# Patient Record
Sex: FEMALE | Race: WHITE | Employment: OTHER
[De-identification: names, ages, dates, MRNs, and addresses within clinical notes are randomized per-mention and may not be internally consistent; named-entity substitution may affect disease eponyms.]

---

## 2017-01-06 ENCOUNTER — CARE COORDINATOR VISIT (OUTPATIENT)
Dept: CARE COORDINATION | Facility: CLINIC | Age: 82
End: 2017-01-06

## 2017-01-13 ENCOUNTER — OFFICE VISIT (OUTPATIENT)
Dept: FAMILY MEDICINE CLINIC | Facility: CLINIC | Age: 82
End: 2017-01-13

## 2017-01-13 VITALS
DIASTOLIC BLOOD PRESSURE: 60 MMHG | HEART RATE: 68 BPM | WEIGHT: 201 LBS | BODY MASS INDEX: 30.46 KG/M2 | TEMPERATURE: 97.6 F | SYSTOLIC BLOOD PRESSURE: 122 MMHG | HEIGHT: 68 IN

## 2017-01-13 DIAGNOSIS — I10 ESSENTIAL HYPERTENSION: ICD-10-CM

## 2017-01-13 DIAGNOSIS — E11.9 TYPE 2 DIABETES MELLITUS WITHOUT COMPLICATION, WITHOUT LONG-TERM CURRENT USE OF INSULIN (HCC): Primary | ICD-10-CM

## 2017-01-13 DIAGNOSIS — Z91.81 AT HIGH RISK FOR FALLS: ICD-10-CM

## 2017-01-13 PROCEDURE — 4040F PNEUMOC VAC/ADMIN/RCVD: CPT | Performed by: FAMILY MEDICINE

## 2017-01-13 PROCEDURE — 1036F TOBACCO NON-USER: CPT | Performed by: FAMILY MEDICINE

## 2017-01-13 PROCEDURE — G8399 PT W/DXA RESULTS DOCUMENT: HCPCS | Performed by: FAMILY MEDICINE

## 2017-01-13 PROCEDURE — G8427 DOCREV CUR MEDS BY ELIG CLIN: HCPCS | Performed by: FAMILY MEDICINE

## 2017-01-13 PROCEDURE — G8484 FLU IMMUNIZE NO ADMIN: HCPCS | Performed by: FAMILY MEDICINE

## 2017-01-13 PROCEDURE — 99213 OFFICE O/P EST LOW 20 MIN: CPT | Performed by: FAMILY MEDICINE

## 2017-01-13 PROCEDURE — G8598 ASA/ANTIPLAT THER USED: HCPCS | Performed by: FAMILY MEDICINE

## 2017-01-13 PROCEDURE — G8419 CALC BMI OUT NRM PARAM NOF/U: HCPCS | Performed by: FAMILY MEDICINE

## 2017-01-13 PROCEDURE — 1090F PRES/ABSN URINE INCON ASSESS: CPT | Performed by: FAMILY MEDICINE

## 2017-01-13 PROCEDURE — 1123F ACP DISCUSS/DSCN MKR DOCD: CPT | Performed by: FAMILY MEDICINE

## 2017-01-13 ASSESSMENT — PATIENT HEALTH QUESTIONNAIRE - PHQ9
SUM OF ALL RESPONSES TO PHQ9 QUESTIONS 1 & 2: 0
SUM OF ALL RESPONSES TO PHQ QUESTIONS 1-9: 0
2. FEELING DOWN, DEPRESSED OR HOPELESS: 0

## 2017-01-13 ASSESSMENT — ENCOUNTER SYMPTOMS
EYE ITCHING: 0
SORE THROAT: 0
SHORTNESS OF BREATH: 0
NAUSEA: 0
ABDOMINAL PAIN: 0

## 2017-01-16 DIAGNOSIS — E11.9 TYPE 2 DIABETES MELLITUS WITHOUT COMPLICATION, WITHOUT LONG-TERM CURRENT USE OF INSULIN (HCC): ICD-10-CM

## 2017-01-16 RX ORDER — BLOOD-GLUCOSE METER
KIT MISCELLANEOUS
Qty: 200 EACH | Refills: 1 | Status: SHIPPED | OUTPATIENT
Start: 2017-01-16 | End: 2020-05-19 | Stop reason: SDUPTHER

## 2017-01-16 RX ORDER — LANCETS 28 GAUGE
EACH MISCELLANEOUS
Qty: 200 EACH | Refills: 1 | Status: SHIPPED | OUTPATIENT
Start: 2017-01-16 | End: 2021-11-17 | Stop reason: SDUPTHER

## 2017-01-18 ENCOUNTER — OFFICE VISIT (OUTPATIENT)
Dept: ORTHOPEDIC SURGERY | Facility: CLINIC | Age: 82
End: 2017-01-18

## 2017-01-18 VITALS
BODY MASS INDEX: 30.51 KG/M2 | HEIGHT: 69 IN | HEART RATE: 66 BPM | DIASTOLIC BLOOD PRESSURE: 72 MMHG | SYSTOLIC BLOOD PRESSURE: 159 MMHG | WEIGHT: 206 LBS

## 2017-01-18 DIAGNOSIS — S49.91XA RIGHT SHOULDER INJURY, INITIAL ENCOUNTER: Primary | ICD-10-CM

## 2017-01-18 PROCEDURE — G8598 ASA/ANTIPLAT THER USED: HCPCS | Performed by: ORTHOPAEDIC SURGERY

## 2017-01-18 PROCEDURE — G8419 CALC BMI OUT NRM PARAM NOF/U: HCPCS | Performed by: ORTHOPAEDIC SURGERY

## 2017-01-18 PROCEDURE — G8399 PT W/DXA RESULTS DOCUMENT: HCPCS | Performed by: ORTHOPAEDIC SURGERY

## 2017-01-18 PROCEDURE — G8484 FLU IMMUNIZE NO ADMIN: HCPCS | Performed by: ORTHOPAEDIC SURGERY

## 2017-01-18 PROCEDURE — 4040F PNEUMOC VAC/ADMIN/RCVD: CPT | Performed by: ORTHOPAEDIC SURGERY

## 2017-01-18 PROCEDURE — 1123F ACP DISCUSS/DSCN MKR DOCD: CPT | Performed by: ORTHOPAEDIC SURGERY

## 2017-01-18 PROCEDURE — 99203 OFFICE O/P NEW LOW 30 MIN: CPT | Performed by: ORTHOPAEDIC SURGERY

## 2017-01-18 PROCEDURE — 1036F TOBACCO NON-USER: CPT | Performed by: ORTHOPAEDIC SURGERY

## 2017-01-18 PROCEDURE — G8427 DOCREV CUR MEDS BY ELIG CLIN: HCPCS | Performed by: ORTHOPAEDIC SURGERY

## 2017-01-18 PROCEDURE — 1090F PRES/ABSN URINE INCON ASSESS: CPT | Performed by: ORTHOPAEDIC SURGERY

## 2017-01-18 RX ORDER — METHYLPREDNISOLONE 4 MG/1
TABLET ORAL
Qty: 1 KIT | Refills: 0 | Status: SHIPPED | OUTPATIENT
Start: 2017-01-18 | End: 2017-03-16 | Stop reason: ALTCHOICE

## 2017-02-06 ENCOUNTER — HOSPITAL ENCOUNTER (OUTPATIENT)
Dept: PHYSICAL THERAPY | Age: 82
Setting detail: THERAPIES SERIES
Discharge: HOME | End: 2017-02-06
Payer: MEDICARE

## 2017-02-06 PROCEDURE — 97110 THERAPEUTIC EXERCISES: CPT

## 2017-02-06 ASSESSMENT — PAIN DESCRIPTION - ORIENTATION: ORIENTATION: RIGHT

## 2017-02-06 ASSESSMENT — PAIN DESCRIPTION - FREQUENCY: FREQUENCY: CONTINUOUS

## 2017-02-06 ASSESSMENT — PAIN SCALES - GENERAL: PAINLEVEL_OUTOF10: 2

## 2017-02-06 ASSESSMENT — PAIN DESCRIPTION - LOCATION: LOCATION: SHOULDER

## 2017-02-06 ASSESSMENT — PAIN DESCRIPTION - DESCRIPTORS: DESCRIPTORS: ACHING;DULL;CONSTANT

## 2017-02-09 ENCOUNTER — APPOINTMENT (OUTPATIENT)
Dept: PHYSICAL THERAPY | Age: 82
End: 2017-02-09
Payer: MEDICARE

## 2017-02-13 ENCOUNTER — HOSPITAL ENCOUNTER (OUTPATIENT)
Dept: PHYSICAL THERAPY | Age: 82
Setting detail: THERAPIES SERIES
Discharge: HOME | End: 2017-02-13
Payer: MEDICARE

## 2017-02-13 PROCEDURE — 97110 THERAPEUTIC EXERCISES: CPT

## 2017-02-13 ASSESSMENT — PAIN DESCRIPTION - FREQUENCY: FREQUENCY: CONTINUOUS

## 2017-02-13 ASSESSMENT — PAIN DESCRIPTION - DESCRIPTORS: DESCRIPTORS: ACHING;DULL;CONSTANT

## 2017-02-13 ASSESSMENT — PAIN DESCRIPTION - ORIENTATION: ORIENTATION: RIGHT

## 2017-02-13 ASSESSMENT — PAIN SCALES - GENERAL: PAINLEVEL_OUTOF10: 4

## 2017-02-13 ASSESSMENT — PAIN DESCRIPTION - LOCATION: LOCATION: SHOULDER

## 2017-02-16 ENCOUNTER — HOSPITAL ENCOUNTER (OUTPATIENT)
Dept: PHYSICAL THERAPY | Age: 82
Setting detail: THERAPIES SERIES
Discharge: HOME | End: 2017-02-16
Payer: MEDICARE

## 2017-02-16 PROCEDURE — 97110 THERAPEUTIC EXERCISES: CPT

## 2017-02-16 PROCEDURE — G8984 CARRY CURRENT STATUS: HCPCS

## 2017-02-16 PROCEDURE — G8985 CARRY GOAL STATUS: HCPCS

## 2017-02-16 ASSESSMENT — PAIN DESCRIPTION - ORIENTATION: ORIENTATION: RIGHT

## 2017-02-16 ASSESSMENT — PAIN DESCRIPTION - LOCATION: LOCATION: SHOULDER

## 2017-02-16 ASSESSMENT — PAIN DESCRIPTION - FREQUENCY: FREQUENCY: CONTINUOUS

## 2017-02-16 ASSESSMENT — PAIN SCALES - GENERAL: PAINLEVEL_OUTOF10: 4

## 2017-02-16 ASSESSMENT — PAIN DESCRIPTION - DESCRIPTORS: DESCRIPTORS: ACHING;CONSTANT;DULL

## 2017-02-20 ENCOUNTER — HOSPITAL ENCOUNTER (OUTPATIENT)
Dept: PHYSICAL THERAPY | Age: 82
Setting detail: THERAPIES SERIES
Discharge: HOME | End: 2017-02-20
Payer: MEDICARE

## 2017-02-20 PROCEDURE — 97110 THERAPEUTIC EXERCISES: CPT

## 2017-02-20 ASSESSMENT — PAIN DESCRIPTION - LOCATION: LOCATION: SHOULDER

## 2017-02-20 ASSESSMENT — PAIN DESCRIPTION - DESCRIPTORS: DESCRIPTORS: ACHING;CONSTANT;DULL

## 2017-02-20 ASSESSMENT — PAIN DESCRIPTION - FREQUENCY: FREQUENCY: CONTINUOUS

## 2017-02-20 ASSESSMENT — PAIN SCALES - GENERAL: PAINLEVEL_OUTOF10: 3

## 2017-02-20 ASSESSMENT — PAIN DESCRIPTION - ORIENTATION: ORIENTATION: RIGHT

## 2017-02-22 ENCOUNTER — HOSPITAL ENCOUNTER (OUTPATIENT)
Dept: PHYSICAL THERAPY | Age: 82
Setting detail: THERAPIES SERIES
Discharge: HOME | End: 2017-02-22
Payer: MEDICARE

## 2017-02-22 PROCEDURE — 97110 THERAPEUTIC EXERCISES: CPT

## 2017-02-22 ASSESSMENT — PAIN SCALES - GENERAL: PAINLEVEL_OUTOF10: 3

## 2017-02-22 ASSESSMENT — PAIN DESCRIPTION - ORIENTATION: ORIENTATION: RIGHT

## 2017-02-22 ASSESSMENT — PAIN DESCRIPTION - FREQUENCY: FREQUENCY: CONTINUOUS

## 2017-02-22 ASSESSMENT — PAIN DESCRIPTION - DESCRIPTORS: DESCRIPTORS: ACHING;CONSTANT

## 2017-02-22 ASSESSMENT — PAIN DESCRIPTION - LOCATION: LOCATION: SHOULDER

## 2017-02-27 ENCOUNTER — HOSPITAL ENCOUNTER (OUTPATIENT)
Dept: PHYSICAL THERAPY | Age: 82
Setting detail: THERAPIES SERIES
Discharge: HOME | End: 2017-02-27
Payer: MEDICARE

## 2017-02-27 PROCEDURE — 97110 THERAPEUTIC EXERCISES: CPT

## 2017-02-27 ASSESSMENT — PAIN DESCRIPTION - ORIENTATION: ORIENTATION: RIGHT

## 2017-02-27 ASSESSMENT — PAIN DESCRIPTION - DESCRIPTORS: DESCRIPTORS: ACHING;CONSTANT

## 2017-02-27 ASSESSMENT — PAIN DESCRIPTION - LOCATION: LOCATION: SHOULDER

## 2017-02-27 ASSESSMENT — PAIN SCALES - GENERAL: PAINLEVEL_OUTOF10: 4

## 2017-02-27 ASSESSMENT — PAIN DESCRIPTION - FREQUENCY: FREQUENCY: CONTINUOUS

## 2017-03-01 ENCOUNTER — HOSPITAL ENCOUNTER (OUTPATIENT)
Dept: PHYSICAL THERAPY | Age: 82
Setting detail: THERAPIES SERIES
Discharge: HOME | End: 2017-03-01
Payer: MEDICARE

## 2017-03-01 PROCEDURE — G8984 CARRY CURRENT STATUS: HCPCS

## 2017-03-01 PROCEDURE — 97110 THERAPEUTIC EXERCISES: CPT

## 2017-03-01 PROCEDURE — G8985 CARRY GOAL STATUS: HCPCS

## 2017-03-01 PROCEDURE — 97164 PT RE-EVAL EST PLAN CARE: CPT

## 2017-03-01 ASSESSMENT — PAIN DESCRIPTION - FREQUENCY: FREQUENCY: CONTINUOUS

## 2017-03-01 ASSESSMENT — PAIN DESCRIPTION - ORIENTATION: ORIENTATION: RIGHT

## 2017-03-01 ASSESSMENT — PAIN SCALES - GENERAL: PAINLEVEL_OUTOF10: 3

## 2017-03-01 ASSESSMENT — PAIN DESCRIPTION - LOCATION: LOCATION: SHOULDER

## 2017-03-01 ASSESSMENT — PAIN DESCRIPTION - DESCRIPTORS: DESCRIPTORS: ACHING;CONSTANT

## 2017-03-02 ENCOUNTER — TELEPHONE (OUTPATIENT)
Dept: ORTHOPEDIC SURGERY | Facility: CLINIC | Age: 82
End: 2017-03-02

## 2017-03-02 DIAGNOSIS — M25.511 CHRONIC RIGHT SHOULDER PAIN: Primary | ICD-10-CM

## 2017-03-02 DIAGNOSIS — G89.29 CHRONIC RIGHT SHOULDER PAIN: Primary | ICD-10-CM

## 2017-03-03 ENCOUNTER — HOSPITAL ENCOUNTER (OUTPATIENT)
Dept: PHYSICAL THERAPY | Age: 82
Setting detail: THERAPIES SERIES
Discharge: HOME | End: 2017-03-03
Payer: MEDICARE

## 2017-03-03 PROCEDURE — 97110 THERAPEUTIC EXERCISES: CPT

## 2017-03-03 ASSESSMENT — PAIN DESCRIPTION - ORIENTATION: ORIENTATION: RIGHT

## 2017-03-03 ASSESSMENT — PAIN DESCRIPTION - DESCRIPTORS: DESCRIPTORS: ACHING;CONSTANT

## 2017-03-03 ASSESSMENT — PAIN DESCRIPTION - FREQUENCY: FREQUENCY: CONTINUOUS

## 2017-03-03 ASSESSMENT — PAIN DESCRIPTION - LOCATION: LOCATION: SHOULDER

## 2017-03-03 ASSESSMENT — PAIN SCALES - GENERAL: PAINLEVEL_OUTOF10: 3

## 2017-03-09 ENCOUNTER — TELEPHONE (OUTPATIENT)
Dept: ORTHOPEDIC SURGERY | Facility: CLINIC | Age: 82
End: 2017-03-09

## 2017-03-13 ENCOUNTER — HOSPITAL ENCOUNTER (OUTPATIENT)
Dept: MRI IMAGING | Age: 82
Discharge: HOME | End: 2017-03-13
Payer: MEDICARE

## 2017-03-13 DIAGNOSIS — G89.29 CHRONIC RIGHT SHOULDER PAIN: ICD-10-CM

## 2017-03-13 DIAGNOSIS — M25.511 CHRONIC RIGHT SHOULDER PAIN: ICD-10-CM

## 2017-03-13 PROCEDURE — 73221 MRI JOINT UPR EXTREM W/O DYE: CPT

## 2017-03-14 ENCOUNTER — TELEPHONE (OUTPATIENT)
Dept: ORTHOPEDIC SURGERY | Age: 82
End: 2017-03-14

## 2017-03-16 ENCOUNTER — OFFICE VISIT (OUTPATIENT)
Dept: FAMILY MEDICINE CLINIC | Age: 82
End: 2017-03-16
Payer: MEDICARE

## 2017-03-16 VITALS
HEART RATE: 71 BPM | SYSTOLIC BLOOD PRESSURE: 134 MMHG | WEIGHT: 206.2 LBS | HEIGHT: 69 IN | BODY MASS INDEX: 30.54 KG/M2 | DIASTOLIC BLOOD PRESSURE: 72 MMHG | TEMPERATURE: 98.3 F

## 2017-03-16 DIAGNOSIS — I25.810 CORONARY ARTERY DISEASE INVOLVING OTHER CORONARY ARTERY BYPASS GRAFT: ICD-10-CM

## 2017-03-16 DIAGNOSIS — E11.9 TYPE 2 DIABETES MELLITUS WITHOUT COMPLICATION, WITHOUT LONG-TERM CURRENT USE OF INSULIN (HCC): Primary | ICD-10-CM

## 2017-03-16 DIAGNOSIS — M10.9 GOUT OF FOOT, UNSPECIFIED CAUSE, UNSPECIFIED CHRONICITY, UNSPECIFIED LATERALITY: ICD-10-CM

## 2017-03-16 DIAGNOSIS — I10 ESSENTIAL HYPERTENSION: ICD-10-CM

## 2017-03-16 LAB — HBA1C MFR BLD: 6.6 %

## 2017-03-16 PROCEDURE — G8598 ASA/ANTIPLAT THER USED: HCPCS | Performed by: FAMILY MEDICINE

## 2017-03-16 PROCEDURE — 1123F ACP DISCUSS/DSCN MKR DOCD: CPT | Performed by: FAMILY MEDICINE

## 2017-03-16 PROCEDURE — 1036F TOBACCO NON-USER: CPT | Performed by: FAMILY MEDICINE

## 2017-03-16 PROCEDURE — G8484 FLU IMMUNIZE NO ADMIN: HCPCS | Performed by: FAMILY MEDICINE

## 2017-03-16 PROCEDURE — G8427 DOCREV CUR MEDS BY ELIG CLIN: HCPCS | Performed by: FAMILY MEDICINE

## 2017-03-16 PROCEDURE — 99214 OFFICE O/P EST MOD 30 MIN: CPT | Performed by: FAMILY MEDICINE

## 2017-03-16 PROCEDURE — 4040F PNEUMOC VAC/ADMIN/RCVD: CPT | Performed by: FAMILY MEDICINE

## 2017-03-16 PROCEDURE — G8399 PT W/DXA RESULTS DOCUMENT: HCPCS | Performed by: FAMILY MEDICINE

## 2017-03-16 PROCEDURE — 1090F PRES/ABSN URINE INCON ASSESS: CPT | Performed by: FAMILY MEDICINE

## 2017-03-16 PROCEDURE — G8419 CALC BMI OUT NRM PARAM NOF/U: HCPCS | Performed by: FAMILY MEDICINE

## 2017-03-16 PROCEDURE — 83036 HEMOGLOBIN GLYCOSYLATED A1C: CPT | Performed by: FAMILY MEDICINE

## 2017-03-16 RX ORDER — ISOSORBIDE MONONITRATE 30 MG/1
TABLET, EXTENDED RELEASE ORAL
COMMUNITY
Start: 2017-03-14 | End: 2017-06-12 | Stop reason: ALTCHOICE

## 2017-03-16 RX ORDER — AMLODIPINE BESYLATE 2.5 MG/1
TABLET ORAL
COMMUNITY
Start: 2017-02-01 | End: 2017-03-16 | Stop reason: ALTCHOICE

## 2017-03-16 ASSESSMENT — PATIENT HEALTH QUESTIONNAIRE - PHQ9
2. FEELING DOWN, DEPRESSED OR HOPELESS: 0
SUM OF ALL RESPONSES TO PHQ9 QUESTIONS 1 & 2: 0
1. LITTLE INTEREST OR PLEASURE IN DOING THINGS: 0
SUM OF ALL RESPONSES TO PHQ QUESTIONS 1-9: 0

## 2017-03-16 ASSESSMENT — ENCOUNTER SYMPTOMS
SORE THROAT: 0
SHORTNESS OF BREATH: 0
NAUSEA: 0
ABDOMINAL PAIN: 0

## 2017-03-22 ENCOUNTER — OFFICE VISIT (OUTPATIENT)
Dept: ORTHOPEDIC SURGERY | Age: 82
End: 2017-03-22
Payer: MEDICARE

## 2017-03-22 VITALS — HEART RATE: 70 BPM | DIASTOLIC BLOOD PRESSURE: 79 MMHG | SYSTOLIC BLOOD PRESSURE: 169 MMHG

## 2017-03-22 DIAGNOSIS — M75.101 TEAR OF RIGHT ROTATOR CUFF, UNSPECIFIED TEAR EXTENT: Primary | ICD-10-CM

## 2017-03-22 PROCEDURE — G8399 PT W/DXA RESULTS DOCUMENT: HCPCS | Performed by: ORTHOPAEDIC SURGERY

## 2017-03-22 PROCEDURE — G8484 FLU IMMUNIZE NO ADMIN: HCPCS | Performed by: ORTHOPAEDIC SURGERY

## 2017-03-22 PROCEDURE — 1123F ACP DISCUSS/DSCN MKR DOCD: CPT | Performed by: ORTHOPAEDIC SURGERY

## 2017-03-22 PROCEDURE — 1036F TOBACCO NON-USER: CPT | Performed by: ORTHOPAEDIC SURGERY

## 2017-03-22 PROCEDURE — G8598 ASA/ANTIPLAT THER USED: HCPCS | Performed by: ORTHOPAEDIC SURGERY

## 2017-03-22 PROCEDURE — 1090F PRES/ABSN URINE INCON ASSESS: CPT | Performed by: ORTHOPAEDIC SURGERY

## 2017-03-22 PROCEDURE — 99212 OFFICE O/P EST SF 10 MIN: CPT | Performed by: ORTHOPAEDIC SURGERY

## 2017-03-22 PROCEDURE — G8419 CALC BMI OUT NRM PARAM NOF/U: HCPCS | Performed by: ORTHOPAEDIC SURGERY

## 2017-03-22 PROCEDURE — 4040F PNEUMOC VAC/ADMIN/RCVD: CPT | Performed by: ORTHOPAEDIC SURGERY

## 2017-03-22 PROCEDURE — G8427 DOCREV CUR MEDS BY ELIG CLIN: HCPCS | Performed by: ORTHOPAEDIC SURGERY

## 2017-03-23 LAB
ALBUMIN SERPL-MCNC: 4.4 G/DL (ref 3.2–5.3)
ALK PHOSPHATASE: 65 IU/L (ref 35–121)
ALT SERPL-CCNC: 37 IU/L (ref 5–59)
ANION GAP SERPL CALCULATED.3IONS-SCNC: 16 MMOL/L
AST SERPL-CCNC: 35 IU/L (ref 10–42)
BILIRUB SERPL-MCNC: 1.6 MG/DL (ref 0.2–1.3)
BUN BLDV-MCNC: 20 MG/DL (ref 9–24)
CALCIUM SERPL-MCNC: 10.1 MG/DL (ref 8.7–10.8)
CHLORIDE BLD-SCNC: 102 MMOL/L (ref 95–111)
CHOLESTEROL/HDL RATIO: 3.2
CHOLESTEROL: 122 MG/DL
CO2: 29 MMOL/L (ref 21–32)
CREAT SERPL-MCNC: 0.8 MG/DL (ref 0.5–1.3)
EGFR AFRICAN AMERICAN: 83
EGFR IF NONAFRICAN AMERICAN: 69
GLUCOSE: 155 MG/DL (ref 70–100)
HDLC SERPL-MCNC: 38 MG/DL (ref 40–60)
LDL CHOLESTEROL CALCULATED: 34 MG/DL
LDL/HDL RATIO: 0.9
POTASSIUM SERPL-SCNC: 4.7 MMOL/L (ref 3.5–5.4)
SODIUM BLD-SCNC: 142 MMOL/L (ref 134–147)
TOTAL PROTEIN: 7 G/DL (ref 5.8–8)
TRIGL SERPL-MCNC: 248 MG/DL
VLDLC SERPL CALC-MCNC: 50 MG/DL

## 2017-04-13 RX ORDER — GLIMEPIRIDE 4 MG/1
TABLET ORAL
Qty: 180 TABLET | Refills: 0 | Status: SHIPPED | OUTPATIENT
Start: 2017-04-13 | End: 2017-07-12 | Stop reason: SDUPTHER

## 2017-04-13 RX ORDER — ALLOPURINOL 100 MG/1
TABLET ORAL
Qty: 90 TABLET | Refills: 0 | Status: SHIPPED | OUTPATIENT
Start: 2017-04-13 | End: 2017-07-12 | Stop reason: SDUPTHER

## 2017-05-22 ENCOUNTER — OFFICE VISIT (OUTPATIENT)
Dept: ORTHOPEDIC SURGERY | Age: 82
End: 2017-05-22
Payer: MEDICARE

## 2017-05-22 VITALS — HEART RATE: 78 BPM | RESPIRATION RATE: 16 BRPM

## 2017-05-22 DIAGNOSIS — S46.001D UNSPECIFIED INJURY OF MUSCLE(S) AND TENDON(S) OF THE ROTATOR CUFF OF RIGHT SHOULDER, SUBSEQUENT ENCOUNTER: Primary | ICD-10-CM

## 2017-05-22 PROCEDURE — 1123F ACP DISCUSS/DSCN MKR DOCD: CPT | Performed by: ORTHOPAEDIC SURGERY

## 2017-05-22 PROCEDURE — G8399 PT W/DXA RESULTS DOCUMENT: HCPCS | Performed by: ORTHOPAEDIC SURGERY

## 2017-05-22 PROCEDURE — 1036F TOBACCO NON-USER: CPT | Performed by: ORTHOPAEDIC SURGERY

## 2017-05-22 PROCEDURE — G8427 DOCREV CUR MEDS BY ELIG CLIN: HCPCS | Performed by: ORTHOPAEDIC SURGERY

## 2017-05-22 PROCEDURE — 4040F PNEUMOC VAC/ADMIN/RCVD: CPT | Performed by: ORTHOPAEDIC SURGERY

## 2017-05-22 PROCEDURE — G8419 CALC BMI OUT NRM PARAM NOF/U: HCPCS | Performed by: ORTHOPAEDIC SURGERY

## 2017-05-22 PROCEDURE — 99213 OFFICE O/P EST LOW 20 MIN: CPT | Performed by: ORTHOPAEDIC SURGERY

## 2017-05-22 PROCEDURE — G8598 ASA/ANTIPLAT THER USED: HCPCS | Performed by: ORTHOPAEDIC SURGERY

## 2017-05-22 PROCEDURE — 1090F PRES/ABSN URINE INCON ASSESS: CPT | Performed by: ORTHOPAEDIC SURGERY

## 2017-05-24 RX ORDER — LINAGLIPTIN 5 MG/1
TABLET, FILM COATED ORAL
Qty: 90 TABLET | Refills: 1 | Status: SHIPPED | OUTPATIENT
Start: 2017-05-24 | End: 2017-11-20 | Stop reason: SDUPTHER

## 2017-05-24 RX ORDER — RIVAROXABAN 20 MG/1
TABLET, FILM COATED ORAL
Qty: 90 TABLET | Refills: 1 | Status: SHIPPED | OUTPATIENT
Start: 2017-05-24 | End: 2017-11-20 | Stop reason: SDUPTHER

## 2017-06-12 ENCOUNTER — HOSPITAL ENCOUNTER (OUTPATIENT)
Age: 82
Setting detail: SPECIMEN
Discharge: HOME | End: 2017-06-12
Payer: MEDICARE

## 2017-06-12 ENCOUNTER — OFFICE VISIT (OUTPATIENT)
Dept: FAMILY MEDICINE CLINIC | Age: 82
End: 2017-06-12
Payer: MEDICARE

## 2017-06-12 VITALS
BODY MASS INDEX: 31.67 KG/M2 | SYSTOLIC BLOOD PRESSURE: 160 MMHG | DIASTOLIC BLOOD PRESSURE: 84 MMHG | WEIGHT: 209 LBS | HEART RATE: 75 BPM | HEIGHT: 68 IN | TEMPERATURE: 99 F | RESPIRATION RATE: 18 BRPM

## 2017-06-12 DIAGNOSIS — I10 ESSENTIAL HYPERTENSION: ICD-10-CM

## 2017-06-12 DIAGNOSIS — R07.81 RIB PAIN ON RIGHT SIDE: ICD-10-CM

## 2017-06-12 DIAGNOSIS — E11.9 TYPE 2 DIABETES MELLITUS WITHOUT COMPLICATION, WITHOUT LONG-TERM CURRENT USE OF INSULIN (HCC): Primary | ICD-10-CM

## 2017-06-12 DIAGNOSIS — M54.89 OTHER BACK PAIN: ICD-10-CM

## 2017-06-12 LAB
-: ABNORMAL
AMORPHOUS: ABNORMAL
BACTERIA: ABNORMAL
BILIRUBIN URINE: NEGATIVE
BILIRUBIN, POC: NEGATIVE
BLOOD URINE, POC: NEGATIVE
CASTS UA: ABNORMAL /LPF (ref 0–2)
CLARITY, POC: CLEAR
COLOR, POC: YELLOW
COLOR: YELLOW
COMMENT UA: ABNORMAL
CRYSTALS, UA: ABNORMAL /HPF
EPITHELIAL CELLS UA: ABNORMAL /HPF (ref 0–5)
GLUCOSE URINE, POC: NEGATIVE
GLUCOSE URINE: NEGATIVE
KETONES, POC: NEGATIVE
KETONES, URINE: NEGATIVE
LEUKOCYTE EST, POC: NORMAL
LEUKOCYTE ESTERASE, URINE: ABNORMAL
MUCUS: ABNORMAL
NITRITE, POC: NEGATIVE
NITRITE, URINE: NEGATIVE
OTHER OBSERVATIONS UA: ABNORMAL
PH UA: 5 (ref 5–8)
PH, POC: 5
PROTEIN UA: NEGATIVE
PROTEIN, POC: NEGATIVE
RBC UA: ABNORMAL /HPF (ref 0–2)
RENAL EPITHELIAL, UA: ABNORMAL /HPF
SPECIFIC GRAVITY UA: 1.02 (ref 1–1.03)
SPECIFIC GRAVITY, POC: 1.01
TRICHOMONAS: ABNORMAL
TURBIDITY: CLEAR
URINE HGB: NEGATIVE
UROBILINOGEN, POC: NEGATIVE
UROBILINOGEN, URINE: NORMAL
WBC UA: ABNORMAL /HPF (ref 0–5)
YEAST: ABNORMAL

## 2017-06-12 PROCEDURE — 1090F PRES/ABSN URINE INCON ASSESS: CPT | Performed by: FAMILY MEDICINE

## 2017-06-12 PROCEDURE — 81003 URINALYSIS AUTO W/O SCOPE: CPT | Performed by: FAMILY MEDICINE

## 2017-06-12 PROCEDURE — 1123F ACP DISCUSS/DSCN MKR DOCD: CPT | Performed by: FAMILY MEDICINE

## 2017-06-12 PROCEDURE — 99213 OFFICE O/P EST LOW 20 MIN: CPT | Performed by: FAMILY MEDICINE

## 2017-06-12 PROCEDURE — 1036F TOBACCO NON-USER: CPT | Performed by: FAMILY MEDICINE

## 2017-06-12 PROCEDURE — 4040F PNEUMOC VAC/ADMIN/RCVD: CPT | Performed by: FAMILY MEDICINE

## 2017-06-12 PROCEDURE — G8598 ASA/ANTIPLAT THER USED: HCPCS | Performed by: FAMILY MEDICINE

## 2017-06-12 PROCEDURE — G8417 CALC BMI ABV UP PARAM F/U: HCPCS | Performed by: FAMILY MEDICINE

## 2017-06-12 PROCEDURE — G8399 PT W/DXA RESULTS DOCUMENT: HCPCS | Performed by: FAMILY MEDICINE

## 2017-06-12 PROCEDURE — G8427 DOCREV CUR MEDS BY ELIG CLIN: HCPCS | Performed by: FAMILY MEDICINE

## 2017-06-12 RX ORDER — AMOXICILLIN 500 MG/1
CAPSULE ORAL
Refills: 0 | COMMUNITY
Start: 2017-06-01 | End: 2017-09-12 | Stop reason: ALTCHOICE

## 2017-06-12 ASSESSMENT — ENCOUNTER SYMPTOMS
ABDOMINAL PAIN: 0
CONSTIPATION: 0
SHORTNESS OF BREATH: 0
NAUSEA: 0
SORE THROAT: 0

## 2017-06-13 ENCOUNTER — HOSPITAL ENCOUNTER (OUTPATIENT)
Dept: GENERAL RADIOLOGY | Age: 82
Discharge: HOME | End: 2017-06-13
Payer: MEDICARE

## 2017-06-13 ENCOUNTER — HOSPITAL ENCOUNTER (OUTPATIENT)
Age: 82
Discharge: HOME | End: 2017-06-13
Payer: MEDICARE

## 2017-06-13 DIAGNOSIS — R07.81 RIB PAIN ON RIGHT SIDE: ICD-10-CM

## 2017-06-13 LAB
CULTURE: NO GROWTH
CULTURE: NORMAL
Lab: NORMAL
SPECIMEN DESCRIPTION: NORMAL
STATUS: NORMAL

## 2017-06-13 PROCEDURE — 71100 X-RAY EXAM RIBS UNI 2 VIEWS: CPT

## 2017-06-15 ENCOUNTER — TELEPHONE (OUTPATIENT)
Dept: FAMILY MEDICINE CLINIC | Age: 82
End: 2017-06-15

## 2017-06-15 DIAGNOSIS — R10.9 RIGHT FLANK PAIN: Primary | ICD-10-CM

## 2017-06-28 ENCOUNTER — OFFICE VISIT (OUTPATIENT)
Dept: ORTHOPEDIC SURGERY | Age: 82
End: 2017-06-28
Payer: MEDICARE

## 2017-06-28 VITALS
HEIGHT: 68 IN | BODY MASS INDEX: 31.67 KG/M2 | WEIGHT: 209 LBS | SYSTOLIC BLOOD PRESSURE: 166 MMHG | DIASTOLIC BLOOD PRESSURE: 75 MMHG | HEART RATE: 66 BPM

## 2017-06-28 DIAGNOSIS — Z96.653 PRESENCE OF BOTH ARTIFICIAL KNEE JOINTS: Primary | ICD-10-CM

## 2017-06-28 PROCEDURE — 1036F TOBACCO NON-USER: CPT | Performed by: ORTHOPAEDIC SURGERY

## 2017-06-28 PROCEDURE — 1123F ACP DISCUSS/DSCN MKR DOCD: CPT | Performed by: ORTHOPAEDIC SURGERY

## 2017-06-28 PROCEDURE — G8427 DOCREV CUR MEDS BY ELIG CLIN: HCPCS | Performed by: ORTHOPAEDIC SURGERY

## 2017-06-28 PROCEDURE — 99213 OFFICE O/P EST LOW 20 MIN: CPT | Performed by: ORTHOPAEDIC SURGERY

## 2017-06-28 PROCEDURE — G8399 PT W/DXA RESULTS DOCUMENT: HCPCS | Performed by: ORTHOPAEDIC SURGERY

## 2017-06-28 PROCEDURE — 1090F PRES/ABSN URINE INCON ASSESS: CPT | Performed by: ORTHOPAEDIC SURGERY

## 2017-06-28 PROCEDURE — 4040F PNEUMOC VAC/ADMIN/RCVD: CPT | Performed by: ORTHOPAEDIC SURGERY

## 2017-06-28 PROCEDURE — G8417 CALC BMI ABV UP PARAM F/U: HCPCS | Performed by: ORTHOPAEDIC SURGERY

## 2017-06-28 PROCEDURE — G8598 ASA/ANTIPLAT THER USED: HCPCS | Performed by: ORTHOPAEDIC SURGERY

## 2017-07-12 RX ORDER — ALLOPURINOL 100 MG/1
TABLET ORAL
Qty: 90 TABLET | Refills: 1 | Status: SHIPPED | OUTPATIENT
Start: 2017-07-12 | End: 2018-01-08 | Stop reason: SDUPTHER

## 2017-07-12 RX ORDER — GLIMEPIRIDE 4 MG/1
TABLET ORAL
Qty: 180 TABLET | Refills: 1 | Status: SHIPPED | OUTPATIENT
Start: 2017-07-12 | End: 2018-01-08 | Stop reason: SDUPTHER

## 2017-08-11 ENCOUNTER — HOSPITAL ENCOUNTER (OUTPATIENT)
Dept: WOMENS IMAGING | Age: 82
Discharge: HOME | End: 2017-08-11
Payer: MEDICARE

## 2017-08-11 DIAGNOSIS — Z12.31 VISIT FOR SCREENING MAMMOGRAM: ICD-10-CM

## 2017-08-11 PROCEDURE — 77063 BREAST TOMOSYNTHESIS BI: CPT

## 2017-08-15 ENCOUNTER — TELEPHONE (OUTPATIENT)
Dept: FAMILY MEDICINE CLINIC | Age: 82
End: 2017-08-15

## 2017-09-12 ENCOUNTER — HOSPITAL ENCOUNTER (OUTPATIENT)
Age: 82
Setting detail: SPECIMEN
Discharge: HOME | End: 2017-09-12
Payer: MEDICARE

## 2017-09-12 ENCOUNTER — OFFICE VISIT (OUTPATIENT)
Dept: FAMILY MEDICINE CLINIC | Age: 82
End: 2017-09-12
Payer: MEDICARE

## 2017-09-12 VITALS
HEIGHT: 68 IN | BODY MASS INDEX: 30.77 KG/M2 | WEIGHT: 203 LBS | SYSTOLIC BLOOD PRESSURE: 128 MMHG | OXYGEN SATURATION: 98 % | HEART RATE: 61 BPM | DIASTOLIC BLOOD PRESSURE: 58 MMHG | TEMPERATURE: 98.1 F

## 2017-09-12 DIAGNOSIS — B34.9 VIRAL SYNDROME: ICD-10-CM

## 2017-09-12 DIAGNOSIS — I10 ESSENTIAL HYPERTENSION: ICD-10-CM

## 2017-09-12 DIAGNOSIS — M10.9 GOUT OF FOOT, UNSPECIFIED CAUSE, UNSPECIFIED CHRONICITY, UNSPECIFIED LATERALITY: ICD-10-CM

## 2017-09-12 DIAGNOSIS — E11.9 TYPE 2 DIABETES MELLITUS WITHOUT COMPLICATION, WITHOUT LONG-TERM CURRENT USE OF INSULIN (HCC): Primary | ICD-10-CM

## 2017-09-12 LAB
CREATININE URINE: 187.2 MG/DL (ref 28–217)
HBA1C MFR BLD: 6.6 %
MICROALBUMIN/CREAT 24H UR: 32 MG/L
MICROALBUMIN/CREAT UR-RTO: 17 MCG/MG CREAT

## 2017-09-12 PROCEDURE — 83036 HEMOGLOBIN GLYCOSYLATED A1C: CPT | Performed by: FAMILY MEDICINE

## 2017-09-12 PROCEDURE — G8399 PT W/DXA RESULTS DOCUMENT: HCPCS | Performed by: FAMILY MEDICINE

## 2017-09-12 PROCEDURE — 1123F ACP DISCUSS/DSCN MKR DOCD: CPT | Performed by: FAMILY MEDICINE

## 2017-09-12 PROCEDURE — 1090F PRES/ABSN URINE INCON ASSESS: CPT | Performed by: FAMILY MEDICINE

## 2017-09-12 PROCEDURE — 1036F TOBACCO NON-USER: CPT | Performed by: FAMILY MEDICINE

## 2017-09-12 PROCEDURE — G8417 CALC BMI ABV UP PARAM F/U: HCPCS | Performed by: FAMILY MEDICINE

## 2017-09-12 PROCEDURE — G8598 ASA/ANTIPLAT THER USED: HCPCS | Performed by: FAMILY MEDICINE

## 2017-09-12 PROCEDURE — 99214 OFFICE O/P EST MOD 30 MIN: CPT | Performed by: FAMILY MEDICINE

## 2017-09-12 PROCEDURE — G8427 DOCREV CUR MEDS BY ELIG CLIN: HCPCS | Performed by: FAMILY MEDICINE

## 2017-09-12 PROCEDURE — 4040F PNEUMOC VAC/ADMIN/RCVD: CPT | Performed by: FAMILY MEDICINE

## 2017-09-12 RX ORDER — AMLODIPINE BESYLATE 2.5 MG/1
TABLET ORAL
COMMUNITY
Start: 2017-07-31 | End: 2017-09-12 | Stop reason: SDUPTHER

## 2017-09-12 ASSESSMENT — ENCOUNTER SYMPTOMS
NAUSEA: 0
CONSTIPATION: 0
COUGH: 1
WHEEZING: 0
ABDOMINAL PAIN: 0
SORE THROAT: 0
SHORTNESS OF BREATH: 0

## 2017-10-11 LAB
ABSOLUTE BASO #: 0 K/UL (ref 0–0.1)
ABSOLUTE EOS #: 0.2 K/UL (ref 0.1–0.4)
ABSOLUTE LYMPH #: 2.5 K/UL (ref 0.8–5.2)
ABSOLUTE MONO #: 0.7 K/UL (ref 0.1–0.9)
ABSOLUTE NEUT #: 4.7 K/UL (ref 1.3–9.1)
ALBUMIN SERPL-MCNC: 4.5 G/DL (ref 3.2–5.3)
ALK PHOSPHATASE: 64 IU/L (ref 35–121)
ALT SERPL-CCNC: 27 IU/L (ref 5–59)
ANION GAP SERPL CALCULATED.3IONS-SCNC: 13 MMOL/L
AST SERPL-CCNC: 26 IU/L (ref 10–42)
BASOPHILS RELATIVE PERCENT: 0.5 %
BILIRUB SERPL-MCNC: 1.5 MG/DL (ref 0.2–1.3)
BUN BLDV-MCNC: 26 MG/DL (ref 9–24)
CALCIUM SERPL-MCNC: 10 MG/DL (ref 8.7–10.8)
CHLORIDE BLD-SCNC: 103 MMOL/L (ref 95–111)
CHOLESTEROL/HDL RATIO: 3
CHOLESTEROL: 120 MG/DL
CO2: 29 MMOL/L (ref 21–32)
CREAT SERPL-MCNC: 0.9 MG/DL (ref 0.5–1.3)
EGFR AFRICAN AMERICAN: 72
EGFR IF NONAFRICAN AMERICAN: 60
EOSINOPHILS RELATIVE PERCENT: 2.8 %
GLUCOSE: 168 MG/DL (ref 70–100)
HCT VFR BLD CALC: 41.6 % (ref 36–48)
HDLC SERPL-MCNC: 40 MG/DL (ref 40–60)
HEMOGLOBIN: 13.8 G/DL (ref 12–16)
LDL CHOLESTEROL CALCULATED: 46 MG/DL
LDL/HDL RATIO: 1.2
LYMPHOCYTE %: 30.6 %
MCH RBC QN AUTO: 28.7 PG (ref 27–34)
MCHC RBC AUTO-ENTMCNC: 33.2 G/DL (ref 31–36)
MCV RBC AUTO: 86.5 FL (ref 80–100)
MONOCYTES # BLD: 8.7 %
NEUTROPHILS RELATIVE PERCENT: 57 %
PDW BLD-RTO: 13.8 % (ref 10.8–14.8)
PLATELETS: 213 K/UL (ref 150–450)
POTASSIUM SERPL-SCNC: 5.1 MMOL/L (ref 3.5–5.4)
RBC: 4.81 M/UL (ref 4–5.5)
SODIUM BLD-SCNC: 140 MMOL/L (ref 134–147)
TOTAL PROTEIN: 6.7 G/DL (ref 5.8–8)
TRIGL SERPL-MCNC: 172 MG/DL
URIC ACID: 6.3 MG/DL (ref 2.8–6.8)
VLDLC SERPL CALC-MCNC: 34 MG/DL
WBC: 8.2 K/UL (ref 3.7–10.8)

## 2017-10-27 ENCOUNTER — NURSE ONLY (OUTPATIENT)
Dept: FAMILY MEDICINE CLINIC | Age: 82
End: 2017-10-27
Payer: MEDICARE

## 2017-10-27 DIAGNOSIS — Z23 NEED FOR INFLUENZA VACCINATION: Primary | ICD-10-CM

## 2017-10-27 PROCEDURE — 90688 IIV4 VACCINE SPLT 0.5 ML IM: CPT | Performed by: FAMILY MEDICINE

## 2017-10-27 PROCEDURE — G0008 ADMIN INFLUENZA VIRUS VAC: HCPCS | Performed by: FAMILY MEDICINE

## 2017-10-27 NOTE — PROGRESS NOTES
After obtaining consent, and per orders of Dr. Alex Singletary, injection of Fluzone Quadrivalent, Influenza Vaccination given in Left deltoid by Dondra Merlin. Patient instructed to report any adverse reaction to me immediately. Patient given education of flu vaccine at time of office visit.

## 2017-11-20 RX ORDER — LINAGLIPTIN 5 MG/1
TABLET, FILM COATED ORAL
Qty: 90 TABLET | Refills: 1 | Status: SHIPPED | OUTPATIENT
Start: 2017-11-20 | End: 2018-07-27 | Stop reason: SDUPTHER

## 2017-11-20 RX ORDER — RIVAROXABAN 20 MG/1
TABLET, FILM COATED ORAL
Qty: 90 TABLET | Refills: 1 | Status: SHIPPED | OUTPATIENT
Start: 2017-11-20 | End: 2018-05-19 | Stop reason: SDUPTHER

## 2017-12-12 ENCOUNTER — OFFICE VISIT (OUTPATIENT)
Dept: FAMILY MEDICINE CLINIC | Age: 82
End: 2017-12-12
Payer: MEDICARE

## 2017-12-12 VITALS
HEART RATE: 68 BPM | OXYGEN SATURATION: 96 % | DIASTOLIC BLOOD PRESSURE: 72 MMHG | SYSTOLIC BLOOD PRESSURE: 138 MMHG | WEIGHT: 202.6 LBS | BODY MASS INDEX: 30.71 KG/M2 | TEMPERATURE: 98.4 F | HEIGHT: 68 IN

## 2017-12-12 DIAGNOSIS — E11.9 TYPE 2 DIABETES MELLITUS WITHOUT COMPLICATION, WITHOUT LONG-TERM CURRENT USE OF INSULIN (HCC): Primary | ICD-10-CM

## 2017-12-12 DIAGNOSIS — I10 ESSENTIAL HYPERTENSION: ICD-10-CM

## 2017-12-12 DIAGNOSIS — M10.9 GOUT OF FOOT, UNSPECIFIED CAUSE, UNSPECIFIED CHRONICITY, UNSPECIFIED LATERALITY: ICD-10-CM

## 2017-12-12 PROCEDURE — G8484 FLU IMMUNIZE NO ADMIN: HCPCS | Performed by: FAMILY MEDICINE

## 2017-12-12 PROCEDURE — G8598 ASA/ANTIPLAT THER USED: HCPCS | Performed by: FAMILY MEDICINE

## 2017-12-12 PROCEDURE — 1090F PRES/ABSN URINE INCON ASSESS: CPT | Performed by: FAMILY MEDICINE

## 2017-12-12 PROCEDURE — 99213 OFFICE O/P EST LOW 20 MIN: CPT | Performed by: FAMILY MEDICINE

## 2017-12-12 PROCEDURE — 1036F TOBACCO NON-USER: CPT | Performed by: FAMILY MEDICINE

## 2017-12-12 PROCEDURE — G8417 CALC BMI ABV UP PARAM F/U: HCPCS | Performed by: FAMILY MEDICINE

## 2017-12-12 PROCEDURE — G8427 DOCREV CUR MEDS BY ELIG CLIN: HCPCS | Performed by: FAMILY MEDICINE

## 2017-12-12 PROCEDURE — 4040F PNEUMOC VAC/ADMIN/RCVD: CPT | Performed by: FAMILY MEDICINE

## 2017-12-12 PROCEDURE — 1123F ACP DISCUSS/DSCN MKR DOCD: CPT | Performed by: FAMILY MEDICINE

## 2017-12-12 PROCEDURE — G8399 PT W/DXA RESULTS DOCUMENT: HCPCS | Performed by: FAMILY MEDICINE

## 2017-12-12 ASSESSMENT — ENCOUNTER SYMPTOMS
SHORTNESS OF BREATH: 0
COUGH: 0
ABDOMINAL PAIN: 0
NAUSEA: 0
SORE THROAT: 0
CONSTIPATION: 0
BACK PAIN: 0

## 2017-12-12 NOTE — PROGRESS NOTES
Subjective:      Patient ID: Helio Duncan is a 80 y.o. female. Chronic Disease Visit Information    BP Readings from Last 3 Encounters:   12/12/17 138/72   09/12/17 (!) 128/58   06/28/17 (!) 166/75          Hemoglobin A1C (%)   Date Value   09/12/2017 6.6   03/16/2017 6.6   09/06/2016 6.9     Microalb/Crt. Ratio (mcg/mg creat)   Date Value   09/12/2017 17     LDL Cholesterol (mg/dL)   Date Value   01/24/2014 54     LDL Calculated (mg/dL)   Date Value   10/10/2017 46     HDL (mg/dl)   Date Value   10/10/2017 40     BUN (mg/dl)   Date Value   10/10/2017 26 (H)     CREATININE (mg/dl)   Date Value   10/10/2017 0.9     Glucose (mg/dl)   Date Value   10/10/2017 168 (H)            Have you changed or started any medications since your last visit including any over-the-counter medicines, vitamins, or herbal medicines? no   Are you having any side effects from any of your medications? -  no  Have you stopped taking any of your medications? Is so, why? -  no    Have you seen any other physician or provider since your last visit? Yes - Records Obtained  Have you had any other diagnostic tests since your last visit? Yes - Records Obtained  Have you been seen in the emergency room and/or had an admission to a hospital since we last saw you? No  Have you had your annual diabetic retinal (eye) exam? Yes - Records Obtained  Have you had your routine dental cleaning in the past 6 months? yes - November 2017    Have you activated your [employer] account? If not, what are your barriers?  Yes     Patient Care Team:  Denia Briceño MD as PCP - General  Denia Briceño MD as PCP - S Attributed Provider  Pop Rothman MD as Consulting Physician (Ophthalmology)  Trinidad Parsons MD as Consulting Physician (Cardiology)  Louisa Gardiner as Consulting Physician (Neurology)         Medical History Review  Past Medical, Family, and Social History reviewed and does contribute to the patient presenting condition    Health Maintenance Topic Date Due    DTaP/Tdap/Td vaccine (1 - Tdap) [birth year]/1952    Pneumococcal low/med risk (2 of 2 - PCV13) 10/22/2016    Diabetic retinal exam  07/07/2017    Diabetic hemoglobin A1C test  03/12/2018    Diabetic foot exam  06/12/2018    Lipid screen  10/10/2018    Colon cancer screen colonoscopy  09/14/2025    Zostavax vaccine  Completed    DEXA (modify frequency per FRAX score)  Completed    Flu vaccine  Completed     HPI  This 66-year-old female is seen in the Office. today for follow-up for her diabetes hypertension hyperlipidemia her blood sugars are running between 150-170 she is on Amaryl and Tradjenta I told her we need to add another medicine she does not want to take anymore medication she will try to watch her diet. Has history of hypertension blood pressure is controlled on Norvasc and Lopressor denies of any chest pain or shortness of her breath  has hyperlipidemia on Lipitor and Zetia she also recently saw her cardiologist who continued her on same medication. Has got gout and is on Zyloprim has had no problems  Review of Systems   Constitutional: Negative for appetite change. HENT: Negative for ear pain and sore throat. Eyes: Positive for visual disturbance. Wears glasses   Respiratory: Negative for cough and shortness of breath. Cardiovascular: Positive for leg swelling. Negative for chest pain. Gastrointestinal: Negative for abdominal pain, constipation and nausea. Genitourinary: Negative for frequency and pelvic pain. Musculoskeletal: Negative for arthralgias and back pain. Neurological: Negative for dizziness and headaches. Objective:   Physical Exam   Constitutional: She is oriented to person, place, and time. She appears well-developed and well-nourished. /72   Pulse 68   Temp 98.4 °F (36.9 °C) (Oral)   Ht 5' 8\" (1.727 m)   Wt 202 lb 9.6 oz (91.9 kg)   SpO2 96%   BMI 30.81 kg/m²    HENT:   Head: Normocephalic.    Mouth/Throat: Oropharynx is clear and moist.        Eyes: Conjunctivae are normal.   Cardiovascular: Normal rate and regular rhythm. Pulmonary/Chest: Breath sounds normal. She has no rales. Abdominal: Soft. Bowel sounds are normal. There is no tenderness. Musculoskeletal: She exhibits edema. She exhibits no tenderness. 1+ pedal edema present   Lymphadenopathy:     She has no cervical adenopathy. Neurological: She is alert and oriented to person, place, and time. Nursing note and vitals reviewed. Assessment:      1. Type 2 diabetes mellitus without complication, without long-term current use of insulin ([facility])  Stable    2. Essential hypertension  Controlled    3. Gout of foot, unspecified cause, unspecified chronicity, unspecified laterality  Controlled           Plan:        No orders of the defined types were placed in this encounter. No orders of the defined types were placed in this encounter. Return in about 3 months (around 3/12/2018) for diabetes.     Continue current medications reviewed from the chart

## 2018-01-08 RX ORDER — ALLOPURINOL 100 MG/1
TABLET ORAL
Qty: 90 TABLET | Refills: 1 | Status: SHIPPED | OUTPATIENT
Start: 2018-01-08 | End: 2018-07-07 | Stop reason: SDUPTHER

## 2018-01-08 RX ORDER — GLIMEPIRIDE 4 MG/1
TABLET ORAL
Qty: 180 TABLET | Refills: 1 | Status: SHIPPED | OUTPATIENT
Start: 2018-01-08 | End: 2018-07-07 | Stop reason: SDUPTHER

## 2018-01-12 ENCOUNTER — TELEPHONE (OUTPATIENT)
Dept: FAMILY MEDICINE CLINIC | Age: 83
End: 2018-01-12

## 2018-03-12 ENCOUNTER — OFFICE VISIT (OUTPATIENT)
Dept: FAMILY MEDICINE CLINIC | Age: 83
End: 2018-03-12
Payer: MEDICARE

## 2018-03-12 VITALS
WEIGHT: 205.2 LBS | OXYGEN SATURATION: 97 % | TEMPERATURE: 98.6 F | DIASTOLIC BLOOD PRESSURE: 68 MMHG | HEART RATE: 72 BPM | SYSTOLIC BLOOD PRESSURE: 124 MMHG | BODY MASS INDEX: 31.2 KG/M2

## 2018-03-12 DIAGNOSIS — I10 ESSENTIAL HYPERTENSION: ICD-10-CM

## 2018-03-12 DIAGNOSIS — E11.9 TYPE 2 DIABETES MELLITUS WITHOUT COMPLICATION, WITHOUT LONG-TERM CURRENT USE OF INSULIN (HCC): Primary | ICD-10-CM

## 2018-03-12 DIAGNOSIS — M10.9 GOUT OF FOOT, UNSPECIFIED CAUSE, UNSPECIFIED CHRONICITY, UNSPECIFIED LATERALITY: ICD-10-CM

## 2018-03-12 DIAGNOSIS — I25.810 CORONARY ARTERY DISEASE INVOLVING CORONARY BYPASS GRAFT OF NATIVE HEART WITHOUT ANGINA PECTORIS: ICD-10-CM

## 2018-03-12 LAB — HBA1C MFR BLD: 6.6 %

## 2018-03-12 PROCEDURE — 83036 HEMOGLOBIN GLYCOSYLATED A1C: CPT | Performed by: FAMILY MEDICINE

## 2018-03-12 PROCEDURE — 4040F PNEUMOC VAC/ADMIN/RCVD: CPT | Performed by: FAMILY MEDICINE

## 2018-03-12 PROCEDURE — G8399 PT W/DXA RESULTS DOCUMENT: HCPCS | Performed by: FAMILY MEDICINE

## 2018-03-12 PROCEDURE — 1090F PRES/ABSN URINE INCON ASSESS: CPT | Performed by: FAMILY MEDICINE

## 2018-03-12 PROCEDURE — G8482 FLU IMMUNIZE ORDER/ADMIN: HCPCS | Performed by: FAMILY MEDICINE

## 2018-03-12 PROCEDURE — G8598 ASA/ANTIPLAT THER USED: HCPCS | Performed by: FAMILY MEDICINE

## 2018-03-12 PROCEDURE — 1123F ACP DISCUSS/DSCN MKR DOCD: CPT | Performed by: FAMILY MEDICINE

## 2018-03-12 PROCEDURE — 1036F TOBACCO NON-USER: CPT | Performed by: FAMILY MEDICINE

## 2018-03-12 PROCEDURE — G8417 CALC BMI ABV UP PARAM F/U: HCPCS | Performed by: FAMILY MEDICINE

## 2018-03-12 PROCEDURE — G8427 DOCREV CUR MEDS BY ELIG CLIN: HCPCS | Performed by: FAMILY MEDICINE

## 2018-03-12 PROCEDURE — 99214 OFFICE O/P EST MOD 30 MIN: CPT | Performed by: FAMILY MEDICINE

## 2018-03-12 RX ORDER — NITROGLYCERIN 0.4 MG/1
0.4 TABLET SUBLINGUAL EVERY 5 MIN PRN
Qty: 25 TABLET | Refills: 1 | Status: CANCELLED | OUTPATIENT
Start: 2018-03-12

## 2018-03-12 ASSESSMENT — ENCOUNTER SYMPTOMS
SORE THROAT: 0
ABDOMINAL PAIN: 0
NAUSEA: 0
CONSTIPATION: 0
SHORTNESS OF BREATH: 0

## 2018-03-12 NOTE — PROGRESS NOTES
when she doesn't follow her diet goes up and is on Tradjenta and Amaryl her hemoglobin A1c is good. Her blood pressure is controlled she is on Norvasc  and metoprolol denies of any chest pain or shortness of  breath  Review of Systems   Constitutional: Negative for appetite change. HENT: Negative for ear pain and sore throat. Eyes: Positive for visual disturbance. Wears glasses   Respiratory: Negative for shortness of breath. Cardiovascular: Positive for leg swelling. Negative for chest pain. Gastrointestinal: Negative for abdominal pain, constipation and nausea. Genitourinary: Negative for frequency and pelvic pain. Musculoskeletal: Negative for arthralgias. Neurological: Negative for dizziness, syncope and headaches. Objective:   Physical Exam   Constitutional: She is oriented to person, place, and time. She appears well-developed and well-nourished. /68 (Site: Left Arm, Position: Sitting, Cuff Size: Large Adult)   Pulse 72   Temp 98.6 °F (37 °C) (Oral)   Wt 205 lb 3.2 oz (93.1 kg)   SpO2 97%   BMI 31.20 kg/m²    HENT:   Head: Normocephalic. Mouth/Throat: Oropharynx is clear and moist.        Eyes: Conjunctivae are normal.   Cardiovascular: Normal rate and regular rhythm. Pulmonary/Chest: Breath sounds normal. She has no rales. Abdominal: Soft. Bowel sounds are normal. There is no tenderness. Musculoskeletal: She exhibits edema. Minimal pedal edema present   Lymphadenopathy:     She has no cervical adenopathy. Neurological: She is alert and oriented to person, place, and time. Nursing note and vitals reviewed. Hemoglobin A1c today was 6.6    Assessment:      1. Type 2 diabetes mellitus without complication, without long-term current use of insulin (Former[facility])  Controlled   POCT glycosylated hemoglobin (Hb A1C)   2. Essential hypertension  Controlled  Comprehensive Metabolic Panel   3.  Coronary artery disease involving coronary bypass graft of native heart without angina pectoris  Lipid Panel   4. Gout of foot, unspecified cause, unspecified chronicity, unspecified laterality  Uric Acid          Plan:        Orders Placed This Encounter   Procedures    Lipid Panel     Standing Status:   Future     Standing Expiration Date:   3/12/2019     Order Specific Question:   Is Patient Fasting?/# of Hours     Answer:   12 hours    Comprehensive Metabolic Panel     Standing Status:   Future     Standing Expiration Date:   3/12/2019    Uric Acid     Standing Status:   Future     Standing Expiration Date:   3/12/2019    POCT glycosylated hemoglobin (Hb A1C)     No orders of the defined types were placed in this encounter. Return in about 3 months (around 6/12/2018) for diabetes.     Continue current medications reviewed from the chart

## 2018-05-14 ENCOUNTER — OFFICE VISIT (OUTPATIENT)
Dept: FAMILY MEDICINE CLINIC | Age: 83
End: 2018-05-14
Payer: MEDICARE

## 2018-05-14 VITALS
HEIGHT: 68 IN | BODY MASS INDEX: 31.37 KG/M2 | OXYGEN SATURATION: 98 % | SYSTOLIC BLOOD PRESSURE: 100 MMHG | TEMPERATURE: 98 F | WEIGHT: 207 LBS | HEART RATE: 62 BPM | DIASTOLIC BLOOD PRESSURE: 70 MMHG | RESPIRATION RATE: 20 BRPM

## 2018-05-14 DIAGNOSIS — Z00.00 ROUTINE GENERAL MEDICAL EXAMINATION AT A HEALTH CARE FACILITY: Primary | ICD-10-CM

## 2018-05-14 PROCEDURE — G0439 PPPS, SUBSEQ VISIT: HCPCS | Performed by: FAMILY MEDICINE

## 2018-05-14 PROCEDURE — 4040F PNEUMOC VAC/ADMIN/RCVD: CPT | Performed by: FAMILY MEDICINE

## 2018-05-14 PROCEDURE — G8598 ASA/ANTIPLAT THER USED: HCPCS | Performed by: FAMILY MEDICINE

## 2018-05-14 RX ORDER — EZETIMIBE 10 MG/1
10 TABLET ORAL NIGHTLY
Qty: 90 TABLET | Refills: 0 | Status: SHIPPED | OUTPATIENT
Start: 2018-05-14 | End: 2018-07-26 | Stop reason: SDUPTHER

## 2018-05-14 ASSESSMENT — ANXIETY QUESTIONNAIRES: GAD7 TOTAL SCORE: 1

## 2018-05-14 ASSESSMENT — PATIENT HEALTH QUESTIONNAIRE - PHQ9: SUM OF ALL RESPONSES TO PHQ QUESTIONS 1-9: 1

## 2018-06-10 LAB
ALBUMIN SERPL-MCNC: 4.4 G/DL (ref 3.5–5.2)
ALK PHOSPHATASE: 62 U/L (ref 30–146)
ALT SERPL-CCNC: 29 U/L (ref 5–40)
ANION GAP SERPL CALCULATED.3IONS-SCNC: 12 MEQ/L (ref 10–19)
AST SERPL-CCNC: 28 U/L (ref 9–40)
BILIRUB SERPL-MCNC: 1.1 MG/DL
BUN BLDV-MCNC: 18 MG/DL (ref 8–23)
CALCIUM SERPL-MCNC: 9.3 MG/DL (ref 8.5–10.5)
CHLORIDE BLD-SCNC: 101 MEQ/L (ref 95–107)
CHOLESTEROL/HDL RATIO: 3.3
CHOLESTEROL: 104 MG/DL
CO2: 27 MEQ/L (ref 19–31)
CREAT SERPL-MCNC: 0.9 MG/DL (ref 0.6–1.3)
EGFR AFRICAN AMERICAN: 68.1 ML/MIN/1.73 M2
EGFR IF NONAFRICAN AMERICAN: 58.7 ML/MIN/1.73 M2
GLUCOSE: 175 MG/DL (ref 70–99)
HDLC SERPL-MCNC: 32 MG/DL
LDL CHOLESTEROL CALCULATED: 34 MG/DL
LDL/HDL RATIO: 1.1
POTASSIUM SERPL-SCNC: 5 MEQ/L (ref 3.5–5.4)
SODIUM BLD-SCNC: 140 MEQ/L (ref 135–146)
TOTAL PROTEIN: 6.5 G/DL (ref 6.1–8.3)
TRIGL SERPL-MCNC: 192 MG/DL
VLDLC SERPL CALC-MCNC: 38 MG/DL

## 2018-06-12 ENCOUNTER — OFFICE VISIT (OUTPATIENT)
Dept: FAMILY MEDICINE CLINIC | Age: 83
End: 2018-06-12
Payer: MEDICARE

## 2018-06-12 VITALS
WEIGHT: 208.4 LBS | HEIGHT: 68 IN | OXYGEN SATURATION: 96 % | SYSTOLIC BLOOD PRESSURE: 134 MMHG | DIASTOLIC BLOOD PRESSURE: 60 MMHG | BODY MASS INDEX: 31.58 KG/M2 | TEMPERATURE: 98.6 F | HEART RATE: 69 BPM

## 2018-06-12 DIAGNOSIS — Z23 NEED FOR PNEUMOCOCCAL VACCINATION: ICD-10-CM

## 2018-06-12 DIAGNOSIS — E11.9 TYPE 2 DIABETES MELLITUS WITHOUT COMPLICATION, WITHOUT LONG-TERM CURRENT USE OF INSULIN (HCC): Primary | ICD-10-CM

## 2018-06-12 DIAGNOSIS — I25.810 CORONARY ARTERY DISEASE INVOLVING CORONARY BYPASS GRAFT OF NATIVE HEART WITHOUT ANGINA PECTORIS: ICD-10-CM

## 2018-06-12 DIAGNOSIS — I10 ESSENTIAL HYPERTENSION: ICD-10-CM

## 2018-06-12 LAB — HBA1C MFR BLD: 6.6 %

## 2018-06-12 PROCEDURE — 83036 HEMOGLOBIN GLYCOSYLATED A1C: CPT | Performed by: FAMILY MEDICINE

## 2018-06-12 PROCEDURE — 4040F PNEUMOC VAC/ADMIN/RCVD: CPT | Performed by: FAMILY MEDICINE

## 2018-06-12 PROCEDURE — G8417 CALC BMI ABV UP PARAM F/U: HCPCS | Performed by: FAMILY MEDICINE

## 2018-06-12 PROCEDURE — G8427 DOCREV CUR MEDS BY ELIG CLIN: HCPCS | Performed by: FAMILY MEDICINE

## 2018-06-12 PROCEDURE — 90732 PPSV23 VACC 2 YRS+ SUBQ/IM: CPT | Performed by: FAMILY MEDICINE

## 2018-06-12 PROCEDURE — 1123F ACP DISCUSS/DSCN MKR DOCD: CPT | Performed by: FAMILY MEDICINE

## 2018-06-12 PROCEDURE — 99214 OFFICE O/P EST MOD 30 MIN: CPT | Performed by: FAMILY MEDICINE

## 2018-06-12 PROCEDURE — G0009 ADMIN PNEUMOCOCCAL VACCINE: HCPCS | Performed by: FAMILY MEDICINE

## 2018-06-12 PROCEDURE — G8598 ASA/ANTIPLAT THER USED: HCPCS | Performed by: FAMILY MEDICINE

## 2018-06-12 PROCEDURE — 1090F PRES/ABSN URINE INCON ASSESS: CPT | Performed by: FAMILY MEDICINE

## 2018-06-12 PROCEDURE — G8399 PT W/DXA RESULTS DOCUMENT: HCPCS | Performed by: FAMILY MEDICINE

## 2018-06-12 PROCEDURE — 1036F TOBACCO NON-USER: CPT | Performed by: FAMILY MEDICINE

## 2018-06-12 ASSESSMENT — ENCOUNTER SYMPTOMS
ABDOMINAL PAIN: 0
SORE THROAT: 0
COUGH: 0
SHORTNESS OF BREATH: 0
NAUSEA: 0

## 2018-06-13 LAB — URIC ACID: 5.6 MG/DL (ref 2.7–6.1)

## 2018-07-09 RX ORDER — ALLOPURINOL 100 MG/1
TABLET ORAL
Qty: 90 TABLET | Refills: 1 | Status: SHIPPED | OUTPATIENT
Start: 2018-07-09 | End: 2019-01-03 | Stop reason: SDUPTHER

## 2018-07-09 RX ORDER — GLIMEPIRIDE 4 MG/1
TABLET ORAL
Qty: 180 TABLET | Refills: 1 | Status: SHIPPED | OUTPATIENT
Start: 2018-07-09 | End: 2019-01-03 | Stop reason: SDUPTHER

## 2018-07-26 RX ORDER — EZETIMIBE 10 MG/1
TABLET ORAL
Qty: 90 TABLET | Refills: 1 | Status: SHIPPED | OUTPATIENT
Start: 2018-07-26 | End: 2019-01-22 | Stop reason: SDUPTHER

## 2018-07-30 RX ORDER — LINAGLIPTIN 5 MG/1
TABLET, FILM COATED ORAL
Qty: 90 TABLET | Refills: 1 | Status: SHIPPED | OUTPATIENT
Start: 2018-07-30 | End: 2019-10-15 | Stop reason: SDUPTHER

## 2018-08-17 NOTE — TELEPHONE ENCOUNTER
She has a follow up appointment in October. Health Maintenance   Topic Date Due    DTaP/Tdap/Td vaccine (1 - Tdap) 12[birth year]10/1952    Diabetic foot exam  06/12/2018    Diabetic retinal exam  07/17/2018    Shingles Vaccine (1 of 2 - 2 Dose Series) 03/01/2019 (Originally 12/1/2017)    Flu vaccine (1) 09/01/2018    A1C test (Diabetic or Prediabetic)  12/12/2018    Lipid screen  06/09/2019    Potassium monitoring  06/09/2019    Creatinine monitoring  06/09/2019    Pneumococcal low/med risk (2 of 2 - PCV13) 06/12/2019    Colon cancer screen colonoscopy  09/14/2025    DEXA (modify frequency per FRAX score)  Completed             (applicable per patient's age: Cancer Screenings, Depression Screening, Fall Risk Screening, Immunizations)    Hemoglobin A1C (%)   Date Value   06/12/2018 6.6   03/12/2018 6.6   09/12/2017 6.6     Microalb/Crt.  Ratio (mcg/mg creat)   Date Value   09/12/2017 17     LDL Cholesterol (mg/dL)   Date Value   01/24/2014 54     LDL Calculated (mg/dL)   Date Value   06/09/2018 34     AST (U/L)   Date Value   06/09/2018 28     ALT (U/L)   Date Value   06/09/2018 29     BUN (mg/dL)   Date Value   06/09/2018 18      (goal A1C is < 7)   (goal LDL is <100) need 30-50% reduction from baseline     BP Readings from Last 3 Encounters:   06/12/18 134/60   05/14/18 100/70   03/12/18 124/68    (goal /80)      All Future Testing planned in [facility]:      Next Visit Date:  Future Appointments  Date Time Provider Sherly Pitt   10/15/2018 1:00 PM Bennett Evangelista MD [facility]            Patient Active Problem List:     Essential hypertension     Gout of foot     Type 2 diabetes mellitus without complication, without long-term current use of insulin ([facility] [address]     Coronary artery disease involving coronary bypass graft of native heart without angina pectoris

## 2018-08-20 RX ORDER — RIVAROXABAN 20 MG/1
TABLET, FILM COATED ORAL
Qty: 90 TABLET | Refills: 0 | Status: SHIPPED | OUTPATIENT
Start: 2018-08-20 | End: 2018-11-17 | Stop reason: SDUPTHER

## 2018-09-06 ENCOUNTER — HOSPITAL ENCOUNTER (OUTPATIENT)
Dept: WOMENS IMAGING | Age: 83
Discharge: HOME | End: 2018-09-08
Payer: MEDICARE

## 2018-09-06 ENCOUNTER — TELEPHONE (OUTPATIENT)
Dept: FAMILY MEDICINE CLINIC | Age: 83
End: 2018-09-06

## 2018-09-06 DIAGNOSIS — Z12.31 VISIT FOR SCREENING MAMMOGRAM: ICD-10-CM

## 2018-09-06 PROCEDURE — 77067 SCR MAMMO BI INCL CAD: CPT

## 2018-10-15 ENCOUNTER — OFFICE VISIT (OUTPATIENT)
Dept: FAMILY MEDICINE CLINIC | Age: 83
End: 2018-10-15
Payer: MEDICARE

## 2018-10-15 ENCOUNTER — HOSPITAL ENCOUNTER (OUTPATIENT)
Age: 83
Setting detail: SPECIMEN
Discharge: HOME | End: 2018-10-15
Payer: MEDICARE

## 2018-10-15 VITALS
HEART RATE: 70 BPM | OXYGEN SATURATION: 97 % | RESPIRATION RATE: 18 BRPM | WEIGHT: 201.8 LBS | BODY MASS INDEX: 30.58 KG/M2 | SYSTOLIC BLOOD PRESSURE: 138 MMHG | HEIGHT: 68 IN | TEMPERATURE: 97.8 F | DIASTOLIC BLOOD PRESSURE: 72 MMHG

## 2018-10-15 DIAGNOSIS — E11.9 TYPE 2 DIABETES MELLITUS WITHOUT COMPLICATION, WITHOUT LONG-TERM CURRENT USE OF INSULIN (HCC): ICD-10-CM

## 2018-10-15 DIAGNOSIS — I25.810 CORONARY ARTERY DISEASE INVOLVING CORONARY BYPASS GRAFT OF NATIVE HEART WITHOUT ANGINA PECTORIS: ICD-10-CM

## 2018-10-15 DIAGNOSIS — I10 ESSENTIAL HYPERTENSION: ICD-10-CM

## 2018-10-15 DIAGNOSIS — Z91.81 AT HIGH RISK FOR FALLS: ICD-10-CM

## 2018-10-15 DIAGNOSIS — E11.9 TYPE 2 DIABETES MELLITUS WITHOUT COMPLICATION, WITHOUT LONG-TERM CURRENT USE OF INSULIN (HCC): Primary | ICD-10-CM

## 2018-10-15 DIAGNOSIS — Z23 FLU VACCINE NEED: ICD-10-CM

## 2018-10-15 LAB
CREATININE URINE: 35.1 MG/DL (ref 28–217)
HBA1C MFR BLD: 7.3 %
MICROALBUMIN/CREAT 24H UR: <12 MG/L
MICROALBUMIN/CREAT UR-RTO: NORMAL MCG/MG CREAT

## 2018-10-15 PROCEDURE — G8399 PT W/DXA RESULTS DOCUMENT: HCPCS | Performed by: FAMILY MEDICINE

## 2018-10-15 PROCEDURE — G8427 DOCREV CUR MEDS BY ELIG CLIN: HCPCS | Performed by: FAMILY MEDICINE

## 2018-10-15 PROCEDURE — 1090F PRES/ABSN URINE INCON ASSESS: CPT | Performed by: FAMILY MEDICINE

## 2018-10-15 PROCEDURE — G8482 FLU IMMUNIZE ORDER/ADMIN: HCPCS | Performed by: FAMILY MEDICINE

## 2018-10-15 PROCEDURE — 83036 HEMOGLOBIN GLYCOSYLATED A1C: CPT | Performed by: FAMILY MEDICINE

## 2018-10-15 PROCEDURE — 1123F ACP DISCUSS/DSCN MKR DOCD: CPT | Performed by: FAMILY MEDICINE

## 2018-10-15 PROCEDURE — G8417 CALC BMI ABV UP PARAM F/U: HCPCS | Performed by: FAMILY MEDICINE

## 2018-10-15 PROCEDURE — G8598 ASA/ANTIPLAT THER USED: HCPCS | Performed by: FAMILY MEDICINE

## 2018-10-15 PROCEDURE — 90662 IIV NO PRSV INCREASED AG IM: CPT | Performed by: FAMILY MEDICINE

## 2018-10-15 PROCEDURE — G0008 ADMIN INFLUENZA VIRUS VAC: HCPCS | Performed by: FAMILY MEDICINE

## 2018-10-15 PROCEDURE — 1036F TOBACCO NON-USER: CPT | Performed by: FAMILY MEDICINE

## 2018-10-15 PROCEDURE — 4040F PNEUMOC VAC/ADMIN/RCVD: CPT | Performed by: FAMILY MEDICINE

## 2018-10-15 PROCEDURE — 1101F PT FALLS ASSESS-DOCD LE1/YR: CPT | Performed by: FAMILY MEDICINE

## 2018-10-15 PROCEDURE — 99214 OFFICE O/P EST MOD 30 MIN: CPT | Performed by: FAMILY MEDICINE

## 2018-10-15 PROCEDURE — G8510 SCR DEP NEG, NO PLAN REQD: HCPCS | Performed by: FAMILY MEDICINE

## 2018-10-15 ASSESSMENT — ENCOUNTER SYMPTOMS
NAUSEA: 0
SHORTNESS OF BREATH: 0
ABDOMINAL PAIN: 0
CONSTIPATION: 0
SORE THROAT: 0

## 2018-10-15 ASSESSMENT — PATIENT HEALTH QUESTIONNAIRE - PHQ9
1. LITTLE INTEREST OR PLEASURE IN DOING THINGS: 0
SUM OF ALL RESPONSES TO PHQ QUESTIONS 1-9: 0
SUM OF ALL RESPONSES TO PHQ QUESTIONS 1-9: 0
2. FEELING DOWN, DEPRESSED OR HOPELESS: 0
SUM OF ALL RESPONSES TO PHQ9 QUESTIONS 1 & 2: 0

## 2018-10-15 NOTE — PROGRESS NOTES
Abdominal: Soft. Bowel sounds are normal. There is no tenderness. Musculoskeletal: She exhibits no edema. Lymphadenopathy:     She has no cervical adenopathy. Neurological: She is alert and oriented to person, place, and time. Nursing note and vitals reviewed. Hemoglobin A1c today was 7.3    Assessment:       Diagnosis Orders   1. Type 2 diabetes mellitus without complication, without long-term current use of insulin (formerly [facility])  Monitor blood sugars  POCT glycosylated hemoglobin (Hb A1C)    Microalbumin, Ur    HM DIABETES FOOT EXAM   2. Flu vaccine need     3. At high risk for falls     4. Essential hypertension  Controlled   5. Coronary artery disease involving coronary bypass graft of native heart without angina pectoris  Stable under care of a cardiologist           Plan:         Orders Placed This Encounter   Procedures    INFLUENZA, HIGH DOSE, 65 YRS +, IM, PF, PREFILL SYR, 0.5ML (FLUZONE HD)    Microalbumin, Ur     Standing Status:   Future     Standing Expiration Date:   10/15/2019    POCT glycosylated hemoglobin (Hb A1C)    HM DIABETES FOOT EXAM     No orders of the defined types were placed in this encounter. Return in about 3 months (around 1/15/2019) for diabetes.     Continue current medications reviewed from the chart            Ida Taylor MD

## 2018-11-19 RX ORDER — RIVAROXABAN 20 MG/1
TABLET, FILM COATED ORAL
Qty: 90 TABLET | Refills: 0 | Status: SHIPPED | OUTPATIENT
Start: 2018-11-19 | End: 2019-02-16 | Stop reason: SDUPTHER

## 2019-01-03 RX ORDER — GLIMEPIRIDE 4 MG/1
TABLET ORAL
Qty: 180 TABLET | Refills: 1 | Status: SHIPPED | OUTPATIENT
Start: 2019-01-03 | End: 2019-10-15 | Stop reason: SDUPTHER

## 2019-01-03 RX ORDER — ALLOPURINOL 100 MG/1
TABLET ORAL
Qty: 90 TABLET | Refills: 1 | Status: SHIPPED | OUTPATIENT
Start: 2019-01-03 | End: 2019-10-15 | Stop reason: SDUPTHER

## 2019-01-22 ENCOUNTER — OFFICE VISIT (OUTPATIENT)
Dept: FAMILY MEDICINE CLINIC | Age: 84
End: 2019-01-22
Payer: MEDICARE

## 2019-01-22 VITALS
OXYGEN SATURATION: 95 % | HEART RATE: 78 BPM | HEIGHT: 68 IN | WEIGHT: 204.4 LBS | BODY MASS INDEX: 30.98 KG/M2 | TEMPERATURE: 96.7 F | SYSTOLIC BLOOD PRESSURE: 138 MMHG | DIASTOLIC BLOOD PRESSURE: 72 MMHG

## 2019-01-22 DIAGNOSIS — I25.810 CORONARY ARTERY DISEASE INVOLVING CORONARY BYPASS GRAFT OF NATIVE HEART WITHOUT ANGINA PECTORIS: ICD-10-CM

## 2019-01-22 DIAGNOSIS — M10.9 GOUT OF FOOT, UNSPECIFIED CAUSE, UNSPECIFIED CHRONICITY, UNSPECIFIED LATERALITY: ICD-10-CM

## 2019-01-22 DIAGNOSIS — E11.9 TYPE 2 DIABETES MELLITUS WITHOUT COMPLICATION, WITHOUT LONG-TERM CURRENT USE OF INSULIN (HCC): Primary | ICD-10-CM

## 2019-01-22 DIAGNOSIS — I10 ESSENTIAL HYPERTENSION: ICD-10-CM

## 2019-01-22 LAB — HBA1C MFR BLD: 7.2 %

## 2019-01-22 PROCEDURE — 99214 OFFICE O/P EST MOD 30 MIN: CPT | Performed by: FAMILY MEDICINE

## 2019-01-22 PROCEDURE — G8427 DOCREV CUR MEDS BY ELIG CLIN: HCPCS | Performed by: FAMILY MEDICINE

## 2019-01-22 PROCEDURE — 1101F PT FALLS ASSESS-DOCD LE1/YR: CPT | Performed by: FAMILY MEDICINE

## 2019-01-22 PROCEDURE — 1123F ACP DISCUSS/DSCN MKR DOCD: CPT | Performed by: FAMILY MEDICINE

## 2019-01-22 PROCEDURE — 1090F PRES/ABSN URINE INCON ASSESS: CPT | Performed by: FAMILY MEDICINE

## 2019-01-22 PROCEDURE — G8417 CALC BMI ABV UP PARAM F/U: HCPCS | Performed by: FAMILY MEDICINE

## 2019-01-22 PROCEDURE — 4040F PNEUMOC VAC/ADMIN/RCVD: CPT | Performed by: FAMILY MEDICINE

## 2019-01-22 PROCEDURE — 83036 HEMOGLOBIN GLYCOSYLATED A1C: CPT | Performed by: FAMILY MEDICINE

## 2019-01-22 PROCEDURE — G8399 PT W/DXA RESULTS DOCUMENT: HCPCS | Performed by: FAMILY MEDICINE

## 2019-01-22 PROCEDURE — 1036F TOBACCO NON-USER: CPT | Performed by: FAMILY MEDICINE

## 2019-01-22 PROCEDURE — G8598 ASA/ANTIPLAT THER USED: HCPCS | Performed by: FAMILY MEDICINE

## 2019-01-22 PROCEDURE — G8482 FLU IMMUNIZE ORDER/ADMIN: HCPCS | Performed by: FAMILY MEDICINE

## 2019-01-22 RX ORDER — EZETIMIBE 10 MG/1
TABLET ORAL
Qty: 90 TABLET | Refills: 1 | Status: SHIPPED | OUTPATIENT
Start: 2019-01-22 | End: 2019-09-05 | Stop reason: SDUPTHER

## 2019-01-22 RX ORDER — TRAZODONE HYDROCHLORIDE 50 MG/1
50 TABLET ORAL NIGHTLY
Qty: 30 TABLET | Refills: 0 | Status: SHIPPED | OUTPATIENT
Start: 2019-01-22 | End: 2019-05-28

## 2019-01-22 ASSESSMENT — ENCOUNTER SYMPTOMS
CONSTIPATION: 0
ABDOMINAL PAIN: 0
SHORTNESS OF BREATH: 0
SORE THROAT: 0
NAUSEA: 0
EYE ITCHING: 0

## 2019-02-18 RX ORDER — RIVAROXABAN 20 MG/1
TABLET, FILM COATED ORAL
Qty: 90 TABLET | Refills: 1 | Status: SHIPPED | OUTPATIENT
Start: 2019-02-18 | End: 2019-10-16 | Stop reason: SDUPTHER

## 2019-02-22 LAB
ABSOLUTE BASO #: 0.1 K/UL (ref 0–0.1)
ABSOLUTE EOS #: 0.2 K/UL (ref 0.1–0.4)
ABSOLUTE LYMPH #: 2.5 K/UL (ref 0.8–5.2)
ABSOLUTE MONO #: 0.8 K/UL (ref 0.1–0.9)
ABSOLUTE NEUT #: 3.7 K/UL (ref 1.3–9.1)
BASOPHILS RELATIVE PERCENT: 0.7 %
EOSINOPHILS RELATIVE PERCENT: 3.3 %
HCT VFR BLD CALC: 39.4 % (ref 36–48)
HEMOGLOBIN: 13.2 G/DL (ref 12–16)
LYMPHOCYTE %: 34.1 %
MCH RBC QN AUTO: 29 PG (ref 27–34)
MCHC RBC AUTO-ENTMCNC: 33.5 G/DL (ref 31–36)
MCV RBC AUTO: 86.6 FL (ref 80–100)
MONOCYTES # BLD: 11 %
NEUTROPHILS RELATIVE PERCENT: 50.5 %
PDW BLD-RTO: 13.9 % (ref 10.8–14.8)
PLATELETS: 191 K/UL (ref 150–450)
RBC: 4.55 M/UL (ref 4–5.5)
WBC: 7.3 K/UL (ref 3.7–10.8)

## 2019-02-23 LAB
ALBUMIN SERPL-MCNC: 4.3 G/DL (ref 3.5–5.2)
ALK PHOSPHATASE: 71 U/L (ref 30–146)
ALT SERPL-CCNC: 47 U/L (ref 5–40)
ANION GAP SERPL CALCULATED.3IONS-SCNC: 13 MEQ/L (ref 10–19)
AST SERPL-CCNC: 40 U/L (ref 9–40)
BILIRUB SERPL-MCNC: 1.4 MG/DL
BUN BLDV-MCNC: 19 MG/DL (ref 8–23)
CALCIUM SERPL-MCNC: 9.4 MG/DL (ref 8.5–10.5)
CHLORIDE BLD-SCNC: 105 MEQ/L (ref 95–107)
CHOLESTEROL/HDL RATIO: 3.2
CHOLESTEROL: 114 MG/DL
CO2: 24 MEQ/L (ref 19–31)
CREAT SERPL-MCNC: 0.8 MG/DL (ref 0.6–1.3)
EGFR AFRICAN AMERICAN: 77.9 ML/MIN/1.73 M2
EGFR IF NONAFRICAN AMERICAN: 67.2 ML/MIN/1.73 M2
GLUCOSE: 174 MG/DL (ref 70–99)
HDLC SERPL-MCNC: 35.6 MG/DL
LDL CHOLESTEROL CALCULATED: 43 MG/DL
LDL/HDL RATIO: 1.2
POTASSIUM SERPL-SCNC: 4.4 MEQ/L (ref 3.5–5.4)
SODIUM BLD-SCNC: 142 MEQ/L (ref 135–146)
TOTAL PROTEIN: 6.5 G/DL (ref 6.1–8.3)
TRIGL SERPL-MCNC: 178 MG/DL
URIC ACID: 5.6 MG/DL (ref 2.7–6.1)
VLDLC SERPL CALC-MCNC: 36 MG/DL

## 2019-02-25 ENCOUNTER — OFFICE VISIT (OUTPATIENT)
Dept: FAMILY MEDICINE CLINIC | Age: 84
End: 2019-02-25
Payer: MEDICARE

## 2019-02-25 VITALS
SYSTOLIC BLOOD PRESSURE: 130 MMHG | WEIGHT: 207 LBS | DIASTOLIC BLOOD PRESSURE: 64 MMHG | BODY MASS INDEX: 31.37 KG/M2 | HEIGHT: 68 IN | TEMPERATURE: 98.7 F | HEART RATE: 64 BPM

## 2019-02-25 DIAGNOSIS — E11.9 TYPE 2 DIABETES MELLITUS WITHOUT COMPLICATION, WITHOUT LONG-TERM CURRENT USE OF INSULIN (HCC): Primary | ICD-10-CM

## 2019-02-25 DIAGNOSIS — R42 DIZZINESS: ICD-10-CM

## 2019-02-25 DIAGNOSIS — I10 ESSENTIAL HYPERTENSION: ICD-10-CM

## 2019-02-25 DIAGNOSIS — I25.810 CORONARY ARTERY DISEASE INVOLVING CORONARY BYPASS GRAFT OF NATIVE HEART WITHOUT ANGINA PECTORIS: ICD-10-CM

## 2019-02-25 PROCEDURE — G8399 PT W/DXA RESULTS DOCUMENT: HCPCS | Performed by: FAMILY MEDICINE

## 2019-02-25 PROCEDURE — 4040F PNEUMOC VAC/ADMIN/RCVD: CPT | Performed by: FAMILY MEDICINE

## 2019-02-25 PROCEDURE — G8482 FLU IMMUNIZE ORDER/ADMIN: HCPCS | Performed by: FAMILY MEDICINE

## 2019-02-25 PROCEDURE — 99213 OFFICE O/P EST LOW 20 MIN: CPT | Performed by: FAMILY MEDICINE

## 2019-02-25 PROCEDURE — 1101F PT FALLS ASSESS-DOCD LE1/YR: CPT | Performed by: FAMILY MEDICINE

## 2019-02-25 PROCEDURE — G8598 ASA/ANTIPLAT THER USED: HCPCS | Performed by: FAMILY MEDICINE

## 2019-02-25 PROCEDURE — G8427 DOCREV CUR MEDS BY ELIG CLIN: HCPCS | Performed by: FAMILY MEDICINE

## 2019-02-25 PROCEDURE — 1123F ACP DISCUSS/DSCN MKR DOCD: CPT | Performed by: FAMILY MEDICINE

## 2019-02-25 PROCEDURE — 1036F TOBACCO NON-USER: CPT | Performed by: FAMILY MEDICINE

## 2019-02-25 PROCEDURE — 1090F PRES/ABSN URINE INCON ASSESS: CPT | Performed by: FAMILY MEDICINE

## 2019-02-25 PROCEDURE — G8417 CALC BMI ABV UP PARAM F/U: HCPCS | Performed by: FAMILY MEDICINE

## 2019-02-25 ASSESSMENT — ENCOUNTER SYMPTOMS
SORE THROAT: 0
ABDOMINAL PAIN: 0
CONSTIPATION: 0
SHORTNESS OF BREATH: 0
NAUSEA: 0

## 2019-02-25 ASSESSMENT — PATIENT HEALTH QUESTIONNAIRE - PHQ9
SUM OF ALL RESPONSES TO PHQ9 QUESTIONS 1 & 2: 0
1. LITTLE INTEREST OR PLEASURE IN DOING THINGS: 0
SUM OF ALL RESPONSES TO PHQ QUESTIONS 1-9: 0
SUM OF ALL RESPONSES TO PHQ QUESTIONS 1-9: 0
2. FEELING DOWN, DEPRESSED OR HOPELESS: 0

## 2019-03-11 ENCOUNTER — HOSPITAL ENCOUNTER (OUTPATIENT)
Dept: VASCULAR LAB | Age: 84
Discharge: HOME | End: 2019-03-11
Payer: MEDICARE

## 2019-03-11 PROCEDURE — 93880 EXTRACRANIAL BILAT STUDY: CPT

## 2019-03-15 ENCOUNTER — TELEPHONE (OUTPATIENT)
Dept: FAMILY MEDICINE CLINIC | Age: 84
End: 2019-03-15

## 2019-05-28 ENCOUNTER — HOSPITAL ENCOUNTER (OUTPATIENT)
Age: 84
Setting detail: SPECIMEN
Discharge: HOME | End: 2019-05-28
Payer: MEDICARE

## 2019-05-28 ENCOUNTER — OFFICE VISIT (OUTPATIENT)
Dept: FAMILY MEDICINE CLINIC | Age: 84
End: 2019-05-28
Payer: MEDICARE

## 2019-05-28 VITALS
BODY MASS INDEX: 31.22 KG/M2 | DIASTOLIC BLOOD PRESSURE: 64 MMHG | TEMPERATURE: 98.7 F | WEIGHT: 206 LBS | HEART RATE: 65 BPM | OXYGEN SATURATION: 96 % | HEIGHT: 68 IN | SYSTOLIC BLOOD PRESSURE: 130 MMHG

## 2019-05-28 DIAGNOSIS — E11.9 TYPE 2 DIABETES MELLITUS WITHOUT COMPLICATION, WITHOUT LONG-TERM CURRENT USE OF INSULIN (HCC): Primary | ICD-10-CM

## 2019-05-28 DIAGNOSIS — I10 ESSENTIAL HYPERTENSION: ICD-10-CM

## 2019-05-28 DIAGNOSIS — I25.810 CORONARY ARTERY DISEASE INVOLVING CORONARY BYPASS GRAFT OF NATIVE HEART WITHOUT ANGINA PECTORIS: ICD-10-CM

## 2019-05-28 LAB — HBA1C MFR BLD: 7.4 %

## 2019-05-28 PROCEDURE — G8427 DOCREV CUR MEDS BY ELIG CLIN: HCPCS | Performed by: FAMILY MEDICINE

## 2019-05-28 PROCEDURE — G8417 CALC BMI ABV UP PARAM F/U: HCPCS | Performed by: FAMILY MEDICINE

## 2019-05-28 PROCEDURE — 1036F TOBACCO NON-USER: CPT | Performed by: FAMILY MEDICINE

## 2019-05-28 PROCEDURE — 1123F ACP DISCUSS/DSCN MKR DOCD: CPT | Performed by: FAMILY MEDICINE

## 2019-05-28 PROCEDURE — 99214 OFFICE O/P EST MOD 30 MIN: CPT | Performed by: FAMILY MEDICINE

## 2019-05-28 PROCEDURE — 83036 HEMOGLOBIN GLYCOSYLATED A1C: CPT | Performed by: FAMILY MEDICINE

## 2019-05-28 PROCEDURE — G8399 PT W/DXA RESULTS DOCUMENT: HCPCS | Performed by: FAMILY MEDICINE

## 2019-05-28 PROCEDURE — 4040F PNEUMOC VAC/ADMIN/RCVD: CPT | Performed by: FAMILY MEDICINE

## 2019-05-28 PROCEDURE — 82360 CALCULUS ASSAY QUANT: CPT

## 2019-05-28 PROCEDURE — 1090F PRES/ABSN URINE INCON ASSESS: CPT | Performed by: FAMILY MEDICINE

## 2019-05-28 PROCEDURE — 88300 SURGICAL PATH GROSS: CPT

## 2019-05-28 PROCEDURE — G8598 ASA/ANTIPLAT THER USED: HCPCS | Performed by: FAMILY MEDICINE

## 2019-05-28 ASSESSMENT — ENCOUNTER SYMPTOMS
NAUSEA: 0
SORE THROAT: 0
SHORTNESS OF BREATH: 0
ABDOMINAL PAIN: 0

## 2019-05-28 NOTE — PROGRESS NOTES
Diabetic retinal exam  07/20/2019    A1C test (Diabetic or Prediabetic)  07/22/2019    Diabetic foot exam  10/15/2019    Lipid screen  02/22/2020    Potassium monitoring  02/22/2020    Creatinine monitoring  02/22/2020    Colon cancer screen colonoscopy  09/14/2025    DEXA (modify frequency per FRAX score)  Completed    Flu vaccine  Completed       HPI  89year-old female is seen in the office today for follow-up for her diabetes hypertension coronary artery disease her blood sugars are running between 120-160 she is trying to watch her diet is on Amaryl and Tradjenta. Has hypertension blood pressure is controlled she is on Lopressor and Norvasc denies of any chest pain or shortness of breath is on Lipitor, has got gout hasn't had any flareups is on Zyloprim she recently passed a kidney stone  Review of Systems   Constitutional: Negative for appetite change. HENT: Negative for ear pain and sore throat. Eyes: Positive for visual disturbance. Wears glasses   Respiratory: Negative for shortness of breath. Cardiovascular: Positive for leg swelling. Negative for chest pain. Gastrointestinal: Negative for abdominal pain and nausea. Genitourinary: Negative for frequency and pelvic pain. Musculoskeletal: Positive for arthralgias. Neurological: Negative for dizziness and headaches. Objective:   Physical Exam   Constitutional: She is oriented to person, place, and time. She appears well-developed and well-nourished. /64 (Site: Left Upper Arm, Position: Sitting, Cuff Size: Large Adult)   Pulse 65   Temp 98.7 °F (37.1 °C) (Oral)   Ht 5' 8\" (1.727 m)   Wt 206 lb (93.4 kg)   SpO2 96%   BMI 31.32 kg/m²    HENT:   Head: Normocephalic. Mouth/Throat: Oropharynx is clear and moist.        Eyes: Conjunctivae are normal.   Cardiovascular: Normal rate and regular rhythm. Pulmonary/Chest: Breath sounds normal. She has no rales. Abdominal: Soft.  Bowel sounds are normal. There is no tenderness. Musculoskeletal: She exhibits edema. She exhibits no tenderness. Minimal pedal edema both legs present and has got spider veins on both legs   Lymphadenopathy:     She has no cervical adenopathy. Neurological: She is alert and oriented to person, place, and time. Nursing note and vitals reviewed. hemoglobin A1c is 7.4    Assessment:       Diagnosis Orders   1. Type 2 diabetes mellitus without complication, without long-term current use of insulin (HCC)  Stable  POCT glycosylated hemoglobin (Hb A1C)   2. Essential hypertension  Control    3. Coronary artery disease involving coronary bypass graft of native heart without angina pectoris  Stable under care of a cardiologist            Plan:        Orders Placed This Encounter   Procedures    POCT glycosylated hemoglobin (Hb A1C)     No orders of the defined types were placed in this encounter. Return in about 3 months (around 8/28/2019) for diabetes.     Continue current medications reviewed from the chart          Maya Hardwick MA

## 2019-05-29 LAB — SURGICAL PATHOLOGY REPORT: NORMAL

## 2019-06-13 ENCOUNTER — TELEPHONE (OUTPATIENT)
Dept: FAMILY MEDICINE CLINIC | Age: 84
End: 2019-06-13

## 2019-06-25 ENCOUNTER — HOSPITAL ENCOUNTER (OUTPATIENT)
Age: 84
Setting detail: SPECIMEN
Discharge: HOME | End: 2019-06-25
Payer: MEDICARE

## 2019-06-25 ENCOUNTER — OFFICE VISIT (OUTPATIENT)
Dept: FAMILY MEDICINE CLINIC | Age: 84
End: 2019-06-25
Payer: MEDICARE

## 2019-06-25 VITALS
BODY MASS INDEX: 30.07 KG/M2 | TEMPERATURE: 98.7 F | HEART RATE: 78 BPM | HEIGHT: 69 IN | WEIGHT: 203 LBS | DIASTOLIC BLOOD PRESSURE: 80 MMHG | SYSTOLIC BLOOD PRESSURE: 120 MMHG | OXYGEN SATURATION: 98 %

## 2019-06-25 DIAGNOSIS — N20.0 CALCIUM KIDNEY STONE: ICD-10-CM

## 2019-06-25 DIAGNOSIS — S21.201A WOUND OF RIGHT SIDE OF BACK, INITIAL ENCOUNTER: ICD-10-CM

## 2019-06-25 DIAGNOSIS — I10 ESSENTIAL HYPERTENSION: ICD-10-CM

## 2019-06-25 DIAGNOSIS — E11.9 TYPE 2 DIABETES MELLITUS WITHOUT COMPLICATION, WITHOUT LONG-TERM CURRENT USE OF INSULIN (HCC): ICD-10-CM

## 2019-06-25 DIAGNOSIS — S21.201A WOUND OF RIGHT SIDE OF BACK, INITIAL ENCOUNTER: Primary | ICD-10-CM

## 2019-06-25 PROCEDURE — G8598 ASA/ANTIPLAT THER USED: HCPCS | Performed by: FAMILY MEDICINE

## 2019-06-25 PROCEDURE — G8417 CALC BMI ABV UP PARAM F/U: HCPCS | Performed by: FAMILY MEDICINE

## 2019-06-25 PROCEDURE — G8399 PT W/DXA RESULTS DOCUMENT: HCPCS | Performed by: FAMILY MEDICINE

## 2019-06-25 PROCEDURE — 1123F ACP DISCUSS/DSCN MKR DOCD: CPT | Performed by: FAMILY MEDICINE

## 2019-06-25 PROCEDURE — G8427 DOCREV CUR MEDS BY ELIG CLIN: HCPCS | Performed by: FAMILY MEDICINE

## 2019-06-25 PROCEDURE — 1036F TOBACCO NON-USER: CPT | Performed by: FAMILY MEDICINE

## 2019-06-25 PROCEDURE — 1090F PRES/ABSN URINE INCON ASSESS: CPT | Performed by: FAMILY MEDICINE

## 2019-06-25 PROCEDURE — 4040F PNEUMOC VAC/ADMIN/RCVD: CPT | Performed by: FAMILY MEDICINE

## 2019-06-25 PROCEDURE — 99214 OFFICE O/P EST MOD 30 MIN: CPT | Performed by: FAMILY MEDICINE

## 2019-06-25 RX ORDER — CEPHALEXIN 500 MG/1
500 CAPSULE ORAL 2 TIMES DAILY
Qty: 20 CAPSULE | Refills: 0 | Status: SHIPPED | OUTPATIENT
Start: 2019-06-25 | End: 2019-08-07

## 2019-06-25 ASSESSMENT — ENCOUNTER SYMPTOMS
ABDOMINAL PAIN: 0
NAUSEA: 0
SORE THROAT: 0
SHORTNESS OF BREATH: 0

## 2019-06-25 NOTE — PROGRESS NOTES
pressure is controlled no chest pain no shortness of breath. She also had calcium kidney stones will refer her to the nephrologist  Review of Systems   Constitutional: Negative for appetite change. HENT: Negative for ear pain and sore throat. Eyes: Positive for visual disturbance. Wears glasses   Respiratory: Negative for shortness of breath. Cardiovascular: Negative for chest pain and leg swelling. Gastrointestinal: Negative for abdominal pain and nausea. Genitourinary: Negative for frequency and pelvic pain. Musculoskeletal: Positive for arthralgias. Skin: Positive for wound. Neurological: Negative for dizziness and headaches. Objective:   Physical Exam   Constitutional: She is oriented to person, place, and time. She appears well-developed and well-nourished. /80 (Site: Left Upper Arm, Position: Sitting, Cuff Size: Large Adult)   Pulse 78   Temp 98.7 °F (37.1 °C) (Oral)   Ht 5' 9\" (1.753 m)   Wt 203 lb (92.1 kg)   SpO2 98%   BMI 29.98 kg/m²    HENT:   Head: Normocephalic. Mouth/Throat: Oropharynx is clear and moist.        Eyes: Conjunctivae are normal.   Cardiovascular: Normal rate and regular rhythm. Pulmonary/Chest: Breath sounds normal. She has no rales. Abdominal: Soft. Bowel sounds are normal. There is no tenderness. Musculoskeletal: She exhibits no edema. Lymphadenopathy:     She has no cervical adenopathy. Neurological: She is alert and oriented to person, place, and time. Skin:   Small wound on the right side of the back present and there is some yellowish drainage present   Nursing note and vitals reviewed. small amount of yellowish drainage was obtained    Assessment:        Diagnosis Orders   1. Wound of right side of back, initial encounter  [facility]FL - Kylie Van MD, General Surgery, [facility]   2. Calcium kidney stone  [facility]) - Brinaa Zaragoza MD, Nephrology, [facility]   3.  Type 2 diabetes mellitus without complication, without long-term current use of insulin (HCC)   stable   4.  Essential hypertension               Plan:         Orders Placed This Encounter   Procedures   Margo Quezada MD, General Surgery, [place]     Referral Priority:   Routine     Referral Type:   Eval and Treat     Referral Reason:   Specialty Services Required     Referred to Provider:   Refugio Barnes MD     Requested Specialty:   General Surgery     Number of Visits Requested:   1    [facility]) - Margo Quezada MD, Nephrology, [place]     Referral Priority:   Routine     Referral Type:   Eval and Treat     Referral Reason:   Specialty Services Required     Referred to Provider:   Carrie Belle MD     Requested Specialty:   Nephrology     Number of Visits Requested:   1     Orders Placed This Encounter   Medications    cephALEXin (KEFLEX) 500 MG capsule     Sig: Take 1 capsule by mouth 2 times daily     Dispense:  20 capsule     Refill:  0     To keep her scheduled appointment  Continue current medications reviewed from the chart    Wound culture for CNS        Chris Sands MA

## 2019-06-28 DIAGNOSIS — L02.91 ABSCESS: Primary | ICD-10-CM

## 2019-06-28 LAB
CULTURE: ABNORMAL
DIRECT EXAM: ABNORMAL
DIRECT EXAM: ABNORMAL
Lab: ABNORMAL
SPECIMEN DESCRIPTION: ABNORMAL

## 2019-06-28 RX ORDER — CLINDAMYCIN HYDROCHLORIDE 300 MG/1
300 CAPSULE ORAL 3 TIMES DAILY
Qty: 30 CAPSULE | Refills: 0 | Status: SHIPPED | OUTPATIENT
Start: 2019-06-28 | End: 2019-07-08

## 2019-07-10 PROBLEM — I25.10 CORONARY ATHEROSCLEROSIS: Status: ACTIVE | Noted: 2019-07-10

## 2019-07-10 PROBLEM — E11.9 DIABETES MELLITUS (HCC): Status: ACTIVE | Noted: 2019-07-10

## 2019-07-10 PROBLEM — M19.90 ARTHRITIS: Status: ACTIVE | Noted: 2019-07-10

## 2019-07-10 PROBLEM — E78.5 HYPERLIPIDEMIA: Status: ACTIVE | Noted: 2019-07-10

## 2019-07-10 PROBLEM — N18.2 CKD (CHRONIC KIDNEY DISEASE), STAGE II: Status: ACTIVE | Noted: 2019-07-10

## 2019-07-10 PROBLEM — R01.1 MURMUR, CARDIAC: Status: ACTIVE | Noted: 2019-06-07

## 2019-07-10 PROBLEM — I82.409 DVT (DEEP VENOUS THROMBOSIS) (HCC): Status: ACTIVE | Noted: 2019-07-10

## 2019-07-10 PROBLEM — I20.9 ANGINA PECTORIS (HCC): Status: ACTIVE | Noted: 2019-07-10

## 2019-07-10 PROBLEM — I49.3 PVC (PREMATURE VENTRICULAR CONTRACTION): Status: ACTIVE | Noted: 2019-07-10

## 2019-08-07 PROBLEM — I12.9 BENIGN HYPERTENSION WITH CKD (CHRONIC KIDNEY DISEASE) STAGE III (HCC): Status: ACTIVE | Noted: 2019-08-07

## 2019-08-07 PROBLEM — E13.22 SECONDARY DIABETES MELLITUS WITH STAGE 3 CHRONIC KIDNEY DISEASE (HCC): Status: ACTIVE | Noted: 2019-07-10

## 2019-08-07 PROBLEM — N18.30 SECONDARY DIABETES MELLITUS WITH STAGE 3 CHRONIC KIDNEY DISEASE (HCC): Status: ACTIVE | Noted: 2019-07-10

## 2019-08-07 PROBLEM — N18.30 BENIGN HYPERTENSION WITH CKD (CHRONIC KIDNEY DISEASE) STAGE III (HCC): Status: ACTIVE | Noted: 2019-08-07

## 2019-08-07 PROBLEM — N18.30 CKD (CHRONIC KIDNEY DISEASE), STAGE III (HCC): Status: ACTIVE | Noted: 2019-08-07

## 2019-08-16 ENCOUNTER — HOSPITAL ENCOUNTER (OUTPATIENT)
Dept: CT IMAGING | Age: 84
Discharge: HOME | End: 2019-08-18
Payer: MEDICARE

## 2019-08-16 DIAGNOSIS — N20.0 CALCIUM KIDNEY STONE: ICD-10-CM

## 2019-08-16 DIAGNOSIS — I12.9 BENIGN HYPERTENSION WITH CKD (CHRONIC KIDNEY DISEASE) STAGE III (HCC): ICD-10-CM

## 2019-08-16 DIAGNOSIS — N18.30 BENIGN HYPERTENSION WITH CKD (CHRONIC KIDNEY DISEASE) STAGE III (HCC): ICD-10-CM

## 2019-08-16 DIAGNOSIS — N18.30 CKD (CHRONIC KIDNEY DISEASE), STAGE III (HCC): ICD-10-CM

## 2019-08-16 DIAGNOSIS — E13.22 SECONDARY DIABETES MELLITUS WITH STAGE 3 CHRONIC KIDNEY DISEASE (HCC): ICD-10-CM

## 2019-08-16 DIAGNOSIS — N18.30 SECONDARY DIABETES MELLITUS WITH STAGE 3 CHRONIC KIDNEY DISEASE (HCC): ICD-10-CM

## 2019-08-16 PROCEDURE — 74176 CT ABD & PELVIS W/O CONTRAST: CPT

## 2019-09-03 ENCOUNTER — OFFICE VISIT (OUTPATIENT)
Dept: FAMILY MEDICINE CLINIC | Age: 84
End: 2019-09-03
Payer: MEDICARE

## 2019-09-03 VITALS — BODY MASS INDEX: 30.42 KG/M2 | HEART RATE: 62 BPM | HEIGHT: 69 IN | OXYGEN SATURATION: 97 % | WEIGHT: 205.4 LBS

## 2019-09-03 DIAGNOSIS — I20.9 ANGINA PECTORIS (HCC): ICD-10-CM

## 2019-09-03 DIAGNOSIS — I49.5 SICK SINUS SYNDROME (HCC): ICD-10-CM

## 2019-09-03 DIAGNOSIS — Z00.00 ROUTINE GENERAL MEDICAL EXAMINATION AT A HEALTH CARE FACILITY: ICD-10-CM

## 2019-09-03 DIAGNOSIS — E11.9 TYPE 2 DIABETES MELLITUS WITHOUT COMPLICATION, WITHOUT LONG-TERM CURRENT USE OF INSULIN (HCC): Primary | ICD-10-CM

## 2019-09-03 LAB — HBA1C MFR BLD: 7 %

## 2019-09-03 PROCEDURE — G8598 ASA/ANTIPLAT THER USED: HCPCS | Performed by: FAMILY MEDICINE

## 2019-09-03 PROCEDURE — G0439 PPPS, SUBSEQ VISIT: HCPCS | Performed by: FAMILY MEDICINE

## 2019-09-03 PROCEDURE — 4040F PNEUMOC VAC/ADMIN/RCVD: CPT | Performed by: FAMILY MEDICINE

## 2019-09-03 PROCEDURE — 83036 HEMOGLOBIN GLYCOSYLATED A1C: CPT | Performed by: FAMILY MEDICINE

## 2019-09-03 PROCEDURE — 1123F ACP DISCUSS/DSCN MKR DOCD: CPT | Performed by: FAMILY MEDICINE

## 2019-09-03 ASSESSMENT — LIFESTYLE VARIABLES: HOW OFTEN DO YOU HAVE A DRINK CONTAINING ALCOHOL: 0

## 2019-09-03 ASSESSMENT — PATIENT HEALTH QUESTIONNAIRE - PHQ9
SUM OF ALL RESPONSES TO PHQ QUESTIONS 1-9: 0
SUM OF ALL RESPONSES TO PHQ QUESTIONS 1-9: 0

## 2019-09-03 NOTE — PROGRESS NOTES
other.    Diagnoses and all orders for this visit:    Type 2 diabetes mellitus without complication, without long-term current use of insulin (HCC)  -     POCT glycosylated hemoglobin (Hb A1C)

## 2019-09-05 RX ORDER — ATORVASTATIN CALCIUM 80 MG/1
80 TABLET, FILM COATED ORAL NIGHTLY
Qty: 90 TABLET | Refills: 2 | Status: SHIPPED | OUTPATIENT
Start: 2019-09-05 | End: 2020-05-19 | Stop reason: SDUPTHER

## 2019-09-05 RX ORDER — EZETIMIBE 10 MG/1
TABLET ORAL
Qty: 90 TABLET | Refills: 2 | Status: SHIPPED | OUTPATIENT
Start: 2019-09-05 | End: 2020-05-19 | Stop reason: SDUPTHER

## 2019-09-05 NOTE — TELEPHONE ENCOUNTER
Please Approve or Refuse.   Send to Pharmacy per Pt's Request: zetia, lipitor     Next Visit Date:  9/17/2019   Last Visit Date: 9/3/2019    Hemoglobin A1C (%)   Date Value   09/03/2019 7.0   05/28/2019 7.4   01/22/2019 7.2             ( goal A1C is < 7)   BP Readings from Last 3 Encounters:   08/07/19 128/64   06/25/19 120/80   05/28/19 130/64          (goal 120/80)  BUN   Date Value Ref Range Status   08/02/2019 21 5 - 27 mg/dL Final     CREATININE   Date Value Ref Range Status   08/02/2019 0.95 0.40 - 1.00 mg/dL Final     Comment:     METHOD TRACEABLE TO IDMS STANDARD     Potassium   Date Value Ref Range Status   08/02/2019 4.7 3.5 - 5.0 mmol/L Final     Comment:     SPECIMEN HEMOLYZED, RESULTS INCREASED

## 2019-09-17 ENCOUNTER — OFFICE VISIT (OUTPATIENT)
Dept: FAMILY MEDICINE CLINIC | Age: 84
End: 2019-09-17
Payer: MEDICARE

## 2019-09-17 VITALS
SYSTOLIC BLOOD PRESSURE: 120 MMHG | HEIGHT: 69 IN | WEIGHT: 203.4 LBS | TEMPERATURE: 98.1 F | OXYGEN SATURATION: 96 % | HEART RATE: 52 BPM | BODY MASS INDEX: 30.13 KG/M2 | DIASTOLIC BLOOD PRESSURE: 70 MMHG

## 2019-09-17 DIAGNOSIS — E11.9 TYPE 2 DIABETES MELLITUS WITHOUT COMPLICATION, WITHOUT LONG-TERM CURRENT USE OF INSULIN (HCC): Primary | ICD-10-CM

## 2019-09-17 DIAGNOSIS — I25.810 CORONARY ARTERY DISEASE INVOLVING CORONARY BYPASS GRAFT OF NATIVE HEART WITHOUT ANGINA PECTORIS: ICD-10-CM

## 2019-09-17 DIAGNOSIS — M10.9 GOUT OF FOOT, UNSPECIFIED CAUSE, UNSPECIFIED CHRONICITY, UNSPECIFIED LATERALITY: ICD-10-CM

## 2019-09-17 DIAGNOSIS — Z23 NEED FOR INFLUENZA VACCINATION: ICD-10-CM

## 2019-09-17 DIAGNOSIS — I10 ESSENTIAL HYPERTENSION: ICD-10-CM

## 2019-09-17 LAB
CREATININE URINE: 10 MG/DL
MICROALBUMIN/CREAT 24H UR: 10 MG/G{CREAT}

## 2019-09-17 PROCEDURE — 99214 OFFICE O/P EST MOD 30 MIN: CPT | Performed by: FAMILY MEDICINE

## 2019-09-17 PROCEDURE — G8427 DOCREV CUR MEDS BY ELIG CLIN: HCPCS | Performed by: FAMILY MEDICINE

## 2019-09-17 PROCEDURE — G8399 PT W/DXA RESULTS DOCUMENT: HCPCS | Performed by: FAMILY MEDICINE

## 2019-09-17 PROCEDURE — G8417 CALC BMI ABV UP PARAM F/U: HCPCS | Performed by: FAMILY MEDICINE

## 2019-09-17 PROCEDURE — G0008 ADMIN INFLUENZA VIRUS VAC: HCPCS | Performed by: FAMILY MEDICINE

## 2019-09-17 PROCEDURE — 4040F PNEUMOC VAC/ADMIN/RCVD: CPT | Performed by: FAMILY MEDICINE

## 2019-09-17 PROCEDURE — G8598 ASA/ANTIPLAT THER USED: HCPCS | Performed by: FAMILY MEDICINE

## 2019-09-17 PROCEDURE — 1036F TOBACCO NON-USER: CPT | Performed by: FAMILY MEDICINE

## 2019-09-17 PROCEDURE — 1123F ACP DISCUSS/DSCN MKR DOCD: CPT | Performed by: FAMILY MEDICINE

## 2019-09-17 PROCEDURE — 90653 IIV ADJUVANT VACCINE IM: CPT | Performed by: FAMILY MEDICINE

## 2019-09-17 PROCEDURE — 1090F PRES/ABSN URINE INCON ASSESS: CPT | Performed by: FAMILY MEDICINE

## 2019-09-17 RX ORDER — TRAZODONE HYDROCHLORIDE 50 MG/1
50 TABLET ORAL NIGHTLY
Qty: 30 TABLET | Refills: 0 | Status: CANCELLED | OUTPATIENT
Start: 2019-09-17

## 2019-09-17 ASSESSMENT — ENCOUNTER SYMPTOMS
NAUSEA: 0
SHORTNESS OF BREATH: 0
SORE THROAT: 0
ABDOMINAL PAIN: 0

## 2019-09-17 NOTE — PROGRESS NOTES
Visit Information    Have you changed or started any medications since your last visit including any over-the-counter medicines, vitamins, or herbal medicines? no   Are you having any side effects from any of your medications? -  no  Have you stopped taking any of your medications? Is so, why? -  no    Have you seen any other physician or provider since your last visit? No  Have you had any other diagnostic tests since your last visit? No  Have you been seen in the emergency room and/or had an admission to a hospital since we last saw you? No  Have you had your routine dental cleaning in the past 6 months? yes -     Have you activated your [employer] account? If not, what are your barriers? Yes     Patient Care Team:  Flora Ding MD as PCP - General  Flora Ding MD as PCP - [facility]  Frandy Nagy MD as Consulting Physician (Ophthalmology)  Sally Vargas MD as Consulting Physician (Cardiology)  Lynne Zee as Consulting Physician (Neurology)    Medical History Review  Past Medical, Family, and Social History reviewed and does not contribute to the patient presenting condition    Health Maintenance   Topic Date Due    DTaP/Tdap/Td vaccine (1 - Tdap) 12/10/1952    Shingles Vaccine (2 of 3) 11/26/2017    Pneumococcal 65+ years Vaccine (2 of 2 - PCV13) 06/12/2019    Diabetic retinal exam  07/20/2019    Flu vaccine (1) 09/01/2019    Diabetic foot exam  10/15/2019    Lipid screen  02/22/2020    A1C test (Diabetic or Prediabetic)  03/03/2020    Potassium monitoring  08/02/2020    Creatinine monitoring  08/02/2020    Annual Wellness Visit (AWV)  09/02/2020    Colon cancer screen colonoscopy  09/14/2025    DEXA (modify frequency per FRAX score)  Completed   Subjective:      Patient ID: Bre Holm is a 80 y.o. female.     HPI  This 80-year-old female is seen in the office today for follow-up for her diabetes hypertension and coronary artery disease blood sugars are running

## 2019-09-18 ENCOUNTER — HOSPITAL ENCOUNTER (OUTPATIENT)
Dept: WOMENS IMAGING | Age: 84
Discharge: HOME | End: 2019-09-20
Payer: MEDICARE

## 2019-09-18 DIAGNOSIS — Z12.39 SCREENING BREAST EXAMINATION: ICD-10-CM

## 2019-09-18 PROCEDURE — 77063 BREAST TOMOSYNTHESIS BI: CPT

## 2019-12-10 ENCOUNTER — TELEPHONE (OUTPATIENT)
Dept: FAMILY MEDICINE CLINIC | Age: 84
End: 2019-12-10

## 2019-12-10 ENCOUNTER — HOSPITAL ENCOUNTER (OUTPATIENT)
Dept: GENERAL RADIOLOGY | Age: 84
Discharge: HOME | End: 2019-12-12
Payer: MEDICARE

## 2019-12-10 ENCOUNTER — HOSPITAL ENCOUNTER (OUTPATIENT)
Age: 84
Discharge: HOME | End: 2019-12-12
Payer: MEDICARE

## 2019-12-10 ENCOUNTER — OFFICE VISIT (OUTPATIENT)
Dept: FAMILY MEDICINE CLINIC | Age: 84
End: 2019-12-10
Payer: MEDICARE

## 2019-12-10 VITALS
HEIGHT: 69 IN | DIASTOLIC BLOOD PRESSURE: 70 MMHG | OXYGEN SATURATION: 97 % | BODY MASS INDEX: 29.62 KG/M2 | WEIGHT: 200 LBS | HEART RATE: 86 BPM | SYSTOLIC BLOOD PRESSURE: 124 MMHG

## 2019-12-10 DIAGNOSIS — I10 ESSENTIAL HYPERTENSION: ICD-10-CM

## 2019-12-10 DIAGNOSIS — M25.551 PAIN IN RIGHT HIP: ICD-10-CM

## 2019-12-10 DIAGNOSIS — M10.9 GOUT OF FOOT, UNSPECIFIED CAUSE, UNSPECIFIED CHRONICITY, UNSPECIFIED LATERALITY: ICD-10-CM

## 2019-12-10 DIAGNOSIS — E11.9 TYPE 2 DIABETES MELLITUS WITHOUT COMPLICATION, WITHOUT LONG-TERM CURRENT USE OF INSULIN (HCC): Primary | ICD-10-CM

## 2019-12-10 LAB — HBA1C MFR BLD: 6.9 %

## 2019-12-10 PROCEDURE — G8482 FLU IMMUNIZE ORDER/ADMIN: HCPCS | Performed by: FAMILY MEDICINE

## 2019-12-10 PROCEDURE — 83036 HEMOGLOBIN GLYCOSYLATED A1C: CPT | Performed by: FAMILY MEDICINE

## 2019-12-10 PROCEDURE — 73502 X-RAY EXAM HIP UNI 2-3 VIEWS: CPT

## 2019-12-10 PROCEDURE — 1090F PRES/ABSN URINE INCON ASSESS: CPT | Performed by: FAMILY MEDICINE

## 2019-12-10 PROCEDURE — 1123F ACP DISCUSS/DSCN MKR DOCD: CPT | Performed by: FAMILY MEDICINE

## 2019-12-10 PROCEDURE — 99214 OFFICE O/P EST MOD 30 MIN: CPT | Performed by: FAMILY MEDICINE

## 2019-12-10 PROCEDURE — 4040F PNEUMOC VAC/ADMIN/RCVD: CPT | Performed by: FAMILY MEDICINE

## 2019-12-10 PROCEDURE — G8427 DOCREV CUR MEDS BY ELIG CLIN: HCPCS | Performed by: FAMILY MEDICINE

## 2019-12-10 PROCEDURE — G8417 CALC BMI ABV UP PARAM F/U: HCPCS | Performed by: FAMILY MEDICINE

## 2019-12-10 PROCEDURE — 1036F TOBACCO NON-USER: CPT | Performed by: FAMILY MEDICINE

## 2019-12-10 PROCEDURE — G8598 ASA/ANTIPLAT THER USED: HCPCS | Performed by: FAMILY MEDICINE

## 2019-12-10 ASSESSMENT — ENCOUNTER SYMPTOMS
SORE THROAT: 0
NAUSEA: 0
ABDOMINAL PAIN: 0
SHORTNESS OF BREATH: 0
BACK PAIN: 0

## 2019-12-12 ENCOUNTER — HOSPITAL ENCOUNTER (OUTPATIENT)
Age: 84
Setting detail: SPECIMEN
Discharge: HOME | End: 2019-12-12
Payer: MEDICARE

## 2019-12-12 ENCOUNTER — OFFICE VISIT (OUTPATIENT)
Dept: UROLOGY | Age: 84
End: 2019-12-12
Payer: MEDICARE

## 2019-12-12 VITALS
BODY MASS INDEX: 30.07 KG/M2 | HEIGHT: 69 IN | SYSTOLIC BLOOD PRESSURE: 134 MMHG | WEIGHT: 203 LBS | DIASTOLIC BLOOD PRESSURE: 60 MMHG | HEART RATE: 77 BPM | TEMPERATURE: 98 F

## 2019-12-12 DIAGNOSIS — R31.0 GROSS HEMATURIA: Primary | ICD-10-CM

## 2019-12-12 LAB
-: ABNORMAL
AMORPHOUS: ABNORMAL
BACTERIA: ABNORMAL
BILIRUBIN URINE: NEGATIVE
BILIRUBIN, POC: ABNORMAL
BLOOD URINE, POC: ABNORMAL
CASTS UA: ABNORMAL /LPF (ref 0–8)
CLARITY, POC: ABNORMAL
COLOR, POC: YELLOW
COLOR: YELLOW
COMMENT UA: ABNORMAL
CRYSTALS, UA: ABNORMAL /HPF
EPITHELIAL CELLS UA: ABNORMAL /HPF (ref 0–5)
GLUCOSE URINE, POC: ABNORMAL
GLUCOSE URINE: NEGATIVE
KETONES, POC: ABNORMAL
KETONES, URINE: NEGATIVE
LEUKOCYTE EST, POC: ABNORMAL
LEUKOCYTE ESTERASE, URINE: ABNORMAL
MUCUS: ABNORMAL
NITRITE, POC: POSITIVE
NITRITE, URINE: NEGATIVE
OTHER OBSERVATIONS UA: ABNORMAL
PH UA: 5 (ref 5–8)
PH, POC: ABNORMAL
PROTEIN UA: ABNORMAL
PROTEIN, POC: ABNORMAL
RBC UA: ABNORMAL /HPF (ref 0–4)
RENAL EPITHELIAL, UA: ABNORMAL /HPF
SPECIFIC GRAVITY UA: 1.02 (ref 1–1.03)
SPECIFIC GRAVITY, POC: ABNORMAL
TRICHOMONAS: ABNORMAL
TURBIDITY: ABNORMAL
URINE HGB: NEGATIVE
UROBILINOGEN, POC: ABNORMAL
UROBILINOGEN, URINE: NORMAL
WBC UA: ABNORMAL /HPF (ref 0–5)
YEAST: ABNORMAL

## 2019-12-12 PROCEDURE — G8598 ASA/ANTIPLAT THER USED: HCPCS | Performed by: UROLOGY

## 2019-12-12 PROCEDURE — 99204 OFFICE O/P NEW MOD 45 MIN: CPT | Performed by: UROLOGY

## 2019-12-12 PROCEDURE — 81002 URINALYSIS NONAUTO W/O SCOPE: CPT | Performed by: UROLOGY

## 2019-12-12 PROCEDURE — 1090F PRES/ABSN URINE INCON ASSESS: CPT | Performed by: UROLOGY

## 2019-12-12 PROCEDURE — 1036F TOBACCO NON-USER: CPT | Performed by: UROLOGY

## 2019-12-12 PROCEDURE — G8482 FLU IMMUNIZE ORDER/ADMIN: HCPCS | Performed by: UROLOGY

## 2019-12-12 PROCEDURE — G8417 CALC BMI ABV UP PARAM F/U: HCPCS | Performed by: UROLOGY

## 2019-12-12 PROCEDURE — 4040F PNEUMOC VAC/ADMIN/RCVD: CPT | Performed by: UROLOGY

## 2019-12-12 PROCEDURE — G8427 DOCREV CUR MEDS BY ELIG CLIN: HCPCS | Performed by: UROLOGY

## 2019-12-12 PROCEDURE — 1123F ACP DISCUSS/DSCN MKR DOCD: CPT | Performed by: UROLOGY

## 2019-12-12 ASSESSMENT — ENCOUNTER SYMPTOMS
EYE REDNESS: 0
COLOR CHANGE: 0
BACK PAIN: 0
ABDOMINAL PAIN: 0
SHORTNESS OF BREATH: 0
EYE PAIN: 0
WHEEZING: 0
VOMITING: 0
COUGH: 0
NAUSEA: 0

## 2019-12-14 LAB
CULTURE: ABNORMAL
Lab: ABNORMAL
SPECIMEN DESCRIPTION: ABNORMAL

## 2019-12-16 ENCOUNTER — TELEPHONE (OUTPATIENT)
Dept: UROLOGY | Age: 84
End: 2019-12-16

## 2019-12-16 RX ORDER — NITROFURANTOIN 25; 75 MG/1; MG/1
100 CAPSULE ORAL 2 TIMES DAILY
Qty: 20 CAPSULE | Refills: 0 | Status: SHIPPED | OUTPATIENT
Start: 2019-12-16 | End: 2019-12-26

## 2020-01-07 ENCOUNTER — HOSPITAL ENCOUNTER (OUTPATIENT)
Dept: PREADMISSION TESTING | Age: 85
Discharge: HOME | End: 2020-01-11
Payer: MEDICARE

## 2020-01-07 VITALS
HEART RATE: 58 BPM | RESPIRATION RATE: 18 BRPM | DIASTOLIC BLOOD PRESSURE: 55 MMHG | OXYGEN SATURATION: 99 % | TEMPERATURE: 58 F | BODY MASS INDEX: 29.92 KG/M2 | HEIGHT: 69 IN | WEIGHT: 202 LBS | SYSTOLIC BLOOD PRESSURE: 144 MMHG

## 2020-01-07 LAB
ABSOLUTE EOS #: 0.2 K/UL (ref 0–0.4)
ABSOLUTE IMMATURE GRANULOCYTE: ABNORMAL K/UL (ref 0–0.3)
ABSOLUTE LYMPH #: 2.3 K/UL (ref 1–4.8)
ABSOLUTE MONO #: 0.8 K/UL (ref 0.1–1.3)
ANION GAP SERPL CALCULATED.3IONS-SCNC: 11 MMOL/L (ref 9–17)
BASOPHILS # BLD: 1 % (ref 0–2)
BASOPHILS ABSOLUTE: 0 K/UL (ref 0–0.2)
BUN BLDV-MCNC: 19 MG/DL (ref 8–23)
BUN/CREAT BLD: ABNORMAL (ref 9–20)
CALCIUM SERPL-MCNC: 9.6 MG/DL (ref 8.6–10.4)
CHLORIDE BLD-SCNC: 102 MMOL/L (ref 98–107)
CO2: 29 MMOL/L (ref 20–31)
CREAT SERPL-MCNC: 0.64 MG/DL (ref 0.5–0.9)
DIFFERENTIAL TYPE: ABNORMAL
EOSINOPHILS RELATIVE PERCENT: 3 % (ref 0–4)
GFR AFRICAN AMERICAN: >60 ML/MIN
GFR NON-AFRICAN AMERICAN: >60 ML/MIN
GFR SERPL CREATININE-BSD FRML MDRD: ABNORMAL ML/MIN/{1.73_M2}
GFR SERPL CREATININE-BSD FRML MDRD: ABNORMAL ML/MIN/{1.73_M2}
GLUCOSE BLD-MCNC: 179 MG/DL (ref 70–99)
HCT VFR BLD CALC: 41.3 % (ref 36–46)
HEMOGLOBIN: 13.7 G/DL (ref 12–16)
IMMATURE GRANULOCYTES: ABNORMAL %
LYMPHOCYTES # BLD: 29 % (ref 24–44)
MCH RBC QN AUTO: 29 PG (ref 26–34)
MCHC RBC AUTO-ENTMCNC: 33.1 G/DL (ref 31–37)
MCV RBC AUTO: 87.5 FL (ref 80–100)
MONOCYTES # BLD: 9 % (ref 1–7)
NRBC AUTOMATED: ABNORMAL PER 100 WBC
PDW BLD-RTO: 15.4 % (ref 11.5–14.9)
PLATELET # BLD: 223 K/UL (ref 150–450)
PLATELET ESTIMATE: ABNORMAL
PMV BLD AUTO: 8.7 FL (ref 6–12)
POTASSIUM SERPL-SCNC: 5 MMOL/L (ref 3.7–5.3)
RBC # BLD: 4.71 M/UL (ref 4–5.2)
RBC # BLD: ABNORMAL 10*6/UL
SEG NEUTROPHILS: 58 % (ref 36–66)
SEGMENTED NEUTROPHILS ABSOLUTE COUNT: 4.7 K/UL (ref 1.3–9.1)
SODIUM BLD-SCNC: 142 MMOL/L (ref 135–144)
WBC # BLD: 8.1 K/UL (ref 3.5–11)
WBC # BLD: ABNORMAL 10*3/UL

## 2020-01-07 PROCEDURE — 36415 COLL VENOUS BLD VENIPUNCTURE: CPT

## 2020-01-07 PROCEDURE — 87086 URINE CULTURE/COLONY COUNT: CPT

## 2020-01-07 PROCEDURE — 87186 SC STD MICRODIL/AGAR DIL: CPT

## 2020-01-07 PROCEDURE — 80048 BASIC METABOLIC PNL TOTAL CA: CPT

## 2020-01-07 PROCEDURE — 87077 CULTURE AEROBIC IDENTIFY: CPT

## 2020-01-07 PROCEDURE — 85025 COMPLETE CBC W/AUTO DIFF WBC: CPT

## 2020-01-07 NOTE — H&P (VIEW-ONLY)
stones most pronounced in the inferior pole of  the right kidney.     Moderate amount of formed stool throughout the colon and correlation with  symptomatology for constipation is needed. PAST MEDICAL HISTORY     Past Medical History:   Diagnosis Date    Arthritis     Basal cell carcinoma of nose     Bronchitis     HX. OF     CAD (coronary artery disease)     Carpal tunnel syndrome     left hand    Colon cancer (HCC)     Diabetes mellitus ([facility]     Gout     Hematuria     Hx of blood clots     ED. LEGS, ED. 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     SURGICAL HISTORY       Past Surgical History:   Procedure Laterality Date    ANGIOPLASTY      hx. of stenting x 3.    APPENDECTOMY      BREAST SURGERY      INFECTED MILK DUCT LT.    CARDIAC SURGERY      CABG x 3 vessels and 3 stents    CAROTID ENDARTERECTOMY Left 3-3-16    CARPAL TUNNEL RELEASE Right     CHOLECYSTECTOMY      COLON SURGERY      colectomy, PRECANCEROUS POLYPS    COLONOSCOPY      X5, HX PRECANCEROUS POLYPS    CORONARY ARTERY BYPASS GRAFT      X3 vessels    CYST REMOVAL  10/07/2016    EXCISION OF SEBACEOUS CYST REMOVED FROM BACK X3    DILATATION, ESOPHAGUS      FINGER TRIGGER RELEASE Right     INDEX FINGER AND THUMB.    HYSTERECTOMY      JOINT REPLACEMENT Left 03/29/2010    TKA    JOINT REPLACEMENT Right 02/16/1999    TKA    LITHOTRIPSY      X 3    SKIN BIOPSY      TOP OF NOSE (BASAL CELL)    VASCULAR SURGERY      vein ablation on legs       FAMILY HISTORY       Family History   Problem Relation Age of Onset    Stroke Mother     Hypertension Mother  Heart Disease Father         AAA    Stroke Sister     Parkinsonism Brother     Cancer Sister         lung    Alcohol Abuse Sister        SOCIAL HISTORY       Social History     Socioeconomic History    Marital status:       Spouse name: None    Number of children: None    Years of education: None    Highest education level: None   Occupational History    None   Social Needs    Financial resource strain: None    Food insecurity:     Worry: None     Inability: None    Transportation needs:     Medical: None     Non-medical: None   Tobacco Use    Smoking status: Never Smoker    Smokeless tobacco: Never Used   Substance and Sexual Activity    Alcohol use: No    Drug use: No    Sexual activity: None   Lifestyle    Physical activity:     Days per week: None     Minutes per session: None    Stress: None   Relationships    Social connections:     Talks on phone: None     Gets together: None     Attends Je[employer]'s witness service: None     Active member of club or organization: None     Attends meetings of clubs or organizations: None     Relationship status: None    Intimate partner violence:     Fear of current or ex partner: None     Emotionally abused: None     Physically abused: None     Forced sexual activity: None   Other Topics Concern    None   Social History Narrative    None        REVIEW OF SYSTEMS      Allergies   Allergen Reactions    Diovan [Valsartan] Other (See Comments)     cough    Lantus [Insulin Glargine] Nausea Only     Stomach \"quivered\" after taking it, palpitations    Metformin And Related Diarrhea    Plavix [Clopidogrel Bisulfate] Itching       Current Outpatient Medications on File Prior to Encounter   Medication Sig Dispense Refill    allopurinol (ZYLOPRIM) 100 MG tablet Take 1 tablet by mouth 2 times daily 180 tablet 3    XARELTO 20 MG TABS tablet TAKE 1 TABLET DAILY WITH BREAKFAST 90 tablet 1    metoprolol tartrate (LOPRESSOR) 25 MG tablet TAKE 1 TABLET TWICE A DAY kg/m².       GENERAL APPEARANCE:   Niko Humphrey is 80 y.o.,  female, not obese, nourished, conscious, alert. Does not appear to be distress or pain at this time. SKIN:  Warm, dry, no cyanosis or jaundice. HEAD:  Normocephalic, atraumatic, no swelling or tenderness. EYES:  Pupils equal, reactive to light. EARS:  No discharge, no marked hearing loss. NOSE:  No rhinorrhea, epistaxis or septal deformity. THROAT:  Not congested. No ulceration bleeding or discharge. NECK:  No stiffness, trachea central.  No palpable masses or L.N.                 CHEST:  Symmetrical and equal on expansion. HEART:  RRR S1 > S2. 2/6 systolic murmur heard at the base. No gallops. LUNGS:  Equal on expansion, normal breath sounds. No adventitious sounds. ABDOMEN:  Soft on palpation. No localized tenderness. No guarding or rigidity. No palpable hepatosplenomegaly. LYMPHATICS:  No palpable cervical lymphadenopathy. LOCOMOTOR, BACK AND SPINE:  No tenderness or deformities. EXTREMITIES:  Symmetrical,  +1 bilateral pretibial edema. Wearing compression socks. No discoloration or ulcerations on exposed skin. NEUROLOGIC:  The patient is conscious, alert, oriented, Cranial nerve II-XII intact, taste and smell were not examined. No apparent focal sensory or motor deficits.                                                                                      PROVISIONAL DIAGNOSES / SURGERY:      HEMATURIA    CYSTOSCOPY RETROGRADE PYELOGRAM Bilateral    Patient Active Problem List    Diagnosis Date Noted    CKD (chronic kidney disease), stage III ([address] 08/07/2019    Benign hypertension with CKD (chronic kidney disease) stage III ([address] 08/07/2019    Arthritis 07/10/2019    Coronary atherosclerosis 07/10/2019    Secondary diabetes mellitus with stage 3 [facility] 07/10/2019    DVT (deep venous thrombosis) ([facility] 07/10/2019    Hyperlipidemia 07/10/2019    Angina pectoris ([address] 07/10/2019    PVC (premature ventricular contraction) 07/10/2019    CKD (chronic kidney disease), stage II 07/10/2019    Wound of right side of back 06/25/2019    Calcium kidney stone 06/25/2019    Murmur, cardiac 06/07/2019    Coronary artery disease involving coronary bypass graft of native heart without angina pectoris 03/12/2018    Type 2 diabetes mellitus without complication, without long-term current use of insulin ([facility] 10/13/2016    Gout of foot 07/11/2016    Other transient cerebral ischemic attacks and related syndromes 06/02/2016    Palpitations 06/02/2016    Sick sinus syndrome ([facility] 04/11/2016    Essential hypertension 02/26/2016    Occlusion and stenosis of bilateral carotid arteries 02/03/2016    Other pulmonary embolism without acute cor pulmonale ([facility] 04/30/2013           LISSY Wood - CNP on 1/7/2020 at 3:34 PM

## 2020-01-07 NOTE — H&P
HISTORY and [id number]       NAME:  Constance Scott  MRN: [id number]   YOB: 1933   Date: 1/7/2020   Age: 80 y.o. Gender: female       COMPLAINT AND PRESENT HISTORY:     Constance Scott is 80 y.o.,  female, here for hematuria with scheduled Cystoscopy with bilateral retrograde pyelogram.     Pt followed by nephrology. Pt has a history of nephrolithiasis with two previous lithotripsies in 2004 and another two in 2014. Pt most recent kidney stone was in May 2019 where she passed this stone at that time. Has had consistent right flank pain since passing this stone. Pain is rated at the worst 5/10. Described as sharp, intermittent and depending on activity. Developed gross hematuria twice since passing stone in May. Each time she was having her hair shampooed and in a supine position. After sitting up she had to urinate and had gross hematuria. August had a CT of abdomen/pelvis showed bilateral non obstructing renal stones (results below). Reports she had recent UTI that was treated with antibiotics that she completed about two weeks ago. Was having dysuria at that time but has since resolved. C/O current, intermittent right flank pain. Reports dark urine at times. Has increased hydration and now reports urine is lighter and clear. Denies current hematuria, dysuria, a sense of residual urine, weak stream, hesitancy or urgency. Denies urological trauma. No recent falls. No redness, rashes or swelling. Denies family history of renal or bladder cancer. Denies history of complications with anesthesia. Denies family history of complications with anesthesia. Denies current chest pain/pressure, palpitations, SOB, recent URI, nausea, vomiting, diarrhea, constipation, fever or chills.          EXAMINATION:  CT OF THE ABDOMEN AND PELVIS WITHOUT CONTRAST 8/16/2019 9:37 am    Impression  No acute abdominal or pelvic abnormality.     Bilateral nonobstructing renal  Heart Disease Father         AAA    Stroke Sister     Parkinsonism Brother     Cancer Sister         lung    Alcohol Abuse Sister        SOCIAL HISTORY       Social History     Socioeconomic History    Marital status:       Spouse name: None    Number of children: None    Years of education: None    Highest education level: None   Occupational History    None   Social Needs    Financial resource strain: None    Food insecurity:     Worry: None     Inability: None    Transportation needs:     Medical: None     Non-medical: None   Tobacco Use    Smoking status: Never Smoker    Smokeless tobacco: Never Used   Substance and Sexual Activity    Alcohol use: No    Drug use: No    Sexual activity: None   Lifestyle    Physical activity:     Days per week: None     Minutes per session: None    Stress: None   Relationships    Social connections:     Talks on phone: None     Gets together: None     Attends [employer] service: None     Active member of club or organization: None     Attends meetings of clubs or organizations: None     Relationship status: None    Intimate partner violence:     Fear of current or ex partner: None     Emotionally abused: None     Physically abused: None     Forced sexual activity: None   Other Topics Concern    None   Social History Narrative    None        REVIEW OF SYSTEMS      Allergies   Allergen Reactions    Diovan [Valsartan] Other (See Comments)     cough    Lantus [Insulin Glargine] Nausea Only     Stomach \"quivered\" after taking it, palpitations    Metformin And Related Diarrhea    Plavix [Clopidogrel Bisulfate] Itching       Current Outpatient Medications on File Prior to Encounter   Medication Sig Dispense Refill    allopurinol (ZYLOPRIM) 100 MG tablet Take 1 tablet by mouth 2 times daily 180 tablet 3    XARELTO 20 MG TABS tablet TAKE 1 TABLET DAILY WITH BREAKFAST 90 tablet 1    metoprolol tartrate (LOPRESSOR) 25 MG tablet TAKE 1 TABLET TWICE A DAY kg/m².       GENERAL APPEARANCE:   Angelic Masters is 80 y.o.,  female, not obese, nourished, conscious, alert. Does not appear to be distress or pain at this time. SKIN:  Warm, dry, no cyanosis or jaundice. HEAD:  Normocephalic, atraumatic, no swelling or tenderness. EYES:  Pupils equal, reactive to light. EARS:  No discharge, no marked hearing loss. NOSE:  No rhinorrhea, epistaxis or septal deformity. THROAT:  Not congested. No ulceration bleeding or discharge. NECK:  No stiffness, trachea central.  No palpable masses or L.N.                 CHEST:  Symmetrical and equal on expansion. HEART:  RRR S1 > S2. 2/6 systolic murmur heard at the base. No gallops. LUNGS:  Equal on expansion, normal breath sounds. No adventitious sounds. ABDOMEN:  Soft on palpation. No localized tenderness. No guarding or rigidity. No palpable hepatosplenomegaly. LYMPHATICS:  No palpable cervical lymphadenopathy. LOCOMOTOR, BACK AND SPINE:  No tenderness or deformities. EXTREMITIES:  Symmetrical,  +1 bilateral pretibial edema. Wearing compression socks. No discoloration or ulcerations on exposed skin. NEUROLOGIC:  The patient is conscious, alert, oriented, Cranial nerve II-XII intact, taste and smell were not examined. No apparent focal sensory or motor deficits.                                                                                      PROVISIONAL DIAGNOSES / SURGERY:      HEMATURIA    CYSTOSCOPY RETROGRADE PYELOGRAM Bilateral    Patient Active Problem List    Diagnosis Date Noted    CKD (chronic kidney disease), stage III ([address] 08/07/2019    Benign hypertension with CKD (chronic kidney disease) stage III ([address] 08/07/2019    Arthritis 07/10/2019    Coronary atherosclerosis 07/10/2019    Secondary diabetes mellitus with stage

## 2020-01-09 LAB
CULTURE: ABNORMAL
Lab: ABNORMAL
SPECIMEN DESCRIPTION: ABNORMAL

## 2020-01-09 RX ORDER — CEPHALEXIN 500 MG/1
500 CAPSULE ORAL 3 TIMES DAILY
Qty: 15 CAPSULE | Refills: 0 | Status: SHIPPED | OUTPATIENT
Start: 2020-01-09 | End: 2020-01-14

## 2020-01-21 ENCOUNTER — HOSPITAL ENCOUNTER (OUTPATIENT)
Age: 85
Setting detail: OUTPATIENT SURGERY
Discharge: HOME | End: 2020-01-21
Attending: UROLOGY | Admitting: UROLOGY
Payer: MEDICARE

## 2020-01-21 ENCOUNTER — APPOINTMENT (OUTPATIENT)
Dept: GENERAL RADIOLOGY | Age: 85
End: 2020-01-21
Attending: UROLOGY
Payer: MEDICARE

## 2020-01-21 ENCOUNTER — ANESTHESIA EVENT (OUTPATIENT)
Dept: OPERATING ROOM | Age: 85
End: 2020-01-21
Payer: MEDICARE

## 2020-01-21 ENCOUNTER — ANESTHESIA (OUTPATIENT)
Dept: OPERATING ROOM | Age: 85
End: 2020-01-21
Payer: MEDICARE

## 2020-01-21 VITALS
BODY MASS INDEX: 29.62 KG/M2 | RESPIRATION RATE: 16 BRPM | SYSTOLIC BLOOD PRESSURE: 164 MMHG | HEART RATE: 78 BPM | DIASTOLIC BLOOD PRESSURE: 77 MMHG | OXYGEN SATURATION: 97 % | WEIGHT: 200 LBS | HEIGHT: 69 IN | TEMPERATURE: 97.9 F

## 2020-01-21 VITALS — DIASTOLIC BLOOD PRESSURE: 57 MMHG | SYSTOLIC BLOOD PRESSURE: 128 MMHG | OXYGEN SATURATION: 98 %

## 2020-01-21 LAB
GLUCOSE BLD-MCNC: 186 MG/DL (ref 65–105)
GLUCOSE BLD-MCNC: 188 MG/DL (ref 65–105)

## 2020-01-21 PROCEDURE — 6360000002 HC RX W HCPCS: Performed by: NURSE ANESTHETIST, CERTIFIED REGISTERED

## 2020-01-21 PROCEDURE — 7100000001 HC PACU RECOVERY - ADDTL 15 MIN: Performed by: UROLOGY

## 2020-01-21 PROCEDURE — 2580000003 HC RX 258: Performed by: ANESTHESIOLOGY

## 2020-01-21 PROCEDURE — 3600000012 HC SURGERY LEVEL 2 ADDTL 15MIN: Performed by: UROLOGY

## 2020-01-21 PROCEDURE — 7100000031 HC ASPR PHASE II RECOVERY - ADDTL 15 MIN: Performed by: UROLOGY

## 2020-01-21 PROCEDURE — 3700000000 HC ANESTHESIA ATTENDED CARE: Performed by: UROLOGY

## 2020-01-21 PROCEDURE — 3209999900 FLUORO FOR SURGICAL PROCEDURES

## 2020-01-21 PROCEDURE — 74420 UROGRAPHY RTRGR +-KUB: CPT

## 2020-01-21 PROCEDURE — 82947 ASSAY GLUCOSE BLOOD QUANT: CPT

## 2020-01-21 PROCEDURE — 7100000000 HC PACU RECOVERY - FIRST 15 MIN: Performed by: UROLOGY

## 2020-01-21 PROCEDURE — 2500000003 HC RX 250 WO HCPCS: Performed by: NURSE ANESTHETIST, CERTIFIED REGISTERED

## 2020-01-21 PROCEDURE — C1758 CATHETER, URETERAL: HCPCS | Performed by: UROLOGY

## 2020-01-21 PROCEDURE — 3700000001 HC ADD 15 MINUTES (ANESTHESIA): Performed by: UROLOGY

## 2020-01-21 PROCEDURE — 88120 CYTP URNE 3-5 PROBES EA SPEC: CPT

## 2020-01-21 PROCEDURE — 7100000030 HC ASPR PHASE II RECOVERY - FIRST 15 MIN: Performed by: UROLOGY

## 2020-01-21 PROCEDURE — 3600000002 HC SURGERY LEVEL 2 BASE: Performed by: UROLOGY

## 2020-01-21 PROCEDURE — 2709999900 HC NON-CHARGEABLE SUPPLY: Performed by: UROLOGY

## 2020-01-21 RX ORDER — ONDANSETRON 2 MG/ML
INJECTION INTRAMUSCULAR; INTRAVENOUS PRN
Status: DISCONTINUED | OUTPATIENT
Start: 2020-01-21 | End: 2020-01-21 | Stop reason: SDUPTHER

## 2020-01-21 RX ORDER — LABETALOL 20 MG/4 ML (5 MG/ML) INTRAVENOUS SYRINGE
5 EVERY 10 MIN PRN
Status: DISCONTINUED | OUTPATIENT
Start: 2020-01-21 | End: 2020-01-21 | Stop reason: HOSPADM

## 2020-01-21 RX ORDER — LIDOCAINE HYDROCHLORIDE 10 MG/ML
INJECTION, SOLUTION EPIDURAL; INFILTRATION; INTRACAUDAL; PERINEURAL PRN
Status: DISCONTINUED | OUTPATIENT
Start: 2020-01-21 | End: 2020-01-21 | Stop reason: SDUPTHER

## 2020-01-21 RX ORDER — SODIUM CHLORIDE 9 MG/ML
INJECTION, SOLUTION INTRAVENOUS CONTINUOUS
Status: DISCONTINUED | OUTPATIENT
Start: 2020-01-21 | End: 2020-01-21 | Stop reason: HOSPADM

## 2020-01-21 RX ORDER — PROPOFOL 10 MG/ML
INJECTION, EMULSION INTRAVENOUS PRN
Status: DISCONTINUED | OUTPATIENT
Start: 2020-01-21 | End: 2020-01-21 | Stop reason: SDUPTHER

## 2020-01-21 RX ORDER — FENTANYL CITRATE 50 UG/ML
25 INJECTION, SOLUTION INTRAMUSCULAR; INTRAVENOUS EVERY 5 MIN PRN
Status: DISCONTINUED | OUTPATIENT
Start: 2020-01-21 | End: 2020-01-21 | Stop reason: HOSPADM

## 2020-01-21 RX ORDER — DIPHENHYDRAMINE HYDROCHLORIDE 50 MG/ML
12.5 INJECTION INTRAMUSCULAR; INTRAVENOUS
Status: DISCONTINUED | OUTPATIENT
Start: 2020-01-21 | End: 2020-01-21 | Stop reason: HOSPADM

## 2020-01-21 RX ORDER — HYDROCODONE BITARTRATE AND ACETAMINOPHEN 5; 325 MG/1; MG/1
2 TABLET ORAL PRN
Status: DISCONTINUED | OUTPATIENT
Start: 2020-01-21 | End: 2020-01-21 | Stop reason: HOSPADM

## 2020-01-21 RX ORDER — HYDROCODONE BITARTRATE AND ACETAMINOPHEN 5; 325 MG/1; MG/1
1 TABLET ORAL PRN
Status: DISCONTINUED | OUTPATIENT
Start: 2020-01-21 | End: 2020-01-21 | Stop reason: HOSPADM

## 2020-01-21 RX ORDER — MORPHINE SULFATE 2 MG/ML
2 INJECTION, SOLUTION INTRAMUSCULAR; INTRAVENOUS EVERY 5 MIN PRN
Status: DISCONTINUED | OUTPATIENT
Start: 2020-01-21 | End: 2020-01-21 | Stop reason: HOSPADM

## 2020-01-21 RX ORDER — CEFAZOLIN SODIUM 1 G/3ML
INJECTION, POWDER, FOR SOLUTION INTRAMUSCULAR; INTRAVENOUS PRN
Status: DISCONTINUED | OUTPATIENT
Start: 2020-01-21 | End: 2020-01-21 | Stop reason: SDUPTHER

## 2020-01-21 RX ORDER — MEPERIDINE HYDROCHLORIDE 25 MG/ML
12.5 INJECTION INTRAMUSCULAR; INTRAVENOUS; SUBCUTANEOUS EVERY 5 MIN PRN
Status: DISCONTINUED | OUTPATIENT
Start: 2020-01-21 | End: 2020-01-21 | Stop reason: HOSPADM

## 2020-01-21 RX ORDER — HYDRALAZINE HYDROCHLORIDE 20 MG/ML
5 INJECTION INTRAMUSCULAR; INTRAVENOUS EVERY 10 MIN PRN
Status: DISCONTINUED | OUTPATIENT
Start: 2020-01-21 | End: 2020-01-21 | Stop reason: HOSPADM

## 2020-01-21 RX ORDER — METOCLOPRAMIDE HYDROCHLORIDE 5 MG/ML
10 INJECTION INTRAMUSCULAR; INTRAVENOUS
Status: DISCONTINUED | OUTPATIENT
Start: 2020-01-21 | End: 2020-01-21 | Stop reason: HOSPADM

## 2020-01-21 RX ORDER — ONDANSETRON 2 MG/ML
4 INJECTION INTRAMUSCULAR; INTRAVENOUS
Status: DISCONTINUED | OUTPATIENT
Start: 2020-01-21 | End: 2020-01-21 | Stop reason: HOSPADM

## 2020-01-21 RX ORDER — FENTANYL CITRATE 50 UG/ML
50 INJECTION, SOLUTION INTRAMUSCULAR; INTRAVENOUS EVERY 5 MIN PRN
Status: DISCONTINUED | OUTPATIENT
Start: 2020-01-21 | End: 2020-01-21 | Stop reason: HOSPADM

## 2020-01-21 RX ORDER — PROPOFOL 10 MG/ML
INJECTION, EMULSION INTRAVENOUS CONTINUOUS PRN
Status: DISCONTINUED | OUTPATIENT
Start: 2020-01-21 | End: 2020-01-21 | Stop reason: SDUPTHER

## 2020-01-21 RX ADMIN — PROPOFOL 150 MCG/KG/MIN: 10 INJECTION, EMULSION INTRAVENOUS at 08:50

## 2020-01-21 RX ADMIN — ONDANSETRON 4 MG: 2 INJECTION INTRAMUSCULAR; INTRAVENOUS at 09:02

## 2020-01-21 RX ADMIN — CEFAZOLIN 2000 MG: 1 INJECTION, POWDER, FOR SOLUTION INTRAMUSCULAR; INTRAVENOUS at 08:50

## 2020-01-21 RX ADMIN — LIDOCAINE HYDROCHLORIDE 50 MG: 10 INJECTION, SOLUTION EPIDURAL; INFILTRATION; INTRACAUDAL at 08:47

## 2020-01-21 RX ADMIN — SODIUM CHLORIDE: 9 INJECTION, SOLUTION INTRAVENOUS at 07:20

## 2020-01-21 RX ADMIN — PROPOFOL 100 MG: 10 INJECTION, EMULSION INTRAVENOUS at 08:47

## 2020-01-21 ASSESSMENT — PULMONARY FUNCTION TESTS
PIF_VALUE: 1
PIF_VALUE: 0
PIF_VALUE: 1
PIF_VALUE: 0
PIF_VALUE: 1
PIF_VALUE: 2
PIF_VALUE: 1
PIF_VALUE: 1
PIF_VALUE: 0
PIF_VALUE: 1
PIF_VALUE: 0
PIF_VALUE: 1
PIF_VALUE: 0
PIF_VALUE: 0
PIF_VALUE: 1
PIF_VALUE: 0
PIF_VALUE: 1

## 2020-01-21 ASSESSMENT — PAIN DESCRIPTION - PAIN TYPE: TYPE: ACUTE PAIN

## 2020-01-21 ASSESSMENT — ENCOUNTER SYMPTOMS: STRIDOR: 0

## 2020-01-21 ASSESSMENT — PAIN SCALES - GENERAL
PAINLEVEL_OUTOF10: 0

## 2020-01-21 ASSESSMENT — PAIN - FUNCTIONAL ASSESSMENT: PAIN_FUNCTIONAL_ASSESSMENT: 0-10

## 2020-01-21 NOTE — ANESTHESIA PRE PROCEDURE
Department of Anesthesiology  Preprocedure Note       Name:  Jennifer Carias   Age:  80 y.o.  :  12/10/1933                                          MRN:  [id number]         Date:  2020      Surgeon: Brianne Chavis):  Blayne Andujar MD    Procedure: CYSTOSCOPY RETROGRADE PYELOGRAM (Bilateral )    Medications prior to admission:   Prior to Admission medications    Medication Sig Start Date End Date Taking? Authorizing Provider   allopurinol (ZYLOPRIM) 100 MG tablet Take 1 tablet by mouth 2 times daily 19  Yes Trevon Roper MD   metoprolol tartrate (LOPRESSOR) 25 MG tablet TAKE 1 TABLET TWICE A DAY 10/15/19  Yes Nadya Grey MD   glimepiride (AMARYL) 4 MG tablet TAKE 1 TABLET TWICE A DAY 10/15/19  Yes Nadya Grey MD   TRADJENTA 5 MG tablet TAKE 1 TABLET DAILY 10/15/19  Yes Nadya Grey MD   ezetimibe (ZETIA) 10 MG tablet TAKE 1 TABLET NIGHTLY 19  Yes Nadya Grey MD   atorvastatin (LIPITOR) 80 MG tablet Take 1 tablet by mouth nightly 19  Yes Nadya Grey MD   amLODIPine (NORVASC) 2.5 MG tablet nightly  19  Yes Historical Provider, MD   Biotin 300 MCG TABS Take 1 tablet by mouth daily   Yes Historical Provider, MD   Cholecalciferol (VITAMIN D3) 1000 UNITS TABS Take 1 tablet by mouth daily   Yes Historical Provider, MD   furosemide (LASIX) 40 MG tablet Take 40 mg by mouth daily. Yes Historical Provider, MD   XARELTO 20 MG TABS tablet TAKE 1 TABLET DAILY WITH BREAKFAST 10/17/19   Nadya Grey MD   FREESTYLE LITE strip USE TO TEST BLOOD SUGAR TWICE A DAY 17   Nadya Grey MD   FREESTYLE LANCETS MISC USE TO TEST BLOOD SUGAR TWICE A DAY 17   Nadya Grey MD   Blood Glucose Monitoring Suppl (FREESTYLE LITE) SARY use as directed 16   Historical Provider, MD   nitroGLYCERIN (NITROSTAT) 0.4 MG SL tablet Place 1 tablet under the tongue every 5 minutes as needed for Chest pain 16   Nadya Grey MD   aspirin 81 MG tablet Take 81 mg by mouth daily. Historical Provider, MD       Current medications:    Current Facility-Administered Medications   Medication Dose Route Frequency Provider Last Rate Last Dose    0.9 % sodium chloride infusion   Intravenous Continuous Reyna Gauthier  mL/hr at 01/21/20 0720         Allergies: Allergies   Allergen Reactions    Diovan [Valsartan] Other (See Comments)     cough    Lantus [Insulin Glargine] Nausea Only     Stomach \"quivered\" after taking it, palpitations    Metformin And Related Diarrhea    Plavix [Clopidogrel Bisulfate] Itching       Problem List:    Patient Active Problem List   Diagnosis Code    Essential hypertension I10    Gout of foot M10.9    Type 2 diabetes mellitus without complication, without long-term current use of insulin ([facility]) E11.9    Coronary artery disease involving coronary bypass graft of native heart without angina pectoris I25.810    Wound of right side of back S21.201A    Calcium kidney stone N20.0    Arthritis M19.90    Coronary atherosclerosis I25.10    Secondary diabetes mellitus with stage 3 chronic kidney disease ([facility]) E13.22, N18.3    DVT (deep venous thrombosis) ([facility]) I82.409    Hyperlipidemia E78.5    Murmur, cardiac R01.1    Angina pectoris ([facility]) I20.9    Occlusion and stenosis of bilateral carotid arteries I65.23    Other pulmonary embolism without acute cor pulmonale ([facility]) I26.99    Other transient cerebral ischemic attacks and related syndromes G45.8    Palpitations R00.2    PVC (premature ventricular contraction) I49.3    Sick sinus syndrome ([facility]) I49.5    CKD (chronic kidney disease), stage II N18.2    CKD (chronic kidney disease), stage III ([facility]) N18.3    Benign hypertension with CKD (chronic kidney disease) stage III ([facility]) I12.9, N18.3       Past Medical History:        Diagnosis Date    Arthritis     Basal cell carcinoma of nose     Bronchitis     HX.  OF     CAD (coronary artery disease)     Carpal tunnel syndrome     left hand    Colon Date of last liquid consumption: 01/20/20                        Date of last solid food consumption: 01/20/20    BMI:   Wt Readings from Last 3 Encounters:   01/21/20 200 lb (90.7 kg)   01/07/20 202 lb (91.6 kg)   12/12/19 203 lb (92.1 kg)     Body mass index is 29.53 kg/m².     CBC:   Lab Results   Component Value Date    WBC 8.1 01/07/2020    RBC 4.71 01/07/2020    RBC 4.68 11/12/2019    HGB 13.7 01/07/2020    HCT 41.3 01/07/2020    MCV 87.5 01/07/2020    RDW 15.4 01/07/2020     01/07/2020       CMP:   Lab Results   Component Value Date     01/07/2020    K 5.0 01/07/2020     01/07/2020    CO2 29 01/07/2020    BUN 19 01/07/2020    CREATININE 0.64 01/07/2020    GFRAA >60 01/07/2020    LABGLOM >60 01/07/2020    GLUCOSE 179 01/07/2020    GLUCOSE 170 11/12/2019    PROT 6.6 11/12/2019    CALCIUM 9.6 01/07/2020    BILITOT 1.2 11/12/2019    ALKPHOS 80 11/12/2019    ALKPHOS 43 03/27/2013    AST 21 11/12/2019    ALT 22 11/12/2019       POC Tests:   Recent Labs     01/21/20  0717   POCGLU 186*       Coags:   Lab Results   Component Value Date    PROTIME 14.4 05/24/2016    PROTIME 32.1 02/21/2014    INR 1.3 05/24/2016    APTT 33.8 05/24/2016       HCG (If Applicable): No results found for: PREGTESTUR, PREGSERUM, HCG, HCGQUANT     ABGs: No results found for: PHART, PO2ART, NKG5KNL, YOQ4PQA, BEART, P2KSVUUH     Type & Screen (If Applicable):  No results found for: [facility]    Anesthesia Evaluation  Patient summary reviewed and Nursing notes reviewed  Airway: Mallampati: III  TM distance: >3 FB   Neck ROM: full  Mouth opening: > = 3 FB Dental:          Pulmonary: breath sounds clear to auscultation      (-) rhonchi, wheezes, rales and stridor                           Cardiovascular:    (+) hypertension: no interval change, angina:, past MI:, CAD:,     (-) murmur, weak pulses,  friction rub, carotid bruit and  JVD    ECG reviewed  Rhythm: regular  Rate: normal                    Neuro/Psych:   (+)

## 2020-01-22 NOTE — OP NOTE
I was present and scrubbed throughout the entire  case.         [facility]    D: 01/21/2020 12:13:41       T: 01/21/2020 19:15:16     [id number]  Job#: [id number]     Doc#: [id number]    CC:  [phone]

## 2020-01-27 LAB — UROTHELIAL CANCER DETECTION: NORMAL

## 2020-02-13 ENCOUNTER — OFFICE VISIT (OUTPATIENT)
Dept: UROLOGY | Age: 85
End: 2020-02-13
Payer: MEDICARE

## 2020-02-13 VITALS
WEIGHT: 206 LBS | SYSTOLIC BLOOD PRESSURE: 118 MMHG | HEIGHT: 69 IN | TEMPERATURE: 98.3 F | DIASTOLIC BLOOD PRESSURE: 62 MMHG | BODY MASS INDEX: 30.51 KG/M2

## 2020-02-13 PROCEDURE — G8417 CALC BMI ABV UP PARAM F/U: HCPCS | Performed by: UROLOGY

## 2020-02-13 PROCEDURE — G8427 DOCREV CUR MEDS BY ELIG CLIN: HCPCS | Performed by: UROLOGY

## 2020-02-13 PROCEDURE — 99213 OFFICE O/P EST LOW 20 MIN: CPT | Performed by: UROLOGY

## 2020-02-13 PROCEDURE — G8482 FLU IMMUNIZE ORDER/ADMIN: HCPCS | Performed by: UROLOGY

## 2020-02-13 PROCEDURE — 1090F PRES/ABSN URINE INCON ASSESS: CPT | Performed by: UROLOGY

## 2020-02-13 PROCEDURE — 1036F TOBACCO NON-USER: CPT | Performed by: UROLOGY

## 2020-02-13 PROCEDURE — 1123F ACP DISCUSS/DSCN MKR DOCD: CPT | Performed by: UROLOGY

## 2020-02-13 PROCEDURE — 4040F PNEUMOC VAC/ADMIN/RCVD: CPT | Performed by: UROLOGY

## 2020-02-13 ASSESSMENT — ENCOUNTER SYMPTOMS
NAUSEA: 0
EYE REDNESS: 0
SHORTNESS OF BREATH: 0
WHEEZING: 0
COLOR CHANGE: 0
COUGH: 0
EYE PAIN: 0
VOMITING: 0
BACK PAIN: 0
ABDOMINAL PAIN: 0

## 2020-02-13 NOTE — PROGRESS NOTES
SOCIAL HISTORY UPDATE:  Past Medical History:   Diagnosis Date    Arthritis     Basal cell carcinoma of nose     Bronchitis     HX. OF     CAD (coronary artery disease)     Carpal tunnel syndrome     left hand    Colon cancer (HCC)     Diabetes mellitus ([address]     Gout     Hematuria     Hx of blood clots     ED.  LEGS, ED. 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 of stenting x 3.    APPENDECTOMY      BREAST SURGERY      INFECTED MILK DUCT LT.    CARDIAC SURGERY      CABG x 3 vessels and 3 stents    CAROTID ENDARTERECTOMY Left 3-3-16    CARPAL TUNNEL RELEASE Right     CHOLECYSTECTOMY      COLON SURGERY      colectomy, PRECANCEROUS POLYPS    COLONOSCOPY      X5, HX PRECANCEROUS POLYPS    CORONARY ARTERY BYPASS GRAFT      X3 vessels    CYST REMOVAL  10/07/2016    EXCISION OF SEBACEOUS CYST REMOVED FROM BACK X3    CYSTOSCOPY Bilateral 1/21/2020    CYSTOSCOPY RETROGRADE PYELOGRAM performed by Gray Roach MD at [facility], [address] Right     INDEX FINGER AND THUMB.    HYSTERECTOMY      JOINT REPLACEMENT Left 03/29/2010    TKA    JOINT REPLACEMENT Right 02/16/1999    TKA    LITHOTRIPSY      X 3    SKIN BIOPSY      TOP OF NOSE (BASAL CELL)    VASCULAR SURGERY      vein ablation on legs     Family History   Problem Relation Age of Onset    Stroke Mother     Hypertension Mother     Heart Disease Father         AAA    Stroke Sister     Parkinsonism Brother     Cancer Sister         lung    Alcohol Abuse Sister      Outpatient Medications Marked as Taking for the 2/13/20 encounter (Office Visit) with Gray Roach MD   Medication Sig Dispense Refill  allopurinol (ZYLOPRIM) 100 MG tablet Take 1 tablet by mouth 2 times daily 180 tablet 3    XARELTO 20 MG TABS tablet TAKE 1 TABLET DAILY WITH BREAKFAST 90 tablet 1    metoprolol tartrate (LOPRESSOR) 25 MG tablet TAKE 1 TABLET TWICE A  tablet 1    glimepiride (AMARYL) 4 MG tablet TAKE 1 TABLET TWICE A  tablet 1    TRADJENTA 5 MG tablet TAKE 1 TABLET DAILY 90 tablet 1    ezetimibe (ZETIA) 10 MG tablet TAKE 1 TABLET NIGHTLY 90 tablet 2    atorvastatin (LIPITOR) 80 MG tablet Take 1 tablet by mouth nightly 90 tablet 2    amLODIPine (NORVASC) 2.5 MG tablet nightly       FREESTYLE LITE strip USE TO TEST BLOOD SUGAR TWICE A  each 1    FREESTYLE LANCETS MISC USE TO TEST BLOOD SUGAR TWICE A  each 1    Blood Glucose Monitoring Suppl (FREESTYLE LITE) SARY use as directed  0    nitroGLYCERIN (NITROSTAT) 0.4 MG SL tablet Place 1 tablet under the tongue every 5 minutes as needed for Chest pain 25 tablet 1    Biotin 300 MCG TABS Take 1 tablet by mouth daily      Cholecalciferol (VITAMIN D3) 1000 UNITS TABS Take 1 tablet by mouth daily      aspirin 81 MG tablet Take 81 mg by mouth daily.  furosemide (LASIX) 40 MG tablet Take 40 mg by mouth daily. (All medications reviewed and updated by provider sincelast office visit or hospitalization)   Diovan [valsartan]; Lantus [insulin glargine]; Metformin and related; and Plavix [clopidogrel bisulfate]  Social History     Tobacco Use   Smoking Status Never Smoker   Smokeless Tobacco Never Used      (If patient a smoker, smoking cessation counseling offered)     Social History     Substance and Sexual Activity   Alcohol Use No       REVIEW OF SYSTEMS:  Review of Systems      Physical Exam:      Vitals:    02/13/20 0941   BP: 118/62   Temp: 98.3 °F (36.8 °C)     Body mass index is 30.42 kg/m². Patient is a 80 y.o. female in noacute distress and alert and oriented to person, place and time.   Physical Exam  Constitutional: Patient in no acute distress. Neuro: Alert andoriented to person, place and time. Psych: Mood normal, affect normal  Skin: No rash noted  HEENT: Head: Normocephalic and atraumatic  Conjunctivae and EOM are normal. Pupils are equal, round  Nose: Normal  Right External Ear: Normal; Left External Ear: Normal  Mouth: Mucosa Moist  Neck: Supple  Lungs: Respiratory effort is normal  Cardiovascular: Warm & Pink  Abdomen: Soft, non-tender, non-distended with no CVA,  No flank tenderness,  Or hepatosplenomegaly         and Plan      1. Other microscopic hematuria    2. Kidney stones           Plan:      Return in about 6 months (around 8/13/2020) for Follow up. Prescriptions Ordered:  No orders of the defined types were placed in this encounter. Orders Placed:  Orders Placed This Encounter   Procedures    XR ABDOMEN (KUB) (SINGLE AP VIEW)     Standing Status:   Future     Standing Expiration Date:   2/13/2021            Francisco Gutierrez MD    Agree with the ROS entered by the MA.

## 2020-02-13 NOTE — PROGRESS NOTES
Review of Systems   Constitutional: Negative for appetite change, chills and fever. Eyes: Negative for pain, redness and visual disturbance. Respiratory: Negative for cough, shortness of breath and wheezing. Cardiovascular: Negative for chest pain and leg swelling. Gastrointestinal: Negative for abdominal pain, nausea and vomiting. Genitourinary: Negative for difficulty urinating, dysuria, flank pain, frequency, hematuria, urgency and vaginal discharge. Musculoskeletal: Negative for back pain, joint swelling and myalgias. Skin: Negative for color change, rash and wound. Neurological: Negative for dizziness, tremors and numbness. Hematological: Negative for adenopathy. Does not bruise/bleed easily.

## 2020-03-10 ENCOUNTER — OFFICE VISIT (OUTPATIENT)
Dept: FAMILY MEDICINE CLINIC | Age: 85
End: 2020-03-10
Payer: MEDICARE

## 2020-03-10 VITALS
HEART RATE: 75 BPM | HEIGHT: 69 IN | SYSTOLIC BLOOD PRESSURE: 132 MMHG | BODY MASS INDEX: 30.07 KG/M2 | WEIGHT: 203 LBS | DIASTOLIC BLOOD PRESSURE: 74 MMHG | OXYGEN SATURATION: 96 %

## 2020-03-10 PROBLEM — S21.201A WOUND OF RIGHT SIDE OF BACK: Status: RESOLVED | Noted: 2019-06-25 | Resolved: 2020-03-10

## 2020-03-10 PROBLEM — I20.9 ANGINA PECTORIS (HCC): Status: RESOLVED | Noted: 2019-07-10 | Resolved: 2020-03-10

## 2020-03-10 PROBLEM — I49.3 PVC (PREMATURE VENTRICULAR CONTRACTION): Status: RESOLVED | Noted: 2019-07-10 | Resolved: 2020-03-10

## 2020-03-10 LAB — HBA1C MFR BLD: 7.1 %

## 2020-03-10 PROCEDURE — G8427 DOCREV CUR MEDS BY ELIG CLIN: HCPCS | Performed by: FAMILY MEDICINE

## 2020-03-10 PROCEDURE — 3051F HG A1C>EQUAL 7.0%<8.0%: CPT | Performed by: FAMILY MEDICINE

## 2020-03-10 PROCEDURE — 1036F TOBACCO NON-USER: CPT | Performed by: FAMILY MEDICINE

## 2020-03-10 PROCEDURE — 1090F PRES/ABSN URINE INCON ASSESS: CPT | Performed by: FAMILY MEDICINE

## 2020-03-10 PROCEDURE — 83036 HEMOGLOBIN GLYCOSYLATED A1C: CPT | Performed by: FAMILY MEDICINE

## 2020-03-10 PROCEDURE — 4040F PNEUMOC VAC/ADMIN/RCVD: CPT | Performed by: FAMILY MEDICINE

## 2020-03-10 PROCEDURE — G8482 FLU IMMUNIZE ORDER/ADMIN: HCPCS | Performed by: FAMILY MEDICINE

## 2020-03-10 PROCEDURE — 1123F ACP DISCUSS/DSCN MKR DOCD: CPT | Performed by: FAMILY MEDICINE

## 2020-03-10 PROCEDURE — G8417 CALC BMI ABV UP PARAM F/U: HCPCS | Performed by: FAMILY MEDICINE

## 2020-03-10 PROCEDURE — 99214 OFFICE O/P EST MOD 30 MIN: CPT | Performed by: FAMILY MEDICINE

## 2020-03-10 RX ORDER — TRAZODONE HYDROCHLORIDE 50 MG/1
TABLET ORAL
Qty: 30 TABLET | Refills: 1 | Status: SHIPPED | OUTPATIENT
Start: 2020-03-10 | End: 2020-05-19

## 2020-03-10 ASSESSMENT — PATIENT HEALTH QUESTIONNAIRE - PHQ9
SUM OF ALL RESPONSES TO PHQ QUESTIONS 1-9: 0
2. FEELING DOWN, DEPRESSED OR HOPELESS: 0
1. LITTLE INTEREST OR PLEASURE IN DOING THINGS: 0
SUM OF ALL RESPONSES TO PHQ9 QUESTIONS 1 & 2: 0
SUM OF ALL RESPONSES TO PHQ QUESTIONS 1-9: 0

## 2020-03-10 ASSESSMENT — ENCOUNTER SYMPTOMS
SHORTNESS OF BREATH: 0
ABDOMINAL PAIN: 0
NAUSEA: 0
SORE THROAT: 0

## 2020-03-10 NOTE — PROGRESS NOTES
1. Type 2 diabetes mellitus with stage 3 chronic kidney disease, without long-term current use of insulin (HCC)   stable   2. Hyperlipidemia, unspecified hyperlipidemia type  Lipid Panel   3. Post-menopausal  DEXA Bone Density 2 Sites   4. Gout of foot, unspecified cause, unspecified chronicity, unspecified laterality   controlled   5. Other pulmonary embolism without acute cor pulmonale, unspecified chronicity (HCC)   stable             Plan:         Orders Placed This Encounter   Procedures    DEXA Bone Density 2 Sites     Standing Status:   Future     Standing Expiration Date:   9/10/2020    Lipid Panel     Standing Status:   Future     Standing Expiration Date:   3/10/2021     Order Specific Question:   Is Patient Fasting?/# of Hours     Answer:   12    POCT glycosylated hemoglobin (Hb A1C)     Orders Placed This Encounter   Medications    traZODone (DESYREL) 50 MG tablet     Sig: Half tab at nite     Dispense:  30 tablet     Refill:  1     Return in about 2 weeks (around 3/24/2020) for insomnia.     Continue current medications reviewed from the chart            Merna Reyes

## 2020-03-17 ENCOUNTER — HOSPITAL ENCOUNTER (OUTPATIENT)
Dept: WOMENS IMAGING | Age: 85
Discharge: HOME | End: 2020-03-19
Payer: MEDICARE

## 2020-03-17 ENCOUNTER — TELEPHONE (OUTPATIENT)
Dept: FAMILY MEDICINE CLINIC | Age: 85
End: 2020-03-17

## 2020-03-17 PROCEDURE — 77080 DXA BONE DENSITY AXIAL: CPT

## 2020-03-17 RX ORDER — ALENDRONATE SODIUM 70 MG/1
70 TABLET ORAL
Qty: 4 TABLET | Refills: 1 | Status: SHIPPED | OUTPATIENT
Start: 2020-03-17 | End: 2020-05-19 | Stop reason: SDUPTHER

## 2020-04-13 ENCOUNTER — TELEPHONE (OUTPATIENT)
Dept: FAMILY MEDICINE CLINIC | Age: 85
End: 2020-04-13

## 2020-04-13 ENCOUNTER — HOSPITAL ENCOUNTER (OUTPATIENT)
Age: 85
Setting detail: SPECIMEN
Discharge: HOME | End: 2020-04-13
Payer: MEDICARE

## 2020-04-13 LAB
CHOLESTEROL/HDL RATIO: 3.1
CHOLESTEROL: 98 MG/DL
HDLC SERPL-MCNC: 32 MG/DL
LDL CHOLESTEROL: 29 MG/DL (ref 0–130)
TRIGL SERPL-MCNC: 186 MG/DL
VLDLC SERPL CALC-MCNC: ABNORMAL MG/DL (ref 1–30)

## 2020-04-13 PROCEDURE — 36415 COLL VENOUS BLD VENIPUNCTURE: CPT

## 2020-04-13 PROCEDURE — 80061 LIPID PANEL: CPT

## 2020-04-22 RX ORDER — ALLOPURINOL 100 MG/1
100 TABLET ORAL 2 TIMES DAILY
Qty: 180 TABLET | Refills: 3 | Status: SHIPPED | OUTPATIENT
Start: 2020-04-22 | End: 2020-05-19 | Stop reason: SDUPTHER

## 2020-04-22 NOTE — TELEPHONE ENCOUNTER
Last visit: 3/10/2020  Last Med refill:   Does patient have enough medication for 72 hours: No:     Next Visit Date:  Future Appointments   Date Time Provider Sherly Kiseri   6/19/2020  1:15 PM Albin John MD [id number]   8/13/2020 10:20 AM Elizabeth Beckman MD [facility] 53 Maintenance   Topic Date Due    DTaP/Tdap/Td vaccine (1 - Tdap) 12/10/1952    Shingles Vaccine (2 of 3) 11/26/2017    Annual Wellness Visit (AWV)  09/03/2020    Potassium monitoring  02/14/2021    Creatinine monitoring  02/14/2021    Lipid screen  04/13/2021    Flu vaccine  Completed    Pneumococcal 65+ years Vaccine  Completed    Hepatitis A vaccine  Aged Out    Hib vaccine  Aged Out    Meningococcal (ACWY) vaccine  Aged Out       Hemoglobin A1C (%)   Date Value   03/10/2020 7.1   12/10/2019 6.9   09/03/2019 7.0             ( goal A1C is < 7)   Microalb/Crt.  Ratio (mcg/mg creat)   Date Value   10/15/2018 CANNOT BE CALCULATED     LDL Cholesterol (mg/dL)   Date Value   04/13/2020 29   01/24/2014 54     LDL Calculated (mg/dL)   Date Value   02/22/2019 43   06/09/2018 34       (goal LDL is <100)   AST (U/L)   Date Value   02/14/2020 26     ALT (U/L)   Date Value   02/14/2020 31     BUN (mg/dL)   Date Value   02/14/2020 18     BP Readings from Last 3 Encounters:   03/10/20 132/74   02/20/20 (!) 118/52   02/13/20 118/62          (goal 120/80)    All Future Testing planned in [facility]  Lab Frequency Next Occurrence   Comprehensive Metabolic Panel Once 34/05/4443   CBC with Differential Once 07/10/2019   Magnesium Once 07/10/2019   Phosphorus Once 07/10/2019   CBC with Differential Once 08/07/2019   Comprehensive Metabolic Panel Once 84/39/7876   Magnesium Once 08/07/2019   Phosphorus Once 08/07/2019   Uric Acid Once 08/07/2019   Calcium, Ionized Once 08/07/2019   CBC with Differential Once 11/18/2019   Comprehensive Metabolic Panel Once 14/55/6052   Magnesium Once 11/18/2019   Phosphorus Once 11/18/2019

## 2020-04-24 NOTE — TELEPHONE ENCOUNTER
Please Approve or Refuse.   Send to Pharmacy per Pt's Request:      Next Visit Date:  6/19/2020   Last Visit Date: 3/10/2020    Hemoglobin A1C (%)   Date Value   03/10/2020 7.1   12/10/2019 6.9   09/03/2019 7.0             ( goal A1C is < 7)   BP Readings from Last 3 Encounters:   03/10/20 132/74   02/20/20 (!) 118/52   02/13/20 118/62          (goal 120/80)  BUN   Date Value Ref Range Status   02/14/2020 18 5 - 27 mg/dL Final     CREATININE   Date Value Ref Range Status   02/14/2020 0.84 0.40 - 1.00 mg/dL Final     Comment:     METHOD TRACEABLE TO IDMS STANDARD     Potassium   Date Value Ref Range Status   02/14/2020 3.9 3.5 - 5.0 mmol/L Final

## 2020-05-19 ENCOUNTER — OFFICE VISIT (OUTPATIENT)
Dept: FAMILY MEDICINE CLINIC | Age: 85
End: 2020-05-19
Payer: MEDICARE

## 2020-05-19 VITALS
HEIGHT: 69 IN | HEART RATE: 63 BPM | OXYGEN SATURATION: 93 % | DIASTOLIC BLOOD PRESSURE: 61 MMHG | SYSTOLIC BLOOD PRESSURE: 139 MMHG | TEMPERATURE: 96.9 F | WEIGHT: 205 LBS | BODY MASS INDEX: 30.36 KG/M2

## 2020-05-19 PROCEDURE — 1123F ACP DISCUSS/DSCN MKR DOCD: CPT | Performed by: FAMILY MEDICINE

## 2020-05-19 PROCEDURE — 4040F PNEUMOC VAC/ADMIN/RCVD: CPT | Performed by: FAMILY MEDICINE

## 2020-05-19 PROCEDURE — 1036F TOBACCO NON-USER: CPT | Performed by: FAMILY MEDICINE

## 2020-05-19 PROCEDURE — 1090F PRES/ABSN URINE INCON ASSESS: CPT | Performed by: FAMILY MEDICINE

## 2020-05-19 PROCEDURE — 3052F HG A1C>EQUAL 8.0%<EQUAL 9.0%: CPT | Performed by: FAMILY MEDICINE

## 2020-05-19 PROCEDURE — 99215 OFFICE O/P EST HI 40 MIN: CPT | Performed by: FAMILY MEDICINE

## 2020-05-19 PROCEDURE — G8417 CALC BMI ABV UP PARAM F/U: HCPCS | Performed by: FAMILY MEDICINE

## 2020-05-19 PROCEDURE — G8510 SCR DEP NEG, NO PLAN REQD: HCPCS | Performed by: FAMILY MEDICINE

## 2020-05-19 PROCEDURE — G8427 DOCREV CUR MEDS BY ELIG CLIN: HCPCS | Performed by: FAMILY MEDICINE

## 2020-05-19 RX ORDER — ALLOPURINOL 100 MG/1
100 TABLET ORAL 2 TIMES DAILY
Qty: 180 TABLET | Refills: 3 | Status: SHIPPED | OUTPATIENT
Start: 2020-05-19 | End: 2021-06-01

## 2020-05-19 RX ORDER — FUROSEMIDE 40 MG/1
40 TABLET ORAL DAILY
Qty: 90 TABLET | Refills: 3 | Status: SHIPPED | OUTPATIENT
Start: 2020-05-19 | End: 2021-04-22

## 2020-05-19 RX ORDER — ALENDRONATE SODIUM 70 MG/1
70 TABLET ORAL
Qty: 12 TABLET | Refills: 3 | Status: SHIPPED | OUTPATIENT
Start: 2020-05-19 | End: 2020-08-21 | Stop reason: ALTCHOICE

## 2020-05-19 RX ORDER — EZETIMIBE 10 MG/1
10 TABLET ORAL DAILY
Qty: 90 TABLET | Refills: 3 | Status: SHIPPED | OUTPATIENT
Start: 2020-05-19 | End: 2020-06-15 | Stop reason: DRUGHIGH

## 2020-05-19 RX ORDER — AMLODIPINE BESYLATE 2.5 MG/1
2.5 TABLET ORAL DAILY
Qty: 90 TABLET | Refills: 3 | Status: SHIPPED | OUTPATIENT
Start: 2020-05-19 | End: 2021-06-07

## 2020-05-19 RX ORDER — TRAZODONE HYDROCHLORIDE 50 MG/1
50 TABLET ORAL NIGHTLY PRN
Qty: 90 TABLET | Refills: 1 | Status: SHIPPED | OUTPATIENT
Start: 2020-05-19 | End: 2020-06-15 | Stop reason: DRUGHIGH

## 2020-05-19 RX ORDER — GLIMEPIRIDE 4 MG/1
4 TABLET ORAL 2 TIMES DAILY
Qty: 180 TABLET | Refills: 3 | Status: SHIPPED | OUTPATIENT
Start: 2020-05-19 | End: 2021-04-22

## 2020-05-19 RX ORDER — ATORVASTATIN CALCIUM 80 MG/1
80 TABLET, FILM COATED ORAL NIGHTLY
Qty: 90 TABLET | Refills: 3 | Status: SHIPPED | OUTPATIENT
Start: 2020-05-19 | End: 2021-04-22

## 2020-05-19 RX ORDER — GLUCOSAMINE HCL/CHONDROITIN SU 500-400 MG
CAPSULE ORAL
Qty: 300 STRIP | Refills: 3 | Status: SHIPPED | OUTPATIENT
Start: 2020-05-19 | End: 2021-11-17 | Stop reason: SDUPTHER

## 2020-05-19 ASSESSMENT — ENCOUNTER SYMPTOMS
CHEST TIGHTNESS: 0
SHORTNESS OF BREATH: 1
VOMITING: 0
ABDOMINAL PAIN: 0
ABDOMINAL DISTENTION: 0
NAUSEA: 0
DIARRHEA: 0
WHEEZING: 0
CONSTIPATION: 0
COUGH: 0

## 2020-05-19 ASSESSMENT — PATIENT HEALTH QUESTIONNAIRE - PHQ9
SUM OF ALL RESPONSES TO PHQ9 QUESTIONS 1 & 2: 0
SUM OF ALL RESPONSES TO PHQ QUESTIONS 1-9: 0
SUM OF ALL RESPONSES TO PHQ QUESTIONS 1-9: 0
2. FEELING DOWN, DEPRESSED OR HOPELESS: 0
1. LITTLE INTEREST OR PLEASURE IN DOING THINGS: 0

## 2020-05-19 NOTE — PROGRESS NOTES
Chief Complaint   Patient presents with    Hypertension    Diabetes    Hyperlipidemia    Establish Care       Patient is here to follow-up on chronic medical problems and to establish care. Her previous PCP retired. HPI      Hypertension and Chronic kidney disease stage 3:    she  is not exercising and is adherent to low salt diet. Blood pressure is well controlled at home. Cardiac symptoms dyspnea, fatigue and lower extremity edema. Patient denies chest pain, chest pressure/discomfort, claudication, exertional chest pressure/discomfort, irregular heart beat, near-syncope, orthopnea, palpitations, paroxysmal nocturnal dyspnea, syncope and tachypnea. Cardiovascular risk factors: advanced age (older than 54 for men, 72 for women), diabetes mellitus, dyslipidemia, hypertension and obesity (BMI >= 30 kg/m2). Use of agents associated with hypertension: none. History of target organ damage: Carotid artery stenosis, coronary artery disease, prior revascularization of the heart, chronic kidney disease stage III. Has history of CABG x 3, and history of PVCs, history of sick sinus syndrome and ventricular tachycardia per notes from Dr. Meg Aparicio, whom she sees once a year  Sees Dr. Meg Aparicio once a year. Echo 2D showed EF 55 to 60%, mild aortic stenosis on 6/21/2019, the report is in [address] ([facility]) Dr. Talia Cotton for kidney stones and kidney disease   Had left ESWL 3 times, last time she had ESWL was last fall   Denies frequency, urgency, dysuria or gross hematuria. BP controlled. Gaby Wilson reports compliance with BP medications, and tolerates them well, denies side effects.       BP Readings from Last 3 Encounters:   05/19/20 139/61   03/10/20 132/74   02/20/20 (!) 118/52        Pulse is Normal.    Pulse Readings from Last 3 Encounters:   05/19/20 63   03/10/20 75   02/20/20 68       Weight is stable  Wt Readings from Last 3 Encounters:   05/19/20 205 lb (93 kg)   03/10/20 203 lb (92.1 kg)   02/20/20 209 lb 3.2 oz (94.9 kg)       Patient has known diabetes mellitus type 2 for several years    Patient reports testing a few times a week, and her blood Glucose is \"high in am\", does not remember the number. She admits she is not testing every day  Reports complinace with Glimepiride and Tradjenta. Patient denies symptoms of Hypoglycemia    A1c is worsening    Lab Results   Component Value Date    LABA1C 7.1 03/10/2020    LABA1C 6.9 12/10/2019    LABA1C 7.0 2019       Hyperlipidemia:  No new myalgias or GI upset on atorvastatin (Lipitor) and eztemibe (Zetia). Medication compliance: compliant all of the time. Patient is  following a low fat, low cholesterol diet. LDL is normal, triglycerides low  Lab Results   Component Value Date    LDLCALC 43 2019    LDLCHOLESTEROL 29 2020     Lab Results   Component Value Date    TRIG 186 (H) 2020    TRIG 178 (H) 2019    TRIG 192 (H) 2018       Patient is on chronic anticoagulation due to history of history of bilateral PE and DVT in the setting of severe gastroenteritis and dehydration during hospitalization, per Dr. Mary Morales notes from 2019. She is on rivaroxaban , tolerated well. Patient has osteopenia, she is on Fosamax, tolerated well, denies heartburn or acid reflux. The report is in epic, completed on 3/17/2020 with a lowest T score minus 1.8 at the left femoral neck and lumbar spine  Patient denies unusual aches and pains. mpression   Osteopenia by WHO criteria. She is taking a vitamin D supplementation, prior vitamin D was normal low. Lab Results   Component Value Date    VITD25 33.9 2020     Patient has known bilateral carotid arteries stenosis, she wants to do retesting. She does have history of TIA per records    3/11/19 testing shows 50-69 stenosis on the left carotid artery.    Patient says she had left  done by Dr. Emma Magana in 2016        Summary        Mild 16-49% stenosis of the right internal TEST BLOOD SUGAR TWICE A  each 1    Blood Glucose Monitoring Suppl (FREESTYLE LITE) SARY use as directed  0    nitroGLYCERIN (NITROSTAT) 0.4 MG SL tablet Place 1 tablet under the tongue every 5 minutes as needed for Chest pain 25 tablet 1    Biotin 300 MCG TABS Take 1 tablet by mouth daily      Cholecalciferol (VITAMIN D3) 1000 UNITS TABS Take 1 tablet by mouth daily      aspirin 81 MG tablet Take 81 mg by mouth daily.  furosemide (LASIX) 40 MG tablet Take 40 mg by mouth daily. No current facility-administered medications for this visit. Social History     Tobacco Use    Smoking status: Never Smoker    Smokeless tobacco: Never Used   Substance Use Topics    Alcohol use: No    Drug use: No       Counseling given: Yes    Past Medical History:   Diagnosis Date    Arthritis     Basal cell carcinoma of nose     Bronchitis     HX. OF     CAD (coronary artery disease)     Carpal tunnel syndrome     left hand    Colon cancer (HCC)     [facility] [address]     DVT (deep venous thrombosis) ([facility] [address] 7/10/2019    Gout     Hematuria     Hx of blood clots     ED. LEGS, ED. 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46    Ophthalmoplegic migraine headache     Osteoarthritis     TIA (transient ischemic attack)     Uterine cancer ([facility] [address]     Wears glasses          Family History   Problem Relation Age of Onset   [facility] Stroke Mother     Hypertension Mother     Heart Disease Father         AAA    Stroke Sister     Parkinsonism Brother     Cancer Sister         lung    Alcohol Abuse Sister              -rest of complaints with corresponding details per ROS    The patient's past medical,surgical, social, and family history as well as her current medications and allergies were reviewed as documented in today's encounter. Review of Systems   Constitutional: Positive for fatigue.  Negative for activity Heart sounds: Murmur present. Crescendo  systolic murmur present with a grade of 2/6. Comments: Carotid bruit on the left side  Pulmonary:      Effort: Pulmonary effort is normal. No respiratory distress. Breath sounds: Normal breath sounds. No wheezing or rales. Chest:      Chest wall: No tenderness. Abdominal:      General: Bowel sounds are normal. There is no distension. Palpations: Abdomen is soft. There is no hepatomegaly or splenomegaly. Tenderness: There is no abdominal tenderness. Comments: Obese abdomen. Musculoskeletal: Normal range of motion. General: No tenderness. Right lower le+ Pitting Edema present. Left lower le+ Pitting Edema present. Skin:     General: Skin is warm and dry. Capillary Refill: Capillary refill takes less than 2 seconds. Findings: No rash. Comments: Very thin hair, very rair, vertex baldness noted, no rash noted   Neurological:      Mental Status: She is alert and oriented to person, place, and time. Cranial Nerves: No cranial nerve deficit. Motor: No abnormal muscle tone. Psychiatric:         Mood and Affect: Mood normal.         Behavior: Behavior normal.         Thought Content: Thought content normal.         Judgment: Judgment normal.         I personally reviewed testing . Discussed testing with the patient and all questions fully answered.   High triglycerides, low HDL  Hyperglycemia  Chronic kidney disease stage III improved  Otherwise labs within normal limits    Hospital Outpatient Visit on 2020   Component Date Value Ref Range Status    Cholesterol 2020 98  <200 mg/dL Final    Comment:    Cholesterol Guidelines:      <200  Desirable   200-240  Borderline      >240  Undesirable         HDL 2020 32* >40 mg/dL Final    Comment:    HDL Guidelines:    <40     Undesirable   40-59    Borderline    >59     Desirable         LDL Cholesterol 2020 29  0 - 130 mg/dL Final improving  Correct use of Fosamax discussed  - alendronate (FOSAMAX) 70 MG tablet; Take 1 tablet by mouth every 7 days  Dispense: 12 tablet; Refill: 3    6. Bilateral carotid artery stenosis  Hopefully stable  needs recheck VL carotids    - VL DUP CAROTID BILATERAL; Future    7. 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 Psychophysiological insomnia  Failing to change as expected. -start traZODone (DESYREL) 50 MG tablet; Take 1 tablet by mouth nightly as needed for Sleep  Dispense: 90 tablet; Refill: 1  -sleep hygiene discussed    9. History of pulmonary embolism  Continue chronic anticoagulation     10.  History of DVT (deep vein thrombosis)  Continue chronic anticoagulation                      Orders Placed This Encounter   Procedures    VL DUP CAROTID BILATERAL     Standing Status:   Future     Number of Occurrences:   1     Standing Expiration Date:   5/19/2021    CBC     Standing Status:   Future     Number of Occurrences:   1     Standing Expiration Date:   12/19/2020    Comprehensive Metabolic Panel     Standing Status:   Future     Number of Occurrences:   1     Standing Expiration Date:   12/19/2020    Hemoglobin A1C     Standing Status:   Future     Number of Occurrences:   1     Standing Expiration Date:   12/19/2020    Magnesium     Standing Status:   Future     Number of Occurrences:   1     Standing Expiration Date:   12/19/2020    Phosphorus     Standing Status:   Future     Number of Occurrences:   1     Standing Expiration Date:   12/19/2020    TSH without Reflex     Standing Status:   Future     Number of Occurrences:   1     Standing Expiration Date:   12/19/2020    Vitamin B12 & Folate     Standing Status:   Future     Number of Occurrences:   1     Standing Expiration Date:   12/19/2020 guarantees can be provided that every mistake has been identified and corrected by editing.   Electronically signed by Mark Nuñez MD on 5/25/2020  5:30 PM

## 2020-05-22 ENCOUNTER — HOSPITAL ENCOUNTER (OUTPATIENT)
Dept: VASCULAR LAB | Age: 85
Discharge: HOME | End: 2020-05-22
Payer: MEDICARE

## 2020-05-22 ENCOUNTER — HOSPITAL ENCOUNTER (OUTPATIENT)
Age: 85
Discharge: HOME | End: 2020-05-22
Payer: MEDICARE

## 2020-05-22 LAB
ALBUMIN SERPL-MCNC: 4.4 G/DL (ref 3.5–5.2)
ALBUMIN/GLOBULIN RATIO: ABNORMAL (ref 1–2.5)
ALP BLD-CCNC: 59 U/L (ref 35–104)
ALT SERPL-CCNC: 28 U/L (ref 5–33)
ANION GAP SERPL CALCULATED.3IONS-SCNC: 15 MMOL/L (ref 9–17)
AST SERPL-CCNC: 27 U/L
BILIRUB SERPL-MCNC: 1.35 MG/DL (ref 0.3–1.2)
BUN BLDV-MCNC: 22 MG/DL (ref 8–23)
BUN/CREAT BLD: ABNORMAL (ref 9–20)
CALCIUM SERPL-MCNC: 9.6 MG/DL (ref 8.6–10.4)
CHLORIDE BLD-SCNC: 102 MMOL/L (ref 98–107)
CO2: 25 MMOL/L (ref 20–31)
CREAT SERPL-MCNC: 0.73 MG/DL (ref 0.5–0.9)
ESTIMATED AVERAGE GLUCOSE: 189 MG/DL
FOLATE: 13.8 NG/ML
GFR AFRICAN AMERICAN: >60 ML/MIN
GFR NON-AFRICAN AMERICAN: >60 ML/MIN
GFR SERPL CREATININE-BSD FRML MDRD: ABNORMAL ML/MIN/{1.73_M2}
GFR SERPL CREATININE-BSD FRML MDRD: ABNORMAL ML/MIN/{1.73_M2}
GLUCOSE BLD-MCNC: 284 MG/DL (ref 70–99)
HBA1C MFR BLD: 8.2 % (ref 4–6)
HCT VFR BLD CALC: 41.6 % (ref 36–46)
HEMOGLOBIN: 13.8 G/DL (ref 12–16)
MAGNESIUM: 2 MG/DL (ref 1.6–2.6)
MCH RBC QN AUTO: 29.1 PG (ref 26–34)
MCHC RBC AUTO-ENTMCNC: 33 G/DL (ref 31–37)
MCV RBC AUTO: 88 FL (ref 80–100)
NRBC AUTOMATED: ABNORMAL PER 100 WBC
PDW BLD-RTO: 15.8 % (ref 11.5–14.9)
PHOSPHORUS: 3.1 MG/DL (ref 2.6–4.5)
PLATELET # BLD: 212 K/UL (ref 150–450)
PMV BLD AUTO: 8.4 FL (ref 6–12)
POTASSIUM SERPL-SCNC: 5.1 MMOL/L (ref 3.7–5.3)
RBC # BLD: 4.73 M/UL (ref 4–5.2)
SODIUM BLD-SCNC: 142 MMOL/L (ref 135–144)
TOTAL PROTEIN: 7.4 G/DL (ref 6.4–8.3)
TSH SERPL DL<=0.05 MIU/L-ACNC: 3.19 MIU/L (ref 0.3–5)
VITAMIN B-12: 485 PG/ML (ref 232–1245)
WBC # BLD: 8.9 K/UL (ref 3.5–11)

## 2020-05-22 PROCEDURE — 84443 ASSAY THYROID STIM HORMONE: CPT

## 2020-05-22 PROCEDURE — 84100 ASSAY OF PHOSPHORUS: CPT

## 2020-05-22 PROCEDURE — 80053 COMPREHEN METABOLIC PANEL: CPT

## 2020-05-22 PROCEDURE — 83036 HEMOGLOBIN GLYCOSYLATED A1C: CPT

## 2020-05-22 PROCEDURE — 85027 COMPLETE CBC AUTOMATED: CPT

## 2020-05-22 PROCEDURE — 82746 ASSAY OF FOLIC ACID SERUM: CPT

## 2020-05-22 PROCEDURE — 93880 EXTRACRANIAL BILAT STUDY: CPT

## 2020-05-22 PROCEDURE — 82607 VITAMIN B-12: CPT

## 2020-05-22 PROCEDURE — 83735 ASSAY OF MAGNESIUM: CPT

## 2020-05-22 PROCEDURE — 36415 COLL VENOUS BLD VENIPUNCTURE: CPT

## 2020-05-25 PROBLEM — I65.23 BILATERAL CAROTID ARTERY STENOSIS: Status: ACTIVE | Noted: 2020-05-25

## 2020-05-25 PROBLEM — F51.04 PSYCHOPHYSIOLOGICAL INSOMNIA: Status: ACTIVE | Noted: 2020-05-25

## 2020-05-25 PROBLEM — G56.00 CARPAL TUNNEL SYNDROME: Status: ACTIVE | Noted: 2020-05-25

## 2020-05-25 PROBLEM — Z86.718 HISTORY OF DVT (DEEP VEIN THROMBOSIS): Status: ACTIVE | Noted: 2020-05-25

## 2020-05-25 PROBLEM — M85.80 OSTEOPENIA: Status: ACTIVE | Noted: 2020-05-25

## 2020-05-25 PROBLEM — Z79.01 CHRONIC ANTICOAGULATION: Status: ACTIVE | Noted: 2020-05-25

## 2020-05-25 PROBLEM — Z86.711 HISTORY OF PULMONARY EMBOLISM: Status: ACTIVE | Noted: 2020-05-25

## 2020-05-25 PROBLEM — I82.409 DVT (DEEP VENOUS THROMBOSIS) (HCC): Status: RESOLVED | Noted: 2019-07-10 | Resolved: 2020-05-25

## 2020-06-15 ENCOUNTER — APPOINTMENT (OUTPATIENT)
Dept: GENERAL RADIOLOGY | Age: 85
DRG: 281 | End: 2020-06-15
Payer: MEDICARE

## 2020-06-15 ENCOUNTER — HOSPITAL ENCOUNTER (INPATIENT)
Age: 85
LOS: 2 days | Discharge: ANOTHER HOSPITAL | DRG: 281 | End: 2020-06-17
Attending: EMERGENCY MEDICINE | Admitting: INTERNAL MEDICINE
Payer: MEDICARE

## 2020-06-15 PROBLEM — R07.9 CHEST PAIN: Status: ACTIVE | Noted: 2020-06-15

## 2020-06-15 LAB
ABSOLUTE EOS #: 0.1 K/UL (ref 0–0.4)
ABSOLUTE IMMATURE GRANULOCYTE: ABNORMAL K/UL (ref 0–0.3)
ABSOLUTE LYMPH #: 2 K/UL (ref 1–4.8)
ABSOLUTE MONO #: 0.8 K/UL (ref 0.1–1.3)
ALBUMIN SERPL-MCNC: 4.2 G/DL (ref 3.5–5.2)
ALBUMIN/GLOBULIN RATIO: ABNORMAL (ref 1–2.5)
ALP BLD-CCNC: 51 U/L (ref 35–104)
ALT SERPL-CCNC: 28 U/L (ref 5–33)
ANION GAP SERPL CALCULATED.3IONS-SCNC: 14 MMOL/L (ref 9–17)
AST SERPL-CCNC: 30 U/L
BASOPHILS # BLD: 1 % (ref 0–2)
BASOPHILS ABSOLUTE: 0.1 K/UL (ref 0–0.2)
BILIRUB SERPL-MCNC: 1.32 MG/DL (ref 0.3–1.2)
BNP INTERPRETATION: NORMAL
BUN BLDV-MCNC: 19 MG/DL (ref 8–23)
BUN/CREAT BLD: ABNORMAL (ref 9–20)
CALCIUM SERPL-MCNC: 9 MG/DL (ref 8.6–10.4)
CHLORIDE BLD-SCNC: 103 MMOL/L (ref 98–107)
CO2: 24 MMOL/L (ref 20–31)
CREAT SERPL-MCNC: 0.64 MG/DL (ref 0.5–0.9)
DIFFERENTIAL TYPE: ABNORMAL
EOSINOPHILS RELATIVE PERCENT: 1 % (ref 0–4)
GFR AFRICAN AMERICAN: >60 ML/MIN
GFR NON-AFRICAN AMERICAN: >60 ML/MIN
GFR SERPL CREATININE-BSD FRML MDRD: ABNORMAL ML/MIN/{1.73_M2}
GFR SERPL CREATININE-BSD FRML MDRD: ABNORMAL ML/MIN/{1.73_M2}
GLUCOSE BLD-MCNC: 173 MG/DL (ref 65–105)
GLUCOSE BLD-MCNC: 243 MG/DL (ref 70–99)
HCT VFR BLD CALC: 38 % (ref 36–46)
HEMOGLOBIN: 12.8 G/DL (ref 12–16)
IMMATURE GRANULOCYTES: ABNORMAL %
INR BLD: 1.9
LIPASE: 23 U/L (ref 13–60)
LYMPHOCYTES # BLD: 24 % (ref 24–44)
MCH RBC QN AUTO: 29.1 PG (ref 26–34)
MCHC RBC AUTO-ENTMCNC: 33.7 G/DL (ref 31–37)
MCV RBC AUTO: 86.2 FL (ref 80–100)
MONOCYTES # BLD: 10 % (ref 1–7)
NRBC AUTOMATED: ABNORMAL PER 100 WBC
PARTIAL THROMBOPLASTIN TIME: 35.1 SEC (ref 24–36)
PDW BLD-RTO: 15.8 % (ref 11.5–14.9)
PLATELET # BLD: 202 K/UL (ref 150–450)
PLATELET ESTIMATE: ABNORMAL
PMV BLD AUTO: 8.6 FL (ref 6–12)
POTASSIUM SERPL-SCNC: 4.1 MMOL/L (ref 3.7–5.3)
PRO-BNP: 110 PG/ML
PROTHROMBIN TIME: 22 SEC (ref 11.8–14.6)
RBC # BLD: 4.4 M/UL (ref 4–5.2)
RBC # BLD: ABNORMAL 10*6/UL
SEG NEUTROPHILS: 64 % (ref 36–66)
SEGMENTED NEUTROPHILS ABSOLUTE COUNT: 5.6 K/UL (ref 1.3–9.1)
SODIUM BLD-SCNC: 141 MMOL/L (ref 135–144)
TOTAL PROTEIN: 7 G/DL (ref 6.4–8.3)
TROPONIN INTERP: ABNORMAL
TROPONIN T: ABNORMAL NG/ML
TROPONIN, HIGH SENSITIVITY: 23 NG/L (ref 0–14)
TROPONIN, HIGH SENSITIVITY: 26 NG/L (ref 0–14)
TROPONIN, HIGH SENSITIVITY: 29 NG/L (ref 0–14)
WBC # BLD: 8.6 K/UL (ref 3.5–11)
WBC # BLD: ABNORMAL 10*3/UL

## 2020-06-15 PROCEDURE — 83690 ASSAY OF LIPASE: CPT

## 2020-06-15 PROCEDURE — 85730 THROMBOPLASTIN TIME PARTIAL: CPT

## 2020-06-15 PROCEDURE — 99223 1ST HOSP IP/OBS HIGH 75: CPT | Performed by: INTERNAL MEDICINE

## 2020-06-15 PROCEDURE — 99285 EMERGENCY DEPT VISIT HI MDM: CPT

## 2020-06-15 PROCEDURE — 6370000000 HC RX 637 (ALT 250 FOR IP): Performed by: STUDENT IN AN ORGANIZED HEALTH CARE EDUCATION/TRAINING PROGRAM

## 2020-06-15 PROCEDURE — 83880 ASSAY OF NATRIURETIC PEPTIDE: CPT

## 2020-06-15 PROCEDURE — 2060000000 HC ICU INTERMEDIATE R&B

## 2020-06-15 PROCEDURE — 85025 COMPLETE CBC W/AUTO DIFF WBC: CPT

## 2020-06-15 PROCEDURE — 6360000002 HC RX W HCPCS: Performed by: EMERGENCY MEDICINE

## 2020-06-15 PROCEDURE — 82947 ASSAY GLUCOSE BLOOD QUANT: CPT

## 2020-06-15 PROCEDURE — 2580000003 HC RX 258: Performed by: STUDENT IN AN ORGANIZED HEALTH CARE EDUCATION/TRAINING PROGRAM

## 2020-06-15 PROCEDURE — 93005 ELECTROCARDIOGRAM TRACING: CPT | Performed by: EMERGENCY MEDICINE

## 2020-06-15 PROCEDURE — 85610 PROTHROMBIN TIME: CPT

## 2020-06-15 PROCEDURE — 84484 ASSAY OF TROPONIN QUANT: CPT

## 2020-06-15 PROCEDURE — 71045 X-RAY EXAM CHEST 1 VIEW: CPT

## 2020-06-15 PROCEDURE — 80053 COMPREHEN METABOLIC PANEL: CPT

## 2020-06-15 PROCEDURE — 6360000002 HC RX W HCPCS: Performed by: STUDENT IN AN ORGANIZED HEALTH CARE EDUCATION/TRAINING PROGRAM

## 2020-06-15 PROCEDURE — 36415 COLL VENOUS BLD VENIPUNCTURE: CPT

## 2020-06-15 RX ORDER — DEXTROSE MONOHYDRATE 25 G/50ML
12.5 INJECTION, SOLUTION INTRAVENOUS PRN
Status: DISCONTINUED | OUTPATIENT
Start: 2020-06-15 | End: 2020-06-17 | Stop reason: HOSPADM

## 2020-06-15 RX ORDER — TRAZODONE HYDROCHLORIDE 50 MG/1
50 TABLET ORAL NIGHTLY PRN
Status: DISCONTINUED | OUTPATIENT
Start: 2020-06-15 | End: 2020-06-17 | Stop reason: HOSPADM

## 2020-06-15 RX ORDER — POLYETHYLENE GLYCOL 3350 17 G/17G
17 POWDER, FOR SOLUTION ORAL DAILY PRN
Status: DISCONTINUED | OUTPATIENT
Start: 2020-06-15 | End: 2020-06-17 | Stop reason: HOSPADM

## 2020-06-15 RX ORDER — FUROSEMIDE 10 MG/ML
20 INJECTION INTRAMUSCULAR; INTRAVENOUS ONCE
Status: COMPLETED | OUTPATIENT
Start: 2020-06-15 | End: 2020-06-15

## 2020-06-15 RX ORDER — FUROSEMIDE 10 MG/ML
40 INJECTION INTRAMUSCULAR; INTRAVENOUS 2 TIMES DAILY
Status: DISCONTINUED | OUTPATIENT
Start: 2020-06-15 | End: 2020-06-17 | Stop reason: HOSPADM

## 2020-06-15 RX ORDER — DEXTROSE MONOHYDRATE 50 MG/ML
100 INJECTION, SOLUTION INTRAVENOUS PRN
Status: DISCONTINUED | OUTPATIENT
Start: 2020-06-15 | End: 2020-06-17 | Stop reason: HOSPADM

## 2020-06-15 RX ORDER — ACETAMINOPHEN 325 MG/1
650 TABLET ORAL EVERY 6 HOURS PRN
Status: DISCONTINUED | OUTPATIENT
Start: 2020-06-15 | End: 2020-06-17 | Stop reason: HOSPADM

## 2020-06-15 RX ORDER — TRAZODONE HYDROCHLORIDE 50 MG/1
50 TABLET ORAL NIGHTLY
COMMUNITY
End: 2020-07-27 | Stop reason: SDUPTHER

## 2020-06-15 RX ORDER — ATORVASTATIN CALCIUM 80 MG/1
80 TABLET, FILM COATED ORAL NIGHTLY
Status: DISCONTINUED | OUTPATIENT
Start: 2020-06-15 | End: 2020-06-17 | Stop reason: HOSPADM

## 2020-06-15 RX ORDER — FUROSEMIDE 40 MG/1
40 TABLET ORAL DAILY
Status: CANCELLED | OUTPATIENT
Start: 2020-06-15

## 2020-06-15 RX ORDER — NICOTINE POLACRILEX 4 MG
15 LOZENGE BUCCAL PRN
Status: DISCONTINUED | OUTPATIENT
Start: 2020-06-15 | End: 2020-06-17 | Stop reason: HOSPADM

## 2020-06-15 RX ORDER — EZETIMIBE 10 MG/1
10 TABLET ORAL DAILY
Status: DISCONTINUED | OUTPATIENT
Start: 2020-06-15 | End: 2020-06-17 | Stop reason: HOSPADM

## 2020-06-15 RX ORDER — ALLOPURINOL 100 MG/1
100 TABLET ORAL 2 TIMES DAILY
Status: DISCONTINUED | OUTPATIENT
Start: 2020-06-15 | End: 2020-06-17 | Stop reason: HOSPADM

## 2020-06-15 RX ORDER — SODIUM CHLORIDE 0.9 % (FLUSH) 0.9 %
10 SYRINGE (ML) INJECTION PRN
Status: DISCONTINUED | OUTPATIENT
Start: 2020-06-15 | End: 2020-06-17 | Stop reason: HOSPADM

## 2020-06-15 RX ORDER — NITROGLYCERIN 0.4 MG/1
0.4 TABLET SUBLINGUAL EVERY 5 MIN PRN
Status: DISCONTINUED | OUTPATIENT
Start: 2020-06-15 | End: 2020-06-17 | Stop reason: HOSPADM

## 2020-06-15 RX ORDER — PROMETHAZINE HYDROCHLORIDE 25 MG/1
12.5 TABLET ORAL EVERY 6 HOURS PRN
Status: DISCONTINUED | OUTPATIENT
Start: 2020-06-15 | End: 2020-06-17 | Stop reason: HOSPADM

## 2020-06-15 RX ORDER — VITAMIN B COMPLEX
1000 TABLET ORAL DAILY
Status: DISCONTINUED | OUTPATIENT
Start: 2020-06-15 | End: 2020-06-17 | Stop reason: HOSPADM

## 2020-06-15 RX ORDER — AMLODIPINE BESYLATE 2.5 MG/1
2.5 TABLET ORAL DAILY
Status: DISCONTINUED | OUTPATIENT
Start: 2020-06-15 | End: 2020-06-17 | Stop reason: HOSPADM

## 2020-06-15 RX ORDER — ONDANSETRON 2 MG/ML
4 INJECTION INTRAMUSCULAR; INTRAVENOUS EVERY 6 HOURS PRN
Status: DISCONTINUED | OUTPATIENT
Start: 2020-06-15 | End: 2020-06-17 | Stop reason: HOSPADM

## 2020-06-15 RX ORDER — ALENDRONATE SODIUM 70 MG/1
70 TABLET ORAL
Status: DISCONTINUED | OUTPATIENT
Start: 2020-06-15 | End: 2020-06-15

## 2020-06-15 RX ORDER — ASPIRIN 81 MG/1
81 TABLET ORAL DAILY
Status: DISCONTINUED | OUTPATIENT
Start: 2020-06-15 | End: 2020-06-17 | Stop reason: HOSPADM

## 2020-06-15 RX ORDER — SODIUM CHLORIDE 0.9 % (FLUSH) 0.9 %
10 SYRINGE (ML) INJECTION EVERY 12 HOURS SCHEDULED
Status: DISCONTINUED | OUTPATIENT
Start: 2020-06-15 | End: 2020-06-17 | Stop reason: HOSPADM

## 2020-06-15 RX ORDER — ACETAMINOPHEN 650 MG/1
650 SUPPOSITORY RECTAL EVERY 6 HOURS PRN
Status: DISCONTINUED | OUTPATIENT
Start: 2020-06-15 | End: 2020-06-17 | Stop reason: HOSPADM

## 2020-06-15 RX ORDER — LANOLIN ALCOHOL/MO/W.PET/CERES
600 CREAM (GRAM) TOPICAL EVERY OTHER DAY
Status: DISCONTINUED | OUTPATIENT
Start: 2020-06-15 | End: 2020-06-15 | Stop reason: RX

## 2020-06-15 RX ORDER — EZETIMIBE 10 MG/1
10 TABLET ORAL NIGHTLY
COMMUNITY
End: 2020-08-13 | Stop reason: SDUPTHER

## 2020-06-15 RX ADMIN — Medication 10 ML: at 21:43

## 2020-06-15 RX ADMIN — METOPROLOL TARTRATE 25 MG: 25 TABLET, FILM COATED ORAL at 21:43

## 2020-06-15 RX ADMIN — ALLOPURINOL 100 MG: 100 TABLET ORAL at 21:44

## 2020-06-15 RX ADMIN — FUROSEMIDE 20 MG: 10 INJECTION, SOLUTION INTRAMUSCULAR; INTRAVENOUS at 17:35

## 2020-06-15 RX ADMIN — ATORVASTATIN CALCIUM 80 MG: 80 TABLET, FILM COATED ORAL at 21:44

## 2020-06-15 RX ADMIN — FUROSEMIDE 40 MG: 10 INJECTION, SOLUTION INTRAMUSCULAR; INTRAVENOUS at 18:57

## 2020-06-15 ASSESSMENT — PAIN SCALES - GENERAL
PAINLEVEL_OUTOF10: 5
PAINLEVEL_OUTOF10: 0
PAINLEVEL_OUTOF10: 0

## 2020-06-15 ASSESSMENT — ENCOUNTER SYMPTOMS
VOMITING: 0
ABDOMINAL DISTENTION: 0
CHEST TIGHTNESS: 1
DIARRHEA: 0
CONSTIPATION: 0
APNEA: 0
BACK PAIN: 0
NAUSEA: 0
SHORTNESS OF BREATH: 1
EYES NEGATIVE: 1
GASTROINTESTINAL NEGATIVE: 1
ABDOMINAL PAIN: 0

## 2020-06-15 NOTE — H&P
Collection Time: 06/15/20  3:30 PM   Result Value Ref Range    WBC 8.6 3.5 - 11.0 k/uL    RBC 4.40 4.0 - 5.2 m/uL    Hemoglobin 12.8 12.0 - 16.0 g/dL    Hematocrit 38.0 36 - 46 %    MCV 86.2 80 - 100 fL    MCH 29.1 26 - 34 pg    MCHC 33.7 31 - 37 g/dL    RDW 15.8 (H) 11.5 - 14.9 %    Platelets 287 264 - 201 k/uL    MPV 8.6 6.0 - 12.0 fL    NRBC Automated NOT REPORTED per 100 WBC    Differential Type NOT REPORTED     Seg Neutrophils 64 36 - 66 %    Lymphocytes 24 24 - 44 %    Monocytes 10 (H) 1 - 7 %    Eosinophils % 1 0 - 4 %    Basophils 1 0 - 2 %    Immature Granulocytes NOT REPORTED 0 %    Segs Absolute 5.60 1.3 - 9.1 k/uL    Absolute Lymph # 2.00 1.0 - 4.8 k/uL    Absolute Mono # 0.80 0.1 - 1.3 k/uL    Absolute Eos # 0.10 0.0 - 0.4 k/uL    Basophils Absolute 0.10 0.0 - 0.2 k/uL    Absolute Immature Granulocyte NOT REPORTED 0.00 - 0.30 k/uL    WBC Morphology NOT REPORTED     RBC Morphology NOT REPORTED     Platelet Estimate NOT REPORTED    Lipase    Collection Time: 06/15/20  3:30 PM   Result Value Ref Range    Lipase 23 13 - 60 U/L   Troponin    Collection Time: 06/15/20  3:30 PM   Result Value Ref Range    Troponin, High Sensitivity 23 (H) 0 - 14 ng/L    Troponin T NOT REPORTED <0.03 ng/mL    Troponin Interp NOT REPORTED    Protime-INR    Collection Time: 06/15/20  3:30 PM   Result Value Ref Range    Protime 22.0 (H) 11.8 - 14.6 sec    INR 1.9    APTT    Collection Time: 06/15/20  3:30 PM   Result Value Ref Range    PTT 35.1 24.0 - 36.0 sec       Imaging/Diagnostics:  Xr Chest Portable    Result Date: 6/15/2020  EXAMINATION: ONE XRAY VIEW OF THE CHEST 6/15/2020 4:05 pm COMPARISON: 05/24/2016 HISTORY: ORDERING SYSTEM PROVIDED HISTORY: Chest Pain TECHNOLOGIST PROVIDED HISTORY: Chest Pain Reason for Exam: sob Acuity: Unknown Type of Exam: Unknown FINDINGS: Status post median sternotomy and CABG. Heart size at the upper limits of normal.  There is mild pulmonary edema. Question small bilateral pleural effusions. an irregular  heterogeneous plaque causing a <50%    heterogeneous plaque causing a <50%  stenosis. stenosis. The external carotid artery has an     The external carotid artery has an  irregular heterogeneous plaque causing irregular heterogeneous plaque  a <50% stenosis. causing a <50% stenosis. There is minimal spectral broadening   There is moderate spectral  of the external carotid artery without broadening of the external carotid  peak velocity elevation. artery with peak velocity                                         elevation. The internal carotid artery has an  irregular calcific plaque causing a    The internal carotid artery has an  <50% stenosis based on velocities. irregular heterogeneous plaque                                         causing a <50% stenosis based on  Moderate spectral broadening of the    velocities. right internal carotid artery without  elevation of peak systolic and end     Severe spectral broadening of the  diastolic velocities. left internal carotid artery with                                         elevation of peak systolic  The vertebral artery is patent with    velocities. antegrade flow. The vertebral artery is patent with                                         antegrade flow. Velocities are measured in cm/s ; Diameters are measured in cm Carotid Right Measurements +------------+-------+-------+--------+-------+------------+---------------+ ! Location    ! PSV    ! EDV    ! Angle   ! RI     !%Stenosis   ! Tortuosity     ! +------------+-------+-------+--------+-------+------------+---------------+ ! Prox CCA    !72     !9.51   !        !0.87   !            !               ! +------------+-------+-------+--------+-------+------------+---------------+ ! Mid CCA     !73.4   !9.51   !        !0.87   !            !               ! +------------+-------+-------+--------+-------+------------+---------------+ ! Prox ICA    ! 144    !20.6   !        !0.86   !            !               ! +------------+-------+-------+--------+-------+------------+---------------+ ! Mid ICA     ! 87.5   !21.4   !        !0.76   !            !               ! +------------+-------+-------+--------+-------+------------+---------------+ ! Prox ECA    !165    !19.7   !        !0.88   !            !               ! +------------+-------+-------+--------+-------+------------+---------------+ ! Vertebral   !36.8   !6.9    !        !0.81   !            !               ! +------------+-------+-------+--------+-------+------------+---------------+   - There is antegrade vertebral flow noted on the left side. - Additional Measurements:ICAPSV/CCAPSV 2. 2. ICAEDV/CCAEDV 2.54.       Assessment :      Primary Problem  <principal problem not specified>    Active Hospital Problems    Diagnosis Date Noted    Chest pain [R07.9] 06/15/2020       Plan:     Patient status Admit as inpatient in the  Progressive Unit/Step down    Sudden onset chest pain likely secondary to  NSTEMI  -Trop 23, awaiting second troponin  -Consult cardiology, appreciate recommendations  -BNP pending  -Patient has history of triple bypass and stent placement  -CBC  -BMP  -Nitrostat 0.4 mg PRN    Diabetes mellitus type 2  -High-dose sliding scale  -Hypoglycemia protocol in place  -POCT glucose  -Patient takes Amaryl and Tradjenta at home  -Hemoglobin A1c 8.2 in 5/20    Essential hypertension  -Norvasc 2.5 mg p.o. daily  -Aspirin 81 mg p.o. daily    Hyperlipidemia  -Lipitor 80 mg p.o. daily    Lymphedema  -Lasi 40 mg PO daily     DVT Px: continue Xarelto 20 mg PO daily   Dispo: social work  Diet: general  PT/OT eval and treat     Consultations:   IP CONSULT TO INTERNAL MEDICINE  IP CONSULT TO CARDIOLOGY  IP CONSULT TO SOCIAL WORK     Patient is admitted as inpatient status because of co-morbiditieslisted above, polyethylene glycol, promethazine **OR** ondansetron        [address], [address] [place] [address].    Phone [phone]   Fax: [phone]  Answering Service: [phone]

## 2020-06-15 NOTE — CONSULTS
Consult        Date of Admission:  6/15/2020  Date of Consultation:  6/15/2020      PCP:  Sintia Rowley MD      Reason for the consult:. Chest pains    History of Present Illness:  Rasheed Reyes is a 80 y.o. female known coronary artery disease prior coronary bypass prior stenting, history of pulmonary embolism. Patient states that she was moving heavy loads when she started to have sharp chest pains she states that this pain has been going on for about a week described as sharp no radiation worse with movement      PMH:   has a past medical history of Arthritis, Basal cell carcinoma of nose, Bronchitis, CAD (coronary artery disease), Carpal tunnel syndrome, Colon cancer ([address] Diabetes mellitus ([address] DVT (deep venous thrombosis) ([address] Gout, Hematuria, Hx of blood clots, Hyperlipidemia, Hypertension, Kidney stones, Lymphedema, MI, old, Ophthalmoplegic migraine headache, Osteoarthritis, TIA (transient ischemic attack), Uterine cancer ([address] and Wears glasses. PSH:   has a past surgical history that includes Cholecystectomy; Appendectomy; Hysterectomy; Coronary artery bypass graft; Lithotripsy; Carpal tunnel release (Right); Finger trigger release (Right); Colon surgery; Dilatation, esophagus; skin biopsy; angioplasty; Carotid endarterectomy (Left, 3-3-16); Colonoscopy; cyst removal (10/07/2016); Breast surgery; joint replacement (Left, 03/29/2010); joint replacement (Right, 02/16/1999); Cardiac surgery; vascular surgery; and Cystoscopy (Bilateral, 1/21/2020). Allergies: Allergies   Allergen Reactions    Ampicillin Other (See Comments)    Clopidogrel Bisulfate Itching    Diovan [Valsartan] Other (See Comments)     cough    Lantus [Insulin Glargine] Nausea Only     Stomach \"quivered\" after taking it, palpitations    Metformin And Related Diarrhea     Chronic kidney disease stage 3        Home Meds:    Prior to Admission medications    Medication Sig Start Date End Date Taking? LITE) SARY use as directed 8/12/16   Historical Provider, MD        [facility] Meds:    Current Facility-Administered Medications   Medication Dose Route Frequency Provider Last Rate Last Dose    allopurinol (ZYLOPRIM) tablet 100 mg  100 mg Oral BID Camryn Granado MD        amLODIPine (NORVASC) tablet 2.5 mg  2.5 mg Oral Daily Camryn Granado MD        aspirin EC tablet 81 mg  81 mg Oral Daily Camryn Granado MD        atorvastatin (LIPITOR) tablet 80 mg  80 mg Oral Nightly Camryn Granado MD        Vitamin D (CHOLECALCIFEROL) tablet 1,000 Units  1,000 Units Oral Daily Camryn Granado MD        ezetimibe (ZETIA) tablet 10 mg  10 mg Oral Daily Camryn Granado MD        metoprolol tartrate (LOPRESSOR) tablet 25 mg  25 mg Oral BID Camryn Granado MD        nitroGLYCERIN (NITROSTAT) SL tablet 0.4 mg  0.4 mg Sublingual Q5 Min PRN Camryn Granado MD       [facility] [place] [START ON 6/16/2020] rivaroxaban (XARELTO) tablet 20 mg  20 mg Oral Daily with breakfast Camryn Granado MD        traZODone (DESYREL) tablet 50 mg  50 mg Oral Nightly PRN Camryn Granado MD        insulin lispro (HUMALOG) injection vial 0-18 Units  0-18 Units Subcutaneous TID  Camryn Granado MD        insulin lispro (HUMALOG) injection vial 0-9 Units  0-9 Units Subcutaneous Nightly Camryn Granado MD        glucose (GLUTOSE) 40 % oral gel 15 g  15 g Oral PRN Camryn Granado MD        dextrose 50 % IV solution  12.5 g Intravenous PRN Camryn Granado MD        glucagon (rDNA) injection 1 mg  1 mg Intramuscular PRN Camryn Granado MD        dextrose 5 % solution  100 mL/hr Intravenous PRN Camryn Granado MD        sodium chloride flush 0.9 % injection 10 mL  10 mL Intravenous 2 times per day Camryn Granado MD        sodium chloride flush 0.9 % injection 10 mL  10 mL Intravenous PRN Camryn Granado MD        acetaminophen (TYLENOL) tablet 650 mg  650 mg Oral Q6H PRN Camryn Granado MD        Or    acetaminophen (TYLENOL) suppository 650 mg  650 mg Rectal Q6H PRN Camryn Granado MD       [facility] [place]

## 2020-06-15 NOTE — ED PROVIDER NOTES
of blood clots     ED.  LEGS, ED. 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46    Ophthalmoplegic migraine headache     Osteoarthritis     TIA (transient ischemic attack)     [facility]     Wears glasses      Past Problem List  Patient Active Problem List   Diagnosis Code    Essential hypertension I10    Gout of foot M10.9    Type 2 diabetes mellitus with stage 3 chronic kidney disease, without long-term current use of insulin ([facility]) E11.22, N18.3    Coronary artery disease involving coronary bypass graft of native heart without angina pectoris I25.810    Calcium kidney stone N20.0    Arthritis M19.90    Coronary atherosclerosis I25.10    Hyperlipidemia with target LDL less than 70 E78.5    Occlusion and stenosis of bilateral carotid arteries I65.23    Other pulmonary embolism without acute cor pulmonale (HCC) I26.99    CKD (chronic kidney disease), stage III (HCC) N18.3    Benign hypertension with CKD (chronic kidney disease) stage III (HCC) I12.9, N18.3    Chronic anticoagulation Z79.01    Osteopenia M85.80    Psychophysiological insomnia F51.04    Bilateral carotid artery stenosis I65.23    Carpal tunnel syndrome G56.00    History of pulmonary embolism Z86.711    History of DVT (deep vein thrombosis) Z86.718    Chest pain R07.9     SURGICAL HISTORY       Past Surgical History:   Procedure Laterality Date    ANGIOPLASTY      hx. of stenting x 3.    APPENDECTOMY      BREAST SURGERY      INFECTED MILK DUCT LT.    CARDIAC SURGERY      CABG x 3 vessels and 3 stents    CAROTID ENDARTERECTOMY Left 3-3-16    CARPAL TUNNEL RELEASE Right     CHOLECYSTECTOMY      COLON SURGERY      colectomy, PRECANCEROUS POLYPS    COLONOSCOPY      X5, HX PRECANCEROUS POLYPS    CORONARY ARTERY BYPASS GRAFT      X3 vessels    CYST REMOVAL  10/07/2016    EXCISION OF SEBACEOUS CYST REMOVED FROM BACK X3    CYSTOSCOPY

## 2020-06-16 ENCOUNTER — APPOINTMENT (OUTPATIENT)
Dept: NUCLEAR MEDICINE | Age: 85
DRG: 281 | End: 2020-06-16
Payer: MEDICARE

## 2020-06-16 LAB
ABSOLUTE EOS #: 0.2 K/UL (ref 0–0.4)
ABSOLUTE IMMATURE GRANULOCYTE: ABNORMAL K/UL (ref 0–0.3)
ABSOLUTE LYMPH #: 2.5 K/UL (ref 1–4.8)
ABSOLUTE MONO #: 1 K/UL (ref 0.1–1.3)
ANION GAP SERPL CALCULATED.3IONS-SCNC: 12 MMOL/L (ref 9–17)
BASOPHILS # BLD: 1 % (ref 0–2)
BASOPHILS ABSOLUTE: 0.1 K/UL (ref 0–0.2)
BUN BLDV-MCNC: 16 MG/DL (ref 8–23)
BUN/CREAT BLD: ABNORMAL (ref 9–20)
CALCIUM SERPL-MCNC: 8.9 MG/DL (ref 8.6–10.4)
CHLORIDE BLD-SCNC: 101 MMOL/L (ref 98–107)
CO2: 27 MMOL/L (ref 20–31)
CREAT SERPL-MCNC: 0.66 MG/DL (ref 0.5–0.9)
DIFFERENTIAL TYPE: ABNORMAL
EKG ATRIAL RATE: 71 BPM
EKG P AXIS: 56 DEGREES
EKG P-R INTERVAL: 174 MS
EKG Q-T INTERVAL: 402 MS
EKG QRS DURATION: 96 MS
EKG QTC CALCULATION (BAZETT): 436 MS
EKG R AXIS: 54 DEGREES
EKG T AXIS: 82 DEGREES
EKG VENTRICULAR RATE: 71 BPM
EOSINOPHILS RELATIVE PERCENT: 3 % (ref 0–4)
GFR AFRICAN AMERICAN: >60 ML/MIN
GFR NON-AFRICAN AMERICAN: >60 ML/MIN
GFR SERPL CREATININE-BSD FRML MDRD: ABNORMAL ML/MIN/{1.73_M2}
GFR SERPL CREATININE-BSD FRML MDRD: ABNORMAL ML/MIN/{1.73_M2}
GLUCOSE BLD-MCNC: 146 MG/DL (ref 65–105)
GLUCOSE BLD-MCNC: 146 MG/DL (ref 70–99)
GLUCOSE BLD-MCNC: 183 MG/DL (ref 65–105)
GLUCOSE BLD-MCNC: 197 MG/DL (ref 65–105)
GLUCOSE BLD-MCNC: 201 MG/DL (ref 65–105)
HCT VFR BLD CALC: 38.1 % (ref 36–46)
HEMOGLOBIN: 12.4 G/DL (ref 12–16)
IMMATURE GRANULOCYTES: ABNORMAL %
LV EF: 55 %
LV EF: 61 %
LVEF MODALITY: NORMAL
LVEF MODALITY: NORMAL
LYMPHOCYTES # BLD: 28 % (ref 24–44)
MCH RBC QN AUTO: 28.1 PG (ref 26–34)
MCHC RBC AUTO-ENTMCNC: 32.7 G/DL (ref 31–37)
MCV RBC AUTO: 86.2 FL (ref 80–100)
MONOCYTES # BLD: 11 % (ref 1–7)
NRBC AUTOMATED: ABNORMAL PER 100 WBC
PDW BLD-RTO: 15.9 % (ref 11.5–14.9)
PLATELET # BLD: 195 K/UL (ref 150–450)
PLATELET ESTIMATE: ABNORMAL
PMV BLD AUTO: 8.7 FL (ref 6–12)
POTASSIUM SERPL-SCNC: 4.1 MMOL/L (ref 3.7–5.3)
RBC # BLD: 4.42 M/UL (ref 4–5.2)
RBC # BLD: ABNORMAL 10*6/UL
SEG NEUTROPHILS: 57 % (ref 36–66)
SEGMENTED NEUTROPHILS ABSOLUTE COUNT: 5.2 K/UL (ref 1.3–9.1)
SODIUM BLD-SCNC: 140 MMOL/L (ref 135–144)
TROPONIN INTERP: ABNORMAL
TROPONIN T: ABNORMAL NG/ML
TROPONIN, HIGH SENSITIVITY: 30 NG/L (ref 0–14)
WBC # BLD: 9.1 K/UL (ref 3.5–11)
WBC # BLD: ABNORMAL 10*3/UL

## 2020-06-16 PROCEDURE — 93010 ELECTROCARDIOGRAM REPORT: CPT | Performed by: INTERNAL MEDICINE

## 2020-06-16 PROCEDURE — 2580000003 HC RX 258: Performed by: STUDENT IN AN ORGANIZED HEALTH CARE EDUCATION/TRAINING PROGRAM

## 2020-06-16 PROCEDURE — 84484 ASSAY OF TROPONIN QUANT: CPT

## 2020-06-16 PROCEDURE — 93306 TTE W/DOPPLER COMPLETE: CPT

## 2020-06-16 PROCEDURE — 6360000002 HC RX W HCPCS: Performed by: STUDENT IN AN ORGANIZED HEALTH CARE EDUCATION/TRAINING PROGRAM

## 2020-06-16 PROCEDURE — 85025 COMPLETE CBC W/AUTO DIFF WBC: CPT

## 2020-06-16 PROCEDURE — 93017 CV STRESS TEST TRACING ONLY: CPT

## 2020-06-16 PROCEDURE — 78452 HT MUSCLE IMAGE SPECT MULT: CPT

## 2020-06-16 PROCEDURE — 6360000002 HC RX W HCPCS: Performed by: INTERNAL MEDICINE

## 2020-06-16 PROCEDURE — 2060000000 HC ICU INTERMEDIATE R&B

## 2020-06-16 PROCEDURE — 82947 ASSAY GLUCOSE BLOOD QUANT: CPT

## 2020-06-16 PROCEDURE — 6370000000 HC RX 637 (ALT 250 FOR IP): Performed by: STUDENT IN AN ORGANIZED HEALTH CARE EDUCATION/TRAINING PROGRAM

## 2020-06-16 PROCEDURE — 6370000000 HC RX 637 (ALT 250 FOR IP): Performed by: INTERNAL MEDICINE

## 2020-06-16 PROCEDURE — 36415 COLL VENOUS BLD VENIPUNCTURE: CPT

## 2020-06-16 PROCEDURE — 80048 BASIC METABOLIC PNL TOTAL CA: CPT

## 2020-06-16 PROCEDURE — 2580000003 HC RX 258: Performed by: INTERNAL MEDICINE

## 2020-06-16 PROCEDURE — 3430000000 HC RX DIAGNOSTIC RADIOPHARMACEUTICAL: Performed by: INTERNAL MEDICINE

## 2020-06-16 PROCEDURE — A9500 TC99M SESTAMIBI: HCPCS | Performed by: INTERNAL MEDICINE

## 2020-06-16 RX ORDER — ATROPINE SULFATE 0.1 MG/ML
0.5 INJECTION INTRAVENOUS EVERY 5 MIN PRN
Status: ACTIVE | OUTPATIENT
Start: 2020-06-16 | End: 2020-06-16

## 2020-06-16 RX ORDER — AMINOPHYLLINE DIHYDRATE 25 MG/ML
50 INJECTION, SOLUTION INTRAVENOUS PRN
Status: ACTIVE | OUTPATIENT
Start: 2020-06-16 | End: 2020-06-16

## 2020-06-16 RX ORDER — SODIUM CHLORIDE 0.9 % (FLUSH) 0.9 %
10 SYRINGE (ML) INJECTION PRN
Status: DISCONTINUED | OUTPATIENT
Start: 2020-06-16 | End: 2020-06-17 | Stop reason: HOSPADM

## 2020-06-16 RX ORDER — NITROGLYCERIN 0.4 MG/1
0.4 TABLET SUBLINGUAL EVERY 5 MIN PRN
Status: ACTIVE | OUTPATIENT
Start: 2020-06-16 | End: 2020-06-16

## 2020-06-16 RX ORDER — SODIUM CHLORIDE 0.9 % (FLUSH) 0.9 %
10 SYRINGE (ML) INJECTION PRN
Status: ACTIVE | OUTPATIENT
Start: 2020-06-16 | End: 2020-06-16

## 2020-06-16 RX ORDER — LOPERAMIDE HYDROCHLORIDE 2 MG/1
2 CAPSULE ORAL 4 TIMES DAILY PRN
Status: DISCONTINUED | OUTPATIENT
Start: 2020-06-16 | End: 2020-06-17 | Stop reason: HOSPADM

## 2020-06-16 RX ORDER — SODIUM CHLORIDE 9 MG/ML
500 INJECTION, SOLUTION INTRAVENOUS CONTINUOUS PRN
Status: ACTIVE | OUTPATIENT
Start: 2020-06-16 | End: 2020-06-16

## 2020-06-16 RX ORDER — METOPROLOL TARTRATE 5 MG/5ML
5 INJECTION INTRAVENOUS EVERY 5 MIN PRN
Status: ACTIVE | OUTPATIENT
Start: 2020-06-16 | End: 2020-06-16

## 2020-06-16 RX ORDER — ALBUTEROL SULFATE 90 UG/1
2 AEROSOL, METERED RESPIRATORY (INHALATION) PRN
Status: ACTIVE | OUTPATIENT
Start: 2020-06-16 | End: 2020-06-16

## 2020-06-16 RX ADMIN — TETRAKIS(2-METHOXYISOBUTYLISOCYANIDE)COPPER(I) TETRAFLUOROBORATE 13.4 MILLICURIE: 1 INJECTION, POWDER, LYOPHILIZED, FOR SOLUTION INTRAVENOUS at 07:04

## 2020-06-16 RX ADMIN — REGADENOSON 0.4 MG: 0.08 INJECTION, SOLUTION INTRAVENOUS at 09:39

## 2020-06-16 RX ADMIN — Medication 10 ML: at 09:17

## 2020-06-16 RX ADMIN — ALLOPURINOL 100 MG: 100 TABLET ORAL at 11:50

## 2020-06-16 RX ADMIN — ALLOPURINOL 100 MG: 100 TABLET ORAL at 22:00

## 2020-06-16 RX ADMIN — ACETAMINOPHEN 650 MG: 325 TABLET, FILM COATED ORAL at 05:13

## 2020-06-16 RX ADMIN — ASPIRIN 81 MG: 81 TABLET, COATED ORAL at 11:49

## 2020-06-16 RX ADMIN — EZETIMIBE 10 MG: 10 TABLET ORAL at 12:46

## 2020-06-16 RX ADMIN — METOPROLOL TARTRATE 25 MG: 25 TABLET, FILM COATED ORAL at 11:51

## 2020-06-16 RX ADMIN — TETRAKIS(2-METHOXYISOBUTYLISOCYANIDE)COPPER(I) TETRAFLUOROBORATE 39 MILLICURIE: 1 INJECTION, POWDER, LYOPHILIZED, FOR SOLUTION INTRAVENOUS at 09:55

## 2020-06-16 RX ADMIN — ATORVASTATIN CALCIUM 80 MG: 80 TABLET, FILM COATED ORAL at 22:00

## 2020-06-16 RX ADMIN — METOPROLOL TARTRATE 25 MG: 25 TABLET, FILM COATED ORAL at 22:00

## 2020-06-16 RX ADMIN — INSULIN LISPRO 3 UNITS: 100 INJECTION, SOLUTION INTRAVENOUS; SUBCUTANEOUS at 18:56

## 2020-06-16 RX ADMIN — VITAMIN D, TAB 1000IU (100/BT) 1000 UNITS: 25 TAB at 11:50

## 2020-06-16 RX ADMIN — FUROSEMIDE 40 MG: 10 INJECTION, SOLUTION INTRAMUSCULAR; INTRAVENOUS at 18:56

## 2020-06-16 RX ADMIN — LOPERAMIDE HYDROCHLORIDE 2 MG: 2 CAPSULE ORAL at 18:56

## 2020-06-16 RX ADMIN — Medication 10 ML: at 07:04

## 2020-06-16 RX ADMIN — INSULIN LISPRO 2 UNITS: 100 INJECTION, SOLUTION INTRAVENOUS; SUBCUTANEOUS at 22:00

## 2020-06-16 RX ADMIN — Medication 10 ML: at 22:07

## 2020-06-16 RX ADMIN — Medication 10 ML: at 08:40

## 2020-06-16 RX ADMIN — AMLODIPINE BESYLATE 2.5 MG: 2.5 TABLET ORAL at 11:51

## 2020-06-16 ASSESSMENT — PAIN SCALES - GENERAL
PAINLEVEL_OUTOF10: 0
PAINLEVEL_OUTOF10: 3

## 2020-06-16 NOTE — ACP (ADVANCE CARE PLANNING)
.Advance Care Planning     Advance Care Planning Activator (Inpatient)  Conversation Note      Date of ACP Conversation: 6/162020    [employer] with: Patient with capacity    ACP Activator: Adenike Peña [address] makes decisions on behalf of the incapacitated patient: Decision Maker is asked to consider and make decisions based on patient values, known preferences, or best interests. Health Care Decision Maker:     Current Designated Health Care Decision Maker:   (If there is a valid [place] named in the [facility] Makers\" box in the ACP activity, but it is not visible above, be sure to open that field and then select the health care decision maker relationship (ie \"primary\") in the blank space to the right of the name.) Validate  this information as still accurate & up-to-date; edit [place] field as needed.)    Note: Assess and validate information in current ACP documents, as indicated. If no Decision Maker listed above or available through scanned documents, then:    If no Authorized Decision Maker has previously been identified, then patient chooses [place]:  \"Who would you like to name as your primary health care decision-maker? \"               Name: Bryan Ortiz     Relationship: son          Phone number: [phone]  Richar Nolan this person be reached easily? \" Yes  \"Who would you like to name as your back-up decision maker? \"   Name: Karely Lujan       Relationship: son        Phone number: [phone]  Richar Nolan this person be reached easily? \" Yes    Note: If the relationship of these Decision-Makers to the patient does NOT follow your state's Next of Kin hierarchy, recommend that patient complete ACP document that meets state-specific requirements to allow them to act on the patient's behalf when appropriate. Care Preferences    Ventilation:   \"If you were in your present state of health and suddenly became very ill and were unable to breathe on your own, what would your preference be about the use of a ventilator (breathing machine) if it were available to you? \"      Would the patient desire the use of ventilator (breathing machine)?: Yes    \"If your health worsens and it becomes clear that your chance of recovery is unlikely, what would your preference be about the use of a ventilator (breathing machine) if it were available to you? \"     Would the patient desire the use of ventilator (breathing machine)? :No      Resuscitation  \"CPR works best to restart the heart when there is a sudden event, like a heart attack, in someone who is otherwise healthy. Unfortunately, CPR does not typically restart the heart for people who have serious health conditions or who are very sick. \"    \"In the event your heart stopped as a result of an underlying serious health condition, would you want attempts to be made to restart your heart (answer \"yes\" for attempt to resuscitate) or would you prefer a natural death (answer \"no\" for do not attempt to resuscitate)? \" Yes      NOTE: If the patient has a valid advance directive AND now provides care preference(s) that are inconsistent with that prior directive, advise the patient to consider either: creating a new advance directive that complies with state-specific requirements; or, if that is not possible, orally revoking that prior directive in accordance with state-specific requirements, which must be documented in the EHR. [x] Yes   [] No   Educated Patient / Trudi Prince regarding differences between Advance Directives and portable DNR orders.     Length of ACP Conversation in minutes:      Conversation Outcomes:  [x] ACP discussion completed  [] Existing advance directive reviewed with patient; no changes to patient's previously recorded wishes  [] New Advance Directive completed  [] Portable Do Not Rescitate prepared for Provider review and signature  [] POLST/POST/MOLST/MOST prepared for Provider review and signature      Follow-up plan:    [] Schedule follow-up conversation to continue planning  [] Referred individual to Provider for additional questions/concerns   [] Advised patient/agent/surrogate to review completed ACP document and update if needed with changes in condition, patient preferences or care setting    [] This note routed to one or more involved healthcare providers

## 2020-06-16 NOTE — CARE COORDINATION
CASE MANAGEMENT NOTE:    Admission Date:  6/15/2020 Darius Nova is a 80 y.o.  female    Admitted for : Chest pain [R07.9]    Met with:  Patient    PCP:  Yan Pat                                Insurance:  Medicare      Current Residence/ Living Arrangements:  independently at home, alone             Current Services PTA:  No    Is patient agreeable to VNS: No    Freedom of choice provided:  Yes    List of [address] provided: No, declines    VNS chosen:  No    DME:  walker and shower chair, GB    Home Oxygen: No    Nebulizer: No    CPAP/BIPAP: No    Supplier: N/A    Potential Assistance Needed: No    SNF needed: No    Freedom of choice and list provided: NA    Pharmacy:  [address] [place] [address] on [facility]       Does Patient want to use MEDS to BEDS? No    Is the Patient an [facility] with Readmission Risk Score greater than 14%? No  If yes, pt needs a follow up appointment made within 7 days. Family Members/Caregivers that pt would like involved in their care:    Yes    If yes, list name here:  Julieta Whitmore, [address] [place] [address]    Transportation Provider:  Patient             Is patient in Isolation/One on One/Altered Mental Status? No  If yes, skip next question. If no, would they like an I-Pad to  use? No  If yes, call [phone]. Discharge Plan:  6/16/20 Medicare/Grace Pt. Lives alone in an apt, w/ no steps. DME walker, [place], [place]. Denies VNS/Needs. Is independent. IV Lasix, 40 Mg, Bid, PT/OT. Stress, Cardio.  [place], 13%, Will follow//KB                 Electronically signed by: Jake London RN on 6/16/2020 at 12:11 PM

## 2020-06-17 VITALS
DIASTOLIC BLOOD PRESSURE: 46 MMHG | TEMPERATURE: 98.7 F | HEART RATE: 75 BPM | SYSTOLIC BLOOD PRESSURE: 138 MMHG | RESPIRATION RATE: 16 BRPM | OXYGEN SATURATION: 96 % | HEIGHT: 69 IN | WEIGHT: 200 LBS | BODY MASS INDEX: 29.62 KG/M2

## 2020-06-17 LAB
ANION GAP SERPL CALCULATED.3IONS-SCNC: 12 MMOL/L (ref 9–17)
BUN BLDV-MCNC: 15 MG/DL (ref 8–23)
BUN/CREAT BLD: ABNORMAL (ref 9–20)
CALCIUM SERPL-MCNC: 8.6 MG/DL (ref 8.6–10.4)
CHLORIDE BLD-SCNC: 102 MMOL/L (ref 98–107)
CO2: 25 MMOL/L (ref 20–31)
CREAT SERPL-MCNC: 0.66 MG/DL (ref 0.5–0.9)
GFR AFRICAN AMERICAN: >60 ML/MIN
GFR NON-AFRICAN AMERICAN: >60 ML/MIN
GFR SERPL CREATININE-BSD FRML MDRD: ABNORMAL ML/MIN/{1.73_M2}
GFR SERPL CREATININE-BSD FRML MDRD: ABNORMAL ML/MIN/{1.73_M2}
GLUCOSE BLD-MCNC: 170 MG/DL (ref 70–99)
GLUCOSE BLD-MCNC: 202 MG/DL (ref 65–105)
POTASSIUM SERPL-SCNC: 4.1 MMOL/L (ref 3.7–5.3)
SODIUM BLD-SCNC: 139 MMOL/L (ref 135–144)

## 2020-06-17 PROCEDURE — 2580000003 HC RX 258: Performed by: STUDENT IN AN ORGANIZED HEALTH CARE EDUCATION/TRAINING PROGRAM

## 2020-06-17 PROCEDURE — 36415 COLL VENOUS BLD VENIPUNCTURE: CPT

## 2020-06-17 PROCEDURE — 6370000000 HC RX 637 (ALT 250 FOR IP): Performed by: STUDENT IN AN ORGANIZED HEALTH CARE EDUCATION/TRAINING PROGRAM

## 2020-06-17 PROCEDURE — 82947 ASSAY GLUCOSE BLOOD QUANT: CPT

## 2020-06-17 PROCEDURE — 6360000002 HC RX W HCPCS: Performed by: STUDENT IN AN ORGANIZED HEALTH CARE EDUCATION/TRAINING PROGRAM

## 2020-06-17 PROCEDURE — 80048 BASIC METABOLIC PNL TOTAL CA: CPT

## 2020-06-17 PROCEDURE — 99239 HOSP IP/OBS DSCHRG MGMT >30: CPT | Performed by: INTERNAL MEDICINE

## 2020-06-17 RX ADMIN — AMLODIPINE BESYLATE 2.5 MG: 2.5 TABLET ORAL at 08:31

## 2020-06-17 RX ADMIN — FUROSEMIDE 40 MG: 10 INJECTION, SOLUTION INTRAMUSCULAR; INTRAVENOUS at 08:31

## 2020-06-17 RX ADMIN — METOPROLOL TARTRATE 25 MG: 25 TABLET, FILM COATED ORAL at 08:31

## 2020-06-17 RX ADMIN — ASPIRIN 81 MG: 81 TABLET, COATED ORAL at 08:31

## 2020-06-17 RX ADMIN — ALLOPURINOL 100 MG: 100 TABLET ORAL at 08:31

## 2020-06-17 RX ADMIN — EZETIMIBE 10 MG: 10 TABLET ORAL at 08:35

## 2020-06-17 RX ADMIN — VITAMIN D, TAB 1000IU (100/BT) 1000 UNITS: 25 TAB at 08:31

## 2020-06-17 RX ADMIN — Medication 10 ML: at 08:31

## 2020-06-17 ASSESSMENT — PAIN SCALES - GENERAL: PAINLEVEL_OUTOF10: 0

## 2020-06-17 NOTE — CARE COORDINATION
ONGOING DISCHARGE PLAN:    Reviewed patients chart regarding discharge plan and chart still confirms the plan is to discharge to home with no needs   Patient transferred to [facility] for a heart cath     Will review plan with patient and or family      Will continue to follow for additional discharge needs.      Electronically signed by Chaparrita Mayfield RN on 6/17/2020 at 3:07 PM

## 2020-06-17 NOTE — PROGRESS NOTES
[facility]    OCCUPATIONAL THERAPY MISSED TREATMENT NOTE   INPATIENT   Date: 20  Patient Name: Yumiko Masterson       Room: 5350/8098-81  MRN: [id number]   Account #: [de-identified]    : 12/10/1933  (80 y.o.)  Gender: female                 REASON FOR MISSED TREATMENT:  Pt reports and demonstrates IND with self-care and mobility. Pt denies any concerns regarding self-care for d/c home. No need for skilled OT services at this time.   -   OT being discontinued at this time.  Patient functioning at Premorbid Level  No further needs      Josafat Gutierrez, OT
PATIENT TO STRESS LAB, EXPLAINED LEXISCAN AND CONSENT SIGNED, PLACED ON EKG AND VITALS /MACHINE, IV FLUSHED WITH NORMAL SALINE. PATIENT DENIES CHEST PAIN AND SHORTNESS OF BREATH. /79, HR 72, O2 SAT 96% RA, NSR ON MONITOR.
Physical Therapy  DATE: 2020    NAME: Meenu Parra  MRN: [id number]   : 12/10/1933    Patient not seen this date for Physical Therapy due to:  [] Blood transfusion in progress  [] Hemodialysis  []  Patient Declined  [] Spine Precautions   [] Strict Bedrest  [] Surgery/ Procedure  [] Testing      [x] Other 2020 at 921- pt out of room at stress test per nurse Thomas. Will check later today. [] PT being discontinued at this time. Patient independent. No further needs. [] PT being discontinued at this time as the patient has been transferred to palliative care. No further needs.     Evan Zuñiga, PT
Physical Therapy  DATE: 2020    NAME: Terry Ruffin  MRN: [id number]   : 12/10/1933    Patient not seen this date for Physical Therapy due to:  [] Blood transfusion in progress  [] Hemodialysis  []  Patient Declined  [] Spine Precautions   [] Strict Bedrest  [] Surgery/ Procedure  [] Testing      [x] Other Observed pt coming out of the bathroom, pt up and about on her own in the room, no difficulty/concerns regarding mobilty per pt, will DC PT.        [] PT being discontinued at this time. Patient independent. No further needs. [] PT being discontinued at this time as the patient has been transferred to palliative care. No further needs.     Krishna Benavidez, PT
RN notified Dr. Coral Perkins that he was consulted on a new admission. He seen the patient. And will see the patient in the morning again.
200 lb (90.7 kg)   05/19/20 205 lb (93 kg)   03/10/20 203 lb (92.1 kg)       BMI: Body mass index is 29.53 kg/m². INPUT/OUTPUT:          Intake/Output Summary (Last 24 hours) at 6/17/2020 0650  Last data filed at 6/17/2020 0546  Gross per 24 hour   Intake --   Output 1100 ml   Net -1100 ml           EXAM:     General appearance: awake and alert moves all ext   Lungs: no rhonchi, no wheezes, no rales  Heart: S1 and S2 no murmur  Abdomen: positive bowel sounds, no bruits, no masses  Extremities: warm and dry, no cyanosis, no clubbing      Principal Problem:    Chest pain  Active Problems:    Essential hypertension  Resolved Problems:    * No resolved hospital problems. *      Assessment:  1. Recurrent chest pains increasing shortness of breath abnormal stress test with inferior and after inferoseptal ischemia. Prior coronary artery bypass prior multiple stents  2. history of extensive pulmonary embolism was on Xarelto. 3.  Nonsustained ventricular tachycardia recommendations:  Different options discussed recommend to proceed with heart catheterization and visualization of the grafts evaluate the need for PCI. Last dose of Xarelto was on the 6/15  All risks of heart catheterization were discussed in details and patient in agreement to proceed    Electronically signed by Cheryle Krauss, MD on 6/17/2020 at 6:50 AM     This note was created with the assistance of a speech-recognition program.  Although the intention is to generate a document that actually reflects the content of the visit, no guarantees can be provided that every mistake has been identified and corrected by editing.     Juan Lora MD [facility]

## 2020-06-17 NOTE — PLAN OF CARE
Transfer of care summary:  Patient is an 75-year-old female with PMH of CAD status post stenting presenting with substernal, non-radiating chest pain. Tropes 23-26- 30.  N.p.o., plan for stress test this a.m. Follow-up echo. Cardiology (Dr. Jeremiah Millan) following.     Electronically signed by Ernie Andres MD on 6/16/2020 at 9:53 AM
Control of chronic pain  Description: Control of chronic pain  Outcome: Ongoing     Problem: Cardiac:  Goal: Ability to maintain an adequate cardiac output will improve  Description: Ability to maintain an adequate cardiac output will improve  6/16/2020 1654 by Lenny Ramos RN  Outcome: Ongoing  6/16/2020 0632 by Birdie Elmore RN  Outcome: Ongoing  Note: Will continue to assess and monitor patients cardiac output to ensure stabilization. Goal: Complications related to the disease process, condition or treatment will be avoided or minimized  Description: Complications related to the disease process, condition or treatment will be avoided or minimized  Outcome: Ongoing  Goal: Hemodynamic stability will improve  Description: Hemodynamic stability will improve  6/16/2020 1654 by Lenny Ramos RN  Outcome: Ongoing  6/16/2020 0632 by Birdie Elmore RN  Outcome: Ongoing  Note: Will continue to assess and monitor patient vitals and output to ensure hemodynamic status is remaining stable. Goal: Risk factors for ineffective tissue perfusion will decrease  Description: Risk factors for ineffective tissue perfusion will decrease  Outcome: Ongoing     Problem: Fluid Volume:  Goal: Will show no signs or symptoms of fluid imbalance  Description: Will show no signs or symptoms of fluid imbalance  6/16/2020 1654 by Lenny Ramos RN  Outcome: Ongoing  6/16/2020 0632 by Birdie Elmore RN  Outcome: Ongoing  Note: Will continue to monitor patients I&O's to ensure fluid balance is remaining stable.
Ongoing  6/16/2020 1654 by Ran Le RN  Outcome: Ongoing  Goal: Hemodynamic stability will improve  Description: Hemodynamic stability will improve  6/17/2020 0309 by Deb Lr RN  Outcome: Ongoing  6/16/2020 1654 by Ran Le RN  Outcome: Ongoing  Goal: Risk factors for ineffective tissue perfusion will decrease  Description: Risk factors for ineffective tissue perfusion will decrease  6/17/2020 0309 by Deb Lr RN  Outcome: Ongoing  6/16/2020 1654 by Ran eL RN  Outcome: Ongoing     Problem: Fluid Volume:  Goal: Will show no signs or symptoms of fluid imbalance  Description: Will show no signs or symptoms of fluid imbalance  6/17/2020 0309 by Deb Lr RN  Outcome: Ongoing  6/16/2020 1654 by Ran Le RN  Outcome: Ongoing
Problem: Fluid Volume:  Goal: Will show no signs or symptoms of fluid imbalance  Description: Will show no signs or symptoms of fluid imbalance  Outcome: Ongoing  Note: Will continue to monitor patients I&O's to ensure fluid balance is remaining stable.

## 2020-06-25 ENCOUNTER — TELEPHONE (OUTPATIENT)
Dept: FAMILY MEDICINE CLINIC | Age: 85
End: 2020-06-25

## 2020-06-29 ENCOUNTER — OFFICE VISIT (OUTPATIENT)
Dept: FAMILY MEDICINE CLINIC | Age: 85
End: 2020-06-29
Payer: MEDICARE

## 2020-06-29 VITALS
BODY MASS INDEX: 30.07 KG/M2 | DIASTOLIC BLOOD PRESSURE: 70 MMHG | SYSTOLIC BLOOD PRESSURE: 130 MMHG | HEART RATE: 65 BPM | WEIGHT: 203 LBS | OXYGEN SATURATION: 98 % | HEIGHT: 69 IN

## 2020-06-29 PROBLEM — K92.0 HEMATEMESIS: Status: ACTIVE | Noted: 2020-06-29

## 2020-06-29 PROBLEM — R07.9 CHEST PAIN: Status: RESOLVED | Noted: 2020-06-15 | Resolved: 2020-06-29

## 2020-06-29 PROBLEM — Z95.5 S/P CORONARY ARTERY STENT PLACEMENT: Status: ACTIVE | Noted: 2020-06-29

## 2020-06-29 PROBLEM — I25.83 CORONARY ARTERY DISEASE DUE TO LIPID RICH PLAQUE: Status: ACTIVE | Noted: 2019-07-10

## 2020-06-29 PROCEDURE — 1111F DSCHRG MED/CURRENT MED MERGE: CPT | Performed by: FAMILY MEDICINE

## 2020-06-29 PROCEDURE — G8427 DOCREV CUR MEDS BY ELIG CLIN: HCPCS | Performed by: FAMILY MEDICINE

## 2020-06-29 PROCEDURE — 1123F ACP DISCUSS/DSCN MKR DOCD: CPT | Performed by: FAMILY MEDICINE

## 2020-06-29 PROCEDURE — 99214 OFFICE O/P EST MOD 30 MIN: CPT | Performed by: FAMILY MEDICINE

## 2020-06-29 PROCEDURE — 3052F HG A1C>EQUAL 8.0%<EQUAL 9.0%: CPT | Performed by: FAMILY MEDICINE

## 2020-06-29 PROCEDURE — 1036F TOBACCO NON-USER: CPT | Performed by: FAMILY MEDICINE

## 2020-06-29 PROCEDURE — 4040F PNEUMOC VAC/ADMIN/RCVD: CPT | Performed by: FAMILY MEDICINE

## 2020-06-29 PROCEDURE — G8417 CALC BMI ABV UP PARAM F/U: HCPCS | Performed by: FAMILY MEDICINE

## 2020-06-29 PROCEDURE — 1090F PRES/ABSN URINE INCON ASSESS: CPT | Performed by: FAMILY MEDICINE

## 2020-06-29 ASSESSMENT — ENCOUNTER SYMPTOMS
BLOOD IN STOOL: 0
ABDOMINAL PAIN: 0
NAUSEA: 0
SHORTNESS OF BREATH: 0
CHEST TIGHTNESS: 0
PHOTOPHOBIA: 0
VOMITING: 0
RECTAL PAIN: 0
CONSTIPATION: 0
COUGH: 0
WHEEZING: 0
ABDOMINAL DISTENTION: 0
BACK PAIN: 0
DIARRHEA: 0
RHINORRHEA: 0

## 2020-06-29 NOTE — PROGRESS NOTES
Chief Complaint   Patient presents with    Other     follow up on heart cath 6/17/2020 for a solid week she states she has been waking up with blood on her pillow from mouth     Other     she states she is on brillinta does not know the doseage she takes it twice a day per her cardiologist          Paramjit Montana  here today for follow up on chronic medical problems, go over labs and/or diagnostic studies, and medication refills. Other (follow up on heart cath 6/17/2020 for a solid week she states she has been waking up with blood on her pillow from mouth ) and Other (she states she is on brillinta does not know the doseage she takes it twice a day per her cardiologist )      HPI: Patient is here for hospital follow-up, was admitted with chest pain in [facility] on June 14, patient had stress test done which was negative but was still having chest pain. She has history of coronary artery disease, was referred to [facility] for cath. Patient had cath done and received 1 stent. Patient was discharged on June 18. Her labs are all in care everywhere discussed with patient. Patient was started on Brilinta, and is already on Xarelto, patient reports she noticed small blood on her pillow. Patient notices 2 times every day in the morning. She did not notice any blood today. Patient reports she had dental pain also but she did not bleed during brushing. She has appointment with dentist as well. Patient denies any blood with sputum denies any blood with saliva denies any epigastric pain or any blood with anywhere else. Patient has history of diabetes uncontrolled is on glimepiride and Tradjenta, reports her sugars usually run in the range of 200, does not watch her diet. A1c is 8.2. Hypertension controlled. Hyperlipidemia on statins. /70   Pulse 65   Ht 5' 9\" (1.753 m)   Wt 203 lb (92.1 kg)   SpO2 98%   BMI 29.98 kg/m²    Body mass index is 29.98 kg/m².   Wt Readings 06/16/2020 NOT REPORTED  9 - 20 Final    Calcium 06/16/2020 8.9  8.6 - 10.4 mg/dL Final    Sodium 06/16/2020 140  135 - 144 mmol/L Final    Potassium 06/16/2020 4.1  3.7 - 5.3 mmol/L Final    Chloride 06/16/2020 101  98 - 107 mmol/L Final    CO2 06/16/2020 27  20 - 31 mmol/L Final    Anion Gap 06/16/2020 12  9 - 17 mmol/L Final    GFR Non- 06/16/2020 >60  >60 mL/min Final    GFR  06/16/2020 >60  >60 mL/min Final    GFR Comment 06/16/2020        Final    Comment: Average GFR for 79or more years old:   76 mL/min/1.73sq m  Chronic Kidney Disease:   <60 mL/min/1.73sq m  Kidney failure:   <15 mL/min/1.73sq m              eGFR calculated using average adult body mass.  Additional eGFR calculator available at:        [email or web address]            GFR Staging 06/16/2020 NOT REPORTED   Final    WBC 06/16/2020 9.1  3.5 - 11.0 k/uL Final    RBC 06/16/2020 4.42  4.0 - 5.2 m/uL Final    Hemoglobin 06/16/2020 12.4  12.0 - 16.0 g/dL Final    Hematocrit 06/16/2020 38.1  36 - 46 % Final    MCV 06/16/2020 86.2  80 - 100 fL Final    MCH 06/16/2020 28.1  26 - 34 pg Final    MCHC 06/16/2020 32.7  31 - 37 g/dL Final    RDW 06/16/2020 15.9* 11.5 - 14.9 % Final    Platelets 55/55/4653 195  150 - 450 k/uL Final    MPV 06/16/2020 8.7  6.0 - 12.0 fL Final    NRBC Automated 06/16/2020 NOT REPORTED  per 100 WBC Final    Differential Type 06/16/2020 NOT REPORTED   Final    Seg Neutrophils 06/16/2020 57  36 - 66 % Final    Lymphocytes 06/16/2020 28  24 - 44 % Final    Monocytes 06/16/2020 11* 1 - 7 % Final    Eosinophils % 06/16/2020 3  0 - 4 % Final    Basophils 06/16/2020 1  0 - 2 % Final    Immature Granulocytes 06/16/2020 NOT REPORTED  0 % Final    Segs Absolute 06/16/2020 5.20  1.3 - 9.1 k/uL Final    Absolute Lymph # 06/16/2020 2.50  1.0 - 4.8 k/uL Final    Absolute Mono # 06/16/2020 1.00  0.1 - 1.3 k/uL Final    Absolute Eos # 06/16/2020 0.20  0.0 family history as well as her current medications and allergies were reviewed as documented intoday's encounter. saranya results   Conclusion:  1.  Multivessel obstructive coronary disease. 2.  Patent lima to the left anterior descending artery and patent radial   artery graft to the ramus intermedius. 3.  Patent diseased vein graft to the right coronary artery successfully   stented with drug eluting stent. Review of Systems   Constitutional: Negative for activity change, diaphoresis, fever and unexpected weight change. HENT: Positive for dental problem and mouth sores. Negative for congestion, hearing loss, nosebleeds, postnasal drip and rhinorrhea. Eyes: Negative for photophobia and visual disturbance. Respiratory: Negative for cough, chest tightness, shortness of breath and wheezing. Cardiovascular: Negative for chest pain, palpitations and leg swelling. Gastrointestinal: Negative for abdominal distention, abdominal pain, blood in stool, constipation, diarrhea, nausea, rectal pain and vomiting. Endocrine: Negative for polyphagia and polyuria. Genitourinary: Negative for difficulty urinating, flank pain, frequency, hematuria, urgency and vaginal pain. Musculoskeletal: Positive for arthralgias. Negative for back pain, gait problem, myalgias and neck stiffness. Neurological: Negative for dizziness, seizures, facial asymmetry, speech difficulty, weakness, numbness and headaches. Psychiatric/Behavioral: Negative for agitation, behavioral problems, decreased concentration, dysphoric mood, sleep disturbance and suicidal ideas. The patient is nervous/anxious. Physical Exam  Vitals signs and nursing note reviewed. Constitutional:       Appearance: Normal appearance. She is obese. HENT:      Head: Normocephalic and atraumatic. Jaw: There is normal jaw occlusion.       Right Ear: Tympanic membrane normal.      Left Ear: Tympanic membrane normal.      Nose: Nose normal. Mouth/Throat:      Mouth: Mucous membranes are moist.      Dentition: Dental caries and gum lesions present. Pharynx: Oropharynx is clear. Comments: There is a small blood-tinged hematoma seen on upper second pre-molar tooth,  Eyes:      Conjunctiva/sclera: Conjunctivae normal.      Pupils: Pupils are equal, round, and reactive to light. Neck:      Musculoskeletal: Normal range of motion and neck supple. Thyroid: No thyromegaly. Cardiovascular:      Rate and Rhythm: Normal rate and regular rhythm. Heart sounds: Normal heart sounds, S1 normal and S2 normal.   Pulmonary:      Effort: Pulmonary effort is normal.      Breath sounds: Normal breath sounds and air entry. No decreased air movement. No decreased breath sounds or wheezing. Abdominal:      General: Abdomen is protuberant. Bowel sounds are normal.      Palpations: Abdomen is soft. Tenderness: There is no abdominal tenderness. Musculoskeletal:      Lumbar back: She exhibits normal range of motion, no tenderness and no pain. Skin:     General: Skin is warm. Findings: No rash. Neurological:      Mental Status: She is alert and oriented to person, place, and time. Cranial Nerves: Cranial nerves are intact. No cranial nerve deficit. Sensory: Sensation is intact. Motor: No weakness. Coordination: Coordination is intact. Psychiatric:         Attention and Perception: Attention normal.         Speech: Speech normal. She is communicative. Speech is not rapid and pressured. Behavior: Behavior normal.         Cognition and Memory: Cognition and memory normal.         Judgment: Judgment normal.             ASSESSMENT AND PLAN      1. S/P coronary artery stent placement  Recent stent placed follow-up with cardiologist regarding the medication check    2. Coronary artery disease due to lipid rich plaque  Stable continue same medications    3.  Type 2 diabetes mellitus with stage 3 chronic kidney disease, without long-term current use of insulin ([address]  Fairly controlled discussed with patient to monitor your diet and keep checking her blood sugars. 4. Hematemesis, presence of nausea not specified  Unlikely hematemesis patient had small hematoma on second premolar buccal mucosa. Bleeding has stopped continue to monitor    5. Essential hypertension  Controlled continue same medications    6. Hyperlipidemia with target LDL less than 70  Controlled continue same medications. No orders of the defined types were placed in this encounter. There are no discontinued medications. Julio Lilach received counseling on the following healthy behaviors: nutrition, exercise and medication adherence  Reviewed prior labs and health maintenance  Continue current medications, diet and exercise. Discussed use, benefit, and side effects of prescribed medications. Barriers to medication compliance addressed. Patient given educational materials - see patient instructions  Was a self-tracking handout given in paper form or via [employer]? Yes    Requested Prescriptions      No prescriptions requested or ordered in this encounter       All patient questions answered. Patient voiced understanding. Quality Measures    Body mass index is 29.98 kg/m². Elevated. Weight control planned discussed Healthy diet and regular exercise. BP: 130/70. Blood pressure is normal. Treatment plan consists of No treatment change needed. Fall Risk 9/3/2019 10/15/2018 5/14/2018 3/16/2017 1/13/2017 8/15/2016 7/11/2016   2 or more falls in past year? no yes no no no no no   Fall with injury in past year? no no no no yes no no     The patient does not have a history of falls. I did , complete a risk assessment for falls.  A plan of care for falls in-office gait and balance testing performed using The [employer] and Go Test was negative for increased falls risk- no further intervention is currently indicated, No Treatment plan indicated    Lab Results   Component Value Date    LDLCALC 43 02/22/2019    LDLCHOLESTEROL 29 04/13/2020    (goal LDL reduction with dx if diabetes is 50% LDL reduction)    PHQ Scores 5/19/2020 3/10/2020 9/3/2019 2/25/2019 10/15/2018 5/14/2018 3/16/2017   PHQ2 Score 0 0 0 0 0 1 0   PHQ9 Score 0 0 0 0 0 1 0     Interpretation of Total Score Depression Severity: 1-4 = Minimal depression, 5-9 = Mild depression, 10-14 = Moderate depression, 15-19 = Moderately severe depression, 20-27 = Severe depression      The patient'spast medical, surgical, social, and family history as well as her   current medications and allergies were reviewed as documented in today's encounter. Medications, labs, diagnostic studies, consultations andfollow-up as documented in this encounter. Return in about 2 months (around 8/29/2020) for keep igor with Dr. Dante Mcginnis, dm ,htn, hld, A1C. Patient wasseen with total face to face time of 25 minutes. More than 50% of this visit was counseling and education. Future Appointments   Date Time Provider Sherly Pitt   8/13/2020 10:20 AM Jaqueline Kingston MD [facility]   8/28/2020  1:00 PM Francisca Ramirez MD [facility]     This note was completed by using the assistance of a speech-recognition program. However, inadvertent computerized transcription errors may be present. Althoughevery effort was made to ensure accuracy, no guarantees can be provided that every mistake has been identified and corrected by editing.   Electronically signed by Marquis Andrews MD on 6/29/2020  5:38 PM

## 2020-07-01 ENCOUNTER — TELEPHONE (OUTPATIENT)
Dept: FAMILY MEDICINE CLINIC | Age: 85
End: 2020-07-01

## 2020-07-02 PROBLEM — Z95.1 HISTORY OF CORONARY ARTERY BYPASS GRAFT X 3: Status: ACTIVE | Noted: 2020-07-02

## 2020-07-27 RX ORDER — TRAZODONE HYDROCHLORIDE 50 MG/1
50 TABLET ORAL NIGHTLY
Qty: 90 TABLET | Refills: 2 | Status: SHIPPED | OUTPATIENT
Start: 2020-07-27 | End: 2021-03-17

## 2020-07-27 NOTE — TELEPHONE ENCOUNTER
Please Approve or Refuse.   Send to Pharmacy per Pt's Request:      Next Visit Date:  8/28/2020   Last Visit Date: 6/29/2020    Hemoglobin A1C (%)   Date Value   05/22/2020 8.2 (H)   03/10/2020 7.1   12/10/2019 6.9             ( goal A1C is < 7)   BP Readings from Last 3 Encounters:   06/29/20 130/70   06/17/20 (!) 138/46   05/19/20 139/61          (goal 120/80)  BUN   Date Value Ref Range Status   06/17/2020 15 8 - 23 mg/dL Final     CREATININE   Date Value Ref Range Status   06/17/2020 0.66 0.50 - 0.90 mg/dL Final     Potassium   Date Value Ref Range Status   06/17/2020 4.1 3.7 - 5.3 mmol/L Final

## 2020-08-13 RX ORDER — EZETIMIBE 10 MG/1
10 TABLET ORAL NIGHTLY
Qty: 90 TABLET | Refills: 3 | Status: SHIPPED | OUTPATIENT
Start: 2020-08-13 | End: 2021-08-09

## 2020-08-21 ENCOUNTER — OFFICE VISIT (OUTPATIENT)
Dept: UROLOGY | Age: 85
End: 2020-08-21
Payer: MEDICARE

## 2020-08-21 ENCOUNTER — TELEPHONE (OUTPATIENT)
Dept: UROLOGY | Age: 85
End: 2020-08-21

## 2020-08-21 ENCOUNTER — HOSPITAL ENCOUNTER (OUTPATIENT)
Age: 85
Discharge: HOME | End: 2020-08-21
Payer: MEDICARE

## 2020-08-21 VITALS — TEMPERATURE: 97.3 F

## 2020-08-21 LAB
ABSOLUTE EOS #: 0.18 K/UL (ref 0–0.4)
ABSOLUTE IMMATURE GRANULOCYTE: ABNORMAL K/UL (ref 0–0.3)
ABSOLUTE LYMPH #: 1.89 K/UL (ref 1–4.8)
ABSOLUTE MONO #: 0.81 K/UL (ref 0.1–1.3)
ALBUMIN SERPL-MCNC: 4.2 G/DL (ref 3.5–5.2)
ALBUMIN/GLOBULIN RATIO: ABNORMAL (ref 1–2.5)
ALP BLD-CCNC: 43 U/L (ref 35–104)
ALT SERPL-CCNC: 18 U/L (ref 5–33)
ANION GAP SERPL CALCULATED.3IONS-SCNC: 12 MMOL/L (ref 9–17)
AST SERPL-CCNC: 23 U/L
BASOPHILS # BLD: 1 % (ref 0–2)
BASOPHILS ABSOLUTE: 0.09 K/UL (ref 0–0.2)
BILIRUB SERPL-MCNC: 1.14 MG/DL (ref 0.3–1.2)
BUN BLDV-MCNC: 22 MG/DL (ref 8–23)
BUN/CREAT BLD: ABNORMAL (ref 9–20)
CALCIUM SERPL-MCNC: 9 MG/DL (ref 8.6–10.4)
CHLORIDE BLD-SCNC: 103 MMOL/L (ref 98–107)
CO2: 24 MMOL/L (ref 20–31)
CREAT SERPL-MCNC: 0.83 MG/DL (ref 0.5–0.9)
DIFFERENTIAL TYPE: ABNORMAL
EOSINOPHILS RELATIVE PERCENT: 2 % (ref 0–4)
GFR AFRICAN AMERICAN: >60 ML/MIN
GFR NON-AFRICAN AMERICAN: >60 ML/MIN
GFR SERPL CREATININE-BSD FRML MDRD: ABNORMAL ML/MIN/{1.73_M2}
GFR SERPL CREATININE-BSD FRML MDRD: ABNORMAL ML/MIN/{1.73_M2}
GLUCOSE BLD-MCNC: 219 MG/DL (ref 70–99)
HCT VFR BLD CALC: 32.1 % (ref 36–46)
HEMOGLOBIN: 10.2 G/DL (ref 12–16)
IMMATURE GRANULOCYTES: ABNORMAL %
LYMPHOCYTES # BLD: 21 % (ref 24–44)
MAGNESIUM: 2.1 MG/DL (ref 1.6–2.6)
MCH RBC QN AUTO: 25.2 PG (ref 26–34)
MCHC RBC AUTO-ENTMCNC: 31.7 G/DL (ref 31–37)
MCV RBC AUTO: 79.6 FL (ref 80–100)
MONOCYTES # BLD: 9 % (ref 1–7)
MORPHOLOGY: ABNORMAL
NRBC AUTOMATED: ABNORMAL PER 100 WBC
PDW BLD-RTO: 17.4 % (ref 11.5–14.9)
PHOSPHORUS: 2.8 MG/DL (ref 2.6–4.5)
PLATELET # BLD: 286 K/UL (ref 150–450)
PLATELET ESTIMATE: ABNORMAL
PMV BLD AUTO: 8.1 FL (ref 6–12)
POTASSIUM SERPL-SCNC: 4.6 MMOL/L (ref 3.7–5.3)
RBC # BLD: 4.03 M/UL (ref 4–5.2)
RBC # BLD: ABNORMAL 10*6/UL
SEG NEUTROPHILS: 67 % (ref 36–66)
SEGMENTED NEUTROPHILS ABSOLUTE COUNT: 6.03 K/UL (ref 1.3–9.1)
SODIUM BLD-SCNC: 139 MMOL/L (ref 135–144)
TOTAL PROTEIN: 7 G/DL (ref 6.4–8.3)
WBC # BLD: 9 K/UL (ref 3.5–11)
WBC # BLD: ABNORMAL 10*3/UL

## 2020-08-21 PROCEDURE — 84100 ASSAY OF PHOSPHORUS: CPT

## 2020-08-21 PROCEDURE — 1123F ACP DISCUSS/DSCN MKR DOCD: CPT | Performed by: UROLOGY

## 2020-08-21 PROCEDURE — 1036F TOBACCO NON-USER: CPT | Performed by: UROLOGY

## 2020-08-21 PROCEDURE — 4040F PNEUMOC VAC/ADMIN/RCVD: CPT | Performed by: UROLOGY

## 2020-08-21 PROCEDURE — 99214 OFFICE O/P EST MOD 30 MIN: CPT | Performed by: UROLOGY

## 2020-08-21 PROCEDURE — 83735 ASSAY OF MAGNESIUM: CPT

## 2020-08-21 PROCEDURE — 80053 COMPREHEN METABOLIC PANEL: CPT

## 2020-08-21 PROCEDURE — G8417 CALC BMI ABV UP PARAM F/U: HCPCS | Performed by: UROLOGY

## 2020-08-21 PROCEDURE — 36415 COLL VENOUS BLD VENIPUNCTURE: CPT

## 2020-08-21 PROCEDURE — 85025 COMPLETE CBC W/AUTO DIFF WBC: CPT

## 2020-08-21 PROCEDURE — G8427 DOCREV CUR MEDS BY ELIG CLIN: HCPCS | Performed by: UROLOGY

## 2020-08-21 PROCEDURE — 1090F PRES/ABSN URINE INCON ASSESS: CPT | Performed by: UROLOGY

## 2020-08-21 ASSESSMENT — ENCOUNTER SYMPTOMS
WHEEZING: 0
EYE REDNESS: 0
EYE PAIN: 0
NAUSEA: 0
BACK PAIN: 0
ABDOMINAL PAIN: 0
COUGH: 0
DIARRHEA: 0
SHORTNESS OF BREATH: 0
VOMITING: 0
CONSTIPATION: 0

## 2020-08-21 NOTE — PROGRESS NOTES
Review of Systems   Constitutional: Negative for appetite change, chills and fever. Eyes: Negative for pain, redness and visual disturbance. Respiratory: Negative for cough, shortness of breath and wheezing. Cardiovascular: Negative for chest pain and leg swelling. Gastrointestinal: Negative for abdominal pain, constipation, diarrhea, nausea and vomiting. Genitourinary: Positive for frequency. Negative for difficulty urinating, dysuria, flank pain, hematuria and urgency. Musculoskeletal: Negative for back pain, joint swelling and myalgias. Skin: Negative for rash and wound. Neurological: Negative for dizziness, tremors and numbness. Hematological: Does not bruise/bleed easily.

## 2020-08-21 NOTE — PROGRESS NOTES
[address] [address]  Dept: [address] [facility] Urology Office Note - Established    Patient:  Jamee Lutz  YOB: 1933  Date: 8/21/2020    The patient is a 80 y.o. female whopresents today for evaluation of the following problems:   Chief Complaint   Patient presents with    6 Month Follow-Up     with KUB       HPI  Has h/o stones, kub reviewed and shows right stones, no dysuria, no hematuria, no flank pain, nocturia x 2, no other c/o    Summary of old records: N/A    Additional History: N/A    Procedures Today: N/A    Urinalysis today:  No results found for this visit on 08/21/20.     Imaging Reviewed during this Office Visit: none  (results were independently reviewed by physician and radiology report verified)    AUA Symptom Score (8/21/2020):  INCOMPLETE EMPTYING: How often have you had the sensation of not emptying your bladder?: Not at all  FREQUENCY: How often do you have to urinate less than every two hours?: Less than Half the time  INTERMITTENCY: How often have you found you stopped and started again several times when you urinated?: Not at all  URGENCY: How often have you found it difficult to postpone urination?: Not at all  WEAK STREAM: How often have you had a weak urinary stream?: Not at all  STRAINING: How often have you had to strain to start  urination?: Not at all  NOCTURIA: How many times did you typically get up at night to uriniate?: 2 Times  TOTAL I-PSS SCORE[de-identified] 4  How would you feel if you were to spend the rest of your life with your urinary condition?: Mostly Satisfied    Last BUN and creatinine:  Lab Results   Component Value Date    BUN 22 08/21/2020     Lab Results   Component Value Date    CREATININE 0.83 08/21/2020       Additional Lab/Culture results: none    PAST MEDICAL, FAMILY AND SOCIAL HISTORY UPDATE:  Past Medical History:   Diagnosis Date    Arthritis     Basal cell carcinoma of nose     Bronchitis     HX. OF     CAD (coronary artery disease)     Carpal tunnel syndrome     left hand    Colon cancer (HCC)     [facility]     DVT (deep venous thrombosis) ([facility] 7/10/2019    Gout     Hematuria     History of coronary artery bypass graft x 3 7/2/2020    Hx of blood clots     ED.  LEGS, ED. 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46    Ophthalmoplegic migraine headache     Osteoarthritis     Other pulmonary embolism without acute cor pulmonale (HCC) 4/30/2013    S/P coronary artery stent placement X 3 6/29/2020    TIA (transient ischemic attack)     Uterine cancer ([facility]     Wears glasses      Past Surgical History:   Procedure Laterality Date    ANGIOPLASTY      hx. of stenting x 3.    APPENDECTOMY      BREAST SURGERY      INFECTED MILK DUCT LT.    CARDIAC SURGERY      CABG x 3 vessels and 3 stents    CAROTID ENDARTERECTOMY Left 3-3-16    CARPAL TUNNEL RELEASE Right     CHOLECYSTECTOMY      COLON SURGERY      colectomy, PRECANCEROUS POLYPS    COLONOSCOPY      X5, HX PRECANCEROUS POLYPS    CORONARY ARTERY BYPASS GRAFT      X3 vessels    CYST REMOVAL  10/07/2016    EXCISION OF SEBACEOUS CYST REMOVED FROM BACK X3    CYSTOSCOPY Bilateral 1/21/2020    CYSTOSCOPY RETROGRADE PYELOGRAM performed by Sandor Smith MD at [address], [address] Right     INDEX FINGER AND THUMB.    HYSTERECTOMY      JOINT REPLACEMENT Left 03/29/2010    TKA    JOINT REPLACEMENT Right 02/16/1999    TKA    LITHOTRIPSY      X 3    SKIN BIOPSY      TOP OF NOSE (BASAL CELL)    VASCULAR SURGERY      vein ablation on legs     Family History   Problem Relation Age of Onset    Stroke Mother     Hypertension Mother     Heart Disease Father         AAA    Stroke Sister    [facility] Parkinsonism Brother     Cancer Sister         lung    Alcohol Abuse Sister      Outpatient Medications Marked as Taking for the 8/21/20 encounter (Office Visit) with Tiffany Mejia MD   Medication Sig Dispense Refill    Ticagrelor (BRILINTA PO) Take 90 mg by mouth 2 times daily      ezetimibe (ZETIA) 10 MG tablet Take 1 tablet by mouth nightly 90 tablet 3    traZODone (DESYREL) 50 MG tablet Take 1 tablet by mouth nightly 90 tablet 2    rivaroxaban (XARELTO) 20 MG TABS tablet Take 1 tablet by mouth daily (with breakfast) TAKE 1 TABLET DAILY WITH BREAKFAST 90 tablet 3    allopurinol (ZYLOPRIM) 100 MG tablet Take 1 tablet by mouth 2 times daily 180 tablet 3    linagliptin (TRADJENTA) 5 MG tablet Take 1 tablet by mouth daily 90 tablet 3    glimepiride (AMARYL) 4 MG tablet Take 1 tablet by mouth 2 times daily 180 tablet 3    metoprolol tartrate (LOPRESSOR) 25 MG tablet Take 1 tablet by mouth 2 times daily TAKE 1 TABLET TWICE A  tablet 3    amLODIPine (NORVASC) 2.5 MG tablet Take 1 tablet by mouth daily 90 tablet 3    furosemide (LASIX) 40 MG tablet Take 1 tablet by mouth daily 90 tablet 3    blood glucose monitor strips Testing once a day. Please dispense according to patients insurance. 300 strip 3    atorvastatin (LIPITOR) 80 MG tablet Take 1 tablet by mouth nightly 90 tablet 3    FREESTYLE LANCETS MISC USE TO TEST BLOOD SUGAR TWICE A  each 1    Blood Glucose Monitoring Suppl (FREESTYLE LITE) SARY use as directed  0    nitroGLYCERIN (NITROSTAT) 0.4 MG SL tablet Place 1 tablet under the tongue every 5 minutes as needed for Chest pain 25 tablet 1    Biotin 300 MCG TABS Take 600 mcg by mouth every other day       Cholecalciferol (VITAMIN D3) 1000 UNITS TABS Take 1 tablet by mouth daily      aspirin 81 MG EC tablet Take 81 mg by mouth daily         (All medications reviewed and updated by provider sincelast office visit or hospitalization)   Ampicillin; Clopidogrel bisulfate; Diovan [valsartan];  Lantus [insulin glargine]; and Metformin and related  Social History     Tobacco Use   Smoking Status Never Smoker   Smokeless Tobacco Never Used      (If patient a smoker, smoking cessation counseling offered)     Social History     Substance and Sexual Activity   Alcohol Use No       REVIEW OF SYSTEMS:  Review of Systems      Physical Exam:      Vitals:    08/21/20 1056   Temp: 97.3 °F (36.3 °C)     There is no height or weight on file to calculate BMI. Patient is a 80 y.o. female in noacute distress and alert and oriented to person, place and time. Physical Exam  Constitutional: Patient in no acute distress. Neuro: Alert andoriented to person, place and time. Psych: Mood normal, affect normal  Skin: No rash noted  HEENT: Head: Normocephalic and atraumatic  Conjunctivae and EOM are normal. Pupils are equal, round  Nose: Normal  Right External Ear: Normal; Left External Ear: Normal  Mouth: Mucosa Moist  Neck: Supple  Lungs: Respiratory effort is normal  Cardiovascular: Warm & Pink  Abdomen: Soft, non-tender, non-distended with no CVA,  No flank tenderness,  Or hepatosplenomegaly   Lymphatics: No palpable lymphadenopathy. Bladder non-tender and not distended. Musculoskeletal: Normalgait and station      and Plan      1. Kidney stones    2. Nocturia           Plan:    cysto right urs laser 2 months from now if cardio clearance  Return for Surgery. Prescriptions Ordered:  No orders of the defined types were placed in this encounter. Orders Placed:  No orders of the defined types were placed in this encounter. Gabe Erickson MD    Agree with the ROS entered by the MA.

## 2020-08-21 NOTE — TELEPHONE ENCOUNTER
Cysto, (RT) URS, HLL, (RT) Stent placement @ [facility] 10/20/20 12:00pm   PAT 10/06/20 11:00am   COVID-19 10/16/20 11:00am Carlitos Huber [phone] with patient procedure info given to patient in office

## 2020-08-24 ENCOUNTER — HOSPITAL ENCOUNTER (OUTPATIENT)
Age: 85
Setting detail: SPECIMEN
Discharge: HOME | End: 2020-08-24
Payer: MEDICARE

## 2020-08-24 LAB — PH UA: 5.5 (ref 5–8)

## 2020-08-24 PROCEDURE — 82340 ASSAY OF CALCIUM IN URINE: CPT

## 2020-08-24 PROCEDURE — 84156 ASSAY OF PROTEIN URINE: CPT

## 2020-08-24 PROCEDURE — 84105 ASSAY OF URINE PHOSPHORUS: CPT

## 2020-08-24 PROCEDURE — 82570 ASSAY OF URINE CREATININE: CPT

## 2020-08-24 PROCEDURE — 82507 ASSAY OF CITRATE: CPT

## 2020-08-24 PROCEDURE — 82131 AMINO ACIDS SINGLE QUANT: CPT

## 2020-08-24 PROCEDURE — 83986 ASSAY PH BODY FLUID NOS: CPT

## 2020-08-24 PROCEDURE — 83945 ASSAY OF OXALATE: CPT

## 2020-08-24 PROCEDURE — 84560 ASSAY OF URINE/URIC ACID: CPT

## 2020-08-24 PROCEDURE — 81050 URINALYSIS VOLUME MEASURE: CPT

## 2020-08-24 PROCEDURE — 84300 ASSAY OF URINE SODIUM: CPT

## 2020-08-25 LAB
CALCIUM TIMED UR: NORMAL MG/X H
CALCIUM URINE: 9.6 MG/DL
CALCIUM, URINE: 211 MG/24 H (ref 20–275)
CREATININE TIMED UR: NORMAL MG/ X H
CREATININE URINE: 65.8 MG/DL
CREATININE, 24H UR: 1448 MG/24 H (ref 740–1570)
HOURS COLLECTED: 24 H
PHOS,INORG TIMED UR: NORMAL MG/DL
PHOSPHORUS  URINE: 41.9 MG/DL
PHOSPHORUS, 24H UR: 922 MG/24 H (ref 400–1300)
PROTEIN 24 HOUR URINE: 198 MG/24 H
PROTEIN,TOT TIMED UR: ABNORMAL MG/X H
SODIUM 24 HOUR URINE: 244 MMOL/24 H (ref 40–220)
SODIUM TIMED UR: ABNORMAL MMOL/X H
SODIUM URINE: 111 MMOL/L
URIC ACID 24 HOUR URINE: 532 MG/24 H (ref 250–750)
URIC ACID TIMED UR: NORMAL MG/X H
URIC ACID, UR: 24.2 MG/DL
URINE TOTAL PROTEIN: 9 MG/DL
VOLUME: 2200 ML

## 2020-08-26 LAB
CITRIC ACID, U, 24HR: 854 MG/D (ref 320–1240)
CITRIC ACID, URINE: 388 MG/L
CITRIC ACID/CREATININE RATIO URINE: 681 MG/G
CREATININE URINE /24 HR: 1254 MG/D (ref 400–1300)
CREATININE URINE /VOLUME: 57 MG/DL
HOURS COLLECTED: 24
URINE VOLUME: 2200

## 2020-08-27 LAB
CREATININE URINE /24 HR: 1254 MG/D (ref 400–1300)
CREATININE URINE /VOLUME: 57 MG/DL
CREATININE URINE: 53 MG/DL
CYSTINE, URINE: 1.53 MG/DL
CYSTINE, URINE: 120 UMOL/G CRT
CYSTINE,24HR,UR: 34 MG/D
HOURS COLLECTED: 24
HOURS COLLECTED: 24 HR
OXALATE 24 HOUR URINE: 42 MG/D (ref 13–40)
OXALATE URINE: 19 MG/L
URINE VOLUME: 2200
URINE VOLUME: 2200 ML

## 2020-08-28 ENCOUNTER — OFFICE VISIT (OUTPATIENT)
Dept: FAMILY MEDICINE CLINIC | Age: 85
End: 2020-08-28
Payer: MEDICARE

## 2020-08-28 ENCOUNTER — HOSPITAL ENCOUNTER (OUTPATIENT)
Age: 85
Setting detail: SPECIMEN
Discharge: HOME | End: 2020-08-28
Payer: MEDICARE

## 2020-08-28 VITALS
OXYGEN SATURATION: 98 % | WEIGHT: 199 LBS | HEART RATE: 73 BPM | BODY MASS INDEX: 29.47 KG/M2 | HEIGHT: 69 IN | DIASTOLIC BLOOD PRESSURE: 80 MMHG | SYSTOLIC BLOOD PRESSURE: 126 MMHG | TEMPERATURE: 98.3 F

## 2020-08-28 PROBLEM — H91.93 BILATERAL HEARING LOSS: Status: ACTIVE | Noted: 2020-08-28

## 2020-08-28 LAB
BILIRUBIN, POC: NEGATIVE
BLOOD URINE, POC: ABNORMAL
CLARITY, POC: ABNORMAL
COLOR, POC: ABNORMAL
GLUCOSE URINE, POC: NEGATIVE
HBA1C MFR BLD: 7.3 %
KETONES, POC: NEGATIVE
LEUKOCYTE EST, POC: ABNORMAL
NITRITE, POC: NEGATIVE
PH, POC: 5.5
PROTEIN, POC: 30
SPECIFIC GRAVITY, POC: 1.03
UROBILINOGEN, POC: 0.2

## 2020-08-28 PROCEDURE — 99214 OFFICE O/P EST MOD 30 MIN: CPT | Performed by: FAMILY MEDICINE

## 2020-08-28 PROCEDURE — 83036 HEMOGLOBIN GLYCOSYLATED A1C: CPT | Performed by: FAMILY MEDICINE

## 2020-08-28 PROCEDURE — 3051F HG A1C>EQUAL 7.0%<8.0%: CPT | Performed by: FAMILY MEDICINE

## 2020-08-28 PROCEDURE — 4040F PNEUMOC VAC/ADMIN/RCVD: CPT | Performed by: FAMILY MEDICINE

## 2020-08-28 PROCEDURE — 81002 URINALYSIS NONAUTO W/O SCOPE: CPT | Performed by: FAMILY MEDICINE

## 2020-08-28 PROCEDURE — 1123F ACP DISCUSS/DSCN MKR DOCD: CPT | Performed by: FAMILY MEDICINE

## 2020-08-28 RX ORDER — NITROFURANTOIN 25; 75 MG/1; MG/1
100 CAPSULE ORAL 2 TIMES DAILY
Qty: 10 CAPSULE | Refills: 0 | Status: SHIPPED | OUTPATIENT
Start: 2020-08-28 | End: 2020-10-06

## 2020-08-28 ASSESSMENT — PATIENT HEALTH QUESTIONNAIRE - PHQ9
SUM OF ALL RESPONSES TO PHQ QUESTIONS 1-9: 0
SUM OF ALL RESPONSES TO PHQ QUESTIONS 1-9: 0

## 2020-08-28 ASSESSMENT — VISUAL ACUITY
OS_CC: 20/30
OD_CC: 20/30

## 2020-08-28 ASSESSMENT — LIFESTYLE VARIABLES: HOW OFTEN DO YOU HAVE A DRINK CONTAINING ALCOHOL: 0

## 2020-08-28 NOTE — PROGRESS NOTES
Medicare Annual Wellness Visit  Name: Wilbert Irby Date: 2020   MRN: [id number] Sex: Female   Age: 80 y.o. Ethnicity: Non-/Non    : 12/10/1933 Race: Mika Humphrey is here for Medicare AWV and Dysuria    Screenings for behavioral, psychosocial and functional/safety risks, and cognitive dysfunction are all negative except as indicated below. These results, as well as other patient data from the [address] form, are documented in Flowsheets linked to this Encounter. Allergies   Allergen Reactions    Ampicillin Other (See Comments)    Clopidogrel Bisulfate Itching    Diovan [Valsartan] Other (See Comments)     cough    Lantus [Insulin Glargine] Nausea Only     Stomach \"quivered\" after taking it, palpitations    Metformin And Related Diarrhea     Chronic kidney disease stage 3       Prior to Visit Medications    Medication Sig Taking?  Authorizing Provider   Ticagrelor (BRILINTA PO) Take 90 mg by mouth 2 times daily Yes Historical Provider, MD   ezetimibe (ZETIA) 10 MG tablet Take 1 tablet by mouth nightly Yes Cristobal You MD   traZODone (DESYREL) 50 MG tablet Take 1 tablet by mouth nightly Yes Cristobal You MD   rivaroxaban (XARELTO) 20 MG TABS tablet Take 1 tablet by mouth daily (with breakfast) TAKE 1 TABLET DAILY WITH BREAKFAST Yes Cristobal You MD   allopurinol (ZYLOPRIM) 100 MG tablet Take 1 tablet by mouth 2 times daily Yes Cristobal You MD   linagliptin (TRADJENTA) 5 MG tablet Take 1 tablet by mouth daily Yes Cristobal You MD   glimepiride (AMARYL) 4 MG tablet Take 1 tablet by mouth 2 times daily Yes Cristobal You MD   metoprolol tartrate (LOPRESSOR) 25 MG tablet Take 1 tablet by mouth 2 times daily TAKE 1 TABLET TWICE A DAY Yes Cristobal You MD   amLODIPine (NORVASC) 2.5 MG tablet Take 1 tablet by mouth daily Yes Cristobal You MD   furosemide (LASIX) 40 MG tablet Take 1 tablet by mouth daily Yes Krishna Tucker MD   blood glucose monitor strips Testing once a day. Please dispense according to patients insurance. Yes Krishna Tucker MD   atorvastatin (LIPITOR) 80 MG tablet Take 1 tablet by mouth nightly Yes Krishna Tucker MD   FREESTYLE LANCETS MISC USE TO TEST BLOOD SUGAR TWICE A DAY Yes Rika Mejia MD   Blood Glucose Monitoring Suppl (FREESTYLE LITE) SARY use as directed Yes Historical Provider, MD   nitroGLYCERIN (NITROSTAT) 0.4 MG SL tablet Place 1 tablet under the tongue every 5 minutes as needed for Chest pain Yes Rika Mejia MD   Biotin 300 MCG TABS Take 600 mcg by mouth every other day  Yes Historical Provider, MD   Cholecalciferol (VITAMIN D3) 1000 UNITS TABS Take 1 tablet by mouth daily Yes Historical Provider, MD   aspirin 81 MG EC tablet Take 81 mg by mouth daily  Yes Historical Provider, MD         Past Medical History:   Diagnosis Date    Arthritis     Basal cell carcinoma of nose     Bronchitis     HX. OF     CAD (coronary artery disease)     Carpal tunnel syndrome     left hand    Colon cancer (HCC)     Diabetes mellitus ([facility]     DVT (deep venous thrombosis) ([facility] 7/10/2019    Gout     Hematuria     History of coronary artery bypass graft x 3 7/2/2020    Hx of blood clots     ED.  LEGS, ED. 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 of stenting x 3.    APPENDECTOMY      BREAST SURGERY      INFECTED MILK DUCT LT.    CARDIAC SURGERY      CABG x 3 vessels and 3 stents    CAROTID ENDARTERECTOMY Left 3-3-16    CARPAL TUNNEL RELEASE Right     CHOLECYSTECTOMY      COLON SURGERY      colectomy, PRECANCEROUS POLYPS    COLONOSCOPY      X5, HX PRECANCEROUS POLYPS    CORONARY ARTERY BYPASS GRAFT      X3 vessels    CYST REMOVAL  10/07/2016    EXCISION OF SEBACEOUS CYST REMOVED FROM BACK X3    CYSTOSCOPY Bilateral 1/21/2020    CYSTOSCOPY RETROGRADE PYELOGRAM performed by Cruz Stevenson MD at [facility], [address] Right     INDEX FINGER AND THUMB.    HYSTERECTOMY      JOINT REPLACEMENT Left 03/29/2010    TKA    JOINT REPLACEMENT Right 02/16/1999    TKA    LITHOTRIPSY      X 3    SKIN BIOPSY      TOP OF NOSE (BASAL CELL)    VASCULAR SURGERY      vein ablation on legs         Family History   Problem Relation Age of Onset    Stroke Mother     Hypertension Mother     Heart Disease Father         AAA    Stroke Sister     Parkinsonism Brother     Cancer Sister         lung    Alcohol Abuse Sister        CareTeam (Including outside providers/suppliers regularly involved in providing care):   Patient Care Team:  Gareth Jade MD as PCP - General (Family Medicine)  Gareth Jade MD as PCP - [facility] Empaneled Provider  Yaquelin Duval MD as Consulting Physician (Ophthalmology)  Linn Welsh MD as Consulting Physician (Cardiology)  [facility] as Consulting Physician (Neurology)  Cruz Stevenson MD as Consulting Physician (Urology)  Agatha Dunlap MD as Consulting Physician (Nephrology)  Monik Sharma MD as Consulting Physician (Internal Medicine Cardiovascular Disease)      Vital signs within normal limits except overweight for BMI  Wt Readings from Last 3 Encounters:   08/28/20 199 lb (90.3 kg)   06/29/20 203 lb (92.1 kg)   06/15/20 200 lb (90.7 kg)     Vitals:    08/28/20 1254   BP: 126/80   Pulse: 73   Temp: 98.3 °F (36.8 °C)   SpO2: 98%   Weight: 199 lb (90.3 kg)   Height: 5' 9\" (1.753 m)     Body mass index is 29.39 kg/m².     Based upon direct observation of the patient, evaluation of cognition reveals recent and remote memory intact. Physical exam  General Appearance: alert and oriented to person, place and time, well-developed and well-nourished, in no acute distress  ENT: Bilateral wax impaction, narrow ear canals,decreased hearing bilaterally, oropharynx clear and moist with normal mucous membranes  Pulmonary/Chest: clear to auscultation bilaterally- no wheezes, rales or rhonchi, normal air movement, no respiratory distress  Cardiovascular: normal rate, regular rhythm and normal S1 and S2      Patient reports frequency of urination, dysuria with urination and history of kidney stones. She thinks she has a urinary tract infection. She denies fever or flank pain. Patient's complete Health Risk Assessment and screening values have been reviewed and are found in Flowsheets. The following problems were reviewed today and where indicated follow up appointments were made and/or referrals ordered.     Positive Risk Factor Screenings with Interventions:     Hearing/Vision:   Hearing Screening    125Hz 250Hz 500Hz 1000Hz 2000Hz 3000Hz 4000Hz 6000Hz 8000Hz   Right ear:            Left ear:               Visual Acuity Screening    Right eye Left eye Both eyes   Without correction:      With correction: 20/30 20/30 20/30     Hearing/Vision  Do you or your family notice any trouble with your hearing?: (!) Yes  Do you have difficulty driving, watching TV, or doing any of your daily activities because of your eyesight?: No  Have you had an eye exam within the past year?: Yes  Hearing/Vision Interventions:  · Hearing concerns:  audiology referral provided    Personalized Preventive Plan   Current Health Maintenance Status  Immunization History   Administered Date(s) Administered    Influenza Virus Vaccine 10/17/2013, 11/13/2014, 10/22/2015    Influenza, High Dose (Fluzone 65 yrs and older) 10/15/2018    Influenza, Candice Kenneth, 6-35 Months, IM (Fluzone,Afluria) 10/27/2017    Influenza, Quadv, IM, (6 mo and older Fluzone, Flulaval, Fluarix and 3 yrs and older Afluria) 09/06/2016    Influenza, Triv, inactivated, subunit, adjuvanted, IM (Fluad 65 yrs and older) 09/17/2019    Pneumococcal Conjugate 7-valent (Prevnar7) 10/29/2010    Pneumococcal Polysaccharide ([id number]) 10/22/2015, 06/12/2018    Zoster Live (Zostavax) 09/28/2016, 10/13/2016, 10/01/2017        Health Maintenance   Topic Date Due    DTaP/Tdap/Td vaccine (1 - Tdap) 12/10/1952    Shingles Vaccine (2 of 3) 11/26/2017    Annual Wellness Visit (AWV)  09/03/2020    Flu vaccine (1) 09/01/2020    Lipid screen  04/13/2021    Potassium monitoring  08/21/2021    Creatinine monitoring  08/21/2021    Pneumococcal 65+ years Vaccine  Completed    Hepatitis A vaccine  Aged Out    Hib vaccine  Aged Out    Meningococcal (ACWY) vaccine  Aged Out     Recommendations for [employer] Due: see orders and patient instructions/AVS.  . Recommended screening schedule for the next 5-10 years is provided to the patient in written form: see Patient Shanti Almaraz was seen today for medicare awv and dysuria. Diagnoses and all orders for this visit:    Routine general medical examination at a health care facility    Type 2 diabetes mellitus with stage 3 chronic kidney disease, without long-term current use of insulin (HCC)  Improving  -     POCT glycosylated hemoglobin (Hb A1C) 7.3 improving diabetes  Lab Results   Component Value Date    LABA1C 7.3 08/28/2020    LABA1C 8.2 (H) 05/22/2020    LABA1C 7.1 03/10/2020     Continue current treatment    Acute cystitis with hematuria  Failing to change as expected.      -     POCT Urinalysis no Micro--positive for blood and small leukocytes we will treat empirically for UTI due to the symptoms  Results for POC orders placed in visit on 08/28/20   POCT glycosylated hemoglobin (Hb A1C)   Result Value Ref Range    Hemoglobin A1C 7.3 %   POCT Urinalysis no Micro   Result Value Ref Range    Color, UA YELLLOW     Clarity, UA CLOUDY     Glucose, UA POC NEGATIVE     Bilirubin, UA NEGATIVE     Ketones, UA NEGATIVE     Spec Grav, UA 1.030     Blood, UA POC MODERATE     pH, UA 5.5     Protein, UA POC 30     Urobilinogen, UA 0.2     Leukocytes, UA SMALL     Nitrite, UA NEGATIVE          -     nitrofurantoin, macrocrystal-monohydrate, (MACROBID) 100 MG capsule; Take 1 capsule by mouth 2 times daily  -     Culture, Urine;  Future    Bilateral hearing loss, unspecified hearing loss type  -     AFL - Lopez Lau MD, Otolaryngology, [place]            Future Appointments   Date Time Provider [facility]   10/6/2020 11:00 AM ST PAT RM 2 STCZ PAT [facility]   10/16/2020 11:00 AM SCHEDULE, [facility] COVID19 PAT SCREENING [facility]   12/9/2020 11:00 [facility]   9/1/2021 10:30 AM Steve Chavez MD [facility]

## 2020-08-28 NOTE — RESULT ENCOUNTER NOTE
Addressed during office visit today, A1c 7.3, greatly improved from prior,  UA abnormal, positive for blood and leukocytes, will treat empirically for UTI with Macrobid and send the urine for urine culture   continue treatment recommended during the office visit

## 2020-08-28 NOTE — PATIENT INSTRUCTIONS
Personalized Preventive Plan for Russel Wolfe - 8/28/2020  Medicare offers a range of preventive health benefits. Some of the tests and screenings are paid in full while other may be subject to a deductible, co-insurance, and/or copay. Some of these benefits include a comprehensive review of your medical history including lifestyle, illnesses that may run in your family, and various assessments and screenings as appropriate. After reviewing your medical record and screening and assessments performed today your provider may have ordered immunizations, labs, imaging, and/or referrals for you. A list of these orders (if applicable) as well as your Preventive Care list are included within your After Visit Summary for your review. Other Preventive Recommendations:    · A preventive eye exam performed by an eye specialist is recommended every 1-2 years to screen for glaucoma; cataracts, macular degeneration, and other eye disorders. · A preventive dental visit is recommended every 6 months. · Try to get at least 150 minutes of exercise per week or 10,000 steps per day on a pedometer . · Order or download the FREE \"Exercise & Physical Activity: Your Everyday Guide\" from [employer]. Call 2-333.112.8762 or search [employer] online. · You need 6368-0487 mg of calcium and 9674-7190 IU of vitamin D per day. It is possible to meet your calcium requirement with diet alone, but a vitamin D supplement is usually necessary to meet this goal.  · When exposed to the sun, use a sunscreen that protects against both UVA and UVB radiation with an SPF of 30 or greater. Reapply every 2 to 3 hours or after sweating, drying off with a towel, or swimming. · Always wear a seat belt when traveling in a car. Always wear a helmet when riding a bicycle or motorcycle. Heart-Healthy Diet   Sodium, Fat, and Cholesterol Controlled Diet       What Is a Heart Healthy Diet?    A heart-healthy diet is one that limits sodium , certain types of fat , and cholesterol . This type of diet is recommended for:   People with any form of cardiovascular disease (eg, coronary heart disease , peripheral vascular disease , previous heart attack , previous stroke )   People with risk factors for cardiovascular disease, such as high blood pressure , high cholesterol , or diabetes   Anyone who wants to lower their risk of developing cardiovascular disease   Sodium    Sodium is a mineral found in many foods. In general, most people consume much more sodium than they need. Diets high in sodium can increase blood pressure and lead to edema (water retention). On a heart-healthy diet, you should consume no more than 2,300 mg (milligrams) of sodium per dayabout the amount in one teaspoon of table salt. The foods highest in sodium include table salt (about 50% sodium), processed foods, convenience foods, and preserved foods. Cholesterol    Cholesterol is a fat-like, waxy substance in your blood. Our bodies make some cholesterol. It is also found in animal products, with the highest amounts in fatty meat, egg yolks, whole milk, cheese, shellfish, and organ meats. On a heart-healthy diet, you should limit your cholesterol intake to less than 200 mg per day. It is normal and important to have some cholesterol in your bloodstream. But too much cholesterol can cause plaque to build up within your arteries, which can eventually lead to a heart attack or stroke. The two types of cholesterol that are most commonly referred to are:   Low-density lipoprotein (LDL) cholesterol  Also known as bad cholesterol, this is the cholesterol that tends to build up along your arteries. Bad cholesterol levels are increased by eating fats that are saturated or hydrogenated. Optimal level of this cholesterol is less than 100. Over 130 starts to get risky for heart disease.    High-density lipoprotein (HDL) cholesterol  Also known as good cholesterol, this type of cholesterol actually carries cholesterol away from your arteries and may, therefore, help lower your risk of having a heart attack. You want this level to be high (ideally greater than 60). It is a risk to have a level less than 40. You can raise this good cholesterol by eating olive oil, canola oil, avocados, or nuts. Exercise raises this level, too. Fat    Fat is calorie dense and packs a lot of calories into a small amount of food. Even though fats should be limited due to their high calorie content, not all fats are bad. In fact, some fats are quite healthful. Fat can be broken down into four main types. The good-for-you fats are:   Monounsaturated fat  found in oils such as olive and canola, avocados, and nuts and natural nut butters; can decrease cholesterol levels, while keeping levels of HDL cholesterol high   Polyunsaturated fat  found in oils such as safflower, sunflower, soybean, corn, and sesame; can decrease total cholesterol and LDL cholesterol   Omega-3 fatty acids  particularly those found in fatty fish (such as salmon, trout, tuna, mackerel, herring, and sardines); can decrease risk of arrhythmias, decrease triglyceride levels, and slightly lower blood pressure   The fats that you want to limit are:   Saturated fat  found in animal products, many fast foods, and a few vegetables; increases total blood cholesterol, including LDL levels   Animal fats that are saturated include: butter, lard, whole-milk dairy products, meat fat, and poultry skin   Vegetable fats that are saturated include: hydrogenated shortening, palm oil, coconut oil, cocoa butter   Hydrogenated or trans fat  found in margarine and vegetable shortening, most shelf stable snack foods, and fried foods; increases LDL and decreases HDL     It is generally recommended that you limit your total fat for the day to less than 30% of your total calories.  If you follow an 1800-calorie heart healthy diet, for example, this would mean 60 grams of fat or less per day. Saturated fat and trans fat in your diet raises your blood cholesterol the most, much more than dietary cholesterol does. For this reason, on a heart-healthy diet, less than 7% of your calories should come from saturated fat and ideally 0% from trans fat. On an 1800-calorie diet, this translates into less than 14 grams of saturated fat per day, leaving 46 grams of fat to come from mono- and polyunsaturated fats.    Food Choices on a Heart Healthy Diet   Food Category   Foods Recommended   Foods to Avoid   Grains   Breads and rolls without salted tops Most dry and cooked cereals Unsalted crackers and breadsticks Low-sodium or homemade breadcrumbs or stuffing All rice and pastas   Breads, rolls, and crackers with salted tops High-fat baked goods (eg, muffins, donuts, pastries) Quick breads, self-rising flour, and biscuit mixes Regular bread crumbs Instant hot cereals Commercially prepared rice, pasta, or stuffing mixes   Vegetables   Most fresh, frozen, and low-sodium canned vegetables Low-sodium and salt-free vegetable juices Canned vegetables if unsalted or rinsed   Regular canned vegetables and juices, including sauerkraut and pickled vegetables Frozen vegetables with sauces Commercially prepared potato and vegetable mixes   Fruits   Most fresh, frozen, and canned fruits All fruit juices   Fruits processed with salt or sodium   Milk   Nonfat or low-fat (1%) milk Nonfat or low-fat yogurt Cottage cheese, low-fat ricotta, cheeses labeled as low-fat and low-sodium   Whole milk Reduced-fat (2%) milk Malted and chocolate milk Full fat yogurt Most cheeses (unless low-fat and low salt) Buttermilk (no more than 1 cup per week)   Meats and Beans   Lean cuts of fresh or frozen beef, veal, lamb, or pork (look for the word loin) Fresh or frozen poultry without the skin Fresh or frozen fish and some shellfish Egg whites and egg substitutes (Limit whole eggs to three per and cholesterol amounts. For products low in sodium, look for sodium free, very low sodium, low sodium, no added salt, and unsalted   Skip the salt when cooking or at the table; if food needs more flavor, get creative and try out different herbs and spices. Garlic and onion also add substantial flavor to foods. Trim any visible fat off meat and poultry before cooking, and drain the fat off after nichole. Use cooking methods that require little or no added fat, such as grilling, boiling, baking, poaching, broiling, roasting, steaming, stir-frying, and sauting. Avoid fast food and convenience food. They tend to be high in saturated and trans fat and have a lot of added salt. Talk to a registered dietitian for individualized diet advice. Last Reviewed: March 2011 Allyson Hawkins MS, MPH, RD   Updated: 3/29/2011   ·     Heart-Healthy Diet   Sodium, Fat, and Cholesterol Controlled Diet       What Is a Heart Healthy Diet? A heart-healthy diet is one that limits sodium , certain types of fat , and cholesterol . This type of diet is recommended for:   People with any form of cardiovascular disease (eg, coronary heart disease , peripheral vascular disease , previous heart attack , previous stroke )   People with risk factors for cardiovascular disease, such as high blood pressure , high cholesterol , or diabetes   Anyone who wants to lower their risk of developing cardiovascular disease   Sodium    Sodium is a mineral found in many foods. In general, most people consume much more sodium than they need. Diets high in sodium can increase blood pressure and lead to edema (water retention). On a heart-healthy diet, you should consume no more than 2,300 mg (milligrams) of sodium per dayabout the amount in one teaspoon of table salt. The foods highest in sodium include table salt (about 50% sodium), processed foods, convenience foods, and preserved foods.    Cholesterol    Cholesterol is a fat-like, waxy substance in your blood. Our bodies make some cholesterol. It is also found in animal products, with the highest amounts in fatty meat, egg yolks, whole milk, cheese, shellfish, and organ meats. On a heart-healthy diet, you should limit your cholesterol intake to less than 200 mg per day. It is normal and important to have some cholesterol in your bloodstream. But too much cholesterol can cause plaque to build up within your arteries, which can eventually lead to a heart attack or stroke. The two types of cholesterol that are most commonly referred to are:   Low-density lipoprotein (LDL) cholesterol  Also known as bad cholesterol, this is the cholesterol that tends to build up along your arteries. Bad cholesterol levels are increased by eating fats that are saturated or hydrogenated. Optimal level of this cholesterol is less than 100. Over 130 starts to get risky for heart disease. High-density lipoprotein (HDL) cholesterol  Also known as good cholesterol, this type of cholesterol actually carries cholesterol away from your arteries and may, therefore, help lower your risk of having a heart attack. You want this level to be high (ideally greater than 60). It is a risk to have a level less than 40. You can raise this good cholesterol by eating olive oil, canola oil, avocados, or nuts. Exercise raises this level, too. Fat    Fat is calorie dense and packs a lot of calories into a small amount of food. Even though fats should be limited due to their high calorie content, not all fats are bad. In fact, some fats are quite healthful. Fat can be broken down into four main types.    The good-for-you fats are:   Monounsaturated fat  found in oils such as olive and canola, avocados, and nuts and natural nut butters; can decrease cholesterol levels, while keeping levels of HDL cholesterol high   Polyunsaturated fat  found in oils such as safflower, sunflower, soybean, corn, and sesame; can decrease total cholesterol and LDL cholesterol   Omega-3 fatty acids  particularly those found in fatty fish (such as salmon, trout, tuna, mackerel, herring, and sardines); can decrease risk of arrhythmias, decrease triglyceride levels, and slightly lower blood pressure   The fats that you want to limit are:   Saturated fat  found in animal products, many fast foods, and a few vegetables; increases total blood cholesterol, including LDL levels   Animal fats that are saturated include: butter, lard, whole-milk dairy products, meat fat, and poultry skin   Vegetable fats that are saturated include: hydrogenated shortening, palm oil, coconut oil, cocoa butter   Hydrogenated or trans fat  found in margarine and vegetable shortening, most shelf stable snack foods, and fried foods; increases LDL and decreases HDL     It is generally recommended that you limit your total fat for the day to less than 30% of your total calories. If you follow an 1800-calorie heart healthy diet, for example, this would mean 60 grams of fat or less per day. Saturated fat and trans fat in your diet raises your blood cholesterol the most, much more than dietary cholesterol does. For this reason, on a heart-healthy diet, less than 7% of your calories should come from saturated fat and ideally 0% from trans fat. On an 1800-calorie diet, this translates into less than 14 grams of saturated fat per day, leaving 46 grams of fat to come from mono- and polyunsaturated fats.    Food Choices on a Heart Healthy Diet   Food Category   Foods Recommended   Foods to Avoid   Grains   Breads and rolls without salted tops Most dry and cooked cereals Unsalted crackers and breadsticks Low-sodium or homemade breadcrumbs or stuffing All rice and pastas   Breads, rolls, and crackers with salted tops High-fat baked goods (eg, muffins, donuts, pastries) Quick breads, self-rising flour, and biscuit mixes Regular bread crumbs Instant hot cereals Commercially prepared rice, pasta, or stuffing mixes grams of fat or less per day. Saturated fat and trans fat in your diet raises your blood cholesterol the most, much more than dietary cholesterol does. For this reason, on a heart-healthy diet, less than 7% of your calories should come from saturated fat and ideally 0% from trans fat. On an 1800-calorie diet, this translates into less than 14 grams of saturated fat per day, leaving 46 grams of fat to come from mono- and polyunsaturated fats.    Food Choices on a Heart Healthy Diet   Food Category   Foods Recommended   Foods to Avoid   Grains   Breads and rolls without salted tops Most dry and cooked cereals Unsalted crackers and breadsticks Low-sodium or homemade breadcrumbs or stuffing All rice and pastas   Breads, rolls, and crackers with salted tops High-fat baked goods (eg, muffins, donuts, pastries) Quick breads, self-rising flour, and biscuit mixes Regular bread crumbs Instant hot cereals Commercially prepared rice, pasta, or stuffing mixes   Vegetables   Most fresh, frozen, and low-sodium canned vegetables Low-sodium and salt-free vegetable juices Canned vegetables if unsalted or rinsed   Regular canned vegetables and juices, including sauerkraut and pickled vegetables Frozen vegetables with sauces Commercially prepared potato and vegetable mixes   Fruits   Most fresh, frozen, and canned fruits All fruit juices   Fruits processed with salt or sodium   Milk   Nonfat or low-fat (1%) milk Nonfat or low-fat yogurt Cottage cheese, low-fat ricotta, cheeses labeled as low-fat and low-sodium   Whole milk Reduced-fat (2%) milk Malted and chocolate milk Full fat yogurt Most cheeses (unless low-fat and low salt) Buttermilk (no more than 1 cup per week)   Meats and Beans   Lean cuts of fresh or frozen beef, veal, lamb, or pork (look for the word loin) Fresh or frozen poultry without the skin Fresh or frozen fish and some shellfish Egg whites and egg substitutes (Limit whole eggs to three per week) Tofu Nuts or seeds (unsalted, dry-roasted), low-sodium peanut butter Dried peas, beans, and lentils   Any smoked, cured, salted, or canned meat, fish, or poultry (including almanza, chipped beef, cold cuts, hot dogs, sausages, sardines, and anchovies) Poultry skins Breaded and/or fried fish or meats Canned peas, beans, and lentils Salted nuts   Fats and Oils   Olive oil and canola oil Low-sodium, low-fat salad dressings and mayonnaise   Butter, margarine, coconut and palm oils, almanza fat   Snacks, Sweets, and Condiments   Low-sodium or unsalted versions of broths, soups, soy sauce, and condiments Pepper, herbs, and spices; vinegar, lemon, or lime juice Low-fat frozen desserts (yogurt, sherbet, fruit bars) Sugar, cocoa powder, honey, syrup, jam, and preserves Low-fat, trans-fat free cookies, cakes, and pies Tobin and animal crackers, fig bars, alexander snaps   High-fat desserts Broth, soups, gravies, and sauces, made from instant mixes or other high-sodium ingredients Salted snack foods Canned olives Meat tenderizers, seasoning salt, and most flavored vinegars   Beverages   Low-sodium carbonated beverages Tea and coffee in moderation Soy milk   Commercially softened water   Suggestions   Make whole grains, fruits, and vegetables the base of your diet. Choose heart-healthy fats such as canola, olive, and flaxseed oil, and foods high in heart-healthy fats, such as nuts, seeds, soybeans, tofu, and fish. Eat fish at least twice per week; the fish highest in omega-3 fatty acids and lowest in mercury include salmon, herring, mackerel, sardines, and canned chunk light tuna. If you eat fish less than twice per week or have high triglycerides, talk to your doctor about taking fish oil supplements. Read food labels. For products low in fat and cholesterol, look for fat free, low-fat, cholesterol free, saturated fat free, and trans fat freeAlso scan the Nutrition Facts Label, which lists saturated fat, trans fat, and cholesterol amounts. For products low in sodium, look for sodium free, very low sodium, low sodium, no added salt, and unsalted   Skip the salt when cooking or at the table; if food needs more flavor, get creative and try out different herbs and spices. Garlic and onion also add substantial flavor to foods. Trim any visible fat off meat and poultry before cooking, and drain the fat off after nichole. Use cooking methods that require little or no added fat, such as grilling, boiling, baking, poaching, broiling, roasting, steaming, stir-frying, and sauting. Avoid fast food and convenience food. They tend to be high in saturated and trans fat and have a lot of added salt. Talk to a registered dietitian for individualized diet advice. Last Reviewed: March 2011 Alexander Sousa MS, MPH, RD   Updated: 3/29/2011   ·     Preventing Osteoporosis: After Your Visit  Your Care Instructions  Osteoporosis means the bones are weak and thin enough that they can break easily. The older you are, the more likely you are to get osteoporosis. But with plenty of calcium, vitamin D, and exercise, you can help prevent osteoporosis. The preteen and teen years are a key time for bone building. With the help of calcium, vitamin D, and exercise in those early years and beyond, the bones reach their peak density and strength by age 27. After age 27, your bones naturally start to thin and weaken. The stronger your bones are at around age 27, the lower your risk for osteoporosis. But no matter what your age and risk are, your bones still need calcium, vitamin D, and exercise to stay strong. Also avoid smoking, and limit alcohol. Smoking and heavy alcohol use can make your bones thinner. Talk to your doctor about any special risks you might have, such as having a close relative with osteoporosis or taking a medicine that can weaken bones. Your doctor can tell you the best ways to protect your bones from thinning.   Follow-up care is a key part of your treatment and safety. Be sure to make and go to all appointments, and call your doctor if you are having problems. It's also a good idea to know your test results and keep a list of the medicines you take. How can you care for yourself at home? Get enough calcium and vitamin D. The [employer] recommends adults younger than age 46 need 1,000 mg of calcium and 600 IU of vitamin D each day. Women ages 46 to 79 need 1,200 mg of calcium and 600 IU of vitamin D each day. Men ages 46 to 79 need 1,000 mg of calcium and 600 IU of vitamin D each day. Adults 71 and older need 1,200 mg of calcium and 800 IU of vitamin D each day. Eat foods rich in calcium, like yogurt, cheese, milk, and dark green vegetables. Eat foods rich in vitamin D, like eggs, fatty fish, cereal, and fortified milk. Get some sunshine. Your body uses sunshine to make its own vitamin D. The safest time to be out in the sun is before 10 a.m. or after 3 p.m. Avoid getting sunburned. Sunburn can increase your risk of skin cancer. Talk to your doctor about taking a calcium plus vitamin D supplement. Ask about what type of calcium is right for you, and how much to take at a time. Adults ages 23 to 48 should not get more than 2,500 mg of calcium and 4,000 IU of vitamin D each day, whether it is from supplements and/or food. Adults ages 46 and older should not get more than 2,000 mg of calcium and 4,000 IU of vitamin D each day from supplements and/or food. Get regular bone-building exercise. Weight-bearing and resistance exercises keep bones healthy by working the muscles and bones against gravity. Start out at an exercise level that feels right for you. Add a little at a time until you can do the following:  Do 30 minutes of weight-bearing exercise on most days of the week. Walking, jogging, stair climbing, and dancing are good choices. Do resistance exercises with weights or elastic bands 2 to 3 days a week. Limit alcohol.  Drink no more than 1 alcohol drink a day if you are a woman. Drink no more than 2 alcohol drinks a day if you are a man. Do not smoke. Smoking can make bones thin faster. If you need help quitting, talk to your doctor about stop-smoking programs and medicines. These can increase your chances of quitting for good. When should you call for help? Watch closely for changes in your health, and be sure to contact your doctor if:  You need help with a healthy eating plan. You need help with an exercise plan    © [id number] [employer]. Care instructions adapted under license by [facility]. This care instruction is for use with your licensed healthcare professional. If you have questions about a medical condition or this instruction, always ask your healthcare professional. [address] any warranty or liability for your use of this information. Content Version: 9.4.36344; Last Revised: June 20, 2011              ·     DASH Diet: After Your Visit  Your Care Instructions  The DASH diet is an eating plan that can help lower your blood pressure. DASH stands for Dietary Approaches to Stop Hypertension. Hypertension is high blood pressure. The DASH diet focuses on eating foods that are high in calcium, potassium, and magnesium. These nutrients can lower blood pressure. The foods that are highest in these nutrients are fruits, vegetables, low-fat dairy products, nuts, seeds, and legumes. But taking calcium, potassium, and magnesium supplements instead of eating foods that are high in those nutrients does not have the same effect. The DASH diet also includes whole grains, fish, and poultry. The DASH diet is one of several lifestyle changes your doctor may recommend to lower your high blood pressure. Your doctor may also want you to decrease the amount of sodium in your diet.  Lowering sodium while following the DASH diet can lower blood pressure even further than just the DASH diet alone.  Follow-up care is a key part of your treatment and safety. Be sure to make and go to all appointments, and call your doctor if you are having problems. Its also a good idea to know your test results and keep a list of the medicines you take. How can you care for yourself at home? Following the DASH diet  Eat 4 to 5 servings of fruit each day. A serving is 1 medium-sized piece of fruit, ½ cup chopped or canned fruit, 1/4 cup dried fruit, or 4 ounces (½ cup) of fruit juice. Choose fruit more often than fruit juice. Eat 4 to 5 servings of vegetables each day. A serving is 1 cup of lettuce or raw leafy vegetables, ½ cup of chopped or cooked vegetables, or 4 ounces (½ cup) of vegetable juice. Choose vegetables more often than vegetable juice. Get 2 to 3 servings of low-fat and fat-free dairy each day. A serving is 8 ounces of milk, 1 cup of yogurt, or 1 ½ ounces of cheese. Eat 7 to 8 servings of grains each day. A serving is 1 slice of bread, 1 ounce of dry cereal, or ½ cup of cooked rice, pasta, or cooked cereal. Try to choose whole-grain products as much as possible. Limit lean meat, poultry, and fish to 6 ounces each day. Six ounces is about the size of two decks of cards. Eat 4 to 5 servings of nuts, seeds, and legumes (cooked dried beans, lentils, and split peas) each week. A serving is 1/3 cup of nuts, 2 tablespoons of seeds, or ½ cup cooked dried beans or peas. Limit sweets and added sugars to 5 servings or less a week. A serving is 1 tablespoon jelly or jam, ½ cup sorbet, or 1 cup of lemonade. Tips for success  Start small. Do not try to make dramatic changes to your diet all at once. You might feel that you are missing out on your favorite foods and then be more likely to not follow the plan. Make small changes, and stick with them. Once those changes become habit, add a few more changes. Try some of the following:  Make it a goal to eat a fruit or vegetable at every meal and at snacks.  This bran cereals Oatmeal, oat bran, or grits Wheat germ Whole-wheat pasta and brown rice   Read the ingredients list on food labels. Look for products that list \"whole\" as the first ingredient (eg, whole-wheat, whole oats). Choose cereals with at least 2 grams of fiber per serving. Vegetables   All vegetables, especially asparagus, bean sprouts, broccoli, Ferris sprouts, cabbage, carrots, cauliflower, celery, corn, greens, green beans, green pepper, onions, peas, potatoes (with skin), snow peas, spinach, squash, sweet potatoes, tomatoes, zucchini   For maximum fiber intake, eat the peels of fruits and vegetablesjust be sure to wash them well first.   Fruits   All fruits, especially apples, berries, grapefruits, mangoes, nectarines, oranges, peaches, pears, dried fruits (figs, dates, prunes, raisins)   Choose raw fruits and vegetables over juice, cooked, or cannedraw fruit has more fiber. Dried fruit is also a good source of fiber. Milk   With the exception of yogurt containing inulin (a type of fiber), dairy foods provide little fiber. Add more fiber by topping your yogurt or cottage cheese with fresh fruit, whole grain or bran cereals, nuts, or seeds. Meats and Beans   All beans and peas, especially Garbanzo beans, kidney beans, lentils, lima beans, split peas, and helms beans All nuts and seeds, especially almonds, peanuts, [place] nuts, cashews, peanut butter, walnuts, sesame and sunflower seeds All meat, poultry, fish, and eggs   Increase fiber in meat dishes by adding helms beans, kidney beans, black-eyed peas, bran, or oatmeal. If you are following a low-fat diet, use nuts and seeds only in moderation.    Fats and Oils   All in moderation   Fats and oils do not provide fiber   Snacks, Sweets, and Condiments   Fruit Nuts Popcorn, whole-wheat pretzels, or trail mix made with dried fruits, nuts, and seeds Cakes, breads, and cookies made with oatmeal or whole-wheat flour   Most snack foods do not provide much fiber. Choose snacks with at least 2 grams of fiber per serving. Last Reviewed: March 2011 Nessa Toussaint MS, MPH, RD   Updated: 3/29/2011   ·     Keep Your Memory [place]       Many factors can affect your ability to remembera hectic lifestyle, aging, stress, chronic disease, and certain medicines. But, there are steps you can take to sharpen your mind and help preserve your memory. Challenge Your Brain   Regularly challenging your mind may help keeps it in top shape. Good mental exercises include:   Crossword puzzlesUse a dictionary if you need it; you will learn more that way. Brainteasers Try some! Crafts, such as wood working and sewing   Hobbies, such as gardening and building model airplanes   SocializingVisit old friends or join groups to meet new ones. Reading   Learning a new language   Taking a class, whether it be art history or ayush chi   TravelingExperience the food, history, and culture of your destination   Learning to use a computer   Going to museums, the theater, or thought-provoking movies   Changing things in your daily life, such as reversing your pattern in the grocery store or brushing your teeth using your nondominant hand   Use Memory Aids   There is no need to remember every detail on your own. These memory aids can help:   Calendars and day planners   Electronic organizers to store all sorts of helpful informationThese devices can \"beep\" to remind you of appointments. A book of days to record birthdays, anniversaries, and other occasions that occur on the same date every year   Detailed \"to-do\" lists and strategically placed sticky notes   Quick \"study\" sessionsBefore a gathering, review who will be there so their names will be fresh in your mind.    Establish routinesFor example, keep your keys, wallet, and umbrella in the same place all the time or take medicine with your 8:00 AM glass of juice   Live a Healthy Life   Many actions that will keep your body strong will do the same for your mind. For example:   Talk to Your Doctor About Herbs and Supplements    Malnutrition and vitamin deficiencies can impair your mental function. For example, vitamin B12 deficiency can cause a range of symptoms, including confusion. But, what if your nutritional needs are being met? Can herbs and supplements still offer a benefit? Researchers have investigated a range of natural remedies, such as ginkgo , ginseng , and the supplement phosphatidylserine (PS). So far, though, the evidence is inconsistent as to whether these products can improve memory or thinking. If you are interested in taking herbs and supplements, talk to your doctor first because they may interact with other medicines that you are taking. Exercise Regularly    Among the many benefits of regular exercise are increased blood flow to the brain and decreased risk of certain diseases that can interfere with memory function. One study found that even moderate exercise has a beneficial effect. Examples of \"moderate\" exercise include:   Playing 18 holes of golf once a week, without a cart   Playing tennis twice a week   Walking one mile per day   Manage Stress    It can be tough to remember what is important when your mind is cluttered. Make time for relaxation. Choose activities that calm you down, and make it routine. Manage Chronic Conditions    Side effects of high blood pressure , diabetes, and heart disease can interfere with mental function. Many of the lifestyle steps discussed here can help manage these conditions. Strive to eat a healthy diet, exercise regularly, get stress under control, and follow your doctor's advice for your condition. Minimize Medications    Talk to your doctor about the medicines that you take. Some may be unnecessary. Also, healthy lifestyle habits may lower the need for certain drugs.      Last Reviewed: April 2010 Liz Garcia MD   Updated: 4/13/2010   ·     [address]       As we get older, changes in balance, gait, strength, vision, hearing, and cognition make even the most youthful senior more prone to accidents. Falls are one of the leading health risks for older people. This increased risk of falling is related to:   Aging process (eg, decreased muscle strength, slowed reflexes)   Higher incidence of chronic health problems (eg, arthritis, diabetes) that may limit mobility, agility or sensory awareness   Side effects of medicine (eg, dizziness, blurred vision)especially medicines like prescription pain medicines and drugs used to treat mental health conditions   Depending on the brittleness of your bones, the consequences of a fall can be serious and long lasting. Home Life   Research by the Association of Aging [facility]) shows that some home accidents among older adults can be prevented by making simple lifestyle changes and basic modifications and repairs to the home environment. Here are some lifestyle changes that experts recommend:   Have your hearing and vision checked regularly. Be sure to wear prescription glasses that are right for you. Speak to your doctor or pharmacist about the possible side effects of your medicines. A number of medicines can cause dizziness. If you have problems with sleep, talk to your doctor. Limit your intake of alcohol. If necessary, use a cane or walker to help maintain your balance. Wear supportive, rubber-soled shoes, even at home. If you live in a region that gets wintry weather, you may want to put special cleats on your shoes to prevent you from slipping on the snow and ice. Exercise regularly to help maintain muscle tone, agility, and balance. Always hold the banister when going up or down stairs. Also, use  bars when getting in or out of the bath or shower, or using the toilet. To avoid dizziness, get up slowly from a lying down position. Sit up first, dangling your legs for a minute or two before rising to a standing position. Overall Home Safety Check   According to the Consumer Product Safety Commision's \"Older Consumer Home Safety Checklist,\" it is important to check for potential hazards in each room. And remember, proper lighting is an essential factor in home safety. If you cannot see clearly, you are more likely to fall. Important questions to ask yourself include:   Are lamp, electric, extension, and telephone cords placed out of the flow of traffic and maintained in good condition? Have frayed cords been replaced? Are all small rugs and runners slip resistant? If not, you can secure them to the floor with a special double-sided carpet tape. Are smoke detectors properly locatedone on every floor of your home and one outside of every sleeping area? Are they in good working order? Are batteries replaced at least once a year? Do you have a well-maintained carbon monoxide detector outside every sleeping are in your home? Does your furniture layout leave plenty of space to maneuver between and around chairs, tables, beds, and sofas? Are hallways, stairs and passages between rooms well lit? Can you reach a lamp without getting out of bed? Are floor surfaces well maintained? Shag rugs, high-pile carpeting, tile floors, and polished wood floors can be particularly slippery. Stairs should always have handrails and be carpeted or fitted with a non-skid tread. Is your telephone easily reachable. Is the cord safely tucked away? Room by Room   According to the Association of Aging, bathrooms and lindsey are the two most potentially hazardous rooms in your home. In the Kitchen    Be sure your stove is in proper working order and always make sure burners and the oven are off before you go out or go to sleep. Keep pots on the back burners, turn handles away from the front of the stove, and keep stove clean and free of grease build-up. Kitchen ventilation systems and range exhausts should be working properly.     Keep flammable objects such as towels and pot holders away from the cooking area except when in use. Make sure kitchen curtains are tied back. Move cords and appliances away from the sink and hot surfaces. If extension cords are needed, install wiring guides so they do not hang over the sink, range, or working areas. Look for coffee pots, kettles and toaster ovens with automatic shut-offs. Keep a mop handy in the kitchen so you can wipe up spills instantly. You should also have a small fire extinguisher. Arrange your kitchen with frequently used items on lower shelves to avoid the need to stand on a stepstool to reach them. Make sure countertops are well-lit to avoid injuries while cutting and preparing food. In the Bathroom    Use a non-slip mat or decals in the tub and shower, since wet, soapy tile or porcelain surfaces are extremely slippery. Make sure bathroom rugs are non-skid or tape them firmly to the floor. Bathtubs should have at least one, preferably two, grab bars, firmly attached to structural supports in the wall. (Do not use built-in soap holders or glass shower doors as grab bars.)    Tub seats fitted with non-slip material on the legs allow you to wash sitting down. For people with limited mobility, bathtub transfer benches allow you to slide safely into the tub. Raised toilet seats and toilet safety rails are helpful for those with knee or hip problems. In the [facility]    Make sure you use a nightlight and that the area around your bed is clear of potential obstacles. Be careful with electric blankets and never go to sleep with a heating pad, which can cause serious burns even if on a low setting. Use fire-resistant mattress covers and pillows, and NEVER smoke in bed. Keep a phone next to the bed that is programmed to dial 911 at the push of a button. If you have a chronic condition, you may want to sign on with an automatic call-in service.  Typically the system includes a small pendant that connects directly to an emergency medical voice-response system. You should also make arrangements to stay in contact with someonefriend, neighbor, family memberon a regular schedule. Fire Prevention   According to the [employer]. (Smoke Alarms for Every) [facility], senior citizens are one of the two highest risk groups for death and serious injuries due to residential fires. When cooking, wear short-sleeved items, never a bulky long-sleeved robe. The [facility]'s Safety Checklist for Older Consumers emphasizes the importance of checking basements, garages, workshops and storage areas for fire hazards, such as volatile liquids, piles of old rags or clothing and overloaded circuits. Never smoke in bed or when lying down on a couch or recliner chair. Small portable electric or kerosene heaters are responsible for many home fires and should be used cautiously if at all. If you do use one, be sure to keep them away from flammable materials. In case of fire, make sure you have a pre-established emergency exit plan. Have a professional check your fireplace and other fuel-burning appliances yearly. Helping Hands   Baby boomers entering the tafoay years will continue to see the development of new products to help older adults live safely and independently in spite of age-related changes. Making Life More Livable  , by Earnest Potter, lists over 1,000 products for \"living well in the mature years,\" such as bathing and mobility aids, household security devices, ergonomically designed knives and peelers, and faucet valves and knobs for temperature control. Medical supply stores and organizations are good sources of information about products that improve your quality of life and insure your safety.      Last Reviewed: November 2009 Audra Mccord MD   Updated: 3/7/2011     ·

## 2020-08-30 ENCOUNTER — TELEPHONE (OUTPATIENT)
Dept: FAMILY MEDICINE CLINIC | Age: 85
End: 2020-08-30

## 2020-08-30 LAB
CULTURE: ABNORMAL
Lab: ABNORMAL
SPECIMEN DESCRIPTION: ABNORMAL

## 2020-08-30 RX ORDER — CIPROFLOXACIN 500 MG/1
500 TABLET, FILM COATED ORAL 2 TIMES DAILY
Qty: 14 TABLET | Refills: 0 | Status: SHIPPED | OUTPATIENT
Start: 2020-08-30 | End: 2020-10-08 | Stop reason: SDUPTHER

## 2020-08-30 NOTE — TELEPHONE ENCOUNTER
UTI     I let the patient know to  prescription from pharmacy. I gave her the instructions below. Requested Prescriptions     Signed Prescriptions Disp Refills    ciprofloxacin (CIPRO) 500 MG tablet 14 tablet 0     Sig: Take 1 tablet by mouth 2 times daily for 7 days **Stop Macrobid**monitor Blood Glucose closely! Authorizing Provider: Mary Alice Albarado [address] [place] [id number]  Phone: [phone] Fax: [phone]      Thank you!         FYI    Future Appointments   Date Time Provider Sherly Pitt   10/6/2020 11:00 AM [facility] PAT RM 2 [facility]   10/16/2020 11:00 AM SCHEDULE, [facility] COVID19 PAT SCREENING [facility]   12/9/2020 11:00 AM MD kay Viveros [facility]   9/1/2021 10:30 AM MD kay Viveros [facility]       Controlled Substances Monitoring:

## 2020-10-06 ENCOUNTER — HOSPITAL ENCOUNTER (OUTPATIENT)
Dept: PREADMISSION TESTING | Age: 85
Discharge: HOME | End: 2020-10-10
Payer: MEDICARE

## 2020-10-06 VITALS
WEIGHT: 196 LBS | DIASTOLIC BLOOD PRESSURE: 51 MMHG | HEART RATE: 63 BPM | TEMPERATURE: 97.1 F | OXYGEN SATURATION: 99 % | SYSTOLIC BLOOD PRESSURE: 124 MMHG | HEIGHT: 69 IN | BODY MASS INDEX: 29.03 KG/M2 | RESPIRATION RATE: 18 BRPM

## 2020-10-06 LAB
ABSOLUTE EOS #: 0.17 K/UL (ref 0–0.4)
ABSOLUTE IMMATURE GRANULOCYTE: ABNORMAL K/UL (ref 0–0.3)
ABSOLUTE LYMPH #: 2.08 K/UL (ref 1–4.8)
ABSOLUTE MONO #: 0.66 K/UL (ref 0.1–1.3)
ANION GAP SERPL CALCULATED.3IONS-SCNC: 12 MMOL/L (ref 9–17)
BASOPHILS # BLD: 1 % (ref 0–2)
BASOPHILS ABSOLUTE: 0.08 K/UL (ref 0–0.2)
BUN BLDV-MCNC: 21 MG/DL (ref 8–23)
BUN/CREAT BLD: ABNORMAL (ref 9–20)
CALCIUM SERPL-MCNC: 9.6 MG/DL (ref 8.6–10.4)
CHLORIDE BLD-SCNC: 102 MMOL/L (ref 98–107)
CO2: 25 MMOL/L (ref 20–31)
CREAT SERPL-MCNC: 0.76 MG/DL (ref 0.5–0.9)
DIFFERENTIAL TYPE: ABNORMAL
EOSINOPHILS RELATIVE PERCENT: 2 % (ref 0–4)
GFR AFRICAN AMERICAN: >60 ML/MIN
GFR NON-AFRICAN AMERICAN: >60 ML/MIN
GFR SERPL CREATININE-BSD FRML MDRD: ABNORMAL ML/MIN/{1.73_M2}
GFR SERPL CREATININE-BSD FRML MDRD: ABNORMAL ML/MIN/{1.73_M2}
GLUCOSE BLD-MCNC: 192 MG/DL (ref 70–99)
HCT VFR BLD CALC: 32.5 % (ref 36–46)
HEMOGLOBIN: 10.3 G/DL (ref 12–16)
IMMATURE GRANULOCYTES: ABNORMAL %
LYMPHOCYTES # BLD: 25 % (ref 24–44)
MCH RBC QN AUTO: 23.5 PG (ref 26–34)
MCHC RBC AUTO-ENTMCNC: 31.6 G/DL (ref 31–37)
MCV RBC AUTO: 74.4 FL (ref 80–100)
MONOCYTES # BLD: 8 % (ref 1–7)
MORPHOLOGY: ABNORMAL
NRBC AUTOMATED: ABNORMAL PER 100 WBC
PDW BLD-RTO: 18.5 % (ref 11.5–14.9)
PLATELET # BLD: 287 K/UL (ref 150–450)
PLATELET ESTIMATE: ABNORMAL
PMV BLD AUTO: 8 FL (ref 6–12)
POTASSIUM SERPL-SCNC: 4.5 MMOL/L (ref 3.7–5.3)
RBC # BLD: 4.37 M/UL (ref 4–5.2)
RBC # BLD: ABNORMAL 10*6/UL
SEG NEUTROPHILS: 64 % (ref 36–66)
SEGMENTED NEUTROPHILS ABSOLUTE COUNT: 5.31 K/UL (ref 1.3–9.1)
SODIUM BLD-SCNC: 139 MMOL/L (ref 135–144)
WBC # BLD: 8.3 K/UL (ref 3.5–11)
WBC # BLD: ABNORMAL 10*3/UL

## 2020-10-06 PROCEDURE — 85025 COMPLETE CBC W/AUTO DIFF WBC: CPT

## 2020-10-06 PROCEDURE — 80048 BASIC METABOLIC PNL TOTAL CA: CPT

## 2020-10-06 PROCEDURE — 87186 SC STD MICRODIL/AGAR DIL: CPT

## 2020-10-06 PROCEDURE — 36415 COLL VENOUS BLD VENIPUNCTURE: CPT

## 2020-10-06 PROCEDURE — 87077 CULTURE AEROBIC IDENTIFY: CPT

## 2020-10-06 PROCEDURE — 87086 URINE CULTURE/COLONY COUNT: CPT

## 2020-10-06 RX ORDER — POTASSIUM CHLORIDE 20 MEQ/1
20 TABLET, EXTENDED RELEASE ORAL DAILY
COMMUNITY
End: 2021-03-16 | Stop reason: SDUPTHER

## 2020-10-06 ASSESSMENT — ENCOUNTER SYMPTOMS
BLOOD IN STOOL: 0
SINUS PAIN: 0
TROUBLE SWALLOWING: 0
WHEEZING: 0
DIARRHEA: 1
NAUSEA: 1
COUGH: 0
SHORTNESS OF BREATH: 0
CONSTIPATION: 0
VOMITING: 0
RHINORRHEA: 0
ABDOMINAL DISTENTION: 1

## 2020-10-06 NOTE — H&P
calculus    Comparison:None    Findings: No free air identified.  Calcific lesions are seen overlying the inferior pole the right kidney which could represent nephrolithiasis.  There are 2 adjacent calculi measuring approximately 6 to 7 mm in size.  Bowel gas pattern shows no acute findings. Impression:    Probable right nephrolithiasis         PAST MEDICAL HISTORY     Past Medical History:   Diagnosis Date    Arthritis     Basal cell carcinoma of nose     Bronchitis     HX. OF     CAD (coronary artery disease)     Carpal tunnel syndrome     left hand    Colon cancer (HCC)     Diabetes mellitus ([address]     DVT (deep venous thrombosis) ([address] 7/10/2019    Gout     Hematuria     History of coronary artery bypass graft x 3 7/2/2020    Hx of blood clots     ED.  LEGS, ED. 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46    Ophthalmoplegic migraine headache     Osteoarthritis     Other pulmonary embolism without acute cor pulmonale (HCC) 4/30/2013    S/P coronary artery stent placement X 3 6/29/2020    TIA (transient ischemic attack)     [facility]     Wears glasses        SURGICAL HISTORY       Past Surgical History:   Procedure Laterality Date    ANGIOPLASTY      hx. of stenting x 3.    APPENDECTOMY      BREAST SURGERY      INFECTED MILK DUCT LT.    CARDIAC SURGERY      CABG x 3 vessels and 3 stents    CAROTID ENDARTERECTOMY Left 3-3-16    CARPAL TUNNEL RELEASE Right     CHOLECYSTECTOMY      COLON SURGERY      colectomy, PRECANCEROUS POLYPS    COLONOSCOPY      X5, HX PRECANCEROUS POLYPS    CORONARY ARTERY BYPASS GRAFT      X3 vessels    CYST REMOVAL  10/07/2016    EXCISION OF SEBACEOUS CYST REMOVED FROM BACK X3    CYSTOSCOPY Bilateral 1/21/2020    CYSTOSCOPY RETROGRADE PYELOGRAM performed by Bobo Jane MD at [address] Right     INDEX FINGER AND THUMB.    HYSTERECTOMY      JOINT REPLACEMENT Left 03/29/2010    [facility]    JOINT REPLACEMENT Right 02/16/1999    TKA    LITHOTRIPSY      X 3    SKIN BIOPSY      TOP OF NOSE (BASAL CELL)    VASCULAR SURGERY      vein ablation on legs       FAMILY HISTORY       Family History   Problem Relation Age of Onset    Stroke Mother     Hypertension Mother     Heart Disease Father         AAA    Stroke Sister     Parkinsonism Brother     Cancer Sister         lung    Alcohol Abuse Sister        SOCIAL HISTORY       Social History     Socioeconomic History    Marital status:       Spouse name: None    Number of children: None    Years of education: None    Highest education level: None   Occupational History    None   Social Needs    Financial resource strain: None    Food insecurity     Worry: None     Inability: None    Transportation needs     Medical: None     Non-medical: None   Tobacco Use    Smoking status: Never Smoker    Smokeless tobacco: Never Used   Substance and Sexual Activity    Alcohol use: No    Drug use: No    Sexual activity: None   Lifestyle    Physical activity     Days per week: None     Minutes per session: None    Stress: None   Relationships    Social connections     Talks on phone: None     Gets together: None     Attends Orthodoxy service: None     Active member of club or organization: None     Attends meetings of clubs or organizations: None     Relationship status: None    Intimate partner violence     Fear of current or ex partner: None     Emotionally abused: None     Physically abused: None     Forced sexual activity: None   Other Topics Concern    None   Social History Narrative    None           REVIEW OF SYSTEMS      Allergies   Allergen Reactions    Ampicillin Other (See Comments)    Clopidogrel Bisulfate Itching    Diovan [Valsartan] Other (See Comments)     cough    Lantus [Insulin Glargine] Nausea Only     Stomach \"quivered\" after taking it, palpitations    Metformin And Related Diarrhea     Chronic kidney disease stage 3       Current Outpatient Medications on File Prior to Encounter   Medication Sig Dispense Refill    potassium chloride (KLOR-CON M) 20 MEQ extended release tablet Take 20 mEq by mouth daily      Ticagrelor (BRILINTA PO) Take 90 mg by mouth 2 times daily      ezetimibe (ZETIA) 10 MG tablet Take 1 tablet by mouth nightly 90 tablet 3    traZODone (DESYREL) 50 MG tablet Take 1 tablet by mouth nightly 90 tablet 2    rivaroxaban (XARELTO) 20 MG TABS tablet Take 1 tablet by mouth daily (with breakfast) TAKE 1 TABLET DAILY WITH BREAKFAST 90 tablet 3    allopurinol (ZYLOPRIM) 100 MG tablet Take 1 tablet by mouth 2 times daily 180 tablet 3    linagliptin (TRADJENTA) 5 MG tablet Take 1 tablet by mouth daily 90 tablet 3    glimepiride (AMARYL) 4 MG tablet Take 1 tablet by mouth 2 times daily 180 tablet 3    metoprolol tartrate (LOPRESSOR) 25 MG tablet Take 1 tablet by mouth 2 times daily TAKE 1 TABLET TWICE A DAY (Patient taking differently: Take 50 mg by mouth 2 times daily TAKE 1 TABLET TWICE A DAY) 180 tablet 3    amLODIPine (NORVASC) 2.5 MG tablet Take 1 tablet by mouth daily 90 tablet 3    furosemide (LASIX) 40 MG tablet Take 1 tablet by mouth daily 90 tablet 3    atorvastatin (LIPITOR) 80 MG tablet Take 1 tablet by mouth nightly 90 tablet 3    nitroGLYCERIN (NITROSTAT) 0.4 MG SL tablet Place 1 tablet under the tongue every 5 minutes as needed for Chest pain 25 tablet 1    Biotin 300 MCG TABS Take 600 mcg by mouth every other day       Cholecalciferol (VITAMIN D3) 1000 UNITS TABS Take 1 tablet by mouth daily      blood glucose monitor strips Testing once a day. Please dispense according to patients insurance.  300 strip 3    FREESTYLE LANCETS MISC USE TO TEST BLOOD SUGAR TWICE A  each 1    Blood Glucose Monitoring Suppl (FREESTYLE LITE) SARY use as directed  0     No current facility-administered medications on file prior to encounter. Review of Systems   Constitutional: Negative for chills, fatigue and fever. HENT: Negative for congestion, postnasal drip, rhinorrhea, sinus pain and trouble swallowing. Eyes: Positive for visual disturbance. Respiratory: Negative for cough, shortness of breath and wheezing. Cardiovascular: Positive for leg swelling. Negative for chest pain. Gastrointestinal: Positive for abdominal distention, diarrhea and nausea. Negative for blood in stool, constipation and vomiting. Genitourinary: Positive for difficulty urinating, dysuria, frequency, hematuria and urgency. Musculoskeletal: Positive for arthralgias. Negative for myalgias. Skin: Negative for rash. Neurological: Negative for dizziness, seizures and headaches. GENERAL PHYSICAL EXAM:     Vitals: BP (!) 124/51   Pulse 63   Temp 97.1 °F (36.2 °C) (Temporal)   Resp 18   Ht 5' 9\" (1.753 m)   Wt 196 lb (88.9 kg)   SpO2 99%   BMI 28.94 kg/m²  Body mass index is 28.94 kg/m². GENERAL APPEARANCE:   Lisa Kerr is 80 y.o.,  female, nourished, conscious, alert. Does not appear to be distress or pain at this time. SKIN:  Warm, dry, no cyanosis or jaundice. HEAD:  Normocephalic, atraumatic, no swelling or tenderness. EYES: Wears glasses, conjunctiva clear sclera white bilaterally           EARS:  No discharge, no marked hearing loss. NOSE:  No rhinorrhea, epistaxis or septal deformity. THROAT:  Uvula midline. No ulceration bleeding or discharge. NECK:  No stiffness, trachea central.                  CHEST:  Symmetrical and equal on expansion. HEART:  RRR S1 > S2. Systolic murmur, 3/6. LUNGS:  Equal on expansion clear throughout. No wheezing rhonchi or rales. ABDOMEN:  Soft on palpation. No localized tenderness. No guarding or rigidity.  No palpable organomegaly. LOCOMOTOR, BACK AND SPINE:  No tenderness or deformities. EXTREMITIES: Bilateral lower extremities with 2+ pitting edema. No calf tenderness. Hyperpigmentation bilateral lower legs. Prominent vasculature, veins of bilateral lower legs. NEUROLOGIC:  The patient is conscious, alert, oriented, No apparent focal sensory or motor deficits.                                                                                    PROVISIONAL DIAGNOSES / SURGERY:      RIGHT KIDNEY STONE  RIGHT CYSTOSCOPY URETEROSCOPY HOLMIUM LASER WITH STENT INSERTION       LISSY Min CNP on 10/6/2020 at 11:48 AM

## 2020-10-07 LAB
CULTURE: ABNORMAL
Lab: ABNORMAL
SPECIMEN DESCRIPTION: ABNORMAL

## 2020-10-08 ENCOUNTER — TELEPHONE (OUTPATIENT)
Dept: UROLOGY | Age: 85
End: 2020-10-08

## 2020-10-08 RX ORDER — CIPROFLOXACIN 500 MG/1
500 TABLET, FILM COATED ORAL 2 TIMES DAILY
Qty: 10 TABLET | Refills: 0 | Status: SHIPPED | OUTPATIENT
Start: 2020-10-08 | End: 2020-10-13

## 2020-10-08 NOTE — TELEPHONE ENCOUNTER
Positive urine culture PAT testing. Per Elena Lowry CNP, orders placed for UTI treatment prior to upcoming cysto URS/stent insertion on 10/20/20.

## 2020-10-29 ENCOUNTER — HOSPITAL ENCOUNTER (OUTPATIENT)
Dept: WOMENS IMAGING | Age: 85
Discharge: HOME | End: 2020-10-31
Payer: MEDICARE

## 2020-10-29 PROCEDURE — 77063 BREAST TOMOSYNTHESIS BI: CPT

## 2020-11-05 ENCOUNTER — OFFICE VISIT (OUTPATIENT)
Dept: UROLOGY | Age: 85
End: 2020-11-05
Payer: MEDICARE

## 2020-11-05 VITALS — DIASTOLIC BLOOD PRESSURE: 69 MMHG | TEMPERATURE: 98.4 F | HEART RATE: 68 BPM | SYSTOLIC BLOOD PRESSURE: 138 MMHG

## 2020-11-05 PROCEDURE — G8417 CALC BMI ABV UP PARAM F/U: HCPCS | Performed by: UROLOGY

## 2020-11-05 PROCEDURE — G8427 DOCREV CUR MEDS BY ELIG CLIN: HCPCS | Performed by: UROLOGY

## 2020-11-05 PROCEDURE — 1090F PRES/ABSN URINE INCON ASSESS: CPT | Performed by: UROLOGY

## 2020-11-05 PROCEDURE — 1123F ACP DISCUSS/DSCN MKR DOCD: CPT | Performed by: UROLOGY

## 2020-11-05 PROCEDURE — 99213 OFFICE O/P EST LOW 20 MIN: CPT | Performed by: UROLOGY

## 2020-11-05 PROCEDURE — 4040F PNEUMOC VAC/ADMIN/RCVD: CPT | Performed by: UROLOGY

## 2020-11-05 PROCEDURE — 1036F TOBACCO NON-USER: CPT | Performed by: UROLOGY

## 2020-11-05 PROCEDURE — G8484 FLU IMMUNIZE NO ADMIN: HCPCS | Performed by: UROLOGY

## 2020-11-05 ASSESSMENT — ENCOUNTER SYMPTOMS
EYE REDNESS: 0
VOMITING: 0
SHORTNESS OF BREATH: 0
COLOR CHANGE: 0
WHEEZING: 0
COUGH: 0
EYE PAIN: 0
BACK PAIN: 0
ABDOMINAL PAIN: 0
NAUSEA: 0

## 2020-11-05 NOTE — TELEPHONE ENCOUNTER
Please Approve or Refuse.   Send to Pharmacy per Pt's Request: patient would need 30 days supply sent to [facility]      Next Visit Date:  12/9/2020   Last Visit Date: 8/28/2020    Hemoglobin A1C (%)   Date Value   08/28/2020 7.3   05/22/2020 8.2 (H)   03/10/2020 7.1             ( goal A1C is < 7)   BP Readings from Last 3 Encounters:   11/05/20 138/69   10/06/20 (!) 124/51   10/08/20 118/68          (goal 120/80)  BUN   Date Value Ref Range Status   10/06/2020 21 8 - 23 mg/dL Final     CREATININE   Date Value Ref Range Status   10/06/2020 0.76 0.50 - 0.90 mg/dL Final     Potassium   Date Value Ref Range Status   10/06/2020 4.5 3.7 - 5.3 mmol/L Final

## 2020-11-05 NOTE — PATIENT INSTRUCTIONS
stay hydrated    Timed and double voiding, decrease fluids 2 hours before bed. Will follow up on US renal to assess stone - call with worsening pain or symptoms sooner. Lena Pro

## 2020-11-05 NOTE — PROGRESS NOTES
[address] [address]  [facility] - Establ[facility]    Patient:  Fam Crowell  YOB: 1933  Date: 11/5/2020    The patient is a 80 y.o. female whopresents today for evaluation of the following problems:   Chief Complaint   Patient presents with    Nephrolithiasis     procedure cancelled due to no cardiac clearance        HPI  Has Hx of stones and her cardiologist does not feel surgery is a good idea unless it its emergent. No flank pain, no hematuria. She has had a recent Klebsiella UTI- asymptomatic since last treatment. Summary of old records: N/A    Additional History: N/A    Procedures Today: N/A    Urinalysis today:  No results found for this visit on 11/05/20. Imaging Reviewed during this Office Visit:    Procedure: Abdomen x-ray(s) performed. Number of views:AP view    History:Renal calculus    Comparison:None    Findings: No free air identified.  Calcific lesions are seen overlying the inferior pole the right kidney which could represent nephrolithiasis.  There are 2 adjacent calculi measuring approximately 6 to 7 mm in size.  Bowel gas pattern shows no acute findings. Impression:    Probable right nephrolithiasis    Workstation:[id number]    Finalized by Aurelia Pat DO on 8/20/2020 5:13 PM   Other Result Information   Interface - Rad Results/Orders In 1 - 08/20/2020  5:14 PM EDT    Procedure: Abdomen x-ray(s) performed. Number of views:AP view    History:Renal calculus    Comparison:None    Findings: No free air identified. Calcific lesions are seen overlying the inferior pole the right kidney which could represent nephrolithiasis. There are 2 adjacent calculi measuring approximately 6 to 7 mm in size. Bowel gas pattern shows no acute findings.     Impression:    Probable right nephrolithiasis    Workstation:[id number]    Finalized by Linda Herrera Cortez Bajwa, DO on 8/20/2020 5:13 PM       (results were independently reviewed by physician and radiology report verified)    AUA Symptom Score (11/5/2020):                               Last BUN and creatinine:  Lab Results   Component Value Date    BUN 21 10/06/2020     Lab Results   Component Value Date    CREATININE 0.76 10/06/2020       Additional Lab/Culture results:  Kleb infection- 10/6/2020     PAST MEDICAL, FAMILY AND SOCIAL HISTORY UPDATE:  Past Medical History:   Diagnosis Date    Arthritis     Basal cell carcinoma of nose     Bronchitis     HX. OF     CAD (coronary artery disease)     Carpal tunnel syndrome     left hand    Colon cancer (HCC)     Diabetes mellitus [facility]     DVT (deep venous thrombosis) ([facility] 7/10/2019    Gout     Hematuria     History of coronary artery bypass graft x 3 7/2/2020    Hx of blood clots     ED.  LEGS, ED. 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 of stenting x 3.    APPENDECTOMY      BREAST SURGERY      INFECTED MILK DUCT LT.    CARDIAC SURGERY      CABG x 3 vessels and 3 stents    CAROTID ENDARTERECTOMY Left 3-3-16    CARPAL TUNNEL RELEASE Right     CHOLECYSTECTOMY      COLON SURGERY      colectomy, PRECANCEROUS POLYPS    COLONOSCOPY      X5, HX PRECANCEROUS POLYPS    CORONARY ARTERY BYPASS GRAFT      X3 vessels    CYST REMOVAL  10/07/2016    EXCISION OF SEBACEOUS CYST REMOVED FROM BACK X3    CYSTOSCOPY Bilateral 1/21/2020    CYSTOSCOPY RETROGRADE PYELOGRAM performed by Isaac Suero MD at [address] [place] [address] FINGER TRIGGER RELEASE Right     INDEX FINGER AND THUMB.    HYSTERECTOMY      JOINT REPLACEMENT Left 03/29/2010    TKA    JOINT REPLACEMENT Right 02/16/1999    TKA    LITHOTRIPSY      X 3    SKIN BIOPSY      TOP OF NOSE (BASAL CELL)    VASCULAR SURGERY      vein ablation on legs     Family History   Problem Relation Age of Onset    Stroke Mother     Hypertension Mother     Heart Disease Father         AAA    Stroke Sister     Parkinsonism Brother     Cancer Sister         lung    Alcohol Abuse Sister      Outpatient Medications Marked as Taking for the 11/5/20 encounter (Office Visit) with Diana Mejia MD   Medication Sig Dispense Refill    alendronate (FOSAMAX) 70 MG tablet Take 70 mg by mouth every 7 days      potassium chloride (KLOR-CON M) 20 MEQ extended release tablet Take 20 mEq by mouth daily      Ticagrelor (BRILINTA PO) Take 90 mg by mouth 2 times daily      ezetimibe (ZETIA) 10 MG tablet Take 1 tablet by mouth nightly 90 tablet 3    traZODone (DESYREL) 50 MG tablet Take 1 tablet by mouth nightly 90 tablet 2    rivaroxaban (XARELTO) 20 MG TABS tablet Take 1 tablet by mouth daily (with breakfast) TAKE 1 TABLET DAILY WITH BREAKFAST 90 tablet 3    allopurinol (ZYLOPRIM) 100 MG tablet Take 1 tablet by mouth 2 times daily 180 tablet 3    linagliptin (TRADJENTA) 5 MG tablet Take 1 tablet by mouth daily 90 tablet 3    glimepiride (AMARYL) 4 MG tablet Take 1 tablet by mouth 2 times daily 180 tablet 3    metoprolol tartrate (LOPRESSOR) 25 MG tablet Take 1 tablet by mouth 2 times daily TAKE 1 TABLET TWICE A DAY (Patient taking differently: Take 50 mg by mouth 2 times daily TAKE 1 TABLET TWICE A DAY) 180 tablet 3    amLODIPine (NORVASC) 2.5 MG tablet Take 1 tablet by mouth daily 90 tablet 3    furosemide (LASIX) 40 MG tablet Take 1 tablet by mouth daily 90 tablet 3    blood glucose monitor strips Testing once a day. Please dispense according to patients insurance.  300 strip 3    atorvastatin (LIPITOR) 80 MG tablet Take 1 tablet by mouth nightly 90 tablet 3    FREESTYLE LANCETS MISC USE TO TEST BLOOD SUGAR TWICE A  each 1    Blood Glucose Monitoring Suppl (FREESTYLE LITE) SARY use as directed  0    nitroGLYCERIN (NITROSTAT) 0.4 MG SL tablet Place 1 tablet under the tongue every 5 minutes as needed for Chest pain 25 tablet 1    Biotin 300 MCG TABS Take 600 mcg by mouth every other day       Cholecalciferol (VITAMIN D3) 1000 UNITS TABS Take 1 tablet by mouth daily        (All medications reviewed and updated by provider sincelast office visit or hospitalization)   Ampicillin; Clopidogrel bisulfate; Diovan [valsartan]; Lantus [insulin glargine]; and Metformin and related  Social History     Tobacco Use   Smoking Status Never Smoker   Smokeless Tobacco Never Used      (If patient a smoker, smoking cessation counseling offered)     Social History     Substance and Sexual Activity   Alcohol Use No       REVIEW OF SYSTEMS:  Review of Systems      Physical Exam:      Vitals:    11/05/20 1032   BP: 138/69   Pulse: 68   Temp: 98.4 °F (36.9 °C)     There is no height or weight on file to calculate BMI. Patient is a 80 y.o. female in noacute distress and alert and oriented to person, place and time. Physical Exam  Constitutional: Patient in no acute distress. Neuro: Alert andoriented to person, place and time. Psych: Mood normal, affect normal  Skin: No rash noted  HEENT: Head: Normocephalic and atraumatic  Conjunctivae and EOM are normal. Pupils are equal, round  Nose: Normal  Right External Ear: Normal; Left External Ear: Normal  Mouth: Mucosa Moist  Neck: Supple  Lungs: Respiratory effort is normal  Cardiovascular: Warm & Pink  Abdomen: Soft, non-tender, non-distended  Bladder non-tender and not distended. Musculoskeletal: Normal gait and station      and Plan      1. Kidney stones    2.  UTI due to Klebsiella species           Plan:    stay hydrated    Timed and double voiding, decrease fluids 2 hours before bed. Will follow up on US 1 year,  call with worsening pain. No follow-ups on file. Prescriptions Ordered:  No orders of the defined types were placed in this encounter. Orders Placed:  Orders Placed This Encounter   Procedures    US RENAL COMPLETE     Standing Status:   Future     Standing Expiration Date:   11/5/2021            Mariely Short MD    Agree with the ROS entered by the MA.

## 2020-11-23 ENCOUNTER — HOSPITAL ENCOUNTER (OUTPATIENT)
Dept: ULTRASOUND IMAGING | Age: 85
Discharge: HOME | End: 2020-11-25
Payer: MEDICARE

## 2020-11-23 PROCEDURE — 76770 US EXAM ABDO BACK WALL COMP: CPT

## 2020-12-09 ENCOUNTER — OFFICE VISIT (OUTPATIENT)
Dept: FAMILY MEDICINE CLINIC | Age: 85
End: 2020-12-09
Payer: MEDICARE

## 2020-12-09 ENCOUNTER — HOSPITAL ENCOUNTER (OUTPATIENT)
Age: 85
Setting detail: SPECIMEN
Discharge: HOME | End: 2020-12-09
Payer: MEDICARE

## 2020-12-09 VITALS
DIASTOLIC BLOOD PRESSURE: 62 MMHG | HEART RATE: 99 BPM | BODY MASS INDEX: 29.59 KG/M2 | SYSTOLIC BLOOD PRESSURE: 118 MMHG | OXYGEN SATURATION: 100 % | TEMPERATURE: 97.3 F | WEIGHT: 199.8 LBS | HEIGHT: 69 IN

## 2020-12-09 LAB
BILIRUBIN, POC: ABNORMAL
BLOOD URINE, POC: ABNORMAL
CLARITY, POC: ABNORMAL
COLOR, POC: YELLOW
GLUCOSE URINE, POC: NEGATIVE
HBA1C MFR BLD: 7 %
KETONES, POC: NEGATIVE
LEUKOCYTE EST, POC: ABNORMAL
NITRITE, POC: POSITIVE
PH, POC: 5.5
PROTEIN, POC: ABNORMAL
SPECIFIC GRAVITY, POC: >=1.03
UROBILINOGEN, POC: ABNORMAL

## 2020-12-09 PROCEDURE — 1036F TOBACCO NON-USER: CPT | Performed by: FAMILY MEDICINE

## 2020-12-09 PROCEDURE — 1090F PRES/ABSN URINE INCON ASSESS: CPT | Performed by: FAMILY MEDICINE

## 2020-12-09 PROCEDURE — G8417 CALC BMI ABV UP PARAM F/U: HCPCS | Performed by: FAMILY MEDICINE

## 2020-12-09 PROCEDURE — 90686 IIV4 VACC NO PRSV 0.5 ML IM: CPT | Performed by: FAMILY MEDICINE

## 2020-12-09 PROCEDURE — 3051F HG A1C>EQUAL 7.0%<8.0%: CPT | Performed by: FAMILY MEDICINE

## 2020-12-09 PROCEDURE — G0008 ADMIN INFLUENZA VIRUS VAC: HCPCS | Performed by: FAMILY MEDICINE

## 2020-12-09 PROCEDURE — 81002 URINALYSIS NONAUTO W/O SCOPE: CPT | Performed by: FAMILY MEDICINE

## 2020-12-09 PROCEDURE — 4040F PNEUMOC VAC/ADMIN/RCVD: CPT | Performed by: FAMILY MEDICINE

## 2020-12-09 PROCEDURE — 83036 HEMOGLOBIN GLYCOSYLATED A1C: CPT | Performed by: FAMILY MEDICINE

## 2020-12-09 PROCEDURE — G8482 FLU IMMUNIZE ORDER/ADMIN: HCPCS | Performed by: FAMILY MEDICINE

## 2020-12-09 PROCEDURE — 1123F ACP DISCUSS/DSCN MKR DOCD: CPT | Performed by: FAMILY MEDICINE

## 2020-12-09 PROCEDURE — G8427 DOCREV CUR MEDS BY ELIG CLIN: HCPCS | Performed by: FAMILY MEDICINE

## 2020-12-09 PROCEDURE — 99214 OFFICE O/P EST MOD 30 MIN: CPT | Performed by: FAMILY MEDICINE

## 2020-12-09 RX ORDER — PETROLATUM,WHITE
OINTMENT IN PACKET (GRAM) TOPICAL
Qty: 200 G | Refills: 0 | Status: SHIPPED | OUTPATIENT
Start: 2020-12-09 | End: 2021-08-11 | Stop reason: ALTCHOICE

## 2020-12-09 RX ORDER — DIAPER,BRIEF,INFANT-TODD,DISP
EACH MISCELLANEOUS
Qty: 1 TUBE | Refills: 1 | Status: SHIPPED | OUTPATIENT
Start: 2020-12-09 | End: 2020-12-16

## 2020-12-09 ASSESSMENT — ENCOUNTER SYMPTOMS
ABDOMINAL PAIN: 0
CHEST TIGHTNESS: 0
COUGH: 1
NAUSEA: 0
ABDOMINAL DISTENTION: 0
CONSTIPATION: 0
DIARRHEA: 0
WHEEZING: 0
SHORTNESS OF BREATH: 1
VOMITING: 0

## 2020-12-09 NOTE — RESULT ENCOUNTER NOTE
Addressed during office visit today, A1c improved, 7, UA abnormal with moderate blood, proteins, trace leukocytes, positive nitrate, likely UTI await urine culture   continue current treatment discussed during visit

## 2020-12-09 NOTE — PATIENT INSTRUCTIONS
Patient Education        Diabetes Blood Sugar Emergencies: Your Action Plan  How can you prevent a blood sugar emergency? An important part of living with diabetes is keeping your blood sugar in your target range. You'll need to know what to do if it's too high or too low. Managing your blood sugar levels helps you avoid emergencies. This care sheet will teach you about the signs of high and low blood sugar. It will help you make an action plan with your doctor for when these signs occur. Low blood sugar is more likely to happen if you take certain medicines for diabetes. It can also happen if you skip a meal, drink alcohol, or exercise more than usual.  You may get high blood sugar if you eat differently than you normally do. One example is eating more carbohydrate than usual. Having a cold, the flu, or other sudden illness can also cause high blood sugar levels. Levels can also rise if you miss a dose of medicine. Any change in how you take your medicine may affect your blood sugar level. So it's important to work with your doctor before you make any changes. Check your blood sugar  Work with your doctor to fill in the blank spaces below that apply to you. Track your levels, know your target range, and write down ways you can get your blood sugar back in your target range. A log book can help you track your levels. Take the book to all of your medical appointments. · Check your blood sugar _____ times a day, at these times:________________________________________________. (For example: Before meals, at bedtime, before exercise, during exercise, other.)  · Your blood sugar target range before a meal is ___________________. Your blood sugar target range after a meal is _______________________. · Do this--___________________________________________________--to get your blood sugar back within your safe range if your blood sugar results are _________________________________________.  (For example: Less than 70 or above 250 mg/dL.)  Call your doctor when your blood sugar results are ___________________________________. (For example: Less than 70 or above 250 mg/dL.)  What are the symptoms of low and high blood sugar? Common symptoms of low blood sugar are sweating and feeling shaky, weak, hungry, or confused. Symptoms can start quickly. Common symptoms of high blood sugar are feeling very thirsty or very hungry. You may also pass urine more often than usual. You may have blurry vision and may lose weight without trying. But some people may have high or low blood sugar without having any symptoms. That's a good reason to check your blood sugar on a regular schedule. What should you do if you have symptoms? Work with your doctor to fill in the blank spaces below that apply to you. Low blood sugar  If you have symptoms of low blood sugar, check your blood sugar. If it's below _____ ( for example, below 70), eat or drink a quick-sugar food that has about 15 grams of carbohydrate. Your goal is to get your level back to your safe range. Check your blood sugar again 15 minutes later. If it's still not in your target range, take another 15 grams of carbohydrate and check your blood sugar again in 15 minutes. Repeat this until you reach your target. Then go back to your regular testing schedule. Children usually need less than 15 grams of carbohydrate. Check with your doctor or diabetes educator for the amount that is right for your child. When you have low blood sugar, it's best to stop or reduce any physical activity until your blood sugar is back in your target range and is stable. If you must stay active, eat or drink 30 grams of carbohydrate. Then check your blood sugar again in 15 minutes. If it's not in your target range, take another 30 grams of carbohydrates. Check your blood sugar again in 15 minutes. Keep doing this until you reach your target. You can then go back to your regular testing schedule.   If your symptoms or blood sugar levels are getting worse or have not improved after 15 minutes, seek medical care right away. Here are some examples of quick-sugar foods with 15 grams of carbohydrate:  · 3 or 4 glucose tablets  · 1 tablespoon (3 teaspoons) table sugar  · ½ cup to ¾ cup (4 to 6 ounces) of fruit juice or regular (not diet) soda  · Hard candy (such as 6 Life Savers)  High blood sugar  If you have symptoms of high blood sugar, check your blood sugar. Your goal is to get your level back to your target range. If it's above ______ ( for example, above 250), follow these steps:  · If you missed a dose of your diabetes medicine, take it now. Take only the amount of medicine that you have been prescribed. Do not take more or less medicine. · Give yourself insulin if your doctor has prescribed it for high blood sugar. · Test for ketones, if the doctor told you to do so. If the results of the ketone test show a moderate-to-large amount of ketones, call the doctor for advice. · Wait 30 minutes after you take the extra insulin or the missed medicine. Check your blood sugar again. If your symptoms or blood sugar levels are getting worse or have not improved after taking these steps, seek medical care right away. Follow-up care is a key part of your treatment and safety. Be sure to make and go to all appointments, and call your doctor if you are having problems. It's also a good idea to know your test results and keep a list of the medicines you take. Where can you learn more? Go to https://XYZErameshCartagenia.[employer]equel Data. org and sign in to your [employer] account. Enter T491 in the [facility] box to learn more about \"Diabetes Blood Sugar Emergencies: Your Action Plan. \"     If you do not have an account, please click on the \"Sign Up Now\" link. Current as of: December 20, 2019               Content Version: 12.6  © 2630-9117 Healthwise, Incorporated.    Care instructions adapted under license by [facility]. If you have questions about a medical condition or this instruction, always ask your healthcare professional. [address] any warranty or liability for your use of this information. Patient Education        Learning About Diabetes Food Guidelines  Your Care Instructions     Meal planning is important to manage diabetes. It helps keep your blood sugar at a target level (which you set with your doctor). You don't have to eat special foods. You can eat what your family eats, including sweets once in a while. But you do have to pay attention to how often you eat and how much you eat of certain foods. You may want to work with a dietitian or a certified diabetes educator (CDE) to help you plan meals and snacks. A dietitian or CDE can also help you lose weight if that is one of your goals. What should you know about eating carbs? Managing the amount of carbohydrate (carbs) you eat is an important part of healthy meals when you have diabetes. Carbohydrate is found in many foods. · Learn which foods have carbs. And learn the amounts of carbs in different foods. ? Bread, cereal, pasta, and rice have about 15 grams of carbs in a serving. A serving is 1 slice of bread (1 ounce), ½ cup of cooked cereal, or 1/3 cup of cooked pasta or rice. ? Fruits have 15 grams of carbs in a serving. A serving is 1 small fresh fruit, such as an apple or orange; ½ of a banana; ½ cup of cooked or canned fruit; ½ cup of fruit juice; 1 cup of melon or raspberries; or 2 tablespoons of dried fruit. ? Milk and no-sugar-added yogurt have 15 grams of carbs in a serving. A serving is 1 cup of milk or 2/3 cup of no-sugar-added yogurt. ? Starchy vegetables have 15 grams of carbs in a serving. A serving is ½ cup of mashed potatoes or sweet potato; 1 cup winter squash; ½ of a small baked potato; ½ cup of cooked beans; or ½ cup cooked corn or green peas.   · Learn how much carbs to eat each day and at each meal. A dietitian or CDE can teach you how to keep track of the amount of carbs you eat. This is called carbohydrate counting. · If you are not sure how to count carbohydrate grams, use the Plate Method to plan meals. It is a good, quick way to make sure that you have a balanced meal. It also helps you spread carbs throughout the day. ? Divide your plate by types of foods. Put non-starchy vegetables on half the plate, meat or other protein food on one-quarter of the plate, and a grain or starchy vegetable in the final quarter of the plate. To this you can add a small piece of fruit and 1 cup of milk or yogurt, depending on how many carbs you are supposed to eat at a meal.  · Try to eat about the same amount of carbs at each meal. Do not \"save up\" your daily allowance of carbs to eat at one meal.  · Proteins have very little or no carbs per serving. Examples of proteins are beef, chicken, turkey, fish, eggs, tofu, cheese, cottage cheese, and peanut butter. A serving size of meat is 3 ounces, which is about the size of a deck of cards. Examples of meat substitute serving sizes (equal to 1 ounce of meat) are 1/4 cup of cottage cheese, 1 egg, 1 tablespoon of peanut butter, and ½ cup of tofu. How can you eat out and still eat healthy? · Learn to estimate the serving sizes of foods that have carbohydrate. If you measure food at home, it will be easier to estimate the amount in a serving of restaurant food. · If the meal you order has too much carbohydrate (such as potatoes, corn, or baked beans), ask to have a low-carbohydrate food instead. Ask for a salad or green vegetables. · If you use insulin, check your blood sugar before and after eating out to help you plan how much to eat in the future. · If you eat more carbohydrate at a meal than you had planned, take a walk or do other exercise. This will help lower your blood sugar. What else should you know?   · Limit saturated fat, such as the fat from meat and dairy products. This is a healthy choice because people who have diabetes are at higher risk of heart disease. So choose lean cuts of meat and nonfat or low-fat dairy products. Use olive or canola oil instead of butter or shortening when cooking. · Don't skip meals. Your blood sugar may drop too low if you skip meals and take insulin or certain medicines for diabetes. · Check with your doctor before you drink alcohol. Alcohol can cause your blood sugar to drop too low. Alcohol can also cause a bad reaction if you take certain diabetes medicines. Follow-up care is a key part of your treatment and safety. Be sure to make and go to all appointments, and call your doctor if you are having problems. It's also a good idea to know your test results and keep a list of the medicines you take. Where can you learn more? Go to https://DyMyndana."[employer] - The Simple, Fast and Elegant Tablet [employer] App". org and sign in to your [employer] account. Enter U802 in the [employer] box to learn more about \"Learning About Diabetes Food Guidelines. \"     If you do not have an account, please click on the \"Sign Up Now\" link. Current as of: December 20, 2019               Content Version: 12.6  © 5911-9572 [employer]. Care instructions adapted under license by [facility] ([facility]). If you have questions about a medical condition or this instruction, always ask your healthcare professional. [address] any warranty or liability for your use of this information.

## 2020-12-09 NOTE — PROGRESS NOTES
active,  and is adherent to low salt diet. Blood pressure is well controlled at home. Cardiac symptoms  dyspnea, fatigue and lower extremity edema. Patient denies  chest pain, chest pressure/discomfort, claudication, exertional chest pressure/discomfort, irregular heart beat, near-syncope, orthopnea, palpitations, paroxysmal nocturnal dyspnea, syncope and tachypnea. Use of agents associated with hypertension: none. History of target organ damage: angina/ prior myocardial infarction,, prior coronary revascularization and Chronic kidney disease stage III, carotid artery stenosis   has known history of CABG x3, history of PVCs, history of sick sinus syndrome, ventricular tachycardia, she sees Dr. Prince Kasper  She is on chronic anticoagulation  Saw cardiology in October. Stress test 6/16/20--got a new stent at that time  Perfusion:  Mild reversible defect in the inferior wall and one inferior    septal segment, 6% of myocardium.         Function:  Normal left ventricular wall motion.  Normal left ventricular    ejection fraction of 61%.         Risk stratification: Intermediate based on extent of perfusion defects.             BP controlled. Mila Cordero reports compliance with BP medications, and tolerates them well, denies side effects. BP Readings from Last 3 Encounters:   12/09/20 118/62   11/05/20 138/69   10/06/20 (!) 124/51        Pulse is Normal.    Pulse Readings from Last 3 Encounters:   12/09/20 99   11/05/20 68   10/06/20 63         Hyperlipidemia:  No new myalgias or GI upset on atorvastatin (Lipitor) and eztemibe (Zetia). Medication compliance: compliant all of the time. Patient is  following a low fat, low cholesterol diet. LDL is Normal.    Lab Results   Component Value Date    LDLCALC 43 02/22/2019    LDLCHOLESTEROL 29 04/13/2020       Had US kidney negative for stones.   She does have history of kidney stones, with left ESWL in the past  Mila Cordero complains of \"Pink urine sometimes, last seen a few weeks ago\"  She says she gets up a lot at nighttime to urinate   Reports urgency and frequency of urination. She denies pain when urinating or flank pain. She does get frequent UTIs. UA today is positive for moderate blood, proteins, trace leukocytes and positive nitrates    Results for POC orders placed in visit on 12/09/20   POCT glycosylated hemoglobin (Hb A1C)   Result Value Ref Range    Hemoglobin A1C 7.0 %   POCT Urinalysis no Micro   Result Value Ref Range    Color, UA YELLOW     Clarity, UA CLOUDY     Glucose, UA POC NEGATIVE     Bilirubin, UA SMALL     Ketones, UA NEGATIVE     Spec Grav, UA >=1.030     Blood, UA POC MODERATE     pH, UA 5.5     Protein, UA  mg/dL     Urobilinogen, UA 0.2E.U./dL     Leukocytes, UA TRACE     Nitrite, UA POSITIVE      Anemia on prior blood work. Feels scratchy and itchy all over . She does have known seborrheic dermatitis and she follows with dermatologist.  The lesions are present on the back and abdominal wall. The lesions sometimes bleed due to scratching. She does not know what to do to stop the itching. Sometimes takes Benadryl due to itching, which helps temporarily, but the itching wakes her up at night  She does have appointment coming up with her dermatologist.    [facility] Fee is due for Influenza vaccine. Denies side effects from prior flu shots. Denies allergy to eggs. Denies history of Guillain-Barré syndrome.            Current Outpatient Medications   Medication Sig Dispense Refill    rivaroxaban (XARELTO) 20 MG TABS tablet Take 1 tablet by mouth daily (with breakfast) TAKE 1 TABLET DAILY WITH BREAKFAST 90 tablet 3    alendronate (FOSAMAX) 70 MG tablet Take 70 mg by mouth every 7 days      potassium chloride (KLOR-CON M) 20 MEQ extended release tablet Take 20 mEq by mouth daily      Ticagrelor (BRILINTA PO) Take 90 mg by mouth 2 times daily      ezetimibe (ZETIA) 10 MG tablet Take 1 tablet by mouth nightly 90 tablet 3    traZODone (DESYREL) present with a grade of 2/6. Pulmonary:      Effort: Pulmonary effort is normal. No respiratory distress. Breath sounds: Normal breath sounds. No wheezing or rales. Chest:      Chest wall: No tenderness. Abdominal:      General: Bowel sounds are normal. There is no distension. Palpations: Abdomen is soft. There is no hepatomegaly or splenomegaly. Tenderness: There is no abdominal tenderness. Comments: Obese abdomen   Musculoskeletal: Normal range of motion. General: No tenderness. Right lower le+ Pitting Edema present. Left lower le+ Pitting Edema present. Skin:     General: Skin is warm and dry. Capillary Refill: Capillary refill takes less than 2 seconds. Findings: Rash present. Comments: On the whole back and abdominal wall there are multiple seborrheic dermatitis papules and plaques of seborrheic dermatitis, some are darker, some are very dry, different sizes up to 4 cm, multiple excoriations noted, there are no signs of infection. She does follow with dermatologist as she does have appointment    On the plantar aspect of the right small toe, crusted small clot noted, not bleeding, covered with Band-Aid, there are no signs of infection, patient was told to call me if getting worse and we will add antibiotic orally. Avoid walking barefoot, she understands   Neurological:      Mental Status: She is alert and oriented to person, place, and time. Cranial Nerves: No cranial nerve deficit. Motor: No abnormal muscle tone. Psychiatric:         Mood and Affect: Mood normal.         Behavior: Behavior normal.         Thought Content: Thought content normal.         Judgment: Judgment normal.           Discussed testing withthe patient and all questions fully answered. I personally reviewed testing with patient.   Hyperglycemia  Anemia  Recent UTI Klebsiella  hypertriglyceridemia    Otherwise labs within normal limits    Hospital Outpatient Visit on 10/06/2020   Component Date Value Ref Range Status    Glucose 10/06/2020 192* 70 - 99 mg/dL Final    BUN 10/06/2020 21  8 - 23 mg/dL Final    CREATININE 10/06/2020 0.76  0.50 - 0.90 mg/dL Final    Bun/Cre Ratio 10/06/2020 NOT REPORTED  9 - 20 Final    Calcium 10/06/2020 9.6  8.6 - 10.4 mg/dL Final    Sodium 10/06/2020 139  135 - 144 mmol/L Final    Potassium 10/06/2020 4.5  3.7 - 5.3 mmol/L Final    Chloride 10/06/2020 102  98 - 107 mmol/L Final    CO2 10/06/2020 25  20 - 31 mmol/L Final    Anion Gap 10/06/2020 12  9 - 17 mmol/L Final    GFR Non- 10/06/2020 >60  >60 mL/min Final    GFR  10/06/2020 >60  >60 mL/min Final    GFR Comment 10/06/2020        Final    Comment: Average GFR for 79or more years old:   76 mL/min/1.73sq m  Chronic Kidney Disease:   <60 mL/min/1.73sq m  Kidney failure:   <15 mL/min/1.73sq m              eGFR calculated using average adult body mass.  Additional eGFR calculator available at:        [email or web address]            GFR Staging 10/06/2020 NOT REPORTED   Final    WBC 10/06/2020 8.3  3.5 - 11.0 k/uL Final    RBC 10/06/2020 4.37  4.0 - 5.2 m/uL Final    Hemoglobin 10/06/2020 10.3* 12.0 - 16.0 g/dL Final    Hematocrit 10/06/2020 32.5* 36 - 46 % Final    MCV 10/06/2020 74.4* 80 - 100 fL Final    MCH 10/06/2020 23.5* 26 - 34 pg Final    MCHC 10/06/2020 31.6  31 - 37 g/dL Final    RDW 10/06/2020 18.5* 11.5 - 14.9 % Final    Platelets 37/87/5339 287  150 - 450 k/uL Final    MPV 10/06/2020 8.0  6.0 - 12.0 fL Final    NRBC Automated 10/06/2020 NOT REPORTED  per 100 WBC Final    Differential Type 10/06/2020 NOT REPORTED   Final    Immature Granulocytes 10/06/2020 NOT REPORTED  0 % Final    Absolute Immature Granulocyte 10/06/2020 NOT REPORTED  0.00 - 0.30 k/uL Final    WBC Morphology 10/06/2020 NOT REPORTED   Final    RBC Morphology 10/06/2020 NOT REPORTED   Final    Platelet Estimate 52/43/7111 NOT REPORTED   Final    Seg Neutrophils 10/06/2020 64  36 - 66 % Final    Lymphocytes 10/06/2020 25  24 - 44 % Final    Monocytes 10/06/2020 8* 1 - 7 % Final    Eosinophils % 10/06/2020 2  0 - 4 % Final    Basophils 10/06/2020 1  0 - 2 % Final    Segs Absolute 10/06/2020 5.31  1.3 - 9.1 k/uL Final    Absolute Lymph # 10/06/2020 2.08  1.0 - 4.8 k/uL Final    Absolute Mono # 10/06/2020 0.66  0.1 - 1.3 k/uL Final    Absolute Eos # 10/06/2020 0.17  0.0 - 0.4 k/uL Final    Basophils Absolute 10/06/2020 0.08  0.0 - 0.2 k/uL Final    Morphology 10/06/2020 ANISOCYTOSIS PRESENT   Final    Morphology 10/06/2020 HYPOCHROMIA PRESENT   Final    Morphology 10/06/2020 MICROCYTOSIS PRESENT   Final    Specimen Description 10/06/2020 . CLEAN CATCH URINE   Final    Special Requests 10/06/2020 NOT REPORTED   Final    Culture 10/06/2020 KLEBSIELLA PNEUMONIAE >[id number] CFU/ML*  Final          Lab Results   Component Value Date    ALT 18 08/21/2020    AST 23 08/21/2020    ALKPHOS 43 08/21/2020    BILITOT 1.14 08/21/2020       Lab Results   Component Value Date    TSH 3.19 05/22/2020       Lab Results   Component Value Date    CHOL 98 04/13/2020    CHOL 114 02/22/2019    CHOL 104 06/09/2018     Lab Results   Component Value Date    TRIG 186 (H) 04/13/2020    TRIG 178 (H) 02/22/2019    TRIG 192 (H) 06/09/2018     Lab Results   Component Value Date    HDL 32 (L) 04/13/2020    HDL 35.6 (L) 02/22/2019    HDL 32 (L) 06/09/2018     Lab Results   Component Value Date    LDLCALC 43 02/22/2019    LDLCALC 34 06/09/2018    LDLCALC 46 10/10/2017    LDLCHOLESTEROL 29 04/13/2020    LDLCHOLESTEROL 54 01/24/2014    LDLCHOLESTEROL 39 03/27/2013     Lab Results   Component Value Date    CHOLHDLRATIO 3.1 04/13/2020    CHOLHDLRATIO 3.2 02/22/2019    CHOLHDLRATIO 3.3 06/09/2018         Lab Results   Component Value Date    DXSKOWTN96 485 05/22/2020     Lab Results   Component Value Date    FOLATE 13.8 05/22/2020     Lab Results   Component Value Date    VITD25 33.9 02/14/2020           ASSESSMENT AND PLAN    1. Type 2 diabetes mellitus with stage 3a chronic kidney disease, without long-term current use of insulin (HCC)  Improved Chronic kidney disease stage 3      Lab Results   Component Value Date    LABA1C 7.0 12/09/2020    LABA1C 7.3 08/28/2020    LABA1C 8.2 (H) 05/22/2020       - POCT glycosylated hemoglobin   POC UA abnormal     - CBC Auto Differential; Future  - Comprehensive Metabolic Panel; Future  - TSH without Reflex; Future  - Vitamin B12 & Folate; Future    -advised home blood glucose testing  daily  -daily feet exam, Foot care: advised to wash feet daily, pat dry and apply lotion at night, avoiding between toes. Need to look at feet daily and report to a physician any signs of inflammation or skin damage  -annual dilated eye exam  -Low carb, low fat diet, increase fruits and vegetables, and exercise 4-5 times a day 30-40 minutes a day discussed  -continue current treatment  -on tegretol   -continue  statin      2. Benign hypertension with CKD (chronic kidney disease) stage III  Improved Chronic kidney disease stage 3  Well controlled HTN. Continue current treatment. Will recheck labs. - POCT Urinalysis no Micro-abnormal   - CBC Auto Differential; Future  - Comprehensive Metabolic Panel; Future  - TSH without Reflex; Future    3. Hyperlipidemia with target LDL less than 70    Stable   continue Lipitor 80 mg daily    4.  Abnormal urinalysis  UA abnormal positive for proteins, blood, nitrates and leukocytes, pending urine culture    Results for POC orders placed in visit on 12/09/20   POCT glycosylated hemoglobin (Hb A1C)   Result Value Ref Range    Hemoglobin A1C 7.0 %   POCT Urinalysis no Micro   Result Value Ref Range    Color, UA YELLOW     Clarity, UA CLOUDY     Glucose, UA POC NEGATIVE     Bilirubin, UA SMALL     Ketones, UA NEGATIVE     Spec Grav, UA >=1.030     Blood, UA POC MODERATE     pH, UA 5.5     Protein, UA  mg/dL Urobilinogen, UA 0.2E.U./dL     Leukocytes, UA TRACE     Nitrite, UA POSITIVE        - Culture, Urine; Future    5. Anemia, unspecified type  Stable  Failing to change as expected. Likely anemia of chronic disease/chronic kidney stage III  We will do anemia work-up  - CBC Auto Differential; Future  - Vitamin B12 & Folate; Future  - Iron and TIBC; Future  - Ferritin; Future  - Path Review, Smear; Future  - Protein Electrophoresis, Urine; Future  - Electrophoresis Protein, Serum without Reflex to Immunofixation; Future    6. Seborrheic dermatitis  Worsening  Avoid scratching, will give topical Vaseline and hydrocortisone and follow-up with dermatologist as scheduled  - hydrocortisone (ALA-DAVID) 1 % cream; Apply topically 2 times daily for 7 days for itching  Dispense: 1 Tube; Refill: 1  - white petrolatum (VASELINE) GEL; Daily at bedtime for itching and dry skin  Dispense: 200 g; Refill: 0    7. Recurrent UTI  - Culture, Urine; Future  Await culture for antibiotic  8. Encounter for immunization    - INFLUENZA, QUADV, 3 YRS AND OLDER, IM PF, PREFILL SYR OR SDV, 0.5ML (AFLURIA QUADV, PF)        Data Unavailable    Orders Placed This Encounter   Procedures    Culture, Urine     Standing Status:   Future     Number of Occurrences:   1     Standing Expiration Date:   2/9/2021     Order Specific Question:   Specify (ex-cath, midstream, cysto, etc)?      Answer:   midstream    INFLUENZA, QUADV, 3 YRS AND OLDER, IM PF, PREFILL SYR OR SDV, 0.5ML (AFLURIA QUADV, PF)    CBC Auto Differential     Standing Status:   Future     Standing Expiration Date:   12/9/2021    Comprehensive Metabolic Panel     Standing Status:   Future     Standing Expiration Date:   12/9/2021    TSH without Reflex     Standing Status:   Future     Standing Expiration Date:   12/9/2021    Vitamin B12 & Folate     Standing Status:   Future     Standing Expiration Date:   12/9/2021    Iron and TIBC     Standing Status:   Future     Standing Expiration

## 2020-12-12 LAB
CULTURE: ABNORMAL
Lab: ABNORMAL
SPECIMEN DESCRIPTION: ABNORMAL

## 2020-12-12 RX ORDER — CIPROFLOXACIN 500 MG/1
500 TABLET, FILM COATED ORAL 2 TIMES DAILY
Qty: 14 TABLET | Refills: 0 | Status: SHIPPED | OUTPATIENT
Start: 2020-12-12 | End: 2020-12-19

## 2020-12-14 PROBLEM — N39.0 RECURRENT UTI: Status: ACTIVE | Noted: 2020-12-14

## 2020-12-14 PROBLEM — D64.9 ANEMIA: Status: ACTIVE | Noted: 2020-12-14

## 2020-12-14 PROBLEM — L21.9 SEBORRHEIC DERMATITIS: Status: ACTIVE | Noted: 2020-12-14

## 2021-03-16 ENCOUNTER — OFFICE VISIT (OUTPATIENT)
Dept: FAMILY MEDICINE CLINIC | Age: 86
End: 2021-03-16
Payer: MEDICARE

## 2021-03-16 VITALS
WEIGHT: 200.2 LBS | SYSTOLIC BLOOD PRESSURE: 134 MMHG | HEIGHT: 69 IN | BODY MASS INDEX: 29.65 KG/M2 | OXYGEN SATURATION: 99 % | HEART RATE: 69 BPM | TEMPERATURE: 96.9 F | DIASTOLIC BLOOD PRESSURE: 72 MMHG

## 2021-03-16 DIAGNOSIS — N18.30 BENIGN HYPERTENSION WITH CKD (CHRONIC KIDNEY DISEASE) STAGE III (HCC): ICD-10-CM

## 2021-03-16 DIAGNOSIS — E11.22 TYPE 2 DIABETES MELLITUS WITH STAGE 3A CHRONIC KIDNEY DISEASE, WITHOUT LONG-TERM CURRENT USE OF INSULIN (HCC): Primary | ICD-10-CM

## 2021-03-16 DIAGNOSIS — D64.9 ANEMIA, UNSPECIFIED TYPE: ICD-10-CM

## 2021-03-16 DIAGNOSIS — N18.31 TYPE 2 DIABETES MELLITUS WITH STAGE 3A CHRONIC KIDNEY DISEASE, WITHOUT LONG-TERM CURRENT USE OF INSULIN (HCC): Primary | ICD-10-CM

## 2021-03-16 DIAGNOSIS — E78.5 HYPERLIPIDEMIA WITH TARGET LDL LESS THAN 70: ICD-10-CM

## 2021-03-16 DIAGNOSIS — I47.29 PAROXYSMAL VT: ICD-10-CM

## 2021-03-16 DIAGNOSIS — I49.5 SSS (SICK SINUS SYNDROME) (HCC): ICD-10-CM

## 2021-03-16 DIAGNOSIS — I12.9 BENIGN HYPERTENSION WITH CKD (CHRONIC KIDNEY DISEASE) STAGE III (HCC): ICD-10-CM

## 2021-03-16 PROBLEM — I47.20 PAROXYSMAL VT (HCC): Status: ACTIVE | Noted: 2021-03-16

## 2021-03-16 PROBLEM — M19.90 ARTHRITIS: Status: RESOLVED | Noted: 2019-07-10 | Resolved: 2021-03-16

## 2021-03-16 LAB — HBA1C MFR BLD: 7.4 %

## 2021-03-16 PROCEDURE — 83036 HEMOGLOBIN GLYCOSYLATED A1C: CPT | Performed by: FAMILY MEDICINE

## 2021-03-16 PROCEDURE — G8427 DOCREV CUR MEDS BY ELIG CLIN: HCPCS | Performed by: FAMILY MEDICINE

## 2021-03-16 PROCEDURE — G8482 FLU IMMUNIZE ORDER/ADMIN: HCPCS | Performed by: FAMILY MEDICINE

## 2021-03-16 PROCEDURE — 99214 OFFICE O/P EST MOD 30 MIN: CPT | Performed by: FAMILY MEDICINE

## 2021-03-16 PROCEDURE — 1036F TOBACCO NON-USER: CPT | Performed by: FAMILY MEDICINE

## 2021-03-16 PROCEDURE — G8417 CALC BMI ABV UP PARAM F/U: HCPCS | Performed by: FAMILY MEDICINE

## 2021-03-16 PROCEDURE — 3051F HG A1C>EQUAL 7.0%<8.0%: CPT | Performed by: FAMILY MEDICINE

## 2021-03-16 PROCEDURE — 1123F ACP DISCUSS/DSCN MKR DOCD: CPT | Performed by: FAMILY MEDICINE

## 2021-03-16 PROCEDURE — 4040F PNEUMOC VAC/ADMIN/RCVD: CPT | Performed by: FAMILY MEDICINE

## 2021-03-16 PROCEDURE — 1090F PRES/ABSN URINE INCON ASSESS: CPT | Performed by: FAMILY MEDICINE

## 2021-03-16 RX ORDER — POTASSIUM CHLORIDE 20 MEQ/1
20 TABLET, EXTENDED RELEASE ORAL DAILY
Qty: 90 TABLET | Refills: 3 | Status: SHIPPED | OUTPATIENT
Start: 2021-03-16 | End: 2022-01-31

## 2021-03-16 ASSESSMENT — ENCOUNTER SYMPTOMS
COUGH: 0
ABDOMINAL DISTENTION: 0
SHORTNESS OF BREATH: 1
CONSTIPATION: 0
DIARRHEA: 0
ABDOMINAL PAIN: 0
NAUSEA: 0
VOMITING: 0
CHEST TIGHTNESS: 0
WHEEZING: 0

## 2021-03-16 ASSESSMENT — PATIENT HEALTH QUESTIONNAIRE - PHQ9
SUM OF ALL RESPONSES TO PHQ QUESTIONS 1-9: 0
SUM OF ALL RESPONSES TO PHQ9 QUESTIONS 1 & 2: 0
1. LITTLE INTEREST OR PLEASURE IN DOING THINGS: 0
SUM OF ALL RESPONSES TO PHQ QUESTIONS 1-9: 0
2. FEELING DOWN, DEPRESSED OR HOPELESS: 0

## 2021-03-16 NOTE — RESULT ENCOUNTER NOTE
Addressed during office visit today, A1c 7.4, abnormal, worsening A1c, continue treatment recommended during the office visit

## 2021-03-16 NOTE — PROGRESS NOTES
Visit Information    Have you changed or started any medications since your last visit including any over-the-counter medicines, vitamins, or herbal medicines? no   Have you stopped taking any of your medications? Is so, why? -  no  Are you having any side effects from any of your medications? - no    Have you seen any other physician or provider since your last visit? yes - CARDIOLOGIST; VASCULAR SURGERY  Have you had any other diagnostic tests since your last visit? yes - VAS CAROTID DUPLEX BILATERAL   Have you been seen in the emergency room and/or had an admission in a hospital since we last saw you?  no   Have you had your routine dental cleaning in the past 6 months? YES    Do you have an active MyChart account? If no, what is the barrier?   Yes    Patient Care Team:  Ralph Rodríguez MD as PCP - General (Family Medicine)  Ralph Rodríguez MD as PCP - [facility]  Shanti Hernandez MD as Consulting Physician (Ophthalmology)  Wicho Jin MD as Consulting Physician (Cardiology)  Axel Mercado as Consulting Physician (Neurology)  Buena Riedel, MD as Consulting Physician (Urology)  Javan Curiel MD as Consulting Physician (Nephrology)  Nandini Murray MD as Consulting Physician (Internal Medicine Cardiovascular Disease)    Medical History Review  Past Medical, Family, and Social History reviewed and does contribute to the patient presenting condition    Health Maintenance   Topic Date Due    COVID-19 Vaccine (1) Never done    DTaP/Tdap/Td vaccine (1 - Tdap) Never done    Shingles Vaccine (2 of 3) 11/26/2017    Lipid screen  04/13/2021    Annual Wellness Visit (AWV)  08/29/2021    Potassium monitoring  10/06/2021    Creatinine monitoring  10/06/2021    Flu vaccine  Completed    Pneumococcal 65+ years Vaccine  Completed    Hepatitis A vaccine  Aged Out    Hib vaccine  Aged Out    Meningococcal (ACWY) vaccine  Aged Out

## 2021-03-16 NOTE — PATIENT INSTRUCTIONS
Patient Education        Learning About Carbohydrate (Carb) Counting and Eating Out When You Have Diabetes  Why plan your meals? Meal planning can be a key part of managing diabetes. Planning meals and snacks with the right balance of carbohydrate, protein, and fat can help you keep your blood sugar at the target level you set with your doctor. You don't have to eat special foods. You can eat what your family eats, including sweets once in a while. But you do have to pay attention to how often you eat and how much you eat of certain foods. You may want to work with a dietitian or a certified diabetes educator. He or she can give you tips and meal ideas and can answer your questions about meal planning. This health professional can also help you reach a healthy weight if that is one of your goals. What should you know about eating carbs? Managing the amount of carbohydrate (carbs) you eat is an important part of healthy meals when you have diabetes. Carbohydrate is found in many foods. · Learn which foods have carbs. And learn the amounts of carbs in different foods. ? Bread, cereal, pasta, and rice have about 15 grams of carbs in a serving. A serving is 1 slice of bread (1 ounce), ½ cup of cooked cereal, or 1/3 cup of cooked pasta or rice. ? Fruits have 15 grams of carbs in a serving. A serving is 1 small fresh fruit, such as an apple or orange; ½ of a banana; ½ cup of cooked or canned fruit; ½ cup of fruit juice; 1 cup of melon or raspberries; or 2 tablespoons of dried fruit. ? Milk and no-sugar-added yogurt have 15 grams of carbs in a serving. A serving is 1 cup of milk or 2/3 cup of no-sugar-added yogurt. ? Starchy vegetables have 15 grams of carbs in a serving. A serving is ½ cup of mashed potatoes or sweet potato; 1 cup winter squash; ½ of a small baked potato; ½ cup of cooked beans; or ½ cup cooked corn or green peas.   · Learn how much carbs to eat each day and at each meal. A dietitian or CDE can teach you how to keep track of the amount of carbs you eat. This is called carbohydrate counting. · If you are not sure how to count carbohydrate grams, use the Plate Method to plan meals. It is a good, quick way to make sure that you have a balanced meal. It also helps you spread carbs throughout the day. ? Divide your plate by types of foods. Put non-starchy vegetables on half the plate, meat or other protein food on one-quarter of the plate, and a grain or starchy vegetable in the final quarter of the plate. To this you can add a small piece of fruit and 1 cup of milk or yogurt, depending on how many carbs you are supposed to eat at a meal.  · Try to eat about the same amount of carbs at each meal. Do not \"save up\" your daily allowance of carbs to eat at one meal.  · Proteins have very little or no carbs per serving. Examples of proteins are beef, chicken, turkey, fish, eggs, tofu, cheese, cottage cheese, and peanut butter. A serving size of meat is 3 ounces, which is about the size of a deck of cards. Examples of meat substitute serving sizes (equal to 1 ounce of meat) are 1/4 cup of cottage cheese, 1 egg, 1 tablespoon of peanut butter, and ½ cup of tofu. How can you eat out and still eat healthy? · Learn to estimate the serving sizes of foods that have carbohydrate. If you measure food at home, it will be easier to estimate the amount in a serving of restaurant food. · If the meal you order has too much carbohydrate (such as potatoes, corn, or baked beans), ask to have a low-carbohydrate food instead. Ask for a salad or green vegetables. · If you use insulin, check your blood sugar before and after eating out to help you plan how much to eat in the future. · If you eat more carbohydrate at a meal than you had planned, take a walk or do other exercise. This will help lower your blood sugar. What are some tips for eating healthy? · Limit saturated fat, such as the fat from meat and dairy products.  This is a healthy choice because people who have diabetes are at higher risk of heart disease. So choose lean cuts of meat and nonfat or low-fat dairy products. Use olive or canola oil instead of butter or shortening when cooking. · Don't skip meals. Your blood sugar may drop too low if you skip meals and take insulin or certain medicines for diabetes. · Check with your doctor before you drink alcohol. Alcohol can cause your blood sugar to drop too low. Alcohol can also cause a bad reaction if you take certain diabetes medicines. Follow-up care is a key part of your treatment and safety. Be sure to make and go to all appointments, and call your doctor if you are having problems. It's also a good idea to know your test results and keep a list of the medicines you take. Where can you learn more? Go to https://RayVcristineeb.[employer]. org and sign in to your [employer] account. Enter A520 in the [employer] box to learn more about \"Learning About Carbohydrate (Carb) Counting and Eating Out When You Have Diabetes. \"     If you do not have an account, please click on the \"Sign Up Now\" link. Current as of: August 31, 2020               Content Version: 12.8  © 7230-7147 [employer], [employer]. Care instructions adapted under license by [facility] ([facility]). If you have questions about a medical condition or this instruction, always ask your healthcare professional. [address] any warranty or liability for your use of this information.

## 2021-03-16 NOTE — PROGRESS NOTES
Quinn Sullivan (:  12/10/1933) is a 80 y.o. female,Established patient, here for evaluation of the following chief complaint(s): Diabetes, Hypertension, Hyperlipidemia, Discuss Labs, and Anemia      ASSESSMENT/PLAN:    1. Type 2 diabetes mellitus with stage 3a chronic kidney disease, without long-term current use of insulin (HCC)   worsening type 2 diabetes mellitus  Stable chronic kidney disease stage III    -     POCT glycosylated hemoglobin (Hb A1C)    Lab Results   Component Value Date    LABA1C 7.4 2021    LABA1C 7.0 2020    LABA1C 7.3 2020       -     Microalbumin / Creatinine Urine Ratio; Future  -     CBC Auto Differential; Future  -     Comprehensive Metabolic Panel; Future  -     TSH without Reflex; Future  -     Vitamin B12 & Folate; Future    -advised home blood glucose testing  daily or at least several times a week  -daily feet exam, Foot care: advised to wash feet daily, pat dry and apply lotion at night, avoiding between toes. Need to look at feet daily and report to a physician any signs of inflammation or skin damage  -annual dilated eye exam  -Low carb, low fat diet, increase fruits and vegetables, and exercise 4-5 times a day 30-40 minutes a day discussed  -continue current treatment  -continue statin      2. Benign hypertension with CKD (chronic kidney disease) stage III  Stable chronic kidney disease stage III  Well controlled hypertension. Continue current treatment. Metoprolol, furosemide, with potassium, and amlodipine  Will recheck labs. -     Microalbumin / Creatinine Urine Ratio; Future  -     CBC Auto Differential; Future  -     Comprehensive Metabolic Panel; Future  -     TSH without Reflex; Future  -     Urinalysis Reflex to Culture; Future  -    Refilled  potassium chloride (KLOR-CON M) 20 MEQ extended release tablet; Take 1 tablet by mouth daily, Disp-90 tablet, R-3Normal    Discussed low salt diet and BP and pulse monitoring daily    3.  Hyperlipidemia with target LDL less than 70  Well controlled. Continue current treatment. Zetia and Lipitor  Will recheck labs. -     Lipid Panel; Future  4. Anemia, unspecified type  Failing to change as expected. She never completed anemia work-up I have ordered for her at the last appointment, will reorder it all     -     CBC Auto Differential; Future  -     Comprehensive Metabolic Panel; Future  -     Vitamin B12 & Folate; Future  -     Iron and TIBC; Future  -     Protein Electrophoresis, Urine; Future  -     Electrophoresis Protein, Serum without Reflex to Immunofixation; Future  -     Ferritin; Future  -     Path Review, Smear; Future  -     Reticulocytes; Future  5. SSS (sick sinus syndrome) ([facility] [address]  Stable  Patient is currently monitored by cardiologist, does not have pacemaker  6. Paroxysmal VT (HCC)  Stable  Well-controlled, on metoprolol and chronic anticoagulation, with rivaroxaban and Brilinta      Laura received counseling on the following healthy behaviors: nutrition, exercise, medication adherence and weight loss  Reviewed prior labs and health maintenance  Discussed use, benefit, and side effects of prescribed medications. Barriers to medication compliance addressed. Patient given educational materials - see patient instructions  Was a self-tracking handout given in paper form or via [employer]? Yes  All patient questions answered. Patient voiced understanding. The patient's past medical,surgical, social, and family history as well as her current medications and allergies were reviewed as documented in today's encounter. Medications, labs, diagnostic studies, consultations and follow-up as documented in this encounter. Return in about 3 months (around 6/16/2021) for ALWAYS NEEDS 30 MIN. APPT, **POC A1C, DM2, HTN, HLP. Data Unavailable      SUBJECTIVE/OBJECTIVE:    Diabetes Mellitus Type II, Follow-up:    Current symptoms/problems include hyperglycemia and visual disturbances.   Symptoms have been present for several years. Known diabetic complications: cardiovascular disease and cerebrovascular disease, FIDENCIO, and chronic kidney disease  Cardiovascular risk factors: advanced age (older than 54 for men, 72 for women), diabetes mellitus, dyslipidemia, hypertension and obesity (BMI >= 30 kg/m2)    Medication compliance:  compliant all of the time  Current diabetic medications include :Tradjenta, Glimepiride      Eye exam current (within one year): no, advised her to see her eye doctor  Current diet: in general, a \"healthy\" diet      Barriers: lack of motivation and senior center not open  Patient says she [facility] [employer] [address] [facility]  Has been doing sitting exercising at home    Current monitoring regimen: home blood tests - 2-3 times weekly  Home blood sugar records: fasting range: 150, 160  Any episodes of hypoglycemia? no    Is She on ACE inhibitor or angiotensin II receptor blocker? No      reports that she has never smoked. She has never used smokeless tobacco.     Counseling given: Yes      Daily Aspirin? No: Patient is already on chronic anticoagulation, she would be a high risk for bleeding and she already has anemia      A1c is worsening  Lab Results   Component Value Date    LABA1C 7.4 03/16/2021    LABA1C 7.0 12/09/2020    LABA1C 7.3 08/28/2020     The weight has been stable  Wt Readings from Last 3 Encounters:   03/16/21 200 lb 3.2 oz (90.8 kg)   12/09/20 199 lb 12.8 oz (90.6 kg)         Hypertension, Chronic kidney disease stage 3, paroxysmal VT and PVCs, Sick Sinus Syndrome :   Ingrid Gloria  is adherent to low salt diet. Blood pressure is well controlled at home. Cardiac symptoms  dyspnea, fatigue and lower extremity edema. Patient denies  chest pain, chest pressure/discomfort, claudication, exertional chest pressure/discomfort, irregular heart beat, near-syncope, orthopnea, palpitations, paroxysmal nocturnal dyspnea, syncope and tachypnea.     Use of agents associated with tablet by mouth daily (with breakfast) TAKE 1 TABLET DAILY WITH BREAKFAST Yes Cielo Corona MD   alendronate (FOSAMAX) 70 MG tablet Take 70 mg by mouth every 7 days Yes Historical Provider, MD   potassium chloride (KLOR-CON M) 20 MEQ extended release tablet Take 20 mEq by mouth daily Yes Historical Provider, MD   Ticagrelor (BRILINTA PO) Take 90 mg by mouth 2 times daily Yes Historical Provider, MD   ezetimibe (ZETIA) 10 MG tablet Take 1 tablet by mouth nightly Yes Cielo Corona MD   traZODone (DESYREL) 50 MG tablet Take 1 tablet by mouth nightly Yes Cielo Corona MD   allopurinol (ZYLOPRIM) 100 MG tablet Take 1 tablet by mouth 2 times daily Yes Cielo Corona MD   linagliptin (TRADJENTA) 5 MG tablet Take 1 tablet by mouth daily Yes Cielo Corona MD   glimepiride (AMARYL) 4 MG tablet Take 1 tablet by mouth 2 times daily Yes Cielo Corona MD   metoprolol tartrate (LOPRESSOR) 25 MG tablet Take 1 tablet by mouth 2 times daily TAKE 1 TABLET TWICE A DAY  Patient taking differently: Take 50 mg by mouth 2 times daily TAKE 1 TABLET TWICE A DAY Yes Cielo Corona MD   amLODIPine (NORVASC) 2.5 MG tablet Take 1 tablet by mouth daily Yes Cielo Corona MD   furosemide (LASIX) 40 MG tablet Take 1 tablet by mouth daily Yes Cielo Corona MD   blood glucose monitor strips Testing once a day. Please dispense according to patients insurance.  Yes Cielo Corona MD   atorvastatin (LIPITOR) 80 MG tablet Take 1 tablet by mouth nightly Yes Cielo Corona MD   FREESTYLE LANCETS MISC USE TO TEST BLOOD SUGAR TWICE A DAY Yes Cori Contreras MD   Blood Glucose Monitoring Suppl (FREESTYLE LITE) SARY use as directed Yes Historical Provider, MD   nitroGLYCERIN (NITROSTAT) 0.4 MG SL tablet Place 1 tablet under the tongue every 5 minutes as needed for Chest pain Yes Cori Contreras MD   Biotin 300 MCG TABS Take 600 mcg by mouth every other day  Yes Historical Provider, MD Cholecalciferol (VITAMIN D3) 1000 UNITS TABS Take 1 tablet by mouth daily Yes Historical Provider, MD            Social History     Tobacco Use    Smoking status: Never Smoker    Smokeless tobacco: Never Used   Substance Use Topics    Alcohol use: No    Drug use: No         Review of Systems   Constitutional: Positive for fatigue. Negative for activity change, appetite change, chills, diaphoresis and fever. Eyes: Positive for visual disturbance. Respiratory: Positive for shortness of breath (BEE). Negative for cough, chest tightness and wheezing. Cardiovascular: Positive for leg swelling. Negative for chest pain and palpitations. Saw vein specialist for varicose veins and leg edema and she says her leg swelling has improved. Gastrointestinal: Negative for abdominal distention, abdominal pain, constipation, diarrhea, nausea and vomiting. Endocrine: Negative for cold intolerance, heat intolerance, polydipsia, polyphagia and polyuria. Neurological: Negative for numbness. Psychiatric/Behavioral: Positive for sleep disturbance (since her  passed away in 2018). Negative for dysphoric mood. -vital signs stable and within normal limits except overweight per BMI  /72 (Site: Left Upper Arm)   Pulse 69   Temp 96.9 °F (36.1 °C)   Ht 5' 9\" (1.753 m)   Wt 200 lb 3.2 oz (90.8 kg)   SpO2 99%   BMI 29.56 kg/m²         Physical Exam  Vitals signs and nursing note reviewed. Constitutional:       General: She is not in acute distress. Appearance: Normal appearance. She is well-developed. She is obese. She is not diaphoretic. HENT:      Head: Normocephalic and atraumatic. Right Ear: External ear normal.      Left Ear: External ear normal.      Nose: No mucosal edema. Mouth/Throat:      Comments: I did not examine the mouth due to coronavirus pandemic and wearing masks    Eyes:      General: Lids are normal. No scleral icterus. Right eye: No discharge. Left eye: No discharge. Extraocular Movements: Extraocular movements intact. Conjunctiva/sclera: Conjunctivae normal.   Neck:      Musculoskeletal: Normal range of motion and neck supple. Thyroid: No thyromegaly. Cardiovascular:      Rate and Rhythm: Normal rate and regular rhythm. Heart sounds: Murmur present. Crescendo  systolic murmur present with a grade of 3/6. Comments: Not wearing compression stockings . Saw vascular and had varicose veins treatment already  Pulmonary:      Effort: Pulmonary effort is normal. No respiratory distress. Breath sounds: Normal breath sounds. No wheezing or rales. Chest:      Chest wall: No tenderness. Abdominal:      General: Bowel sounds are normal. There is no distension. Palpations: Abdomen is soft. There is no hepatomegaly or splenomegaly. Tenderness: There is no abdominal tenderness. Comments: Obese abdomen. Musculoskeletal: Normal range of motion. General: No tenderness. Right lower le+ Pitting Edema present. Left lower le+ Pitting Edema present. Skin:     General: Skin is warm and dry. Capillary Refill: Capillary refill takes less than 2 seconds. Findings: No rash. Neurological:      Mental Status: She is alert and oriented to person, place, and time. Cranial Nerves: No cranial nerve deficit. Motor: No abnormal muscle tone. Psychiatric:         Mood and Affect: Mood normal.         Behavior: Behavior normal.         Thought Content: Thought content normal.         Judgment: Judgment normal.           I personally reviewed testing with patient. Recent UTI, we treated her with antibiotics  Anemia not improving  Hyperglycemia  Chronic kidney disease stage III stable  High triglycerides  Otherwise labs within normal limits    Hospital Outpatient Visit on 2020   Component Date Value Ref Range Status    Specimen Description 2020 . CLEAN CATCH URINE   Final    Special Requests 12/09/2020 NOT REPORTED   Final    Culture 12/09/2020 KLEBSIELLA PNEUMONIAE >[id number] CFU/ML*  Final       Lab Results   Component Value Date    URICACID 6.1 02/14/2020         Lab Results   Component Value Date    WBC 8.3 10/06/2020    HGB 10.3 (L) 10/06/2020    HCT 32.5 (L) 10/06/2020    MCV 74.4 (L) 10/06/2020     10/06/2020       Lab Results   Component Value Date     10/06/2020    K 4.5 10/06/2020     10/06/2020    CO2 25 10/06/2020    BUN 21 10/06/2020    CREATININE 0.76 10/06/2020    GLUCOSE 192 10/06/2020    GLUCOSE 170 02/14/2020    CALCIUM 9.6 10/06/2020        Lab Results   Component Value Date    ALT 18 08/21/2020    AST 23 08/21/2020    ALKPHOS 43 08/21/2020    BILITOT 1.14 08/21/2020       Lab Results   Component Value Date    TSH 3.19 05/22/2020       Lab Results   Component Value Date    CHOL 98 04/13/2020    CHOL 114 02/22/2019    CHOL 104 06/09/2018     Lab Results   Component Value Date    TRIG 186 (H) 04/13/2020    TRIG 178 (H) 02/22/2019    TRIG 192 (H) 06/09/2018     Lab Results   Component Value Date    HDL 32 (L) 04/13/2020    HDL 35.6 (L) 02/22/2019    HDL 32 (L) 06/09/2018     Lab Results   Component Value Date    LDLCALC 43 02/22/2019    LDLCALC 34 06/09/2018    LDLCALC 46 10/10/2017    LDLCHOLESTEROL 29 04/13/2020    LDLCHOLESTEROL 54 01/24/2014    LDLCHOLESTEROL 39 03/27/2013     Lab Results   Component Value Date    CHOLHDLRATIO 3.1 04/13/2020    CHOLHDLRATIO 3.2 02/22/2019    CHOLHDLRATIO 3.3 06/09/2018         Lab Results   Component Value Date    [id number] 485 05/22/2020     Lab Results   Component Value Date    FOLATE 13.8 05/22/2020     Lab Results   Component Value Date    VITD25 33.9 02/14/2020         Orders Placed This Encounter   Medications    : Semaglutide 3 MG TABS     Sig: Take 3 mg by mouth daily 1st step     Dispense:  30 tablet     Refill:  0    potassium chloride (KLOR-CON M) 20 MEQ extended release tablet     Sig: Take 1 tablet by mouth daily     Dispense:  90 tablet     Refill:  3       Orders Placed This Encounter   Procedures    Microalbumin / Creatinine Urine Ratio     Standing Status:   Future     Standing Expiration Date:   12/16/2021    CBC Auto Differential     Standing Status:   Future     Standing Expiration Date:   12/16/2021    Comprehensive Metabolic Panel     Standing Status:   Future     Standing Expiration Date:   12/16/2021    Lipid Panel     Standing Status:   Future     Standing Expiration Date:   12/16/2021     Order Specific Question:   Is Patient Fasting?/# of Hours     Answer:   8-10 Hours, water ok to drink    TSH without Reflex     Standing Status:   Future     Standing Expiration Date:   12/16/2021    Vitamin B12 & Folate     Standing Status:   Future     Standing Expiration Date:   12/16/2021    Iron and TIBC     Standing Status:   Future     Standing Expiration Date:   12/16/2021     Order Specific Question:   Is Patient Fasting? Answer:   yes     Order Specific Question:   No of Hours? Answer:   8    Protein Electrophoresis, Urine     Standing Status:   Future     Standing Expiration Date:   12/16/2021    Electrophoresis Protein, Serum without Reflex to Immunofixation     Standing Status:   Future     Standing Expiration Date:   12/16/2021    Ferritin     Standing Status:   Future     Standing Expiration Date:   12/16/2021    Path Review, Smear     Standing Status:   Future     Standing Expiration Date:   12/16/2021    Reticulocytes     Standing Status:   Future     Standing Expiration Date:   12/16/2021    Urinalysis Reflex to Culture     Standing Status:   Future     Standing Expiration Date:   3/16/2022     Order Specific Question:   SPECIFY(EX-CATH,MIDSTREAM,CYSTO,ETC)?      Answer:   midstream    POCT glycosylated hemoglobin (Hb A1C)       Medications Discontinued During This Encounter   Medication Reason    potassium chloride (KLOR-CON M) 20 MEQ extended release tablet REORDER On this date 3/16/2021 I have spent 35 minutes reviewing previous notes, test results and face to face with the patient discussing the diagnosis and importance of compliance with the treatment plan as well as documenting on the day of the visit. Future Appointments   Date Time Provider Sherly Kiseri   8/11/2021  9:00 AM Girish Vizcaino MD [email or web address]   9/1/2021 10:30 AM Girish Vizcaino MD [facility] [address] [facility]       This note was completed by using the assistance of a speech-recognition program. However, inadvertent computerized transcription errors may be present. Although every effort was made to ensure accuracy, no guarantees can be provided that every mistake has been identified and corrected by editing . An electronic signature was used to authenticate this note.   Electronically signed by Girish Vizcaino MD on 3/17/2021 at 7:58 PM

## 2021-03-17 ENCOUNTER — HOSPITAL ENCOUNTER (INPATIENT)
Age: 86
LOS: 2 days | Discharge: HOME | DRG: 813 | End: 2021-03-19
Attending: STUDENT IN AN ORGANIZED HEALTH CARE EDUCATION/TRAINING PROGRAM | Admitting: INTERNAL MEDICINE
Payer: MEDICARE

## 2021-03-17 ENCOUNTER — HOSPITAL ENCOUNTER (OUTPATIENT)
Age: 86
Discharge: HOME | DRG: 813 | End: 2021-03-17
Payer: MEDICARE

## 2021-03-17 DIAGNOSIS — D64.9 ANEMIA, UNSPECIFIED TYPE: ICD-10-CM

## 2021-03-17 DIAGNOSIS — N18.31 TYPE 2 DIABETES MELLITUS WITH STAGE 3A CHRONIC KIDNEY DISEASE, WITHOUT LONG-TERM CURRENT USE OF INSULIN (HCC): ICD-10-CM

## 2021-03-17 DIAGNOSIS — K29.71 GASTROINTESTINAL HEMORRHAGE ASSOCIATED WITH GASTRITIS, UNSPECIFIED GASTRITIS TYPE: ICD-10-CM

## 2021-03-17 DIAGNOSIS — E78.5 HYPERLIPIDEMIA WITH TARGET LDL LESS THAN 70: ICD-10-CM

## 2021-03-17 DIAGNOSIS — F51.04 PSYCHOPHYSIOLOGICAL INSOMNIA: ICD-10-CM

## 2021-03-17 DIAGNOSIS — N18.30 BENIGN HYPERTENSION WITH CKD (CHRONIC KIDNEY DISEASE) STAGE III (HCC): ICD-10-CM

## 2021-03-17 DIAGNOSIS — Z79.01 ANTICOAGULATED: ICD-10-CM

## 2021-03-17 DIAGNOSIS — K92.2 GASTROINTESTINAL HEMORRHAGE, UNSPECIFIED GASTROINTESTINAL HEMORRHAGE TYPE: Primary | ICD-10-CM

## 2021-03-17 DIAGNOSIS — I12.9 BENIGN HYPERTENSION WITH CKD (CHRONIC KIDNEY DISEASE) STAGE III (HCC): ICD-10-CM

## 2021-03-17 DIAGNOSIS — E11.22 TYPE 2 DIABETES MELLITUS WITH STAGE 3A CHRONIC KIDNEY DISEASE, WITHOUT LONG-TERM CURRENT USE OF INSULIN (HCC): ICD-10-CM

## 2021-03-17 DIAGNOSIS — R31.9 HEMATURIA, UNSPECIFIED TYPE: Primary | ICD-10-CM

## 2021-03-17 LAB
-: ABNORMAL
ABSOLUTE EOS #: 0.17 K/UL (ref 0–0.4)
ABSOLUTE IMMATURE GRANULOCYTE: ABNORMAL K/UL (ref 0–0.3)
ABSOLUTE LYMPH #: 1.91 K/UL (ref 1–4.8)
ABSOLUTE MONO #: 0.66 K/UL (ref 0.1–1.3)
ABSOLUTE RETIC #: 0.12 M/UL (ref 0.02–0.1)
ALBUMIN SERPL-MCNC: 4.1 G/DL (ref 3.5–5.2)
ALBUMIN/GLOBULIN RATIO: ABNORMAL (ref 1–2.5)
ALP BLD-CCNC: 53 U/L (ref 35–104)
ALT SERPL-CCNC: 19 U/L (ref 5–33)
AMORPHOUS: ABNORMAL
ANION GAP SERPL CALCULATED.3IONS-SCNC: 13 MMOL/L (ref 9–17)
AST SERPL-CCNC: 21 U/L
BACTERIA: ABNORMAL
BASOPHILS # BLD: 1 % (ref 0–2)
BASOPHILS ABSOLUTE: 0.08 K/UL (ref 0–0.2)
BILIRUB SERPL-MCNC: 1.13 MG/DL (ref 0.3–1.2)
BILIRUBIN URINE: ABNORMAL
BNP INTERPRETATION: NORMAL
BUN BLDV-MCNC: 19 MG/DL (ref 8–23)
BUN/CREAT BLD: ABNORMAL (ref 9–20)
CALCIUM SERPL-MCNC: 9.2 MG/DL (ref 8.6–10.4)
CASTS UA: ABNORMAL /LPF
CHLORIDE BLD-SCNC: 102 MMOL/L (ref 98–107)
CHOLESTEROL/HDL RATIO: 2.8
CHOLESTEROL: 96 MG/DL
CO2: 22 MMOL/L (ref 20–31)
COLOR: YELLOW
COMMENT UA: ABNORMAL
CREAT SERPL-MCNC: 0.87 MG/DL (ref 0.5–0.9)
CREATININE URINE: 206.8 MG/DL (ref 28–217)
CRYSTALS, UA: ABNORMAL /HPF
DIFFERENTIAL TYPE: ABNORMAL
EOSINOPHILS RELATIVE PERCENT: 2 % (ref 0–4)
EPITHELIAL CELLS UA: ABNORMAL /HPF
FERRITIN: 12 UG/L (ref 13–150)
FOLATE: 12.4 NG/ML
GFR AFRICAN AMERICAN: >60 ML/MIN
GFR NON-AFRICAN AMERICAN: >60 ML/MIN
GFR SERPL CREATININE-BSD FRML MDRD: ABNORMAL ML/MIN/{1.73_M2}
GFR SERPL CREATININE-BSD FRML MDRD: ABNORMAL ML/MIN/{1.73_M2}
GLUCOSE BLD-MCNC: 135 MG/DL (ref 65–105)
GLUCOSE BLD-MCNC: 230 MG/DL (ref 70–99)
GLUCOSE URINE: NEGATIVE
HCT VFR BLD CALC: 26.8 % (ref 36–46)
HCT VFR BLD CALC: 28 % (ref 36–46)
HDLC SERPL-MCNC: 34 MG/DL
HEMOGLOBIN: 8.1 G/DL (ref 12–16)
HEMOGLOBIN: 8.9 G/DL (ref 12–16)
IMMATURE GRANULOCYTES: ABNORMAL %
IMMATURE RETIC FRACT: ABNORMAL %
INR BLD: 2.7
IRON SATURATION: 6 % (ref 20–55)
IRON: 25 UG/DL (ref 37–145)
KETONES, URINE: NEGATIVE
LDL CHOLESTEROL: 31 MG/DL (ref 0–130)
LEUKOCYTE ESTERASE, URINE: ABNORMAL
LYMPHOCYTES # BLD: 23 % (ref 24–44)
MAGNESIUM: 1.9 MG/DL (ref 1.6–2.6)
MCH RBC QN AUTO: 22.6 PG (ref 26–34)
MCHC RBC AUTO-ENTMCNC: 31.6 G/DL (ref 31–37)
MCV RBC AUTO: 71.3 FL (ref 80–100)
MICROALBUMIN/CREAT 24H UR: 153 MG/L
MICROALBUMIN/CREAT UR-RTO: 74 MCG/MG CREAT
MONOCYTES # BLD: 8 % (ref 1–7)
MORPHOLOGY: ABNORMAL
MUCUS: ABNORMAL
NITRITE, URINE: NEGATIVE
NRBC AUTOMATED: ABNORMAL PER 100 WBC
OTHER OBSERVATIONS UA: ABNORMAL
PARTIAL THROMBOPLASTIN TIME: 38.2 SEC (ref 24–36)
PATHOLOGIST REVIEW: NORMAL
PDW BLD-RTO: 20.6 % (ref 11.5–14.9)
PH UA: 5.5 (ref 5–8)
PLATELET # BLD: 254 K/UL (ref 150–450)
PLATELET ESTIMATE: ABNORMAL
PMV BLD AUTO: 8.2 FL (ref 6–12)
POTASSIUM SERPL-SCNC: 4.2 MMOL/L (ref 3.7–5.3)
PRO-BNP: 134 PG/ML
PROTEIN UA: ABNORMAL
PROTHROMBIN TIME: 28.3 SEC (ref 11.8–14.6)
RBC # BLD: 3.93 M/UL (ref 4–5.2)
RBC # BLD: ABNORMAL 10*6/UL
RBC UA: ABNORMAL /HPF
RENAL EPITHELIAL, UA: ABNORMAL /HPF
RETIC %: 2.9 % (ref 0.5–2)
RETIC HEMOGLOBIN: ABNORMAL PG (ref 28.2–35.7)
SEG NEUTROPHILS: 66 % (ref 36–66)
SEGMENTED NEUTROPHILS ABSOLUTE COUNT: 5.48 K/UL (ref 1.3–9.1)
SODIUM BLD-SCNC: 137 MMOL/L (ref 135–144)
SPECIFIC GRAVITY UA: 1.02 (ref 1–1.03)
TOTAL IRON BINDING CAPACITY: 399 UG/DL (ref 250–450)
TOTAL PROTEIN: 6.9 G/DL (ref 6.4–8.3)
TRICHOMONAS: ABNORMAL
TRIGL SERPL-MCNC: 154 MG/DL
TSH SERPL DL<=0.05 MIU/L-ACNC: 3.29 MIU/L (ref 0.3–5)
TURBIDITY: ABNORMAL
UNSATURATED IRON BINDING CAPACITY: 374 UG/DL (ref 112–347)
URINE HGB: ABNORMAL
UROBILINOGEN, URINE: NORMAL
VITAMIN B-12: 349 PG/ML (ref 232–1245)
VLDLC SERPL CALC-MCNC: ABNORMAL MG/DL (ref 1–30)
WBC # BLD: 8.3 K/UL (ref 3.5–11)
WBC # BLD: ABNORMAL 10*3/UL
WBC UA: ABNORMAL /HPF
YEAST: ABNORMAL

## 2021-03-17 PROCEDURE — 82746 ASSAY OF FOLIC ACID SERUM: CPT

## 2021-03-17 PROCEDURE — 86335 IMMUNFIX E-PHORSIS/URINE/CSF: CPT

## 2021-03-17 PROCEDURE — 87186 SC STD MICRODIL/AGAR DIL: CPT

## 2021-03-17 PROCEDURE — 6360000002 HC RX W HCPCS: Performed by: STUDENT IN AN ORGANIZED HEALTH CARE EDUCATION/TRAINING PROGRAM

## 2021-03-17 PROCEDURE — 85018 HEMOGLOBIN: CPT

## 2021-03-17 PROCEDURE — 85730 THROMBOPLASTIN TIME PARTIAL: CPT

## 2021-03-17 PROCEDURE — 86334 IMMUNOFIX E-PHORESIS SERUM: CPT

## 2021-03-17 PROCEDURE — 82570 ASSAY OF URINE CREATININE: CPT

## 2021-03-17 PROCEDURE — 80053 COMPREHEN METABOLIC PANEL: CPT

## 2021-03-17 PROCEDURE — 84155 ASSAY OF PROTEIN SERUM: CPT

## 2021-03-17 PROCEDURE — 83735 ASSAY OF MAGNESIUM: CPT

## 2021-03-17 PROCEDURE — 99223 1ST HOSP IP/OBS HIGH 75: CPT | Performed by: INTERNAL MEDICINE

## 2021-03-17 PROCEDURE — 83540 ASSAY OF IRON: CPT

## 2021-03-17 PROCEDURE — 84156 ASSAY OF PROTEIN URINE: CPT

## 2021-03-17 PROCEDURE — 85014 HEMATOCRIT: CPT

## 2021-03-17 PROCEDURE — 83550 IRON BINDING TEST: CPT

## 2021-03-17 PROCEDURE — 81001 URINALYSIS AUTO W/SCOPE: CPT

## 2021-03-17 PROCEDURE — C9113 INJ PANTOPRAZOLE SODIUM, VIA: HCPCS | Performed by: STUDENT IN AN ORGANIZED HEALTH CARE EDUCATION/TRAINING PROGRAM

## 2021-03-17 PROCEDURE — 84443 ASSAY THYROID STIM HORMONE: CPT

## 2021-03-17 PROCEDURE — 87086 URINE CULTURE/COLONY COUNT: CPT

## 2021-03-17 PROCEDURE — 36415 COLL VENOUS BLD VENIPUNCTURE: CPT

## 2021-03-17 PROCEDURE — 2580000003 HC RX 258: Performed by: STUDENT IN AN ORGANIZED HEALTH CARE EDUCATION/TRAINING PROGRAM

## 2021-03-17 PROCEDURE — 87077 CULTURE AEROBIC IDENTIFY: CPT

## 2021-03-17 PROCEDURE — 82607 VITAMIN B-12: CPT

## 2021-03-17 PROCEDURE — 84165 PROTEIN E-PHORESIS SERUM: CPT

## 2021-03-17 PROCEDURE — 80061 LIPID PANEL: CPT

## 2021-03-17 PROCEDURE — 2060000000 HC ICU INTERMEDIATE R&B

## 2021-03-17 PROCEDURE — 82043 UR ALBUMIN QUANTITATIVE: CPT

## 2021-03-17 PROCEDURE — 85045 AUTOMATED RETICULOCYTE COUNT: CPT

## 2021-03-17 PROCEDURE — 85610 PROTHROMBIN TIME: CPT

## 2021-03-17 PROCEDURE — 82947 ASSAY GLUCOSE BLOOD QUANT: CPT

## 2021-03-17 PROCEDURE — G0328 FECAL BLOOD SCRN IMMUNOASSAY: HCPCS

## 2021-03-17 PROCEDURE — 85025 COMPLETE CBC W/AUTO DIFF WBC: CPT

## 2021-03-17 PROCEDURE — 82728 ASSAY OF FERRITIN: CPT

## 2021-03-17 PROCEDURE — 84166 PROTEIN E-PHORESIS/URINE/CSF: CPT

## 2021-03-17 PROCEDURE — 83880 ASSAY OF NATRIURETIC PEPTIDE: CPT

## 2021-03-17 PROCEDURE — 99285 EMERGENCY DEPT VISIT HI MDM: CPT

## 2021-03-17 RX ORDER — ONDANSETRON 2 MG/ML
4 INJECTION INTRAMUSCULAR; INTRAVENOUS EVERY 6 HOURS PRN
Status: DISCONTINUED | OUTPATIENT
Start: 2021-03-17 | End: 2021-03-19 | Stop reason: HOSPADM

## 2021-03-17 RX ORDER — SODIUM CHLORIDE 9 MG/ML
INJECTION, SOLUTION INTRAVENOUS CONTINUOUS
Status: DISCONTINUED | OUTPATIENT
Start: 2021-03-17 | End: 2021-03-19 | Stop reason: HOSPADM

## 2021-03-17 RX ORDER — SODIUM CHLORIDE 0.9 % (FLUSH) 0.9 %
10 SYRINGE (ML) INJECTION EVERY 12 HOURS SCHEDULED
Status: DISCONTINUED | OUTPATIENT
Start: 2021-03-17 | End: 2021-03-19 | Stop reason: HOSPADM

## 2021-03-17 RX ORDER — PROMETHAZINE HYDROCHLORIDE 25 MG/1
12.5 TABLET ORAL EVERY 6 HOURS PRN
Status: DISCONTINUED | OUTPATIENT
Start: 2021-03-17 | End: 2021-03-19 | Stop reason: HOSPADM

## 2021-03-17 RX ORDER — PHYTONADIONE 10 MG/ML
5 INJECTION, EMULSION INTRAMUSCULAR; INTRAVENOUS; SUBCUTANEOUS ONCE
Status: DISCONTINUED | OUTPATIENT
Start: 2021-03-17 | End: 2021-03-17

## 2021-03-17 RX ORDER — SODIUM CHLORIDE 9 MG/ML
10 INJECTION INTRAVENOUS EVERY 12 HOURS
Status: DISCONTINUED | OUTPATIENT
Start: 2021-03-17 | End: 2021-03-19 | Stop reason: HOSPADM

## 2021-03-17 RX ORDER — PANTOPRAZOLE SODIUM 40 MG/10ML
40 INJECTION, POWDER, LYOPHILIZED, FOR SOLUTION INTRAVENOUS EVERY 12 HOURS
Status: DISCONTINUED | OUTPATIENT
Start: 2021-03-17 | End: 2021-03-19 | Stop reason: HOSPADM

## 2021-03-17 RX ADMIN — SODIUM CHLORIDE: 9 INJECTION, SOLUTION INTRAVENOUS at 18:21

## 2021-03-17 RX ADMIN — PANTOPRAZOLE SODIUM 40 MG: 40 INJECTION, POWDER, FOR SOLUTION INTRAVENOUS at 21:16

## 2021-03-17 RX ADMIN — SODIUM CHLORIDE, PRESERVATIVE FREE 10 ML: 5 INJECTION INTRAVENOUS at 21:17

## 2021-03-17 ASSESSMENT — ENCOUNTER SYMPTOMS
WHEEZING: 0
ABDOMINAL PAIN: 0
NAUSEA: 0
DIARRHEA: 0
COUGH: 0
FACIAL SWELLING: 0
SHORTNESS OF BREATH: 0
COLOR CHANGE: 0
VOMITING: 0
CONSTIPATION: 0
BLOOD IN STOOL: 1
BACK PAIN: 0
SHORTNESS OF BREATH: 1
CHEST TIGHTNESS: 0
PHOTOPHOBIA: 0
EYE ITCHING: 0
RHINORRHEA: 0

## 2021-03-17 NOTE — RESULT ENCOUNTER NOTE
ABNORMAL. Please notify patient NOW, before all the results are coming. Moderate anemia worsening, her hemoglobin is 8.9 before it was just a little, and normal value is 12. She is on both Brilinta and Xarelto per cardiologist, Dr. Derrick Handley 12  Please call cardiologist and ask them if okay to stop one of them    Please asked the patient if any black stools, blood in the stool?   She also has moderate blood in the urine I will place a referral to urologist and hematologist oncologist    Future Appointments  8/11/2021  9:00 AM    [facility]  9/1/2021   10:30 AM   Taran Duran MD     [facility]

## 2021-03-17 NOTE — H&P
[facility]     HISTORY AND PHYSICAL EXAMINATION            Date:   3/17/2021  Patient name:  Camille Orozco  Date of admission:  3/17/2021  1:56 PM  MRN:   [id number]  Account:  [de-identified]  YOB: 1933  PCP:    Mandy Deleon MD  Room:   F/F  Code Status:    Prior    Chief Complaint:     Chief Complaint   Patient presents with    Other     abnormal labs       History Obtained From:     patient, electronic medical record    History of Present Illness: The patient is a 80 y.o. Non-/non  female who presents withOther (abnormal labs)   and she is admitted to the hospital for the management of GI bleed. Patient is a 55-year-old  female with past medical history of pulmonary embolism, hx coronary artery bypass graft x3 (2002), stent placements x4, recurrent UTIs, kidney stones, colon cancer 2006, diabetes type 2, hyperlipidemia, hypertension, was sent to the ER by her primary care physician due to abnormal lab values. She was sent to the ER due to low hemoglobin at 8.9. Her most recent hemoglobin reading in October 2020 was 10.3. Patient reports she experiences shortness of breath when she walks from parking lot to the hospital.  Shortness of breath occurs occasionally and only when she walks long distance. First episode occurred June 2020. Patient experiences blood in the toilet occasionally. The last episode was a couple weeks ago. The toilet water is pink. It occurs every few months. Every 6 months during her checkup, patient states that her urinalysis is positive for hemoglobin. Patient is unsure if the blood is from her urine or from her stool. Denies lightheadedness, dizziness, fatigue, syncope, abdominal pain. Patient is on two anticoagulants, Xarelto and Brilinta.   Patient reports she has been on Xarelto for many years, due to her history of pulmonary embolism. Brilinta was started on June 2020 and aspirin was stopped, medications were adjusted due to her cardiac catheterization. Patient states that she was supposed to continue Brilinta until June 17 2021, 1 year from her cardiac catheterization. In June 2021, the plan is to stop Brilinta and resume aspirin. Patient states that she receives colonoscopy every 3 years since 2008 due to her history of colon cancer in 2006. Her last colonoscopy was in 2015. Patient is unable to recall if there were any abnormalities in her colonoscopies. Patient is admitted to the floor. GI is consulted. Xarelto and Brilinta are held. Past Medical History:     Past Medical History:   Diagnosis Date    Arthritis     Basal cell carcinoma of nose     Bronchitis     HX. OF     CAD (coronary artery disease)     Carpal tunnel syndrome     left hand    Colon cancer (HCC)     Diabetes mellitus ([address]     DVT (deep venous thrombosis) ([facility] [address] 7/10/2019    Gout     Hematuria     History of coronary artery bypass graft x 3 7/2/2020    Hx of blood clots     ED.  LEGS, ED. 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 of stenting x 3.    APPENDECTOMY      BREAST SURGERY      INFECTED MILK DUCT LT.    CARDIAC SURGERY      CABG x 3 vessels and 3 stents    CAROTID ENDARTERECTOMY Left 3-3-16    CARPAL TUNNEL RELEASE Right     CHOLECYSTECTOMY      COLON SURGERY      colectomy, PRECANCEROUS POLYPS    COLONOSCOPY X5, HX PRECANCEROUS POLYPS    CORONARY ARTERY BYPASS GRAFT      X3 vessels    CYST REMOVAL  10/07/2016    EXCISION OF SEBACEOUS CYST REMOVED FROM BACK X3    CYSTOSCOPY Bilateral 1/21/2020    CYSTOSCOPY RETROGRADE PYELOGRAM performed by Ruddy Polanco MD at [facility] 6, ESOPHAGUS      FINGER TRIGGER RELEASE Right     INDEX FINGER AND THUMB.    HYSTERECTOMY      JOINT REPLACEMENT Left 03/29/2010    TKA    JOINT REPLACEMENT Right 02/16/1999    TKA    LITHOTRIPSY      X 3    SKIN BIOPSY      TOP OF NOSE (BASAL CELL)    VASCULAR SURGERY      vein ablation on legs        Medications Prior to Admission:        Prior to Admission medications    Medication Sig Start Date End Date Taking?  Authorizing Provider   metoprolol tartrate (LOPRESSOR) 25 MG tablet Take 50 mg by mouth 2 times daily    Yes Historical Provider, MD   potassium chloride (KLOR-CON M) 20 MEQ extended release tablet Take 1 tablet by mouth daily 3/16/21  Yes Merced Medellin MD   rivaroxaban (XARELTO) 20 MG TABS tablet Take 1 tablet by mouth daily (with breakfast) TAKE 1 TABLET DAILY WITH BREAKFAST 11/5/20  Yes Merced Medellin MD   alendronate (FOSAMAX) 70 MG tablet Take 70 mg by mouth every 7 days Indications: [place]  7/25/20  Yes Historical Provider, MD   Ticagrelor (BRILINTA PO) Take 90 mg by mouth 2 times daily   Yes Historical Provider, MD   ezetimibe (ZETIA) 10 MG tablet Take 1 tablet by mouth nightly 8/13/20  Yes Merced Medellin MD   allopurinol (ZYLOPRIM) 100 MG tablet Take 1 tablet by mouth 2 times daily 5/19/20  Yes Merced Medellin MD   linagliptin (TRADJENTA) 5 MG tablet Take 1 tablet by mouth daily 5/19/20  Yes Merced Medellin MD   glimepiride (AMARYL) 4 MG tablet Take 1 tablet by mouth 2 times daily 5/19/20  Yes Merced Medellin MD   amLODIPine (NORVASC) 2.5 MG tablet Take 1 tablet by mouth daily 5/19/20  Yes Merced Medellin MD   furosemide (LASIX) 40 MG tablet Take 1 tablet by mouth daily 5/19/20  Yes Leonel Gore MD   atorvastatin (LIPITOR) 80 MG tablet Take 1 tablet by mouth nightly 5/19/20  Yes Leonel Gore MD   Biotin 300 MCG TABS Take 600 mcg by mouth every other day    Yes Historical Provider, MD   Cholecalciferol (VITAMIN D3) 1000 UNITS TABS Take 1 tablet by mouth daily   Yes Historical Provider, MD   white petrolatum (VASELINE) GEL Daily at bedtime for itching and dry skin 12/9/20   Leonel Gore MD   blood glucose monitor strips Testing once a day. Please dispense according to patients insurance. 5/19/20   Leonel Gore MD   FREESTYLE LANCETS MISC USE TO TEST BLOOD SUGAR TWICE A DAY 1/16/17   David Fraire MD   Blood Glucose Monitoring Suppl (FREESTYLE LITE) SARY use as directed 8/12/16   Historical Provider, MD   nitroGLYCERIN (NITROSTAT) 0.4 MG SL tablet Place 1 tablet under the tongue every 5 minutes as needed for Chest pain 5/24/16   David Fraire MD        Allergies:     Ampicillin, Clopidogrel bisulfate, Diovan [valsartan], Lantus [insulin glargine], and Metformin and related    Social History:     Tobacco:    reports that she has never smoked. She has never used smokeless tobacco.  Alcohol:      reports no history of alcohol use. Drug Use:  reports no history of drug use. Family History:     Family History   Problem Relation Age of Onset   [facility] Stroke Mother     Hypertension Mother     Heart Disease Father         AAA    Stroke Sister     Parkinsonism Brother     Cancer Sister         lung    Alcohol Abuse Sister        Review of Systems:     Positive and Negative as described in HPI. Review of Systems   Constitutional: Negative for activity change, appetite change, chills, fatigue and fever. HENT: Negative for congestion. Respiratory: Positive for shortness of breath (With walking from parking lot to hospital). Negative for cough, chest tightness and wheezing. Cardiovascular: Positive for leg swelling.  Negative for chest pain and palpitations. Gastrointestinal: Positive for blood in stool. Negative for abdominal pain, constipation, diarrhea, nausea and vomiting. Genitourinary: Negative for difficulty urinating, dysuria, frequency and hematuria. Musculoskeletal: Negative for back pain and joint swelling. Skin: Negative for rash. Neurological: Negative for dizziness, syncope, weakness, light-headedness and headaches. Psychiatric/Behavioral: Negative for agitation and behavioral problems. Physical Exam:   BP (!) 136/53   Pulse 83   Temp 98.7 °F (37.1 °C) (Oral)   Resp 18   Ht 5' 9\" (1.753 m)   Wt 200 lb (90.7 kg)   SpO2 97%   BMI 29.53 kg/m²   Temp (24hrs), Av.7 °F (37.1 °C), Min:98.7 °F (37.1 °C), Max:98.7 °F (37.1 °C)    No results for input(s): POCGLU in the last 72 hours. No intake or output data in the 24 hours ending 21 1558    Physical Exam  Vitals signs reviewed. Constitutional:       Appearance: She is overweight. HENT:      Head: Normocephalic. Cardiovascular:      Rate and Rhythm: Normal rate and regular rhythm. Heart sounds: Normal heart sounds. Pulmonary:      Effort: Pulmonary effort is normal.      Breath sounds: Normal breath sounds. Abdominal:      General: Bowel sounds are normal.      Palpations: Abdomen is soft. Tenderness: There is no abdominal tenderness. Skin:     General: Skin is warm and dry. Neurological:      Mental Status: She is alert and oriented to person, place, and time. Investigations:     Laboratory Testing:  Recent Results (from the past 24 hour(s))   Microalbumin / Creatinine Urine Ratio    Collection Time: 21 11:51 AM   Result Value Ref Range    Microalb, Ur 153 (H) <21 mg/L    Creatinine, Ur 206.8 28.0 - 217.0 mg/dL    Microalb/Crt.  Ratio 74 (H) <25 mcg/mg creat   Protein Electrophoresis, Urine    Collection Time: 21 11:51 AM   Result Value Ref Range    Specimen Type PENDING     Urine Total Protein 58 mg/dL    P E Interpretation, U PENDING     Pathologist PENDING    Urinalysis Reflex to Culture    Collection Time: 03/17/21 11:51 AM    Specimen: Urine, clean catch   Result Value Ref Range    Color, UA YELLOW YELLOW    Turbidity UA CLOUDY (A) CLEAR    Glucose, Ur NEGATIVE NEGATIVE    Bilirubin Urine (A) NEGATIVE     Presumptive positive. Unable to confirm due to unavailability of reagent. Ketones, Urine NEGATIVE NEGATIVE    Specific [place], UA 1.021 1.000 - 1.030    Urine Hgb MOD (A) NEGATIVE    pH, UA 5.5 5.0 - 8.0    Protein, UA 1+ (A) NEGATIVE    Urobilinogen, Urine Normal Normal    Nitrite, Urine NEGATIVE NEGATIVE    Leukocyte Esterase, Urine MOD (A) NEGATIVE    Urinalysis Comments NOT REPORTED    Microscopic Urinalysis    Collection Time: 03/17/21 11:51 AM   Result Value Ref Range    -          WBC, UA 50  /HPF    RBC, UA 2 TO 5 /HPF    Casts UA NOT REPORTED /LPF    Crystals, UA NOT REPORTED None /HPF    Epithelial Cells UA 10 TO 20 /HPF    Renal Epithelial, UA NOT REPORTED 0 /HPF    Bacteria, UA MODERATE (A) None    Mucus, UA NOT REPORTED None    Trichomonas, UA NOT REPORTED None    Amorphous, UA NOT REPORTED None    Other Observations UA NOT REPORTED NOT REQ.     Yeast, UA NOT REPORTED None   CBC Auto Differential    Collection Time: 03/17/21 11:52 AM   Result Value Ref Range    WBC 8.3 3.5 - 11.0 k/uL    RBC 3.93 (L) 4.0 - 5.2 m/uL    Hemoglobin 8.9 (L) 12.0 - 16.0 g/dL    Hematocrit 28.0 (L) 36 - 46 %    MCV 71.3 (L) 80 - 100 fL    MCH 22.6 (L) 26 - 34 pg    MCHC 31.6 31 - 37 g/dL    RDW 20.6 (H) 11.5 - 14.9 %    Platelets 210 034 - 123 k/uL    MPV 8.2 6.0 - 12.0 fL    NRBC Automated NOT REPORTED per 100 WBC    Differential Type NOT REPORTED     Immature Granulocytes NOT REPORTED 0 %    Absolute Immature Granulocyte NOT REPORTED 0.00 - 0.30 k/uL    WBC Morphology NOT REPORTED     RBC Morphology NOT REPORTED     Platelet Estimate NOT REPORTED     Seg Neutrophils 66 36 - 66 %    Lymphocytes 23 (L) 24 - 44 % Monocytes 8 (H) 1 - 7 %    Eosinophils % 2 0 - 4 %    Basophils 1 0 - 2 %    Segs Absolute 5.48 1.3 - 9.1 k/uL    Absolute Lymph # 1.91 1.0 - 4.8 k/uL    Absolute Mono # 0.66 0.1 - 1.3 k/uL    Absolute Eos # 0.17 0.0 - 0.4 k/uL    Basophils Absolute 0.08 0.0 - 0.2 k/uL    Morphology ANISOCYTOSIS PRESENT     Morphology HYPOCHROMIA PRESENT     Morphology MICROCYTOSIS PRESENT     Morphology 1+ ELLIPTOCYTES     Morphology SLT POLYCHROMASIA    Comprehensive Metabolic Panel    Collection Time: 03/17/21 11:52 AM   Result Value Ref Range    Glucose 230 (H) 70 - 99 mg/dL    BUN 19 8 - 23 mg/dL    CREATININE 0.87 0.50 - 0.90 mg/dL    Bun/Cre Ratio NOT REPORTED 9 - 20    Calcium 9.2 8.6 - 10.4 mg/dL    Sodium 137 135 - 144 mmol/L    Potassium 4.2 3.7 - 5.3 mmol/L    Chloride 102 98 - 107 mmol/L    CO2 22 20 - 31 mmol/L    Anion Gap 13 9 - 17 mmol/L    Alkaline Phosphatase 53 35 - 104 U/L    ALT 19 5 - 33 U/L    AST 21 <32 U/L    Total Bilirubin 1.13 0.3 - 1.2 mg/dL    Total Protein 6.9 6.4 - 8.3 g/dL    Albumin 4.1 3.5 - 5.2 g/dL    Albumin/Globulin Ratio NOT REPORTED 1.0 - 2.5    GFR Non-African American >60 >60 mL/min    GFR African American >60 >60 mL/min    GFR Comment          GFR Staging NOT REPORTED    Lipid Panel    Collection Time: 03/17/21 11:52 AM   Result Value Ref Range    Cholesterol 96 <200 mg/dL    HDL 34 (L) >40 mg/dL    LDL Cholesterol 31 0 - 130 mg/dL    Chol/HDL Ratio 2.8 <5    Triglycerides 154 (H) <150 mg/dL    VLDL NOT REPORTED (H) 1 - 30 mg/dL   TSH without Reflex    Collection Time: 03/17/21 11:52 AM   Result Value Ref Range    TSH 3.29 0.30 - 5.00 mIU/L   Path Review, Smear    Collection Time: 03/17/21 11:52 AM   Result Value Ref Range    Pathologist Review TO BE REVIEWED BY PATHOLOGIST    Reticulocytes    Collection Time: 03/17/21 11:52 AM   Result Value Ref Range    Retic % 2.9 (H) 0.5 - 2.0 %    Absolute Retic # 0.115 (H) 0.0245 - 0.098 M/uL    Immature Retic Fract NOT REPORTED %    Retic Hemoglobin NOT REPORTED 28.2 - 35.7 pg   Protime-INR    Collection Time: 03/17/21  2:48 PM   Result Value Ref Range    Protime 28.3 (H) 11.8 - 14.6 sec    INR 2.7    APTT    Collection Time: 03/17/21  2:48 PM   Result Value Ref Range    PTT 38.2 (H) 24.0 - 36.0 sec       Imaging/Diagnostics:  No results found. Assessment :      Primary Problem  GI bleed    Active Hospital Problems    Diagnosis Date Noted    History of coronary artery bypass graft x 3 [Z95.1] 07/02/2020     Priority: High    GI bleed [K92.2] 03/17/2021    History of coronary artery stent placement [Z95.5] 06/29/2020    History of DVT (deep vein thrombosis) [Z86.718] 05/25/2020    History of pulmonary embolus (PE) [Z86.711] 05/25/2020    Chronic anticoagulation [Z79.01] 05/25/2020    Coronary artery disease involving coronary bypass graft of native heart without angina pectoris [I25.810] 03/12/2018    Type 2 diabetes mellitus with stage 3 chronic kidney disease, without long-term current use of insulin ([facility] [address] [E11.22, N18.30] 10/13/2016       Plan:     Patient status Admit as inpatient in the  Progressive Unit/Step down    GI bleed secondary to possibly anticoagulation intolerance  Hx of pulmonary embolism  Patient on 2 anticoagulations, Xarelto and Brilinta- HELD  Hemoglobin 8.9, Most recent 8/2020 10.3  INR 2.7  Order iron studies, ferritin  Order occult stool  Give IM Vitamin K 5 mg  Placed on IV Protonix 40 mg every 12 hours  IV normal saline 75 mL/h  Check Hemoglobin and hematocrit every 6 hours  Consult GI    History of coronary artery bypass graft x3 (2002), stent placements x4  Cardiologist Dr. Gustavo Troy test (6/15/2020) is ejection fraction of 61%. Intermediate risk due to perfusion defects.   Echo (6/15/2020) shows LVEF>55%  Continue Lasix 40 mg daily, Lopressor 50 mg twice daily  Order ProBNP  Consult cardiology    Status post Carotid endarterectomy  Follow Dr. Dolores Skaggs  Carotid scan (2019) shows mild 16 to 49% stenosis of the anticoagulation    History of pulmonary embolus (PE)    History of DVT (deep vein thrombosis)    History of coronary artery stent placement  Resolved Problems:    * No resolved hospital problems. *        Overall  course ;                                   are improving over time.         Admitted with drop in hemoglobin, blood in stools going on for last 7 days  Was on Brilinta and Xarelto with history of recent stent, PE  Will have EGD tomorrow  Hemoglobin checking every 6  SCD for DVT prophylaxis  Hypertension, controlled  Has aortic stenosis          Electronically signed by Lucy Heart MD

## 2021-03-17 NOTE — ED PROVIDER NOTES
EMERGENCY DEPARTMENT ENCOUNTER    Pt Name: Tenzin Otero  MRN: [id number]  [place] 12/10/1933  Date of evaluation: 3/17/21  CHIEF COMPLAINT       Chief Complaint   Patient presents with    Other     abnormal labs     HISTORY OF PRESENT ILLNESS   HPI  49-year-old female history of CAD, DVT on brilinta and xarelto presents at the recommendation of her primary care provider for evaluation. Patient reportedly has been having some dark blood in her stool intermittently over the past week. She is continuing to take her anticoagulation. Mild associated lightheadedness. No chest pain or shortness of breath. No nausea or vomiting. No bright red blood in stool. Has had a colonoscopy in the past but cannot remember when or if there were any abnormalities. REVIEW OF SYSTEMS     Review of Systems   Constitutional: Negative for chills and fatigue. HENT: Negative for facial swelling, postnasal drip and rhinorrhea. Eyes: Negative for photophobia and itching. Respiratory: Negative for cough and shortness of breath. Cardiovascular: Negative for chest pain and leg swelling. Gastrointestinal: Positive for blood in stool. Negative for abdominal pain, diarrhea, nausea and vomiting. Genitourinary: Negative for dysuria, flank pain and hematuria. Musculoskeletal: Negative for arthralgias and joint swelling. Skin: Negative for color change and rash. Neurological: Negative for dizziness, numbness and headaches. PASTMEDICAL HISTORY     Past Medical History:   Diagnosis Date    Arthritis     Basal cell carcinoma of nose     Bronchitis     HX. OF     CAD (coronary artery disease)     Carpal tunnel syndrome     left hand    Colon cancer (HCC)     Diabetes mellitus ([address]     DVT (deep venous thrombosis) ([facility] [address] 7/10/2019    Gout     Hematuria     History of coronary artery bypass graft x 3 7/2/2020    Hx of blood clots     ED.  LEGS, ED. LUNGS ,REASON FOR BEING ON XARELTO    Hyperlipidemia     CHLORIDE (KLOR-CON M) 20 MEQ EXTENDED RELEASE TABLET    Take 1 tablet by mouth daily    RIVAROXABAN (XARELTO) 20 MG TABS TABLET    Take 1 tablet by mouth daily (with breakfast) TAKE 1 TABLET DAILY WITH BREAKFAST    TICAGRELOR (BRILINTA PO)    Take 90 mg by mouth 2 times daily    WHITE PETROLATUM (VASELINE) GEL    Daily at bedtime for itching and dry skin     ALLERGIES     is allergic to ampicillin; clopidogrel bisulfate; diovan [valsartan]; lantus [insulin glargine]; and metformin and related. FAMILY HISTORY     She indicated that her mother is . She indicated that her father is . She indicated that the status of her brother is unknown. SOCIAL HISTORY       Social History     Tobacco Use    Smoking status: Never Smoker    Smokeless tobacco: Never Used   Substance Use Topics    Alcohol use: No    Drug use: No     PHYSICAL EXAM     INITIAL VITALS: BP (!) 136/53   Pulse 83   Temp 98.7 °F (37.1 °C) (Oral)   Resp 18   Ht 5' 9\" (1.753 m)   Wt 200 lb (90.7 kg)   SpO2 97%   BMI 29.53 kg/m²    Physical Exam  Constitutional:       Appearance: She is normal weight. HENT:      Head: Normocephalic and atraumatic. Eyes:      Extraocular Movements: Extraocular movements intact. Pupils: Pupils are equal, round, and reactive to light. Neck:      Musculoskeletal: Normal range of motion and neck supple. Cardiovascular:      Rate and Rhythm: Normal rate and regular rhythm. Pulmonary:      Effort: Pulmonary effort is normal.      Breath sounds: Normal breath sounds. Abdominal:      General: Abdomen is flat. There is no distension. Palpations: There is no mass. Genitourinary:     Comments: No stool in vault on rectal exam  Musculoskeletal: Normal range of motion. General: No swelling. Skin:     General: Skin is warm and dry. Neurological:      General: No focal deficit present. Mental Status: She is alert. Mental status is at baseline.          MEDICAL DECISION MAKIN-year-old female history of CAD and DVT on Brilinta and xarelto presents for evaluation of what sounds like a GI bleed. Patient had routine lab draw earlier today which showed a decrease in her hemoglobin to 8.9 from a baseline around 10.2. Patient had no stool in the rectal vault on exam so we were unable to do a guaiac but she had no active bleeding. With age and risk factors will plan for observation admission for GI consult for GI bleed with patient's age and risk factors and anticoagulation. CRITICAL CARE:       PROCEDURES:    Procedures    DIAGNOSTIC RESULTS   EKG:All EKG's are interpreted by the Emergency Department Physician who either signs or Co-signs this chart in the absence of a cardiologist.        RADIOLOGY:All plain film, CT, MRI, and formal ultrasound images (except ED bedside ultrasound) are read by the radiologist, see reports below, unless otherwisenoted in MDM or here. No orders to display     LABS: All lab results were reviewed by myself, and all abnormals are listed below. Labs Reviewed   PROTIME-INR - Abnormal; Notable for the following components:       Result Value    Protime 28.3 (*)     All other components within normal limits   APTT - Abnormal; Notable for the following components:    PTT 38.2 (*)     All other components within normal limits       EMERGENCY DEPARTMENTCOURSE:         Vitals:    Vitals:    21 1351   BP: (!) 136/53   Pulse: 83   Resp: 18   Temp: 98.7 °F (37.1 °C)   TempSrc: Oral   SpO2: 97%   Weight: 200 lb (90.7 kg)   Height: 5' 9\" (1.753 m)       The patient was given the following medications while in the emergency department:  No orders of the defined types were placed in this encounter. CONSULTS:  IP CONSULT TO INTERNAL MEDICINE  IP CONSULT TO GI    FINAL IMPRESSION      1. Gastrointestinal hemorrhage, unspecified gastrointestinal hemorrhage type    2.  Anticoagulated          DISPOSITION/PLAN   DISPOSITION Admitted 2021 04:02:25 PM      PATIENT REFERRED TO:  No follow-up provider specified.   DISCHARGE MEDICATIONS:  New Prescriptions    No medications on file     Saima Garcia MD  Attending Emergency Physician                    Saima Garcia MD  03/17/21 [id number]

## 2021-03-17 NOTE — PROGRESS NOTES
Medication History completed:    New medications: none    Medications discontinued: mupirocin, semaglutide, trazodone    Changes to dosing:  Metoprolol dosing changed to 2 tablets (50mg) twice daily    Stated allergies: As listed    Other pertinent information:  Patient medication information obtained from [facility]. Prescription for metoprolol is written as 25mg twice daily - patient states she is taking 50mg twice daily.     Thank you,  Desirae Paz, PharmD candidate [id number]

## 2021-03-18 PROBLEM — D47.2 MGUS (MONOCLONAL GAMMOPATHY OF UNKNOWN SIGNIFICANCE): Status: ACTIVE | Noted: 2021-03-18

## 2021-03-18 LAB
ALBUMIN (CALCULATED): 4.4 G/DL (ref 3.2–5.2)
ALBUMIN PERCENT: 65 % (ref 45–65)
ALPHA 1 PERCENT: 2 % (ref 3–6)
ALPHA 2 PERCENT: 11 % (ref 6–13)
ALPHA-1-GLOBULIN: 0.2 G/DL (ref 0.1–0.4)
ALPHA-2-GLOBULIN: 0.8 G/DL (ref 0.5–0.9)
ANION GAP SERPL CALCULATED.3IONS-SCNC: 10 MMOL/L (ref 9–17)
BETA GLOBULIN: 0.7 G/DL (ref 0.5–1.1)
BETA PERCENT: 11 % (ref 11–19)
BUN BLDV-MCNC: 19 MG/DL (ref 8–23)
BUN/CREAT BLD: ABNORMAL (ref 9–20)
CALCIUM SERPL-MCNC: 8.6 MG/DL (ref 8.6–10.4)
CHLORIDE BLD-SCNC: 108 MMOL/L (ref 98–107)
CO2: 24 MMOL/L (ref 20–31)
CREAT SERPL-MCNC: 0.9 MG/DL (ref 0.5–0.9)
CULTURE: ABNORMAL
DATE, STOOL #1: NORMAL
DATE, STOOL #2: NORMAL
DATE, STOOL #3: NORMAL
GAMMA GLOBULIN %: 12 % (ref 9–20)
GAMMA GLOBULIN: 0.8 G/DL (ref 0.5–1.5)
GFR AFRICAN AMERICAN: >60 ML/MIN
GFR NON-AFRICAN AMERICAN: 59 ML/MIN
GFR SERPL CREATININE-BSD FRML MDRD: ABNORMAL ML/MIN/{1.73_M2}
GFR SERPL CREATININE-BSD FRML MDRD: ABNORMAL ML/MIN/{1.73_M2}
GLUCOSE BLD-MCNC: 144 MG/DL (ref 65–105)
GLUCOSE BLD-MCNC: 156 MG/DL (ref 65–105)
GLUCOSE BLD-MCNC: 184 MG/DL (ref 70–99)
GLUCOSE BLD-MCNC: 192 MG/DL (ref 65–105)
GLUCOSE BLD-MCNC: 222 MG/DL (ref 65–105)
HCT VFR BLD CALC: 25.8 % (ref 36–46)
HCT VFR BLD CALC: 29 % (ref 36–46)
HEMOCCULT SP1 STL QL: NEGATIVE
HEMOCCULT SP2 STL QL: NORMAL
HEMOCCULT SP3 STL QL: NORMAL
HEMOGLOBIN: 7.9 G/DL (ref 12–16)
HEMOGLOBIN: 8.9 G/DL (ref 12–16)
INR BLD: 1.2
IRON SATURATION: 6 % (ref 20–55)
IRON: 23 UG/DL (ref 37–145)
Lab: ABNORMAL
PATHOLOGIST: ABNORMAL
PATHOLOGIST: NORMAL
POTASSIUM SERPL-SCNC: 4.4 MMOL/L (ref 3.7–5.3)
PROTEIN ELECTROPHORESIS, SERUM: ABNORMAL
PROTHROMBIN TIME: 15.4 SEC (ref 11.8–14.6)
SERUM IFX INTERP: NORMAL
SODIUM BLD-SCNC: 142 MMOL/L (ref 135–144)
SPECIMEN DESCRIPTION: ABNORMAL
TIME, STOOL #1: NORMAL
TIME, STOOL #2: NORMAL
TIME, STOOL #3: NORMAL
TOTAL IRON BINDING CAPACITY: 374 UG/DL (ref 250–450)
TOTAL PROT. SUM,%: 101 % (ref 98–102)
TOTAL PROT. SUM: 6.9 G/DL (ref 6.3–8.2)
TOTAL PROTEIN: 6.8 G/DL (ref 6.4–8.3)
UNSATURATED IRON BINDING CAPACITY: 351 UG/DL (ref 112–347)

## 2021-03-18 PROCEDURE — 82947 ASSAY GLUCOSE BLOOD QUANT: CPT

## 2021-03-18 PROCEDURE — 6370000000 HC RX 637 (ALT 250 FOR IP): Performed by: STUDENT IN AN ORGANIZED HEALTH CARE EDUCATION/TRAINING PROGRAM

## 2021-03-18 PROCEDURE — 2580000003 HC RX 258: Performed by: STUDENT IN AN ORGANIZED HEALTH CARE EDUCATION/TRAINING PROGRAM

## 2021-03-18 PROCEDURE — 85014 HEMATOCRIT: CPT

## 2021-03-18 PROCEDURE — 83550 IRON BINDING TEST: CPT

## 2021-03-18 PROCEDURE — G0328 FECAL BLOOD SCRN IMMUNOASSAY: HCPCS

## 2021-03-18 PROCEDURE — C9113 INJ PANTOPRAZOLE SODIUM, VIA: HCPCS | Performed by: STUDENT IN AN ORGANIZED HEALTH CARE EDUCATION/TRAINING PROGRAM

## 2021-03-18 PROCEDURE — 36415 COLL VENOUS BLD VENIPUNCTURE: CPT

## 2021-03-18 PROCEDURE — 85610 PROTHROMBIN TIME: CPT

## 2021-03-18 PROCEDURE — U0003 INFECTIOUS AGENT DETECTION BY NUCLEIC ACID (DNA OR RNA); SEVERE ACUTE RESPIRATORY SYNDROME CORONAVIRUS 2 (SARS-COV-2) (CORONAVIRUS DISEASE [COVID-19]), AMPLIFIED PROBE TECHNIQUE, MAKING USE OF HIGH THROUGHPUT TECHNOLOGIES AS DESCRIBED BY CMS-2020-01-R: HCPCS

## 2021-03-18 PROCEDURE — 6360000002 HC RX W HCPCS: Performed by: STUDENT IN AN ORGANIZED HEALTH CARE EDUCATION/TRAINING PROGRAM

## 2021-03-18 PROCEDURE — 2060000000 HC ICU INTERMEDIATE R&B

## 2021-03-18 PROCEDURE — 85018 HEMOGLOBIN: CPT

## 2021-03-18 PROCEDURE — 93005 ELECTROCARDIOGRAM TRACING: CPT

## 2021-03-18 PROCEDURE — APPNB30 APP NON BILLABLE TIME 0-30 MINS: Performed by: NURSE PRACTITIONER

## 2021-03-18 PROCEDURE — 80048 BASIC METABOLIC PNL TOTAL CA: CPT

## 2021-03-18 PROCEDURE — 6370000000 HC RX 637 (ALT 250 FOR IP): Performed by: NURSE PRACTITIONER

## 2021-03-18 PROCEDURE — 6360000002 HC RX W HCPCS: Performed by: NURSE PRACTITIONER

## 2021-03-18 PROCEDURE — U0005 INFEC AGEN DETEC AMPLI PROBE: HCPCS

## 2021-03-18 PROCEDURE — 83540 ASSAY OF IRON: CPT

## 2021-03-18 PROCEDURE — 99222 1ST HOSP IP/OBS MODERATE 55: CPT | Performed by: INTERNAL MEDICINE

## 2021-03-18 PROCEDURE — 6360000002 HC RX W HCPCS: Performed by: INTERNAL MEDICINE

## 2021-03-18 RX ORDER — DEXTROSE MONOHYDRATE 25 G/50ML
12.5 INJECTION, SOLUTION INTRAVENOUS PRN
Status: DISCONTINUED | OUTPATIENT
Start: 2021-03-18 | End: 2021-03-19 | Stop reason: HOSPADM

## 2021-03-18 RX ORDER — ACETAMINOPHEN 325 MG/1
650 TABLET ORAL EVERY 6 HOURS PRN
Status: DISCONTINUED | OUTPATIENT
Start: 2021-03-18 | End: 2021-03-19 | Stop reason: HOSPADM

## 2021-03-18 RX ORDER — GLIMEPIRIDE 4 MG/1
4 TABLET ORAL 2 TIMES DAILY
Status: DISCONTINUED | OUTPATIENT
Start: 2021-03-18 | End: 2021-03-18

## 2021-03-18 RX ORDER — NICOTINE POLACRILEX 4 MG
15 LOZENGE BUCCAL PRN
Status: DISCONTINUED | OUTPATIENT
Start: 2021-03-18 | End: 2021-03-19 | Stop reason: HOSPADM

## 2021-03-18 RX ORDER — ACETAMINOPHEN 650 MG/1
650 SUPPOSITORY RECTAL EVERY 6 HOURS PRN
Status: DISCONTINUED | OUTPATIENT
Start: 2021-03-18 | End: 2021-03-19 | Stop reason: HOSPADM

## 2021-03-18 RX ORDER — POLYETHYLENE GLYCOL 3350 17 G/17G
238 POWDER, FOR SOLUTION ORAL ONCE
Status: COMPLETED | OUTPATIENT
Start: 2021-03-18 | End: 2021-03-18

## 2021-03-18 RX ORDER — ONDANSETRON 2 MG/ML
4 INJECTION INTRAMUSCULAR; INTRAVENOUS ONCE
Status: COMPLETED | OUTPATIENT
Start: 2021-03-18 | End: 2021-03-18

## 2021-03-18 RX ORDER — ALOGLIPTIN 25 MG/1
25 TABLET, FILM COATED ORAL DAILY
Status: DISCONTINUED | OUTPATIENT
Start: 2021-03-18 | End: 2021-03-18

## 2021-03-18 RX ORDER — FUROSEMIDE 40 MG/1
40 TABLET ORAL DAILY
Status: DISCONTINUED | OUTPATIENT
Start: 2021-03-18 | End: 2021-03-19 | Stop reason: HOSPADM

## 2021-03-18 RX ORDER — CIPROFLOXACIN 2 MG/ML
400 INJECTION, SOLUTION INTRAVENOUS EVERY 12 HOURS
Status: DISCONTINUED | OUTPATIENT
Start: 2021-03-18 | End: 2021-03-19 | Stop reason: HOSPADM

## 2021-03-18 RX ORDER — SODIUM CHLORIDE 0.9 % (FLUSH) 0.9 %
10 SYRINGE (ML) INJECTION PRN
Status: DISCONTINUED | OUTPATIENT
Start: 2021-03-18 | End: 2021-03-19 | Stop reason: HOSPADM

## 2021-03-18 RX ORDER — ALLOPURINOL 100 MG/1
100 TABLET ORAL 2 TIMES DAILY
Status: DISCONTINUED | OUTPATIENT
Start: 2021-03-18 | End: 2021-03-19 | Stop reason: HOSPADM

## 2021-03-18 RX ORDER — AMLODIPINE BESYLATE 2.5 MG/1
2.5 TABLET ORAL DAILY
Status: DISCONTINUED | OUTPATIENT
Start: 2021-03-18 | End: 2021-03-19 | Stop reason: HOSPADM

## 2021-03-18 RX ORDER — ATORVASTATIN CALCIUM 80 MG/1
80 TABLET, FILM COATED ORAL NIGHTLY
Status: DISCONTINUED | OUTPATIENT
Start: 2021-03-18 | End: 2021-03-19 | Stop reason: HOSPADM

## 2021-03-18 RX ORDER — METOPROLOL TARTRATE 50 MG/1
50 TABLET, FILM COATED ORAL 2 TIMES DAILY
Status: DISCONTINUED | OUTPATIENT
Start: 2021-03-18 | End: 2021-03-19 | Stop reason: HOSPADM

## 2021-03-18 RX ORDER — DEXTROSE MONOHYDRATE 50 MG/ML
100 INJECTION, SOLUTION INTRAVENOUS PRN
Status: DISCONTINUED | OUTPATIENT
Start: 2021-03-18 | End: 2021-03-19 | Stop reason: HOSPADM

## 2021-03-18 RX ADMIN — SODIUM CHLORIDE: 9 INJECTION, SOLUTION INTRAVENOUS at 06:26

## 2021-03-18 RX ADMIN — ONDANSETRON 4 MG: 2 INJECTION INTRAMUSCULAR; INTRAVENOUS at 18:03

## 2021-03-18 RX ADMIN — AMLODIPINE BESYLATE 2.5 MG: 2.5 TABLET ORAL at 09:52

## 2021-03-18 RX ADMIN — IRON SUCROSE 100 MG: 20 INJECTION, SOLUTION INTRAVENOUS at 09:52

## 2021-03-18 RX ADMIN — CIPROFLOXACIN 400 MG: 2 INJECTION, SOLUTION INTRAVENOUS at 13:00

## 2021-03-18 RX ADMIN — SODIUM CHLORIDE, PRESERVATIVE FREE 10 ML: 5 INJECTION INTRAVENOUS at 17:32

## 2021-03-18 RX ADMIN — SODIUM CHLORIDE, PRESERVATIVE FREE 10 ML: 5 INJECTION INTRAVENOUS at 20:06

## 2021-03-18 RX ADMIN — METOPROLOL TARTRATE 50 MG: 50 TABLET, FILM COATED ORAL at 20:06

## 2021-03-18 RX ADMIN — ATORVASTATIN CALCIUM 80 MG: 80 TABLET, FILM COATED ORAL at 20:06

## 2021-03-18 RX ADMIN — INSULIN LISPRO 2 UNITS: 100 INJECTION, SOLUTION INTRAVENOUS; SUBCUTANEOUS at 17:32

## 2021-03-18 RX ADMIN — FUROSEMIDE 40 MG: 40 TABLET ORAL at 09:52

## 2021-03-18 RX ADMIN — INSULIN LISPRO 4 UNITS: 100 INJECTION, SOLUTION INTRAVENOUS; SUBCUTANEOUS at 13:38

## 2021-03-18 RX ADMIN — ALLOPURINOL 100 MG: 100 TABLET ORAL at 20:06

## 2021-03-18 RX ADMIN — INSULIN LISPRO 1 UNITS: 100 INJECTION, SOLUTION INTRAVENOUS; SUBCUTANEOUS at 20:07

## 2021-03-18 RX ADMIN — ALLOPURINOL 100 MG: 100 TABLET ORAL at 09:52

## 2021-03-18 RX ADMIN — SODIUM CHLORIDE: 9 INJECTION, SOLUTION INTRAVENOUS at 20:19

## 2021-03-18 RX ADMIN — SODIUM CHLORIDE, PRESERVATIVE FREE 10 ML: 5 INJECTION INTRAVENOUS at 06:18

## 2021-03-18 RX ADMIN — PANTOPRAZOLE SODIUM 40 MG: 40 INJECTION, POWDER, FOR SOLUTION INTRAVENOUS at 17:32

## 2021-03-18 RX ADMIN — POLYETHYLENE GLYCOL 3350 238 G: 17 POWDER, FOR SOLUTION ORAL at 18:02

## 2021-03-18 RX ADMIN — PANTOPRAZOLE SODIUM 40 MG: 40 INJECTION, POWDER, FOR SOLUTION INTRAVENOUS at 06:18

## 2021-03-18 RX ADMIN — METOPROLOL TARTRATE 50 MG: 50 TABLET, FILM COATED ORAL at 09:52

## 2021-03-18 ASSESSMENT — ENCOUNTER SYMPTOMS
SHORTNESS OF BREATH: 1
BLOOD IN STOOL: 1

## 2021-03-18 ASSESSMENT — PAIN SCALES - GENERAL: PAINLEVEL_OUTOF10: 0

## 2021-03-18 NOTE — PROGRESS NOTES
Got perfect serve from patient PCP, urine culture sent yesterday is positive, final sensitivity awaited  Patient allergic to penicillin  Starting patient on ciprofloxacin until final cultures are back

## 2021-03-18 NOTE — PLAN OF CARE
PRE CONSULT ROUNDING NOTE  HPI  80year old female with pmh of colon cancer s/p resection, uterine cancer pe CABG dm htn hyperlipidemia who was called by her PCP  For abnormal labs. hgb was found to be 7.9, in October of last year hgb around 10. She has had melena for one week. Iron 25 with 6% saturation. She is on brilinta and xaralto because of heart stents and previous PE; last doses were yesterday. She denies NSAID use. inr yesterday was 2.7, todays level is pending. No abdominal imaging has been done. No fevers chills weight loss change in the bowel habits dysphagia hematochezia or hematemesis.       Endoscopy no egd reports normal colonoscopy in 2015-no report  Family reports no hx of liver pancreatic stomach or colon cancer no UC/crohns  Social no smoking no etoh or illicit drugs   BP (!) 143/47   Pulse 81   Temp 97.9 °F (36.6 °C) (Oral)   Resp 16   Ht 5' 9\" (1.753 m)   Wt 200 lb (90.7 kg)   SpO2 99%   BMI 29.53 kg/m²     ROS as above meds labs imaging and past medical records were reviewed    Exam  General Appearance: alert and oriented to person, place and time, well-developed and well-nourished, in no acute distress  Skin: warm and dry, no rash or erythema  Head: normocephalic and atraumatic  Eyes: pupils equal, round, and reactive to light, extraocular eye movements intact, conjunctivae normal  ENT: hearing grossly normal bilaterally  Neck: neck supple and non tender without mass, no thyromegaly or thyroid nodules, no cervical lymphadenopathy   Pulmonary/Chest: clear to auscultation bilaterally- no wheezes, rales or rhonchi, normal air movement, no respiratory distress  Cardiovascular: normal rate, regular rhythm, normal S1 and S2, no murmurs, rubs, clicks or gallops, distal pulses intact, no carotid bruits  Abdomen: soft, obese non tender, non-distended, normal bowel sounds, no masses or organomegaly no ascites  Extremities: no cyanosis, clubbing or edema  Musculoskeletal: normal range of motion, no joint swelling, deformity or tenderness  Neurologic: no cranial nerve deficit and muscle strength normal    Assessment  Anemia with melena  Iron deficiency anemia  Hx colon cancer s/p surgery  UTI    Plan  Will discuss egd with md, will place AC on hold  ppi  covid testing ordered  Clear diet today  Trend hh and keep hgb >7  recc iron replacement  She is also due for colonoscopy-likely outpt    Formal gi consult to follow  . Ray Saxena, APRN - CNP

## 2021-03-18 NOTE — ED NOTES
Report given to Neelam Sierra RN from U. Report method by phone   The following was reviewed with receiving RN:   Current vital signs:  BP (!) 148/59   Pulse 76   Temp 97.8 °F (36.6 °C) (Oral)   Resp 14   Ht 5' 9\" (1.753 m)   Wt 200 lb (90.7 kg)   SpO2 98%   BMI 29.53 kg/m²                MEWS Score: 0     Any medication or safety alerts were reviewed. Any pending diagnostics and notifications were also reviewed, as well as any safety concerns or issues, abnormal labs, abnormal imaging, and abnormal assessment findings. Questions were answered.           [facility] BeckerAC[facility]  03/18/21 1932

## 2021-03-18 NOTE — CARE COORDINATION
CASE MANAGEMENT NOTE:    Admission Date:  3/17/2021 Claudeen Sprang is a 80 y.o.  female    Admitted for : GI bleed [K92.2]    Met with:  Patient    PCP:  Dr. Gonzalez Life:  Medicare      Current Residence/ Living Arrangements:  independently at home             Current Services PTA:  No    Is patient agreeable to VNS: No    Freedom of choice provided:  Yes    List of [address] provided: No    VNS chosen:  No    DME:  walker and shower chair and GB    Home Oxygen: No    Nebulizer: No    CPAP/BIPAP: No    Supplier: N/A    Potential Assistance Needed: No    SNF needed: No    Freedom of choice and list provided: NA    Pharmacy:  [facility]       Does Patient want to use MEDS to BEDS? No    Is patient currently receiving oral anticoagulation therapy? No    Is the Patient an DARRELL ABREU [facility] with Readmission Risk Score greater than 14%? No  If yes, pt needs a follow up appointment made within 7 days. Family Members/Caregivers that pt would like involved in their care:    Yes    If yes, list name here:  Suzannamode Garcia and [facility] Provider:  Patient             Is patient in Isolation/One on One/Altered Mental Status? No  If yes, skip next question. If no, would they like an I-Pad to  use? No  If yes, call [phone]. Discharge Plan:  3/18: MEDICARE - Patient is from 1st floor apartment alone. States she is independent and drives. DME - Walker, [place], GB. Declines need for VNS. Going to EGD/Scope tomorrow. Hgb 7.9 today.  ORANGE HEADER - 13%. Rigo Banks                 Electronically signed by: Ilene Bright RN on 3/18/2021 at 4:18 PM

## 2021-03-18 NOTE — PROGRESS NOTES
[address]    PROGRESS NOTE             3/18/2021    1:16 PM    Name:   Jones Mccartney  MRN:     [id number]     Acct:      [de-identified]   Room:   [facility]  IP Day:  1  Admit Date:  3/17/2021  1:56 PM    PCP:  Maddy Rabago MD  Code Status:  Full Code    Subjective:     C/C:   Chief Complaint   Patient presents with    Other     abnormal labs     Interval History Status: not changed. Patient seen and examined at the bedside. No acute events overnight. Patient states that her stools looked dark. Patient was seen by GI today who started Venofer 100 mg IV. Will continue IV Venofer 200 mg for 5 days. Iron 23, TIBC 274, iron saturation 6, U . Plan for EGD tomorrow, clinically outpatient. Covid swab pending. Patient receiving IV Protonix 40 mg twice daily. Patient urine culture positive for lactose fermenting gram-negative rods, ciprofloxacin  mg twice daily started    Cardiology will look into discontinuing Brilinta    Brief History:        Patient is a 61-year-old  female with past medical history of pulmonary embolism, hx coronary artery bypass graft x3 (2002), stent placements x4, recurrent UTIs, kidney stones, colon cancer 2006, diabetes type 2, hyperlipidemia, hypertension, was sent to the ER by her primary care physician due to abnormal lab values. She was sent to the ER due to low hemoglobin at 8.9. Her most recent hemoglobin reading in October 2020 was 10.3. Patient reports she experiences shortness of breath when she walks from parking lot to the hospital.  Shortness of breath occurs occasionally and only when she walks long distance. First episode occurred June 2020. Patient experiences blood in the toilet occasionally. The last episode was a couple weeks ago. The toilet water is pink. It occurs every few months.   Every 6 months during her checkup, patient states that her urinalysis is positive for hemoglobin. Patient is unsure if the blood is from her urine or from her stool. Denies lightheadedness, dizziness, fatigue, syncope, abdominal pain. Patient is on two anticoagulants, Xarelto and Brilinta. Patient reports she has been on Xarelto for many years, due to her history of pulmonary embolism. Brilinta was started on June 2020 and aspirin was stopped, medications were adjusted due to her cardiac catheterization. Patient states that she was supposed to continue Brilinta until June 17 2021, 1 year from her cardiac catheterization. In June 2021, the plan is to stop Brilinta and resume aspirin. Patient states that she receives colonoscopy every 3 years since 2008 due to her history of colon cancer in 2006. Her last colonoscopy was in 2015. Patient is unable to recall if there were any abnormalities in her colonoscopies.     Patient is admitted to the floor. GI is consulted. Xarelto and Brilinta are held. Review of Systems:     Review of Systems   Respiratory: Positive for shortness of breath. Gastrointestinal: Positive for blood in stool. Medications: Allergies:     Allergies   Allergen Reactions    Ampicillin Other (See Comments)    Clopidogrel Bisulfate Itching    Diovan [Valsartan] Other (See Comments)     cough    Lantus [Insulin Glargine] Nausea Only     Stomach \"quivered\" after taking it, palpitations    Metformin And Related Diarrhea     Chronic kidney disease stage 3       Current Meds:   Scheduled Meds:    allopurinol  100 mg Oral BID    amLODIPine  2.5 mg Oral Daily    atorvastatin  80 mg Oral Nightly    furosemide  40 mg Oral Daily    metoprolol tartrate  50 mg Oral BID    [START ON 3/19/2021] iron sucrose  200 mg Intravenous Q24H    ciprofloxacin  400 mg Intravenous Q12H    insulin lispro  0-12 Units Subcutaneous TID     insulin lispro  0-6 Units Subcutaneous Nightly    sodium chloride flush  10 mL Intravenous 2 times per day    pantoprazole  40 mg Intravenous Q12H    And    sodium chloride (PF)  10 mL Intravenous Q12H     Continuous Infusions:    dextrose      sodium chloride 75 mL/hr at 21 0626     PRN Meds: glucose, dextrose, glucagon (rDNA), dextrose, sodium chloride flush, acetaminophen **OR** acetaminophen, promethazine **OR** ondansetron    Data:     Past Medical History:   has a past medical history of Arthritis, Basal cell carcinoma of nose, Bronchitis, CAD (coronary artery disease), Carpal tunnel syndrome, Colon cancer ([address] Diabetes mellitus ([address] DVT (deep venous thrombosis) ([address] Gout, Hematuria, History of coronary artery bypass graft x 3, Hx of blood clots, Hyperlipidemia, Hypertension, Kidney stones, Lymphedema, MI, old, Ophthalmoplegic migraine headache, Osteoarthritis, Other pulmonary embolism without acute cor pulmonale (HCC), S/P coronary artery stent placement X 3, TIA (transient ischemic attack), Uterine cancer ([address] and Wears glasses. Social History:   reports that she has never smoked. She has never used smokeless tobacco. She reports that she does not drink alcohol or use drugs. Family History:   Family History   Problem Relation Age of Onset   Anastasia Sit Stroke Mother     Hypertension Mother     Heart Disease Father         AAA    Stroke Sister     Parkinsonism Brother     Cancer Sister         lung    Alcohol Abuse Sister        Vitals:  /75   Pulse 66   Temp 97.7 °F (36.5 °C) (Oral)   Resp 16   Ht 5' 9\" (1.753 m)   Wt 200 lb (90.7 kg)   SpO2 100%   BMI 29.53 kg/m²   Temp (24hrs), Av °F (36.7 °C), Min:97.7 °F (36.5 °C), Max:98.7 °F (37.1 °C)    Recent Labs     21  0622 21  1306   POCGLU 135* 156* 222*       I/O(24Hr):     Intake/Output Summary (Last 24 hours) at 3/18/2021 1316  Last data filed at 3/18/2021 1304  Gross per 24 hour   Intake 450 ml   Output    Net 450 ml       Labs:    [unfilled]    Lab Results   Component Value Date/Time    SPECIAL NOT REPORTED 03/17/2021 01:03 PM     Lab Results   Component Value Date/Time    CULTURE (A) 03/17/2021 01:03 PM     LACTOSE FERMENTING GRAM NEGATIVE RODS >[id number] CFU/ML       [facility]    Radiology:    No results found. Physical Examination:        Physical Exam  Constitutional:       General: She is not in acute distress. Appearance: Normal appearance. She is not ill-appearing. HENT:      Head: Normocephalic. Mouth/Throat:      Mouth: Mucous membranes are moist.   Cardiovascular:      Rate and Rhythm: Normal rate and regular rhythm. Pulses: Normal pulses. Heart sounds: Normal heart sounds. No murmur. No gallop. Pulmonary:      Effort: Pulmonary effort is normal. No respiratory distress. Breath sounds: Normal breath sounds. No wheezing. Chest:      Chest wall: No tenderness. Abdominal:      General: Bowel sounds are normal. There is no distension. Palpations: Abdomen is soft. There is no mass. Tenderness: There is no abdominal tenderness. Musculoskeletal:         General: No swelling, tenderness or deformity. Neurological:      General: No focal deficit present. Mental Status: She is alert and oriented to person, place, and time. Psychiatric:         Mood and Affect: Mood normal.         Behavior: Behavior normal.         Thought Content:  Thought content normal.           Assessment:        Primary Problem  GI bleed    Active Hospital Problems    Diagnosis Date Noted    History of coronary artery bypass graft x 3 [Z95.1] 07/02/2020     Priority: High    GI bleed [K92.2] 03/17/2021    History of coronary artery stent placement [Z95.5] 06/29/2020    History of DVT (deep vein thrombosis) [Z86.718] 05/25/2020    History of pulmonary embolus (PE) [Z86.711] 05/25/2020    Chronic anticoagulation [Z79.01] 05/25/2020    Coronary artery disease involving coronary bypass graft of native heart without angina pectoris [I25.810] 03/12/2018    Type 2 diabetes Hyperlipidemia  Continue Lipitor 80 mg nightly     Diet: Clear liquid diet per GI, n.p.o. after midnight  DVT prophylaxis: Intermittent pneumatic compression device  GI prophylaxis: IV Protonix  PT/OT/SW     Disposition:     Nery Elmore MD  3/18/2021  1:16 PM

## 2021-03-18 NOTE — ED NOTES
Report given to [facility], RN    Report method {IN PERSON   The following was reviewed with receiving RN: hgb 8.9, H/H q6, NSS @ 75/hr via pump. Cardiology/GI consults in AM.    Current vital signs:  /60   Pulse 83   Temp 98.7 °F (37.1 °C) (Oral)   Resp 18   Ht 5' 9\" (1.753 m)   Wt 200 lb (90.7 kg)   SpO2 99%   BMI 29.53 kg/m²                MEWS Score: 1     Any medication or safety alerts were reviewed. Any pending diagnostics and notifications were also reviewed, as well as any safety concerns or issues, abnormal labs, abnormal imaging, and abnormal assessment findings. Questions were answered.             Simon Vaughn RN  03/17/21 7420

## 2021-03-18 NOTE — CONSULTS
Cardiology Consult           Date of Admission:  3/17/2021  Date of Consultation:  3/18/2021      PCP:  Jacque Villatoro MD      Chief Complaint:   GI bleed    History of Present Illness:  Filiberto Vidal is a 80 y.o. female who presents with  GI bleed. She was instructed by her primary care physician to report to the emergency room after routine lab work. Was found  To have a hemoglobin of 8.9 on admission, is now 7.9. She had stenting done in June of 2020 and has been on Brilinta and Xarelto since. Both are now held. Patient has history significant for multivessel coronary artery disease with previous CABG, previous drug-eluting stents x4, preserved EF 65% with moderate concentric hypertrophy, remote history of pulmonary embolism currently on Xarelto, essential hypertension, mixed hyperlipidemia. PMH:   has a past medical history of Arthritis, Basal cell carcinoma of nose, Bronchitis, CAD (coronary artery disease), Carpal tunnel syndrome, Colon cancer ([address] Diabetes mellitus ([address] DVT (deep venous thrombosis) ([address] Gout, Hematuria, History of coronary artery bypass graft x 3, Hx of blood clots, Hyperlipidemia, Hypertension, Kidney stones, Lymphedema, MI, old, Ophthalmoplegic migraine headache, Osteoarthritis, Other pulmonary embolism without acute cor pulmonale (HCC), S/P coronary artery stent placement X 3, TIA (transient ischemic attack), Uterine cancer ([address] and Wears glasses. PSH:   has a past surgical history that includes Cholecystectomy; Appendectomy; Hysterectomy; Coronary artery bypass graft; Lithotripsy; Carpal tunnel release (Right); Finger trigger release (Right); Colon surgery; Dilatation, esophagus; skin biopsy; angioplasty; Carotid endarterectomy (Left, 3-3-16); Colonoscopy; cyst removal (10/07/2016); Breast surgery; joint replacement (Left, 03/29/2010); joint replacement (Right, 02/16/1999); Cardiac surgery; vascular surgery; and Cystoscopy (Bilateral, 1/21/2020). Allergies: Allergies   Allergen Reactions    Ampicillin Other (See Comments)    Clopidogrel Bisulfate Itching    Diovan [Valsartan] Other (See Comments)     cough    Lantus [Insulin Glargine] Nausea Only     Stomach \"quivered\" after taking it, palpitations    Metformin And Related Diarrhea     Chronic kidney disease stage 3        Home Meds:    Prior to Admission medications    Medication Sig Start Date End Date Taking?  Authorizing Provider   metoprolol tartrate (LOPRESSOR) 25 MG tablet Take 50 mg by mouth 2 times daily    Yes Historical Provider, MD   potassium chloride (KLOR-CON M) 20 MEQ extended release tablet Take 1 tablet by mouth daily 3/16/21  Yes Cielo Corona MD   rivaroxaban (XARELTO) 20 MG TABS tablet Take 1 tablet by mouth daily (with breakfast) TAKE 1 TABLET DAILY WITH BREAKFAST 11/5/20  Yes Cielo Corona MD   alendronate (FOSAMAX) 70 MG tablet Take 70 mg by mouth every 7 days Indications: Rigo Boast  7/25/20  Yes Historical Provider, MD   Ticagrelor (BRILINTA PO) Take 90 mg by mouth 2 times daily   Yes Historical Provider, MD   ezetimibe (ZETIA) 10 MG tablet Take 1 tablet by mouth nightly 8/13/20  Yes Cielo Corona MD   allopurinol (ZYLOPRIM) 100 MG tablet Take 1 tablet by mouth 2 times daily 5/19/20  Yes Cielo Corona MD   linagliptin (TRADJENTA) 5 MG tablet Take 1 tablet by mouth daily 5/19/20  Yes Cielo Corona MD   glimepiride (AMARYL) 4 MG tablet Take 1 tablet by mouth 2 times daily 5/19/20  Yes Cielo Corona MD   amLODIPine (NORVASC) 2.5 MG tablet Take 1 tablet by mouth daily 5/19/20  Yes Cielo Corona MD   furosemide (LASIX) 40 MG tablet Take 1 tablet by mouth daily 5/19/20  Yes Cielo Corona MD   atorvastatin (LIPITOR) 80 MG tablet Take 1 tablet by mouth nightly 5/19/20  Yes Cielo Corona MD   Biotin 300 MCG TABS Take 600 mcg by mouth every other day    Yes Historical Provider, MD   Cholecalciferol (VITAMIN D3) 1000 UNITS TABS Take 1 tablet by mouth daily   Yes Historical Provider, MD   white petrolatum (VASELINE) GEL Daily at bedtime for itching and dry skin 12/9/20   Juvenal Rivera MD   blood glucose monitor strips Testing once a day. Please dispense according to patients insurance.  5/19/20   Juvenal Rivera MD   FREESTYLE LANCETS MISC USE TO TEST BLOOD SUGAR TWICE A DAY 1/16/17   Mandeep Serna MD   Blood Glucose Monitoring Suppl (FREESTYLE LITE) SARY use as directed 8/12/16   Historical Provider, MD   nitroGLYCERIN (NITROSTAT) 0.4 MG SL tablet Place 1 tablet under the tongue every 5 minutes as needed for Chest pain 5/24/16   Mandeep Serna MD        Hospital Meds:    Current Facility-Administered Medications   Medication Dose Route Frequency Provider Last Rate Last Admin    allopurinol (ZYLOPRIM) tablet 100 mg  100 mg Oral BID Josep Chen MD   100 mg at 03/18/21 0952    amLODIPine (NORVASC) tablet 2.5 mg  2.5 mg Oral Daily Josep Chen MD   2.5 mg at 03/18/21 8784    atorvastatin (LIPITOR) tablet 80 mg  80 mg Oral Nightly Josep Chen MD        furosemide (LASIX) tablet 40 mg  40 mg Oral Daily Josep Chen MD   40 mg at 03/18/21 0952    metoprolol tartrate (LOPRESSOR) tablet 50 mg  50 mg Oral BID Josep Chen MD   50 mg at 03/18/21 0952    insulin lispro (HUMALOG) injection vial 0-6 Units  0-6 Units Subcutaneous TID  Josep Chen MD        insulin lispro (HUMALOG) injection vial 0-3 Units  0-3 Units Subcutaneous Nightly Josep Chen MD        glucose (GLUTOSE) 40 % oral gel 15 g  15 g Oral PRN Josep Chen MD        dextrose 50 % IV solution  12.5 g Intravenous PRN Josep Chen MD        glucagon (rDNA) injection 1 mg  1 mg Intramuscular PRN Josep Chen MD        dextrose 5 % solution  100 mL/hr Intravenous PRN Josep Chen MD        sodium chloride flush 0.9 % injection 10 mL  10 mL Intravenous PRN Josep Chen MD        acetaminophen (TYLENOL) tablet 650 mg 650 mg Oral Q6H PRN Sarath Valenzuela MD        Or    acetaminophen (TYLENOL) suppository 650 mg  650 mg Rectal Q6H PRN Sarath Valenzuela MD        iron sucrose (VENOFER) 100 mg in sodium chloride 0.9 % 100 mL IVPB  100 mg Intravenous Once Sarath Valenzuela  mL/hr at 03/18/21 0952 100 mg at 03/18/21 0952    0.9 % sodium chloride infusion   Intravenous Continuous Sarath Valenzuela MD 75 mL/hr at 03/18/21 0626 New Bag at 03/18/21 0626    sodium chloride flush 0.9 % injection 10 mL  10 mL Intravenous 2 times per day Sarath Valenzuela MD        promethazine (PHENERGAN) tablet 12.5 mg  12.5 mg Oral Q6H PRN Sarath Valenzuela MD        Or    ondansetron (ZOFRAN) injection 4 mg  4 mg Intravenous Q6H PRN Sarath Valenzuela MD        pantoprazole (PROTONIX) injection 40 mg  40 mg Intravenous Q12H Sarath Valenzuela MD   40 mg at 03/18/21 3726    And    sodium chloride (PF) 0.9 % injection 10 mL  10 mL Intravenous Q12H Sarath Valenzuela MD   10 mL at 03/18/21 6446       Social History:       TOBACCO:   reports that she has never smoked. She has never used smokeless tobacco.  ETOH:   reports no history of alcohol use. DRUGS:  reports no history of drug use. OCCUPATION:          Family Histroy:         Problem Relation Age of Onset   Collette Satchel Stroke Mother     Hypertension Mother     Heart Disease Father         AAA    Stroke Sister     Parkinsonism Brother     Cancer Sister         lung    Alcohol Abuse Sister            Review of Systems:   · Constitutional: there has been no unanticipated weight loss. There's been no change in energy level, sleep pattern, or activity level. · Eyes: No visual changes or diplopia. No scleral icterus. · ENT: No Headaches, hearing loss or vertigo. No mouth sores or sore throat. · Cardiovascular: No chest pain, dyspnea on exertion, palpitations or loss of consciousness. No cough, hemoptysis, pleuritic pain, or phlebitis. · Respiratory: No cough or wheezing, no sputum production.  No hematemesis. · Gastrointestinal: No abdominal pain, appetite loss, blood in stools. No change in bowel or bladder habits. · Genitourinary: No dysuria, trouble voiding, or hematuria. · Musculoskeletal:  No gait disturbance, weakness or joint complaints. · Integumentary: No rash or pruritis. · Neurological: No headache, diplopia, change in muscle strength, numbness or tingling. No change in gait, balance, coordination, mood, affect, memory, mentation, behavior. · Psychiatric: No anxiety, or depression. · Endocrine: No temperature intolerance. No excessive thirst, fluid intake, or urination. No tremor. · Hematologic/Lymphatic: No abnormal bruising or bleeding, blood clots or swollen lymph nodes. · Allergic/Immunologic: No nasal congestion or hives. Physical Exam    Vital Signs: BP (!) 143/47   Pulse 81   Temp 97.9 °F (36.6 °C) (Oral)   Resp 16   Ht 5' 9\" (1.753 m)   Wt 200 lb (90.7 kg)   SpO2 99%   BMI 29.53 kg/m²        Admission Weight: 200 lb (90.7 kg)     General appearance: Awake, Alert Cooperative    Head: Normocephalic, without obvious abnormality, atraumatic    Eyes: Conjunctivae/corneas clear. PERRL, EOM's intact. Fundi benign    Neck: no adenopathy, no carotid bruit, no JVD, supple, symmetrical, trachea midline and thyroid: not enlarged, symmetric, no tenderness/mass/nodules    Lungs: clear to auscultation bilaterally    Heart: regular rate and rhythm, S1, S2 normal, no murmur, click, rub or gallop    Abdomen: Soft, non-tender.  Bowel sounds normal. No masses,  no organomegaly    Extremities: extremities normal, atraumatic, no cyanosis or edema    Skin: Skin color, texture, turgor normal. No rashes or lesions    Neurologic: Grossly normal        MEDICAL DECISION MAKING/TESTING    Cardiac Cath:  Procedures    CARDIAC CATHETERIZATION   Coronary angiogram and graft/native   Revascularization graft drug eluting stent  right coronary artery   Indications    Abnormal stress test [X93.14 (ICD-10-CM)]   Conclusion    Recommendations:  Medical therapy as needed. Risk factor modification. Post PCI care.        Conclusion:  1. Multivessel obstructive coronary disease. 2.  Patent lima to the left anterior descending artery and patent radial artery graft to the ramus intermedius. 3.  Patent diseased vein graft to the right coronary artery successfully stented with drug eluting stent. Pre Procedure Diagnosis    abnormal cardiac stress test   Coronary Vessel Findings    Diagnostic  Dominance: Right  Left Main   Left main coronary artery is free of important disease. Left Anterior Descending   Left anterior descending artery is totally occluded proximally. LIMA to the left anterior descending artery is widely patent without insertional stenosis. Ramus Intermedius   Ramus intermedius is a large grafted vessel which is totally occluded near its ostium. Radial artery graft to the ramus intermedius is widely patent without insertional stenosis. Left Circumflex   Left circumflex coronary artery is a nondominant system. It is diffusely irregular through the proximal portion and into the 1st obtuse marginal with stenosis to approximately 30-40%. Left circumflex coronary artery is totally occluded after the takeoff of the obtuse marginal and reconstitute a small distal vessel with left to left jump collaterals. Right Coronary Artery   Right coronary artery is totally occluded. Saphenous vein graft to the posterior descending artery is patent with a focal 99% mid stenosis. Remaining graft is ectatic without obstruction. Saphenous vein graft is wired with 0.014 mm 3.5-5.5 filter wire. 3.5 x 18 mm in length Xience drug-eluting stent is placed. KURT 2 initial flow. KURT 3 post flow. 0% residual stenosis. 10 mm in length type C lesion. Intervention    No interventions have been documented.   Complications    Complications documented before study signed (6/17/2020 13:54 AM)     No complications were associated with this study. Documented by Brenda Smiley MD - 6/17/2020 10:49 AM      Implants    Name ID Temporary Type Supply   COREEN XIENCE JUAN 3.5X18 RX - [id number] No Stent COREEN XIENCE JUAN 3.5X18 RX   Stent(s) deployed      Event Details User   10:39 AM 6/17/20 Stent Deployment [address] 3.5x18 Rx - Stent deployed in the RPDA and saphenous vein graft. Pressure = 14 alfonso. Echo/Stress:    Narrative & Impression     [facility]     Transthoracic Echocardiography Report (TTE)      Patient Name Radha Mariee     Date of Study                 06/16/2020                JD HOLLINGSWORTH      Date of      12/10/1933  Gender                        Female   Birth      Age          80 year(s)  Race                                Room Number  2089        Height:                       68.9 inch, 175 cm      Corporate ID [id number]    Weight:                       198 pounds, 90 kg   #      Patient [place] [de-identified]   BSA:           2.06 m^2       BMI:      29.39   #                                                                kg/m^2      MR #         [id number]      Nolberto Tucker      Accession #  [id number]  Interpreting Physician        Anne Nixon 61      Fellow                   Referring Nurse Practitioner      Interpreting             Referring Physician           [address] [place] [address]   Fellow     Type of Study      TTE procedure:2D Echocardiogram, M-Mode, Doppler, Color Doppler. Procedure Date  Date: 06/16/2020 Start: 08:15 AM     Study Location: [address] [place] [address]  Technical Quality: Fair visualization     Indications:Chest pain.     History / Tech.  Comments:  HTN DM CAD HLD CKD PE  CABGX3     Patient Status: Inpatient     Height: 68.9 inches Weight: 198.43 pounds BSA: 2.06 m^2 BMI: 29.39 kg/m^2     Rhythm: Within normal limits HR: 64 bpm BP: 130/47 mmHg     CONCLUSIONS     Summary  Normal left ventricle size and function with an estimated EF > 55%.  No segmental wall motion abnormalities seen. Mild to moderate left ventricular hypertrophy. Grade I (mild) left ventricular diastolic dysfunction. Normal right ventricular size and function. Left atrium appears mildly dilated. Right atrium is normal in size. Aortic valve is trileaflet. Mild aortic stenosis. Peak instantaneous  gradient 25 mmHg and mean gradient 13 mmHg. Mild aortic insufficiency. Normal aortic root dimension. Mild mitral stenosis. Mean 2 mmHg, max 7 mmHg. MVA 1.8 cm2  Mitral annular calcification is seen. Mild mitral regurgitation. Normal tricuspid valve structure and function. Insignificant tricuspid  regurgitation, unable to estimate RVSP. The pulmonic valve is normal in structure. No pulmonic insufficiency. IVC not well visualized. No significant pericardial effusion is seen. EKG:      CXR:       Labs:      CBC:   Recent Labs     03/17/21  1152 03/17/21  2323 03/18/21  0609   WBC 8.3  --   --    HGB 8.9* 8.1* 7.9*   HCT 28.0* 26.8* 25.8*   MCV 71.3*  --   --      --   --      BMP:   Recent Labs     03/17/21  1152 03/18/21  0609    142   K 4.2 4.4    108*   CO2 22 24   BUN 19 19   CREATININE 0.87 0.90     PT/INR:   Recent Labs     03/17/21  1448   PROTIME 28.3*   INR 2.7     APTT:   Recent Labs     03/17/21  1448   APTT 38.2*     MAG:   Recent Labs     03/17/21  1152   MG 1.9     D Dimer: No results for input(s): DDIMER in the last 72 hours. Troponin T No results for input(s): TROPONINT in the last 72 hours.   ProBNP Invalid input(s): PRO-BNP          Diagnosis:  Principal Problem:    GI bleed  Active Problems:    History of coronary artery bypass graft x 3    Type 2 diabetes mellitus with stage 3 chronic kidney disease, without long-term current use of insulin ([facility])    Coronary artery disease involving coronary bypass graft of native heart without angina pectoris    SSS (sick sinus syndrome) ([facility])    Chronic anticoagulation    History of pulmonary embolus (PE)    History of DVT (deep vein thrombosis)    History of coronary artery stent placement  Resolved Problems:    * No resolved hospital problems. *        Plan:    Patient's hemoglobin is 7.9 today. Patient has been on Brilinta and Xarelto at home. Had cardiac catheterization done on June 17, 2020 which resulted in drug-eluting stent to the SVG to the RCA. Patient has been on Xarelto for some time now due to pulmonary embolisms. She is unsure whether these were provoked or unprovoked. Will remain off blood thinners till seen by Gastroenterology. Has been on Brilinta now for over 9 months. Will discuss with attending if this can be DC. Will need to resume Xarelto when okay with GI. Continue supportive care. Will follow.

## 2021-03-18 NOTE — ED NOTES
Pt ambulates to restroom and tolerates well.       [facility]  03/18/21 4624

## 2021-03-18 NOTE — FLOWSHEET NOTE
Patient admitted to [facility]123 from ER. Vitals obtained as charted, assessment completed. POC discussed. Patient oriented to room. Call light within reach.

## 2021-03-18 NOTE — PLAN OF CARE
Case d/w dr Carlton Ruano will plan on egd and colonoscopy tomorrow, stat INR ordered . Amanda Coy, APRN - CNP

## 2021-03-19 ENCOUNTER — ANESTHESIA EVENT (OUTPATIENT)
Dept: ENDOSCOPY | Age: 86
DRG: 813 | End: 2021-03-19
Payer: MEDICARE

## 2021-03-19 ENCOUNTER — ANESTHESIA (OUTPATIENT)
Dept: ENDOSCOPY | Age: 86
DRG: 813 | End: 2021-03-19
Payer: MEDICARE

## 2021-03-19 VITALS
HEART RATE: 81 BPM | WEIGHT: 200 LBS | OXYGEN SATURATION: 97 % | TEMPERATURE: 97.7 F | SYSTOLIC BLOOD PRESSURE: 134 MMHG | RESPIRATION RATE: 18 BRPM | BODY MASS INDEX: 29.62 KG/M2 | DIASTOLIC BLOOD PRESSURE: 58 MMHG | HEIGHT: 69 IN

## 2021-03-19 VITALS — DIASTOLIC BLOOD PRESSURE: 56 MMHG | SYSTOLIC BLOOD PRESSURE: 114 MMHG | OXYGEN SATURATION: 100 %

## 2021-03-19 LAB
ABSOLUTE EOS #: 0.13 K/UL (ref 0–0.4)
ABSOLUTE IMMATURE GRANULOCYTE: ABNORMAL K/UL (ref 0–0.3)
ABSOLUTE LYMPH #: 1.39 K/UL (ref 1–4.8)
ABSOLUTE MONO #: 0.4 K/UL (ref 0.1–1.3)
ANION GAP SERPL CALCULATED.3IONS-SCNC: 10 MMOL/L (ref 9–17)
BASOPHILS # BLD: 1 % (ref 0–2)
BASOPHILS ABSOLUTE: 0.07 K/UL (ref 0–0.2)
BUN BLDV-MCNC: 9 MG/DL (ref 8–23)
BUN/CREAT BLD: ABNORMAL (ref 9–20)
CALCIUM SERPL-MCNC: 8.2 MG/DL (ref 8.6–10.4)
CHLORIDE BLD-SCNC: 108 MMOL/L (ref 98–107)
CO2: 21 MMOL/L (ref 20–31)
CREAT SERPL-MCNC: 0.63 MG/DL (ref 0.5–0.9)
DIFFERENTIAL TYPE: ABNORMAL
EKG ATRIAL RATE: 65 BPM
EKG P AXIS: 53 DEGREES
EKG P-R INTERVAL: 190 MS
EKG Q-T INTERVAL: 424 MS
EKG QRS DURATION: 84 MS
EKG QTC CALCULATION (BAZETT): 440 MS
EKG R AXIS: 50 DEGREES
EKG T AXIS: 132 DEGREES
EKG VENTRICULAR RATE: 65 BPM
EOSINOPHILS RELATIVE PERCENT: 2 % (ref 0–4)
GFR AFRICAN AMERICAN: >60 ML/MIN
GFR NON-AFRICAN AMERICAN: >60 ML/MIN
GFR SERPL CREATININE-BSD FRML MDRD: ABNORMAL ML/MIN/{1.73_M2}
GFR SERPL CREATININE-BSD FRML MDRD: ABNORMAL ML/MIN/{1.73_M2}
GLUCOSE BLD-MCNC: 160 MG/DL (ref 65–105)
GLUCOSE BLD-MCNC: 164 MG/DL (ref 65–105)
GLUCOSE BLD-MCNC: 168 MG/DL (ref 65–105)
GLUCOSE BLD-MCNC: 185 MG/DL (ref 70–99)
HCT VFR BLD CALC: 25.6 % (ref 36–46)
HCT VFR BLD CALC: 27.2 % (ref 36–46)
HCT VFR BLD CALC: 27.2 % (ref 36–46)
HEMOGLOBIN: 7.9 G/DL (ref 12–16)
HEMOGLOBIN: 8.1 G/DL (ref 12–16)
HEMOGLOBIN: 8.1 G/DL (ref 12–16)
IMMATURE GRANULOCYTES: ABNORMAL %
LYMPHOCYTES # BLD: 21 % (ref 24–44)
MCH RBC QN AUTO: 21.9 PG (ref 26–34)
MCHC RBC AUTO-ENTMCNC: 29.9 G/DL (ref 31–37)
MCV RBC AUTO: 73.2 FL (ref 80–100)
MONOCYTES # BLD: 6 % (ref 1–7)
MORPHOLOGY: ABNORMAL
NRBC AUTOMATED: ABNORMAL PER 100 WBC
P E INTERPRETATION, U: NORMAL
PATHOLOGIST: NORMAL
PDW BLD-RTO: 20.1 % (ref 11.5–14.9)
PLATELET # BLD: 222 K/UL (ref 150–450)
PLATELET ESTIMATE: ABNORMAL
PMV BLD AUTO: 7.5 FL (ref 6–12)
POTASSIUM SERPL-SCNC: 3.9 MMOL/L (ref 3.7–5.3)
RBC # BLD: 3.71 M/UL (ref 4–5.2)
RBC # BLD: ABNORMAL 10*6/UL
SARS-COV-2: NORMAL
SARS-COV-2: NOT DETECTED
SEG NEUTROPHILS: 70 % (ref 36–66)
SEGMENTED NEUTROPHILS ABSOLUTE COUNT: 4.61 K/UL (ref 1.3–9.1)
SODIUM BLD-SCNC: 139 MMOL/L (ref 135–144)
SOURCE: NORMAL
SPECIMEN TYPE: NORMAL
URINE IFX INTERP: NORMAL
URINE IFX SPECIMEN: NORMAL
URINE TOTAL PROTEIN: 58 MG/DL
URINE TOTAL PROTEIN: 58 MG/DL
VOLUME: NORMAL ML
WBC # BLD: 6.6 K/UL (ref 3.5–11)
WBC # BLD: ABNORMAL 10*3/UL

## 2021-03-19 PROCEDURE — 3700000001 HC ADD 15 MINUTES (ANESTHESIA): Performed by: INTERNAL MEDICINE

## 2021-03-19 PROCEDURE — 85018 HEMOGLOBIN: CPT

## 2021-03-19 PROCEDURE — 99239 HOSP IP/OBS DSCHRG MGMT >30: CPT | Performed by: INTERNAL MEDICINE

## 2021-03-19 PROCEDURE — 6370000000 HC RX 637 (ALT 250 FOR IP): Performed by: INTERNAL MEDICINE

## 2021-03-19 PROCEDURE — 6360000002 HC RX W HCPCS: Performed by: INTERNAL MEDICINE

## 2021-03-19 PROCEDURE — 2709999900 HC NON-CHARGEABLE SUPPLY: Performed by: INTERNAL MEDICINE

## 2021-03-19 PROCEDURE — 3700000000 HC ANESTHESIA ATTENDED CARE: Performed by: INTERNAL MEDICINE

## 2021-03-19 PROCEDURE — 7100000000 HC PACU RECOVERY - FIRST 15 MIN: Performed by: INTERNAL MEDICINE

## 2021-03-19 PROCEDURE — 88342 IMHCHEM/IMCYTCHM 1ST ANTB: CPT

## 2021-03-19 PROCEDURE — 43239 EGD BIOPSY SINGLE/MULTIPLE: CPT | Performed by: INTERNAL MEDICINE

## 2021-03-19 PROCEDURE — 36415 COLL VENOUS BLD VENIPUNCTURE: CPT

## 2021-03-19 PROCEDURE — 6360000002 HC RX W HCPCS: Performed by: STUDENT IN AN ORGANIZED HEALTH CARE EDUCATION/TRAINING PROGRAM

## 2021-03-19 PROCEDURE — 0DBN8ZZ EXCISION OF SIGMOID COLON, VIA NATURAL OR ARTIFICIAL OPENING ENDOSCOPIC: ICD-10-PCS | Performed by: INTERNAL MEDICINE

## 2021-03-19 PROCEDURE — 0DB88ZX EXCISION OF SMALL INTESTINE, VIA NATURAL OR ARTIFICIAL OPENING ENDOSCOPIC, DIAGNOSTIC: ICD-10-PCS | Performed by: INTERNAL MEDICINE

## 2021-03-19 PROCEDURE — 6360000002 HC RX W HCPCS

## 2021-03-19 PROCEDURE — 80048 BASIC METABOLIC PNL TOTAL CA: CPT

## 2021-03-19 PROCEDURE — 88305 TISSUE EXAM BY PATHOLOGIST: CPT

## 2021-03-19 PROCEDURE — 2580000003 HC RX 258: Performed by: STUDENT IN AN ORGANIZED HEALTH CARE EDUCATION/TRAINING PROGRAM

## 2021-03-19 PROCEDURE — 7100000001 HC PACU RECOVERY - ADDTL 15 MIN: Performed by: INTERNAL MEDICINE

## 2021-03-19 PROCEDURE — 82947 ASSAY GLUCOSE BLOOD QUANT: CPT

## 2021-03-19 PROCEDURE — 85025 COMPLETE CBC W/AUTO DIFF WBC: CPT

## 2021-03-19 PROCEDURE — 3609010500 HC COLONOSCOPY POLYPECTOMY REMOVAL HOT BIOPSY/STOMA: Performed by: INTERNAL MEDICINE

## 2021-03-19 PROCEDURE — C9113 INJ PANTOPRAZOLE SODIUM, VIA: HCPCS | Performed by: STUDENT IN AN ORGANIZED HEALTH CARE EDUCATION/TRAINING PROGRAM

## 2021-03-19 PROCEDURE — 45385 COLONOSCOPY W/LESION REMOVAL: CPT | Performed by: INTERNAL MEDICINE

## 2021-03-19 PROCEDURE — 0DBL8ZZ EXCISION OF TRANSVERSE COLON, VIA NATURAL OR ARTIFICIAL OPENING ENDOSCOPIC: ICD-10-PCS | Performed by: INTERNAL MEDICINE

## 2021-03-19 PROCEDURE — 2500000003 HC RX 250 WO HCPCS

## 2021-03-19 PROCEDURE — 0DB68ZX EXCISION OF STOMACH, VIA NATURAL OR ARTIFICIAL OPENING ENDOSCOPIC, DIAGNOSTIC: ICD-10-PCS | Performed by: INTERNAL MEDICINE

## 2021-03-19 PROCEDURE — 3609012400 HC EGD TRANSORAL BIOPSY SINGLE/MULTIPLE: Performed by: INTERNAL MEDICINE

## 2021-03-19 PROCEDURE — 85014 HEMATOCRIT: CPT

## 2021-03-19 RX ORDER — OXYCODONE HYDROCHLORIDE AND ACETAMINOPHEN 5; 325 MG/1; MG/1
1 TABLET ORAL PRN
Status: DISCONTINUED | OUTPATIENT
Start: 2021-03-19 | End: 2021-03-19 | Stop reason: HOSPADM

## 2021-03-19 RX ORDER — PROPOFOL 10 MG/ML
INJECTION, EMULSION INTRAVENOUS PRN
Status: DISCONTINUED | OUTPATIENT
Start: 2021-03-19 | End: 2021-03-19 | Stop reason: SDUPTHER

## 2021-03-19 RX ORDER — LIDOCAINE HYDROCHLORIDE 10 MG/ML
INJECTION, SOLUTION EPIDURAL; INFILTRATION; INTRACAUDAL; PERINEURAL PRN
Status: DISCONTINUED | OUTPATIENT
Start: 2021-03-19 | End: 2021-03-19 | Stop reason: SDUPTHER

## 2021-03-19 RX ORDER — OXYCODONE HYDROCHLORIDE AND ACETAMINOPHEN 5; 325 MG/1; MG/1
2 TABLET ORAL PRN
Status: DISCONTINUED | OUTPATIENT
Start: 2021-03-19 | End: 2021-03-19 | Stop reason: HOSPADM

## 2021-03-19 RX ORDER — DIPHENHYDRAMINE HYDROCHLORIDE 50 MG/ML
12.5 INJECTION INTRAMUSCULAR; INTRAVENOUS
Status: DISCONTINUED | OUTPATIENT
Start: 2021-03-19 | End: 2021-03-19 | Stop reason: HOSPADM

## 2021-03-19 RX ORDER — FUROSEMIDE 10 MG/ML
40 INJECTION INTRAMUSCULAR; INTRAVENOUS ONCE
Status: COMPLETED | OUTPATIENT
Start: 2021-03-19 | End: 2021-03-19

## 2021-03-19 RX ORDER — LABETALOL HYDROCHLORIDE 5 MG/ML
5 INJECTION, SOLUTION INTRAVENOUS EVERY 10 MIN PRN
Status: DISCONTINUED | OUTPATIENT
Start: 2021-03-19 | End: 2021-03-19 | Stop reason: HOSPADM

## 2021-03-19 RX ORDER — ONDANSETRON 2 MG/ML
4 INJECTION INTRAMUSCULAR; INTRAVENOUS
Status: DISCONTINUED | OUTPATIENT
Start: 2021-03-19 | End: 2021-03-19 | Stop reason: HOSPADM

## 2021-03-19 RX ADMIN — CIPROFLOXACIN 400 MG: 2 INJECTION, SOLUTION INTRAVENOUS at 01:22

## 2021-03-19 RX ADMIN — PROPOFOL 620 MG: 10 INJECTION, EMULSION INTRAVENOUS at 09:57

## 2021-03-19 RX ADMIN — METOPROLOL TARTRATE 50 MG: 50 TABLET, FILM COATED ORAL at 12:47

## 2021-03-19 RX ADMIN — ATORVASTATIN CALCIUM 80 MG: 80 TABLET, FILM COATED ORAL at 12:47

## 2021-03-19 RX ADMIN — ALLOPURINOL 100 MG: 100 TABLET ORAL at 12:47

## 2021-03-19 RX ADMIN — SODIUM CHLORIDE, PRESERVATIVE FREE 10 ML: 5 INJECTION INTRAVENOUS at 06:22

## 2021-03-19 RX ADMIN — FUROSEMIDE 40 MG: 10 INJECTION, SOLUTION INTRAMUSCULAR; INTRAVENOUS at 12:39

## 2021-03-19 RX ADMIN — AMLODIPINE BESYLATE 2.5 MG: 2.5 TABLET ORAL at 12:46

## 2021-03-19 RX ADMIN — PANTOPRAZOLE SODIUM 40 MG: 40 INJECTION, POWDER, FOR SOLUTION INTRAVENOUS at 06:23

## 2021-03-19 RX ADMIN — SODIUM CHLORIDE: 9 INJECTION, SOLUTION INTRAVENOUS at 09:40

## 2021-03-19 RX ADMIN — LIDOCAINE HYDROCHLORIDE 50 MG: 10 INJECTION, SOLUTION EPIDURAL; INFILTRATION; INTRACAUDAL; PERINEURAL at 09:57

## 2021-03-19 ASSESSMENT — PULMONARY FUNCTION TESTS
PIF_VALUE: 1
PIF_VALUE: 0
PIF_VALUE: 1
PIF_VALUE: 0
PIF_VALUE: 1

## 2021-03-19 ASSESSMENT — ENCOUNTER SYMPTOMS
VOMITING: 0
NAUSEA: 0
COUGH: 0
BACK PAIN: 0
WHEEZING: 0
SHORTNESS OF BREATH: 1
CONSTIPATION: 0
SHORTNESS OF BREATH: 1
STRIDOR: 0
DIARRHEA: 0
CHEST TIGHTNESS: 0
ABDOMINAL PAIN: 0

## 2021-03-19 ASSESSMENT — PAIN SCALES - GENERAL
PAINLEVEL_OUTOF10: 0
PAINLEVEL_OUTOF10: 0

## 2021-03-19 NOTE — ANESTHESIA PRE PROCEDURE
Department of Anesthesiology  Preprocedure Note       Name:  Christiano Lang   Age:  80 y.o.  :  12/10/1933                                          MRN:  [id number]         Date:  3/19/2021      Surgeon: Tamera Monteiro):  Julia Rivera MD    Procedure: EGD (N/A Esophagus)  COLONOSCOPY DIAGNOSTIC (N/A )    Medications prior to admission:   Prior to Admission medications    Medication Sig Start Date End Date Taking? Authorizing Provider   metoprolol tartrate (LOPRESSOR) 25 MG tablet Take 50 mg by mouth 2 times daily     Historical Provider, MD   potassium chloride (KLOR-CON M) 20 MEQ extended release tablet Take 1 tablet by mouth daily 3/16/21   Bonifacio Stoddard MD   white petrolatum (VASELINE) GEL Daily at bedtime for itching and dry skin 20   Bonifacio Stoddard MD   rivaroxaban (XARELTO) 20 MG TABS tablet Take 1 tablet by mouth daily (with breakfast) TAKE 1 TABLET DAILY WITH BREAKFAST 20   Bonifacio Stoddard MD   alendronate (FOSAMAX) 70 MG tablet Take 70 mg by mouth every 7 days Indications: [place]  20   Historical Provider, MD   Ticagrelor (BRILINTA PO) Take 90 mg by mouth 2 times daily    Historical Provider, MD   ezetimibe (ZETIA) 10 MG tablet Take 1 tablet by mouth nightly 20   Bonifacio Stoddard MD   allopurinol (ZYLOPRIM) 100 MG tablet Take 1 tablet by mouth 2 times daily 20   Bonifacio Stoddard MD   linagliptin (TRADJENTA) 5 MG tablet Take 1 tablet by mouth daily 20   Bonifacio Stoddard MD   glimepiride (AMARYL) 4 MG tablet Take 1 tablet by mouth 2 times daily 20   Bonifacio Stoddard MD   amLODIPine (NORVASC) 2.5 MG tablet Take 1 tablet by mouth daily 20   Bonifacio Stoddard MD   furosemide (LASIX) 40 MG tablet Take 1 tablet by mouth daily 20   Bonifacio Stoddard MD   blood glucose monitor strips Testing once a day. Please dispense according to patients insurance.  20   Bonifacio Stoddard MD   atorvastatin (LIPITOR) 80 MG tablet Take 1 tablet by mouth nightly 5/19/20   Jeffrey Anderson MD   FREESTYLE LANCETS MISC USE TO TEST BLOOD SUGAR TWICE A DAY 1/16/17   Lyssa Smiley MD   Blood Glucose Monitoring Suppl (FREESTYLE LITE) SARY use as directed 8/12/16   Historical Provider, MD   nitroGLYCERIN (NITROSTAT) 0.4 MG SL tablet Place 1 tablet under the tongue every 5 minutes as needed for Chest pain 5/24/16   Lyssa Smiley MD   Biotin 300 MCG TABS Take 600 mcg by mouth every other day     Historical Provider, MD   Cholecalciferol (VITAMIN D3) 1000 UNITS TABS Take 1 tablet by mouth daily    Historical Provider, MD       Current medications:    No current facility-administered medications for this visit. No current outpatient medications on file.      Facility-Administered Medications Ordered in Other Visits   Medication Dose Route Frequency Provider Last Rate Last Admin    furosemide (LASIX) injection 40 mg  40 mg Intravenous Once Leopoldo Mins, APRN - CNP        allopurinol (ZYLOPRIM) tablet 100 mg  100 mg Oral BID Hemant Ornelas MD   100 mg at 03/18/21 2006    amLODIPine (NORVASC) tablet 2.5 mg  2.5 mg Oral Daily Hemant Ornelas MD   2.5 mg at 03/18/21 3348    atorvastatin (LIPITOR) tablet 80 mg  80 mg Oral Nightly Hemant Ornelas MD   80 mg at 03/18/21 2006    furosemide (LASIX) tablet 40 mg  40 mg Oral Daily Hemant Ornelas MD   40 mg at 03/18/21 5421    metoprolol tartrate (LOPRESSOR) tablet 50 mg  50 mg Oral BID Hemant Ornelas MD   50 mg at 03/18/21 2006    glucose (GLUTOSE) 40 % oral gel 15 g  15 g Oral PRN Hemant Ornelas MD        dextrose 50 % IV solution  12.5 g Intravenous PRN Hemant Ornelas MD        glucagon (rDNA) injection 1 mg  1 mg Intramuscular PRN Hemant Ornelas MD        dextrose 5 % solution  100 mL/hr Intravenous PRN Hemant Ornelas MD        sodium chloride flush 0.9 % injection 10 mL  10 mL Intravenous PRN Hemant Ornelas MD        acetaminophen (TYLENOL) tablet 650 mg  650 mg Oral Q6H PRN Tarkellie Mary Jaime MD        Or   Georgianna Olszewski acetaminophen (TYLENOL) suppository 650 mg  650 mg Rectal Q6H PRN Angela Patton MD        iron sucrose (VENOFER) 200 mg in sodium chloride 0.9 % 100 mL IVPB  200 mg Intravenous Q24H Angela Patton MD        ciprofloxacin (CIPRO) IVPB 400 mg  400 mg Intravenous Q12H Kenny Mckinley MD   Stopped at 03/19/21 0422    insulin lispro (HUMALOG) injection vial 0-12 Units  0-12 Units Subcutaneous TID WC Judie Slaughter MD   2 Units at 03/18/21 1732    insulin lispro (HUMALOG) injection vial 0-6 Units  0-6 Units Subcutaneous Nightly Judie Slaughter MD   1 Units at 03/18/21 2007    0.9 % sodium chloride infusion   Intravenous Continuous Angela Patton MD 75 mL/hr at 03/18/21 2019 New Bag at 03/18/21 2019    sodium chloride flush 0.9 % injection 10 mL  10 mL Intravenous 2 times per day Angela Patton MD   10 mL at 03/18/21 2006    promethazine (PHENERGAN) tablet 12.5 mg  12.5 mg Oral Q6H PRN Angela Patton MD        Or    ondansetron (ZOFRAN) injection 4 mg  4 mg Intravenous Q6H PRN Angela Patton MD        pantoprazole (PROTONIX) injection 40 mg  40 mg Intravenous Q12H Angela Patton MD   40 mg at 03/19/21 3493    And    sodium chloride (PF) 0.9 % injection 10 mL  10 mL Intravenous Q12H Angela Patton MD   10 mL at 03/19/21 4108       Allergies:     Allergies   Allergen Reactions    Ampicillin Other (See Comments)    Clopidogrel Bisulfate Itching    Diovan [Valsartan] Other (See Comments)     cough    Lantus [Insulin Glargine] Nausea Only     Stomach \"quivered\" after taking it, palpitations    Metformin And Related Diarrhea     Chronic kidney disease stage 3       Problem List:    Patient Active Problem List   Diagnosis Code    Gout of foot M10.9    Type 2 diabetes mellitus with stage 3 chronic kidney disease, without long-term current use of insulin ([facility]) E11.22, N18.30    Coronary artery disease involving coronary bypass graft of native heart without angina pectoris I25.810    Calcium kidney stone N20.0    Coronary artery disease due to lipid rich plaque I25.10, I25.83    Hyperlipidemia with target LDL less than 70 E78.5    Occlusion and stenosis of bilateral carotid arteries I65.23    SSS (sick sinus syndrome) ([facility]) I49.5    CKD (chronic kidney disease), stage III N18.30    Benign hypertension with CKD (chronic kidney disease) stage III I12.9, N18.30    Chronic anticoagulation Z79.01    Osteopenia M85.80    Psychophysiological insomnia F51.04    Bilateral carotid artery stenosis I65.23    Carpal tunnel syndrome G56.00    History of pulmonary embolus (PE) Z86.711    History of DVT (deep vein thrombosis) Z86.718    Hematemesis K92.0    History of coronary artery stent placement Z95.5    History of coronary artery bypass graft x 3 Z95.1    Bilateral hearing loss H91.93    Anemia D64.9    Seborrheic dermatitis L21.9    Recurrent UTI N39.0    Paroxysmal VT ([facility]) I47.2    Hematuria R31.9    GI bleed K92.2    MGUS (monoclonal gammopathy of unknown significance) D47.2       Past Medical History:        Diagnosis Date    Arthritis     Basal cell carcinoma of nose     Bronchitis     HX. OF     CAD (coronary artery disease)     Carpal tunnel syndrome     left hand    Colon cancer ([facility])     Diabetes mellitus ([address]     DVT (deep venous thrombosis) ([address] 7/10/2019    Gout     Hematuria     History of coronary artery bypass graft x 3 7/2/2020    Hx of blood clots     ED.  LEGS, ED. 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46    Ophthalmoplegic migraine headache     Osteoarthritis     Other pulmonary embolism without acute cor pulmonale ([facility]) 4/30/2013    S/P coronary artery stent placement X 3 6/29/2020    TIA (transient ischemic attack)     Uterine cancer ([address]     Wears glasses        Past Surgical History:        Procedure Laterality Date    ANGIOPLASTY      hx. of stenting x 3.    APPENDECTOMY      BREAST SURGERY      INFECTED MILK DUCT LT.    CARDIAC SURGERY      CABG x 3 vessels and 3 stents    CAROTID ENDARTERECTOMY Left 3-3-16    CARPAL TUNNEL RELEASE Right     CHOLECYSTECTOMY      COLON SURGERY      colectomy, PRECANCEROUS POLYPS    COLONOSCOPY      X5, HX PRECANCEROUS POLYPS    CORONARY ARTERY BYPASS GRAFT      X3 vessels    CYST REMOVAL  10/07/2016    EXCISION OF SEBACEOUS CYST REMOVED FROM BACK X3    CYSTOSCOPY Bilateral 1/21/2020    CYSTOSCOPY RETROGRADE PYELOGRAM performed by Eric Dumont MD at [facility], [address] Right     INDEX FINGER AND THUMB.    HYSTERECTOMY      JOINT REPLACEMENT Left 03/29/2010    TKA    JOINT REPLACEMENT Right 02/16/1999    TKA    LITHOTRIPSY      X 3    SKIN BIOPSY      TOP OF NOSE (BASAL CELL)    VASCULAR SURGERY      vein ablation on legs       Social History:    Social History     Tobacco Use    Smoking status: Never Smoker    Smokeless tobacco: Never Used   Substance Use Topics    Alcohol use: No                                Counseling given: Not Answered      Vital Signs (Current): There were no vitals filed for this visit.                                            BP Readings from Last 3 Encounters:   03/19/21 (!) 127/57   03/16/21 134/72   12/09/20 118/62       NPO Status:                                                                                 BMI:   Wt Readings from Last 3 Encounters:   03/18/21 200 lb (90.7 kg)   03/16/21 200 lb 3.2 oz (90.8 kg)   12/09/20 199 lb 12.8 oz (90.6 kg)     There is no height or weight on file to calculate BMI.    CBC:   Lab Results   Component Value Date    WBC 8.3 03/17/2021    RBC 3.93 03/17/2021    RBC 4.62 02/14/2020    HGB 7.9 03/19/2021    HCT 25.6 03/19/2021    MCV 71.3 03/17/2021    RDW 20.6 03/17/2021     03/17/2021       CMP:   Lab Results Component Value Date     03/18/2021    K 4.4 03/18/2021     03/18/2021    CO2 24 03/18/2021    BUN 19 03/18/2021    CREATININE 0.90 03/18/2021    GFRAA >60 03/18/2021    LABGLOM 59 03/18/2021    GLUCOSE 184 03/18/2021    GLUCOSE 170 02/14/2020    PROT 6.9 03/17/2021    PROT 6.8 03/17/2021    CALCIUM 8.6 03/18/2021    BILITOT 1.13 03/17/2021    ALKPHOS 53 03/17/2021    AST 21 03/17/2021    ALT 19 03/17/2021       POC Tests:   Recent Labs     03/19/21  4089   POCGLU 164*       Coags:   Lab Results   Component Value Date    PROTIME 15.4 03/18/2021    PROTIME 32.1 02/21/2014    INR 1.2 03/18/2021    APTT 38.2 03/17/2021       HCG (If Applicable): No results found for: PREGTESTUR, PREGSERUM, HCG, HCGQUANT     ABGs: No results found for: PHART, PO2ART, JBX9EQM, ZYF7ZPB, BEART, Y5HXTOFA     Type & Screen (If Applicable):  No results found for: LABABO, [address]    Anesthesia Evaluation  Patient summary reviewed and Nursing notes reviewed no history of anesthetic complications:   Airway: Mallampati: III  TM distance: >3 FB   Neck ROM: full  Mouth opening: > = 3 FB Dental: normal exam         Pulmonary:normal exam  breath sounds clear to auscultation  (+) shortness of breath:      (-) rhonchi, wheezes, rales and stridor                           Cardiovascular:    (+) hypertension: no interval change, angina:, past MI:, CAD:, CABG/stent (CABG and Stent):, dysrhythmias (Paroxysmal VT ;  SSS (sick sinus syndrome) ):, hyperlipidemia    (-) murmur, weak pulses,  friction rub, carotid bruit and  JVD    ECG reviewed  Rhythm: regular  Rate: normal  Echocardiogram reviewed    Cleared by cardiology     Beta Blocker:  Dose within 24 Hrs      ROS comment: Normal left ventricle size and function with an estimated EF > 55%.      Neuro/Psych:   (+) neuromuscular disease:, TIA, headaches: migraine headaches,              ROS comment: Bilateral hearing loss GI/Hepatic/Renal:   (+) renal disease: kidney stones and CRI,          ROS comment: GI Bleed. Endo/Other:    (+) DiabetesType II DM, , blood dyscrasia (Hgb -7.9): anticoagulation therapy and anemia, arthritis: OA., malignancy/cancer (h/o Colon Cancer; Uterine Cancer). ROS comment: Gout  bilateral lower extremity edema Abdominal:           Vascular:   + PVD, aortic or cerebral (Occlusion and stenosis of bilateral carotid arteries), DVT, PE. Anesthesia Plan      MAC     ASA 3       Induction: intravenous. MIPS: Prophylactic antiemetics administered. Anesthetic plan and risks discussed with patient. Plan discussed with CRNA.                   Qasim Lewis MD   3/19/2021

## 2021-03-19 NOTE — FLOWSHEET NOTE
Patient returned to room from recovery. VSS as charted. Patient awake, alert and oriented. Call light within reach. Will monitor.

## 2021-03-19 NOTE — PROGRESS NOTES
Progress Note    Patient Name:  Alberto Blackmon    :  12/10/1933  3/19/2021 7:20 AM      SUBJECTIVE       Ms. House  has no chest pain, shortness of breath, palpitations, nausea or vomiting      OBJECTIVE     Vital signs:    BP (!) 122/57   Pulse 75   Temp 97.9 °F (36.6 °C) (Oral)   Resp 14   Ht 5' 9\" (1.753 m)   Wt 200 lb (90.7 kg)   SpO2 100%   BMI 29.53 kg/m²         Admit Weight:  200 lb (90.7 kg)    Last 3 weights: Wt Readings from Last 3 Encounters:   21 200 lb (90.7 kg)   21 200 lb 3.2 oz (90.8 kg)   20 199 lb 12.8 oz (90.6 kg)       BMI: Body mass index is 29.53 kg/m². Input/Output:       Intake/Output Summary (Last 24 hours) at 3/19/2021 0720  Last data filed at 3/19/2021 5066  Gross per 24 hour   Intake 4068.75 ml   Output    Net 4068.75 ml         Exam:     General appearance: awake and alert moves all ext   Lungs: no rhonchi, no wheezes, no rales  Heart: S1 and S2 no murmur  Abdomen: positive bowel sounds, no bruits, no masses  Extremities: 2-3 plus bilateral lower extremity edema        Laboratory Studies:     CBC:   Recent Labs     21  1152 21  1152 21  0609 21  1609 21  0426   WBC 8.3  --   --   --   --    HGB 8.9*   < > 7.9* 8.9* 7.9*   HCT 28.0*   < > 25.8* 29.0* 25.6*   MCV 71.3*  --   --   --   --      --   --   --   --     < > = values in this interval not displayed. BMP:   Recent Labs     21  1152 21  0609    142   K 4.2 4.4    108*   CO2 22 24   BUN 19 19   CREATININE 0.87 0.90     PT/INR:   Recent Labs     21  1448 21  1405   PROTIME 28.3* 15.4*   INR 2.7 1.2     APTT:   Recent Labs     21  1448   APTT 38.2*     MAG:   Recent Labs     21  1152   MG 1.9     D Dimer: No results for input(s): DDIMER in the last 72 hours. Troponin  No results for input(s): TROPONINI in the last 72 hours. No results for input(s): TROPONINT in the last 72 hours.    BNP No results for input(s): BNP in the last 72 hours. Recent Labs     21  1152   PROBNP 134         Pulse Ox: SpO2  Av.5 %  Min: 99 %  Max: 100 %  Supplemental O2:       Current Meds:    allopurinol  100 mg Oral BID    amLODIPine  2.5 mg Oral Daily    atorvastatin  80 mg Oral Nightly    furosemide  40 mg Oral Daily    metoprolol tartrate  50 mg Oral BID    iron sucrose  200 mg Intravenous Q24H    ciprofloxacin  400 mg Intravenous Q12H    insulin lispro  0-12 Units Subcutaneous TID WC    insulin lispro  0-6 Units Subcutaneous Nightly    sodium chloride flush  10 mL Intravenous 2 times per day    pantoprazole  40 mg Intravenous Q12H    And    sodium chloride (PF)  10 mL Intravenous Q12H     Continuous Infusions:    dextrose      sodium chloride 75 mL/hr at 21 2019            ASSESSMENT     Principal Problem:    GI bleed  Active Problems:    History of coronary artery bypass graft x 3    Type 2 diabetes mellitus with stage 3 chronic kidney disease, without long-term current use of insulin ([facility])    Coronary artery disease involving coronary bypass graft of native heart without angina pectoris    SSS (sick sinus syndrome) ([facility])    Chronic anticoagulation    History of pulmonary embolus (PE)    History of DVT (deep vein thrombosis)    History of coronary artery stent placement  Resolved Problems:    * No resolved hospital problems. *      PLAN     EGD and colonoscopy planned for today. Brilinta and Xarelto on hold. Consider switching to Eliquis at discharge. HGB 7.9 today. Patient has significant bilateral lower extremity edema. Will give IV lasix X1 while NPO. Lungs sounds diminished. Continue current course.

## 2021-03-19 NOTE — OP NOTE
Operative Note  PROCEDURE NOTE    DATE OF PROCEDURE: 3/19/2021     SURGEON: Yong Gatica MD  Facility: [facility]  ASSISTANT: None  Anesthesia: MAC  PREOPERATIVE DIAGNOSIS:   Severe anemia    Diagnosis:  Multiple gastric polyps most consistent with fundic gland polyps sampled 2 of them in the body and the fundus of the stomach    Gastritis biopsies were taken    Normal small bowel biopsies were taken        POSTOPERATIVE DIAGNOSIS: As described below    OPERATION: Upper GI endoscopy with Biopsy    ANESTHESIA: Moderate Sedation     ESTIMATED BLOOD LOSS: Less than 50 ml    COMPLICATIONS: None. SPECIMENS:  Was Obtained:   As above     HISTORY: The patient is a 80y.o. year old female with history of above preop diagnosis. I recommended esophagogastroduodenoscopy with possible biopsy and I explained the risk, benefits, expected outcome, and alternatives to the procedure. Risks included but are not limited to bleeding, infection, respiratory distress, hypotension, and perforation of the esophagus, stomach, or duodenum. Patient understands and is in agreement. The patient was counseled at length about the risks of megan Covid-19 during their perioperative period and any recovery window from their procedure. The patient was made aware that megan Covid-19  may worsen their prognosis for recovering from their procedure  and lend to a higher morbidity and/or mortality risk. All material risks, benefits, and reasonable alternatives including postponing the procedure were discussed. The patient does wish to proceed with the procedure at this time. PROCEDURE: The patient was given IV conscious sedation. The patient's SPO2 remained above 90% throughout the procedure. The gastroscope was inserted orally and advanced under direct vision through the esophagus, through the stomach, through the pylorus, and into the descending duodenum. Post sedation note : The patient's SPO2 remained above 90% throughout the procedure. the vital signs remained stable , and no immediate complication form the procedure noted, patient will be ready for d/c when criteria is met . Findings:    Retropharyngeal area was grossly normal appearing    Esophagus: normal    Stomach:    Fundus/ Body: abnormal: Multiple gastric polyps most consistent with fundic gland polyps sampled 2 of them in the body and the fundus of the stomach    Gastritis biopsies were taken    Antrum: abnormal: Gastritis biopsies were taken    Duodenum:     Descending: abnormal: Normal small bowel biopsies were taken    Bulb: abnormal: Normal small bowel biopsies were taken    The scope was removed and the patient tolerated the procedure well. Recommendations/Plan:   1. F/U Biopsies  2.  Colonoscopy     Electronically signed by Sy Dean MD  on 3/19/2021 at 10:04 AM

## 2021-03-19 NOTE — CARE COORDINATION
Writer notified that patient will need a cab voucher for discharge. Writer [facility] and [employer] and set up  time for 4:00p.m. per Yolanda Truong request.  Keith Alvarodavid will call  PCU  When they are on the way.     Electronically signed by Angel Luis Alexandre RN on 3/19/2021 at 2:11 PM

## 2021-03-19 NOTE — DISCHARGE INSTR - COC
Continuity of Care Form    Patient Name: Filiberto Vidal   :  12/10/1933  MRN:  [id number]    Admit date:  3/17/2021  Discharge date:  ***    Code Status Order: Full Code   Advance Directives:   Advance Care Flowsheet Documentation       Date/Time Healthcare Directive Type of Healthcare Directive Copy in [address] Agent's Name Healthcare Agent's Phone Number    [phone]  No, patient does not have an advance directive for healthcare treatment -- -- -- -- --            Admitting Physician:  Netta Hernandez MD  PCP: Jacque Villatoro MD    Discharging Nurse: [facility] Unit/Room#: 2123/2123-01  Discharging Unit Phone Number: ***    Emergency Contact:   Extended Emergency Contact Information  Primary Emergency Contact: Dakota House  Address: [facility] of [address] Phone: [phone]  Relation: Child  Secondary Emergency Contact: Jackeline Lopez of [address] Phone: [phone]  Relation: Child    Past Surgical History:  Past Surgical History:   Procedure Laterality Date    ANGIOPLASTY      hx. of stenting x 3. APPENDECTOMY      BREAST SURGERY      INFECTED MILK DUCT LT. CARDIAC SURGERY      CABG x 3 vessels and 3 stents    CAROTID ENDARTERECTOMY Left 3-3-16    CARPAL TUNNEL RELEASE Right     CHOLECYSTECTOMY      COLON SURGERY      colectomy, PRECANCEROUS POLYPS    COLONOSCOPY      X5, HX PRECANCEROUS POLYPS    CORONARY ARTERY BYPASS GRAFT      X3 vessels    CYST REMOVAL  10/07/2016    EXCISION OF SEBACEOUS CYST REMOVED FROM BACK X3    CYSTOSCOPY Bilateral 2020    CYSTOSCOPY RETROGRADE PYELOGRAM performed by Jadon Roper MD at [facility], [address] Right     INDEX FINGER AND THUMB.     HYSTERECTOMY      JOINT REPLACEMENT Left 2010    TKA    JOINT REPLACEMENT Right 1999    TKA    LITHOTRIPSY      X 3    SKIN BIOPSY      TOP OF NOSE (BASAL CELL) VASCULAR SURGERY      vein ablation on legs       Immunization History:   Immunization History   Administered Date(s) Administered    COVID-19, Pfizer, PF, 30mcg/0.3mL 01/23/2021, 02/13/2021    Influenza Virus Vaccine 10/17/2013, 11/13/2014, 10/22/2015    Influenza, High Dose (Fluzone 65 yrs and older) 10/15/2018    Influenza, Beryl Declan, 6-35 Months, IM (Fluzone,Afluria) 10/27/2017    Influenza, Quadv, IM, (6 mo and older Fluzone, Flulaval, Fluarix and 3 yrs and older Afluria) 09/06/2016    Influenza, Quadv, IM, PF (6 mo and older Fluzone, Flulaval, Fluarix, and 3 yrs and older Afluria) 12/09/2020    Influenza, Triv, inactivated, subunit, adjuvanted, IM (Fluad 65 yrs and older) 09/17/2019    Pneumococcal Conjugate 7-valent (Prevnar7) 10/29/2010    Pneumococcal Polysaccharide (Idryldmwb33) 10/22/2015, 06/12/2018    Zoster Live (Zostavax) 09/28/2016, 10/13/2016, 10/01/2017       Active Problems:  Patient Active Problem List   Diagnosis Code    Gout of foot M10.9    Type 2 diabetes mellitus with stage 3 chronic kidney disease, without long-term current use of insulin ([facility]) E11.22, N18.30    Coronary artery disease involving coronary bypass graft of native heart without angina pectoris I25.810    Calcium kidney stone N20.0    Coronary artery disease due to lipid rich plaque I25.10, I25.83    Hyperlipidemia with target LDL less than 70 E78.5    Occlusion and stenosis of bilateral carotid arteries I65.23    SSS (sick sinus syndrome) ([facility]) I49.5    CKD (chronic kidney disease), stage III N18.30    Benign hypertension with CKD (chronic kidney disease) stage III I12.9, N18.30    Chronic anticoagulation Z79.01    Osteopenia M85.80    Psychophysiological insomnia F51.04    Bilateral carotid artery stenosis I65.23    Carpal tunnel syndrome G56.00    History of pulmonary embolus (PE) Z86.711    History of DVT (deep vein thrombosis) Z86.718    Hematemesis K92.0    History of coronary artery stent placement Z95.5    History of Feeding: {CHP DME Other Feedings:012068710:::0}  Liquids: {Slp liquid thickness:34786}  Daily Fluid Restriction: {CHP DME Yes amt example:362293647:::0}  Last Modified Barium Swallow with Video (Video Swallowing Test): {Done Not Done DOWB:521052110:::0}    Treatments at the Time of Hospital Discharge:   Respiratory Treatments: ***  Oxygen Therapy:  {Therapy; copd oxygen:90726:::0}  Ventilator:    {[facility] Vent List:669247322:::0}    Rehab Therapies: {THERAPEUTIC INTERVENTION:4833969874}  Weight Bearing Status/Restrictions: {[facility] Weight Bearin:::0}  Other Medical Equipment (for information only, NOT a DME order):  {EQUIPMENT:128839482}  Other Treatments: ***    Patient's personal belongings (please select all that are sent with patient):  {CHP DME Belongings:168305787:::0}    RN SIGNATURE:  {Esignature:467982726:::0}    CASE MANAGEMENT/SOCIAL WORK SECTION    Inpatient Status Date: ***    Readmission Risk Assessment Score:  Readmission Risk              Risk of Unplanned Readmission:        16           Discharging to Facility/ Agency   Name:   Address:  Phone:  Fax:    Dialysis Facility (if applicable)   Name:  Address:  Dialysis Schedule:  Phone:  Fax:    / signature: {Esignature:066474678:::0}    PHYSICIAN SECTION    Prognosis: {Prognosis:0364503545:::0}    Condition at Discharge: [address] [place] [address] Patient Condition:515307828:::0}    Rehab Potential (if transferring to Rehab): {Prognosis:3784207837:::0}    Recommended Labs or Other Treatments After Discharge: ***    Physician Certification: I certify the above information and transfer of Misael George  is necessary for the continuing treatment of the diagnosis listed and that she requires {Admit to Appropriate Level of Care:34422:::0} for {GREATER/LESS:317004099} 30 days.      Update Admission H&P: {CHP DME Changes in HandP:708850416:::0}    PHYSICIAN SIGNATURE:  Electronically signed by Arlinda Hamman, MD on 3/19/21 at 12:41 PM EDT

## 2021-03-19 NOTE — PLAN OF CARE
Problem: Falls - Risk of:  Goal: Will remain free from falls  Description: Will remain free from falls  3/19/2021 0424 by Perico Ray RN  Outcome: Met This Shift  No falls noted this shift. Patient ambulates with x1 staff assistance without difficulty. Bed kept in low position. Safe environment maintained. Bedside table & call light in reach. Uses call light appropriately when needing assistance. Problem: Daily Care:  Goal: Daily care needs are met  Description: Daily care needs are met  Outcome: Met This Shift  Patient and staff currently meeting all patient's daily care needs. Patient encouraged to participate and complete all ADLs per self. Will continue to monitor for opportunities for patient to meet all ADLs per self. Problem: Pain:  Goal: Patient's pain/discomfort is manageable  Description: Patient's pain/discomfort is manageable  Outcome: Met This Shift  Pt medicated with pain medication prn. Assessed all pain characteristics including level, type, location, frequency, and onset. Non-pharmacologic interventions offered to pt as well. Pt states pain is tolerable at this time. Will continue to monitor.

## 2021-03-19 NOTE — PROGRESS NOTES
[address]    PROGRESS NOTE             3/19/2021    2:30 PM    Name:   Quintin Mortensen  MRN:     [id number]     Latanya Lafleurter:      [de-identified]   Room:   2123/2123-01   Day:  2  Admit Date:  3/17/2021  1:56 PM    PCP:  Reagan De La Fuente MD  Code Status:  Full Code    Subjective:     C/C:   Chief Complaint   Patient presents with    Other     abnormal labs     Interval History Status:     Patient was seen and evaluated by bedside. No acute events overnight. Patient went down for EGD and colonoscopy per GI today. The results of the colonoscopy is removal of 2 polyps in the transverse colon, 10 polyps from the sigmoid, and visualized hemorrhoids. With the EDG, they obtained gastritis biopsies in the fundus and gastritis biopsied in the antrum. Patient tolerated procedure well. No complications. Patient reports no bleeding episodes or noticeable blood in the stool while in hospital.  Patient urine culture grew Klebsiella pneumoniae, sensitive to ciprofloxacin. On day 2 of IV ciprofloxacin.   Plan to discharge today after cardiology recommendation on anticoagulants    Brief History:     Patient is a 66-year-old  female with past medical history of pulmonary embolism, hx coronary artery bypass graft x3 (2002), stent placements x4, recurrent UTIs, kidney stones, colon cancer 2006, diabetes type 2, hyperlipidemia, hypertension, was sent to the ER by her primary care physician due to abnormal lab values.  She was sent to the ER due to low hemoglobin at 8.9.  Her most recent hemoglobin reading in October 2020 was 10.3.  Patient reports she experiences shortness of breath when she walks from parking lot to the hospital.  Shortness of breath occurs occasionally and only when she walks long distance.  First episode occurred June 2020.  Patient experiences blood in the toilet occasionally.  The last episode was a couple weeks ago. Susan Lucas toilet water is pink.  It occurs every few months.  Every 6 months during her checkup, patient states that her urinalysis is positive for hemoglobin.  Patient is unsure if the blood is from her urine or from her stool.  Denies lightheadedness, dizziness, fatigue, syncope, abdominal pain.  Patient is on two anticoagulants, Xarelto and Brilinta.  Patient reports she has been on Xarelto for many years, due to her history of Raymundo Senters was started on June 2020 and aspirin was stopped, medications were adjusted due to her cardiac catheterization.  Patient states that she was supposed to continue Camryn Mandujano until June 17 2021, 1 year from her cardiac catheterization. In June 2021, the plan is to stop Brilinta and resume aspirin. Patient states that she receives colonoscopy every 3 years since 2008 due to her history of colon cancer in 2006. Toby Sawyer last colonoscopy was in 2015.  Patient is unable to recall if there were any abnormalities in her colonoscopies.     Patient is admitted to the floor. Edgardo Gaspar is consulted. Ed Stage and Camryn Mandujano are held. Review of Systems:     Review of Systems   Constitutional: Negative for activity change, appetite change, chills, fatigue and fever. HENT: Negative for congestion. Respiratory: Positive for shortness of breath (With walking from parking lot to hospital). Negative for cough, chest tightness and wheezing. Cardiovascular: Positive for leg swelling. Negative for chest pain and palpitations. Gastrointestinal: Negative for abdominal pain, constipation, diarrhea, nausea and vomiting. Genitourinary: Negative for difficulty urinating, dysuria, frequency and hematuria. Musculoskeletal: Negative for back pain and joint swelling. Skin: Negative for rash. Neurological: Negative for dizziness, syncope, weakness, light-headedness and headaches. Psychiatric/Behavioral: Negative for agitation and behavioral problems. Medications: Allergies:     Allergies Allergen Reactions    Ampicillin Other (See Comments)    Clopidogrel Bisulfate Itching    Diovan [Valsartan] Other (See Comments)     cough    Lantus [Insulin Glargine] Nausea Only     Stomach \"quivered\" after taking it, palpitations    Metformin And Related Diarrhea     Chronic kidney disease stage 3       Current Meds:   Scheduled Meds:    allopurinol  100 mg Oral BID    amLODIPine  2.5 mg Oral Daily    atorvastatin  80 mg Oral Nightly    furosemide  40 mg Oral Daily    metoprolol tartrate  50 mg Oral BID    iron sucrose  200 mg Intravenous Q24H    ciprofloxacin  400 mg Intravenous Q12H    insulin lispro  0-12 Units Subcutaneous TID WC    insulin lispro  0-6 Units Subcutaneous Nightly    sodium chloride flush  10 mL Intravenous 2 times per day    pantoprazole  40 mg Intravenous Q12H    And    sodium chloride (PF)  10 mL Intravenous Q12H     Continuous Infusions:    dextrose      sodium chloride 75 mL/hr at 03/19/21 0940     PRN Meds: glucose, dextrose, glucagon (rDNA), dextrose, sodium chloride flush, acetaminophen **OR** acetaminophen, promethazine **OR** ondansetron    Data:     Past Medical History:   has a past medical history of Arthritis, Basal cell carcinoma of nose, Bronchitis, CAD (coronary artery disease), Carpal tunnel syndrome, Colon cancer ([facility] [address] Diabetes mellitus ([facility] [address] DVT (deep venous thrombosis) ([facility] [address] Gout, Hematuria, History of coronary artery bypass graft x 3, Hx of blood clots, Hyperlipidemia, Hypertension, Kidney stones, Lymphedema, MGUS (monoclonal gammopathy of unknown significance), MI, old, Ophthalmoplegic migraine headache, Osteoarthritis, Other pulmonary embolism without acute cor pulmonale (HCC), S/P coronary artery stent placement X 3, TIA (transient ischemic attack), Uterine cancer ([facility] [address] and Wears glasses. Social History:   reports that she has never smoked. She has never used smokeless tobacco. She reports that she does not drink alcohol or use drugs. Family History:   Family History   Problem Relation Age of Onset   [facility] Stroke Mother     Hypertension Mother     Heart Disease Father         AAA    Stroke Sister     Parkinsonism Brother     Cancer Sister         lung    Alcohol Abuse Sister        Vitals:  BP (!) 134/58   Pulse 81   Temp 97.7 °F (36.5 °C) (Oral)   Resp 18   Ht 5' 9\" (1.753 m)   Wt 200 lb (90.7 kg)   SpO2 97%   BMI 29.53 kg/m²   Temp (24hrs), Av.7 °F (36.5 °C), Min:97.5 °F (36.4 °C), Max:97.9 °F (36.6 °C)    Recent Labs     21  1932 21  0652 21  1056 21  1250   POCGLU 192* 164* 160* 168*       I/O(24Hr):     Intake/Output Summary (Last 24 hours) at 3/19/2021 1430  Last data filed at 3/19/2021 1250  Gross per 24 hour   Intake 4693.75 ml   Output 100 ml   Net 4593.75 ml       Labs:    CBC with Differential:    Lab Results   Component Value Date    WBC 6.6 2021    RBC 3.71 2021    RBC 4.62 2020    HGB 8.1 2021    HGB 8.1 2021    HCT 27.2 2021    HCT 27.2 2021     2021    MCV 73.2 2021    MCH 21.9 2021    MCHC 29.9 2021    RDW 20.1 2021    METASPCT 4 2013    LYMPHOPCT 21 2021    LYMPHOPCT 30.2 2020    MONOPCT 6 2021    MYELOPCT 2 2013    EOSPCT 2.7 2020    BASOPCT 1 2021    BASOPCT 0.6 2020    MONOSABS 0.40 2021    LYMPHSABS 1.39 2021    EOSABS 0.13 2021    BASOSABS 0.07 2021    DIFFTYPE NOT REPORTED 2021     BMP:    Lab Results   Component Value Date     2021    K 3.9 2021     2021    CO2 21 2021    BUN 9 2021    LABALBU 4.1 2021    CREATININE 0.63 2021    CALCIUM 8.2 2021    GFRAA >60 2021    LABGLOM >60 2021    GLUCOSE 185 2021    GLUCOSE 170 2020       Lab Results   Component Value Date/Time    SPECIAL NOT REPORTED 2021 01:03 PM     Lab Results   Component Value Date/Time    CULTURE KLEBSIELLA PNEUMONIAE >509215 CFU/ML (A) 03/17/2021 01:03 PM         Radiology:    No results found. Physical Examination:        Physical Exam  Vitals signs reviewed. Constitutional:       Appearance: She is overweight. HENT:      Head: Normocephalic. Cardiovascular:      Rate and Rhythm: Normal rate and regular rhythm. Heart sounds: Normal heart sounds. Pulmonary:      Effort: Pulmonary effort is normal.      Breath sounds: Normal breath sounds. Abdominal:      General: Bowel sounds are normal.      Palpations: Abdomen is soft. Tenderness: There is no abdominal tenderness. Skin:     General: Skin is warm and dry. Neurological:      Mental Status: She is alert and oriented to person, place, and time.          Assessment:        Primary Problem  GI bleed    Active Hospital Problems    Diagnosis Date Noted    History of coronary artery bypass graft x 3 [Z95.1] 07/02/2020     Priority: High    GI bleed [K92.2] 03/17/2021    History of coronary artery stent placement [Z95.5] 06/29/2020    History of DVT (deep vein thrombosis) [Z86.718] 05/25/2020    History of pulmonary embolus (PE) [Z86.711] 05/25/2020    Chronic anticoagulation [Z79.01] 05/25/2020    Coronary artery disease involving coronary bypass graft of native heart without angina pectoris [I25.810] 03/12/2018    Type 2 diabetes mellitus with stage 3 chronic kidney disease, without long-term current use of insulin ([facility] [address] [E11.22, N18.30] 10/13/2016    [facility] [I49.5] 04/11/2016       Plan:        GI bleed secondary to possibly anticoagulation intolerance  Hx of pulmonary embolism  Patient on 2 anticoagulations, Xarelto and Brilinta- HELD  Hemoglobin 8.9, Most recent 8/2020 10.3  INR 2.7  FOBT negative  Give IM Vitamin K 5 mg, IV Venofer 200 mg daily for 5 days  Iron 23, TIBC 374, iron saturation 6  Placed on IV Protonix 40 mg every 12 hours  IV normal saline 75 mL/h Check Hemoglobin and hematocrit every 6 hours hemoglobin 8.9>7.9  Consult GI: EGD and colonoscopy today  Colonoscopy obtained 2 polyps in the transverse colon, 10 polyps from the sigmoid, visualized hemorrhoids. EDG obtained gastritis biopsies in the fundus and gastritis biopsied in the antrum    History of coronary artery bypass graft x3 (2002), stent placements x4  Cardiologist Dr. Jeramie Bustamante test (6/15/2020) is ejection fraction of 61%.  Intermediate risk due to perfusion defects. Echo (6/15/2020) shows LVEF>55%  Continue Lasix 40 mg daily, Lopressor 50 mg twice daily  proBNP 134  Consult cardiology, will possibly discontinue Brilinta, switch to Eliquis     Status post Carotid endarterectomy  Follow Dr. Angle Persaud  Carotid scan (2019) shows mild 16 to 49% stenosis of the right internal carotid artery.  Moderate 50 to 69% stenosis of left internal carotid artery.  Patent vertebral arteries bilaterally with antegrade flow.     UTI  Urinalysis positive with leukocyte esterase, bacteria, hemoglobin  Urine culture shows lactose fermenting gram-negative rods  Start ciprofloxacin IV twice daily  Past cultures grew Klebsiella pneumoniae, sensitive to Rocephin  Urine culture (3/17) grew Klebsiella pneumonia, sensitive to ciprofloxacin     Diabetes mellitus type 2  Hemoglobin A1c (3/16/2021) 7.4  Blood glucose 168  Continue oral medication glimepiride and alogliptin  Low insulin sliding scale  POCT  Hypoglycemia protocol     Gout  Continue allopurinol 100 mg twice daily     Hypertension  Continue amlodipine 2.5 mg daily     Hyperlipidemia  Continue Lipitor 80 mg nightly     Diet: Clear liquid diet per GI, n.p.o. after midnight  DVT prophylaxis: Intermittent pneumatic compression device  GI prophylaxis: IV Protonix  PT/OT/SW     Disposition:     Leila Seo MD  3/19/2021  2:30 PM

## 2021-03-19 NOTE — FLOWSHEET NOTE
Patient discharged to home per orders. IV discontinued intact. Discharge instructions reviewed written and verbal.  Patient states all belongings present. Taken per wheelchair to main lobby, awaiting cab.

## 2021-03-19 NOTE — PROGRESS NOTES
Patient seen and examined with family medicine resident  Patient feeling much better today  Underwent EGD and colonoscopy  EGD is okay  Colonoscopy multiple polyps were removed  Hemoglobin staying okay  Getting one-time dose of IV Lasix  Likely discharge later today, will inquire from GI and cardiology team about anticoagulation antiplatelet with history of DVT, PE and recent stent last year  Patient voiced understanding  She need to follow with GI as outpatient to discuss biopsy report

## 2021-03-19 NOTE — DISCHARGE SUMMARY
[address]    Discharge Summary     Patient ID: Ree Hatchet  :  12/10/1933   MRN: [id number]     ACCOUNT:  [de-identified]   Patient's PCP: Adam Murillo MD  Admit Date: 3/17/2021   Discharge Date: 3/21/2021   Length of Stay: 2  Code Status:  Prior  Admitting Physician: Miguel Ángel Melara MD  Discharge Physician: Jamaal Osorio MD     Active Discharge Diagnoses:       Primary Problem  GI bleed      Hospital Problems  Active Hospital Problems    Diagnosis Date Noted    History of coronary artery bypass graft x 3 [Z95.1] 2020     Priority: High    GI bleed [K92.2] 2021    History of coronary artery stent placement [Z95.5] 2020    History of DVT (deep vein thrombosis) [Z86.718] 2020    History of pulmonary embolus (PE) [Z86.711] 2020    Chronic anticoagulation [Z79.01] 2020    Coronary artery disease involving coronary bypass graft of native heart without angina pectoris [I25.810] 2018    Type 2 diabetes mellitus with stage 3 chronic kidney disease, without long-term current use of insulin (HCC) [E11.22, N18.30] 10/13/2016    SSS (sick sinus syndrome) ([facility] [place] [I49.5] 2016       Admission Condition:  poor     Discharged Condition: fair    Hospital Stay:       Hospital Course:  Ree Hatchet is a 80 y.o. female who was admitted for the management of  GI bleed , presented to ER with Other (abnormal labs)    Patient is a 43-year-old  female with past medical history of pulmonary embolism, hx coronary artery bypass graft x3 (), stent placements x4, recurrent UTIs, kidney stones, colon cancer , diabetes type 2, hyperlipidemia, hypertension, was sent to the ER by her primary care physician due to abnormal lab values.  She was sent to the ER due to low hemoglobin at 8.9.  Her most recent hemoglobin reading in 2020 was 10.3.  Patient is on two anticoagulants, Xarelto and Tia Roy reports she has been on Xarelto for many years, due to her history of Michelle Flood was started on June 2020 and aspirin was stopped, medications were adjusted due to her cardiac catheterization.  Patient states that she was supposed to continue [address] until June 17 2021, 1 year from her cardiac catheterization. In June 2021, the plan is to stop Brilinta and resume aspirin. Patient states that she receives colonoscopy every 3 years since 2008 due to her history of colon cancer in 2006. Srinivasan Zabala last colonoscopy was in 2015.      During her hospital admission, Xarelto and Brilinta were held. GI was consulted. Patient underwent endoscopy and colonoscopy. From her colonoscopy, and 22 polyps in the transverse colon, 10 polyps of the sigmoid, visualized hemorrhoids. From her EDG, they obtained gastritis biopsy in the fundus and gastritis biopsied in the antrum. Cardiology was consulted on recommendation on her anticoagulants. Cardiology recommended to discontinue Brilinta and to continue on Xarelto. She had a positive urinalysis and urine culture grew Klebsiella pneumoniae. Patient was asymptomatic but has a history of recurring UTIs. Patient was given IV Cipro. On the day of discharge, all consultants were in agreement. Patient's vitals were stable and condition improved. Patient expressed understanding and was instructed to follow-up with her cardiologist on her anticoagulation medication, and to follow-up with her primary care physician. She is discharged with instructions of continuing Xarelto and discontinuing Brilinta. She was instructed to get her hemoglobin and hematocrit lab check on Monday.     Significant therapeutic interventions:   Ciprofloxacin    Significant Diagnostic Studies:   Labs / Micro:  CBC:   Lab Results   Component Value Date    WBC 6.6 03/19/2021    RBC 3.71 03/19/2021    RBC 4.62 02/14/2020    HGB 8.1 03/19/2021    HGB 8.1 03/19/2021    HCT 27.2 03/19/2021    HCT 27.2 03/19/2021    MCV 73.2 03/19/2021    MCH 21.9 03/19/2021    MCHC 29.9 03/19/2021    RDW 20.1 03/19/2021     03/19/2021     BMP:    Lab Results   Component Value Date    GLUCOSE 185 03/19/2021    GLUCOSE 170 02/14/2020     03/19/2021    K 3.9 03/19/2021     03/19/2021    CO2 21 03/19/2021    ANIONGAP 10 03/19/2021    BUN 9 03/19/2021    CREATININE 0.63 03/19/2021    BUNCRER NOT REPORTED 03/19/2021    CALCIUM 8.2 03/19/2021    LABGLOM >60 03/19/2021    GFRAA >60 03/19/2021    GFR      03/19/2021    GFR NOT REPORTED 03/19/2021     U/A:    Lab Results   Component Value Date    COLORU YELLOW 03/17/2021    TURBIDITY CLOUDY 03/17/2021    SPECGRAV 1.021 03/17/2021    HGBUR MOD 03/17/2021    PHUR 5.5 03/17/2021    PROTEINU 1+ 03/17/2021    GLUCOSEU NEGATIVE 03/17/2021    KETUA NEGATIVE 03/17/2021    BILIRUBINUR  03/17/2021     Presumptive positive. Unable to confirm due to unavailability of reagent. BILIRUBINUR SMALL 12/09/2020    UROBILINOGEN Normal 03/17/2021    NITRU NEGATIVE 03/17/2021    LEUKOCYTESUR MOD 03/17/2021       urine culture: positive for Klebsiella pneumonia     Radiology:    No results found. Consultations:    Consults:     Final Specialist Recommendations/Findings:   IP CONSULT TO INTERNAL MEDICINE  IP CONSULT TO GI  IP CONSULT TO CARDIOLOGY  IP CONSULT TO GI      The patient was seen and examined on day of discharge and this discharge summary is in conjunction with any daily progress note from day of discharge.     Discharge plan:       Disposition: Home    Physician Follow Up:     MD Elvin [address], [address]  [address] [address]  [phone]    Schedule an appointment as soon as possible for a visit in 2 weeks  endoscopy bx results    Madi Montes MD  [address]  [address] [place] [address]  [phone]    Go on 4/6/2021  For post hospital follow-up appointment - Time: 10:30 AM       Requiring Further Evaluation/Follow Up POST HOSPITALIZATION/Incidental Findings:     Diet: diabetic diet    Activity: As tolerated    Instructions to Patient:   - Take all medications as prescribed  - Continue Xarelto, stop Brilinta  - Follow-up with Dr. Lobo Alvarado on anticoagulation medications  - Follow up with PCP on 4/6/21  - Check blood levels, hemoglobin and hematocrit on Monday  - In case of any worsening condition please visit Emergency Room. Discharge Medications:      Medication List      CONTINUE taking these medications    alendronate 70 MG tablet  Commonly known as: FOSAMAX     allopurinol 100 MG tablet  Commonly known as: ZYLOPRIM  Take 1 tablet by mouth 2 times daily     amLODIPine 2.5 MG tablet  Commonly known as: NORVASC  Take 1 tablet by mouth daily     atorvastatin 80 MG tablet  Commonly known as: Lipitor  Take 1 tablet by mouth nightly     Biotin 300 MCG Tabs     blood glucose test strips  Testing once a day.   Please dispense according to patients insurance.     ezetimibe 10 MG tablet  Commonly known as: ZETIA  Take 1 tablet by mouth nightly     FreeStyle Lancets Misc  USE TO TEST BLOOD SUGAR TWICE A DAY     FreeStyle Lite Francisca     furosemide 40 MG tablet  Commonly known as: Lasix  Take 1 tablet by mouth daily     glimepiride 4 MG tablet  Commonly known as: AMARYL  Take 1 tablet by mouth 2 times daily     linagliptin 5 MG tablet  Commonly known as: Tradjenta  Take 1 tablet by mouth daily     metoprolol tartrate 25 MG tablet  Commonly known as: LOPRESSOR     nitroGLYCERIN 0.4 MG SL tablet  Commonly known as: NITROSTAT  Place 1 tablet under the tongue every 5 minutes as needed for Chest pain     potassium chloride 20 MEQ extended release tablet  Commonly known as: KLOR-CON M  Take 1 tablet by mouth daily     rivaroxaban 20 MG Tabs tablet  Commonly known as: Xarelto  Take 1 tablet by mouth daily (with breakfast) TAKE 1 TABLET DAILY WITH BREAKFAST     vitamin D3 25 MCG (1000 UT) Tabs tablet  Commonly known as: CHOLECALCIFEROL     white

## 2021-03-19 NOTE — OP NOTE
Operative Note    PROCEDURE NOTE    DATE OF PROCEDURE: 3/19/2021    SURGEON: Deangelo Oconnell MD  Facility : [facility]  ASSISTANT: None  Anesthesia: MAC  PREOPERATIVE DIAGNOSIS:   Significant iron deficiency anemia    POSTOPERATIVE DIAGNOSIS: as described below    OPERATION: Total colonoscopy     ANESTHESIA: Moderate Sedation    ESTIMATED BLOOD LOSS: less than 50     COMPLICATIONS: None. SPECIMENS:  Was Obtained:   Right colon Flat button type polyp in the cecum removed with a snare measuring 12 mm in diameter    Transverse colon 2 polyps removed with a snare the larger one was 1 cm      10 polyps at least removed from the sigmoid ranging in size from 5 to 11 mm we snared them all but in 1 jar      Prep is not perfect but satisfactory    Hemorrhoids    An area in the transverse colon which looks like it was tattooed from the previous colonoscopy but I do not see any abnormality in the tattooed area        HISTORY: The patient is a 80y.o. year old female with history of above preop diagnosis. I recommended colonoscopy with possible biopsy or polypectomy and I explained the risk, benefits, expected outcome, and alternatives to the procedure. Risks included but are not limited to bleeding, infection, respiratory distress, hypotension, and perforation of the colon and possibility of missing a lesion. The patient understands and is in agreement. The patient was counseled at length about the risks of megan Covid-19 during their perioperative period and any recovery window from their procedure. The patient was made aware that megan Covid-19  may worsen their prognosis for recovering from their procedure  and lend to a higher morbidity and/or mortality risk. All material risks, benefits, and reasonable alternatives including postponing the procedure were discussed. The patient does wish to proceed with the procedure at this time.        PROCEDURE: The patient was given IV conscious sedation. The patient's SPO2 remained above 90% throughout the procedure. The colonoscope was inserted per rectum and advanced under direct vision to the cecum without difficulty. Post sedation note : The patient's SPO2 remained above 90% throughout the procedure. the vital signs remained stable , and no immediate complication form the procedure noted, patient will be ready for d/c when criteria is met . The prep was fair. Findings:  Terminal ileum: normal in the last 5 cm        colon :  Right colon Flat button type polyp in the cecum removed with a snare measuring 12 mm in diameter    Transverse colon 2 polyps removed with a snare the larger one was 1 cm      10 polyps at least removed from the sigmoid ranging in size from 5 to 11 mm we snared them all but in 1 jar      Prep is not perfect but satisfactory    Hemorrhoids    An area in the transverse colon which looks like it was tattooed from the previous colonoscopy but I do not see any abnormality in the tattooed area    Withdrawal Time was (minutes): 25    The colon was decompressed and the scope was removed. The patient tolerated the procedure well. Recommendations/Plan:   1. Alice Mata tod/c from GI standpoint and follow out pt   2. F/U In OfficeYes  3. Discussed with the family  4. Repeat colonoscopy [id number]  5.  Hold AC for 1-2 more days and restart    Electronically signed by Yen Wolfe MD  on 3/19/2021 at 10:47 AM

## 2021-03-19 NOTE — FLOWSHEET NOTE
Perfect Serve sent to NANCY SMITH for patient to restart anticoagulants on Sunday 03/21/21. Polyps removed today.

## 2021-03-19 NOTE — CARE COORDINATION
DISCHARGE PLANNING NOTE:    Follow up appointment made for patient with Dr. Bebe Up on 4/6 at 10:30 AM.  Notified patient of date and time. Patient verbalizes understanding. Appointment entered into discharge navigator.     Electronically signed by Prakash Choi RN on 3/19/2021 at 3:35 PM

## 2021-03-19 NOTE — ANESTHESIA POSTPROCEDURE EVALUATION
Department of Anesthesiology  Postprocedure Note    Patient: Radha Rivas  MRN: [id number]  YOB: 1933  Date of evaluation: 3/19/2021  Time:  12:13 PM     Procedure Summary     Date: 03/19/21 Room / Location: [facility] ENDO 04 / [facility] ENDO    Anesthesia Start: 0659 Anesthesia Stop: 1054    Procedures:       EGD BIOPSY (N/A Esophagus)      COLONOSCOPY POLYPECTOMY REMOVAL HOT SNARE (N/A Anus) Diagnosis:       (MELENA)      (COVID NEG 3/18)    Surgeons: Jj Price MD Responsible Provider: Sarai Bella MD    Anesthesia Type: MAC, general ASA Status: 3          Anesthesia Type: MAC, general    Kyle Phase I: Kyle Score: 10    Kyle Phase II:      Last vitals: Reviewed and per EMR flowsheets.        Anesthesia Post Evaluation    Comments: POST- ANESTHESIA EVALUATION       Pt Name: Radha Rivas  MRN: [id number]  YOB: 1933  Date of evaluation: 3/19/2021  Time:  12:13 PM      /75   Pulse 83   Temp 97.5 °F (36.4 °C)   Resp 22   Ht 5' 9\" (1.753 m)   Wt 200 lb (90.7 kg)   SpO2 94%   BMI 29.53 kg/m²      Consciousness Level  Awake  Cardiopulmonary Status  Stable  Pain Adequately Treated YES  Nausea / Vomiting  NO  Adequate Hydration  YES  Anesthesia Related Complications NONE      Electronically signed by Sarai Bella MD on 3/19/2021 at 12:13 PM

## 2021-03-19 NOTE — CONSULTS
pulmonale ([facility] 4/30/2013    S/P coronary artery stent placement X 3 6/29/2020    TIA (transient ischemic attack)     Uterine cancer ([facility]     Wears glasses       Past Surgical History:   Procedure Laterality Date    ANGIOPLASTY      hx. of stenting x 3.    APPENDECTOMY      BREAST SURGERY      INFECTED MILK DUCT LT.    CARDIAC SURGERY      CABG x 3 vessels and 3 stents    CAROTID ENDARTERECTOMY Left 3-3-16    CARPAL TUNNEL RELEASE Right     CHOLECYSTECTOMY      COLON SURGERY      colectomy, PRECANCEROUS POLYPS    COLONOSCOPY      X5, HX PRECANCEROUS POLYPS    CORONARY ARTERY BYPASS GRAFT      X3 vessels    CYST REMOVAL  10/07/2016    EXCISION OF SEBACEOUS CYST REMOVED FROM BACK X3    CYSTOSCOPY Bilateral 1/21/2020    CYSTOSCOPY RETROGRADE PYELOGRAM performed by Burnette Holstein, MD at [facility], [address] Right     INDEX FINGER AND THUMB.    HYSTERECTOMY      JOINT REPLACEMENT Left 03/29/2010    TKA    JOINT REPLACEMENT Right 02/16/1999    TKA    LITHOTRIPSY      X 3    SKIN BIOPSY      TOP OF NOSE (BASAL CELL)    VASCULAR SURGERY      vein ablation on legs      Past Endoscopic History as above    Admission Meds  No current facility-administered medications on file prior to encounter.       Current Outpatient Medications on File Prior to Encounter   Medication Sig Dispense Refill    metoprolol tartrate (LOPRESSOR) 25 MG tablet Take 50 mg by mouth 2 times daily       potassium chloride (KLOR-CON M) 20 MEQ extended release tablet Take 1 tablet by mouth daily 90 tablet 3    rivaroxaban (XARELTO) 20 MG TABS tablet Take 1 tablet by mouth daily (with breakfast) TAKE 1 TABLET DAILY WITH BREAKFAST 90 tablet 3    alendronate (FOSAMAX) 70 MG tablet Take 70 mg by mouth every 7 days Indications: SATURDAYS       Ticagrelor (BRILINTA PO) Take 90 mg by mouth 2 times daily      ezetimibe (ZETIA) 10 MG tablet Take 1 tablet by mouth nightly 90 tablet 3    allopurinol (ZYLOPRIM) 100 MG tablet Take 1 tablet by mouth 2 times daily 180 tablet 3    linagliptin (TRADJENTA) 5 MG tablet Take 1 tablet by mouth daily 90 tablet 3    glimepiride (AMARYL) 4 MG tablet Take 1 tablet by mouth 2 times daily 180 tablet 3    amLODIPine (NORVASC) 2.5 MG tablet Take 1 tablet by mouth daily 90 tablet 3    furosemide (LASIX) 40 MG tablet Take 1 tablet by mouth daily 90 tablet 3    atorvastatin (LIPITOR) 80 MG tablet Take 1 tablet by mouth nightly 90 tablet 3    Biotin 300 MCG TABS Take 600 mcg by mouth every other day       Cholecalciferol (VITAMIN D3) 1000 UNITS TABS Take 1 tablet by mouth daily      white petrolatum (VASELINE) GEL Daily at bedtime for itching and dry skin 200 g 0    blood glucose monitor strips Testing once a day. Please dispense according to patients insurance.  300 strip 3    FREESTYLE LANCETS MISC USE TO TEST BLOOD SUGAR TWICE A  each 1    Blood Glucose Monitoring Suppl (FREESTYLE LITE) SARY use as directed  0    nitroGLYCERIN (NITROSTAT) 0.4 MG SL tablet Place 1 tablet under the tongue every 5 minutes as needed for Chest pain 25 tablet 1       Patient   Does Use ASA, NSAID No  Allergies  Allergies   Allergen Reactions    Ampicillin Other (See Comments)    Clopidogrel Bisulfate Itching    Diovan [Valsartan] Other (See Comments)     cough    Lantus [Insulin Glargine] Nausea Only     Stomach \"quivered\" after taking it, palpitations    Metformin And Related Diarrhea     Chronic kidney disease stage 3        Social   Social History     Tobacco Use    Smoking status: Never Smoker    Smokeless tobacco: Never Used   Substance Use Topics    Alcohol use: No        PSYCH HISTORY:  Depression No  Anxiety No  Suicide No       Family History   Problem Relation Age of Onset    Stroke Mother     Hypertension Mother     Heart Disease Father         AAA    Stroke Sister     Parkinsonism Brother     Cancer Sister         lung    Alcohol Abuse Sister       No family history of colon cancer, Crohn's disease, or ulcerative colitis. Review of Systems  Constitutional: negative  Eyes: negative  Ears, nose, mouth, throat, and face: negative  Respiratory: negative  Cardiovascular: negative  Gastrointestinal: negative  Genitourinary:negative  Integument/breast: negative  Hematologic/lymphatic: negative  Musculoskeletal:negative  Endocrine: negative           Physical Exam  Blood pressure (!) 124/52, pulse 71, temperature 97.9 °F (36.6 °C), temperature source Oral, resp. rate 16, height 5' 9\" (1.753 m), weight 200 lb (90.7 kg), SpO2 99 %, not currently breastfeeding.          General Appearance: alert and oriented to person, place and time, well-developed and well-nourished, in no acute distress  Skin: warm and dry, no rash or erythema  Head: normocephalic and atraumatic  Eyes: pupils equal, round, and reactive to light, extraocular eye movements intact, conjunctivae normal  ENT: hearing grossly normal bilaterally  Neck: neck supple and non tender without mass, no thyromegaly or thyroid nodules, no cervical lymphadenopathy   Pulmonary/Chest: clear to auscultation bilaterally- no wheezes, rales or rhonchi, normal air movement, no respiratory distress  Cardiovascular: normal rate, regular rhythm, normal S1 and S2, no murmurs, rubs, clicks or gallops, distal pulses intact, no carotid bruits  Abdomen: soft, non-tender, non-distended, normal bowel sounds, no masses or organomegaly  Extremities: no cyanosis, clubbing or edema  Musculoskeletal: normal range of motion, no joint swelling, deformity or tenderness  Neurologic: no cranial nerve deficit and muscle strength normal    Data Review:    Recent Labs     03/17/21  1152 03/17/21  2323 03/18/21  0609 03/18/21  1609   WBC 8.3  --   --   --    HGB 8.9* 8.1* 7.9* 8.9*   HCT 28.0* 26.8* 25.8* 29.0*   MCV 71.3*  --   --   --      --   --   --      Recent Labs     03/17/21  1152 03/18/21  0609    142   K 4.2 4.4    108*   CO2 22 24   BUN 19 19   CREATININE 0.87 0.90     Recent Labs     03/17/21  1152   AST 21   ALT 19   BILITOT 1.13   ALKPHOS 53     No results for input(s): LIPASE, AMYLASE in the last 72 hours. Recent Labs     03/17/21  1448 03/18/21  1405   PROTIME 28.3* 15.4*   INR 2.7 1.2     No results for input(s): PTT in the last 72 hours. No results for input(s): OCCULTBLD in the last 72 hours. CEA:  No results found for: CEA  Ca 125:  No results found for:   Ca 19-9:  No results found for:   Ca 15-3:  No results found for:   AFP:  No components found for: AFAFP  Beta HCG:  No components found for: BHCG  Neuron Specific Enolase:  No results found for: NSE  Imaging Studies:                           All appropriate imaging studies and reports reviewed: Yes                 Assessment:     Principal Problem:    GI bleed  Active Problems:    History of coronary artery bypass graft x 3    Type 2 diabetes mellitus with stage 3 chronic kidney disease, without long-term current use of insulin (HCC)    Coronary artery disease involving coronary bypass graft of native heart without angina pectoris    SSS (sick sinus syndrome) (HCC)    Chronic anticoagulation    History of pulmonary embolus (PE)    History of DVT (deep vein thrombosis)    History of coronary artery stent placement  Resolved Problems:    * No resolved hospital problems. *    *Anemia with melena  Severe iron deficiency  History of colon cancer status post resection  Admitted right now with UTI shortness of breath and the anemia  INR 2.7 because of Brilinta Xarelto use      Recommendations:   Try to correct the INR and proceed with an EGD colonoscopy tomorrow  Iron infusion  PPI  Clear diet for now                      Thank you for allowing me to participate in the care of your patient. Please feel free to contact me with any questions or concerns.      Edie Cruz MD

## 2021-03-20 ENCOUNTER — CARE COORDINATION (OUTPATIENT)
Dept: CASE MANAGEMENT | Age: 86
End: 2021-03-20

## 2021-03-20 DIAGNOSIS — K25.4 GASTROINTESTINAL HEMORRHAGE ASSOCIATED WITH GASTRIC ULCER: Primary | ICD-10-CM

## 2021-03-20 PROCEDURE — 1111F DSCHRG MED/CURRENT MED MERGE: CPT

## 2021-03-20 NOTE — CARE COORDINATION
Jason 45 Transitions Initial Follow Up Call    Call within 2 business days of discharge: Yes    Patient: Blayne Doyle Patient : 12/10/1933   MRN: [id number]  Reason for Admission: GI bleed  Discharge Date: 3/19/21 RARS: Readmission Risk Score: 16      Last Discharge [facility]       Complaint Diagnosis Description Type Department Provider    3/17/21 Other Gastrointestinal hemorrhage, unspecified gastrointestinal hemorrhage type . .. ED to Hosp-Admission (Discharged) (ADMITTED) Ulises Kline MD; Zach Jansen. .. [facility] [address] [facility] [address]    Non-face-to-face services provided:  Writer spoke to patient, she is doing well, no new needs or concerns, reviewed discharge instructions and medications, 1111f order completed, no vns, patient has f/u appointment with pcp on 21, reviewed covid precautions and reviewed healthcare decision makers, provided contact information, will continue to follow//JU  Challenges to be reviewed by the provider   Additional needs identified to be addressed with provider No  none             Method of communication with provider : none    Discussed COVID-19 related testing which was available at this time. Test results were negative. Patient informed of results, if available? Yes    Advance Care Planning:   Does patient have an Advance Directive:  reviewed and current. Was this a readmission? No  Patient stated reason for admission: GI bleed  Patients top risk factors for readmission: medical condition    Care Transition Nurse (CTN) contacted the patient by telephone to perform post hospital discharge assessment. Verified name and  with patient as identifiers. Provided introduction to self, and explanation of the CTN role. CTN reviewed discharge instructions, medical action plan and red flags with patient who verbalized understanding.  Patient given an opportunity to ask questions and does not have any further questions or concerns at this time. Were discharge instructions available to patient? Yes. Reviewed appropriate site of care based on symptoms and resources available to patient including: PCP, Specialist, Urgent care clinics, When to call 911 and CTC. The patient agrees to contact the PCP office for questions related to their healthcare. Medication reconciliation was performed with patient, who verbalizes understanding of administration of home medications. Advised obtaining a 90-day supply of all daily and as-needed medications. Covid Risk Education    Patient has following risk factors of: immunocompromised and diabetes. Education provided regarding infection prevention, and signs and symptoms of COVID-19 and when to seek medical attention with patient who verbalized understanding. Discussed exposure protocols and quarantine From CDC: Are you at higher risk for severe illness?   and given an opportunity for questions and concerns. The patient agrees to contact the COVID-19 hotline [phone] or PCP office for questions related to COVID-19. For more information on steps you can take to protect yourself, see CDC's How to Protect Yourself     Was patient discharged with a pulse oximeter NO    Patient/family/caregiver given information for [employer] and agrees to enroll no  Patient's preferred e-mail: declines  Patient's preferred phone number: declines    Discussed follow-up appointments. If no appointment was previously scheduled, appointment scheduling offered: Yes. Is follow up appointment scheduled within 7 days of discharge? Follow up appointment on 4/6/21 with pcp  Non-[facility] follow up appointment(s):     Plan for follow-up call in 7-10 days based on severity of symptoms and risk factors. Plan for next call: routine  CTN provided contact information for future needs.         Care Transitions 24 Hour Call    Care Transitions Interventions         Follow Up  Future Appointments   Date Time Provider Sherly Pitt 4/6/2021 10:30 AM MD kay [facility]   8/11/2021  9:00 [facility]   9/1/2021 10:30 AM MD kay River RN

## 2021-03-22 ENCOUNTER — HOSPITAL ENCOUNTER (OUTPATIENT)
Age: 86
Discharge: HOME | End: 2021-03-22
Payer: MEDICARE

## 2021-03-22 DIAGNOSIS — K29.71 GASTROINTESTINAL HEMORRHAGE ASSOCIATED WITH GASTRITIS, UNSPECIFIED GASTRITIS TYPE: ICD-10-CM

## 2021-03-22 LAB
HCT VFR BLD CALC: 28.3 % (ref 36–46)
HEMOGLOBIN: 8.6 G/DL (ref 12–16)
MCH RBC QN AUTO: 22.2 PG (ref 26–34)
MCHC RBC AUTO-ENTMCNC: 30.4 G/DL (ref 31–37)
MCV RBC AUTO: 73.1 FL (ref 80–100)
NRBC AUTOMATED: ABNORMAL PER 100 WBC
PDW BLD-RTO: 20.9 % (ref 11.5–14.9)
PLATELET # BLD: 247 K/UL (ref 150–450)
PMV BLD AUTO: 7.8 FL (ref 6–12)
RBC # BLD: 3.87 M/UL (ref 4–5.2)
SURGICAL PATHOLOGY REPORT: NORMAL
WBC # BLD: 9.9 K/UL (ref 3.5–11)

## 2021-03-22 PROCEDURE — 36415 COLL VENOUS BLD VENIPUNCTURE: CPT

## 2021-03-22 PROCEDURE — 85027 COMPLETE CBC AUTOMATED: CPT

## 2021-03-23 NOTE — PROGRESS NOTES
Physician Progress Note      Bibiana Ramirez  CSN #:                  [id number]  :                       12/10/1933  ADMIT DATE:       3/17/2021 1:56 PM  Luis Rivera DATE:        3/19/2021 4:45 PM  RESPONDING  PROVIDER #:        Angie ARCHER MD          QUERY TEXT:    Patient admitted with GI bleeding, noted to have possible anticoagulant   intolerance and multiple polyps removed. If possible, please document in   progress notes and discharge summary the cause of the GI bleeding: The medical record reflects the following:  Risk Factors: Elderly, was on brilinta and Xarelto  Clinical Indicators: Pt was on brilinta and Xarelto, multiple colon polyps   removed, hemorrhoids seen  Treatment: anticoags held, vitamin K, serial H & H, EGD/colonoscopy    Thank You! Zaina Stanton RN  RN Clinical   (Q) [phone] () [phone]  Options provided:  -- GI bleeding due to colon polyps  -- GI bleeding due to anticoagulation  -- Other - I will add my own diagnosis  -- Disagree - Not applicable / Not valid  -- Disagree - Clinically unable to determine / Unknown  -- Refer to Clinical Documentation Reviewer    PROVIDER RESPONSE TEXT:    This patient has GI bleeding due to anticoagulation.     Query created by: Hudson Sherman on 3/23/2021 2:35 PM      Electronically signed by:  Mary Jane Ramos MD 3/23/2021 2:50 PM

## 2021-04-05 RX ORDER — ALENDRONATE SODIUM 70 MG/1
TABLET ORAL
Qty: 12 TABLET | Refills: 3 | OUTPATIENT
Start: 2021-04-05

## 2021-04-05 NOTE — TELEPHONE ENCOUNTER
Due to recent GI bleed, to stop Fosamax.   Please try to send the fax to [facility] she needs to stop Fosamax/alendronate, needs to write down and stop it

## 2021-04-05 NOTE — TELEPHONE ENCOUNTER
Patient returned call and was advised to stop Fosamax. She states she did take it this past Saturday and was advised to not take it anymore. Patient understood.

## 2021-04-06 ENCOUNTER — OFFICE VISIT (OUTPATIENT)
Dept: FAMILY MEDICINE CLINIC | Age: 86
End: 2021-04-06
Payer: MEDICARE

## 2021-04-06 ENCOUNTER — HOSPITAL ENCOUNTER (OUTPATIENT)
Age: 86
Setting detail: SPECIMEN
Discharge: HOME | End: 2021-04-06
Payer: MEDICARE

## 2021-04-06 VITALS
BODY MASS INDEX: 29.77 KG/M2 | HEIGHT: 69 IN | SYSTOLIC BLOOD PRESSURE: 130 MMHG | TEMPERATURE: 97.1 F | WEIGHT: 201 LBS | OXYGEN SATURATION: 100 % | HEART RATE: 63 BPM | DIASTOLIC BLOOD PRESSURE: 74 MMHG

## 2021-04-06 DIAGNOSIS — K92.2 GASTROINTESTINAL HEMORRHAGE, UNSPECIFIED GASTROINTESTINAL HEMORRHAGE TYPE: ICD-10-CM

## 2021-04-06 DIAGNOSIS — E11.22 TYPE 2 DIABETES MELLITUS WITH STAGE 3A CHRONIC KIDNEY DISEASE, WITHOUT LONG-TERM CURRENT USE OF INSULIN (HCC): ICD-10-CM

## 2021-04-06 DIAGNOSIS — R31.0 GROSS HEMATURIA: ICD-10-CM

## 2021-04-06 DIAGNOSIS — Z85.038 PERSONAL HISTORY OF COLON CANCER: ICD-10-CM

## 2021-04-06 DIAGNOSIS — N18.31 TYPE 2 DIABETES MELLITUS WITH STAGE 3A CHRONIC KIDNEY DISEASE, WITHOUT LONG-TERM CURRENT USE OF INSULIN (HCC): ICD-10-CM

## 2021-04-06 DIAGNOSIS — N18.30 BENIGN HYPERTENSION WITH CKD (CHRONIC KIDNEY DISEASE) STAGE III (HCC): ICD-10-CM

## 2021-04-06 DIAGNOSIS — N30.01 ACUTE CYSTITIS WITH HEMATURIA: ICD-10-CM

## 2021-04-06 DIAGNOSIS — I50.32 CHRONIC DIASTOLIC HEART FAILURE (HCC): ICD-10-CM

## 2021-04-06 DIAGNOSIS — E53.8 VITAMIN B 12 DEFICIENCY: ICD-10-CM

## 2021-04-06 DIAGNOSIS — I12.9 BENIGN HYPERTENSION WITH CKD (CHRONIC KIDNEY DISEASE) STAGE III (HCC): ICD-10-CM

## 2021-04-06 DIAGNOSIS — K92.2 GASTROINTESTINAL HEMORRHAGE, UNSPECIFIED GASTROINTESTINAL HEMORRHAGE TYPE: Primary | ICD-10-CM

## 2021-04-06 LAB
-: ABNORMAL
ABSOLUTE EOS #: 0.26 K/UL (ref 0–0.4)
ABSOLUTE IMMATURE GRANULOCYTE: ABNORMAL K/UL (ref 0–0.3)
ABSOLUTE LYMPH #: 2.38 K/UL (ref 1–4.8)
ABSOLUTE MONO #: 0.85 K/UL (ref 0.1–1.3)
AMORPHOUS: ABNORMAL
ANION GAP SERPL CALCULATED.3IONS-SCNC: 10 MMOL/L (ref 9–17)
BACTERIA: ABNORMAL
BASOPHILS # BLD: 1 % (ref 0–2)
BASOPHILS ABSOLUTE: 0.09 K/UL (ref 0–0.2)
BILIRUBIN URINE: NEGATIVE
BUN BLDV-MCNC: 20 MG/DL (ref 8–23)
BUN/CREAT BLD: ABNORMAL (ref 9–20)
CALCIUM SERPL-MCNC: 9.4 MG/DL (ref 8.6–10.4)
CASTS UA: ABNORMAL /LPF
CHLORIDE BLD-SCNC: 104 MMOL/L (ref 98–107)
CO2: 27 MMOL/L (ref 20–31)
COLOR: YELLOW
COMMENT UA: ABNORMAL
CREAT SERPL-MCNC: 0.98 MG/DL (ref 0.5–0.9)
CRYSTALS, UA: ABNORMAL /HPF
DIFFERENTIAL TYPE: ABNORMAL
EOSINOPHILS RELATIVE PERCENT: 3 % (ref 0–4)
EPITHELIAL CELLS UA: ABNORMAL /HPF
GFR AFRICAN AMERICAN: >60 ML/MIN
GFR NON-AFRICAN AMERICAN: 54 ML/MIN
GFR SERPL CREATININE-BSD FRML MDRD: ABNORMAL ML/MIN/{1.73_M2}
GFR SERPL CREATININE-BSD FRML MDRD: ABNORMAL ML/MIN/{1.73_M2}
GLUCOSE BLD-MCNC: 217 MG/DL (ref 70–99)
GLUCOSE URINE: NEGATIVE
HCT VFR BLD CALC: 31 % (ref 36–46)
HEMOGLOBIN: 9.1 G/DL (ref 12–16)
IMMATURE GRANULOCYTES: ABNORMAL %
KETONES, URINE: NEGATIVE
LEUKOCYTE ESTERASE, URINE: ABNORMAL
LYMPHOCYTES # BLD: 28 % (ref 24–44)
MCH RBC QN AUTO: 21.2 PG (ref 26–34)
MCHC RBC AUTO-ENTMCNC: 29.5 G/DL (ref 31–37)
MCV RBC AUTO: 71.8 FL (ref 80–100)
MONOCYTES # BLD: 10 % (ref 1–7)
MORPHOLOGY: ABNORMAL
MUCUS: ABNORMAL
NITRITE, URINE: NEGATIVE
NRBC AUTOMATED: ABNORMAL PER 100 WBC
OTHER OBSERVATIONS UA: ABNORMAL
PDW BLD-RTO: 20.3 % (ref 11.5–14.9)
PH UA: 5.5 (ref 5–8)
PLATELET # BLD: 290 K/UL (ref 150–450)
PLATELET ESTIMATE: ABNORMAL
PMV BLD AUTO: 8.4 FL (ref 6–12)
POTASSIUM SERPL-SCNC: 5.3 MMOL/L (ref 3.7–5.3)
PROTEIN UA: NEGATIVE
RBC # BLD: 4.32 M/UL (ref 4–5.2)
RBC # BLD: ABNORMAL 10*6/UL
RBC UA: ABNORMAL /HPF
RENAL EPITHELIAL, UA: ABNORMAL /HPF
SEG NEUTROPHILS: 58 % (ref 36–66)
SEGMENTED NEUTROPHILS ABSOLUTE COUNT: 4.92 K/UL (ref 1.3–9.1)
SODIUM BLD-SCNC: 141 MMOL/L (ref 135–144)
SPECIFIC GRAVITY UA: 1.02 (ref 1–1.03)
TRICHOMONAS: ABNORMAL
TURBIDITY: ABNORMAL
URINE HGB: ABNORMAL
UROBILINOGEN, URINE: NORMAL
WBC # BLD: 8.5 K/UL (ref 3.5–11)
WBC # BLD: ABNORMAL 10*3/UL
WBC UA: ABNORMAL /HPF
YEAST: ABNORMAL

## 2021-04-06 PROCEDURE — 81001 URINALYSIS AUTO W/SCOPE: CPT

## 2021-04-06 PROCEDURE — 87186 SC STD MICRODIL/AGAR DIL: CPT

## 2021-04-06 PROCEDURE — 1090F PRES/ABSN URINE INCON ASSESS: CPT | Performed by: FAMILY MEDICINE

## 2021-04-06 PROCEDURE — 80048 BASIC METABOLIC PNL TOTAL CA: CPT

## 2021-04-06 PROCEDURE — 1123F ACP DISCUSS/DSCN MKR DOCD: CPT | Performed by: FAMILY MEDICINE

## 2021-04-06 PROCEDURE — 1036F TOBACCO NON-USER: CPT | Performed by: FAMILY MEDICINE

## 2021-04-06 PROCEDURE — 87077 CULTURE AEROBIC IDENTIFY: CPT

## 2021-04-06 PROCEDURE — 4040F PNEUMOC VAC/ADMIN/RCVD: CPT | Performed by: FAMILY MEDICINE

## 2021-04-06 PROCEDURE — 3051F HG A1C>EQUAL 7.0%<8.0%: CPT | Performed by: FAMILY MEDICINE

## 2021-04-06 PROCEDURE — 1111F DSCHRG MED/CURRENT MED MERGE: CPT | Performed by: FAMILY MEDICINE

## 2021-04-06 PROCEDURE — G8417 CALC BMI ABV UP PARAM F/U: HCPCS | Performed by: FAMILY MEDICINE

## 2021-04-06 PROCEDURE — G8427 DOCREV CUR MEDS BY ELIG CLIN: HCPCS | Performed by: FAMILY MEDICINE

## 2021-04-06 PROCEDURE — 36415 COLL VENOUS BLD VENIPUNCTURE: CPT

## 2021-04-06 PROCEDURE — 99214 OFFICE O/P EST MOD 30 MIN: CPT | Performed by: FAMILY MEDICINE

## 2021-04-06 PROCEDURE — 87086 URINE CULTURE/COLONY COUNT: CPT

## 2021-04-06 PROCEDURE — 85025 COMPLETE CBC W/AUTO DIFF WBC: CPT

## 2021-04-06 RX ORDER — NITROFURANTOIN 25; 75 MG/1; MG/1
100 CAPSULE ORAL 2 TIMES DAILY
Qty: 10 CAPSULE | Refills: 0 | Status: SHIPPED | OUTPATIENT
Start: 2021-04-06 | End: 2021-04-28

## 2021-04-06 ASSESSMENT — ENCOUNTER SYMPTOMS
BLOOD IN STOOL: 0
RECTAL PAIN: 0
WHEEZING: 0
COUGH: 0
ABDOMINAL PAIN: 0
CHEST TIGHTNESS: 0
ABDOMINAL DISTENTION: 0
VOMITING: 0
NAUSEA: 0
DIARRHEA: 0
CONSTIPATION: 0

## 2021-04-06 NOTE — PROGRESS NOTES
Post-Discharge Transitional Care Management Services or Hospital Follow Up      Heaven Harmon   YOB: 1933    Date of Office Visit:  4/6/2021  Date of Hospital Admission: 3/17/21  Date of Hospital Discharge: 3/19/21  Readmission Risk Score(high >=14%.  Medium >=10%):Readmission Risk Score: 16      Care management risk score Rising risk (score 2-5) and Complex Care (Scores >=6): 6     Non face to face  following discharge, date last encounter closed (first attempt may have been earlier): *No documented post hospital discharge outreach found in the last 14 days  Call initiated 2 business days of discharge: *No response recorded in the last 14 days     Patient Active Problem List   Diagnosis    Gout of foot    Type 2 diabetes mellitus with stage 3 chronic kidney disease, without long-term current use of insulin ([facility] [address]    Coronary artery disease involving coronary bypass graft of native heart without angina pectoris    Calcium kidney stone    Coronary artery disease due to lipid rich plaque    Hyperlipidemia with target LDL less than 70    Occlusion and stenosis of bilateral carotid arteries    SSS (sick sinus syndrome) (HCC)    CKD (chronic kidney disease), stage III    Benign hypertension with CKD (chronic kidney disease) stage III    Chronic anticoagulation    Osteopenia    Psychophysiological insomnia    Bilateral carotid artery stenosis    Carpal tunnel syndrome    History of pulmonary embolus (PE)    History of DVT (deep vein thrombosis)    Hematemesis    History of coronary artery stent placement    History of coronary artery bypass graft x 3    Bilateral hearing loss    Anemia    Seborrheic dermatitis    Recurrent UTI    Paroxysmal VT (HCC)    Hematuria    GI bleed    MGUS (monoclonal gammopathy of unknown significance)       Allergies   Allergen Reactions    Brilinta [Ticagrelor]      Gi BLEED    Ampicillin Other (See Comments)    Clopidogrel Bisulfate Itching  Diovan [Valsartan] Other (See Comments)     cough    Lantus [Insulin Glargine] Nausea Only     Stomach \"quivered\" after taking it, palpitations    Metformin And Related Diarrhea     Chronic kidney disease stage 3       Medications listed as ordered at the time of discharge from hospital   Nikkie HOLLINGSWORTH   Home Medication Instructions EMILY:    Printed on:04/06/21 5393   Medication Information                      allopurinol (ZYLOPRIM) 100 MG tablet  Take 1 tablet by mouth 2 times daily             amLODIPine (NORVASC) 2.5 MG tablet  Take 1 tablet by mouth daily             atorvastatin (LIPITOR) 80 MG tablet  Take 1 tablet by mouth nightly             Biotin 300 MCG TABS  Take 600 mcg by mouth every other day              blood glucose monitor strips  Testing once a day. Please dispense according to patients insurance.              Blood Glucose Monitoring Suppl (FREESTYLE LITE) SARY  use as directed             Cholecalciferol (VITAMIN D3) 1000 UNITS TABS  Take 1 tablet by mouth daily             cyanocobalamin ([facility] VITAMIN B12) 1000 MCG tablet  Take 1 tablet by mouth daily             ezetimibe (ZETIA) 10 MG tablet  Take 1 tablet by mouth nightly             FREESTYLE LANCETS MISC  USE TO TEST BLOOD SUGAR TWICE A DAY             furosemide (LASIX) 40 MG tablet  Take 1 tablet by mouth daily             glimepiride (AMARYL) 4 MG tablet  Take 1 tablet by mouth 2 times daily             linagliptin (TRADJENTA) 5 MG tablet  Take 1 tablet by mouth daily             metoprolol tartrate (LOPRESSOR) 25 MG tablet  Take 50 mg by mouth 2 times daily              nitroGLYCERIN (NITROSTAT) 0.4 MG SL tablet  Place 1 tablet under the tongue every 5 minutes as needed for Chest pain             potassium chloride (KLOR-CON M) 20 MEQ extended release tablet  Take 1 tablet by mouth daily             rivaroxaban (XARELTO) 20 MG TABS tablet  Take 1 tablet by mouth daily (with breakfast) TAKE 1 TABLET DAILY WITH BREAKFAST now reconciled against medications ordered at time of hospital discharge: Yes    Chief Complaint   Patient presents with    [facility]    Inpatient course: Discharge summary reviewed- see chart. Patient was admitted 3/17/2021 through 3/19/2021 at [facility] due to GI bleed, actually sent to the hospital due to dropping in hemoglobin by our office. Patient was on Xarelto and Brilinta at that time due to significant coronary artery disease CABG x3 in 2002, stents placement x4, and history of PE. During hospitalization, patient underwent colonoscopy and endoscopy, she was found to have multiple polyps, 2 polyps in transverse colon and 10 polyp in the sigmoid, also hemorrhoids per records. EGD showed gastritis but no active bleeding. She does have a history of colon cancer in 2006. Patient had cardiology consult who recommended to stop Brilinta and continue Xarelto. Patient also was found to have UTI with Klebsiella, and she was treated with Cipro. Interval history/Current status: improved    Today, patient says she feels better but still has shortness of breath. Cardiologist refer her to congestive heart failure clinic. Was told to stop  Brillinta and has been doing that  Still has legs swelling , cannot put the compression stockings on. Patient denies chest pain, palpitations, or orthopnea. Fatigue is improving. Patient has known congestive heart failure, hypertension, chronic kidney disease stage III mild. GFR 56 on 8/2/2019, was 59 on 3/18/2021, stable. She does not take NSAIDs. Lab Results   Component Value Date    LVEF 61 06/16/2020         BP controlled. Mackenzie Irene reports compliance with BP medications, and tolerates them well, denies side effects.     BP Readings from Last 3 Encounters:   04/06/21 130/74   03/19/21 (!) 134/58   03/19/21 (!) 114/56       Patient reports she has noticed \"a lot of blood in the  Toilet\", \"solid red\" at 3 am last night, but this morning that was no blood that she could see  Patient says the blood was in the urine and had pain at the end of urination. She denies blood in the stool and she is sure that the blood was in the urine. Sees Dr. Abrahan Zuniga and has kidney stones, needed ESWL but not cleared by Dr. Sully Mayberry to have that done. She denies flank pain. Patient denies black stools, blood in the stool, or pain with defecation. She denies nausea, vomiting or abdominal pain        Has known diabetes mellitus type 2    Home Blood Glucose 200's. Patient denies blood glucose below 80s. Has been taking glimepiride and Tradjenta   A1c is slightly worsening  Lab Results   Component Value Date    LABA1C 7.4 03/16/2021    LABA1C 7.0 12/09/2020    LABA1C 7.3 08/28/2020       Weight is stable  Wt Readings from Last 3 Encounters:   04/06/21 201 lb (91.2 kg)   03/18/21 200 lb (90.7 kg)   03/16/21 200 lb 3.2 oz (90.8 kg)     Vitamin B12 deficiency   Yvonne  is  taking Vitamin B12 supplementation. Yvonne denies numbness and tingling, but she still fatigued. Lab Results   Component Value Date    TUDALPFQ72 906 03/17/2021           Review of Systems   Constitutional: Positive for fatigue. Negative for activity change, appetite change, chills, diaphoresis, fever and unexpected weight change. Eyes: Positive for visual disturbance. Respiratory: Positive for shortness of breath (BEE). Negative for cough, chest tightness and wheezing. Cardiovascular: Positive for leg swelling. Negative for chest pain and palpitations. Saw vein specialist for varicose veins and leg edema and she says her leg swelling has improved. Cannot put her compression stockings on. Gastrointestinal: Negative for abdominal distention, abdominal pain, blood in stool, constipation, diarrhea, nausea, rectal pain and vomiting. Endocrine: Negative for cold intolerance, heat intolerance, polydipsia, polyphagia and polyuria. Genitourinary: Positive for dysuria and hematuria. Negative for difficulty urinating, flank pain, frequency and urgency. Neurological: Negative for numbness. Hematological: Does not bruise/bleed easily. Psychiatric/Behavioral: Positive for sleep disturbance (since her  passed away in 2018). Negative for dysphoric mood. Vital signs within normal limits except overweight per BMI    Vitals:    04/06/21 1026   BP: 130/74   Site: Left Upper Arm   Pulse: 63   Temp: 97.1 °F (36.2 °C)   SpO2: 100%   Weight: 201 lb (91.2 kg)   Height: 5' 9\" (1.753 m)     Body mass index is 29.68 kg/m². Wt Readings from Last 3 Encounters:   04/06/21 201 lb (91.2 kg)   03/18/21 200 lb (90.7 kg)   03/16/21 200 lb 3.2 oz (90.8 kg)     BP Readings from Last 3 Encounters:   04/06/21 130/74   03/19/21 (!) 134/58   03/19/21 (!) 114/56       Physical Exam  Vitals signs and nursing note reviewed. Constitutional:       General: She is not in acute distress. Appearance: Normal appearance. She is well-developed. She is obese. She is not diaphoretic. HENT:      Head: Normocephalic and atraumatic. Right Ear: External ear normal.      Left Ear: External ear normal.      Nose: No mucosal edema. Mouth/Throat:      Comments: I did not examine the mouth due to coronavirus pandemic and wearing masks    Eyes:      General: Lids are normal. No scleral icterus. Right eye: No discharge. Left eye: No discharge. Extraocular Movements: Extraocular movements intact. Conjunctiva/sclera: Conjunctivae normal.   Neck:      Musculoskeletal: Normal range of motion and neck supple. Thyroid: No thyromegaly. Cardiovascular:      Rate and Rhythm: Normal rate and regular rhythm. Heart sounds: Murmur present. Crescendo  systolic murmur present with a grade of 3/6.       Comments: Varicose veins bilateral Pulmonary:      Effort: Pulmonary effort is normal. No respiratory distress. Breath sounds: Normal breath sounds. No wheezing or rales. Chest:      Chest wall: No tenderness. Abdominal:      General: Bowel sounds are normal. There is no distension. Palpations: Abdomen is soft. There is no hepatomegaly or splenomegaly. Tenderness: There is abdominal tenderness in the right lower quadrant. There is no right CVA tenderness, left CVA tenderness, guarding or rebound. Musculoskeletal: Normal range of motion. General: No tenderness. Right lower le+ Pitting Edema present. Left lower le+ Pitting Edema present. Comments: Mild thoracic kyphosis   Skin:     General: Skin is warm and dry. Capillary Refill: Capillary refill takes less than 2 seconds. Findings: No rash. Neurological:      Mental Status: She is alert and oriented to person, place, and time. Cranial Nerves: No cranial nerve deficit. Motor: No abnormal muscle tone. Psychiatric:         Mood and Affect: Mood normal.         Behavior: Behavior normal.         Thought Content: Thought content normal.         Judgment: Judgment normal.       Most recent labs reviewed with the patient and all questions fully answered.     Anemia of blood loss moderate in severity  Hyperglycemia  Chronic kidney disease stage III stable  Vitamin B12 deficiency  Iron deficiency  Otherwise labs within normal limits      Lab Results   Component Value Date    WBC 9.9 2021    HGB 8.6 (L) 2021    HCT 28.3 (L) 2021    MCV 73.1 (L) 2021     2021       Lab Results   Component Value Date     2021    K 3.9 2021     2021    CO2 21 2021    BUN 9 2021    CREATININE 0.63 2021    GLUCOSE 185 2021    GLUCOSE 170 2020    CALCIUM 8.2 2021        Lab Results   Component Value Date    ALT 19 2021    AST 21 2021    ALKPHOS 53 03/17/2021    BILITOT 1.13 03/17/2021       Lab Results   Component Value Date    TSH 3.29 03/17/2021     Lab Results   Component Value Date    LDLCALC 43 02/22/2019    LDLCALC 34 06/09/2018    LDLCALC 46 10/10/2017    LDLCHOLESTEROL 31 03/17/2021    LDLCHOLESTEROL 29 04/13/2020    LDLCHOLESTEROL 54 01/24/2014       Lab Results   Component Value Date    CHOLHDLRATIO 2.8 03/17/2021    CHOLHDLRATIO 3.1 04/13/2020    CHOLHDLRATIO 3.2 02/22/2019       Lab Results   Component Value Date    LABA1C 7.4 03/16/2021       Lab Results   Component Value Date    NZHZNESP52 349 03/17/2021       Lab Results   Component Value Date    FOLATE 12.4 03/17/2021       Lab Results   Component Value Date    IRON 23 (L) 03/18/2021    TIBC 374 03/18/2021    FERRITIN 12 (L) 03/17/2021       Lab Results   Component Value Date    VITD25 33.9 02/14/2020         Assessment/Plan:  1. Gastrointestinal hemorrhage, unspecified gastrointestinal hemorrhage type  Improved  Recent colonoscopy and EGD done, with multiple polyps removed, benign in nature, one polyp was sessile serrated adenoma, others were tubular adenomas, also chronic inactive gastritis  Follow-up with GI specialist  Recheck CBC  - MD DISCHARGE MEDS RECONCILED W/ CURRENT OUTPATIENT MED LIST  - CBC Auto Differential; Future  Continue iron supplementation and B12 supplementation  Follow-up with hematologist oncologist    2. Benign hypertension with CKD (chronic kidney disease) stage III  Stable chronic kidney disease stage III  Well controlled HTN. Continue current treatment. Will recheck labs. - MD DISCHARGE MEDS RECONCILED W/ CURRENT OUTPATIENT MED LIST  - CBC Auto Differential; Future  - Basic Metabolic Panel; Future    3.  Type 2 diabetes mellitus with stage 3a chronic kidney disease, without long-term current use of insulin (HCC)  Worsening but well controlled  Lab Results   Component Value Date    LABA1C 7.4 03/16/2021    LABA1C 7.0 12/09/2020    LABA1C 7.3 08/28/2020 - HI DISCHARGE MEDS RECONCILED W/ CURRENT OUTPATIENT MED LIST  -advised home blood glucose testing daily  -daily feet exam, Foot care: advised to wash feet daily, pat dry and apply lotion at night, avoiding between toes. Need to look at feet daily and report to a physician any signs of inflammation or skin damage  -annual dilated eye exam  -Low carb, low fat diet, increase fruits and vegetables, and exercise 4-5 times a day 30-40 minutes a day discussed  -continue current treatment  -continue statin      4. Acute cystitis with hematuria  Recurrent   - HI DISCHARGE MEDS RECONCILED W/ CURRENT OUTPATIENT MED LIST  - Urinalysis Reflex to Culture; Future  - nitrofurantoin, macrocrystal-monohydrate, (MACROBID) 100 MG capsule; Take 1 capsule by mouth 2 times daily  Dispense: 10 capsule; Refill: 0  Patient was treated with ciprofloxacin while in the hospital  Patient was strongly advised to follow-up with urology    5. Vitamin B 12 deficiency  Improving  Continue supplementation  - HI DISCHARGE MEDS RECONCILED W/ CURRENT OUTPATIENT MED LIST  - cyanocobalamin ([employer]S VITAMIN B12) 1000 MCG tablet; Take 1 tablet by mouth daily  Dispense: 30 tablet; Refill: 0    6. Chronic diastolic heart failure (HCC)  Stable  Lab Results   Component Value Date    LVEF 61 06/16/2020   Patient says she will go to congestive heart failure clinic per her cardiologist  Continue furosemide, metoprolol, potassium, Lipitor, Zetia, Norvasc      - HI DISCHARGE MEDS RECONCILED W/ CURRENT OUTPATIENT MED LIST    7.  Personal history of colon cancer  Up-to-date with colonoscopy  Follow-up with genetics, she did have a serrated adenoma  - HI DISCHARGE MEDS RECONCILED W/ CURRENT OUTPATIENT MED LIST        To see Dr. Aureliano Currie Making: moderate complexity        Future Appointments   Date Time Provider Sherly Pitt   4/7/2021  2:00 PM Sukhjinder Black MD [address] [place] [address]   4/28/2021  1:00 PM Harvey Ibanez MD [facility] 8/11/2021  9:00 AM MD kay Hernandez [id number]   9/1/2021 10:30 AM [facility]       On this date 4/6/2021 I have spent 39 minutes reviewing previous notes, test results and face to face with the patient discussing the diagnosis and importance of compliance with the treatment plan as well as documenting on the day of the visit.

## 2021-04-06 NOTE — RESULT ENCOUNTER NOTE
ABNORMAL. Please notify patient.   Blood glucose high at 217 but I am not worried about that  Stay away from sugar    Mild chronic kidney disease stage II/III, to drink 6 x 8 ounce glasses of water every day from all fluids  Anemia is stable and slightly improved, to follow-up with the blood doctor tomorrow  Very small amount of blood in the urine and there is possible UTI I will send an antibiotic for UTI and to follow-up with urologist      Future Appointments  4/7/2021   2:00 PM    Steve Mooney MD          [address] [place] [address]  4/28/2021  1:00 PM    Harvey Martin MD            [facility]  8/11/2021  9:00 AM    Patrick Andrade MD     [facility]  9/1/2021   10:30 AM   Patrick Andrade MD     [facility]

## 2021-04-06 NOTE — PROGRESS NOTES
Visit Information    Have you changed or started any medications since your last visit including any over-the-counter medicines, vitamins, or herbal medicines? no   Have you stopped taking any of your medications? Is so, why? -  no  Are you having any side effects from any of your medications? - no    Have you seen any other physician or provider since your last visit? yes - CARDIOLOGY   Have you had any other diagnostic tests since your last visit?  no   Have you been seen in the emergency room and/or had an admission in a hospital since we last saw you?  yes - 03/17/2021   Have you had your routine dental cleaning in the past 6 months?  yes -      Do you have an active MyChart account? If no, what is the barrier?   Yes    Patient Care Team:  Wisam Victor MD as PCP - General (Family Medicine)  Wisam Victor MD as PCP - [facility] Provider  Guillermo Mccartney MD as Consulting Physician (Ophthalmology)  Mitali Garcia MD as Consulting Physician (Cardiology)  Vi Browning as Consulting Physician (Neurology)  Rony Thomas MD as Consulting Physician (Urology)  Deondre Ramirez MD as Consulting Physician (Nephrology)  Mayra Ledezma MD as Consulting Physician (Internal Medicine Cardiovascular Disease)    Medical History Review  Past Medical, Family, and Social History reviewed and does contribute to the patient presenting condition    Health Maintenance   Topic Date Due    DTaP/Tdap/Td vaccine (1 - Tdap) Never done    Shingles Vaccine (2 of 3) 11/26/2017    Annual Wellness Visit (AWV)  08/29/2021    Lipid screen  03/17/2022    Potassium monitoring  03/19/2022    Creatinine monitoring  03/19/2022    Flu vaccine  Completed    Pneumococcal 65+ years Vaccine  Completed    COVID-19 Vaccine  Completed    Hepatitis A vaccine  Aged Out    Hib vaccine  Aged Out    Meningococcal (ACWY) vaccine  Aged Out

## 2021-04-07 ENCOUNTER — TELEPHONE (OUTPATIENT)
Dept: ONCOLOGY | Age: 86
End: 2021-04-07

## 2021-04-07 ENCOUNTER — INITIAL CONSULT (OUTPATIENT)
Dept: ONCOLOGY | Age: 86
End: 2021-04-07
Payer: MEDICARE

## 2021-04-07 VITALS
BODY MASS INDEX: 29.86 KG/M2 | SYSTOLIC BLOOD PRESSURE: 151 MMHG | HEART RATE: 71 BPM | WEIGHT: 202.2 LBS | DIASTOLIC BLOOD PRESSURE: 71 MMHG | TEMPERATURE: 98.5 F

## 2021-04-07 DIAGNOSIS — D64.9 ANEMIA, UNSPECIFIED TYPE: Primary | ICD-10-CM

## 2021-04-07 DIAGNOSIS — Z86.711 HISTORY OF PULMONARY EMBOLISM: ICD-10-CM

## 2021-04-07 DIAGNOSIS — D47.2 MGUS (MONOCLONAL GAMMOPATHY OF UNKNOWN SIGNIFICANCE): ICD-10-CM

## 2021-04-07 DIAGNOSIS — E61.1 IRON DEFICIENCY: ICD-10-CM

## 2021-04-07 DIAGNOSIS — E53.8 B12 DEFICIENCY: ICD-10-CM

## 2021-04-07 LAB
CULTURE: ABNORMAL
Lab: ABNORMAL
SPECIMEN DESCRIPTION: ABNORMAL

## 2021-04-07 PROCEDURE — 99202 OFFICE O/P NEW SF 15 MIN: CPT | Performed by: INTERNAL MEDICINE

## 2021-04-07 PROCEDURE — G8427 DOCREV CUR MEDS BY ELIG CLIN: HCPCS | Performed by: INTERNAL MEDICINE

## 2021-04-07 PROCEDURE — G8417 CALC BMI ABV UP PARAM F/U: HCPCS | Performed by: INTERNAL MEDICINE

## 2021-04-07 PROCEDURE — 99204 OFFICE O/P NEW MOD 45 MIN: CPT | Performed by: INTERNAL MEDICINE

## 2021-04-07 PROCEDURE — 1090F PRES/ABSN URINE INCON ASSESS: CPT | Performed by: INTERNAL MEDICINE

## 2021-04-07 RX ORDER — ASCORBIC ACID 250 MG
250 TABLET ORAL 2 TIMES DAILY
Qty: 60 TABLET | Refills: 3 | Status: SHIPPED | OUTPATIENT
Start: 2021-04-07 | End: 2022-04-07 | Stop reason: HOSPADM

## 2021-04-07 RX ORDER — FERROUS SULFATE 325(65) MG
325 TABLET ORAL 2 TIMES DAILY
Qty: 60 TABLET | Refills: 1 | Status: SHIPPED | OUTPATIENT
Start: 2021-04-07 | End: 2021-06-24 | Stop reason: SDUPTHER

## 2021-04-07 NOTE — TELEPHONE ENCOUNTER
AVS from 4/7/21    rv in 2 months with labs couple of days prior       *rv is scheduled for August@[facility]  *pt will get labs (cmp,isp,iron and tibc, vitamin b12&folate,reticulocytes, electrophoresis,kappa/lambda) drawn 1 week prior to rv    PT was given AVS and an appt schedule

## 2021-04-07 NOTE — PROGRESS NOTES
Jason Elmore                                                                                                                  4/7/2021  MRN:   [id number]  YOB: 1933  PCP:                           Jayde Diaz MD  Referring Physician: Nicolás Morrell  Treating Physician Name: Rajeev Jefferson MD      Reason for consultation:  Chief Complaint   Patient presents with    Consultation     Anemia   Establish care and for further work-up and treatment recommendations    Current problems:  Anemia  Iron deficiency  B12 deficiency  GI bleed  History of pulmonary embolism on chronic anticoagulation  Coronary artery disease  History of colon cancer 2006 s/p surgery with no adjuvant chemotherapy    Active and recent treatments:  Oral B12 supplementation  Oral iron    Summary of Case/History:    Jason Elmore a 80 y. o.female is a patient with anemia. Recent lab work showed iron deficiency anemia with fluctuations in HGB dating back to 2013 and has been consistently low since 08/2020 with worsening. She was in house 03/2021 following last lab work and dark stool, she underwent EGD and colonoscopy, gastritis and benign polyps found, negative otherwise. She received some IV iron while in house. Her lab work also showed paraproteinemia. She has history of PE and takes Xarelto and was taken off Brilinta while in house. She is taking B12 and has been taken off of Fosamax. She has history of cardiac stenting and follows with cardiology. She has history of colon cancer and underwent resection in 2006, no recurrence. She reports history of kidney stones and feels she passed one yesterday, 04/06/2021. She has resumed regular Dionte-Chi. Past Medical History:   Past Medical History:   Diagnosis Date    Arthritis     Basal cell carcinoma of nose     Bronchitis     HX.  OF     CAD (coronary artery disease)     Carpal tunnel syndrome     left hand    [facility]     Diabetes mellitus ([facility]     DVT (deep venous thrombosis) ([facility] 7/10/2019    Gout     Hematuria     History of coronary artery bypass graft x 3 7/2/2020    Hx of blood clots     ED.  LEGS, ED. 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 of stenting x 3.    APPENDECTOMY      BREAST SURGERY      INFECTED MILK DUCT LT.    CARDIAC SURGERY      CABG x 3 vessels and 3 stents    CAROTID ENDARTERECTOMY Left 3-3-16    CARPAL TUNNEL RELEASE Right     CHOLECYSTECTOMY      COLON SURGERY      colectomy, PRECANCEROUS POLYPS    COLONOSCOPY      X5, HX PRECANCEROUS POLYPS    COLONOSCOPY N/A 3/19/2021    COLONOSCOPY POLYPECTOMY REMOVAL HOT SNARE performed by Rodo Gomez MD at [facility]      X3 vessels    CYST REMOVAL  10/07/2016    EXCISION OF SEBACEOUS CYST REMOVED FROM BACK X3    CYSTOSCOPY Bilateral 1/21/2020    CYSTOSCOPY RETROGRADE PYELOGRAM performed by Sharon Heart MD at [address], [address] Right     INDEX FINGER AND THUMB.    HYSTERECTOMY      JOINT REPLACEMENT Left 03/29/2010    TKA    JOINT REPLACEMENT Right 02/16/1999    TKA    LITHOTRIPSY      X 3    SKIN BIOPSY      TOP OF NOSE (BASAL CELL)    UPPER GASTROINTESTINAL ENDOSCOPY N/A 3/19/2021    EGD BIOPSY performed by Rodo Gomez MD at [facility] [address]      vein ablation on legs       Patient Family Social History:     Social History     Socioeconomic History    Marital 0    rivaroxaban (XARELTO) 20 MG TABS tablet Take 1 tablet by mouth daily (with breakfast) TAKE 1 TABLET DAILY WITH BREAKFAST 90 tablet 3    ezetimibe (ZETIA) 10 MG tablet Take 1 tablet by mouth nightly 90 tablet 3    allopurinol (ZYLOPRIM) 100 MG tablet Take 1 tablet by mouth 2 times daily 180 tablet 3    linagliptin (TRADJENTA) 5 MG tablet Take 1 tablet by mouth daily 90 tablet 3    glimepiride (AMARYL) 4 MG tablet Take 1 tablet by mouth 2 times daily 180 tablet 3    amLODIPine (NORVASC) 2.5 MG tablet Take 1 tablet by mouth daily 90 tablet 3    furosemide (LASIX) 40 MG tablet Take 1 tablet by mouth daily 90 tablet 3    blood glucose monitor strips Testing once a day. Please dispense according to patients insurance. 300 strip 3    atorvastatin (LIPITOR) 80 MG tablet Take 1 tablet by mouth nightly 90 tablet 3    FREESTYLE LANCETS MISC USE TO TEST BLOOD SUGAR TWICE A  each 1    Blood Glucose Monitoring Suppl (FREESTYLE LITE) SARY use as directed  0    nitroGLYCERIN (NITROSTAT) 0.4 MG SL tablet Place 1 tablet under the tongue every 5 minutes as needed for Chest pain 25 tablet 1    Biotin 300 MCG TABS Take 600 mcg by mouth every other day       Cholecalciferol (VITAMIN D3) 1000 UNITS TABS Take 1 tablet by mouth daily       No current facility-administered medications for this visit. Allergies:   Brilinta [ticagrelor], Ampicillin, Clopidogrel bisulfate, Diovan [valsartan], Lantus [insulin glargine], and Metformin and related    Review of Systems:    Constitutional: No fever or chills.  No night sweats, no weight loss   Eyes: No eye discharge, double vision, or eye pain   HEENT: negative for sore mouth, sore throat, hoarseness and voice change   Respiratory: negative for cough , sputum, dyspnea, wheezing, hemoptysis, chest pain   Cardiovascular: negative for chest pain, dyspnea, palpitations, orthopnea, PND   Gastrointestinal: negative for nausea, vomiting, diarrhea, constipation, abdominal Culture (A)      LACTOSE FERMENTING GRAM NEGATIVE RODS >[id number] CFU/ML   CBC Auto Differential   Result Value Ref Range    WBC 8.5 3.5 - 11.0 k/uL    RBC 4.32 4.0 - 5.2 m/uL    Hemoglobin 9.1 (L) 12.0 - 16.0 g/dL    Hematocrit 31.0 (L) 36 - 46 %    MCV 71.8 (L) 80 - 100 fL    MCH 21.2 (L) 26 - 34 pg    MCHC 29.5 (L) 31 - 37 g/dL    RDW 20.3 (H) 11.5 - 14.9 %    Platelets 097 982 - 431 k/uL    MPV 8.4 6.0 - 12.0 fL    NRBC Automated NOT REPORTED per 100 WBC    Differential Type NOT REPORTED     Immature Granulocytes NOT REPORTED 0 %    Absolute Immature Granulocyte NOT REPORTED 0.00 - 0.30 k/uL    WBC Morphology NOT REPORTED     RBC Morphology NOT REPORTED     Platelet Estimate NOT REPORTED     Seg Neutrophils 58 36 - 66 %    Lymphocytes 28 24 - 44 %    Monocytes 10 (H) 1 - 7 %    Eosinophils % 3 0 - 4 %    Basophils 1 0 - 2 %    Segs Absolute 4.92 1.3 - 9.1 k/uL    Absolute Lymph # 2.38 1.0 - 4.8 k/uL    Absolute Mono # 0.85 0.1 - 1.3 k/uL    Absolute Eos # 0.26 0.0 - 0.4 k/uL    Basophils Absolute 0.09 0.0 - 0.2 k/uL    Morphology ANISOCYTOSIS PRESENT     Morphology HYPOCHROMIA PRESENT     Morphology MICROCYTOSIS PRESENT     Morphology SLT ELLIPTOCYTES    Basic Metabolic Panel   Result Value Ref Range    Glucose 217 (H) 70 - 99 mg/dL    BUN 20 8 - 23 mg/dL    CREATININE 0.98 (H) 0.50 - 0.90 mg/dL    Bun/Cre Ratio NOT REPORTED 9 - 20    Calcium 9.4 8.6 - 10.4 mg/dL    Sodium 141 135 - 144 mmol/L    Potassium 5.3 3.7 - 5.3 mmol/L    Chloride 104 98 - 107 mmol/L    CO2 27 20 - 31 mmol/L    Anion Gap 10 9 - 17 mmol/L    GFR Non-African American 54 (L) >60 mL/min    GFR African American >60 >60 mL/min    GFR Comment          GFR Staging NOT REPORTED    Urinalysis Reflex to Culture    Specimen: Urine, clean catch   Result Value Ref Range    Color, UA YELLOW YELLOW    Turbidity UA CLOUDY (A) CLEAR    Glucose, Ur NEGATIVE NEGATIVE    Bilirubin Urine NEGATIVE NEGATIVE    Ketones, Urine NEGATIVE NEGATIVE    Specific Gravity, UA 1.024 1.000 - 1.030    Urine Hgb LARGE (A) NEGATIVE    pH, UA 5.5 5.0 - 8.0    Protein, UA NEGATIVE NEGATIVE    Urobilinogen, Urine Normal Normal    Nitrite, Urine NEGATIVE NEGATIVE    Leukocyte Esterase, Urine SMALL (A) NEGATIVE    Urinalysis Comments Microscopic performed on uncentrifuged urine -     Microscopic Urinalysis   Result Value Ref Range    -          WBC, UA TOO NUMEROUS TO COUNT /HPF    RBC, UA 5 TO 10 /HPF    Casts UA NOT REPORTED /LPF    Crystals, UA NOT REPORTED None /HPF    Epithelial Cells UA 2 TO 5 /HPF    Renal Epithelial, UA NOT REPORTED 0 /HPF    Bacteria, UA MANY (A) None    Mucus, UA NOT REPORTED None    Trichomonas, UA NOT REPORTED None    Amorphous, UA NOT REPORTED None    Other Observations UA NOT REPORTED NOT REQ. Yeast, UA NOT REPORTED None     Impression   Unremarkable renal ultrasound.               Impression:  Anemia  Iron deficiency  B12 deficiency  GI bleed  History of pulmonary embolism on chronic anticoagulation  Coronary artery disease  History of colon cancer 2006 s/p surgery with no adjuvant chemotherapy      Plan:  I had a detailed discussion with the patient and personally went over results of lab work-up imaging studies and other relevant clinical data. Reviewed records from hospitalization. Reviewed results of colonoscopy and EGD. Work-up shows iron deficiency. Patient did receive IV iron in the hospital.  Hemoglobin has improved. I will start patient on oral iron. Iron deficiency likely secondary GI bleed given clinical picture however source of GI bleed is not clear. We will continue to monitor  Continue B12 supplementation  We will recheck paraproteinemia profile with the next office visit continue surveillance. No plan for bone marrow biopsy at this point  Patient has been on long-term anticoagulation because of history of pulmonary embolism. Continue surveillance for colon cancer.   Patient likely showed since she is more than 14 years out now  Return to clinic in 2 months or sooner if clinically indicated      Tessa Irby        This note is created with the assistance of a speech recognition program.  While intending to generate a document that actually reflects the content of the visit, the document can still have some errors including those of syntax and sound a like substitutions which may escape proof reading. It such instances, actual meaning can be extrapolated by contextual diversion.

## 2021-04-08 DIAGNOSIS — B96.89 UTI DUE TO KLEBSIELLA SPECIES: Primary | ICD-10-CM

## 2021-04-08 DIAGNOSIS — N39.0 UTI DUE TO KLEBSIELLA SPECIES: Primary | ICD-10-CM

## 2021-04-08 RX ORDER — CIPROFLOXACIN 500 MG/1
500 TABLET, FILM COATED ORAL 2 TIMES DAILY
Qty: 14 TABLET | Refills: 0 | Status: SHIPPED | OUTPATIENT
Start: 2021-04-08 | End: 2021-04-15

## 2021-04-08 NOTE — RESULT ENCOUNTER NOTE
ABNORMAL. Please notify patient.   UTI Klebsiella which is not sensitive to Macrobid which she has been taking, please tell her I will send a new prescription for Keflex to the pharmacy after she finishes the treatment, to call our office and we will order another urine test to make sure the UTI resolved    Future Appointments  4/28/2021  1:00 PM    Valerie Galindo MD            [facility]  6/2/2021   12:30 PM   Chuy Dougherty MD          [address] [place] [address]  8/11/2021  9:00 AM    Dareen Goltz, MD     [facility]  9/1/2021   10:30 AM   Dareen Goltz, MD     [facility]

## 2021-04-12 PROBLEM — Z85.038 PERSONAL HISTORY OF COLON CANCER: Status: ACTIVE | Noted: 2021-04-12

## 2021-04-12 PROBLEM — E53.8 VITAMIN B 12 DEFICIENCY: Status: ACTIVE | Noted: 2021-04-12

## 2021-04-12 PROBLEM — I50.33 ACUTE ON CHRONIC DIASTOLIC HEART FAILURE (HCC): Status: ACTIVE | Noted: 2021-03-25

## 2021-04-12 PROBLEM — I50.32 CHRONIC DIASTOLIC HEART FAILURE (HCC): Status: ACTIVE | Noted: 2021-03-25

## 2021-04-12 ASSESSMENT — ENCOUNTER SYMPTOMS: SHORTNESS OF BREATH: 1

## 2021-04-22 DIAGNOSIS — E11.22 TYPE 2 DIABETES MELLITUS WITH STAGE 3 CHRONIC KIDNEY DISEASE, WITHOUT LONG-TERM CURRENT USE OF INSULIN (HCC): ICD-10-CM

## 2021-04-22 DIAGNOSIS — N18.30 TYPE 2 DIABETES MELLITUS WITH STAGE 3 CHRONIC KIDNEY DISEASE, WITHOUT LONG-TERM CURRENT USE OF INSULIN (HCC): ICD-10-CM

## 2021-04-22 DIAGNOSIS — E78.5 HYPERLIPIDEMIA WITH TARGET LDL LESS THAN 70: ICD-10-CM

## 2021-04-22 DIAGNOSIS — N18.30 BENIGN HYPERTENSION WITH CKD (CHRONIC KIDNEY DISEASE) STAGE III (HCC): ICD-10-CM

## 2021-04-22 DIAGNOSIS — I12.9 BENIGN HYPERTENSION WITH CKD (CHRONIC KIDNEY DISEASE) STAGE III (HCC): ICD-10-CM

## 2021-04-22 RX ORDER — ATORVASTATIN CALCIUM 80 MG/1
TABLET, FILM COATED ORAL
Qty: 90 TABLET | Refills: 3 | Status: SHIPPED | OUTPATIENT
Start: 2021-04-22 | End: 2022-04-18

## 2021-04-22 RX ORDER — GLIMEPIRIDE 4 MG/1
TABLET ORAL
Qty: 180 TABLET | Refills: 3 | Status: SHIPPED | OUTPATIENT
Start: 2021-04-22 | End: 2022-04-18

## 2021-04-22 RX ORDER — LINAGLIPTIN 5 MG/1
TABLET, FILM COATED ORAL
Qty: 90 TABLET | Refills: 3 | Status: SHIPPED | OUTPATIENT
Start: 2021-04-22 | End: 2021-11-17 | Stop reason: ALTCHOICE

## 2021-04-22 RX ORDER — FUROSEMIDE 40 MG/1
TABLET ORAL
Qty: 90 TABLET | Refills: 3 | Status: SHIPPED | OUTPATIENT
Start: 2021-04-22 | End: 2022-04-18

## 2021-04-26 DIAGNOSIS — N39.0 UTI DUE TO KLEBSIELLA SPECIES: ICD-10-CM

## 2021-04-26 DIAGNOSIS — B96.89 UTI DUE TO KLEBSIELLA SPECIES: ICD-10-CM

## 2021-04-26 DIAGNOSIS — B96.89 UTI DUE TO KLEBSIELLA SPECIES: Primary | ICD-10-CM

## 2021-04-26 DIAGNOSIS — N39.0 UTI DUE TO KLEBSIELLA SPECIES: Primary | ICD-10-CM

## 2021-04-26 LAB
BILIRUBIN URINE: ABNORMAL
BLOOD, URINE: POSITIVE
CLARITY: ABNORMAL
COLOR: ABNORMAL
GLUCOSE URINE: NEGATIVE
KETONES, URINE: NEGATIVE
LEUKOCYTE ESTERASE, URINE: POSITIVE
NITRITE, URINE: NEGATIVE
PH UA: 5.5 (ref 4.5–8)
PROTEIN UA: POSITIVE
SPECIFIC GRAVITY UA: 1.02 (ref 1–1.03)
UROBILINOGEN, URINE: ABNORMAL

## 2021-04-26 RX ORDER — CIPROFLOXACIN 500 MG/1
500 TABLET, FILM COATED ORAL 2 TIMES DAILY
Qty: 14 TABLET | Refills: 0 | Status: SHIPPED | OUTPATIENT
Start: 2021-04-26 | End: 2021-05-03

## 2021-04-26 NOTE — RESULT ENCOUNTER NOTE
ABNORMAL. Please notify patient.   UTI Klebsiella, needs another antibiotic Cipro    Macrobid does not work    Future Appointments  4/28/2021  1:00 PM    Harvey Martin MD            [facility]  6/2/2021   12:30 PM   Nelson Rahman MD          [address] [place] [address]  8/11/2021  9:00 AM    Gabe Sorensen MD     [facility]  9/1/2021   10:30 AM   Gabe Sorensen MD     [facility]          Brie Francisco

## 2021-04-28 ENCOUNTER — OFFICE VISIT (OUTPATIENT)
Dept: GASTROENTEROLOGY | Age: 86
End: 2021-04-28
Payer: MEDICARE

## 2021-04-28 VITALS
BODY MASS INDEX: 29.92 KG/M2 | DIASTOLIC BLOOD PRESSURE: 69 MMHG | HEIGHT: 69 IN | WEIGHT: 202 LBS | SYSTOLIC BLOOD PRESSURE: 137 MMHG | TEMPERATURE: 97.7 F | RESPIRATION RATE: 18 BRPM | HEART RATE: 73 BPM

## 2021-04-28 DIAGNOSIS — K29.00 ACUTE SUPERFICIAL GASTRITIS WITHOUT HEMORRHAGE: ICD-10-CM

## 2021-04-28 DIAGNOSIS — D36.9 ADENOMATOUS POLYP: ICD-10-CM

## 2021-04-28 DIAGNOSIS — D50.0 ANEMIA, BLOOD LOSS: Primary | ICD-10-CM

## 2021-04-28 PROCEDURE — 4040F PNEUMOC VAC/ADMIN/RCVD: CPT | Performed by: INTERNAL MEDICINE

## 2021-04-28 PROCEDURE — G8428 CUR MEDS NOT DOCUMENT: HCPCS | Performed by: INTERNAL MEDICINE

## 2021-04-28 PROCEDURE — 1090F PRES/ABSN URINE INCON ASSESS: CPT | Performed by: INTERNAL MEDICINE

## 2021-04-28 PROCEDURE — 99214 OFFICE O/P EST MOD 30 MIN: CPT | Performed by: INTERNAL MEDICINE

## 2021-04-28 PROCEDURE — G8417 CALC BMI ABV UP PARAM F/U: HCPCS | Performed by: INTERNAL MEDICINE

## 2021-04-28 PROCEDURE — 1036F TOBACCO NON-USER: CPT | Performed by: INTERNAL MEDICINE

## 2021-04-28 PROCEDURE — 1123F ACP DISCUSS/DSCN MKR DOCD: CPT | Performed by: INTERNAL MEDICINE

## 2021-04-28 RX ORDER — FAMOTIDINE 40 MG/1
40 TABLET, FILM COATED ORAL EVERY EVENING
Qty: 90 TABLET | Refills: 3 | Status: SHIPPED | OUTPATIENT
Start: 2021-04-28 | End: 2022-03-21

## 2021-04-28 RX ORDER — FAMOTIDINE 40 MG/1
40 TABLET, FILM COATED ORAL EVERY EVENING
Qty: 30 TABLET | Refills: 3 | Status: SHIPPED | OUTPATIENT
Start: 2021-04-28 | End: 2021-04-28 | Stop reason: SDUPTHER

## 2021-04-28 ASSESSMENT — ENCOUNTER SYMPTOMS
TROUBLE SWALLOWING: 0
COUGH: 0
ANAL BLEEDING: 0
BLOOD IN STOOL: 1
WHEEZING: 0
ABDOMINAL PAIN: 0
ABDOMINAL DISTENTION: 0
VOMITING: 0
CONSTIPATION: 0
NAUSEA: 0
DIARRHEA: 0
RECTAL PAIN: 0
CHOKING: 0

## 2021-04-28 NOTE — PROGRESS NOTES
GI CLINIC FOLLOW UP    INTERVAL HISTORY:   No referring provider defined for this encounter. Chief Complaint   Patient presents with    Follow-up     Patient is f/u on hospital visit where she had EGD and colonosocpy           HISTORY OF PRESENT ILLNESS: Ms.Betty EDEL Mejia is a 80 y.o. female , referred for evaluation of* GIB , anemia    here for f/u   Saw her in house for :  Anemia with melena  Severe iron deficiency  History of colon cancer status post resection  Admitted right now with UTI shortness of breath and the anemia  INR 2.7 because of Brilinta Xarelto use   egd/colon done in pt       Colon :      Right colon Flat button type polyp in the cecum removed with a snare measuring 12 mm in diameter   Transverse colon 2 polyps removed with a snare the larger one was 1 cm   10 polyps at least removed from the sigmoid ranging in size from 5 to 11 mm we snared them all but in 1 jar   Prep is not perfect but satisfactory   Hemorrhoids   An area in the transverse colon which looks like it was tattooed from the previous colonoscopy but I do not see any abnormality in the tattooed area    egd :gastritis, fundic gland polyps       bxs were negative   fe sat 6% had one infusion in the hospital   Referred now to see hematology \    No N/v   no abd pain   no melena/hematemsis/hematochezia  No dysphagia/odynophagia   no wt loss   no diarrhea /contipation                     SURGEON: Roverto Lester MD  Facility: [facility]  ASSISTANT: None  Anesthesia: MAC  PREOPERATIVE DIAGNOSIS:   Severe anemia     Diagnosis:  Multiple gastric polyps most consistent with fundic gland polyps sampled 2 of them in the body and the fundus of the stomach     Gastritis biopsies were taken     Normal small bowel biopsies were taken      POSTOPERATIVE DIAGNOSIS: As described below     OPERATION: Upper GI endoscopy with Biopsy     ANESTHESIA: Moderate Sedation      ESTIMATED BLOOD LOSS: Less than 50 ml     COMPLICATIONS: None. abnormal: Normal small bowel biopsies were taken     The scope was removed and the patient tolerated the procedure well.      Recommendations/Plan:   1. F/U Biopsies  2. Colonoscopy      Electronically signed by Vikram Moore MD  on 3/19/2021 at 10:04 AM   SURGEON: Vikram Moore MD  Facility : [facility]  ASSISTANT: None  Anesthesia: MAC  PREOPERATIVE DIAGNOSIS:   Significant iron deficiency anemia     POSTOPERATIVE DIAGNOSIS: as described below     OPERATION: Total colonoscopy      ANESTHESIA: Moderate Sedation     ESTIMATED BLOOD LOSS: less than 50      COMPLICATIONS: None.      SPECIMENS:  Was Obtained:   Right colon Flat button type polyp in the cecum removed with a snare measuring 12 mm in diameter     Transverse colon 2 polyps removed with a snare the larger one was 1 cm      10 polyps at least removed from the sigmoid ranging in size from 5 to 11 mm we snared them all but in 1 jar      Prep is not perfect but satisfactory     Hemorrhoids     An area in the transverse colon which looks like it was tattooed from the previous colonoscopy but I do not see any abnormality in the tattooed area     HISTORY: The patient is a 80y.o. year old female with history of above preop diagnosis. I recommended colonoscopy with possible biopsy or polypectomy and I explained the risk, benefits, expected outcome, and alternatives to the procedure. Risks included but are not limited to bleeding, infection, respiratory distress, hypotension, and perforation of the colon and possibility of missing a lesion.   The patient understands and is in agreement.       The patient was counseled at length about the risks of megan Covid-19 during their perioperative period and any recovery window from their procedure.  The patient was made aware that megan Covid-19  may worsen their prognosis for recovering from their procedure  and lend to a higher morbidity and/or mortality risk.  All material risks, benefits, and reasonable alternatives including postponing the procedure were discussed. The patient does wish to proceed with the procedure at this time. PROCEDURE: The patient was given IV conscious sedation. The patient's SPO2 remained above 90% throughout the procedure.      The colonoscope was inserted per rectum and advanced under direct vision to the cecum without difficulty.       Post sedation note : The patient's SPO2 remained above 90% throughout the procedure. the vital signs remained stable , and no immediate complication form the procedure noted, patient will be ready for d/c when criteria is met .      The prep was fair.       Findings:  Terminal ileum: normal in the last 5 cm        colon :  Right colon Flat button type polyp in the cecum removed with a snare measuring 12 mm in diameter     Transverse colon 2 polyps removed with a snare the larger one was 1 cm      10 polyps at least removed from the sigmoid ranging in size from 5 to 11 mm we snared them all but in 1 jar      Prep is not perfect but satisfactory     Hemorrhoids     An area in the transverse colon which looks like it was tattooed from the previous colonoscopy but I do not see any abnormality in the tattooed area     Withdrawal Time was (minutes): 25     The colon was decompressed and the scope was removed. The patient tolerated the procedure well.      Recommendations/Plan:   3. [address] Bea Ibarra tod/c from GI standpoint and follow out pt   4. F/U In OfficeYes  5. Discussed with the family  6. Repeat colonoscopy [id number]  7.  Hold AC for 1-2 more days and restart     Electronically signed by Mary Corey MD  on 3/19/2021 at 10:47 AM   Final Diagnosis   SPECIMEN \"A\":  DUODENUM, BIOPSY:          NO PATHOLOGIC DIAGNOSIS     SPECIMEN \"B\":  STOMACH, BIOPSY:          CHRONIC INACTIVE GASTRITIS     HELICOBACTER PYLORI MICROORGANISMS ARE NOT IDENTIFIED     SPECIMEN \"C\": [address] [place], BIOPSY:          GASTRIC FUNDIC GLAND POLYP     SPECIMEN \"D\":  CECUM, POLYPECTOMY:          SESSILE SERRATED ADENOMA     SPECIMEN \"E\":  TRANSVERSE COLON, POLYPECTOMY:          TUBULAR ADENOMA     SPECIMEN \"F\":  SIGMOID COLON, POLYPECTOMIES:          TUBULAR ADENOMAS         Ric Hun. Renay Babinski, D.O.   **Electronically Signed Out**         clj/3/22/2021       Past Medical,Family, and Social History reviewed and does contribute to the patient presentingcondition. Patient's PMH/PSH,SH,PSYCH Hx, MEDs, ALLERGIES, and ROS were all reviewed and updated in the appropriate sections. PAST MEDICAL HISTORY:  Past Medical History:   Diagnosis Date    Arthritis     Basal cell carcinoma of nose     Bronchitis     HX. OF     CAD (coronary artery disease)     Carpal tunnel syndrome     left hand    Chronic diastolic heart failure ([address] 3/25/2021    Colon cancer ([address]     Diabetes mellitus ([address]     DVT (deep venous thrombosis) ([address] 7/10/2019    Gout     Hematuria     History of coronary artery bypass graft x 3 7/2/2020    Hx of blood clots     ED.  LEGS, ED. 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46    Ophthalmoplegic migraine headache     Osteoarthritis     Other pulmonary embolism without acute cor pulmonale (HCC) 4/30/2013    Personal history of colon cancer 4/12/2021 2006    S/P coronary artery stent placement X 3 6/29/2020    TIA (transient ischemic attack)     Uterine cancer ([address]     Wears glasses        Past Surgical History:   Procedure Laterality Date    ANGIOPLASTY      hx. of stenting x 3.    APPENDECTOMY      BREAST SURGERY      INFECTED MILK DUCT LT.    CARDIAC SURGERY      CABG x 3 vessels and 3 stents    CAROTID ENDARTERECTOMY Left 3-3-16    CARPAL TUNNEL RELEASE Right     CHOLECYSTECTOMY      COLON SURGERY      colectomy, PRECANCEROUS POLYPS    COLONOSCOPY      X5, HX PRECANCEROUS POLYPS    COLONOSCOPY N/A 3/19/2021    COLONOSCOPY POLYPECTOMY REMOVAL HOT SNARE performed by Elio Moffett MD at [facility] [address]      X3 vessels    CYST REMOVAL  10/07/2016    EXCISION OF SEBACEOUS CYST REMOVED FROM BACK X3    CYSTOSCOPY Bilateral 1/21/2020    CYSTOSCOPY RETROGRADE PYELOGRAM performed by Jackelin Penn MD at [facility], ESOPHAGUS      FINGER TRIGGER RELEASE Right     INDEX FINGER AND THUMB.    HYSTERECTOMY      JOINT REPLACEMENT Left 03/29/2010    TKA    JOINT REPLACEMENT Right 02/16/1999    TKA    LITHOTRIPSY      X 3    SKIN BIOPSY      TOP OF NOSE (BASAL CELL)    UPPER GASTROINTESTINAL ENDOSCOPY N/A 3/19/2021    EGD BIOPSY performed by Elio Moffett MD at [facility] [address]      vein ablation on legs       CURRENT MEDICATIONS:    Current Outpatient Medications:     ciprofloxacin (CIPRO) 500 MG tablet, Take 1 tablet by mouth 2 times daily for 7 days, Disp: 14 tablet, Rfl: 0    TRADJENTA 5 MG tablet, TAKE 1 TABLET DAILY, Disp: 90 tablet, Rfl: 3    atorvastatin (LIPITOR) 80 MG tablet, TAKE 1 TABLET NIGHTLY, Disp: 90 tablet, Rfl: 3    furosemide (LASIX) 40 MG tablet, TAKE 1 TABLET DAILY, Disp: 90 tablet, Rfl: 3    glimepiride (AMARYL) 4 MG tablet, TAKE 1 TABLET TWICE A DAY, Disp: 180 tablet, Rfl: 3    ferrous sulfate (IRON 325) 325 (65 Fe) MG tablet, Take 1 tablet by mouth 2 times daily, Disp: 60 tablet, Rfl: 1    ascorbic acid (V-R VITAMIN C) 250 MG tablet, Take 1 tablet by mouth 2 times daily, Disp: 60 tablet, Rfl: 3    cyanocobalamin ([employer] VITAMIN B12) 1000 MCG tablet, Take 1 tablet by mouth daily, Disp: 30 tablet, Rfl: 0    metoprolol tartrate (LOPRESSOR) 25 MG tablet, Take 50 mg by mouth 2 times daily , Disp: , Rfl:     potassium chloride (KLOR-CON M) 20 MEQ extended release tablet, Take 1 tablet by mouth daily, Disp: 90 tablet, Rfl: 3    white petrolatum (VASELINE) GEL, Daily at bedtime for itching and dry skin, Disp: 200 g, Rfl: 0    rivaroxaban Social Needs    Financial resource strain: Not on file    Food insecurity     Worry: Not on file     Inability: Not on file    Transportation needs     Medical: Not on file     Non-medical: Not on file   Tobacco Use    Smoking status: Never Smoker    Smokeless tobacco: Never Used   Substance and Sexual Activity    Alcohol use: No    Drug use: No    Sexual activity: Not Currently     Partners: Male   Lifestyle    Physical activity     Days per week: Not on file     Minutes per session: Not on file    Stress: Not on file   Relationships    Social connections     Talks on phone: Not on file     Gets together: Not on file     Attends Zoroastrianism service: Not on file     Active member of club or organization: Not on file     Attends meetings of clubs or organizations: Not on file     Relationship status: Not on file    Intimate partner violence     Fear of current or ex partner: Not on file     Emotionally abused: Not on file     Physically abused: Not on file     Forced sexual activity: Not on file   Other Topics Concern    Not on file   Social History Narrative    Not on file       REVIEW OF SYSTEMS: A 12-point review of systemswas obtained and pertinent positives and negatives were enumerated above in the history of present illness. All other reviewed systems / symptoms were negative. Review of Systems   Constitutional: Negative for appetite change, fatigue and unexpected weight change. HENT: Negative for trouble swallowing. Eyes: Positive for visual disturbance (glasses). Respiratory: Negative for cough, choking and wheezing. Cardiovascular: Negative for chest pain, palpitations and leg swelling. Gastrointestinal: Positive for blood in stool (black stool-iron). Negative for abdominal distention, abdominal pain, anal bleeding, constipation, diarrhea (loose), nausea, rectal pain and vomiting. Genitourinary: Negative for difficulty urinating.    Allergic/Immunologic: Negative for environmental allergies and food allergies. Neurological: Negative for dizziness, weakness, light-headedness, numbness and headaches. Hematological: Bruises/bleeds easily. Psychiatric/Behavioral: Negative for sleep disturbance. The patient is not nervous/anxious. LABORATORY DATA: Reviewed  Lab Results   Component Value Date    WBC 8.5 04/06/2021    HGB 9.1 (L) 04/06/2021    HCT 31.0 (L) 04/06/2021    MCV 71.8 (L) 04/06/2021     04/06/2021     04/06/2021    K 5.3 04/06/2021     04/06/2021    CO2 27 04/06/2021    BUN 20 04/06/2021    CREATININE 0.98 (H) 04/06/2021    LABALBU 4.1 03/17/2021    BILITOT 1.13 03/17/2021    ALKPHOS 53 03/17/2021    AST 21 03/17/2021    ALT 19 03/17/2021    INR 1.2 03/18/2021         Lab Results   Component Value Date    RBC 4.32 04/06/2021    HGB 9.1 (L) 04/06/2021    MCV 71.8 (L) 04/06/2021    MCH 21.2 (L) 04/06/2021    MCHC 29.5 (L) 04/06/2021    RDW 20.3 (H) 04/06/2021    MPV 8.4 04/06/2021    BASOPCT 1 04/06/2021    LYMPHSABS 2.38 04/06/2021    MONOSABS 0.85 04/06/2021    NEUTROABS 4.92 04/06/2021    EOSABS 0.26 04/06/2021    BASOSABS 0.09 04/06/2021         DIAGNOSTIC TESTING:     No results found. PHYSICAL EXAMINATION: Vital signs reviewed per the nursing documentation. /69   Pulse 73   Temp 97.7 °F (36.5 °C)   Resp 18   Ht 5' 9\" (1.753 m)   Wt 202 lb (91.6 kg)   BMI 29.83 kg/m²   Body mass index is 29.83 kg/m². Physical Exam  Vitals signs and nursing note reviewed. Constitutional:       General: She is not in acute distress. Appearance: She is well-developed. She is not diaphoretic. HENT:      Head: Normocephalic. Mouth/Throat:      Pharynx: No oropharyngeal exudate. Eyes:      General: No scleral icterus. Pupils: Pupils are equal, round, and reactive to light. Neck:      Musculoskeletal: Normal range of motion and neck supple. Thyroid: No thyromegaly. Vascular: No JVD.       Trachea: No tracheal deviation. Cardiovascular:      Rate and Rhythm: Normal rate and regular rhythm. Heart sounds: Normal heart sounds. No murmur. Pulmonary:      Effort: Pulmonary effort is normal. No respiratory distress. Breath sounds: Normal breath sounds. No wheezing. Abdominal:      General: Bowel sounds are normal. There is no distension. Palpations: Abdomen is soft. Tenderness: There is no abdominal tenderness. There is no guarding or rebound. Comments: No ascites   Musculoskeletal: Normal range of motion. Skin:     General: Skin is warm. Coloration: Skin is not pale. Findings: No erythema or rash. Comments: She is not diaphoretic   Neurological:      Mental Status: She is alert and oriented to person, place, and time. Deep Tendon Reflexes: Reflexes are normal and symmetric. Psychiatric:         Behavior: Behavior normal.         Thought Content: Thought content normal.         Judgment: Judgment normal.           IMPRESSION: Ms. Trevor Garcia is a 80 y.o. female with    Diagnosis Orders   1. Anemia, blood loss  Iron and TIBC    CBC   2. Adenomatous polyp     3. Acute superficial gastritis without hemorrhage       Will start Pepcid . Check above    see hematology for f/u  Might need IV iron again       Diet/life style/natural hx /complication of the dx were all explained in details   Past medical, past surgical, social history, psychiatric history, medications or allergies, all reviewed and  updated    Thank you for allowing me to participate in the care of Ms. House. For any further questions please do not hesitate to contact me. I have reviewed and agree with the ROS entered by the MA/RN. Note is dictated utilizing voice recognition software. Unfortunately this leads to occasional typographical errors. Please contact our office if you have any questions.       Elio Moffett MD  [facility]  O: #[phone]

## 2021-05-26 ENCOUNTER — HOSPITAL ENCOUNTER (OUTPATIENT)
Age: 86
Discharge: HOME | End: 2021-05-26
Payer: MEDICARE

## 2021-05-26 DIAGNOSIS — D64.9 ANEMIA, UNSPECIFIED TYPE: ICD-10-CM

## 2021-05-26 LAB
ABSOLUTE EOS #: 0.17 K/UL (ref 0–0.4)
ABSOLUTE IMMATURE GRANULOCYTE: ABNORMAL K/UL (ref 0–0.3)
ABSOLUTE LYMPH #: 2.32 K/UL (ref 1–4.8)
ABSOLUTE MONO #: 0.77 K/UL (ref 0.1–1.3)
ABSOLUTE RETIC #: 0.06 M/UL (ref 0.02–0.1)
ALBUMIN SERPL-MCNC: 4.3 G/DL (ref 3.5–5.2)
ALBUMIN/GLOBULIN RATIO: ABNORMAL (ref 1–2.5)
ALP BLD-CCNC: 49 U/L (ref 35–104)
ALT SERPL-CCNC: 25 U/L (ref 5–33)
ANION GAP SERPL CALCULATED.3IONS-SCNC: 8 MMOL/L (ref 9–17)
AST SERPL-CCNC: 27 U/L
BASOPHILS # BLD: 1 % (ref 0–2)
BASOPHILS ABSOLUTE: 0.09 K/UL (ref 0–0.2)
BILIRUB SERPL-MCNC: 1.12 MG/DL (ref 0.3–1.2)
BUN BLDV-MCNC: 19 MG/DL (ref 8–23)
BUN/CREAT BLD: ABNORMAL (ref 9–20)
CALCIUM SERPL-MCNC: 9.4 MG/DL (ref 8.6–10.4)
CHLORIDE BLD-SCNC: 100 MMOL/L (ref 98–107)
CO2: 30 MMOL/L (ref 20–31)
CREAT SERPL-MCNC: 0.82 MG/DL (ref 0.5–0.9)
DIFFERENTIAL TYPE: ABNORMAL
EOSINOPHILS RELATIVE PERCENT: 2 % (ref 0–4)
FERRITIN: 56 UG/L (ref 13–150)
FOLATE: 12.7 NG/ML
FREE KAPPA/LAMBDA RATIO: 1.72 (ref 0.26–1.65)
GFR AFRICAN AMERICAN: >60 ML/MIN
GFR NON-AFRICAN AMERICAN: >60 ML/MIN
GFR SERPL CREATININE-BSD FRML MDRD: ABNORMAL ML/MIN/{1.73_M2}
GFR SERPL CREATININE-BSD FRML MDRD: ABNORMAL ML/MIN/{1.73_M2}
GLUCOSE BLD-MCNC: 177 MG/DL (ref 70–99)
HCT VFR BLD CALC: 39 % (ref 36–46)
HEMOGLOBIN: 12.7 G/DL (ref 12–16)
IMMATURE GRANULOCYTES: ABNORMAL %
IMMATURE RETIC FRACT: NORMAL %
IRON SATURATION: 34 % (ref 20–55)
IRON: 111 UG/DL (ref 37–145)
KAPPA FREE LIGHT CHAINS QNT: 2.22 MG/DL (ref 0.37–1.94)
LAMBDA FREE LIGHT CHAINS QNT: 1.29 MG/DL (ref 0.57–2.63)
LYMPHOCYTES # BLD: 27 % (ref 24–44)
MCH RBC QN AUTO: 26.9 PG (ref 26–34)
MCHC RBC AUTO-ENTMCNC: 32.7 G/DL (ref 31–37)
MCV RBC AUTO: 82.2 FL (ref 80–100)
MONOCYTES # BLD: 9 % (ref 1–7)
MORPHOLOGY: ABNORMAL
MORPHOLOGY: ABNORMAL
NRBC AUTOMATED: ABNORMAL PER 100 WBC
PDW BLD-RTO: 25.7 % (ref 11.5–14.9)
PLATELET # BLD: 210 K/UL (ref 150–450)
PLATELET ESTIMATE: ABNORMAL
PMV BLD AUTO: 8.3 FL (ref 6–12)
POTASSIUM SERPL-SCNC: 4.5 MMOL/L (ref 3.7–5.3)
RBC # BLD: 4.74 M/UL (ref 4–5.2)
RBC # BLD: ABNORMAL 10*6/UL
RETIC %: 1.3 % (ref 0.5–2)
RETIC HEMOGLOBIN: NORMAL PG (ref 28.2–35.7)
SEG NEUTROPHILS: 61 % (ref 36–66)
SEGMENTED NEUTROPHILS ABSOLUTE COUNT: 5.25 K/UL (ref 1.3–9.1)
SODIUM BLD-SCNC: 138 MMOL/L (ref 135–144)
TOTAL IRON BINDING CAPACITY: 327 UG/DL (ref 250–450)
TOTAL PROTEIN: 7.1 G/DL (ref 6.4–8.3)
UNSATURATED IRON BINDING CAPACITY: 216 UG/DL (ref 112–347)
VITAMIN B-12: 825 PG/ML (ref 232–1245)
WBC # BLD: 8.6 K/UL (ref 3.5–11)
WBC # BLD: ABNORMAL 10*3/UL

## 2021-05-26 PROCEDURE — 83883 ASSAY NEPHELOMETRY NOT SPEC: CPT

## 2021-05-26 PROCEDURE — 84155 ASSAY OF PROTEIN SERUM: CPT

## 2021-05-26 PROCEDURE — 82728 ASSAY OF FERRITIN: CPT

## 2021-05-26 PROCEDURE — 85045 AUTOMATED RETICULOCYTE COUNT: CPT

## 2021-05-26 PROCEDURE — 82746 ASSAY OF FOLIC ACID SERUM: CPT

## 2021-05-26 PROCEDURE — 85025 COMPLETE CBC W/AUTO DIFF WBC: CPT

## 2021-05-26 PROCEDURE — 80053 COMPREHEN METABOLIC PANEL: CPT

## 2021-05-26 PROCEDURE — 82607 VITAMIN B-12: CPT

## 2021-05-26 PROCEDURE — 83540 ASSAY OF IRON: CPT

## 2021-05-26 PROCEDURE — 36415 COLL VENOUS BLD VENIPUNCTURE: CPT

## 2021-05-26 PROCEDURE — 84165 PROTEIN E-PHORESIS SERUM: CPT

## 2021-05-26 PROCEDURE — 83550 IRON BINDING TEST: CPT

## 2021-05-26 PROCEDURE — 86334 IMMUNOFIX E-PHORESIS SERUM: CPT

## 2021-05-27 LAB
ALBUMIN (CALCULATED): 4.4 G/DL (ref 3.2–5.2)
ALBUMIN PERCENT: 65 % (ref 45–65)
ALPHA 1 PERCENT: 3 % (ref 3–6)
ALPHA 2 PERCENT: 11 % (ref 6–13)
ALPHA-1-GLOBULIN: 0.2 G/DL (ref 0.1–0.4)
ALPHA-2-GLOBULIN: 0.7 G/DL (ref 0.5–0.9)
BETA GLOBULIN: 0.7 G/DL (ref 0.5–1.1)
BETA PERCENT: 10 % (ref 11–19)
GAMMA GLOBULIN %: 12 % (ref 9–20)
GAMMA GLOBULIN: 0.8 G/DL (ref 0.5–1.5)
PATHOLOGIST: ABNORMAL
PATHOLOGIST: NORMAL
PROTEIN ELECTROPHORESIS, SERUM: ABNORMAL
SERUM IFX INTERP: NORMAL
TOTAL PROT. SUM,%: 101 % (ref 98–102)
TOTAL PROT. SUM: 6.8 G/DL (ref 6.3–8.2)
TOTAL PROTEIN: 6.7 G/DL (ref 6.4–8.3)

## 2021-06-01 DIAGNOSIS — M1A.4790 OTHER SECONDARY CHRONIC GOUT OF FOOT WITHOUT TOPHUS, UNSPECIFIED LATERALITY: ICD-10-CM

## 2021-06-01 RX ORDER — ALLOPURINOL 100 MG/1
TABLET ORAL
Qty: 180 TABLET | Refills: 3 | Status: SHIPPED | OUTPATIENT
Start: 2021-06-01 | End: 2022-05-27

## 2021-06-01 NOTE — TELEPHONE ENCOUNTER
Please Approve or Refuse.   Send to Pharmacy per Pt's Request:      Next Visit Date:  8/11/2021   Last Visit Date: 4/6/2021    Hemoglobin A1C (%)   Date Value   03/16/2021 7.4   12/09/2020 7.0   08/28/2020 7.3             ( goal A1C is < 7)   BP Readings from Last 3 Encounters:   04/28/21 137/69   04/07/21 (!) 151/71   04/06/21 130/74          (goal 120/80)  BUN   Date Value Ref Range Status   05/26/2021 19 8 - 23 mg/dL Final     CREATININE   Date Value Ref Range Status   05/26/2021 0.82 0.50 - 0.90 mg/dL Final     Potassium   Date Value Ref Range Status   05/26/2021 4.5 3.7 - 5.3 mmol/L Final

## 2021-06-02 ENCOUNTER — OFFICE VISIT (OUTPATIENT)
Dept: ONCOLOGY | Age: 86
End: 2021-06-02
Payer: MEDICARE

## 2021-06-02 ENCOUNTER — TELEPHONE (OUTPATIENT)
Dept: ONCOLOGY | Age: 86
End: 2021-06-02

## 2021-06-02 VITALS
HEART RATE: 63 BPM | WEIGHT: 197.8 LBS | DIASTOLIC BLOOD PRESSURE: 77 MMHG | SYSTOLIC BLOOD PRESSURE: 145 MMHG | BODY MASS INDEX: 29.21 KG/M2 | TEMPERATURE: 98.1 F | RESPIRATION RATE: 16 BRPM

## 2021-06-02 DIAGNOSIS — D64.9 ANEMIA, UNSPECIFIED TYPE: Primary | ICD-10-CM

## 2021-06-02 DIAGNOSIS — D47.2 MGUS (MONOCLONAL GAMMOPATHY OF UNKNOWN SIGNIFICANCE): ICD-10-CM

## 2021-06-02 DIAGNOSIS — Z86.711 HISTORY OF PULMONARY EMBOLISM: ICD-10-CM

## 2021-06-02 DIAGNOSIS — E61.1 IRON DEFICIENCY: ICD-10-CM

## 2021-06-02 PROCEDURE — 4040F PNEUMOC VAC/ADMIN/RCVD: CPT | Performed by: INTERNAL MEDICINE

## 2021-06-02 PROCEDURE — G8427 DOCREV CUR MEDS BY ELIG CLIN: HCPCS | Performed by: INTERNAL MEDICINE

## 2021-06-02 PROCEDURE — 1123F ACP DISCUSS/DSCN MKR DOCD: CPT | Performed by: INTERNAL MEDICINE

## 2021-06-02 PROCEDURE — 99214 OFFICE O/P EST MOD 30 MIN: CPT | Performed by: INTERNAL MEDICINE

## 2021-06-02 PROCEDURE — 1090F PRES/ABSN URINE INCON ASSESS: CPT | Performed by: INTERNAL MEDICINE

## 2021-06-02 PROCEDURE — G8417 CALC BMI ABV UP PARAM F/U: HCPCS | Performed by: INTERNAL MEDICINE

## 2021-06-02 PROCEDURE — 99211 OFF/OP EST MAY X REQ PHY/QHP: CPT | Performed by: INTERNAL MEDICINE

## 2021-06-02 PROCEDURE — 1036F TOBACCO NON-USER: CPT | Performed by: INTERNAL MEDICINE

## 2021-06-02 NOTE — PROGRESS NOTES
Kayla Davison                                                                                                                  6/2/2021  MRN:   [id number]  YOB: 1933  PCP:                           Amelie Bishop MD  Referring Physician: No ref. provider found  Treating Physician Name: Mando Chopra MD      Reason for consultation:  Chief Complaint   Patient presents with    Follow-up     review status of disease    Numbness     left hand. also burning feeling    Pain     left hand   Establish care and for further work-up and treatment recommendations    Current problems:  Anemia  Iron deficiency  B12 deficiency  GI bleed  History of pulmonary embolism on chronic anticoagulation  Coronary artery disease  History of colon cancer 2006 s/p surgery with no adjuvant chemotherapy    Active and recent treatments:  Oral B12 supplementation  Oral iron    Summary of Case/History:    Kayla Davison a 80 y. o.female is a patient with anemia. Recent lab work showed iron deficiency anemia with fluctuations in HGB dating back to 2013 and has been consistently low since 08/2020 with worsening. She was in house 03/2021 following last lab work and dark stool, she underwent EGD and colonoscopy, gastritis and benign polyps found, negative otherwise. She received some IV iron while in house. Her lab work also showed paraproteinemia. She has history of PE and takes Xarelto and was taken off Brilinta while in house. She is taking B12 and has been taken off of Fosamax. She has history of cardiac stenting and follows with cardiology. She has history of colon cancer and underwent resection in 2006, no recurrence. She reports history of kidney stones and feels she passed one yesterday, 04/06/2021. She has resumed regular Dionte-Chi. Interim History: The patient presents for follow up today for history of anemia and to review lab work.  She has been taking oral iron as directed, she notes dark stool with supplementation. She complaints of burning in her left hand that inhibits sleep. She is taking anti-coagulation with no bleeding. Her EGD and coloscopy done 03/2021 did not show any obvious source of bleeding but showed gastric polyps. During this visit patient's allergy, social, medical, surgical history and medications were reviewed and updated. Past Medical History:   Past Medical History:   Diagnosis Date    Arthritis     Basal cell carcinoma of nose     Bronchitis     HX. OF     CAD (coronary artery disease)     Carpal tunnel syndrome     left hand    Chronic diastolic heart failure ([address] 3/25/2021    Colon cancer ([address]     Diabetes mellitus ([address]     DVT (deep venous thrombosis) ([address] 7/10/2019    Gout     Hematuria     History of coronary artery bypass graft x 3 7/2/2020    Hx of blood clots     ED.  LEGS, ED. 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 of stenting x 3.    APPENDECTOMY      BREAST SURGERY      INFECTED MILK DUCT LT.    CARDIAC SURGERY      CABG x 3 vessels and 3 stents    CAROTID ENDARTERECTOMY Left 3-3-16    CARPAL TUNNEL RELEASE Right     CHOLECYSTECTOMY      COLON SURGERY      colectomy, PRECANCEROUS POLYPS    COLONOSCOPY      X5, HX PRECANCEROUS POLYPS    COLONOSCOPY N/A 3/19/2021    COLONOSCOPY POLYPECTOMY REMOVAL HOT SNARE performed by Venkat Huddleston MD at [place] [address] Status:    Intimate Partner Violence:     Fear of Current or Ex-Partner:     Emotionally Abused:     Physically Abused:     Sexually Abused:      Family History   Problem Relation Age of Onset    Stroke Mother     Hypertension Mother     Heart Disease Father         AAA    Stroke Sister     Parkinsonism Brother     Cancer Sister         lung    Alcohol Abuse Sister        Current Medications:     Current Outpatient Medications   Medication Sig Dispense Refill    allopurinol (ZYLOPRIM) 100 MG tablet TAKE 1 TABLET TWICE A  tablet 3    metoprolol tartrate (LOPRESSOR) 25 MG tablet TAKE 1 TABLET TWICE A  tablet 3    famotidine (PEPCID) 40 MG tablet Take 1 tablet by mouth every evening 90 tablet 3    TRADJENTA 5 MG tablet TAKE 1 TABLET DAILY 90 tablet 3    atorvastatin (LIPITOR) 80 MG tablet TAKE 1 TABLET NIGHTLY 90 tablet 3    furosemide (LASIX) 40 MG tablet TAKE 1 TABLET DAILY 90 tablet 3    glimepiride (AMARYL) 4 MG tablet TAKE 1 TABLET TWICE A  tablet 3    ferrous sulfate (IRON 325) 325 (65 Fe) MG tablet Take 1 tablet by mouth 2 times daily 60 tablet 1    ascorbic acid (V-R VITAMIN C) 250 MG tablet Take 1 tablet by mouth 2 times daily 60 tablet 3    cyanocobalamin ([employer] VITAMIN B12) 1000 MCG tablet Take 1 tablet by mouth daily 30 tablet 0    potassium chloride (KLOR-CON M) 20 MEQ extended release tablet Take 1 tablet by mouth daily 90 tablet 3    white petrolatum (VASELINE) GEL Daily at bedtime for itching and dry skin 200 g 0    rivaroxaban (XARELTO) 20 MG TABS tablet Take 1 tablet by mouth daily (with breakfast) TAKE 1 TABLET DAILY WITH BREAKFAST 90 tablet 3    ezetimibe (ZETIA) 10 MG tablet Take 1 tablet by mouth nightly 90 tablet 3    amLODIPine (NORVASC) 2.5 MG tablet Take 1 tablet by mouth daily 90 tablet 3    blood glucose monitor strips Testing once a day. Please dispense according to patients insurance.  300 strip 3    FREESTYLE LANCETS MISC USE TO TEST BLOOD SUGAR TWICE A  each 1    Blood Glucose Monitoring Suppl (FREESTYLE LITE) SARY use as directed  0    nitroGLYCERIN (NITROSTAT) 0.4 MG SL tablet Place 1 tablet under the tongue every 5 minutes as needed for Chest pain 25 tablet 1    Biotin 300 MCG TABS Take 600 mcg by mouth every other day       Cholecalciferol (VITAMIN D3) 1000 UNITS TABS Take 1 tablet by mouth daily       No current facility-administered medications for this visit. Allergies:   Brilinta [ticagrelor], Ampicillin, Clopidogrel bisulfate, Diovan [valsartan], Lantus [insulin glargine], and Metformin and related    Review of Systems:    Constitutional: No fever or chills. No night sweats, no weight loss   Eyes: No eye discharge, double vision, or eye pain   HEENT: negative for sore mouth, sore throat, hoarseness and voice change   Respiratory: negative for cough , sputum, dyspnea, wheezing, hemoptysis, chest pain   Cardiovascular: negative for chest pain, dyspnea, palpitations, orthopnea, PND   Gastrointestinal: negative for nausea, vomiting, diarrhea, constipation, abdominal pain, Dysphagia, hematemesis and hematochezia +dark stool with oral iron  Genitourinary: negative for frequency, dysuria, nocturia, urinary incontinence, and hematuria   Integument: negative for rash, skin lesions, bruises. Hematologic/Lymphatic: negative for easy bruising, bleeding, lymphadenopathy, or petechiae   Endocrine: negative for heat or cold intolerance,weight changes, change in bowel habits and hair loss   Musculoskeletal: negative for myalgias, arthralgias, pain, joint swelling,and bone pain   Neurological: negative for headaches, dizziness, seizures, weakness, numbness; +burining pain in left hand.          Physical Exam:    Vitals: BP (!) 145/77   Pulse 63   Temp 98.1 °F (36.7 °C) (Oral)   Resp 16   Wt 197 lb 12.8 oz (89.7 kg)   BMI 29.21 kg/m²   General appearance - well appearing, no in pain or distress  Mental status - AAO X3  Eyes - pupils 5.3 mmol/L    Chloride 100 98 - 107 mmol/L    CO2 30 20 - 31 mmol/L    Anion Gap 8 (L) 9 - 17 mmol/L    Alkaline Phosphatase 49 35 - 104 U/L    ALT 25 5 - 33 U/L    AST 27 <32 U/L    Total Bilirubin 1.12 0.3 - 1.2 mg/dL    Total Protein 7.1 6.4 - 8.3 g/dL    Albumin 4.3 3.5 - 5.2 g/dL    Albumin/Globulin Ratio NOT REPORTED 1.0 - 2.5    GFR Non-African American >60 >60 mL/min    GFR African American >60 >60 mL/min    GFR Comment          GFR Staging NOT REPORTED    CBC Auto Differential   Result Value Ref Range    WBC 8.6 3.5 - 11.0 k/uL    RBC 4.74 4.0 - 5.2 m/uL    Hemoglobin 12.7 12.0 - 16.0 g/dL    Hematocrit 39.0 36 - 46 %    MCV 82.2 80 - 100 fL    MCH 26.9 26 - 34 pg    MCHC 32.7 31 - 37 g/dL    RDW 25.7 (H) 11.5 - 14.9 %    Platelets 426 766 - 419 k/uL    MPV 8.3 6.0 - 12.0 fL    NRBC Automated NOT REPORTED per 100 WBC    Differential Type NOT REPORTED     Immature Granulocytes NOT REPORTED 0 %    Absolute Immature Granulocyte NOT REPORTED 0.00 - 0.30 k/uL    WBC Morphology NOT REPORTED     RBC Morphology NOT REPORTED     Platelet Estimate NOT REPORTED     Seg Neutrophils 61 36 - 66 %    Lymphocytes 27 24 - 44 %    Monocytes 9 (H) 1 - 7 %    Eosinophils % 2 0 - 4 %    Basophils 1 0 - 2 %    Segs Absolute 5.25 1.3 - 9.1 k/uL    Absolute Lymph # 2.32 1.0 - 4.8 k/uL    Absolute Mono # 0.77 0.1 - 1.3 k/uL    Absolute Eos # 0.17 0.0 - 0.4 k/uL    Basophils Absolute 0.09 0.0 - 0.2 k/uL    Morphology ANISOCYTOSIS PRESENT     Morphology 1+ TEARDROPS    [place]/Lambda Free Lt Chains, Serum Quant   Result Value Ref Range    Kappa Free Light Chains QNT 2.22 (H) 0.37 - 1.94 mg/dL    Lambda Free Light Chains QNT 1.29 0.57 - 2.63 mg/dL    Free Kappa/Lambda Ratio 1.72 (H) 0.26 - 1.65   Electrophoresis Protein, Serum without Reflex to Immunofixation   Result Value Ref Range    Total Protein 6.7 6.4 - 8.3 g/dL    Albumin (calculated) 4.4 3.2 - 5.2 g/dL    Albumin % 65 45 - 65 %    Alpha-1-Globulin 0.2 0.1 - 0.4 g/dL    Alpha 1 % 3 3 - 6 %    Alpha-2-Globulin 0.7 0.5 - 0.9 g/dL    Alpha 2 % 11 6 - 13 %    Beta Globulin 0.7 0.5 - 1.1 g/dL    Beta Percent 10 (L) 11 - 19 %    Gamma Globulin 0.8 0.5 - 1.5 g/dL    Gamma Globulin % 12 9 - 20 %    Total Prot. Sum 6.8 6.3 - 8.2 g/dL    Total Prot. Sum,% 101 98 - 102 %    Protein Electrophoresis, Serum       A ZONE OF RESTRICTION IS PRESENT IN THE GAMMAGLOBULIN REGION. CONFIRMED BY IMMUNOFIXATION TO BE MONOCLONAL    Pathologist ELECTRONICALLY SIGNED. Vania Johnson M.D. Ferritin   Result Value Ref Range    Ferritin 56 13 - 150 ug/L     Anesthesia: MAC  PREOPERATIVE DIAGNOSIS:   Severe anemia     Diagnosis:  Multiple gastric polyps most consistent with fundic gland polyps sampled 2 of them in the body and the fundus of the stomach     Gastritis biopsies were taken     Normal small bowel biopsies were taken  Final Diagnosis   SPECIMEN \"A\":  DUODENUM, BIOPSY:          NO PATHOLOGIC DIAGNOSIS     SPECIMEN \"B\":  STOMACH, BIOPSY:          CHRONIC INACTIVE GASTRITIS     HELICOBACTER PYLORI MICROORGANISMS ARE NOT IDENTIFIED     SPECIMEN \"C\":  STOMACH, BIOPSY:          GASTRIC FUNDIC GLAND POLYP     SPECIMEN \"D\":  CECUM, POLYPECTOMY:          SESSILE SERRATED ADENOMA     SPECIMEN \"E\":  TRANSVERSE COLON, POLYPECTOMY:          TUBULAR ADENOMA     SPECIMEN \"F\":  SIGMOID COLON, POLYPECTOMIES:          TUBULAR ADENOMAS         Impression:  Anemia  Iron deficiency  B12 deficiency  GI bleed  History of pulmonary embolism on chronic anticoagulation  Coronary artery disease  History of colon cancer 2006 s/p surgery with no adjuvant chemotherapy  Paraproteinemia, likely MGUS    Plan:  Her lab work was reivewed and discussed, her HGB, ferritin and B12 have improved significantly. She has had good response to oral iron and I advised her to reduce dose to once every other day for maintenance. We reviewed results of EGD and colonoscopy done in house. We also discussed results of paraproteinemia.   Discussed natural history of paraproteinemia. Clinically I suspect patient has MGUS. Reviewed risk of progression to myeloma. No plans for bone marrow biopsy at this point. We will continue to monitor. return in 6 months with repeat lab work. Joselin Wynne MD        This note is created with the assistance of a speech recognition program.  While intending to generate a document that actually reflects the content of the visit, the document can still have some errors including those of syntax and sound a like substitutions which may escape proof reading. It such instances, actual meaning can be extrapolated by contextual diversion.

## 2021-06-07 DIAGNOSIS — I12.9 BENIGN HYPERTENSION WITH CKD (CHRONIC KIDNEY DISEASE) STAGE III (HCC): ICD-10-CM

## 2021-06-07 DIAGNOSIS — N18.30 BENIGN HYPERTENSION WITH CKD (CHRONIC KIDNEY DISEASE) STAGE III (HCC): ICD-10-CM

## 2021-06-07 RX ORDER — AMLODIPINE BESYLATE 2.5 MG/1
TABLET ORAL
Qty: 90 TABLET | Refills: 3 | Status: SHIPPED | OUTPATIENT
Start: 2021-06-07 | End: 2022-06-02

## 2021-06-07 NOTE — TELEPHONE ENCOUNTER
Please Approve or Refuse.   Send to Pharmacy per Pt's Request:      Next Visit Date:  8/11/2021   Last Visit Date: 4/6/2021    Hemoglobin A1C (%)   Date Value   03/16/2021 7.4   12/09/2020 7.0   08/28/2020 7.3             ( goal A1C is < 7)   BP Readings from Last 3 Encounters:   06/02/21 (!) 145/77   04/28/21 137/69   04/07/21 (!) 151/71          (goal 120/80)  BUN   Date Value Ref Range Status   05/26/2021 19 8 - 23 mg/dL Final     CREATININE   Date Value Ref Range Status   05/26/2021 0.82 0.50 - 0.90 mg/dL Final     Potassium   Date Value Ref Range Status   05/26/2021 4.5 3.7 - 5.3 mmol/L Final

## 2021-06-24 RX ORDER — FERROUS SULFATE 325(65) MG
325 TABLET ORAL 2 TIMES DAILY
Qty: 60 TABLET | Refills: 1 | Status: SHIPPED | OUTPATIENT
Start: 2021-06-24 | End: 2021-08-30

## 2021-08-09 DIAGNOSIS — E78.5 HYPERLIPIDEMIA WITH TARGET LDL LESS THAN 100: ICD-10-CM

## 2021-08-09 RX ORDER — EZETIMIBE 10 MG/1
TABLET ORAL
Qty: 90 TABLET | Refills: 3 | Status: SHIPPED | OUTPATIENT
Start: 2021-08-09 | End: 2022-08-03

## 2021-08-11 ENCOUNTER — OFFICE VISIT (OUTPATIENT)
Dept: FAMILY MEDICINE CLINIC | Age: 86
End: 2021-08-11
Payer: MEDICARE

## 2021-08-11 VITALS
TEMPERATURE: 97.3 F | BODY MASS INDEX: 29.51 KG/M2 | WEIGHT: 199.2 LBS | DIASTOLIC BLOOD PRESSURE: 80 MMHG | OXYGEN SATURATION: 98 % | SYSTOLIC BLOOD PRESSURE: 126 MMHG | HEART RATE: 63 BPM | HEIGHT: 69 IN

## 2021-08-11 DIAGNOSIS — I12.9 BENIGN HYPERTENSION WITH CKD (CHRONIC KIDNEY DISEASE), STAGE II: ICD-10-CM

## 2021-08-11 DIAGNOSIS — N18.2 BENIGN HYPERTENSION WITH CKD (CHRONIC KIDNEY DISEASE), STAGE II: ICD-10-CM

## 2021-08-11 DIAGNOSIS — D47.2 MGUS (MONOCLONAL GAMMOPATHY OF UNKNOWN SIGNIFICANCE): ICD-10-CM

## 2021-08-11 DIAGNOSIS — Z23 NEED FOR PROPHYLACTIC VACCINATION AND INOCULATION AGAINST VARICELLA: ICD-10-CM

## 2021-08-11 DIAGNOSIS — Z23 NEED FOR PROPHYLACTIC VACCINATION AGAINST DIPHTHERIA-TETANUS-PERTUSSIS (DTP): ICD-10-CM

## 2021-08-11 DIAGNOSIS — E11.22 TYPE 2 DIABETES MELLITUS WITH STAGE 2 CHRONIC KIDNEY DISEASE, WITHOUT LONG-TERM CURRENT USE OF INSULIN (HCC): Primary | ICD-10-CM

## 2021-08-11 DIAGNOSIS — E78.5 HYPERLIPIDEMIA WITH TARGET LDL LESS THAN 70: ICD-10-CM

## 2021-08-11 DIAGNOSIS — N18.2 TYPE 2 DIABETES MELLITUS WITH STAGE 2 CHRONIC KIDNEY DISEASE, WITHOUT LONG-TERM CURRENT USE OF INSULIN (HCC): Primary | ICD-10-CM

## 2021-08-11 DIAGNOSIS — G56.02 CARPAL TUNNEL SYNDROME OF LEFT WRIST: ICD-10-CM

## 2021-08-11 DIAGNOSIS — I50.32 CHRONIC DIASTOLIC HEART FAILURE (HCC): ICD-10-CM

## 2021-08-11 LAB — HBA1C MFR BLD: 8 %

## 2021-08-11 PROCEDURE — 4040F PNEUMOC VAC/ADMIN/RCVD: CPT | Performed by: FAMILY MEDICINE

## 2021-08-11 PROCEDURE — 1123F ACP DISCUSS/DSCN MKR DOCD: CPT | Performed by: FAMILY MEDICINE

## 2021-08-11 PROCEDURE — 99214 OFFICE O/P EST MOD 30 MIN: CPT | Performed by: FAMILY MEDICINE

## 2021-08-11 PROCEDURE — 1090F PRES/ABSN URINE INCON ASSESS: CPT | Performed by: FAMILY MEDICINE

## 2021-08-11 PROCEDURE — G8427 DOCREV CUR MEDS BY ELIG CLIN: HCPCS | Performed by: FAMILY MEDICINE

## 2021-08-11 PROCEDURE — 3052F HG A1C>EQUAL 8.0%<EQUAL 9.0%: CPT | Performed by: FAMILY MEDICINE

## 2021-08-11 PROCEDURE — G8417 CALC BMI ABV UP PARAM F/U: HCPCS | Performed by: FAMILY MEDICINE

## 2021-08-11 PROCEDURE — 1036F TOBACCO NON-USER: CPT | Performed by: FAMILY MEDICINE

## 2021-08-11 PROCEDURE — 83036 HEMOGLOBIN GLYCOSYLATED A1C: CPT | Performed by: FAMILY MEDICINE

## 2021-08-11 SDOH — ECONOMIC STABILITY: FOOD INSECURITY: WITHIN THE PAST 12 MONTHS, YOU WORRIED THAT YOUR FOOD WOULD RUN OUT BEFORE YOU GOT MONEY TO BUY MORE.: NEVER TRUE

## 2021-08-11 SDOH — ECONOMIC STABILITY: FOOD INSECURITY: WITHIN THE PAST 12 MONTHS, THE FOOD YOU BOUGHT JUST DIDN'T LAST AND YOU DIDN'T HAVE MONEY TO GET MORE.: NEVER TRUE

## 2021-08-11 ASSESSMENT — ENCOUNTER SYMPTOMS
RECTAL PAIN: 0
DIARRHEA: 0
ABDOMINAL PAIN: 0
CONSTIPATION: 0
SHORTNESS OF BREATH: 0
NAUSEA: 0
CHEST TIGHTNESS: 0
WHEEZING: 0
ABDOMINAL DISTENTION: 0
BLOOD IN STOOL: 0
VOMITING: 0

## 2021-08-11 ASSESSMENT — SOCIAL DETERMINANTS OF HEALTH (SDOH): HOW HARD IS IT FOR YOU TO PAY FOR THE VERY BASICS LIKE FOOD, HOUSING, MEDICAL CARE, AND HEATING?: NOT VERY HARD

## 2021-08-11 NOTE — RESULT ENCOUNTER NOTE
Addressed during office visit today, *A1c 8, abnormal, continue treatment recommended during the office visit

## 2021-08-11 NOTE — PROGRESS NOTES
Visit Information    Have you changed or started any medications since your last visit including any over-the-counter medicines, vitamins, or herbal medicines? no   Are you having any side effects from any of your medications? -  no  Have you stopped taking any of your medications? Is so, why? -  no    Have you seen any other physician or provider since your last visit? Yes - Records Obtained  Have you had any other diagnostic tests since your last visit? Yes - Records Obtained  Have you been seen in the emergency room and/or had an admission to a hospital since we last saw you? No  Have you had your routine dental cleaning in the past 6 months? yes -     Have you activated your [employer] account? If not, what are your barriers?  Yes     Patient Care Team:  Cris Millard MD as PCP - General (Family Medicine)  Cris Millard MD as PCP - [facility]  Chrsi Krause MD as Consulting Physician (Ophthalmology)  Yandy Banuelos MD as Consulting Physician (Cardiology)  Kirill Miles as Consulting Physician (Neurology)  Brennen Britton MD as Consulting Physician (Urology)  Adry Avina MD as Consulting Physician (Nephrology)  Jennifer Boone MD as Consulting Physician (Internal Medicine Cardiovascular Disease)  Magdalene Cabrales MD as Consulting Physician (Gastroenterology)  Jeana Lux MD as Consulting Physician (Hematology and Oncology)    Medical History Review  Past Medical, Family, and Social History reviewed and does contribute to the patient presenting condition    Health Maintenance   Topic Date Due    DTaP/Tdap/Td vaccine (1 - Tdap) Never done    Shingles Vaccine (2 of 3) 11/26/2017    Annual Wellness Visit (AWV)  Never done    Flu vaccine (1) 09/01/2021    Lipid screen  03/17/2022    Potassium monitoring  05/26/2022    Creatinine monitoring  05/26/2022    Pneumococcal 65+ years Vaccine  Completed    COVID-19 Vaccine  Completed    Hepatitis A vaccine  Aged Out    Hib vaccine  Aged Out    Meningococcal (ACWY) vaccine  Aged Out

## 2021-08-11 NOTE — PATIENT INSTRUCTIONS
Patient Education        Learning About Carbohydrate (Carb) Counting and Eating Out When You Have Diabetes  Why plan your meals? Meal planning can be a key part of managing diabetes. Planning meals and snacks with the right balance of carbohydrate, protein, and fat can help you keep your blood sugar at the target level you set with your doctor. You don't have to eat special foods. You can eat what your family eats, including sweets once in a while. But you do have to pay attention to how often you eat and how much you eat of certain foods. You may want to work with a dietitian or a certified diabetes educator. He or she can give you tips and meal ideas and can answer your questions about meal planning. This health professional can also help you reach a healthy weight if that is one of your goals. What should you know about eating carbs? Managing the amount of carbohydrate (carbs) you eat is an important part of healthy meals when you have diabetes. Carbohydrate is found in many foods. · Learn which foods have carbs. And learn the amounts of carbs in different foods. ? Bread, cereal, pasta, and rice have about 15 grams of carbs in a serving. A serving is 1 slice of bread (1 ounce), ½ cup of cooked cereal, or 1/3 cup of cooked pasta or rice. ? Fruits have 15 grams of carbs in a serving. A serving is 1 small fresh fruit, such as an apple or orange; ½ of a banana; ½ cup of cooked or canned fruit; ½ cup of fruit juice; 1 cup of melon or raspberries; or 2 tablespoons of dried fruit. ? Milk and no-sugar-added yogurt have 15 grams of carbs in a serving. A serving is 1 cup of milk or 2/3 cup of no-sugar-added yogurt. ? Starchy vegetables have 15 grams of carbs in a serving. A serving is ½ cup of mashed potatoes or sweet potato; 1 cup winter squash; ½ of a small baked potato; ½ cup of cooked beans; or ½ cup cooked corn or green peas.   · Learn how much carbs to eat each day and at each meal. A dietitian or CDE can teach you how to keep track of the amount of carbs you eat. This is called carbohydrate counting. · If you are not sure how to count carbohydrate grams, use the Plate Method to plan meals. It is a good, quick way to make sure that you have a balanced meal. It also helps you spread carbs throughout the day. ? Divide your plate by types of foods. Put non-starchy vegetables on half the plate, meat or other protein food on one-quarter of the plate, and a grain or starchy vegetable in the final quarter of the plate. To this you can add a small piece of fruit and 1 cup of milk or yogurt, depending on how many carbs you are supposed to eat at a meal.  · Try to eat about the same amount of carbs at each meal. Do not \"save up\" your daily allowance of carbs to eat at one meal.  · Proteins have very little or no carbs per serving. Examples of proteins are beef, chicken, turkey, fish, eggs, tofu, cheese, cottage cheese, and peanut butter. A serving size of meat is 3 ounces, which is about the size of a deck of cards. Examples of meat substitute serving sizes (equal to 1 ounce of meat) are 1/4 cup of cottage cheese, 1 egg, 1 tablespoon of peanut butter, and ½ cup of tofu. How can you eat out and still eat healthy? · Learn to estimate the serving sizes of foods that have carbohydrate. If you measure food at home, it will be easier to estimate the amount in a serving of restaurant food. · If the meal you order has too much carbohydrate (such as potatoes, corn, or baked beans), ask to have a low-carbohydrate food instead. Ask for a salad or green vegetables. · If you use insulin, check your blood sugar before and after eating out to help you plan how much to eat in the future. · If you eat more carbohydrate at a meal than you had planned, take a walk or do other exercise. This will help lower your blood sugar. What are some tips for eating healthy? · Limit saturated fat, such as the fat from meat and dairy products.  This is a healthy choice because people who have diabetes are at higher risk of heart disease. So choose lean cuts of meat and nonfat or low-fat dairy products. Use olive or canola oil instead of butter or shortening when cooking. · Don't skip meals. Your blood sugar may drop too low if you skip meals and take insulin or certain medicines for diabetes. · Check with your doctor before you drink alcohol. Alcohol can cause your blood sugar to drop too low. Alcohol can also cause a bad reaction if you take certain diabetes medicines. Follow-up care is a key part of your treatment and safety. Be sure to make and go to all appointments, and call your doctor if you are having problems. It's also a good idea to know your test results and keep a list of the medicines you take. Where can you learn more? Go to https://NeuStringcristineeb.Cohuman. org and sign in to your [employer] account. Enter P176 in the [employer] box to learn more about \"Learning About Carbohydrate (Carb) Counting and Eating Out When You Have Diabetes. \"     If you do not have an account, please click on the \"Sign Up Now\" link. Current as of: August 31, 2020               Content Version: 12.9  © 8661-3947 Healthwise, [employer]. Care instructions adapted under license by [facility] ([facility]). If you have questions about a medical condition or this instruction, always ask your healthcare professional. [address] any warranty or liability for your use of this information.

## 2021-08-11 NOTE — PROGRESS NOTES
Sujey Duval (:  12/10/1933) is a 80 y.o. female,Established patient, here for evaluation of the following chief complaint(s): Diabetes, Hypertension, Hyperlipidemia, and Carpal Tunnel (sx  )      ASSESSMENT/PLAN:    1. Type 2 diabetes mellitus with stage 2 chronic kidney disease, without long-term current use of insulin (HCC)  worsening    A1c worsening , poorly controlled    Lab Results   Component Value Date    LABA1C 8.0 2021    LABA1C 7.4 2021    LABA1C 7.0 2020       -     POCT glycosylated hemoglobin (Hb A1C)  -  start   Semaglutide 3 MG TABS; Take 3 mg by mouth daily 1st step, Disp-30 tablet, R-0Normal  -     CBC; Future  -     Comprehensive Metabolic Panel; Future  -     Magnesium; Future    -advised home blood glucose testing  daily  -daily feet exam, Foot care: advised to wash feet daily, pat dry and apply lotion at night, avoiding between toes. Need to look at feet daily and report to a physician any signs of inflammation or skin damage  -annual dilated eye exam  -Low carb, low fat diet, increase fruits and vegetables, and exercise 4-5 times a day 30-40 minutes a day discussed  -continue Tradjenta 5 mg, glimepiride 4 mg twice a day  -Not on aspirin as she is on chronic anticoagulation  -continue Zetia and Lipitor      2. Benign hypertension with CKD (chronic kidney disease) stage II  Improved chronic kidney disease stage II GFR over 60 on 2021, was 47 on 2021  Well controlled HTN. Continue current treatment. Will recheck labs. -     CBC; Future  -     Comprehensive Metabolic Panel; Future  -     Magnesium; Future  -     Phosphorus; Future  3. Chronic diastolic heart failure (HCC)  Stable  Lab Results   Component Value Date    LVEF 61 2020     Continue amlodipine, metoprolol, furosemide, and potassium  -     CBC; Future  -     Magnesium; Future  -     Brain Natriuretic Peptide; Future  4.  Hyperlipidemia with target LDL less than 70  Well-controlled  Continue Lipitor high intensity and Zetia   Lab Results   Component Value Date    LDLCALC 43 02/22/2019    LDLCHOLESTEROL 31 03/17/2021       5. MGUS (monoclonal gammopathy of unknown significance)  Stable  Follows with hematologist oncologist  We will continue to monitor  -     CBC; Future  6. Need for prophylactic vaccination against diphtheria-tetanus-pertussis (DTP)  -     Tdap (ADACEL) 5-2-15.5 LF-MCG/0.5 injection; Inject 0.5 mLs into the muscle once for 1 dose, Disp-0.5 mL, R-0Normal  7. Need for prophylactic vaccination and inoculation against varicella  -     zoster recombinant adjuvanted vaccine (SHINGRIX) 50 MCG/0.5ML SUSR injection; 50 MCG IM then repeat 2-6 months., Disp-0.5 mL, R-1Normal    8. Carpal tunnel syndrome of left wrist  Worsening  Pending surgery, will need clearance from cardiologist then will clear her    Yvonne received counseling on the following healthy behaviors: nutrition, exercise and medication adherence  Reviewed prior labs and health maintenance  Discussed use, benefit, and side effects of prescribed medications. Barriers to medication compliance addressed. Patient given educational materials - see patient instructions  Was a self-tracking handout given in paper form or via [employer]? Yes  All patient questions answered. Patient voiced understanding. The patient's past medical,surgical, social, and family history as well as her current medications and allergies were reviewed as documented in today's encounter. Medications, labs, diagnostic studies, consultations and follow-up as documented in this encounter. Return for KEEP APPT.     NEEDS PA FOR MOOSE [place]      Future Appointments   Date Time Provider Sherly Pitt   10[facility]7/2021  2:00 PM [facility]   11/17/2021  9:00 AM Maria Esther Salcedo MD [facility]   12/8/2021  1:15 PM Yong Hidalgo MD [facility]         SUBJECTIVE/OBJECTIVE:        Diabetes Mellitus Type II, Follow-up:    Current symptoms/problems include hyperglycemia and visual disturbances. Symptoms have been present for several years. Known diabetic complications: cardiovascular disease, cerebrovascular disease and Chronic kidney disease, carotid artery stenosis  Cardiovascular risk factors: advanced age (older than 54 for men, 72 for women), diabetes mellitus, dyslipidemia, hypertension and obesity (BMI >= 30 kg/m2)    Current diabetic medications include oral agents (dual therapy): glimepiride (Amaryl), Tradjenta  She reports compliance with her medications Tradjenta and amaryl BID, but says she is not always compliant with low-carb diet    Eye exam current (within one year): yes, HAS appointment     Current diet: in general, a \"healthy\" diet      Barriers: impairment:  physical: Advanced age, lack of motivation    Current monitoring regimen: home blood tests - daily  Home blood sugar records: fasting range: 157-200  Any episodes of hypoglycemia? no    Is She on ACE inhibitor or angiotensin II receptor blocker? No      reports that she has never smoked. She has never used smokeless tobacco.     Counseling given: Yes      Daily Aspirin? No: Patient is on chronic anticoagulation, she would be a high risk for bleeding due to her advanced age      A1c is 8 ,worsening  . Lab Results   Component Value Date    LABA1C 8.0 08/11/2021    LABA1C 7.4 03/16/2021    LABA1C 7.0 12/09/2020       Urine microabumin is Elevated. Lab Results   Component Value Date    LABMICR 74 (H) 03/17/2021        Her weight is stable  Wt Readings from Last 3 Encounters:   08/11/21 199 lb 3.2 oz (90.4 kg)   06/02/21 197 lb 12.8 oz (89.7 kg)   04/28/21 202 lb (91.6 kg)     03/16/21 200 lb 3.2 oz (90.8 kg)         Hypertension, chronic kidney disease stage III, paroxysmal ventricular tachycardia, PVCs, Sick Sinus Syndrome, congestive heart failure:   Yvonne  is not  exercising, but staying active and is adherent to low salt diet. Blood pressure is well controlled at home. Cardiac symptoms  fatigue and lower extremity edema. Patient denies  chest pain, chest pressure/discomfort, claudication, dyspnea, exertional chest pressure/discomfort, irregular heart beat, near-syncope, orthopnea, palpitations, paroxysmal nocturnal dyspnea, syncope and tachypnea. Use of agents associated with hypertension: none. History of target organ damage: angina/ prior myocardial infarction, chronic kidney disease, heart failure and prior coronary revascularization. Has history of CABG x3, 4 stents, last one was 1 year ago reason     I advised Michel Krueger to make appointment with cardiology. The patient verbalizes understanding and agrees with the plan. BP controlled. Shanique Quiñonez reports compliance with BP medications, and tolerates them well, denies side effects. BP Readings from Last 3 Encounters:   08/11/21 126/80   06/02/21 (!) 145/77   04/28/21 137/69        Pulse is Normal.    Pulse Readings from Last 3 Encounters:   08/11/21 63   06/02/21 63   04/28/21 73     Reports mild sore throat and  dry coughing since yesterday, relieved with drinking water. Patient says the dry cough was triggered by having her mouth dry at that time but it is already getting better  She says she has no other symptoms       Hyperlipidemia:  No new myalgias or GI upset on atorvastatin (Lipitor) and eztemibe (Zetia). Medication compliance: compliant all of the time. Patient is  following a low fat, low cholesterol diet. LDL is Normal.    Lab Results   Component Value Date    LDLCALC 43 02/22/2019    LDLCHOLESTEROL 31 03/17/2021       Patient says she will have left hand carpal tunnel surgery, she has been suffering with it for many years, has been worsening, with numbness and tingling, and dropping things out of the left hand, the wrist splints not helping anymore.   Will have surgery by Dr. Justin Maria on 8/27/21  I tole her to obtain clearance    Was recently found to tablet Place 1 tablet under the tongue every 5 minutes as needed for Chest pain Yes Ninfa Chambers MD   Biotin 300 MCG TABS Take 600 mcg by mouth every other day  Yes Historical Provider, MD   Cholecalciferol (VITAMIN D3) 1000 UNITS TABS Take 1 tablet by mouth daily Yes Historical Provider, MD   white petrolatum (VASELINE) GEL Daily at bedtime for itching and dry skin  Patient not taking: Reported on 8/11/2021  Carri Sheth MD            Social History     Tobacco Use    Smoking status: Never Smoker    Smokeless tobacco: Never Used   Vaping Use    Vaping Use: Never used   Substance Use Topics    Alcohol use: No    Drug use: No         Review of Systems   Constitutional: Positive for fatigue. Negative for activity change, appetite change, chills, diaphoresis, fever and unexpected weight change. HENT: Positive for sore throat (feels dry). Negative for congestion, postnasal drip, rhinorrhea, sinus pressure and sinus pain. Eyes: Positive for visual disturbance (stable, chronic, wears glasses). Respiratory: Positive for cough (dry). Negative for chest tightness, shortness of breath and wheezing. Cardiovascular: Positive for leg swelling. Negative for chest pain and palpitations. Saw vein specialist for varicose veins and leg edema and she says her leg swelling has improved. Cannot put her compression stockings on. Gastrointestinal: Negative for abdominal distention, abdominal pain, blood in stool, constipation, diarrhea, nausea, rectal pain and vomiting. Endocrine: Negative for cold intolerance, heat intolerance, polydipsia, polyphagia and polyuria. Neurological: Positive for weakness (left hand) and numbness (left fingers). Hematological: Does not bruise/bleed easily. Psychiatric/Behavioral: Positive for sleep disturbance (since her  passed away in 2018). Negative for dysphoric mood.          -vital signs stable and within normal limits except overweight per BMI  /80 Pulse 63   Temp 97.3 °F (36.3 °C) (Temporal)   Ht 5' 9\" (1.753 m)   Wt 199 lb 3.2 oz (90.4 kg)   SpO2 98%   BMI 29.42 kg/m²         Physical Exam  Vitals and nursing note reviewed. Constitutional:       General: She is not in acute distress. Appearance: Normal appearance. She is well-developed. She is not diaphoretic. HENT:      Head: Normocephalic and atraumatic. Right Ear: External ear normal.      Left Ear: External ear normal.      Nose: No mucosal edema. Mouth/Throat:      Comments: I did not examine the mouth due to coronavirus pandemic and wearing masks    Eyes:      General: Lids are normal. No scleral icterus. Right eye: No discharge. Left eye: No discharge. Extraocular Movements: Extraocular movements intact. Conjunctiva/sclera: Conjunctivae normal.   Neck:      Thyroid: No thyromegaly. Cardiovascular:      Rate and Rhythm: Normal rate and regular rhythm. Heart sounds: Murmur heard. Crescendo systolic murmur is present with a grade of 3/6. Comments: Varicose veins bilateral legs  Pulmonary:      Effort: Pulmonary effort is normal. No respiratory distress. Breath sounds: Normal breath sounds. No wheezing or rales. Chest:      Chest wall: No tenderness. Abdominal:      General: Bowel sounds are normal. There is no distension. Palpations: Abdomen is soft. There is no hepatomegaly or splenomegaly. Tenderness: There is no abdominal tenderness. Musculoskeletal:         General: No tenderness. Normal range of motion. Cervical back: Normal range of motion and neck supple. Right lower le+ Pitting Edema present. Left lower le+ Pitting Edema present. Comments: Mild thoracic kyphosis   Skin:     General: Skin is warm and dry. Capillary Refill: Capillary refill takes less than 2 seconds. Findings: No rash. Neurological:      Mental Status: She is alert and oriented to person, place, and time. Cranial Nerves: No cranial nerve deficit. Motor: No abnormal muscle tone. Comments: Tinel and Phalen positive on the left wrist.  Mild left thenar muscle atrophy   Psychiatric:         Mood and Affect: Mood normal.         Behavior: Behavior normal.         Thought Content: Thought content normal.         Judgment: Judgment normal.           I personally reviewed testing with patient. Hyperglycemia  MGUS noted  High triglycerides    Otherwise labs within normal limits    Hospital Outpatient Visit on 05/26/2021   Component Date Value Ref Range Status    Iron 05/26/2021 111  37 - 145 ug/dL Final    TIBC 05/26/2021 327  250 - 450 ug/dL Final    Iron Saturation 05/26/2021 34  20 - 55 % Final    UIBC 05/26/2021 216  112 - 347 ug/dL Final    Vitamin B-12 05/26/2021 825  232 - 1245 pg/mL Final    Folate 05/26/2021 12.7  >4.8 ng/mL Final    Retic % 05/26/2021 1.3  0.5 - 2.0 % Final    Absolute Retic # 05/26/2021 0.062  0.0245 - 0.098 M/uL Final    Immature Retic Fract 05/26/2021 NOT REPORTED  % Final    Retic Hemoglobin 05/26/2021 NOT REPORTED  28.2 - 35.7 pg Final    Serum IFX Interp 05/26/2021 A ZONE OF RESTRICTION IS PRESENT IN THE GAMMAGLOBULIN REGION. CONFIRMED BY IMMUNOFIXATION TO BE MONOCLONAL   Final    IGG KAPPA (0.10 G/DL).  Pathologist 05/26/2021 ELECTRONICALLY SIGNED. Shanna Perez M.D.    Final    Glucose 05/26/2021 177* 70 - 99 mg/dL Final    BUN 05/26/2021 19  8 - 23 mg/dL Final    CREATININE 05/26/2021 0.82  0.50 - 0.90 mg/dL Final    Bun/Cre Ratio 05/26/2021 NOT REPORTED  9 - 20 Final    Calcium 05/26/2021 9.4  8.6 - 10.4 mg/dL Final    Sodium 05/26/2021 138  135 - 144 mmol/L Final    Potassium 05/26/2021 4.5  3.7 - 5.3 mmol/L Final    Chloride 05/26/2021 100  98 - 107 mmol/L Final    CO2 05/26/2021 30  20 - 31 mmol/L Final    Anion Gap 05/26/2021 8* 9 - 17 mmol/L Final    Alkaline Phosphatase 05/26/2021 49  35 - 104 U/L Final    ALT 05/26/2021 25  5 - 33 U/L Final    AST 05/26/2021 27  <32 U/L Final    Total Bilirubin 05/26/2021 1.12  0.3 - 1.2 mg/dL Final    Total Protein 05/26/2021 7.1  6.4 - 8.3 g/dL Final    Albumin 05/26/2021 4.3  3.5 - 5.2 g/dL Final    Albumin/Globulin Ratio 05/26/2021 NOT REPORTED  1.0 - 2.5 Final    GFR Non- 05/26/2021 >60  >60 mL/min Final    GFR  05/26/2021 >60  >60 mL/min Final    GFR Comment 05/26/2021        Final    Comment: Average GFR for 79or more years old:   76 mL/min/1.73sq m  Chronic Kidney Disease:   <60 mL/min/1.73sq m  Kidney failure:   <15 mL/min/1.73sq m              eGFR calculated using average adult body mass.  Additional eGFR calculator available at:        [employer]            GFR Staging 05/26/2021 NOT REPORTED   Final    WBC 05/26/2021 8.6  3.5 - 11.0 k/uL Final    RBC 05/26/2021 4.74  4.0 - 5.2 m/uL Final    Hemoglobin 05/26/2021 12.7  12.0 - 16.0 g/dL Final    Hematocrit 05/26/2021 39.0  36 - 46 % Final    MCV 05/26/2021 82.2  80 - 100 fL Final    MCH 05/26/2021 26.9  26 - 34 pg Final    MCHC 05/26/2021 32.7  31 - 37 g/dL Final    RDW 05/26/2021 25.7* 11.5 - 14.9 % Final    Platelets 79/70/1151 210  150 - 450 k/uL Final    MPV 05/26/2021 8.3  6.0 - 12.0 fL Final    NRBC Automated 05/26/2021 NOT REPORTED  per 100 WBC Final    Differential Type 05/26/2021 NOT REPORTED   Final    Immature Granulocytes 05/26/2021 NOT REPORTED  0 % Final    Absolute Immature Granulocyte 05/26/2021 NOT REPORTED  0.00 - 0.30 k/uL Final    WBC Morphology 05/26/2021 NOT REPORTED   Final    RBC Morphology 05/26/2021 NOT REPORTED   Final    Platelet Estimate 02/09/3523 NOT REPORTED   Final    Seg Neutrophils 05/26/2021 61  36 - 66 % Final    Lymphocytes 05/26/2021 27  24 - 44 % Final    Monocytes 05/26/2021 9* 1 - 7 % Final    Eosinophils % 05/26/2021 2  0 - 4 % Final    Basophils 05/26/2021 1  0 - 2 % Final    Segs Absolute 05/26/2021 5.25  1.3 - 9.1 k/uL Final    Absolute Lymph # 05/26/2021 2.32  1.0 - 4.8 k/uL Final    Absolute Mono # 05/26/2021 0.77  0.1 - 1.3 k/uL Final    Absolute Eos # 05/26/2021 0.17  0.0 - 0.4 k/uL Final    Basophils Absolute 05/26/2021 0.09  0.0 - 0.2 k/uL Final    Morphology 05/26/2021 ANISOCYTOSIS PRESENT   Final    Morphology 05/26/2021 1+ TEARDROPS   Final    Kappa Free Light Chains QNT 05/26/2021 2.22* 0.37 - 1.94 mg/dL Final    Lambda Free Light Chains QNT 05/26/2021 1.29  0.57 - 2.63 mg/dL Final    Free Kappa/Lambda Ratio 05/26/2021 1.72* 0.26 - 1.65 Final    Total Protein 05/26/2021 6.7  6.4 - 8.3 g/dL Final    Albumin (calculated) 05/26/2021 4.4  3.2 - 5.2 g/dL Final    Albumin % 05/26/2021 65  45 - 65 % Final    Alpha-1-Globulin 05/26/2021 0.2  0.1 - 0.4 g/dL Final    Alpha 1 % 05/26/2021 3  3 - 6 % Final    Alpha-2-Globulin 05/26/2021 0.7  0.5 - 0.9 g/dL Final    Alpha 2 % 05/26/2021 11  6 - 13 % Final    Beta Globulin 05/26/2021 0.7  0.5 - 1.1 g/dL Final    Beta Percent 05/26/2021 10* 11 - 19 % Final    Gamma Globulin 05/26/2021 0.8  0.5 - 1.5 g/dL Final    Gamma Globulin % 05/26/2021 12  9 - 20 % Final    Total Prot. Sum 05/26/2021 6.8  6.3 - 8.2 g/dL Final    Total Prot. Sum,% 05/26/2021 101  98 - 102 % Final    Protein Electrophoresis, Serum 05/26/2021 A ZONE OF RESTRICTION IS PRESENT IN THE GAMMAGLOBULIN REGION. CONFIRMED BY IMMUNOFIXATION TO BE MONOCLONAL   Final    IGG KAPPA (0.10 G/DL).  Pathologist 05/26/2021 ELECTRONICALLY SIGNED. Nandini Lambert M.D.    Final    Ferritin 05/26/2021 56  13 - 150 ug/L Final           Lab Results   Component Value Date     05/26/2021    K 4.5 05/26/2021     05/26/2021    CO2 30 05/26/2021    BUN 19 05/26/2021    CREATININE 0.82 05/26/2021    GLUCOSE 177 05/26/2021    GLUCOSE 170 02/14/2020    CALCIUM 9.4 05/26/2021        Lab Results   Component Value Date    ALT 25 05/26/2021    AST 27 05/26/2021    ALKPHOS 49 05/26/2021    BILITOT 1.12 glycosylated hemoglobin (Hb A1C)       Medications Discontinued During This Encounter   Medication Reason    white petrolatum (VASELINE) GEL Therapy completed         On this date 8/11/2021 I have spent  35 minutes reviewing previous notes, test results and face to face with the patient discussing the diagnosis and importance of compliance with the treatment plan as well as documenting on the day of the visit. This note was completed by using the assistance of a speech-recognition program. However, inadvertent computerized transcription errors may be present. Although every effort was made to ensure accuracy, no guarantees can be provided that every mistake has been identified and corrected by editing . An electronic signature was used to authenticate this note.   Electronically signed by Cris Millard MD on 8/16/2021 at 6:33 PM

## 2021-08-12 ENCOUNTER — HOSPITAL ENCOUNTER (OUTPATIENT)
Age: 86
Discharge: HOME | End: 2021-08-12
Payer: MEDICARE

## 2021-08-12 DIAGNOSIS — E11.22 TYPE 2 DIABETES MELLITUS WITH STAGE 3A CHRONIC KIDNEY DISEASE, WITHOUT LONG-TERM CURRENT USE OF INSULIN (HCC): ICD-10-CM

## 2021-08-12 DIAGNOSIS — N18.31 TYPE 2 DIABETES MELLITUS WITH STAGE 3A CHRONIC KIDNEY DISEASE, WITHOUT LONG-TERM CURRENT USE OF INSULIN (HCC): ICD-10-CM

## 2021-08-12 DIAGNOSIS — D47.2 MGUS (MONOCLONAL GAMMOPATHY OF UNKNOWN SIGNIFICANCE): ICD-10-CM

## 2021-08-12 DIAGNOSIS — I50.32 CHRONIC DIASTOLIC HEART FAILURE (HCC): ICD-10-CM

## 2021-08-12 DIAGNOSIS — N18.30 BENIGN HYPERTENSION WITH CKD (CHRONIC KIDNEY DISEASE) STAGE III (HCC): ICD-10-CM

## 2021-08-12 DIAGNOSIS — I12.9 BENIGN HYPERTENSION WITH CKD (CHRONIC KIDNEY DISEASE) STAGE III (HCC): ICD-10-CM

## 2021-08-12 LAB
ALBUMIN SERPL-MCNC: 4.3 G/DL (ref 3.5–5.2)
ALBUMIN/GLOBULIN RATIO: ABNORMAL (ref 1–2.5)
ALP BLD-CCNC: 57 U/L (ref 35–104)
ALT SERPL-CCNC: 29 U/L (ref 5–33)
ANION GAP SERPL CALCULATED.3IONS-SCNC: 11 MMOL/L (ref 9–17)
AST SERPL-CCNC: 30 U/L
BILIRUB SERPL-MCNC: 1.51 MG/DL (ref 0.3–1.2)
BNP INTERPRETATION: NORMAL
BUN BLDV-MCNC: 17 MG/DL (ref 8–23)
BUN/CREAT BLD: ABNORMAL (ref 9–20)
CALCIUM SERPL-MCNC: 9.2 MG/DL (ref 8.6–10.4)
CHLORIDE BLD-SCNC: 100 MMOL/L (ref 98–107)
CO2: 26 MMOL/L (ref 20–31)
CREAT SERPL-MCNC: 0.71 MG/DL (ref 0.5–0.9)
GFR AFRICAN AMERICAN: >60 ML/MIN
GFR NON-AFRICAN AMERICAN: >60 ML/MIN
GFR SERPL CREATININE-BSD FRML MDRD: ABNORMAL ML/MIN/{1.73_M2}
GFR SERPL CREATININE-BSD FRML MDRD: ABNORMAL ML/MIN/{1.73_M2}
GLUCOSE BLD-MCNC: 226 MG/DL (ref 70–99)
HCT VFR BLD CALC: 41.5 % (ref 36–46)
HEMOGLOBIN: 13.7 G/DL (ref 12–16)
MAGNESIUM: 1.9 MG/DL (ref 1.6–2.6)
MCH RBC QN AUTO: 30.2 PG (ref 26–34)
MCHC RBC AUTO-ENTMCNC: 32.9 G/DL (ref 31–37)
MCV RBC AUTO: 91.7 FL (ref 80–100)
NRBC AUTOMATED: ABNORMAL PER 100 WBC
PDW BLD-RTO: 15.4 % (ref 11.5–14.9)
PHOSPHORUS: 2.9 MG/DL (ref 2.6–4.5)
PLATELET # BLD: 200 K/UL (ref 150–450)
PMV BLD AUTO: 8.1 FL (ref 6–12)
POTASSIUM SERPL-SCNC: 4.9 MMOL/L (ref 3.7–5.3)
PRO-BNP: 78 PG/ML
RBC # BLD: 4.52 M/UL (ref 4–5.2)
SODIUM BLD-SCNC: 137 MMOL/L (ref 135–144)
TOTAL PROTEIN: 7.3 G/DL (ref 6.4–8.3)
WBC # BLD: 8.7 K/UL (ref 3.5–11)

## 2021-08-12 PROCEDURE — 83735 ASSAY OF MAGNESIUM: CPT

## 2021-08-12 PROCEDURE — 80053 COMPREHEN METABOLIC PANEL: CPT

## 2021-08-12 PROCEDURE — 84100 ASSAY OF PHOSPHORUS: CPT

## 2021-08-12 PROCEDURE — 85027 COMPLETE CBC AUTOMATED: CPT

## 2021-08-12 PROCEDURE — 36415 COLL VENOUS BLD VENIPUNCTURE: CPT

## 2021-08-12 PROCEDURE — 83880 ASSAY OF NATRIURETIC PEPTIDE: CPT

## 2021-08-12 NOTE — RESULT ENCOUNTER NOTE
ABNORMAL. Please notify patient. Very high blood glucose 226  All other labs within normal limits    To cut down the sweets  WAS SHE ABLE TO RECEIVE semaglutide 3 mg/oral form for Ozempic which she wanted?   Might need prior authorization      Future Appointments  10/7/2021  2:00 PM    Harvey Martin MD            [facility]  11/17/2021 9:00 AM    Annette Chavarria MD     [facility]  12/8/2021  1:15 PM    Naaman Duane, MD          [place] [phone]

## 2021-08-13 NOTE — RESULT ENCOUNTER NOTE
Noted  Future Appointments  10/7/2021  2:00 PM    Harvey Martin MD            [facility]  11/17/2021 9:00 AM    Jonh Arreguin MD     [facility]  12/8/2021  1:15 PM    Anabelle Herbert MD          [place] [id number]

## 2021-08-16 PROBLEM — G56.02 CARPAL TUNNEL SYNDROME OF LEFT WRIST: Status: ACTIVE | Noted: 2020-05-25

## 2021-08-16 ASSESSMENT — ENCOUNTER SYMPTOMS
RHINORRHEA: 0
COUGH: 1
SINUS PAIN: 0
SINUS PRESSURE: 0
SORE THROAT: 1

## 2021-08-30 ENCOUNTER — TELEPHONE (OUTPATIENT)
Dept: FAMILY MEDICINE CLINIC | Age: 86
End: 2021-08-30

## 2021-08-30 ENCOUNTER — NURSE TRIAGE (OUTPATIENT)
Dept: OTHER | Facility: CLINIC | Age: 86
End: 2021-08-30

## 2021-08-30 ENCOUNTER — APPOINTMENT (OUTPATIENT)
Dept: CT IMAGING | Age: 86
End: 2021-08-30
Payer: MEDICARE

## 2021-08-30 ENCOUNTER — APPOINTMENT (OUTPATIENT)
Dept: GENERAL RADIOLOGY | Age: 86
End: 2021-08-30
Payer: MEDICARE

## 2021-08-30 ENCOUNTER — HOSPITAL ENCOUNTER (EMERGENCY)
Age: 86
Discharge: HOME | End: 2021-08-30
Attending: EMERGENCY MEDICINE
Payer: MEDICARE

## 2021-08-30 VITALS
BODY MASS INDEX: 29.62 KG/M2 | WEIGHT: 200 LBS | DIASTOLIC BLOOD PRESSURE: 57 MMHG | SYSTOLIC BLOOD PRESSURE: 134 MMHG | HEART RATE: 66 BPM | TEMPERATURE: 98.3 F | HEIGHT: 69 IN | RESPIRATION RATE: 16 BRPM | OXYGEN SATURATION: 96 %

## 2021-08-30 DIAGNOSIS — R47.9 SPEECH DISTURBANCE, UNSPECIFIED TYPE: Primary | ICD-10-CM

## 2021-08-30 LAB
-: NORMAL
ABSOLUTE EOS #: 0.2 K/UL (ref 0–0.4)
ABSOLUTE IMMATURE GRANULOCYTE: ABNORMAL K/UL (ref 0–0.3)
ABSOLUTE LYMPH #: 2.4 K/UL (ref 1–4.8)
ABSOLUTE MONO #: 0.8 K/UL (ref 0.1–1.3)
ANION GAP SERPL CALCULATED.3IONS-SCNC: 11 MMOL/L (ref 9–17)
BASOPHILS # BLD: 1 % (ref 0–2)
BASOPHILS ABSOLUTE: 0.1 K/UL (ref 0–0.2)
BUN BLDV-MCNC: 17 MG/DL (ref 8–23)
BUN/CREAT BLD: ABNORMAL (ref 9–20)
CALCIUM SERPL-MCNC: 9.5 MG/DL (ref 8.6–10.4)
CHLORIDE BLD-SCNC: 100 MMOL/L (ref 98–107)
CO2: 25 MMOL/L (ref 20–31)
CREAT SERPL-MCNC: 0.72 MG/DL (ref 0.5–0.9)
DIFFERENTIAL TYPE: ABNORMAL
EOSINOPHILS RELATIVE PERCENT: 2 % (ref 0–4)
GFR AFRICAN AMERICAN: >60 ML/MIN
GFR NON-AFRICAN AMERICAN: >60 ML/MIN
GFR SERPL CREATININE-BSD FRML MDRD: ABNORMAL ML/MIN/{1.73_M2}
GFR SERPL CREATININE-BSD FRML MDRD: ABNORMAL ML/MIN/{1.73_M2}
GLUCOSE BLD-MCNC: 162 MG/DL (ref 65–105)
GLUCOSE BLD-MCNC: 164 MG/DL (ref 70–99)
HCT VFR BLD CALC: 38.9 % (ref 36–46)
HEMOGLOBIN: 13.3 G/DL (ref 12–16)
IMMATURE GRANULOCYTES: ABNORMAL %
INR BLD: 2
LYMPHOCYTES # BLD: 28 % (ref 24–44)
MCH RBC QN AUTO: 31 PG (ref 26–34)
MCHC RBC AUTO-ENTMCNC: 34.2 G/DL (ref 31–37)
MCV RBC AUTO: 90.5 FL (ref 80–100)
MONOCYTES # BLD: 9 % (ref 1–7)
NRBC AUTOMATED: ABNORMAL PER 100 WBC
PARTIAL THROMBOPLASTIN TIME: 38.9 SEC (ref 24–36)
PDW BLD-RTO: 14.8 % (ref 11.5–14.9)
PLATELET # BLD: 212 K/UL (ref 150–450)
PLATELET ESTIMATE: ABNORMAL
PMV BLD AUTO: 8.5 FL (ref 6–12)
POTASSIUM SERPL-SCNC: 4.2 MMOL/L (ref 3.7–5.3)
PROTHROMBIN TIME: 22.4 SEC (ref 11.8–14.6)
RBC # BLD: 4.3 M/UL (ref 4–5.2)
RBC # BLD: ABNORMAL 10*6/UL
REASON FOR REJECTION: NORMAL
SEG NEUTROPHILS: 60 % (ref 36–66)
SEGMENTED NEUTROPHILS ABSOLUTE COUNT: 5.4 K/UL (ref 1.3–9.1)
SODIUM BLD-SCNC: 136 MMOL/L (ref 135–144)
TROPONIN INTERP: NORMAL
TROPONIN INTERP: NORMAL
TROPONIN T: NORMAL NG/ML
TROPONIN T: NORMAL NG/ML
TROPONIN, HIGH SENSITIVITY: 14 NG/L (ref 0–14)
TROPONIN, HIGH SENSITIVITY: 14 NG/L (ref 0–14)
WBC # BLD: 8.8 K/UL (ref 3.5–11)
WBC # BLD: ABNORMAL 10*3/UL
ZZ NTE CLEAN UP: ORDERED TEST: NORMAL
ZZ NTE WITH NAME CLEAN UP: SPECIMEN SOURCE: NORMAL

## 2021-08-30 PROCEDURE — 85025 COMPLETE CBC W/AUTO DIFF WBC: CPT

## 2021-08-30 PROCEDURE — 36415 COLL VENOUS BLD VENIPUNCTURE: CPT

## 2021-08-30 PROCEDURE — 6360000004 HC RX CONTRAST MEDICATION: Performed by: EMERGENCY MEDICINE

## 2021-08-30 PROCEDURE — 85730 THROMBOPLASTIN TIME PARTIAL: CPT

## 2021-08-30 PROCEDURE — 85610 PROTHROMBIN TIME: CPT

## 2021-08-30 PROCEDURE — 70498 CT ANGIOGRAPHY NECK: CPT

## 2021-08-30 PROCEDURE — 80048 BASIC METABOLIC PNL TOTAL CA: CPT

## 2021-08-30 PROCEDURE — 71045 X-RAY EXAM CHEST 1 VIEW: CPT

## 2021-08-30 PROCEDURE — 99283 EMERGENCY DEPT VISIT LOW MDM: CPT

## 2021-08-30 PROCEDURE — 93005 ELECTROCARDIOGRAM TRACING: CPT | Performed by: EMERGENCY MEDICINE

## 2021-08-30 PROCEDURE — 84484 ASSAY OF TROPONIN QUANT: CPT

## 2021-08-30 PROCEDURE — 82947 ASSAY GLUCOSE BLOOD QUANT: CPT

## 2021-08-30 PROCEDURE — 2580000003 HC RX 258: Performed by: EMERGENCY MEDICINE

## 2021-08-30 PROCEDURE — 70450 CT HEAD/BRAIN W/O DYE: CPT

## 2021-08-30 RX ORDER — SODIUM CHLORIDE 0.9 % (FLUSH) 0.9 %
10 SYRINGE (ML) INJECTION PRN
Status: DISCONTINUED | OUTPATIENT
Start: 2021-08-30 | End: 2021-08-30 | Stop reason: HOSPADM

## 2021-08-30 RX ORDER — 0.9 % SODIUM CHLORIDE 0.9 %
80 INTRAVENOUS SOLUTION INTRAVENOUS ONCE
Status: COMPLETED | OUTPATIENT
Start: 2021-08-30 | End: 2021-08-30

## 2021-08-30 RX ORDER — FERROUS SULFATE 325(65) MG
325 TABLET ORAL EVERY OTHER DAY
COMMUNITY
End: 2021-12-17 | Stop reason: SDUPTHER

## 2021-08-30 RX ADMIN — IOPAMIDOL 75 ML: 755 INJECTION, SOLUTION INTRAVENOUS at 16:02

## 2021-08-30 RX ADMIN — SODIUM CHLORIDE 80 ML: 9 INJECTION, SOLUTION INTRAVENOUS at 16:03

## 2021-08-30 RX ADMIN — SODIUM CHLORIDE, PRESERVATIVE FREE 10 ML: 5 INJECTION INTRAVENOUS at 16:03

## 2021-08-30 ASSESSMENT — ENCOUNTER SYMPTOMS
ABDOMINAL PAIN: 0
VOMITING: 0
SHORTNESS OF BREATH: 0
BACK PAIN: 0
BLOOD IN STOOL: 0
TROUBLE SWALLOWING: 0
COLOR CHANGE: 0
SORE THROAT: 0
COUGH: 0
NAUSEA: 0
CONSTIPATION: 0
DIARRHEA: 0

## 2021-08-30 ASSESSMENT — PAIN DESCRIPTION - LOCATION: LOCATION: HAND

## 2021-08-30 ASSESSMENT — PAIN DESCRIPTION - ORIENTATION: ORIENTATION: LEFT

## 2021-08-30 ASSESSMENT — PAIN SCALES - GENERAL: PAINLEVEL_OUTOF10: 2

## 2021-08-30 ASSESSMENT — PAIN DESCRIPTION - PAIN TYPE: TYPE: ACUTE PAIN

## 2021-08-30 NOTE — ED PROVIDER NOTES
Psychiatric/Behavioral: Negative for confusion. All other systems reviewed and are negative. Negative in 10 essential Systems except as mentioned above and in the HPI. PAST MEDICAL HISTORY     Past Medical History:   Diagnosis Date    Arthritis     Basal cell carcinoma of nose     Bronchitis     HX. OF     CAD (coronary artery disease)     Carpal tunnel syndrome     left hand    Chronic diastolic heart failure ([address] 3/25/2021    Colon cancer ([address]     Diabetes mellitus ([address]     DVT (deep venous thrombosis) ([address] 7/10/2019    Gout     Hematuria     History of coronary artery bypass graft x 3 7/2/2020    Hx of blood clots     ED. LEGS, ED. 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46    Ophthalmoplegic migraine headache     Osteoarthritis     Other pulmonary embolism without acute cor pulmonale ([address] 4/30/2013    Personal history of colon cancer 4/12/2021    2006    S/P coronary artery stent placement X 3 6/29/2020    TIA (transient ischemic attack)     Uterine cancer ([address]     Wears glasses          SURGICAL HISTORY      has a past surgical history that includes Cholecystectomy; Appendectomy; Hysterectomy; Coronary artery bypass graft; Lithotripsy; Carpal tunnel release (Right); Finger trigger release (Right); Colon surgery; Dilatation, esophagus; skin biopsy; angioplasty; Carotid endarterectomy (Left, 3-3-16); Colonoscopy; cyst removal (10/07/2016); Breast surgery; joint replacement (Left, 03/29/2010); joint replacement (Right, 02/16/1999); Cardiac surgery; vascular surgery; Cystoscopy (Bilateral, 1/21/2020); Upper gastrointestinal endoscopy (N/A, 3/19/2021); and Colonoscopy (N/A, 3/19/2021).       CURRENT MEDICATIONS       Current Discharge Medication List      CONTINUE these medications which have NOT CHANGED    Details   ferrous sulfate (IRON 325) 325 (65 Fe) MG tablet Take 325 mg by mouth every other day      traMADol-acetaminophen (ULTRACET) 37.5-325 MG per tablet Take 1 tablet by mouth every 8 hours as needed for Pain.       Semaglutide 3 MG TABS Take 3 mg by mouth daily 1st step  Qty: 30 tablet, Refills: 0    Associated Diagnoses: Type 2 diabetes mellitus with stage 2 chronic kidney disease, without long-term current use of insulin ([facility])      ezetimibe (ZETIA) 10 MG tablet TAKE 1 TABLET NIGHTLY  Qty: 90 tablet, Refills: 3    Associated Diagnoses: Hyperlipidemia with target LDL less than 100      amLODIPine (NORVASC) 2.5 MG tablet TAKE 1 TABLET DAILY  Qty: 90 tablet, Refills: 3    Associated Diagnoses: Benign hypertension with CKD (chronic kidney disease) stage III ([facility])      allopurinol (ZYLOPRIM) 100 MG tablet TAKE 1 TABLET TWICE A DAY  Qty: 180 tablet, Refills: 3    Associated Diagnoses: 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, unspecified laterality      metoprolol tartrate (LOPRESSOR) 25 MG tablet TAKE 1 TABLET TWICE A DAY  Qty: 180 tablet, Refills: 3      famotidine (PEPCID) 40 MG tablet Take 1 tablet by mouth every evening  Qty: 90 tablet, Refills: 3      TRADJENTA 5 MG tablet TAKE 1 TABLET DAILY  Qty: 90 tablet, Refills: 3    Associated Diagnoses: Type 2 diabetes mellitus with stage 3 chronic kidney disease, without long-term current use of insulin ([facility])      furosemide (LASIX) 40 MG tablet TAKE 1 TABLET DAILY  Qty: 90 tablet, Refills: 3    Associated Diagnoses: Benign hypertension with CKD (chronic kidney disease) stage III ([facility])      glimepiride (AMARYL) 4 MG tablet TAKE 1 TABLET TWICE A DAY  Qty: 180 tablet, Refills: 3    Associated Diagnoses: Type 2 diabetes mellitus with stage 3 chronic kidney disease, without long-term current use of insulin ([facility])      ascorbic acid (V-R VITAMIN C) 250 MG tablet Take 1 tablet by mouth 2 times daily  Qty: 60 tablet, Refills: 3      cyanocobalamin ([employer] VITAMIN B12) 1000 MCG tablet Take 1 tablet by mouth daily  Qty: 30 tablet, Refills: 0    Associated Diagnoses: Vitamin B 12 deficiency      potassium chloride (KLOR-CON M) 20 MEQ extended release tablet Take 1 tablet by mouth daily  Qty: 90 tablet, Refills: 3    Associated Diagnoses: Benign hypertension with CKD (chronic kidney disease) stage III ([facility])      rivaroxaban (XARELTO) 20 MG TABS tablet Take 1 tablet by mouth daily (with breakfast) TAKE 1 TABLET DAILY WITH BREAKFAST  Qty: 90 tablet, Refills: 3    Associated Diagnoses: Chronic anticoagulation      Biotin 300 MCG TABS Take 600 mcg by mouth every other day       Cholecalciferol (VITAMIN D3) 1000 UNITS TABS Take 1 tablet by mouth daily      zoster recombinant adjuvanted vaccine (SHINGRIX) 50 MCG/0.5ML SUSR injection 50 MCG IM then repeat 2-6 months. Qty: 0.5 mL, Refills: 1    Associated Diagnoses: Need for prophylactic vaccination and inoculation against varicella      atorvastatin (LIPITOR) 80 MG tablet TAKE 1 TABLET NIGHTLY  Qty: 90 tablet, Refills: 3    Associated Diagnoses: Hyperlipidemia with target LDL less than 70      blood glucose monitor strips Testing once a day. Please dispense according to patients insurance.   Qty: 300 strip, Refills: 3    Associated Diagnoses: Type 2 diabetes mellitus with stage 3 chronic kidney disease, without long-term current use of insulin ([facility])      FREESTYLE LANCETS MISC USE TO TEST BLOOD SUGAR TWICE A DAY  Qty: 200 each, Refills: 1    Associated Diagnoses: Type 2 diabetes mellitus without complication, without long-term current use of insulin ([facility])      Blood Glucose Monitoring Suppl (FREESTYLE LITE) SARY use as directed  Refills: 0      nitroGLYCERIN (NITROSTAT) 0.4 MG SL tablet Place 1 tablet under the tongue every 5 minutes as needed for Chest pain  Qty: 25 tablet, Refills: 1             ALLERGIES     is allergic to brilinta [ticagrelor], ampicillin, clopidogrel bisulfate, diovan [valsartan], lantus [insulin glargine], and metformin and related. FAMILY HISTORY     She indicated that her mother is . She indicated that her father is . She indicated that the status of her brother is unknown.     family history includes Alcohol Abuse in her sister; Cancer in her sister; Heart Disease in her father; Hypertension in her mother; Parkinsonism in her brother; Stroke in her mother and sister. SOCIAL HISTORY      reports that she has never smoked. She has never used smokeless tobacco. She reports that she does not drink alcohol and does not use drugs. PHYSICAL EXAM     INITIAL VITALS:  height is 5' 9\" (1.753 m) and weight is 200 lb (90.7 kg). Her temperature is 98.3 °F (36.8 °C). Her blood pressure is 137/58 (abnormal) and her pulse is 64. Her respiration is 17 and oxygen saturation is 95%. Physical Exam  Vitals and nursing note reviewed. Constitutional:       General: She is not in acute distress. HENT:      Head: Normocephalic and atraumatic. Eyes:      Conjunctiva/sclera: Conjunctivae normal.      Pupils: Pupils are equal, round, and reactive to light. Cardiovascular:      Rate and Rhythm: Normal rate and regular rhythm. Heart sounds: Normal heart sounds. No murmur heard. Pulmonary:      Effort: Pulmonary effort is normal. No respiratory distress. Breath sounds: Normal breath sounds. Abdominal:      General: Bowel sounds are normal. There is no distension. Palpations: Abdomen is soft. Tenderness: There is no abdominal tenderness. Musculoskeletal:         General: No tenderness. Cervical back: Neck supple. Lymphadenopathy:      Cervical: No cervical adenopathy. Skin:     General: Skin is warm and dry. Findings: No rash. Neurological:      General: No focal deficit present. Mental Status: She is alert and oriented to person, place, and time. Cranial Nerves: No cranial nerve deficit. Sensory: No sensory deficit.    Psychiatric:         Judgment: Judgment normal. DIFFERENTIAL DIAGNOSIS/MDM:   79-year-old female presents with episode of garbled speech yesterday around 5 PM last until around 5:30 PM.    Currently she is afebrile, nontoxic, normal vital signs. No acute distress. It is possible that patient had a TIA. CVA felt to be less likely with her symptoms totally resolved. Get cardiac work-up. Will get CT head, CTA head and neck. DIAGNOSTIC RESULTS     EKG: All EKG's are interpreted by the Emergency Department Physician who either signs or Co-signs this chart in the absence of a cardiologist.    EKG Interpretation    Interpreted by me    Rhythm: normal sinus   Rate: normal  Axis: normal  Ectopy: none  Conduction: normal  ST Segments: no acute change  T Waves: Inversion aVL  Q Waves: none    Clinical Impression: Nonspecific EKG    RADIOLOGY:   I directly visualized the following  images and reviewed the radiologist interpretations:  CTA HEAD NECK W CONTRAST   Final Result   Addendum 1 of 1   ADDENDUM:   Three-dimensional image post processing was performed at a remote    workstation. Final   Mild plaque formation within the bilateral carotid bulb regions, without   hemodynamically significant carotid stenosis. Otherwise, unremarkable CT angiogram of the head and neck, with no evidence   of vessel dissection or irregularity, or proximal intracranial arterial   occlusion. Comment:      Reference per NASCET criteria for degree of stenosis:  Mild: Less than 50%   stenosis. Moderate: 50-69% stenosis. Severe: 70-94% stenosis. Near-occlusion: 95-99% stenosis. CT HEAD WO CONTRAST   Final Result   No evidence of acute intracranial process. Mild atrophy and chronic small   vessel ischemic changes noted. XR CHEST PORTABLE   Final Result   No acute airspace disease identified.                  ED BEDSIDE ULTRASOUND:      LABS:  Labs Reviewed   CBC WITH AUTO DIFFERENTIAL - Abnormal; Notable for the following components: Result Value    Monocytes 9 (*)     All other components within normal limits   PROTIME-INR - Abnormal; Notable for the following components:    Protime 22.4 (*)     All other components within normal limits   APTT - Abnormal; Notable for the following components:    PTT 38.9 (*)     All other components within normal limits   BASIC METABOLIC PANEL - Abnormal; Notable for the following components:    Glucose 164 (*)     All other components within normal limits   POC GLUCOSE FINGERSTICK - Abnormal; Notable for the following components:    POC Glucose 162 (*)     All other components within normal limits   TROPONIN   TROPONIN   SPECIMEN REJECTION         EMERGENCY DEPARTMENT COURSE:   Vitals:    Vitals:    08/30/21 1419   BP: (!) 137/58   Pulse: 64   Resp: 17   Temp: 98.3 °F (36.8 °C)   SpO2: 95%   Weight: 200 lb (90.7 kg)   Height: 5' 9\" (1.753 m)     5:28 PM EDT  Work appears mostly unremarkable. She did have a T wave inversion in lead aVL on her EKG however this is seen on prior EKG in March 2021.  2 troponins are negative. Her CT head is unremarkable. She does have at least some degree of carotid stenosis however there there are no high-grade areas of occlusion. She still is asymptomatic here. We will discharge home with instruction to follow closely with PCP. Advised to continue taking her blood thinners. Vies return if any symptoms worsen. She is agreeable plan will be discharged home today. CRITICALCARE:      CONSULTS:  None      PROCEDURES:      FINAL IMPRESSION      1.  Speech disturbance, unspecified type            DISPOSITION/PLAN   DISPOSITION Decision To Discharge 08/30/2021 05:14:59 PM        PATIENT REFERRED TO:  Keith Oswald MD  [address] [place] [address].  [address] [address]  [phone]    Schedule an appointment as soon as possible for a visit       [address] [facility]  [phone]    As needed, If symptoms worsen      DISCHARGE MEDICATIONS:  Current Discharge Medication List          (Please note that portions ofthis note were completed with a voice recognition program.  Efforts were made to edit the dictations but occasionally words are mis-transcribed.)    Zechariah Gonsalze DO  Attending Emergency Physician          Zechariah Gonsalez DO  08/30/21 [id number]

## 2021-08-30 NOTE — PROGRESS NOTES
Medication History completed:    New medications: Tramadol-Acetaminophen    Medications discontinued: None    Changes to dosing:  Ferrous Sulfate: Directions changed to one 325 mg (65 mg Fe) tablet PO every other day; previously one tablet twice daily. Stated allergies: As listed    Other pertinent information: Prescription information verified with [facility] and [employer].     Taya Calvert, Pharmacy Intern  [employer] Student - [facility]

## 2021-08-30 NOTE — TELEPHONE ENCOUNTER
spoke with patient and was advised to go to ER for the garbbled speech, she was talked to by nurse triage and I spoke to doctor Yenny Rodriguez about her symptoms and she advised pt to go to ER for possible stroke. Pt stated she will go today to [facility] to be evaluated.

## 2021-08-30 NOTE — TELEPHONE ENCOUNTER
Received call from Jessica Mascorro at [facility] with [employer]. Brief description of triage: garbled speech over the weekend. Triage indicates for patient to be seen now in office and if appt not available to be seen in er / ucc    Care advice provided, patient verbalizes understanding; denies any other questions or concerns; instructed to call back for any new or worsening symptoms. Writer provided warm transfer to Caty Freeman at [facility] for appointment scheduling. Attention Provider: Thank you for allowing me to participate in the care of your patient. The patient was connected to triage in response to information provided to the [facility]. Please do not respond through this encounter as the response is not directed to a shared pool. Reason for Disposition   Neurologic deficit that was brief (now gone), ANY of the following: * Weakness of the face, arm, or leg on one side of the body * Numbness of the face, arm, or leg on one side of the body * Loss of speech or garbled speech    Answer Assessment - Initial Assessment Questions  1. SYMPTOM: \"What is the main symptom you are concerned about? \" (e.g., weakness, numbness)      Last night 25 minutes of aphasia    2. ONSET: \"When did this start? \" (minutes, hours, days; while sleeping)  Last night    3. LAST NORMAL: \"When was the last time you were normal (no symptoms)? \"    25 minutes after episode last night    4. PATTERN \"Does this come and go, or has it been constant since it started? \"  \"Is it present now? \"  Comes and goes, hasnt had this issue before except 10 years ago, not seen by anyone    5. CARDIAC SYMPTOMS: \"Have you had any of the following symptoms: chest pain, difficulty breathing, palpitations? \"    No    6. NEUROLOGIC SYMPTOMS: \"Have you had any of the following symptoms: headache, dizziness, vision loss, double vision, changes in speech, unsteady on your feet? \"    No    7. OTHER SYMPTOMS: \"Do you have any other symptoms? \"   carpal tunnel surgery 8/28    8. PREGNANCY: \"Is there any chance you are pregnant? \" \"When was your last menstrual period? \"     Not addressed do to age    Protocols used: NEUROLOGIC DEFICIT-ADULT-[place]

## 2021-08-31 ENCOUNTER — CARE COORDINATION (OUTPATIENT)
Dept: CARE COORDINATION | Age: 86
End: 2021-08-31

## 2021-08-31 SDOH — ECONOMIC STABILITY: HOUSING INSECURITY: IN THE LAST 12 MONTHS, HOW MANY PLACES HAVE YOU LIVED?: 1

## 2021-08-31 SDOH — ECONOMIC STABILITY: TRANSPORTATION INSECURITY
IN THE PAST 12 MONTHS, HAS LACK OF TRANSPORTATION KEPT YOU FROM MEETINGS, WORK, OR FROM GETTING THINGS NEEDED FOR DAILY LIVING?: NO

## 2021-08-31 SDOH — HEALTH STABILITY: PHYSICAL HEALTH: ON AVERAGE, HOW MANY DAYS PER WEEK DO YOU ENGAGE IN MODERATE TO STRENUOUS EXERCISE (LIKE A BRISK WALK)?: 2 DAYS

## 2021-08-31 SDOH — ECONOMIC STABILITY: HOUSING INSECURITY
IN THE LAST 12 MONTHS, WAS THERE A TIME WHEN YOU DID NOT HAVE A STEADY PLACE TO SLEEP OR SLEPT IN A SHELTER (INCLUDING NOW)?: NO

## 2021-08-31 SDOH — ECONOMIC STABILITY: TRANSPORTATION INSECURITY
IN THE PAST 12 MONTHS, HAS THE LACK OF TRANSPORTATION KEPT YOU FROM MEDICAL APPOINTMENTS OR FROM GETTING MEDICATIONS?: NO

## 2021-08-31 SDOH — ECONOMIC STABILITY: INCOME INSECURITY: IN THE LAST 12 MONTHS, WAS THERE A TIME WHEN YOU WERE NOT ABLE TO PAY THE MORTGAGE OR RENT ON TIME?: NO

## 2021-08-31 SDOH — HEALTH STABILITY: PHYSICAL HEALTH: ON AVERAGE, HOW MANY MINUTES DO YOU ENGAGE IN EXERCISE AT THIS LEVEL?: 60 MIN

## 2021-08-31 ASSESSMENT — SOCIAL DETERMINANTS OF HEALTH (SDOH)
HOW OFTEN DO YOU ATTENT MEETINGS OF THE CLUB OR ORGANIZATION YOU BELONG TO?: MORE THAN 4 TIMES PER YEAR
IN A TYPICAL WEEK, HOW MANY TIMES DO YOU TALK ON THE PHONE WITH FAMILY, FRIENDS, OR NEIGHBORS?: MORE THAN THREE TIMES A WEEK
DO YOU BELONG TO ANY CLUBS OR ORGANIZATIONS SUCH AS CHURCH GROUPS UNIONS, FRATERNAL OR ATHLETIC GROUPS, OR SCHOOL GROUPS?: YES
IN A TYPICAL WEEK, HOW MANY TIMES DO YOU TALK ON THE PHONE WITH FAMILY, FRIENDS, OR NEIGHBORS?: MORE THAN THREE TIMES A WEEK
HOW OFTEN DO YOU ATTEND CHURCH OR RELIGIOUS SERVICES?: NEVER
IN A TYPICAL WEEK, HOW MANY TIMES DO YOU TALK ON THE PHONE WITH FAMILY, FRIENDS, OR NEIGHBORS?: MORE THAN THREE TIMES A WEEK
HOW OFTEN DO YOU ATTENT MEETINGS OF THE CLUB OR ORGANIZATION YOU BELONG TO?: MORE THAN 4 TIMES PER YEAR
HOW OFTEN DO YOU GET TOGETHER WITH FRIENDS OR RELATIVES?: MORE THAN THREE TIMES A WEEK
HOW OFTEN DO YOU ATTEND CHURCH OR RELIGIOUS SERVICES?: NEVER
HOW OFTEN DO YOU ATTENT MEETINGS OF THE CLUB OR ORGANIZATION YOU BELONG TO?: MORE THAN 4 TIMES PER YEAR
HOW OFTEN DO YOU ATTEND CHURCH OR RELIGIOUS SERVICES?: NEVER
HOW OFTEN DO YOU GET TOGETHER WITH FRIENDS OR RELATIVES?: MORE THAN THREE TIMES A WEEK
HOW OFTEN DO YOU GET TOGETHER WITH FRIENDS OR RELATIVES?: MORE THAN THREE TIMES A WEEK

## 2021-08-31 ASSESSMENT — LIFESTYLE VARIABLES: HOW OFTEN DO YOU HAVE A DRINK CONTAINING ALCOHOL: NEVER

## 2021-08-31 NOTE — CARE COORDINATION
Ambulatory Care Coordination Note  8/31/2021  CM Risk Score: 3  Charlson 10 Year Mortality Risk Score: 100%     ACC: Renella Lombard, RN    Summary  Date Care Coordination Episode Started: 31 August 2021    Reason for Call Today:     CC Enrollment    Reason patient is in Care Coordination:     · CHF  · Diabetes  · RAEV 77%    Topics Discussed Today:     1) CHF        - Positive for exertional dyspnea and bilateral leg swelling. Patient does not weigh self regularly. States \"I just don't think about it. \" Discussed rationale behind daily weight and incorporating weight into patient's morning routine. Most recent weight in EHR is 200 lbs. , recorded 30 August 2021. 2) Diabetes        -  Check glucose \"a couple times a week. \"  Most recent reading was checked yesterday; 162 mg/dl (non-fasting). Most recent A1C is 8.0% (8/11/2021). Again, we discussed incorporating glucose into morning routine. 3) ED Visit        Reports she was in ED yesterday. States her friend noticed patient was \"talking gibberish\"  Speech difficulty eventually resolved. Patient states this has happened before. Patient does not remember specifically when. Interventions completed today:    Assessments completed today:     1. Fall risk  2. Initial assessment  3. Medication reconciliation  4. SDOH  5. ED Follow up, CHF, Diabetes     Care Coordinator plan of care:     1. Update PC  2. Zone management, CHF - GREEN zone, Diabetes - YELLOW Zone  3. Patient will weigh self and record result daily  4. Patient  will check and record glucose level daily and PRN  5. Follow up with physicians as scheduled  6. Patient to take Advanced Directives to PCP's office to have scanned into record.   7. Follow up, one week    Congestive Heart Failure Assessment    Are you currently restricting fluids?: No Restriction  Do you understand a low sodium diet?: Yes  Do you understand how to read food labels?: Yes  Do you salt your food before tasting it?: No Symptoms:  CHF associated dyspnea on exertion: Pos, CHF associated leg swelling: Pos      Symptom course: stable  Patient-reported weight (lb):  (Comment: Not weighing self regularly)       Diabetes Assessment    Medic Alert ID: No  Meal Planning: None   How often do you test your blood sugar?: Other (Comment: Tests \"a couple time a week\")   Do you have barriers with adherence to non-pharmacologic self-management interventions? (Nutrition/Exercise/Self-Monitoring): No   Have you ever had to go to the ED for symptoms of low blood sugar?: No       Do you have hyperglycemia symptoms?: No   Do you have hypoglycemia symptoms?: No   Last Blood Sugar Value: 162   Blood Sugar Monitoring Regimen: 2 Hours Post Meal   Blood Sugar Trends: Fluctuating        This SmartLink has not been configured with any valid records. Wt Readings from Last 3 Encounters:   08/30/21 200 lb (90.7 kg)   08/11/21 199 lb 3.2 oz (90.4 kg)   06/02/21 197 lb 12.8 oz (89.7 kg)     BP Readings from Last 3 Encounters:   08/30/21 (!) 134/57   08/11/21 126/80   06/02/21 (!) 145/77     Lab Results   Component Value Date    LABA1C 8.0 08/11/2021    LABA1C 7.4 03/16/2021    LABA1C 7.0 12/09/2020     Lab Results   Component Value Date     05/22/2020     01/13/2014     03/30/2013                   Ambulatory Care Coordination Assessment    Care Coordination Protocol  Program Enrollment: Complex Care  Referral from Primary Care Provider: No  Week 1 - Initial Assessment     Do you have all of your prescriptions and are they filled?: Yes  Barriers to medication adherence: Forgets to take  Are you able to afford your medications?: No  How often do you have trouble taking your medications the way you have been told to take them?: I always take them as prescribed. Do you have Home O2 Therapy?: No      Ability to seek help/take action for Emergent Urgent situations i.e. fire, crime, inclement weather or health crisis. : Independent  Ability to ambulate to restroom: Independent  Ability handle personal hygeine needs (bathing/dressing/grooming): Independent  Ability to manage Medications: Independent  Ability to prepare Food Preparation: Independent  Ability to maintain home (clean home, laundry): Independent  Ability to drive and/or has transportation: Independent  Ability to do shopping: Independent  Ability to manage finances: Independent  Is patient able to live independently?: Yes     Current Housing: Apartment                 Thinking about your patient's physical health needs, are there any symptoms or problems (risk indicators) you are unsure about that require further investigation?: No identified areas of uncertainly or problems already being investigated   Are the patients physical health problems impacting on their mental well-being?: No identified areas of concern   Are there any problems with your patients lifestyle behaviors (alcohol, drugs, diet, exercise) that are impacting on physical or mental well-being?: No identified areas of concern   Do you have any other concerns about your patients mental well-being?  How would you rate their severity and impact on the patient?: No identified areas of concern   How would you rate their home environment in terms of safety and stability (including domestic violence, insecure housing, neighbor harassment)?: Consistently safe, supportive, stable, no identified problems   How do daily activities impact on the patient's well-being? (include current or anticipated unemployment, work, caregiving, access to transportation or other): No identified problems or perceived positive benefits   How would you rate their social network (family, work, friends)?: Good participation with social networks   How would you rate their financial resources (including ability to afford all required medical care)?: Financially secure, resources adequate, no identified problems   How wells does the patient now understand their health and well-being (symptoms, signs or risk factors) and what they need to do to manage their health?: Reasonable to good understanding and already engages in managing health or is willing to undertake better management   How well do you think your patient can engage in healthcare discussions? (Barriers include language, deafness, aphasia, alcohol or drug problems, learning difficulties, concentration): Clear and open communication, no identified barriers   Do other services need to be involved to help this patient?: Other care/services not required at this time   Suggested Interventions and Community Resources   Zone Management Tools: Completed                  Prior to Admission medications    Medication Sig Start Date End Date Taking? Authorizing Provider   ferrous sulfate (IRON 325) 325 (65 Fe) MG tablet Take 325 mg by mouth every other day   Yes Historical Provider, MD   traMADol-acetaminophen (ULTRACET) 37.5-325 MG per tablet Take 1 tablet by mouth every 8 hours as needed for Pain.  8/27/21 9/2/21 Yes Historical Provider, MD   Semaglutide 3 MG TABS Take 3 mg by mouth daily 1st step 8/11/21  Yes Kassie Caraballo MD   ezetimibe (ZETIA) 10 MG tablet TAKE 1 TABLET NIGHTLY 8/9/21  Yes Kassie Caraballo MD   amLODIPine (NORVASC) 2.5 MG tablet TAKE 1 TABLET DAILY 6/7/21  Yes Kassie Caraballo MD   allopurinol (ZYLOPRIM) 100 MG tablet TAKE 1 TABLET TWICE A DAY 6/1/21  Yes Kassie Caraballo MD   metoprolol tartrate (LOPRESSOR) 25 MG tablet TAKE 1 TABLET TWICE A DAY 6/1/21  Yes Kassie Caraballo MD   famotidine (PEPCID) 40 MG tablet Take 1 tablet by mouth every evening 4/28/21  Yes Reva Crane MD   TRADJENTA 5 MG tablet TAKE 1 TABLET DAILY 4/22/21  Yes Kassie Caraballo MD   atorvastatin (LIPITOR) 80 MG tablet TAKE 1 TABLET NIGHTLY 4/22/21  Yes Kassie Caraballo MD   furosemide (LASIX) 40 MG tablet TAKE 1 TABLET DAILY 4/22/21  Yes Kassie Caraballo MD   glimepiride (AMARYL) 4 MG tablet TAKE 1 TABLET TWICE A DAY 4/22/21  Yes Alix Kwan MD   ascorbic acid (V-R VITAMIN C) 250 MG tablet Take 1 tablet by mouth 2 times daily 4/7/21  Yes [facility], MD   cyanocobalamin ([facility] VITAMIN B12) 1000 MCG tablet Take 1 tablet by mouth daily 4/6/21  Yes Alix Kwan MD   potassium chloride (KLOR-CON M) 20 MEQ extended release tablet Take 1 tablet by mouth daily 3/16/21  Yes Alix Kwan MD   rivaroxaban (XARELTO) 20 MG TABS tablet Take 1 tablet by mouth daily (with breakfast) TAKE 1 TABLET DAILY WITH BREAKFAST 11/5/20  Yes Alix Kwan MD   nitroGLYCERIN (NITROSTAT) 0.4 MG SL tablet Place 1 tablet under the tongue every 5 minutes as needed for Chest pain 5/24/16  Yes Anette Oliveros MD   Biotin 300 MCG TABS Take 600 mcg by mouth every other day    Yes Historical Provider, MD   Cholecalciferol (VITAMIN D3) 1000 UNITS TABS Take 1 tablet by mouth daily   Yes Historical Provider, MD   zoster recombinant adjuvanted vaccine (SHINGRIX) 50 MCG/0.5ML SUSR injection 50 MCG IM then repeat 2-6 months. 8/11/21 8/11/22  Alix Kwan MD   blood glucose monitor strips Testing once a day. Please dispense according to patients insurance.  5/19/20   Alix Kwan MD   FREESTYLE LANCETS MISC USE TO TEST BLOOD SUGAR TWICE A DAY 1/16/17   Anette Oliveros MD   Blood Glucose Monitoring Suppl (FREESTYLE LITE) SARY use as directed 8/12/16   Historical Provider, MD       Future Appointments   Date Time Provider Sherly Pitt   10/7/2021  2:00 PM Harvey Martin MD [facility]   11/17/2021  9:00 AM [facility]   12/8/2021  1:15 PM [facility], MD [address] [place] [address]

## 2021-08-31 NOTE — CARE COORDINATION
Ambulatory Care Coordination  ED Follow up Call    Reason for ED visit:  Speech disturbance   Status:     improved    Did you call your PCP prior to going to the ED? Yes      Did you receive a discharge instructions from the Emergency Room? Yes  Review of Instructions:     Understands what to report/when to return?:  Yes   Understands discharge instructions?:  Yes   Following discharge instructions?:  Yes   If not why? Are there any new complaints of pain? No  New Pain Meds? No    Constipation prophylaxis needed? N/A    If you have a wound is the dressing clean, dry, and intact? No  Understands wound care regimen? No    Are there any other complaints/concerns that you wish to tell your provider? FU appts/Provider:    Future Appointments   Date Time Provider Sherly Pitt   10/7/2021  2:00 PM Harvey Martin MD [facility]   11/17/2021  9:00 AM Belvin Cabot, MD [facility]   12/8/2021  1:15 PM Chloe Montano MD [address] [place] [address] 22           New Medications?:   No      Medication Reconciliation by phone - Yes  Understands Medications? Yes  Taking Medications? Yes  Can you swallow your pills? Yes    Any further needs in the home i.e. Equipment?   No    Link to services in community?:  N/A   Which services:

## 2021-09-01 LAB
EKG ATRIAL RATE: 66 BPM
EKG P AXIS: 35 DEGREES
EKG P-R INTERVAL: 186 MS
EKG Q-T INTERVAL: 428 MS
EKG QRS DURATION: 88 MS
EKG QTC CALCULATION (BAZETT): 448 MS
EKG R AXIS: 48 DEGREES
EKG T AXIS: 91 DEGREES
EKG VENTRICULAR RATE: 66 BPM

## 2021-09-01 PROCEDURE — 93010 ELECTROCARDIOGRAM REPORT: CPT | Performed by: INTERNAL MEDICINE

## 2021-09-02 ENCOUNTER — CARE COORDINATION (OUTPATIENT)
Dept: CARE COORDINATION | Age: 86
End: 2021-09-02

## 2021-09-09 ENCOUNTER — CARE COORDINATION (OUTPATIENT)
Dept: CARE COORDINATION | Age: 86
End: 2021-09-09

## 2021-09-09 NOTE — CARE COORDINATION
Ambulatory Care Coordination Note  9/9/2021  CM Risk Score: 3  [place]son 10 Year Mortality Risk Score: 100%     ACC: Audrey Doyle, RN    Summary  Date Care Coordination Episode Started: 31 August 2021     Reason for Call Today:      CC Enrollment     Reason patient is in Care Coordination:      · CHF  · Diabetes  · RAEV 77%     Topics Discussed Today:      1. CHF        - Positive for exertional dyspnea and bilateral leg swelling. Patient still does not weigh self regularly. Again discussed incorporating weight into patient's morning routine  2. Diabetes        -  Most recent reading was checked 2-3 days ago; 169 mg/dl. Most recent A1C is 8.0% (8/11/2021). Again, we discussed incorporating glucose into morning routine. 3.  Carpal Tunnel surgery        - Patient had stitches removed this afternoon. Pain well controlled. Patient reports her thumb and index finger are \"a little stiff\"  Reports she was given some home exercises to do.      Interventions completed today:     Assessments completed today:      1. Medication reconciliation  2. CHF, Diabetes      Care Coordinator plan of care:      1. Zone management, CHF - GREEN zone, Diabetes - YELLOW Zone  2. Patient will weigh self and record result daily  3. Patient  will check and record glucose level daily and PRN  4. Follow up with physicians as scheduled  5.  Follow up, one week       Congestive Heart Failure Assessment    Are you currently restricting fluids?: No Restriction  Do you understand a low sodium diet?: Yes  Do you understand how to read food labels?: Yes  Do you salt your food before tasting it?: No         Symptoms:  CHF associated dyspnea on exertion: Pos, CHF associated leg swelling: Pos      Symptom course: stable  Patient-reported weight (lb): 200  Salt intake watch compared to last visit: stable       Diabetes Assessment    Medic Alert ID: No  Meal Planning: None   How often do you test your blood sugar?: Other (Comment: Tests Precilla Christopher couple time a week\")   Do you have barriers with adherence to non-pharmacologic self-management interventions? (Nutrition/Exercise/Self-Monitoring): No   Have you ever had to go to the ED for symptoms of low blood sugar?: No       Do you have hyperglycemia symptoms?: No   Do you have hypoglycemia symptoms?: No   Last Blood Sugar Value: 169   Blood Sugar Monitoring Regimen: Morning Fasting   Blood Sugar Trends: Fluctuating        Wt Readings from Last 3 Encounters:   08/30/21 200 lb (90.7 kg)   08/11/21 199 lb 3.2 oz (90.4 kg)   06/02/21 197 lb 12.8 oz (89.7 kg)     Lab Results   Component Value Date    LABA1C 8.0 08/11/2021    LABA1C 7.4 03/16/2021    LABA1C 7.0 12/09/2020     Lab Results   Component Value Date     05/22/2020     01/13/2014     03/30/2013       Care Coordination Interventions    Program Enrollment: Complex Care  Referral from Primary Care Provider: No  Suggested Interventions and Community Resources  Zone Management Tools: Completed (Comment: DIabetes)         Goals      Conditions and Symptoms      I will schedule office visits, as directed by my provider. I will keep my appointment or reschedule if I have to cancel. I will notify my provider of any barriers to my plan of care. I will follow my Zone Management tool to seek urgent or emergent care. I will notify my provider of any symptoms that indicate a worsening of my condition. Barriers: lack of motivation and advanced age  Plan for overcoming my barriers: Care coordination  Confidence: 7/10  Anticipated Goal Completion Date: 01 December 2021                Prior to Admission medications    Medication Sig Start Date End Date Taking?  Authorizing Provider   ferrous sulfate (IRON 325) 325 (65 Fe) MG tablet Take 325 mg by mouth every other day    Historical Provider, MD   zoster recombinant adjuvanted vaccine [facility]) 50 MCG/0.5ML SUSR injection 50 MCG IM then repeat 2-6 months. 8/11/21 8/11/22  Cris Millard MD

## 2021-09-15 ENCOUNTER — CARE COORDINATION (OUTPATIENT)
Dept: CARE COORDINATION | Age: 86
End: 2021-09-15

## 2021-09-15 NOTE — CARE COORDINATION
Follow up CC call attempted. Patient was not available. Message left requesting return call. Call back info provided. Will attempt follow up with patient within one week if no response received.

## 2021-09-27 ENCOUNTER — CARE COORDINATION (OUTPATIENT)
Dept: CARE COORDINATION | Age: 86
End: 2021-09-27

## 2021-09-27 NOTE — CARE COORDINATION
Pt states she is going good today. She is baking a cake for her son's birthday which is tomorrow. CHF- pt states she has leg swelling most of the time, and she tries to remember to take her lasix. Today her weight is 196.6 lbs. Pt states he only gains or loses a pound. BS- today her AM reading was 177. She states lately her reading have been in the 170-190's. Pt does not have any concerns and says she feels ok today. carpal tunnel-pt stats her carpal tunnel is doing ok. But she is concerned with a \"sore spot\" on her hand. Pt state its looks like a welt, the size of a dime, and discolored. Pt states she has an appt this week , and will have the doctor exam it. Writer advised pt to keep it wrapped and to avoid hitting the sore area. -Flu shot- pt state she received her flu shot Friday 9/24/2021    -sleeplessness- pt takes she has  Not been sleepy well at night. She states some night she does not sleep at all. Pt states she does take Tylenol PM to help her sleep. Pt is wanting to do more exercise through out the day, but she cannot because she get SOB. -pt was reminded of appts lists below, pt advised to reach out to [facility] or PCP office with any health related concerns. Writer will update [facility].        appts/Provider:    Future Appointments   Date Time Provider Sherly Pitt   10/7/2021  2:00 [facility]   11/17/2021  9:00 AM [facility]   12/8/2021  1:15 PM Austin Martinez MD [address] [place] [address]

## 2021-09-30 DIAGNOSIS — N18.2 TYPE 2 DIABETES MELLITUS WITH STAGE 2 CHRONIC KIDNEY DISEASE, WITHOUT LONG-TERM CURRENT USE OF INSULIN (HCC): ICD-10-CM

## 2021-09-30 DIAGNOSIS — E11.22 TYPE 2 DIABETES MELLITUS WITH STAGE 2 CHRONIC KIDNEY DISEASE, WITHOUT LONG-TERM CURRENT USE OF INSULIN (HCC): ICD-10-CM

## 2021-09-30 NOTE — TELEPHONE ENCOUNTER
Please Approve or Refuse.   Send to Pharmacy per Pt's Request:      Next Visit Date:  11/17/2021   Last Visit Date: 8/11/2021    Hemoglobin A1C (%)   Date Value   08/11/2021 8.0   03/16/2021 7.4   12/09/2020 7.0             ( goal A1C is < 7)   BP Readings from Last 3 Encounters:   08/30/21 (!) 134/57   08/11/21 126/80   06/02/21 (!) 145/77          (goal 120/80)  BUN   Date Value Ref Range Status   08/30/2021 17 8 - 23 mg/dL Final     CREATININE   Date Value Ref Range Status   08/30/2021 0.72 0.50 - 0.90 mg/dL Final     Potassium   Date Value Ref Range Status   08/30/2021 4.2 3.7 - 5.3 mmol/L Final

## 2021-10-04 ENCOUNTER — HOSPITAL ENCOUNTER (OUTPATIENT)
Age: 86
Discharge: HOME | End: 2021-10-04
Payer: MEDICARE

## 2021-10-04 LAB
HCT VFR BLD CALC: 39.9 % (ref 36–46)
HEMOGLOBIN: 13.3 G/DL (ref 12–16)
IRON SATURATION: 23 % (ref 20–55)
IRON: 71 UG/DL (ref 37–145)
MCH RBC QN AUTO: 30.8 PG (ref 26–34)
MCHC RBC AUTO-ENTMCNC: 33.4 G/DL (ref 31–37)
MCV RBC AUTO: 92.4 FL (ref 80–100)
NRBC AUTOMATED: NORMAL PER 100 WBC
PDW BLD-RTO: 14.6 % (ref 11.5–14.9)
PLATELET # BLD: 199 K/UL (ref 150–450)
PMV BLD AUTO: 8.2 FL (ref 6–12)
RBC # BLD: 4.31 M/UL (ref 4–5.2)
TOTAL IRON BINDING CAPACITY: 307 UG/DL (ref 250–450)
UNSATURATED IRON BINDING CAPACITY: 236 UG/DL (ref 112–347)
WBC # BLD: 8.4 K/UL (ref 3.5–11)

## 2021-10-04 PROCEDURE — 83550 IRON BINDING TEST: CPT

## 2021-10-04 PROCEDURE — 83540 ASSAY OF IRON: CPT

## 2021-10-04 PROCEDURE — 36415 COLL VENOUS BLD VENIPUNCTURE: CPT

## 2021-10-04 PROCEDURE — 85027 COMPLETE CBC AUTOMATED: CPT

## 2021-10-07 ENCOUNTER — OFFICE VISIT (OUTPATIENT)
Dept: GASTROENTEROLOGY | Age: 86
End: 2021-10-07
Payer: MEDICARE

## 2021-10-07 VITALS
DIASTOLIC BLOOD PRESSURE: 65 MMHG | HEART RATE: 76 BPM | TEMPERATURE: 97.3 F | WEIGHT: 194 LBS | BODY MASS INDEX: 28.65 KG/M2 | SYSTOLIC BLOOD PRESSURE: 125 MMHG

## 2021-10-07 DIAGNOSIS — D50.0 ANEMIA, BLOOD LOSS: Primary | ICD-10-CM

## 2021-10-07 DIAGNOSIS — D36.9 ADENOMATOUS POLYP: ICD-10-CM

## 2021-10-07 DIAGNOSIS — K29.00 ACUTE SUPERFICIAL GASTRITIS WITHOUT HEMORRHAGE: ICD-10-CM

## 2021-10-07 PROCEDURE — G8427 DOCREV CUR MEDS BY ELIG CLIN: HCPCS | Performed by: INTERNAL MEDICINE

## 2021-10-07 PROCEDURE — G8484 FLU IMMUNIZE NO ADMIN: HCPCS | Performed by: INTERNAL MEDICINE

## 2021-10-07 PROCEDURE — 1036F TOBACCO NON-USER: CPT | Performed by: INTERNAL MEDICINE

## 2021-10-07 PROCEDURE — 99214 OFFICE O/P EST MOD 30 MIN: CPT | Performed by: INTERNAL MEDICINE

## 2021-10-07 PROCEDURE — 4040F PNEUMOC VAC/ADMIN/RCVD: CPT | Performed by: INTERNAL MEDICINE

## 2021-10-07 PROCEDURE — 1090F PRES/ABSN URINE INCON ASSESS: CPT | Performed by: INTERNAL MEDICINE

## 2021-10-07 PROCEDURE — 1123F ACP DISCUSS/DSCN MKR DOCD: CPT | Performed by: INTERNAL MEDICINE

## 2021-10-07 PROCEDURE — G8417 CALC BMI ABV UP PARAM F/U: HCPCS | Performed by: INTERNAL MEDICINE

## 2021-10-07 ASSESSMENT — ENCOUNTER SYMPTOMS
NAUSEA: 0
CONSTIPATION: 0
WHEEZING: 0
ANAL BLEEDING: 0
BLOOD IN STOOL: 1
ABDOMINAL DISTENTION: 0
ABDOMINAL PAIN: 0
VOMITING: 0
RECTAL PAIN: 0
COUGH: 0
CHOKING: 0
DIARRHEA: 1
TROUBLE SWALLOWING: 0

## 2021-10-07 NOTE — PROGRESS NOTES
GI CLINIC FOLLOW UP    INTERVAL HISTORY:   No referring provider defined for this encounter. Chief Complaint   Patient presents with    Anemia     Patient is f/u on anemia and labs           HISTORY OF PRESENT ILLNESS: Ms.Betty EDEL Valadez is a 80 y.o. female , referred for evaluation of*GIB , anemia     Saw her in house for :  Anemia with melena  Severe iron deficiency  History of colon cancer status post resection  Egd/colon done 3/2021 please see result     Labs 10/4/2021  normal CBC /LFTs/BUN/Cr    On xeralto   On Pepcid    on iron by Dr Segura Prior  No N/v   no abd pain   no melena/hematemsis/hematochezia  No dysphagia/odynophagia   no wt loss   no diarrhea /contipation    Past Medical,Family, and Social History reviewed and does contribute to the patient presentingcondition. Patient's PMH/PSH,SH,PSYCH Hx, MEDs, ALLERGIES, and ROS were all reviewed and updated in the appropriate sections. PAST MEDICAL HISTORY:  Past Medical History:   Diagnosis Date    Arthritis     Basal cell carcinoma of nose     Bronchitis     HX. OF     CAD (coronary artery disease)     Carpal tunnel syndrome     left hand    Chronic diastolic heart failure ([address] 3/25/2021    Colon cancer ([address]     Diabetes mellitus ([address]     DVT (deep venous thrombosis) ([address] 7/10/2019    Gout     Hematuria     History of coronary artery bypass graft x 3 7/2/2020    Hx of blood clots     ED.  LEGS, ED. 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 of stenting x 3.    APPENDECTOMY      BREAST SURGERY      INFECTED MILK DUCT LT.    CARDIAC SURGERY      CABG x 3 vessels and 3 stents    CAROTID ENDARTERECTOMY Left 3-3-16    CARPAL TUNNEL RELEASE Right     CHOLECYSTECTOMY      COLON SURGERY      colectomy, PRECANCEROUS POLYPS    COLONOSCOPY      X5, HX PRECANCEROUS POLYPS    COLONOSCOPY N/A 3/19/2021    COLONOSCOPY POLYPECTOMY REMOVAL HOT SNARE performed by Lety Gifford MD at [facility].      X3 vessels    CYST REMOVAL  10/07/2016    EXCISION OF SEBACEOUS CYST REMOVED FROM BACK X3    CYSTOSCOPY Bilateral 1/21/2020    CYSTOSCOPY RETROGRADE PYELOGRAM performed by Dee Palacios MD at [facility], ESOPHAGUS      FINGER TRIGGER RELEASE Right     INDEX FINGER AND THUMB.    HYSTERECTOMY      JOINT REPLACEMENT Left 03/29/2010    TKA    JOINT REPLACEMENT Right 02/16/1999    TKA    LITHOTRIPSY      X 3    SKIN BIOPSY      TOP OF NOSE (BASAL CELL)    UPPER GASTROINTESTINAL ENDOSCOPY N/A 3/19/2021    EGD BIOPSY performed by Lety Gifford MD at [facility] [address]      vein ablation on legs       CURRENT MEDICATIONS:    Current Outpatient Medications:     Semaglutide 3 MG TABS, Take 3 mg by mouth daily 1st step, Disp: 90 tablet, Rfl: 3    ferrous sulfate (IRON 325) 325 (65 Fe) MG tablet, Take 325 mg by mouth every other day, Disp: , Rfl:     zoster recombinant adjuvanted vaccine (SHINGRIX) 50 MCG/0.5ML SUSR injection, 50 MCG IM then repeat 2-6 months., Disp: 0.5 mL, Rfl: 1    ezetimibe (ZETIA) 10 MG tablet, TAKE 1 TABLET NIGHTLY, Disp: 90 tablet, Rfl: 3    amLODIPine (NORVASC) 2.5 MG tablet, TAKE 1 TABLET DAILY, Disp: 90 tablet, Rfl: 3    allopurinol (ZYLOPRIM) 100 MG tablet, TAKE 1 TABLET TWICE A DAY, Disp: 180 tablet, Rfl: 3    metoprolol tartrate (LOPRESSOR) 25 MG tablet, TAKE 1 TABLET TWICE A DAY, Disp: 180 tablet, Rfl: 3    famotidine (PEPCID) 40 MG tablet, Take 1 tablet by mouth every evening, Disp: 90 tablet, Rfl: 3    TRADJENTA 5 MG tablet, TAKE 1 TABLET DAILY, Disp: 90 tablet, Rfl: 3    atorvastatin (LIPITOR) 80 MG tablet, TAKE 1 TABLET NIGHTLY, Disp: 90 tablet, Rfl: 3    furosemide (LASIX) 40 MG tablet, TAKE 1 TABLET DAILY, Disp: 90 tablet, Rfl: 3    glimepiride (AMARYL) 4 MG tablet, TAKE 1 TABLET TWICE A DAY, Disp: 180 tablet, Rfl: 3    ascorbic acid (V-R VITAMIN C) 250 MG tablet, Take 1 tablet by mouth 2 times daily, Disp: 60 tablet, Rfl: 3    cyanocobalamin ([employer] VITAMIN B12) 1000 MCG tablet, Take 1 tablet by mouth daily, Disp: 30 tablet, Rfl: 0    potassium chloride (KLOR-CON M) 20 MEQ extended release tablet, Take 1 tablet by mouth daily, Disp: 90 tablet, Rfl: 3    rivaroxaban (XARELTO) 20 MG TABS tablet, Take 1 tablet by mouth daily (with breakfast) TAKE 1 TABLET DAILY WITH BREAKFAST, Disp: 90 tablet, Rfl: 3    blood glucose monitor strips, Testing once a day.   Please dispense according to patients insurance., Disp: 300 strip, Rfl: 3    FREESTYLE LANCETS MISC, USE TO TEST BLOOD SUGAR TWICE A DAY, Disp: 200 each, Rfl: 1    Blood Glucose Monitoring Suppl (FREESTYLE LITE) SARY, use as directed, Disp: , Rfl: 0    nitroGLYCERIN (NITROSTAT) 0.4 MG SL tablet, Place 1 tablet under the tongue every 5 minutes as needed for Chest pain, Disp: 25 tablet, Rfl: 1    Biotin 300 MCG TABS, Take 600 mcg by mouth every other day , Disp: , Rfl:     Cholecalciferol (VITAMIN D3) 1000 UNITS TABS, Take 1 tablet by mouth daily, Disp: , Rfl:     ALLERGIES:   Allergies   Allergen Reactions    Brilinta [Ticagrelor]      Gi BLEED    Ampicillin Other (See Comments)    Clopidogrel Bisulfate Itching    Diovan [Valsartan] Other (See Comments)     cough    Lantus [Insulin Glargine] Nausea Only     Stomach \"quivered\" after taking it, palpitations    Metformin And Related Diarrhea     Chronic kidney disease stage 3       FAMILY HISTORY:       Problem Relation Age of Onset    Stroke Mother     Hypertension Mother     Heart Disease Father         AAA    Stroke Sister     Parkinsonism Brother     Cancer Sister         lung    Alcohol Abuse Sister          SOCIAL HISTORY:   Social History     Socioeconomic History    Marital status:      Spouse name: Not on file    Number of children: Not on file    Years of education: Not on file    Highest education level: Not on file   Occupational History    Not on file   Tobacco Use    Smoking status: Never Smoker    Smokeless tobacco: Never Used   Vaping Use    Vaping Use: Never used   Substance and Sexual Activity    Alcohol use: No    Drug use: No    Sexual activity: Not Currently     Partners: Male   Other Topics Concern    Not on file   Social History Narrative    Not on file     Social Determinants of Health     Financial Resource Strain: Low Risk     Difficulty of Paying Living Expenses: Not very hard   Food Insecurity: No Food Insecurity    Worried About Running Out of Food in the Last Year: Never true    Citlaly of Food in the Last Year: Never true   Transportation Needs: No Transportation Needs    Lack of Transportation (Medical): No    Lack of Transportation (Non-Medical): No   Physical Activity: Insufficiently Active    Days of Exercise per Week: 2 days    Minutes of Exercise per Session: 60 min   Stress: No Stress Concern Present    Feeling of Stress : Only a little   Social Connections: Moderately Isolated    Frequency of Communication with Friends and Family: More than three times a week    Frequency of Social Gatherings with Friends and Family: More than three times a week    Attends Buddhism Services: Never    Active Member of Clubs or Organizations: Yes    Attends Club or Organization Meetings: More than 4 times per year    Marital Status:     Intimate Partner Violence:     Fear of Current or Ex-Partner:     Emotionally Abused:     Physically Abused:     Sexually Abused:        REVIEW OF SYSTEMS: A 12-point review of systemswas obtained and pertinent positives and negatives were enumerated above in the history of present illness. All other reviewed systems / symptoms were negative. Review of Systems   Constitutional: Negative for appetite change, fatigue and unexpected weight change. HENT: Negative for trouble swallowing. Eyes: Positive for visual disturbance (glasses). Respiratory: Negative for cough, choking and wheezing. Cardiovascular: Negative for chest pain, palpitations and leg swelling. Gastrointestinal: Positive for blood in stool (black stool-iron) and diarrhea (loose). Negative for abdominal distention, abdominal pain, anal bleeding, constipation, nausea, rectal pain and vomiting. Genitourinary: Negative for difficulty urinating. Allergic/Immunologic: Negative for environmental allergies and food allergies. Neurological: Positive for headaches. Negative for dizziness, weakness, light-headedness and numbness. Hematological: Bruises/bleeds easily. Psychiatric/Behavioral: Negative for sleep disturbance. The patient is not nervous/anxious.             LABORATORY DATA: Reviewed  Lab Results   Component Value Date    WBC 8.4 10/04/2021    HGB 13.3 10/04/2021    HCT 39.9 10/04/2021    MCV 92.4 10/04/2021     10/04/2021     08/30/2021    K 4.2 08/30/2021     08/30/2021    CO2 25 08/30/2021    BUN 17 08/30/2021    CREATININE 0.72 08/30/2021    LABALBU 4.3 08/12/2021    BILITOT 1.51 (H) 08/12/2021    ALKPHOS 57 08/12/2021    AST 30 08/12/2021    ALT 29 08/12/2021    INR 2.0 08/30/2021         Lab Results   Component Value Date    RBC 4.31 10/04/2021    HGB 13.3 10/04/2021    MCV 92.4 10/04/2021    MCH 30.8 10/04/2021    MCHC 33.4 10/04/2021    RDW 14.6 10/04/2021    MPV 8.2 10/04/2021    BASOPCT 1 08/30/2021    LYMPHSABS 2.40 08/30/2021    MONOSABS 0.80 08/30/2021    NEUTROABS 5.40 08/30/2021    EOSABS 0.20 08/30/2021 ROBERT 0.10 08/30/2021         DIAGNOSTIC TESTING:     No results found. PHYSICAL EXAMINATION: Vital signs reviewed per the nursing documentation. /65   Pulse 76   Temp 97.3 °F (36.3 °C)   Wt 194 lb (88 kg)   BMI 28.65 kg/m²   Body mass index is 28.65 kg/m². Physical Exam  Vitals and nursing note reviewed. Constitutional:       General: She is not in acute distress. Appearance: She is well-developed. She is not diaphoretic. HENT:      Head: Normocephalic. Mouth/Throat:      Pharynx: No oropharyngeal exudate. Eyes:      General: No scleral icterus. Pupils: Pupils are equal, round, and reactive to light. Neck:      Thyroid: No thyromegaly. Vascular: No JVD. Trachea: No tracheal deviation. Cardiovascular:      Rate and Rhythm: Normal rate and regular rhythm. Heart sounds: Normal heart sounds. No murmur heard. Pulmonary:      Effort: Pulmonary effort is normal. No respiratory distress. Breath sounds: Normal breath sounds. No wheezing. Abdominal:      General: Bowel sounds are normal. There is no distension. Palpations: Abdomen is soft. Tenderness: There is no abdominal tenderness. There is no guarding or rebound. Comments: No ascites   Musculoskeletal:         General: Normal range of motion. Cervical back: Normal range of motion and neck supple. Skin:     General: Skin is warm. Coloration: Skin is not pale. Findings: No erythema or rash. Comments: She is not diaphoretic   Neurological:      Mental Status: She is alert and oriented to person, place, and time. Deep Tendon Reflexes: Reflexes are normal and symmetric. Psychiatric:         Behavior: Behavior normal.         Thought Content: Thought content normal.         Judgment: Judgment normal.           IMPRESSION: Ms. Noah Goldberg is a 80 y.o. female with      Diagnosis Orders   1. Anemia, blood loss     2. Adenomatous polyp     3.  Acute superficial gastritis without hemorrhage       We will continue to watch  She is to follow with Dr. Reggie Coley on her iron and H&H  She is to report any new symptom  We will continue with the Pepcid       Diet/life style/natural hx /complication of the dx were all explained in details   Past medical, past surgical, social history, psychiatric history, medications or allergies, all reviewed and  updated    Thank you for allowing me to participate in the care of Ms. House. For any further questions please do not hesitate to contact me. I have reviewed and agree with the ROS entered by the MA/RN. Note is dictated utilizing voice recognition software. Unfortunately this leads to occasional typographical errors. Please contact our office if you have any questions.       Dea Rios MD  [facility]  O: #[phone]

## 2021-10-13 DIAGNOSIS — Z79.01 CHRONIC ANTICOAGULATION: ICD-10-CM

## 2021-10-13 RX ORDER — RIVAROXABAN 20 MG/1
TABLET, FILM COATED ORAL
Qty: 90 TABLET | Refills: 3 | Status: SHIPPED | OUTPATIENT
Start: 2021-10-13

## 2021-10-13 NOTE — TELEPHONE ENCOUNTER
Please Approve or Refuse.   Send to Pharmacy per Pt's Request:    Next Visit Date:  11/17/2021   Last Visit Date: 8/11/2021    Hemoglobin A1C (%)   Date Value   08/11/2021 8.0   03/16/2021 7.4   12/09/2020 7.0             ( goal A1C is < 7)   BP Readings from Last 3 Encounters:   10/07/21 125/65   08/30/21 (!) 134/57   08/11/21 126/80          (goal 120/80)  BUN   Date Value Ref Range Status   08/30/2021 17 8 - 23 mg/dL Final     CREATININE   Date Value Ref Range Status   08/30/2021 0.72 0.50 - 0.90 mg/dL Final     Potassium   Date Value Ref Range Status   08/30/2021 4.2 3.7 - 5.3 mmol/L Final

## 2021-11-17 ENCOUNTER — OFFICE VISIT (OUTPATIENT)
Dept: FAMILY MEDICINE CLINIC | Age: 86
End: 2021-11-17
Payer: MEDICARE

## 2021-11-17 VITALS
TEMPERATURE: 98 F | BODY MASS INDEX: 29.03 KG/M2 | HEART RATE: 67 BPM | HEIGHT: 69 IN | SYSTOLIC BLOOD PRESSURE: 120 MMHG | OXYGEN SATURATION: 98 % | WEIGHT: 196 LBS | DIASTOLIC BLOOD PRESSURE: 80 MMHG

## 2021-11-17 DIAGNOSIS — Z23 ENCOUNTER FOR IMMUNIZATION: ICD-10-CM

## 2021-11-17 DIAGNOSIS — Z00.00 ROUTINE GENERAL MEDICAL EXAMINATION AT A HEALTH CARE FACILITY: Primary | ICD-10-CM

## 2021-11-17 DIAGNOSIS — E11.22 TYPE 2 DIABETES MELLITUS WITH STAGE 2 CHRONIC KIDNEY DISEASE, WITHOUT LONG-TERM CURRENT USE OF INSULIN (HCC): ICD-10-CM

## 2021-11-17 DIAGNOSIS — F51.04 PSYCHOPHYSIOLOGICAL INSOMNIA: ICD-10-CM

## 2021-11-17 DIAGNOSIS — H61.21 IMPACTED CERUMEN OF RIGHT EAR: ICD-10-CM

## 2021-11-17 DIAGNOSIS — N18.2 TYPE 2 DIABETES MELLITUS WITH STAGE 2 CHRONIC KIDNEY DISEASE, WITHOUT LONG-TERM CURRENT USE OF INSULIN (HCC): ICD-10-CM

## 2021-11-17 PROBLEM — R09.89 BILATERAL CAROTID BRUITS: Status: ACTIVE | Noted: 2021-06-09

## 2021-11-17 PROBLEM — Z86.73 HISTORY OF TIA (TRANSIENT ISCHEMIC ATTACK): Status: ACTIVE | Noted: 2021-06-09

## 2021-11-17 PROBLEM — N18.30 CKD (CHRONIC KIDNEY DISEASE), STAGE III (HCC): Status: RESOLVED | Noted: 2019-08-07 | Resolved: 2021-11-17

## 2021-11-17 PROBLEM — R31.9 HEMATURIA: Status: RESOLVED | Noted: 2021-03-17 | Resolved: 2021-11-17

## 2021-11-17 PROBLEM — Z98.890 HISTORY OF LEFT-SIDED CAROTID ENDARTERECTOMY: Status: ACTIVE | Noted: 2021-06-09

## 2021-11-17 PROBLEM — N39.0 UTI DUE TO KLEBSIELLA SPECIES: Status: RESOLVED | Noted: 2020-12-14 | Resolved: 2021-11-17

## 2021-11-17 PROBLEM — K92.2 GI BLEED: Status: RESOLVED | Noted: 2021-03-17 | Resolved: 2021-11-17

## 2021-11-17 PROBLEM — I35.0 MILD AORTIC STENOSIS: Status: ACTIVE | Noted: 2021-06-09

## 2021-11-17 PROBLEM — B96.89 UTI DUE TO KLEBSIELLA SPECIES: Status: RESOLVED | Noted: 2020-12-14 | Resolved: 2021-11-17

## 2021-11-17 LAB — HBA1C MFR BLD: 7.6 %

## 2021-11-17 PROCEDURE — 3051F HG A1C>EQUAL 7.0%<8.0%: CPT | Performed by: FAMILY MEDICINE

## 2021-11-17 PROCEDURE — 1123F ACP DISCUSS/DSCN MKR DOCD: CPT | Performed by: FAMILY MEDICINE

## 2021-11-17 PROCEDURE — G8484 FLU IMMUNIZE NO ADMIN: HCPCS | Performed by: FAMILY MEDICINE

## 2021-11-17 PROCEDURE — 4040F PNEUMOC VAC/ADMIN/RCVD: CPT | Performed by: FAMILY MEDICINE

## 2021-11-17 PROCEDURE — G0439 PPPS, SUBSEQ VISIT: HCPCS | Performed by: FAMILY MEDICINE

## 2021-11-17 PROCEDURE — 83036 HEMOGLOBIN GLYCOSYLATED A1C: CPT | Performed by: FAMILY MEDICINE

## 2021-11-17 RX ORDER — LANCETS 28 GAUGE
EACH MISCELLANEOUS
Qty: 300 EACH | Refills: 3 | Status: SHIPPED | OUTPATIENT
Start: 2021-11-17

## 2021-11-17 RX ORDER — GLUCOSAMINE HCL/CHONDROITIN SU 500-400 MG
CAPSULE ORAL
Qty: 300 STRIP | Refills: 3 | Status: SHIPPED | OUTPATIENT
Start: 2021-11-17 | End: 2022-08-12 | Stop reason: SDUPTHER

## 2021-11-17 RX ORDER — SYRING-NEEDL,DISP,INSUL,0.3 ML 30 GX5/16"
SYRINGE, EMPTY DISPOSABLE MISCELLANEOUS
Qty: 1 EACH | Refills: 0 | Status: SHIPPED | OUTPATIENT
Start: 2021-11-17

## 2021-11-17 RX ORDER — MIRTAZAPINE 7.5 MG/1
7.5-15 TABLET, FILM COATED ORAL NIGHTLY
Qty: 60 TABLET | Refills: 0 | Status: SHIPPED | OUTPATIENT
Start: 2021-11-17

## 2021-11-17 ASSESSMENT — VISUAL ACUITY
OD_CC: 20/50
OS_CC: 20/40

## 2021-11-17 ASSESSMENT — LIFESTYLE VARIABLES: HOW OFTEN DO YOU HAVE A DRINK CONTAINING ALCOHOL: 0

## 2021-11-17 ASSESSMENT — PATIENT HEALTH QUESTIONNAIRE - PHQ9
SUM OF ALL RESPONSES TO PHQ QUESTIONS 1-9: 0
1. LITTLE INTEREST OR PLEASURE IN DOING THINGS: 0
SUM OF ALL RESPONSES TO PHQ9 QUESTIONS 1 & 2: 0
2. FEELING DOWN, DEPRESSED OR HOPELESS: 0
SUM OF ALL RESPONSES TO PHQ QUESTIONS 1-9: 0
SUM OF ALL RESPONSES TO PHQ QUESTIONS 1-9: 0

## 2021-11-17 NOTE — PROGRESS NOTES
(LIPITOR) 80 MG tablet TAKE 1 TABLET NIGHTLY Yes Jose Garcia MD   furosemide (LASIX) 40 MG tablet TAKE 1 TABLET DAILY Yes Jose Garcia MD   glimepiride (AMARYL) 4 MG tablet TAKE 1 TABLET TWICE A DAY Yes Jose Garcia MD   ascorbic acid (V-R VITAMIN C) 250 MG tablet Take 1 tablet by mouth 2 times daily Yes Chu Rojas MD   cyanocobalamin ([facility] VITAMIN B12) 1000 MCG tablet Take 1 tablet by mouth daily Yes Jose Garcia MD   potassium chloride (KLOR-CON M) 20 MEQ extended release tablet Take 1 tablet by mouth daily Yes Jose Garcia MD   blood glucose monitor strips Testing once a day. Please dispense according to patients insurance. Yes Jose Garcia MD   FREESTYLE LANCETS MISC USE TO TEST BLOOD SUGAR TWICE A DAY Yes Frankey Shiley, MD   Blood Glucose Monitoring Suppl (FREESTYLE LITE) SARY use as directed Yes Historical Provider, MD   nitroGLYCERIN (NITROSTAT) 0.4 MG SL tablet Place 1 tablet under the tongue every 5 minutes as needed for Chest pain Yes Frankey Shiley, MD   Biotin 300 MCG TABS Take 600 mcg by mouth every other day  Yes Historical Provider, MD   Cholecalciferol (VITAMIN D3) 1000 UNITS TABS Take 1 tablet by mouth daily Yes Historical Provider, MD         Past Medical History:   Diagnosis Date    Arthritis     Basal cell carcinoma of nose     Bronchitis     HX. OF     CAD (coronary artery disease)     Carpal tunnel syndrome     left hand    Chronic diastolic heart failure ([facility] 3/25/2021    CKD (chronic kidney disease), stage III (HCC) 8/7/2019    Colon cancer ([facility]     Diabetes mellitus ([facility]     DVT (deep venous thrombosis) ([facility] 7/10/2019    GI bleed 3/17/2021    Gout     Hematuria     History of coronary artery bypass graft x 3 7/2/2020    Hx of blood clots     ED.  LEGS, ED. 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 of stenting x 3.    APPENDECTOMY      BREAST SURGERY      INFECTED MILK DUCT LT.    CARDIAC SURGERY      CABG x 3 vessels and 3 stents    CAROTID ENDARTERECTOMY Left 3-3-16    CARPAL TUNNEL RELEASE Right     CHOLECYSTECTOMY      COLON SURGERY      colectomy, PRECANCEROUS POLYPS    COLONOSCOPY      X5, HX PRECANCEROUS POLYPS    COLONOSCOPY N/A 3/19/2021    COLONOSCOPY POLYPECTOMY REMOVAL HOT SNARE performed by Krista Mandujano MD at [address] [place] [address] vessels    CYST REMOVAL  10/07/2016    EXCISION OF SEBACEOUS CYST REMOVED FROM BACK X3    CYSTOSCOPY Bilateral 1/21/2020    CYSTOSCOPY RETROGRADE PYELOGRAM performed by Giles Jade MD at [facility], [address] Right     INDEX FINGER AND THUMB.    HYSTERECTOMY      JOINT REPLACEMENT Left 03/29/2010    TKA    JOINT REPLACEMENT Right 02/16/1999    TKA    LITHOTRIPSY      X 3    SKIN BIOPSY      TOP OF NOSE (BASAL CELL)    UPPER GASTROINTESTINAL ENDOSCOPY N/A 3/19/2021    EGD BIOPSY [facility]      vein ablation on legs         Family History   Problem Relation Age of Onset    Stroke Mother     Hypertension Mother     Heart Disease Father         AAA    Stroke Sister     Parkinsonism Brother     Cancer Sister         lung    Alcohol Abuse Sister        CareTeam (Including outside providers/suppliers regularly involved in providing care):   Patient Care Team:  Esperanza Cruz MD as PCP - General (Family Medicine)  Esperanza Cruz MD as PCP - [facility] Empaneled Provider  Alannah Nicole MD as Consulting Physician (Ophthalmology)  Corrie Dey MD as Consulting Physician (Cardiology)  Terra Mohamud as Consulting Physician (Neurology)  Mya Bills MD as Consulting Physician (Urology)  Shawna Stratton MD as Consulting Physician (Nephrology)  Kelley Alegre MD as Consulting Physician (Internal Medicine Cardiovascular Disease)  Joselin Brown MD as Consulting Physician (Gastroenterology)  Felicia Ballard MD as Consulting Physician (Hematology and Oncology)  Frankey Slot, MD as Surgeon (Orthopedic Surgery)      Vital signs within normal limits except mildly overweight per BMI  Wt Readings from Last 3 Encounters:   11/17/21 196 lb (88.9 kg)   10/07/21 194 lb (88 kg)   08/30/21 200 lb (90.7 kg)     Vitals:    11/17/21 0857   BP: 120/80   Pulse: 67   Temp: 98 °F (36.7 °C)   SpO2: 98%   Weight: 196 lb (88.9 kg)   Height: 5' 9\" (1.753 m)     Body mass index is 28.94 kg/m². Based upon direct observation of the patient, evaluation of cognition reveals recent and remote memory intact.     Patient reports insomnia  She says she has not been sleeping for about 1 year, CANNOT FALL ASLEEP FOR MANY HOURS, and then when she falls asleep, she will not sleep more than 3 hours at a time   TRIED MELATONIN, TYLENOL PM from over-the-counter but did not help    Patient does have type 2 diabetes mellitus with chronic kidney disease stage II  Home Blood Glucose checking daily 110,170, 150, 160, 137  Stopped tradjenta due to starting Rybelsus    A1c 7.6 improved, Well controlled    Lab Results   Component Value Date    LABA1C 7.6 11/17/2021    LABA1C 8.0 08/11/2021    LABA1C 7.4 03/16/2021     Has history of colon cancer  Had colonoscopy last June  Has been having more diarrhea lately denies constipation      Physical exam  General Appearance: alert and oriented to person, place and time, well-developed and well-nourished, in no acute distress  ENT: right cerumen impaction noted and hearing abnormal-  Right  Left ear  Hearing screening by finger rub within normal limits, no wax    Will try debrox, if still not hearing then advised to see ENT    Pulmonary/Chest: clear to auscultation bilaterally  Cardiovascular: normal rate, regular rhythm, normal S1 and S2 and systolic murmur present- 3/6 at 2nd left intercostal space and at lower left sternal border, will see cardiology next month    Abdomen: soft, non-tender, non-distended, normal bowel sounds, no masses or organomegaly  Extremities :bilateral leg edema 1 + pitting edema, varicose veins, advised compression stockings    Small cut left middle toe, 5 mm, closed , not bleeding, crusted for 1 day, no signs of infection, putting topical antibiotics, advised to continue to observe      Patient's complete Health Risk Assessment and screening values have been reviewed and are found in Flowsheets. The following problems were reviewed today and where indicated follow up appointments were made and/or referrals ordered. Positive Risk Factor Screenings with Interventions:            General Health and ACP:  General  In general, how would you say your health is?: Good  In the past 7 days, have you experienced any of the following?  New or Increased Pain, New or Increased Fatigue, Loneliness, Social Isolation, Stress or Anger?: None of These  Do you get the social and emotional support that you need?: Yes  Do you have a Living Will?: Yes  Advance Directives     Power of [place] [employer] Will ACP-Advance Directive ACP-Power of     Not on File Not on File Not on File Not on File      General Health Risk Interventions:  · No Living Will: Will give patient the paperwork for living will, and power of , she wants to discuss with her sons     Hearing/Vision:   Hearing Screening    125Hz 250Hz 500Hz [address] 6000Hz 8000Hz   Right ear:            Left ear:               Visual Acuity Screening    Right eye Left eye Both eyes   Without correction:      With correction: 20/50 20/40 20/50     Hearing/Vision  Do you or your family notice any trouble with your hearing that hasn't been managed with hearing aids?: (!) Yes  Do you have difficulty driving, watching TV, or doing any of your daily activities because of your eyesight?: No  Have you had an eye exam within the past year?: Yes  Hearing/Vision Interventions:  · Hearing concerns:  patient declines any further evaluation/treatment for hearing issues, saw ENT before, we gave her eardrops for the right ear wax impaction which she had it removed 1 year ago at the ENT  · Discussed with patient, if the hearing does not improve after the wax removal, she needs to see ENT. The patient verbalizes understanding and agrees with the plan.        Personalized Preventive Plan   Current Health Maintenance Status  Immunization History   Administered Date(s) Administered    COVID-19, Gertrude, PF, 30mcg/0.3mL 01/23/2021, 02/13/2021, 10/02/2021    Influenza Virus Vaccine 10/17/2013, 11/13/2014, 10/22/2015    Influenza, High Dose (Fluzone 65 yrs and older) 10/15/2018    Influenza, Jose Sierran, 6-35 Months, IM (Fluzone,Afluria) 10/27/2017    Influenza, Jose Churn, IM, (6 mo and older Fluzone, Flulaval, Fluarix and 3 yrs and older Afluria) 09/06/2016    Influenza, Quadv, IM, PF (6 mo and older Fluzone, Flulaval, Fluarix, and 3 yrs and older Afluria) 12/09/2020    Influenza, Quadv, adjuvanted, 65 yrs +, IM, PF (Fluad) 09/24/2021    Influenza, Triv, inactivated, subunit, adjuvanted, IM (Fluad 65 yrs and older) 09/17/2019    Pneumococcal Conjugate 7-valent (Prevnar7) 10/29/2010    Pneumococcal Polysaccharide ([id number]) 10/22/2015, 06/12/2018    Zoster Live (Zostavax) 09/28/2016, 10/13/2016, 10/01/2017        Health Maintenance   Topic Date Due    DTaP/Tdap/Td vaccine (1 - Tdap) Never done   [facility] Shingles Vaccine (2 of 3) 11/26/2017    Annual Wellness Visit (AWV)  09/03/2020    Lipid screen  03/17/2022    Potassium monitoring  08/30/2022    Creatinine monitoring  08/30/2022    Flu vaccine  Completed    Pneumococcal 65+ years Vaccine  Completed    COVID-19 Vaccine  Completed    Hepatitis A vaccine  Aged Out    Hib vaccine  Aged Out    Meningococcal (ACWY) vaccine  Aged Out     Recommendations for [employer] Due: see orders and patient instructions/AVS.  . Recommended screening schedule for the next 5-10 years is provided to the patient in written form: see Patient Yvonne Narayanan was seen today for medicare awv. Diagnoses and all orders for this visit:    Routine general medical examination at a health care facility    Psychophysiological insomnia  Failing to change as expected. -To start     mirtazapine (REMERON) 7.5 MG tablet; Take 1-2 tablets by mouth nightly For insomnia and anxiety. Call for refills or dose adjustment. Encounter for immunization  -     zoster recombinant adjuvanted vaccine [facility]) 50 MCG/0.5ML SUSR injection; Inject 0.5 mLs into the muscle once for 1 dose  -     Tetanus-Diphth-Acell Pertussis (BOOSTRIX) 5-2.5-18.5 LF-MCG/0.5 injection; Inject 0.5 mLs into the muscle once for 1 dose  Will get to the pharmacy    Impacted cerumen of right ear  -     carbamide peroxide (DEBROX) 6.5 % otic solution; Place 5 drops into the right ear 2 times daily for 5 days  Call or return if symptoms persist or fail to improve, will need to see ENT. Type 2 diabetes mellitus with stage 2 chronic kidney disease, without long-term current use of insulin (HCC)  Improving, well controlled  -     POCT glycosylated hemoglobin (Hb A1C)  Lab Results   Component Value Date    LABA1C 7.6 11/17/2021    LABA1C 8.0 08/11/2021    LABA1C 7.4 03/16/2021       -     FreeStyle Lancets MISC; USE TO TEST BLOOD SUGAR TWICE A DAY  -     blood glucose monitor strips; Testing once a day. Please dispense according to patients insurance. -     Lancet Device MISC; For use with lancets for blood glucose checks  -     CBC; Future  -     Comprehensive Metabolic Panel; Future  -     Magnesium; Future  -     TSH without Reflex; Future  -     Vitamin B12 & Folate; Future  -     Phosphorus; Future  She denies hypoglycemia.  -advised home blood glucose testing  daily  -daily feet exam, Foot care: advised to wash feet daily, pat dry and apply lotion at night, avoiding between toes.  Need to look at feet daily and report to a physician any signs of inflammation or skin damage  -annual dilated eye exam  -Low carb, low fat diet, increase fruits and vegetables, and exercise 4-5 times a day 30-40 minutes a day discussed  -continue current treatment Semaglutide, glimepiride    -continue  statin Lipitor 40 mg    Future Appointments   Date Time Provider Sherly Pitt   12/1/2021  1:15 PM [facility] [id number] [facility]   12/8/2021  1:15 PM Nolvia Harris MD [address] [place] [address]   4/7/2022  4:00 PM Mel Webster MD [facility]   11/22/2022  9:00 AM [facility]

## 2021-11-17 NOTE — PATIENT INSTRUCTIONS
Personalized Preventive Plan for Carmella Cotto - 11/17/2021  Medicare offers a range of preventive health benefits. Some of the tests and screenings are paid in full while other may be subject to a deductible, co-insurance, and/or copay. Some of these benefits include a comprehensive review of your medical history including lifestyle, illnesses that may run in your family, and various assessments and screenings as appropriate. After reviewing your medical record and screening and assessments performed today your provider may have ordered immunizations, labs, imaging, and/or referrals for you. A list of these orders (if applicable) as well as your Preventive Care list are included within your After Visit Summary for your review. Other Preventive Recommendations:    · A preventive eye exam performed by an eye specialist is recommended every 1-2 years to screen for glaucoma; cataracts, macular degeneration, and other eye disorders. · A preventive dental visit is recommended every 6 months. · Try to get at least 150 minutes of exercise per week or 10,000 steps per day on a pedometer . · Order or download the FREE \"Exercise & Physical Activity: Your Everyday Guide\" from [employer]. Call 8-929.466.3870 or search [employer] online. · You need 5556-5906 mg of calcium and 4481-8820 IU of vitamin D per day. It is possible to meet your calcium requirement with diet alone, but a vitamin D supplement is usually necessary to meet this goal.  · When exposed to the sun, use a sunscreen that protects against both UVA and UVB radiation with an SPF of 30 or greater. Reapply every 2 to 3 hours or after sweating, drying off with a towel, or swimming. · Always wear a seat belt when traveling in a car. Always wear a helmet when riding a bicycle or motorcycle. Heart-Healthy Diet   Sodium, Fat, and Cholesterol Controlled Diet       What Is a Heart Healthy Diet?    A heart-healthy diet is one that limits sodium , certain types of fat , and cholesterol . This type of diet is recommended for:   People with any form of cardiovascular disease (eg, coronary heart disease , peripheral vascular disease , previous heart attack , previous stroke )   People with risk factors for cardiovascular disease, such as high blood pressure , high cholesterol , or diabetes   Anyone who wants to lower their risk of developing cardiovascular disease   Sodium    Sodium is a mineral found in many foods. In general, most people consume much more sodium than they need. Diets high in sodium can increase blood pressure and lead to edema (water retention). On a heart-healthy diet, you should consume no more than 2,300 mg (milligrams) of sodium per dayabout the amount in one teaspoon of table salt. The foods highest in sodium include table salt (about 50% sodium), processed foods, convenience foods, and preserved foods. Cholesterol    Cholesterol is a fat-like, waxy substance in your blood. Our bodies make some cholesterol. It is also found in animal products, with the highest amounts in fatty meat, egg yolks, whole milk, cheese, shellfish, and organ meats. On a heart-healthy diet, you should limit your cholesterol intake to less than 200 mg per day. It is normal and important to have some cholesterol in your bloodstream. But too much cholesterol can cause plaque to build up within your arteries, which can eventually lead to a heart attack or stroke. The two types of cholesterol that are most commonly referred to are:   Low-density lipoprotein (LDL) cholesterol  Also known as bad cholesterol, this is the cholesterol that tends to build up along your arteries. Bad cholesterol levels are increased by eating fats that are saturated or hydrogenated. Optimal level of this cholesterol is less than 100. Over 130 starts to get risky for heart disease.    High-density lipoprotein (HDL) cholesterol  Also known as good cholesterol, this type of cholesterol actually carries cholesterol away from your arteries and may, therefore, help lower your risk of having a heart attack. You want this level to be high (ideally greater than 60). It is a risk to have a level less than 40. You can raise this good cholesterol by eating olive oil, canola oil, avocados, or nuts. Exercise raises this level, too. Fat    Fat is calorie dense and packs a lot of calories into a small amount of food. Even though fats should be limited due to their high calorie content, not all fats are bad. In fact, some fats are quite healthful. Fat can be broken down into four main types. The good-for-you fats are:   Monounsaturated fat  found in oils such as olive and canola, avocados, and nuts and natural nut butters; can decrease cholesterol levels, while keeping levels of HDL cholesterol high   Polyunsaturated fat  found in oils such as safflower, sunflower, soybean, corn, and sesame; can decrease total cholesterol and LDL cholesterol   Omega-3 fatty acids  particularly those found in fatty fish (such as salmon, trout, tuna, mackerel, herring, and sardines); can decrease risk of arrhythmias, decrease triglyceride levels, and slightly lower blood pressure   The fats that you want to limit are:   Saturated fat  found in animal products, many fast foods, and a few vegetables; increases total blood cholesterol, including LDL levels   Animal fats that are saturated include: butter, lard, whole-milk dairy products, meat fat, and poultry skin   Vegetable fats that are saturated include: hydrogenated shortening, palm oil, coconut oil, cocoa butter   Hydrogenated or trans fat  found in margarine and vegetable shortening, most shelf stable snack foods, and fried foods; increases LDL and decreases HDL     It is generally recommended that you limit your total fat for the day to less than 30% of your total calories.  If you follow an 1800-calorie heart healthy diet, for example, this would mean 60 grams of fat or less per day. Saturated fat and trans fat in your diet raises your blood cholesterol the most, much more than dietary cholesterol does. For this reason, on a heart-healthy diet, less than 7% of your calories should come from saturated fat and ideally 0% from trans fat. On an 1800-calorie diet, this translates into less than 14 grams of saturated fat per day, leaving 46 grams of fat to come from mono- and polyunsaturated fats.    Food Choices on a Heart Healthy Diet   Food Category   Foods Recommended   Foods to Avoid   Grains   Breads and rolls without salted tops Most dry and cooked cereals Unsalted crackers and breadsticks Low-sodium or homemade breadcrumbs or stuffing All rice and pastas   Breads, rolls, and crackers with salted tops High-fat baked goods (eg, muffins, donuts, pastries) Quick breads, self-rising flour, and biscuit mixes Regular bread crumbs Instant hot cereals Commercially prepared rice, pasta, or stuffing mixes   Vegetables   Most fresh, frozen, and low-sodium canned vegetables Low-sodium and salt-free vegetable juices Canned vegetables if unsalted or rinsed   Regular canned vegetables and juices, including sauerkraut and pickled vegetables Frozen vegetables with sauces Commercially prepared potato and vegetable mixes   Fruits   Most fresh, frozen, and canned fruits All fruit juices   Fruits processed with salt or sodium   Milk   Nonfat or low-fat (1%) milk Nonfat or low-fat yogurt Cottage cheese, low-fat ricotta, cheeses labeled as low-fat and low-sodium   Whole milk Reduced-fat (2%) milk Malted and chocolate milk Full fat yogurt Most cheeses (unless low-fat and low salt) Buttermilk (no more than 1 cup per week)   Meats and Beans   Lean cuts of fresh or frozen beef, veal, lamb, or pork (look for the word loin) Fresh or frozen poultry without the skin Fresh or frozen fish and some shellfish Egg whites and egg substitutes (Limit whole eggs to three per week) Tofu Nuts or seeds (unsalted, dry-roasted), low-sodium peanut butter Dried peas, beans, and lentils   Any smoked, cured, salted, or canned meat, fish, or poultry (including almanza, chipped beef, cold cuts, hot dogs, sausages, sardines, and anchovies) Poultry skins Breaded and/or fried fish or meats Canned peas, beans, and lentils Salted nuts   Fats and Oils   Olive oil and canola oil Low-sodium, low-fat salad dressings and mayonnaise   Butter, margarine, coconut and palm oils, almanza fat   Snacks, Sweets, and Condiments   Low-sodium or unsalted versions of broths, soups, soy sauce, and condiments Pepper, herbs, and spices; vinegar, lemon, or lime juice Low-fat frozen desserts (yogurt, sherbet, fruit bars) Sugar, cocoa powder, honey, syrup, jam, and preserves Low-fat, trans-fat free cookies, cakes, and pies Tobin and animal crackers, fig bars, alexander snaps   High-fat desserts Broth, soups, gravies, and sauces, made from instant mixes or other high-sodium ingredients Salted snack foods Canned olives Meat tenderizers, seasoning salt, and most flavored vinegars   Beverages   Low-sodium carbonated beverages Tea and coffee in moderation Soy milk   Commercially softened water   Suggestions   Make whole grains, fruits, and vegetables the base of your diet. Choose heart-healthy fats such as canola, olive, and flaxseed oil, and foods high in heart-healthy fats, such as nuts, seeds, soybeans, tofu, and fish. Eat fish at least twice per week; the fish highest in omega-3 fatty acids and lowest in mercury include salmon, herring, mackerel, sardines, and canned chunk light tuna. If you eat fish less than twice per week or have high triglycerides, talk to your doctor about taking fish oil supplements. Read food labels.    For products low in fat and cholesterol, look for fat free, low-fat, cholesterol free, saturated fat free, and trans fat freeAlso scan the Nutrition Facts Label, which lists saturated fat, trans fat, and cholesterol amounts. For products low in sodium, look for sodium free, very low sodium, low sodium, no added salt, and unsalted   Skip the salt when cooking or at the table; if food needs more flavor, get creative and try out different herbs and spices. Garlic and onion also add substantial flavor to foods. Trim any visible fat off meat and poultry before cooking, and drain the fat off after nichole. Use cooking methods that require little or no added fat, such as grilling, boiling, baking, poaching, broiling, roasting, steaming, stir-frying, and sauting. Avoid fast food and convenience food. They tend to be high in saturated and trans fat and have a lot of added salt. Talk to a registered dietitian for individualized diet advice. Last Reviewed: March 2011 Charly Rutherford MS, MPH, RD   Updated: 3/29/2011   ·     Heart-Healthy Diet   Sodium, Fat, and Cholesterol Controlled Diet       What Is a Heart Healthy Diet? A heart-healthy diet is one that limits sodium , certain types of fat , and cholesterol . This type of diet is recommended for:   People with any form of cardiovascular disease (eg, coronary heart disease , peripheral vascular disease , previous heart attack , previous stroke )   People with risk factors for cardiovascular disease, such as high blood pressure , high cholesterol , or diabetes   Anyone who wants to lower their risk of developing cardiovascular disease   Sodium    Sodium is a mineral found in many foods. In general, most people consume much more sodium than they need. Diets high in sodium can increase blood pressure and lead to edema (water retention). On a heart-healthy diet, you should consume no more than 2,300 mg (milligrams) of sodium per dayabout the amount in one teaspoon of table salt. The foods highest in sodium include table salt (about 50% sodium), processed foods, convenience foods, and preserved foods.    Cholesterol    Cholesterol is a fat-like, waxy substance in your blood. Our bodies make some cholesterol. It is also found in animal products, with the highest amounts in fatty meat, egg yolks, whole milk, cheese, shellfish, and organ meats. On a heart-healthy diet, you should limit your cholesterol intake to less than 200 mg per day. It is normal and important to have some cholesterol in your bloodstream. But too much cholesterol can cause plaque to build up within your arteries, which can eventually lead to a heart attack or stroke. The two types of cholesterol that are most commonly referred to are:   Low-density lipoprotein (LDL) cholesterol  Also known as bad cholesterol, this is the cholesterol that tends to build up along your arteries. Bad cholesterol levels are increased by eating fats that are saturated or hydrogenated. Optimal level of this cholesterol is less than 100. Over 130 starts to get risky for heart disease. High-density lipoprotein (HDL) cholesterol  Also known as good cholesterol, this type of cholesterol actually carries cholesterol away from your arteries and may, therefore, help lower your risk of having a heart attack. You want this level to be high (ideally greater than 60). It is a risk to have a level less than 40. You can raise this good cholesterol by eating olive oil, canola oil, avocados, or nuts. Exercise raises this level, too. Fat    Fat is calorie dense and packs a lot of calories into a small amount of food. Even though fats should be limited due to their high calorie content, not all fats are bad. In fact, some fats are quite healthful. Fat can be broken down into four main types.    The good-for-you fats are:   Monounsaturated fat  found in oils such as olive and canola, avocados, and nuts and natural nut butters; can decrease cholesterol levels, while keeping levels of HDL cholesterol high   Polyunsaturated fat  found in oils such as safflower, sunflower, soybean, corn, and sesame; can decrease total cholesterol and LDL cholesterol   Omega-3 fatty acids  particularly those found in fatty fish (such as salmon, trout, tuna, mackerel, herring, and sardines); can decrease risk of arrhythmias, decrease triglyceride levels, and slightly lower blood pressure   The fats that you want to limit are:   Saturated fat  found in animal products, many fast foods, and a few vegetables; increases total blood cholesterol, including LDL levels   Animal fats that are saturated include: butter, lard, whole-milk dairy products, meat fat, and poultry skin   Vegetable fats that are saturated include: hydrogenated shortening, palm oil, coconut oil, cocoa butter   Hydrogenated or trans fat  found in margarine and vegetable shortening, most shelf stable snack foods, and fried foods; increases LDL and decreases HDL     It is generally recommended that you limit your total fat for the day to less than 30% of your total calories. If you follow an 1800-calorie heart healthy diet, for example, this would mean 60 grams of fat or less per day. Saturated fat and trans fat in your diet raises your blood cholesterol the most, much more than dietary cholesterol does. For this reason, on a heart-healthy diet, less than 7% of your calories should come from saturated fat and ideally 0% from trans fat. On an 1800-calorie diet, this translates into less than 14 grams of saturated fat per day, leaving 46 grams of fat to come from mono- and polyunsaturated fats.    Food Choices on a Heart Healthy Diet   Food Category   Foods Recommended   Foods to Avoid   Grains   Breads and rolls without salted tops Most dry and cooked cereals Unsalted crackers and breadsticks Low-sodium or homemade breadcrumbs or stuffing All rice and pastas   Breads, rolls, and crackers with salted tops High-fat baked goods (eg, muffins, donuts, pastries) Quick breads, self-rising flour, and biscuit mixes Regular bread crumbs Instant hot cereals Commercially prepared rice, pasta, or stuffing mixes Vegetables   Most fresh, frozen, and low-sodium canned vegetables Low-sodium and salt-free vegetable juices Canned vegetables if unsalted or rinsed   Regular canned vegetables and juices, including sauerkraut and pickled vegetables Frozen vegetables with sauces Commercially prepared potato and vegetable mixes   Fruits   Most fresh, frozen, and canned fruits All fruit juices   Fruits processed with salt or sodium   Milk   Nonfat or low-fat (1%) milk Nonfat or low-fat yogurt Cottage cheese, low-fat ricotta, cheeses labeled as low-fat and low-sodium   Whole milk Reduced-fat (2%) milk Malted and chocolate milk Full fat yogurt Most cheeses (unless low-fat and low salt) Buttermilk (no more than 1 cup per week)   Meats and Beans   Lean cuts of fresh or frozen beef, veal, lamb, or pork (look for the word loin) Fresh or frozen poultry without the skin Fresh or frozen fish and some shellfish Egg whites and egg substitutes (Limit whole eggs to three per week) Tofu Nuts or seeds (unsalted, dry-roasted), low-sodium peanut butter Dried peas, beans, and lentils   Any smoked, cured, salted, or canned meat, fish, or poultry (including almanza, chipped beef, cold cuts, hot dogs, sausages, sardines, and anchovies) Poultry skins Breaded and/or fried fish or meats Canned peas, beans, and lentils Salted nuts   Fats and Oils   Olive oil and canola oil Low-sodium, low-fat salad dressings and mayonnaise   Butter, margarine, coconut and palm oils, almanza fat   Snacks, Sweets, and Condiments   Low-sodium or unsalted versions of broths, soups, soy sauce, and condiments Pepper, herbs, and spices; vinegar, lemon, or lime juice Low-fat frozen desserts (yogurt, sherbet, fruit bars) Sugar, cocoa powder, honey, syrup, jam, and preserves Low-fat, trans-fat free cookies, cakes, and pies Tobin and animal crackers, fig bars, alexander snaps   High-fat desserts Broth, soups, gravies, and sauces, made from instant mixes or other high-sodium ingredients Salted snack foods Canned olives Meat tenderizers, seasoning salt, and most flavored vinegars   Beverages   Low-sodium carbonated beverages Tea and coffee in moderation Soy milk   Commercially softened water   Suggestions   Make whole grains, fruits, and vegetables the base of your diet. Choose heart-healthy fats such as canola, olive, and flaxseed oil, and foods high in heart-healthy fats, such as nuts, seeds, soybeans, tofu, and fish. Eat fish at least twice per week; the fish highest in omega-3 fatty acids and lowest in mercury include salmon, herring, mackerel, sardines, and canned chunk light tuna. If you eat fish less than twice per week or have high triglycerides, talk to your doctor about taking fish oil supplements. Read food labels. For products low in fat and cholesterol, look for fat free, low-fat, cholesterol free, saturated fat free, and trans fat freeAlso scan the Nutrition Facts Label, which lists saturated fat, trans fat, and cholesterol amounts. For products low in sodium, look for sodium free, very low sodium, low sodium, no added salt, and unsalted   Skip the salt when cooking or at the table; if food needs more flavor, get creative and try out different herbs and spices. Garlic and onion also add substantial flavor to foods. Trim any visible fat off meat and poultry before cooking, and drain the fat off after nichole. Use cooking methods that require little or no added fat, such as grilling, boiling, baking, poaching, broiling, roasting, steaming, stir-frying, and sauting. Avoid fast food and convenience food. They tend to be high in saturated and trans fat and have a lot of added salt. Talk to a registered dietitian for individualized diet advice. Last Reviewed: March 2011 Christiana Cordon MS, MPH, RD   Updated: 3/29/2011   ·     Heart-Healthy Diet   Sodium, Fat, and Cholesterol Controlled Diet       What Is a Heart Healthy Diet?    A heart-healthy diet is one that cholesterol actually carries cholesterol away from your arteries and may, therefore, help lower your risk of having a heart attack. You want this level to be high (ideally greater than 60). It is a risk to have a level less than 40. You can raise this good cholesterol by eating olive oil, canola oil, avocados, or nuts. Exercise raises this level, too. Fat    Fat is calorie dense and packs a lot of calories into a small amount of food. Even though fats should be limited due to their high calorie content, not all fats are bad. In fact, some fats are quite healthful. Fat can be broken down into four main types. The good-for-you fats are:   Monounsaturated fat  found in oils such as olive and canola, avocados, and nuts and natural nut butters; can decrease cholesterol levels, while keeping levels of HDL cholesterol high   Polyunsaturated fat  found in oils such as safflower, sunflower, soybean, corn, and sesame; can decrease total cholesterol and LDL cholesterol   Omega-3 fatty acids  particularly those found in fatty fish (such as salmon, trout, tuna, mackerel, herring, and sardines); can decrease risk of arrhythmias, decrease triglyceride levels, and slightly lower blood pressure   The fats that you want to limit are:   Saturated fat  found in animal products, many fast foods, and a few vegetables; increases total blood cholesterol, including LDL levels   Animal fats that are saturated include: butter, lard, whole-milk dairy products, meat fat, and poultry skin   Vegetable fats that are saturated include: hydrogenated shortening, palm oil, coconut oil, cocoa butter   Hydrogenated or trans fat  found in margarine and vegetable shortening, most shelf stable snack foods, and fried foods; increases LDL and decreases HDL     It is generally recommended that you limit your total fat for the day to less than 30% of your total calories.  If you follow an 1800-calorie heart healthy diet, for example, this would mean 60 (unsalted, dry-roasted), low-sodium peanut butter Dried peas, beans, and lentils   Any smoked, cured, salted, or canned meat, fish, or poultry (including almanza, chipped beef, cold cuts, hot dogs, sausages, sardines, and anchovies) Poultry skins Breaded and/or fried fish or meats Canned peas, beans, and lentils Salted nuts   Fats and Oils   Olive oil and canola oil Low-sodium, low-fat salad dressings and mayonnaise   Butter, margarine, coconut and palm oils, almanza fat   Snacks, Sweets, and Condiments   Low-sodium or unsalted versions of broths, soups, soy sauce, and condiments Pepper, herbs, and spices; vinegar, lemon, or lime juice Low-fat frozen desserts (yogurt, sherbet, fruit bars) Sugar, cocoa powder, honey, syrup, jam, and preserves Low-fat, trans-fat free cookies, cakes, and pies Tobin and animal crackers, fig bars, alexander snaps   High-fat desserts Broth, soups, gravies, and sauces, made from instant mixes or other high-sodium ingredients Salted snack foods Canned olives Meat tenderizers, seasoning salt, and most flavored vinegars   Beverages   Low-sodium carbonated beverages Tea and coffee in moderation Soy milk   Commercially softened water   Suggestions   Make whole grains, fruits, and vegetables the base of your diet. Choose heart-healthy fats such as canola, olive, and flaxseed oil, and foods high in heart-healthy fats, such as nuts, seeds, soybeans, tofu, and fish. Eat fish at least twice per week; the fish highest in omega-3 fatty acids and lowest in mercury include salmon, herring, mackerel, sardines, and canned chunk light tuna. If you eat fish less than twice per week or have high triglycerides, talk to your doctor about taking fish oil supplements. Read food labels. For products low in fat and cholesterol, look for fat free, low-fat, cholesterol free, saturated fat free, and trans fat freeAlso scan the Nutrition Facts Label, which lists saturated fat, trans fat, and cholesterol amounts. For products low in sodium, look for sodium free, very low sodium, low sodium, no added salt, and unsalted   Skip the salt when cooking or at the table; if food needs more flavor, get creative and try out different herbs and spices. Garlic and onion also add substantial flavor to foods. Trim any visible fat off meat and poultry before cooking, and drain the fat off after nichole. Use cooking methods that require little or no added fat, such as grilling, boiling, baking, poaching, broiling, roasting, steaming, stir-frying, and sauting. Avoid fast food and convenience food. They tend to be high in saturated and trans fat and have a lot of added salt. Talk to a registered dietitian for individualized diet advice. Last Reviewed: March 2011 Malina Osborn MS, MPH, RD   Updated: 3/29/2011   ·     Preventing Osteoporosis: After Your Visit  Your Care Instructions  Osteoporosis means the bones are weak and thin enough that they can break easily. The older you are, the more likely you are to get osteoporosis. But with plenty of calcium, vitamin D, and exercise, you can help prevent osteoporosis. The preteen and teen years are a key time for bone building. With the help of calcium, vitamin D, and exercise in those early years and beyond, the bones reach their peak density and strength by age 27. After age 27, your bones naturally start to thin and weaken. The stronger your bones are at around age 27, the lower your risk for osteoporosis. But no matter what your age and risk are, your bones still need calcium, vitamin D, and exercise to stay strong. Also avoid smoking, and limit alcohol. Smoking and heavy alcohol use can make your bones thinner. Talk to your doctor about any special risks you might have, such as having a close relative with osteoporosis or taking a medicine that can weaken bones. Your doctor can tell you the best ways to protect your bones from thinning.   Follow-up care is a key part of your treatment and safety. Be sure to make and go to all appointments, and call your doctor if you are having problems. It's also a good idea to know your test results and keep a list of the medicines you take. How can you care for yourself at home? Get enough calcium and vitamin D. The [employer] recommends adults younger than age 46 need 1,000 mg of calcium and 600 IU of vitamin D each day. Women ages 46 to 79 need 1,200 mg of calcium and 600 IU of vitamin D each day. Men ages 46 to 79 need 1,000 mg of calcium and 600 IU of vitamin D each day. Adults 71 and older need 1,200 mg of calcium and 800 IU of vitamin D each day. Eat foods rich in calcium, like yogurt, cheese, milk, and dark green vegetables. Eat foods rich in vitamin D, like eggs, fatty fish, cereal, and fortified milk. Get some sunshine. Your body uses sunshine to make its own vitamin D. The safest time to be out in the sun is before 10 a.m. or after 3 p.m. Avoid getting sunburned. Sunburn can increase your risk of skin cancer. Talk to your doctor about taking a calcium plus vitamin D supplement. Ask about what type of calcium is right for you, and how much to take at a time. Adults ages 23 to 48 should not get more than 2,500 mg of calcium and 4,000 IU of vitamin D each day, whether it is from supplements and/or food. Adults ages 46 and older should not get more than 2,000 mg of calcium and 4,000 IU of vitamin D each day from supplements and/or food. Get regular bone-building exercise. Weight-bearing and resistance exercises keep bones healthy by working the muscles and bones against gravity. Start out at an exercise level that feels right for you. Add a little at a time until you can do the following:  Do 30 minutes of weight-bearing exercise on most days of the week. Walking, jogging, stair climbing, and dancing are good choices. Do resistance exercises with weights or elastic bands 2 to 3 days a week. Limit alcohol.  Drink no more than 1 alcohol drink a day if you are a woman. Drink no more than 2 alcohol drinks a day if you are a man. Do not smoke. Smoking can make bones thin faster. If you need help quitting, talk to your doctor about stop-smoking programs and medicines. These can increase your chances of quitting for good. When should you call for help? Watch closely for changes in your health, and be sure to contact your doctor if:  You need help with a healthy eating plan. You need help with an exercise plan    © [id number] [employer]. Care instructions adapted under license by [facility]. This care instruction is for use with your licensed healthcare professional. If you have questions about a medical condition or this instruction, always ask your healthcare professional. [address] any warranty or liability for your use of this information. Content Version: 9.4.72752; Last Revised: June 20, 2011              ·     DASH Diet: After Your Visit  Your Care Instructions  The DASH diet is an eating plan that can help lower your blood pressure. DASH stands for Dietary Approaches to Stop Hypertension. Hypertension is high blood pressure. The DASH diet focuses on eating foods that are high in calcium, potassium, and magnesium. These nutrients can lower blood pressure. The foods that are highest in these nutrients are fruits, vegetables, low-fat dairy products, nuts, seeds, and legumes. But taking calcium, potassium, and magnesium supplements instead of eating foods that are high in those nutrients does not have the same effect. The DASH diet also includes whole grains, fish, and poultry. The DASH diet is one of several lifestyle changes your doctor may recommend to lower your high blood pressure. Your doctor may also want you to decrease the amount of sodium in your diet.  Lowering sodium while following the DASH diet can lower blood pressure even further than just the DASH diet alone.  Follow-up care is a key part of your treatment and safety. Be sure to make and go to all appointments, and call your doctor if you are having problems. Its also a good idea to know your test results and keep a list of the medicines you take. How can you care for yourself at home? Following the DASH diet  Eat 4 to 5 servings of fruit each day. A serving is 1 medium-sized piece of fruit, ½ cup chopped or canned fruit, 1/4 cup dried fruit, or 4 ounces (½ cup) of fruit juice. Choose fruit more often than fruit juice. Eat 4 to 5 servings of vegetables each day. A serving is 1 cup of lettuce or raw leafy vegetables, ½ cup of chopped or cooked vegetables, or 4 ounces (½ cup) of vegetable juice. Choose vegetables more often than vegetable juice. Get 2 to 3 servings of low-fat and fat-free dairy each day. A serving is 8 ounces of milk, 1 cup of yogurt, or 1 ½ ounces of cheese. Eat 7 to 8 servings of grains each day. A serving is 1 slice of bread, 1 ounce of dry cereal, or ½ cup of cooked rice, pasta, or cooked cereal. Try to choose whole-grain products as much as possible. Limit lean meat, poultry, and fish to 6 ounces each day. Six ounces is about the size of two decks of cards. Eat 4 to 5 servings of nuts, seeds, and legumes (cooked dried beans, lentils, and split peas) each week. A serving is 1/3 cup of nuts, 2 tablespoons of seeds, or ½ cup cooked dried beans or peas. Limit sweets and added sugars to 5 servings or less a week. A serving is 1 tablespoon jelly or jam, ½ cup sorbet, or 1 cup of lemonade. Tips for success  Start small. Do not try to make dramatic changes to your diet all at once. You might feel that you are missing out on your favorite foods and then be more likely to not follow the plan. Make small changes, and stick with them. Once those changes become habit, add a few more changes. Try some of the following:  Make it a goal to eat a fruit or vegetable at every meal and at snacks.  This will make it easy to get the recommended amount of fruits and vegetables each day. Try yogurt topped with fruit and nuts for a snack or healthy dessert. Add lettuce, tomato, cucumber, and onion to sandwiches. Combine a ready-made pizza crust with low-fat mozzarella cheese and lots of vegetable toppings. Try using tomatoes, squash, spinach, broccoli, carrots, cauliflower, and onions. Have a variety of cut-up vegetables with a low-fat dip as an appetizer instead of chips and dip. Sprinkle sunflower seeds or chopped almonds over salads. Or try adding chopped walnuts or almonds to cooked vegetables. Try some vegetarian meals using beans and peas. Add garbanzo or kidney beans to salads. Make burritos and tacos with mashed helms beans or black beans    © 5631-3195 Healthwise, [employer]. Care instructions adapted under license by [employer] This care instruction is for use with your licensed healthcare professional. If you have questions about a medical condition or this instruction, always ask your healthcare professional. [address] any warranty or liability for your use of this information. Content Version: 9.4.09177; Last Revised: March 15, 2012              ·     High-Fiber Diet     What Is Fiber? Dietary fiber is a form of carbohydrate found in plants that cannot be digested by humans. All plants contain fiber, including fruits, vegetables, grains, and legumes. Fiber is often classified into two categories: soluble and insoluble. Soluble fiber draws water into the bowel and can help slow digestion. Examples of foods that are high in soluble fiber include oatmeal, oat bran, barley, legumes (eg, beans and peas), apples, and strawberries. Insoluble fiber speeds digestion and can add bulk to the stool. Examples of foods that are high in insoluble fiber include whole-wheat products, wheat bran, cauliflower, green beans, and potatoes. Why Follow a High-Fiber Diet? A high-fiber diet is often recommended to prevent and treat constipation , hemorrhoids , diverticulitis , and irritable bowel syndrome . Eating a high-fiber diet can also help improve your cholesterol levels, lower your risk of coronary heart disease , reduce your risk of type 2 diabetes , and lower your weight. For people with type 1 or 2 diabetes, a high-fiber diet can also help stabilize blood sugar levels. How Much Fiber Should I Eat? A high-fiber diet should contain  20-35 grams  of fiber a day. This is actually the amount recommended for the general adult population; however, most Americans eat only 15 grams of fiber per day. Digestion of Fiber   Eating a higher fiber diet than usual can take some getting used to by your body's digestive system. To avoid the side effects of sudden increases in dietary fiber (eg, gas, cramping, bloating, and diarrhea), increase fiber gradually and be sure to drink plenty of fluids every day. Tips for Increasing Fiber Intake   Whenever possible, choose whole grains over refined grains (eg, brown rice instead of white rice, whole-wheat bread instead of white bread). Include a variety of grains in your diet, such as wheat, rye, barley, oats, quinoa, and bulgur. Eat more vegetarian-based meals. Here are some ideas: black bean burgers, eggplant lasagna, and veggie tofu stir-pace. Choose high-fiber snacks, such as fruits, popcorn, whole-grain crackers, and nuts. Make whole-grain cereal or whole-grain toast part of your daily breakfast regime. When eating out, whether ordering a sandwich or dinner, ask for extra vegetables. When baking, replace part of the white flour with whole-wheat flour. Whole-wheat flour is particularly easy to incorporate into a recipe.     High-Fiber Diet Eating Guide   Food Category   Foods Recommended   Notes   Grains   Whole-grain breads, muffins, bagels, or neto bread Rye bread Whole-wheat crackers or crisp breads Whole-grain or bran cereals Oatmeal, oat bran, or grits Wheat germ Whole-wheat pasta and brown rice   Read the ingredients list on food labels. Look for products that list \"whole\" as the first ingredient (eg, whole-wheat, whole oats). Choose cereals with at least 2 grams of fiber per serving. Vegetables   All vegetables, especially asparagus, bean sprouts, broccoli, [place] sprouts, cabbage, carrots, cauliflower, celery, corn, greens, green beans, green pepper, onions, peas, potatoes (with skin), snow peas, spinach, squash, sweet potatoes, tomatoes, zucchini   For maximum fiber intake, eat the peels of fruits and vegetablesjust be sure to wash them well first.   Fruits   All fruits, especially apples, berries, grapefruits, mangoes, nectarines, oranges, peaches, pears, dried fruits (figs, dates, prunes, raisins)   Choose raw fruits and vegetables over juice, cooked, or cannedraw fruit has more fiber. Dried fruit is also a good source of fiber. Milk   With the exception of yogurt containing inulin (a type of fiber), dairy foods provide little fiber. Add more fiber by topping your yogurt or cottage cheese with fresh fruit, whole grain or bran cereals, nuts, or seeds. Meats and Beans   All beans and peas, especially Garbanzo beans, kidney beans, lentils, lima beans, split peas, and helms beans All nuts and seeds, especially almonds, peanuts, [place] nuts, cashews, peanut butter, walnuts, sesame and sunflower seeds All meat, poultry, fish, and eggs   Increase fiber in meat dishes by adding helms beans, kidney beans, black-eyed peas, bran, or oatmeal. If you are following a low-fat diet, use nuts and seeds only in moderation.    Fats and Oils   All in moderation   Fats and oils do not provide fiber   Snacks, Sweets, and Condiments   Fruit Nuts Popcorn, whole-wheat pretzels, or trail mix made with dried fruits, nuts, and seeds Cakes, breads, and cookies made with oatmeal or whole-wheat flour   Most snack foods do not provide much fiber. Choose snacks with at least 2 grams of fiber per serving. Last Reviewed: March 2011 Eduarda Almodovar MS, MPH, RD   Updated: 3/29/2011   ·     Safer Sex: After Your Visit  Your Care Instructions  Safer sex is a way to reduce your risk of getting an infection spread through sex. It can also help prevent pregnancy. Most infections that are spread through sex, also called sexually transmitted infections or STIs, can be cured. But some can decrease your chances of getting pregnant if they are not treated early. Others, such as herpes, have no cure. And some, such as HIV, can be deadly. Several products can help you practice safer sex and reduce your chance of STIs. One of the best is a condom. There are condoms for men and for women. The female condom is a tube of soft plastic with a closed end that is placed deep into the vagina. You can use a special rubber sheet (dental dam) for protection during oral sex. Latex gloves can keep your hands from touching blood, semen, or other body fluids that can carry infections. Remember that birth control methods such as diaphragms, IUDs, foams, and birth control pills do not stop you from getting STIs. Follow-up care is a key part of your treatment and safety. Be sure to make and go to all appointments, and call your doctor if you are having problems. Its also a good idea to know your test results and keep a list of the medicines you take. How can you care for yourself at home? Think about getting shots to prevent hepatitis A and hepatitis B. These two diseases can be spread through sex. You also can get hepatitis A if you eat infected food. Use condoms or female condoms each time and every time you have sex. Learn the right way to use a male condom:  Condoms come in several sizes. Make sure you use the right size. A condom that is too small can break easily. A condom that is too big can slip off during sex. Use a new condom each time you have sex.   Be careful not to poke a hole in the condom when you open the wrapper. Squeeze the tip of the condom to keep out air. Pull down the loose skin (foreskin) from the head of an uncircumcised penis. While squeezing the tip of the condom, unroll it all the way down to the base of the firm penis. Never use petroleum jelly (such as Vaseline), grease, hand lotion, baby oil, or anything with oil in it. These products can make holes in the condom. After sex, hold the condom on your penis as you remove your penis from your partner. This will keep semen from spilling out of the condom. Learn to use a female condom:  You can put in a female condom up to 8 hours before sex. Squeeze the smaller ring at the closed end and insert it deep into the vagina. The larger ring at the open end should stay outside the vagina. During sex, make sure the penis goes into the condom. After the penis is removed, close the open end of the condom by twisting it. Remove the condom. Do not use a female condom and male condom at the same time. Do not have sex with anyone who has symptoms of an STI, such as sores on the genitals or mouth. The herpes virus that causes cold sores can spread to and from the penis and vagina. Do not drink a lot of alcohol or use drugs before sex. This can cause you to let down your guard and not practice safer sex. Having one sex partner (who does not have STIs and does not have sex with anyone else) is a sure way to avoid STIs. Talk to your partner before you have sex. Find out if he or she has or is at risk for any STI. Keep in mind that a person may be able to spread an STI even if he or she does not have symptoms. You and your partner may want to get an HIV test. You should get tested again 6 months later. © 8893-4278 Healthwise, Incorporated. Care instructions adapted under license by [facility].  This care instruction is for use with your licensed healthcare professional. If you have questions about a Reviewed: April 2010 Hipolito Francis MD   Updated: 4/13/2010   ·     [address] a [facility]       As we get older, changes in balance, gait, strength, vision, hearing, and cognition make even the most youthful senior more prone to accidents. Falls are one of the leading health risks for older people. This increased risk of falling is related to:   Aging process (eg, decreased muscle strength, slowed reflexes)   Higher incidence of chronic health problems (eg, arthritis, diabetes) that may limit mobility, agility or sensory awareness   Side effects of medicine (eg, dizziness, blurred vision)especially medicines like prescription pain medicines and drugs used to treat mental health conditions   Depending on the brittleness of your bones, the consequences of a fall can be serious and long lasting. Home Life   Research by the Association of Aging [facility]) shows that some home accidents among older adults can be prevented by making simple lifestyle changes and basic modifications and repairs to the home environment. Here are some lifestyle changes that experts recommend:   Have your hearing and vision checked regularly. Be sure to wear prescription glasses that are right for you. Speak to your doctor or pharmacist about the possible side effects of your medicines. A number of medicines can cause dizziness. If you have problems with sleep, talk to your doctor. Limit your intake of alcohol. If necessary, use a cane or walker to help maintain your balance. Wear supportive, rubber-soled shoes, even at home. If you live in a region that gets wintry weather, you may want to put special cleats on your shoes to prevent you from slipping on the snow and ice. Exercise regularly to help maintain muscle tone, agility, and balance. Always hold the banister when going up or down stairs. Also, use  bars when getting in or out of the bath or shower, or using the toilet.    To avoid dizziness, get up slowly from a lying down position. Sit up first, dangling your legs for a minute or two before rising to a standing position. Overall Home Safety Check   According to the Consumer [employer] Safety Commision's \"Older Consumer Home Safety Checklist,\" it is important to check for potential hazards in each room. And remember, proper lighting is an essential factor in home safety. If you cannot see clearly, you are more likely to fall. Important questions to ask yourself include:   Are lamp, electric, extension, and telephone cords placed out of the flow of traffic and maintained in good condition? Have frayed cords been replaced? Are all small rugs and runners slip resistant? If not, you can secure them to the floor with a special double-sided carpet tape. Are smoke detectors properly locatedone on every floor of your home and one outside of every sleeping area? Are they in good working order? Are batteries replaced at least once a year? Do you have a well-maintained carbon monoxide detector outside every sleeping are in your home? Does your furniture layout leave plenty of space to maneuver between and around chairs, tables, beds, and sofas? Are hallways, stairs and passages between rooms well lit? Can you reach a lamp without getting out of bed? Are floor surfaces well maintained? Shag rugs, high-pile carpeting, tile floors, and polished wood floors can be particularly slippery. Stairs should always have handrails and be carpeted or fitted with a non-skid tread. Is your telephone easily reachable. Is the cord safely tucked away? Room by Room   According to the Association of Aging, bathrooms and lindsey are the two most potentially hazardous rooms in your home. In the Kitchen    Be sure your stove is in proper working order and always make sure burners and the oven are off before you go out or go to sleep.     Keep pots on the back burners, turn handles away from the front of the stove, and keep stove clean and free of grease build-up. Kitchen ventilation systems and range exhausts should be working properly. Keep flammable objects such as towels and pot holders away from the cooking area except when in use. Make sure kitchen curtains are tied back. Move cords and appliances away from the sink and hot surfaces. If extension cords are needed, install wiring guides so they do not hang over the sink, range, or working areas. Look for coffee pots, kettles and toaster ovens with automatic shut-offs. Keep a mop handy in the kitchen so you can wipe up spills instantly. You should also have a small fire extinguisher. Arrange your kitchen with frequently used items on lower shelves to avoid the need to stand on a stepstool to reach them. Make sure countertops are well-lit to avoid injuries while cutting and preparing food. In the Bathroom    Use a non-slip mat or decals in the tub and shower, since wet, soapy tile or porcelain surfaces are extremely slippery. Make sure bathroom rugs are non-skid or tape them firmly to the floor. Bathtubs should have at least one, preferably two, grab bars, firmly attached to structural supports in the wall. (Do not use built-in soap holders or glass shower doors as grab bars.)    Tub seats fitted with non-slip material on the legs allow you to wash sitting down. For people with limited mobility, bathtub transfer benches allow you to slide safely into the tub. Raised toilet seats and toilet safety rails are helpful for those with knee or hip problems. In the [facility]    Make sure you use a nightlight and that the area around your bed is clear of potential obstacles. Be careful with electric blankets and never go to sleep with a heating pad, which can cause serious burns even if on a low setting. Use fire-resistant mattress covers and pillows, and NEVER smoke in bed. Keep a phone next to the bed that is programmed to dial 911 at the push of a button.       If you have a chronic condition, you may want to sign on with an automatic call-in service. Typically the system includes a small pendant that connects directly to an emergency medical voice-response system. You should also make arrangements to stay in contact with someonefriend, neighbor, family memberon a regular schedule. Fire Prevention   According to the [employer]. (Smoke Alarms for Every) [address] [place] [address], senior citizens are one of the two highest risk groups for death and serious injuries due to residential fires. When cooking, wear short-sleeved items, never a bulky long-sleeved robe. The [facility]'s Safety Checklist for Older Consumers emphasizes the importance of checking basements, garages, workshops and storage areas for fire hazards, such as volatile liquids, piles of old rags or clothing and overloaded circuits. Never smoke in bed or when lying down on a couch or recliner chair. Small portable electric or kerosene heaters are responsible for many home fires and should be used cautiously if at all. If you do use one, be sure to keep them away from flammable materials. In case of fire, make sure you have a pre-established emergency exit plan. Have a professional check your fireplace and other fuel-burning appliances yearly. [employer]   Baby boomers entering the tafoya years will continue to see the development of new products to help older adults live safely and independently in spite of age-related changes. Making Life More Livable  , by Donald Zabala, lists over 1,000 products for \"living well in the mature years,\" such as bathing and mobility aids, household security devices, ergonomically designed knives and peelers, and faucet valves and knobs for temperature control. Medical supply stores and organizations are good sources of information about products that improve your quality of life and insure your safety.      Last Reviewed: November 2009 Domo Hardy MD   Updated: 3/7/2011     ·

## 2021-11-17 NOTE — PROGRESS NOTES
Visit Information    Have you changed or started any medications since your last visit including any over-the-counter medicines, vitamins, or herbal medicines? no   Are you having any side effects from any of your medications? -  no  Have you stopped taking any of your medications? Is so, why? -  no    Have you seen any other physician or provider since your last visit? No  Have you had any other diagnostic tests since your last visit? No  Have you been seen in the emergency room and/or had an admission to a hospital since we last saw you? No  Have you had your routine dental cleaning in the past 6 months? yes     Have you activated your Re-APP account? If not, what are your barriers?  Yes     Patient Care Team:  Gabe San MD as PCP - General (Family Medicine)  Gabe San MD as PCP - [facility]  Oscar Berry MD as Consulting Physician (Ophthalmology)  Manoj Mcallister MD as Consulting Physician (Cardiology)  Franklyn Brooks as Consulting Physician (Neurology)  Mone Belle MD as Consulting Physician (Urology)  Jay Smith MD as Consulting Physician (Nephrology)  Wagner Dubois MD as Consulting Physician (Internal Medicine Cardiovascular Disease)  Alba Encarnacion MD as Consulting Physician (Gastroenterology)  Brendan Siu MD as Consulting Physician (Hematology and Oncology)  Cindy Mak MD as Surgeon (Orthopedic Surgery)    Medical History Review  Past Medical, Family, and Social History reviewed and does contribute to the patient presenting condition    Health Maintenance   Topic Date Due    DTaP/Tdap/Td vaccine (1 - Tdap) Never done    Shingles Vaccine (2 of 3) 11/26/2017    Annual Wellness Visit (AWV)  09/03/2020    Lipid screen  03/17/2022    Potassium monitoring  08/30/2022    Creatinine monitoring  08/30/2022    Flu vaccine  Completed    Pneumococcal 65+ years Vaccine  Completed    COVID-19 Vaccine  Completed    Hepatitis A vaccine  Aged Out  Hib vaccine  Aged Out    Meningococcal (ACWY) vaccine  Aged Out

## 2021-11-17 NOTE — RESULT ENCOUNTER NOTE
Addressed during office visit today, A1c 7.6, improved diabetes, continue treatment recommended during the office visit.

## 2021-11-18 ENCOUNTER — TELEPHONE (OUTPATIENT)
Dept: FAMILY MEDICINE CLINIC | Age: 86
End: 2021-11-18

## 2021-11-18 DIAGNOSIS — E11.22 TYPE 2 DIABETES MELLITUS WITH STAGE 2 CHRONIC KIDNEY DISEASE, WITHOUT LONG-TERM CURRENT USE OF INSULIN (HCC): ICD-10-CM

## 2021-11-18 DIAGNOSIS — I49.5 SSS (SICK SINUS SYNDROME) (HCC): ICD-10-CM

## 2021-11-18 DIAGNOSIS — N18.2 TYPE 2 DIABETES MELLITUS WITH STAGE 2 CHRONIC KIDNEY DISEASE, WITHOUT LONG-TERM CURRENT USE OF INSULIN (HCC): ICD-10-CM

## 2021-11-18 DIAGNOSIS — Z95.1 HISTORY OF CORONARY ARTERY BYPASS GRAFT X 3: ICD-10-CM

## 2021-11-18 DIAGNOSIS — I50.32 CHRONIC DIASTOLIC HEART FAILURE (HCC): Primary | ICD-10-CM

## 2021-11-18 NOTE — TELEPHONE ENCOUNTER
Patient states she forgot to ask you at her appointment yesterday if she could get a handicap placard. Please advise.

## 2021-11-18 NOTE — TELEPHONE ENCOUNTER
Completed     Diagnosis Orders   1. Chronic diastolic heart failure (HCC)  Handicap Placard MISC   2. Type 2 diabetes mellitus with stage 2 chronic kidney disease, without long-term current use of insulin (HCC)  Handicap Placard MISC   3. SSS (sick sinus syndrome) (HCC)  Handicap Placard MISC   4.  History of coronary artery bypass graft x 3  Handicap Placard MISC        Future Appointments   Date Time Provider Sherly Pitt   12/1/2021  1:15 PM [facility] [id number] [address] Radiolog   12/8/2021  1:15 PM Halle Lewis MD [address] [place] [address]   4/7/2022  4:00 PM [facility]   11/22/2022  9:00 AM Lenore Edmonds MD [facility]

## 2021-12-01 ENCOUNTER — HOSPITAL ENCOUNTER (OUTPATIENT)
Dept: WOMENS IMAGING | Age: 86
Discharge: HOME | End: 2021-12-03
Payer: MEDICARE

## 2021-12-01 DIAGNOSIS — Z12.39 SCREENING BREAST EXAMINATION: ICD-10-CM

## 2021-12-01 PROCEDURE — 77063 BREAST TOMOSYNTHESIS BI: CPT

## 2021-12-14 ENCOUNTER — HOSPITAL ENCOUNTER (OUTPATIENT)
Age: 86
Setting detail: SPECIMEN
Discharge: HOME | End: 2021-12-14

## 2021-12-14 DIAGNOSIS — D47.2 MGUS (MONOCLONAL GAMMOPATHY OF UNKNOWN SIGNIFICANCE): ICD-10-CM

## 2021-12-14 DIAGNOSIS — Z86.711 HISTORY OF PULMONARY EMBOLISM: ICD-10-CM

## 2021-12-14 DIAGNOSIS — E61.1 IRON DEFICIENCY: ICD-10-CM

## 2021-12-14 DIAGNOSIS — D64.9 ANEMIA, UNSPECIFIED TYPE: ICD-10-CM

## 2021-12-14 LAB
ABSOLUTE EOS #: 0.38 K/UL (ref 0–0.44)
ABSOLUTE IMMATURE GRANULOCYTE: 0.03 K/UL (ref 0–0.3)
ABSOLUTE LYMPH #: 2.31 K/UL (ref 1.1–3.7)
ABSOLUTE MONO #: 0.8 K/UL (ref 0.1–1.2)
ALBUMIN SERPL-MCNC: 4.3 G/DL (ref 3.5–5.2)
ALBUMIN/GLOBULIN RATIO: 1.4 (ref 1–2.5)
ALP BLD-CCNC: 62 U/L (ref 35–104)
ALT SERPL-CCNC: 27 U/L (ref 5–33)
ANION GAP SERPL CALCULATED.3IONS-SCNC: 15 MMOL/L (ref 9–17)
AST SERPL-CCNC: 33 U/L
BASOPHILS # BLD: 1 % (ref 0–2)
BASOPHILS ABSOLUTE: 0.05 K/UL (ref 0–0.2)
BILIRUB SERPL-MCNC: 1.24 MG/DL (ref 0.3–1.2)
BUN BLDV-MCNC: 16 MG/DL (ref 8–23)
BUN/CREAT BLD: ABNORMAL (ref 9–20)
CALCIUM SERPL-MCNC: 9.7 MG/DL (ref 8.6–10.4)
CHLORIDE BLD-SCNC: 101 MMOL/L (ref 98–107)
CO2: 25 MMOL/L (ref 20–31)
CREAT SERPL-MCNC: 0.64 MG/DL (ref 0.5–0.9)
DIFFERENTIAL TYPE: NORMAL
EOSINOPHILS RELATIVE PERCENT: 4 % (ref 1–4)
FERRITIN: 92 UG/L (ref 13–150)
FREE KAPPA/LAMBDA RATIO: 1.29 (ref 0.26–1.65)
GFR AFRICAN AMERICAN: >60 ML/MIN
GFR NON-AFRICAN AMERICAN: >60 ML/MIN
GFR SERPL CREATININE-BSD FRML MDRD: ABNORMAL ML/MIN/{1.73_M2}
GFR SERPL CREATININE-BSD FRML MDRD: ABNORMAL ML/MIN/{1.73_M2}
GLUCOSE BLD-MCNC: 211 MG/DL (ref 70–99)
HCT VFR BLD CALC: 42.7 % (ref 36.3–47.1)
HEMOGLOBIN: 13.6 G/DL (ref 11.9–15.1)
IMMATURE GRANULOCYTES: 0 %
IRON SATURATION: 42 % (ref 20–55)
IRON: 126 UG/DL (ref 37–145)
KAPPA FREE LIGHT CHAINS QNT: 2.19 MG/DL (ref 0.37–1.94)
LAMBDA FREE LIGHT CHAINS QNT: 1.7 MG/DL (ref 0.57–2.63)
LYMPHOCYTES # BLD: 26 % (ref 24–43)
MCH RBC QN AUTO: 30.5 PG (ref 25.2–33.5)
MCHC RBC AUTO-ENTMCNC: 31.9 G/DL (ref 28.4–34.8)
MCV RBC AUTO: 95.7 FL (ref 82.6–102.9)
MONOCYTES # BLD: 9 % (ref 3–12)
NRBC AUTOMATED: 0 PER 100 WBC
PDW BLD-RTO: 14 % (ref 11.8–14.4)
PLATELET # BLD: 252 K/UL (ref 138–453)
PLATELET ESTIMATE: NORMAL
PMV BLD AUTO: 11 FL (ref 8.1–13.5)
POTASSIUM SERPL-SCNC: 5 MMOL/L (ref 3.7–5.3)
RBC # BLD: 4.46 M/UL (ref 3.95–5.11)
RBC # BLD: NORMAL 10*6/UL
SEG NEUTROPHILS: 60 % (ref 36–65)
SEGMENTED NEUTROPHILS ABSOLUTE COUNT: 5.48 K/UL (ref 1.5–8.1)
SODIUM BLD-SCNC: 141 MMOL/L (ref 135–144)
TOTAL IRON BINDING CAPACITY: 302 UG/DL (ref 250–450)
TOTAL PROTEIN: 7.3 G/DL (ref 6.4–8.3)
UNSATURATED IRON BINDING CAPACITY: 176 UG/DL (ref 112–347)
WBC # BLD: 9.1 K/UL (ref 3.5–11.3)
WBC # BLD: NORMAL 10*3/UL

## 2021-12-16 LAB
ALBUMIN (CALCULATED): 4.4 G/DL (ref 3.2–5.2)
ALBUMIN PERCENT: 65 % (ref 45–65)
ALPHA 1 PERCENT: 3 % (ref 3–6)
ALPHA 2 PERCENT: 12 % (ref 6–13)
ALPHA-1-GLOBULIN: 0.2 G/DL (ref 0.1–0.4)
ALPHA-2-GLOBULIN: 0.8 G/DL (ref 0.5–0.9)
BETA GLOBULIN: 0.7 G/DL (ref 0.5–1.1)
BETA PERCENT: 11 % (ref 11–19)
GAMMA GLOBULIN %: 10 % (ref 9–20)
GAMMA GLOBULIN: 0.7 G/DL (ref 0.5–1.5)
PATHOLOGIST: NORMAL
PATHOLOGIST: NORMAL
PROTEIN ELECTROPHORESIS, SERUM: NORMAL
SERUM IFX INTERP: NORMAL
TOTAL PROT. SUM,%: 101 % (ref 98–102)
TOTAL PROT. SUM: 6.8 G/DL (ref 6.3–8.2)
TOTAL PROTEIN: 6.8 G/DL (ref 6.4–8.3)

## 2021-12-17 ENCOUNTER — OFFICE VISIT (OUTPATIENT)
Dept: ONCOLOGY | Age: 86
End: 2021-12-17
Payer: MEDICARE

## 2021-12-17 ENCOUNTER — TELEPHONE (OUTPATIENT)
Dept: ONCOLOGY | Age: 86
End: 2021-12-17

## 2021-12-17 VITALS
SYSTOLIC BLOOD PRESSURE: 160 MMHG | WEIGHT: 193.6 LBS | DIASTOLIC BLOOD PRESSURE: 75 MMHG | TEMPERATURE: 96.5 F | BODY MASS INDEX: 28.59 KG/M2 | HEART RATE: 60 BPM

## 2021-12-17 DIAGNOSIS — E61.1 IRON DEFICIENCY: ICD-10-CM

## 2021-12-17 DIAGNOSIS — D47.2 MGUS (MONOCLONAL GAMMOPATHY OF UNKNOWN SIGNIFICANCE): ICD-10-CM

## 2021-12-17 DIAGNOSIS — D64.9 ANEMIA, UNSPECIFIED TYPE: Primary | ICD-10-CM

## 2021-12-17 DIAGNOSIS — Z86.711 HISTORY OF PULMONARY EMBOLISM: ICD-10-CM

## 2021-12-17 PROCEDURE — 4040F PNEUMOC VAC/ADMIN/RCVD: CPT | Performed by: INTERNAL MEDICINE

## 2021-12-17 PROCEDURE — G8417 CALC BMI ABV UP PARAM F/U: HCPCS | Performed by: INTERNAL MEDICINE

## 2021-12-17 PROCEDURE — 99214 OFFICE O/P EST MOD 30 MIN: CPT | Performed by: INTERNAL MEDICINE

## 2021-12-17 PROCEDURE — 1036F TOBACCO NON-USER: CPT | Performed by: INTERNAL MEDICINE

## 2021-12-17 PROCEDURE — 1123F ACP DISCUSS/DSCN MKR DOCD: CPT | Performed by: INTERNAL MEDICINE

## 2021-12-17 PROCEDURE — 1090F PRES/ABSN URINE INCON ASSESS: CPT | Performed by: INTERNAL MEDICINE

## 2021-12-17 PROCEDURE — G8427 DOCREV CUR MEDS BY ELIG CLIN: HCPCS | Performed by: INTERNAL MEDICINE

## 2021-12-17 PROCEDURE — 99211 OFF/OP EST MAY X REQ PHY/QHP: CPT | Performed by: INTERNAL MEDICINE

## 2021-12-17 PROCEDURE — G8484 FLU IMMUNIZE NO ADMIN: HCPCS | Performed by: INTERNAL MEDICINE

## 2021-12-17 RX ORDER — FERROUS SULFATE 325(65) MG
325 TABLET ORAL EVERY OTHER DAY
Qty: 45 TABLET | Refills: 1 | Status: SHIPPED | OUTPATIENT
Start: 2021-12-17 | End: 2022-04-07 | Stop reason: ALTCHOICE

## 2021-12-17 NOTE — PROGRESS NOTES
Melissa Mejia                                                                                                                  12/17/2021  MRN:   [id number]  YOB: 1933  PCP:                           Love Corrales MD  Referring Physician: No ref. provider found  Treating Physician Name: Sherrill No MD      Reason for consultation:  Chief Complaint   Patient presents with    Follow-up     review status of disease    Discuss Labs   Establish care and for further work-up and treatment recommendations    Current problems:  Anemia  Iron deficiency  B12 deficiency  GI bleed  History of pulmonary embolism on chronic anticoagulation  Coronary artery disease  History of colon cancer 2006 s/p surgery with no adjuvant chemotherapy    Active and recent treatments:  Oral B12 supplementation  Oral iron    Summary of Case/History:    Melissa Mejia a 80 y. o.female is a patient with anemia. Recent lab work showed iron deficiency anemia with fluctuations in HGB dating back to 2013 and has been consistently low since 08/2020 with worsening. She was in house 03/2021 following last lab work and dark stool, she underwent EGD and colonoscopy, gastritis and benign polyps found, negative otherwise. She received some IV iron while in house. Her lab work also showed paraproteinemia. She has history of PE and takes Xarelto and was taken off Brilinta while in house. She is taking B12 and has been taken off of Fosamax. She has history of cardiac stenting and follows with cardiology. She has history of colon cancer and underwent resection in 2006, no recurrence. She reports history of kidney stones and feels she passed one yesterday, 04/06/2021. She has resumed regular Dionte-Chi. Interim History: Patient presents to the clinic for a follow-up visit and to discuss results of her lab work-up. Patient is on iron supplements. Iron stores have improved. Hemoglobin has improved as well.   Patient tolerating anticoagulation without any adverse events. M protein continues to be stable. Jaquelin Records Her EGD and coloscopy done 03/2021 did not show any obvious source of bleeding but showed gastric polyps. During this visit patient's allergy, social, medical, surgical history and medications were reviewed and updated. Past Medical History:   Past Medical History:   Diagnosis Date    Arthritis     Basal cell carcinoma of nose     Bronchitis     HX. OF     CAD (coronary artery disease)     Carpal tunnel syndrome     left hand    Chronic diastolic heart failure ([address] 3/25/2021    CKD (chronic kidney disease), stage III (HCC) 8/7/2019    Colon cancer ([address]     Diabetes mellitus ([address]     DVT (deep venous thrombosis) ([address] 7/10/2019    GI bleed 3/17/2021    Gout     Hematuria     History of coronary artery bypass graft x 3 7/2/2020    Hx of blood clots     ED.  LEGS, ED. 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 of stenting x 3.    APPENDECTOMY      BREAST SURGERY      INFECTED MILK DUCT LT.    CARDIAC SURGERY      CABG x 3 vessels and 3 stents    CAROTID ENDARTERECTOMY Left 3-3-16    CARPAL TUNNEL RELEASE Right     CHOLECYSTECTOMY      COLON SURGERY      colectomy, PRECANCEROUS POLYPS    COLONOSCOPY      X5, HX PRECANCEROUS POLYPS    COLONOSCOPY N/A 3/19/2021    COLONOSCOPY POLYPECTOMY REMOVAL HOT SNARE performed by Ramona Clayton MD at [address] [facility]RAFT      X3 vessels    CYST REMOVAL  10/07/2016    EXCISION OF SEBACEOUS CYST REMOVED FROM BACK X3    CYSTOSCOPY Bilateral 1/21/2020    CYSTOSCOPY RETROGRADE PYELOGRAM performed by Diane Jones MD at [address], ESOPHAGUS      FINGER TRIGGER RELEASE Right     INDEX FINGER AND THUMB.    HYSTERECTOMY      JOINT REPLACEMENT Left 03/29/2010    TKA    JOINT REPLACEMENT Right 02/16/1999    TKA    LITHOTRIPSY      X 3    SKIN BIOPSY      TOP OF NOSE (BASAL CELL)    UPPER GASTROINTESTINAL ENDOSCOPY N/A 3/19/2021    EGD BIOPSY performed by Ramona Clayton MD at [facility] [address]      vein ablation on legs       Patient Family Social History:     Social History     Socioeconomic History    Marital status:      Spouse name: None    Number of children: None    Years of education: None    Highest education level: None   Occupational History    None   Tobacco Use    Smoking status: Never Smoker    Smokeless tobacco: Never Used   Vaping Use    Vaping Use: Never used   Substance and Sexual Activity    Alcohol use: No    Drug use: No    Sexual activity: Not Currently     Partners: Male   Other Topics Concern    None   Social History Narrative    None     Social Determinants of Health     Financial Resource Strain: Low Risk     Difficulty of Paying Living Expenses: Not very hard   Food Insecurity: No Food Insecurity    Worried About Running Out of Food in the Last Year: Never true    Citlaly of Food in the Last Year: Never true   Transportation Needs: No Transportation Needs    Lack of Transportation (Medical): No    Lack of Transportation (Non-Medical): No   Physical Activity: Insufficiently Active    Days of Exercise per Week: 2 days    Minutes of Exercise per Session: 60 min   Stress: No Stress Concern Present    Feeling of Stress :  Only a little   Social Connections: Moderately Isolated    Frequency of Communication with Friends and Family: More than three times a week    Frequency of Social Gatherings with Friends and Family: More than three times a week    Attends Confucianism Services: Never    Active Member of Clubs or Organizations: Yes    Attends Club or Organization Meetings: More than 4 times per year    Marital Status:    Intimate Partner Violence:     Fear of Current or Ex-Partner: Not on file    Emotionally Abused: Not on file    Physically Abused: Not on file    Sexually Abused: Not on file   Housing Stability: [address] Unable to Pay for Housing in the Last Year: No    Number of Places Lived in the Last Year: 1    Unstable Housing in the Last Year: No     Family History   Problem Relation Age of Onset    Stroke Mother     Hypertension Mother     Heart Disease Father         AAA    Stroke Sister     Parkinsonism Brother     Cancer Sister         lung    Alcohol Abuse Sister        Current Medications:     Current Outpatient Medications   Medication Sig Dispense Refill    Handicap Placard MISC by Does not apply route Can't walk greater than 200 feet. Expires in 5 years. 1 each 0    FreeStyle Lancets MISC USE TO TEST BLOOD SUGAR TWICE A  each 3    blood glucose monitor strips Testing once a day. Please dispense according to patients insurance. 300 strip 3    Lancet Device MISC For use with lancets for blood glucose checks 1 each 0    mirtazapine (REMERON) 7.5 MG tablet Take 1-2 tablets by mouth nightly For insomnia and anxiety. Call for refills or dose adjustment.  60 tablet 0    XARELTO 20 MG TABS tablet TAKE 1 TABLET DAILY WITH BREAKFAST 90 tablet 3    Semaglutide 3 MG TABS Take 3 mg by mouth daily 1st step 90 tablet 3    ferrous sulfate (IRON 325) 325 (65 Fe) MG tablet Take 325 mg by mouth every other day      ezetimibe (ZETIA) 10 MG tablet TAKE 1 TABLET NIGHTLY 90 tablet 3    amLODIPine (NORVASC) 2.5 MG tablet TAKE 1 TABLET DAILY 90 tablet 3    allopurinol (ZYLOPRIM) 100 MG tablet TAKE 1 TABLET TWICE A  tablet 3    metoprolol tartrate (LOPRESSOR) 25 MG tablet TAKE 1 TABLET TWICE A  tablet 3    famotidine (PEPCID) 40 MG tablet Take 1 tablet by mouth every evening 90 tablet 3    atorvastatin (LIPITOR) 80 MG tablet TAKE 1 TABLET NIGHTLY 90 tablet 3    glimepiride (AMARYL) 4 MG tablet TAKE 1 TABLET TWICE A  tablet 3    ascorbic acid (V-R VITAMIN C) 250 MG tablet Take 1 tablet by mouth 2 times daily 60 tablet 3    cyanocobalamin ([employer] VITAMIN B12) 1000 MCG tablet Take 1 tablet by mouth daily 30 tablet 0    potassium chloride (KLOR-CON M) 20 MEQ extended release tablet Take 1 tablet by mouth daily 90 tablet 3    Blood Glucose Monitoring Suppl (FREESTYLE LITE) SARY use as directed  0    nitroGLYCERIN (NITROSTAT) 0.4 MG SL tablet Place 1 tablet under the tongue every 5 minutes as needed for Chest pain 25 tablet 1    Biotin 300 MCG TABS Take 600 mcg by mouth every other day       Cholecalciferol (VITAMIN D3) 1000 UNITS TABS Take 1 tablet by mouth daily      furosemide (LASIX) 40 MG tablet TAKE 1 TABLET DAILY (Patient not taking: Reported on 12/17/2021) 90 tablet 3     No current facility-administered medications for this visit. Allergies:   Brilinta [ticagrelor], Ampicillin, Clopidogrel bisulfate, Diovan [valsartan], Lantus [insulin glargine], and Metformin and related    Review of Systems:    Constitutional: No fever or chills.  No night sweats, no weight loss   Eyes: No eye discharge, double vision, or eye pain   HEENT: negative for sore mouth, sore throat, hoarseness and voice change   Respiratory: negative for cough , sputum, dyspnea, wheezing, hemoptysis, chest pain   Cardiovascular: negative for chest pain, dyspnea, palpitations, orthopnea, PND   Gastrointestinal: negative for nausea, vomiting, diarrhea, constipation, abdominal pain, Dysphagia, hematemesis and hematochezia +dark stool with oral iron  Genitourinary: negative for frequency, dysuria, nocturia, urinary incontinence, and hematuria   Integument: negative for rash, skin lesions, bruises. Hematologic/Lymphatic: negative for easy bruising, bleeding, lymphadenopathy, or petechiae   Endocrine: negative for heat or cold intolerance,weight changes, change in bowel habits and hair loss   Musculoskeletal: negative for myalgias, arthralgias, pain, joint swelling,and bone pain   Neurological: negative for headaches, dizziness, seizures, weakness, numbness; +burining pain in left hand.          Physical Exam:    Vitals: BP (!) 160/75   Pulse 60   Temp 96.5 °F (35.8 °C) (Temporal)   Wt 193 lb 9.6 oz (87.8 kg)   BMI 28.59 kg/m²   General appearance - well appearing, no in pain or distress  Mental status - AAO X3  Eyes - pupils equal and reactive, extraocular eye movements intact  Mouth - mucous membranes moist, pharynx normal without lesions  Neck - supple, no significant adenopathy  Lymphatics - no palpable lymphadenopathy, no hepatosplenomegaly  Chest - clear to auscultation, no wheezes, rales or rhonchi, symmetric air entry  Heart - normal rate, regular rhythm, normal S1, S2, no murmurs  Abdomen - soft, nontender, nondistended, no masses or organomegaly  Neurological - alert, oriented, normal speech, no focal findings or movement disorder noted  Extremities - peripheral pulses normal, no pedal edema, no clubbing or cyanosis  Skin - normal coloration and turgor, no rashes, no suspicious skin lesions noted       DATA:    CBC:   Recent Labs     12/14/21  1406   WBC 9.1   HGB 13.6        BMP:    Recent Labs     12/14/21  1406      K 5.0      CO2 25   BUN 16   CREATININE 0.64   GLUCOSE 211*     Results for orders placed or performed during the hospital encounter of 12/14/21   CBC Auto Differential   Result Value Ref Range    WBC 9.1 3.5 - 11.3 k/uL    RBC 4.46 3.95 - 5.11 m/uL    Hemoglobin 13.6 11.9 - 15.1 g/dL    Hematocrit 42.7 36.3 - 47.1 %    MCV 95.7 82.6 - 102.9 fL    MCH 30.5 25.2 - 33.5 pg    MCHC 31.9 28.4 - 34.8 g/dL    RDW 14.0 11.8 - 14.4 %    Platelets 787 545 - 837 k/uL    MPV 11.0 8.1 - 13.5 fL    NRBC Automated 0.0 0.0 per 100 WBC    Differential Type NOT REPORTED     Seg Neutrophils 60 36 - 65 %    Lymphocytes 26 24 - 43 %    Monocytes 9 3 - 12 %    Eosinophils % 4 1 - 4 %    Basophils 1 0 - 2 %    Immature Granulocytes 0 0 %    Segs Absolute 5.48 1.50 - 8.10 k/uL    Absolute Lymph # 2.31 1.10 - 3.70 k/uL    Absolute Mono # 0.80 0.10 - 1.20 k/uL    Absolute Eos # 0.38 0.00 - 0.44 k/uL    Basophils Absolute 0.05 0.00 - 0.20 k/uL    Absolute Immature Granulocyte 0.03 0.00 - 0.30 k/uL    WBC Morphology NOT REPORTED     RBC Morphology NOT REPORTED     Platelet Estimate NOT REPORTED    Comprehensive Metabolic Panel   Result Value Ref Range    Glucose 211 (H) 70 - 99 mg/dL    BUN 16 8 - 23 mg/dL    CREATININE 0.64 0.50 - 0.90 mg/dL    Bun/Cre Ratio NOT REPORTED 9 - 20    Calcium 9.7 8.6 - 10.4 mg/dL    Sodium 141 135 - 144 mmol/L    Potassium 5.0 3.7 - 5.3 mmol/L    Chloride 101 98 - 107 mmol/L    CO2 25 20 - 31 mmol/L    Anion Gap 15 9 - 17 mmol/L    Alkaline Phosphatase 62 35 - 104 U/L    ALT 27 5 - 33 U/L    AST 33 (H) <32 U/L    Total Bilirubin 1.24 (H) 0.3 - 1.2 mg/dL    Total Protein 7.3 6.4 - 8.3 g/dL    Albumin 4.3 3.5 - 5.2 g/dL    Albumin/Globulin Ratio 1.4 1.0 - 2.5    GFR Non-African American >60 >60 mL/min    GFR African American >60 >60 mL/min    GFR Comment          GFR Staging NOT REPORTED    [place]/Lambda Free Lt Chains, Serum Quant   Result Value Ref Range    Kappa Free Light Chains QNT 2.19 (H) 0.37 - 1.94 mg/dL    Lambda Free Light Chains QNT 1.70 0.57 - 2.63 mg/dL    Free Kappa/Lambda Ratio 1.29 0.26 - 1.65   Electrophoresis Protein, Serum without Reflex to Immunofixation   Result Value Ref Range    Total Protein 6.8 6.4 - 8.3 g/dL    Albumin (calculated) 4.4 3.2 - 5.2 g/dL    Albumin % 65 45 - 65 % disease  History of colon cancer 2006 s/p surgery with no adjuvant chemotherapy  Paraproteinemia, likely MGUS    Plan:  Personally reviewed results of lab work-up and other relevant clinical data. Iron stores have improved. Patient now has adequate iron stores. Recommend changing iron to 1 tablet every other day now. Continue surveillance for paraproteinemia. Patient likely has MGUS. No plans for bone marrow biopsy. Patient is agreeable with surveillance for MGUS  Patient tolerating anticoagulation without any adverse events. Return to clinic in 6 months with lab work-up. Elif Kraft MD        This note is created with the assistance of a speech recognition program.  While intending to generate a document that actually reflects the content of the visit, the document can still have some errors including those of syntax and sound a like substitutions which may escape proof reading. It such instances, actual meaning can be extrapolated by contextual diversion.

## 2021-12-17 NOTE — TELEPHONE ENCOUNTER
AVS from 12/17/21    rv in 6 months with labs prior     RV scheduled 6/8/22 @ 10:30am    PT will have labs drawn one week prior to return visit    PT was given AVS and an appt schedule    Electronically signed by Analisa Forrester on 12/17/2021 at 1:28 PM

## 2022-01-31 DIAGNOSIS — I12.9 BENIGN HYPERTENSION WITH CKD (CHRONIC KIDNEY DISEASE) STAGE III (HCC): ICD-10-CM

## 2022-01-31 DIAGNOSIS — N18.30 BENIGN HYPERTENSION WITH CKD (CHRONIC KIDNEY DISEASE) STAGE III (HCC): ICD-10-CM

## 2022-01-31 RX ORDER — POTASSIUM CHLORIDE 20 MEQ/1
20 TABLET, EXTENDED RELEASE ORAL DAILY
Qty: 90 TABLET | Refills: 3 | Status: SHIPPED | OUTPATIENT
Start: 2022-01-31

## 2022-03-21 RX ORDER — FAMOTIDINE 40 MG/1
TABLET, FILM COATED ORAL
Qty: 90 TABLET | Refills: 3 | Status: SHIPPED | OUTPATIENT
Start: 2022-03-21

## 2022-04-04 ENCOUNTER — HOSPITAL ENCOUNTER (OUTPATIENT)
Age: 87
Setting detail: SPECIMEN
Discharge: HOME | End: 2022-04-04

## 2022-04-04 DIAGNOSIS — N18.2 TYPE 2 DIABETES MELLITUS WITH STAGE 2 CHRONIC KIDNEY DISEASE, WITHOUT LONG-TERM CURRENT USE OF INSULIN (HCC): ICD-10-CM

## 2022-04-04 DIAGNOSIS — E11.22 TYPE 2 DIABETES MELLITUS WITH STAGE 2 CHRONIC KIDNEY DISEASE, WITHOUT LONG-TERM CURRENT USE OF INSULIN (HCC): ICD-10-CM

## 2022-04-04 LAB
ALBUMIN SERPL-MCNC: 4.4 G/DL (ref 3.5–5.2)
ALBUMIN/GLOBULIN RATIO: 1.7 (ref 1–2.5)
ALP BLD-CCNC: 55 U/L (ref 35–104)
ALT SERPL-CCNC: 25 U/L (ref 5–33)
ANION GAP SERPL CALCULATED.3IONS-SCNC: 10 MMOL/L (ref 9–17)
AST SERPL-CCNC: 24 U/L
BILIRUB SERPL-MCNC: 1.4 MG/DL (ref 0.3–1.2)
BUN BLDV-MCNC: 16 MG/DL (ref 8–23)
CALCIUM SERPL-MCNC: 9.5 MG/DL (ref 8.6–10.4)
CHLORIDE BLD-SCNC: 102 MMOL/L (ref 98–107)
CO2: 30 MMOL/L (ref 20–31)
CREAT SERPL-MCNC: 0.64 MG/DL (ref 0.5–0.9)
FOLATE: 17.1 NG/ML
GFR AFRICAN AMERICAN: >60 ML/MIN
GFR NON-AFRICAN AMERICAN: >60 ML/MIN
GFR SERPL CREATININE-BSD FRML MDRD: ABNORMAL ML/MIN/{1.73_M2}
GLUCOSE BLD-MCNC: 161 MG/DL (ref 70–99)
HCT VFR BLD CALC: 42.7 % (ref 36.3–47.1)
HEMOGLOBIN: 13.8 G/DL (ref 11.9–15.1)
MAGNESIUM: 1.9 MG/DL (ref 1.6–2.6)
MCH RBC QN AUTO: 30.2 PG (ref 25.2–33.5)
MCHC RBC AUTO-ENTMCNC: 32.3 G/DL (ref 28.4–34.8)
MCV RBC AUTO: 93.4 FL (ref 82.6–102.9)
NRBC AUTOMATED: 0 PER 100 WBC
PDW BLD-RTO: 14.2 % (ref 11.8–14.4)
PHOSPHORUS: 2.9 MG/DL (ref 2.6–4.5)
PLATELET # BLD: 210 K/UL (ref 138–453)
PMV BLD AUTO: 11 FL (ref 8.1–13.5)
POTASSIUM SERPL-SCNC: 4.2 MMOL/L (ref 3.7–5.3)
RBC # BLD: 4.57 M/UL (ref 3.95–5.11)
SODIUM BLD-SCNC: 142 MMOL/L (ref 135–144)
TOTAL PROTEIN: 7 G/DL (ref 6.4–8.3)
TSH SERPL DL<=0.05 MIU/L-ACNC: 2.82 UIU/ML (ref 0.3–5)
VITAMIN B-12: 1313 PG/ML (ref 232–1245)
WBC # BLD: 9.3 K/UL (ref 3.5–11.3)

## 2022-04-05 NOTE — RESULT ENCOUNTER NOTE
Please notify patient: Blood glucose mildly high 161   Improved liver function tests    Otherwise labs within normal limits  continue current treatment      Lab Results       Component                Value               Date                       LABA1C                   7.6                 11/17/2021                 LABA1C                   8.0                 08/11/2021                 LABA1C                   7.4                 03/16/2021                Future Appointments  4/7/2022   4:00 PM    Effie Mahmood MD     [facility]  6/8/2022   10:30 AM   Austin Carrillo MD          [address]  11/22/2022 9:00 AM    Effie Mahmood MD     [facility]

## 2022-04-07 ENCOUNTER — OFFICE VISIT (OUTPATIENT)
Dept: FAMILY MEDICINE CLINIC | Age: 87
End: 2022-04-07
Payer: MEDICARE

## 2022-04-07 VITALS
WEIGHT: 195.2 LBS | BODY MASS INDEX: 28.91 KG/M2 | HEIGHT: 69 IN | HEART RATE: 66 BPM | SYSTOLIC BLOOD PRESSURE: 128 MMHG | DIASTOLIC BLOOD PRESSURE: 72 MMHG | OXYGEN SATURATION: 95 % | TEMPERATURE: 96.4 F

## 2022-04-07 DIAGNOSIS — H61.23 BILATERAL IMPACTED CERUMEN: ICD-10-CM

## 2022-04-07 DIAGNOSIS — L98.9 SKIN LESION OF RIGHT ARM: ICD-10-CM

## 2022-04-07 DIAGNOSIS — Z85.038 HISTORY OF COLON CANCER: ICD-10-CM

## 2022-04-07 DIAGNOSIS — I50.32 CHRONIC DIASTOLIC HEART FAILURE (HCC): ICD-10-CM

## 2022-04-07 DIAGNOSIS — R15.2 INCONTINENCE OF FECES WITH FECAL URGENCY: ICD-10-CM

## 2022-04-07 DIAGNOSIS — E11.22 TYPE 2 DIABETES MELLITUS WITH STAGE 2 CHRONIC KIDNEY DISEASE, WITHOUT LONG-TERM CURRENT USE OF INSULIN (HCC): Primary | ICD-10-CM

## 2022-04-07 DIAGNOSIS — N18.2 TYPE 2 DIABETES MELLITUS WITH STAGE 2 CHRONIC KIDNEY DISEASE, WITHOUT LONG-TERM CURRENT USE OF INSULIN (HCC): Primary | ICD-10-CM

## 2022-04-07 DIAGNOSIS — I12.9 BENIGN HYPERTENSION WITH CKD (CHRONIC KIDNEY DISEASE), STAGE II: ICD-10-CM

## 2022-04-07 DIAGNOSIS — N18.2 BENIGN HYPERTENSION WITH CKD (CHRONIC KIDNEY DISEASE), STAGE II: ICD-10-CM

## 2022-04-07 DIAGNOSIS — E78.5 HYPERLIPIDEMIA WITH TARGET LDL LESS THAN 70: ICD-10-CM

## 2022-04-07 DIAGNOSIS — R15.9 INCONTINENCE OF FECES WITH FECAL URGENCY: ICD-10-CM

## 2022-04-07 LAB — HBA1C MFR BLD: 7.2 %

## 2022-04-07 PROCEDURE — 3051F HG A1C>EQUAL 7.0%<8.0%: CPT | Performed by: FAMILY MEDICINE

## 2022-04-07 PROCEDURE — 99214 OFFICE O/P EST MOD 30 MIN: CPT | Performed by: FAMILY MEDICINE

## 2022-04-07 PROCEDURE — 4040F PNEUMOC VAC/ADMIN/RCVD: CPT | Performed by: FAMILY MEDICINE

## 2022-04-07 PROCEDURE — 1036F TOBACCO NON-USER: CPT | Performed by: FAMILY MEDICINE

## 2022-04-07 PROCEDURE — 1090F PRES/ABSN URINE INCON ASSESS: CPT | Performed by: FAMILY MEDICINE

## 2022-04-07 PROCEDURE — G8427 DOCREV CUR MEDS BY ELIG CLIN: HCPCS | Performed by: FAMILY MEDICINE

## 2022-04-07 PROCEDURE — 1123F ACP DISCUSS/DSCN MKR DOCD: CPT | Performed by: FAMILY MEDICINE

## 2022-04-07 PROCEDURE — 83036 HEMOGLOBIN GLYCOSYLATED A1C: CPT | Performed by: FAMILY MEDICINE

## 2022-04-07 PROCEDURE — G8417 CALC BMI ABV UP PARAM F/U: HCPCS | Performed by: FAMILY MEDICINE

## 2022-04-07 RX ORDER — GREEN TEA/HOODIA GORDONII 315-12.5MG
1 CAPSULE ORAL 2 TIMES DAILY
Qty: 60 TABLET | Refills: 0
Start: 2022-04-07 | End: 2022-05-07

## 2022-04-07 ASSESSMENT — PATIENT HEALTH QUESTIONNAIRE - PHQ9
SUM OF ALL RESPONSES TO PHQ9 QUESTIONS 1 & 2: 0
2. FEELING DOWN, DEPRESSED OR HOPELESS: 0
SUM OF ALL RESPONSES TO PHQ QUESTIONS 1-9: 0
1. LITTLE INTEREST OR PLEASURE IN DOING THINGS: 0

## 2022-04-07 ASSESSMENT — ENCOUNTER SYMPTOMS
COUGH: 0
CHEST TIGHTNESS: 0
SHORTNESS OF BREATH: 1
WHEEZING: 0
ABDOMINAL PAIN: 0
ABDOMINAL DISTENTION: 0
NAUSEA: 0
CONSTIPATION: 0
VOMITING: 0

## 2022-04-07 NOTE — PROGRESS NOTES
Rich Mckenzie (:  12/10/1933) is a 80 y.o. female,Established patient, here for evaluation of the following chief complaint(s): Diabetes, Elbow Pain (PAINFUL SPOT ON HER RIGHT ELBOW STILL WHEN APPLYING PRESSURE PAINFUL), Discuss Labs, Ear Fullness (USED DROPS NOT SURE IF CHANGE), and Insomnia      ASSESSMENT/PLAN:    1. Type 2 diabetes mellitus with stage 2 chronic kidney disease, without long-term current use of insulin ([facility])  Improving diabetes, well-controlled  -     POCT glycosylated hemoglobin (Hb A1C) 7.2 improved    Lab Results   Component Value Date    LABA1C 7.2 2022    LABA1C 7.6 2021    LABA1C 8.0 2021       -     Microalbumin / Creatinine Urine Ratio; Future  -advised home blood glucose testing  daily  -daily feet exam, Foot care: advised to wash feet daily, pat dry and apply lotion at night, avoiding between toes. Need to look at feet daily and report to a physician any signs of inflammation or skin damage  -annual dilated eye exam  -Low carb, low fat diet, increase fruits and vegetables, and exercise 4-5 times a day 30-40 minutes a day discussed  -continue current treatment glimepiride 4 mg twice a day  She wants to restart Rybelsus, but diabetes is improving, told her it is okay not to restart taking it  -continue statin Lipitor 80 mg, Zetia 10 mg    2. Incontinence of feces with fecal urgency  worsening  rule out C diff  -     Clostridium Difficile Toxin/Antigen; Future  -  start   Probiotic Acidophilus (FLORANEX) TABS; Take 1 tablet by mouth 2 times daily, Disp-60 tablet, R-0NO PRINT  Food diary and rule out foods which can trigger her diarrhea, especially sweets  She is up to date with colonoscopy  Start vitamin C and see if improves diarrhea     3.  Chronic diastolic heart failure (HCC)  Stable    Lab Results   Component Value Date    LVEF 61 2020   Continue metoprolol 25 mg twice a day, I resolved sick sinus syndrome, as she is on beta-blocker and does not have a pacemaker  Continue furosemide 40 mg daily with potassium 20 MEQ daily    Discussed low salt diet and BP and pulse monitoring. 4. Benign hypertension with CKD (chronic kidney disease), stage II  Stable chronic kidney disease stage II, GFR over 60 on 4/4/2022  Well controlled. Continue current treatment. Will recheck labs. 5. Hyperlipidemia with target LDL less than 70  Well-controlled  Continue Lipitor and Zetia  Lab Results   Component Value Date    LDLCALC 43 02/22/2019    LDLCHOLESTEROL 31 03/17/2021       -     Lipid Panel; Future  6. Skin lesion of right elbow  Likely skin cancer or keratoacanthoma, advised appointment with dermatologist, she does have a dermatologist  7. Bilateral impacted cerumen  Avoid Qtips  -     AFL - Benitez Chávez CNP, Otolaryngology, [facility]  8. History of colon cancer   Patient is up-to-date with colonoscopy last done 3/19/21        Ernst Figueroa received counseling on the following healthy behaviors: nutrition, exercise, medication adherence and weight loss and falls precautions. Reviewed prior labs and health maintenance  Discussed use, benefit, and side effects of prescribed medications. Barriers to medication compliance addressed. Patient given educational materials - see patient instructions  Was a self-tracking handout given in paper form or via [employer]? Yes  All patient questions answered. Patient voiced understanding. The patient's past medical,surgical, social, and family history as well as her current medications and allergies were reviewed as documented in today's encounter. Medications, labs, diagnostic studies, consultations and follow-up as documented in this encounter. Return in about 4 months (around 8/7/2022) for ALWAYS NEEDS 30 MIN. APPT, DM2, HTN, HLP.     Data Unavailable    Future Appointments   Date Time Provider Sherly Pitt   6/8/2022 10:30 AM Yong Hidalgo MD [address] [place] [address]   8/12/2022  1:30 PM [facility]22/[facility]22  9:00 AM Mohan Bolivar MD [facility]         SUBJECTIVE/OBJECTIVE:    Diabetes Mellitus Type II, Follow-up:    Current symptoms/problems include hyperglycemia, paresthesia of the feet and visual disturbances. Symptoms have been present for several years. Known diabetic complications: nephropathy, peripheral neuropathy and cardiovascular disease  Cardiovascular risk factors: advanced age (older than 54 for men, 72 for women), diabetes mellitus, dyslipidemia and hypertension    Medication compliance:  compliant all of the time  Current diabetic medications include oral agent (monotherapy): glimepiride (Amaryl). Didn't take rybelsus  Taking Glimepiride only. Eye exam current (within one year): yes  Current diet: in general, a \"healthy\" diet  , diabetic, low salt    Barriers: impairment:  Advanced age. Current monitoring regimen: home blood tests - daily  Home blood sugar records: fasting range: 150, 161,170, 160, 181    Any episodes of hypoglycemia? no    Is She on ACE inhibitor or angiotensin II receptor blocker? No      reports that she has never smoked. She has never used smokeless tobacco.     Counseling given: Yes      Daily Aspirin? No    A1c is improving. Lab Results   Component Value Date    LABA1C 7.2 04/07/2022    LABA1C 7.6 11/17/2021    LABA1C 8.0 08/11/2021       Urine microabumin is Elevated. Lab Results   Component Value Date    LABMICR 74 (H) 03/17/2021        Stable weight    Wt Readings from Last 3 Encounters:   04/07/22 195 lb 3.2 oz (88.5 kg)   12/17/21 193 lb 9.6 oz (87.8 kg)   11/17/21 196 lb (88.9 kg)     Patient reports episodes of stool incontinence if she does not make it on time to the bathroom . She reports intense urgency of defecation and incontinence for the past few months. Has history of colon cancer and colectomy  Had colonoscopy 3/19/21 and 13 polyps were removed per colonoscopy report.   She also has hemorrhoids  Has 3-4 bowel movements a day, loose, no blood, and they do smell differently than usually. Certain foods causing it, especially sweets. Stopped Rebelsus, but didn't get better        Hypertension, congestive heart failure, Chronic kidney disease stage 2, coronary artery disease. Has prior diagnosis of sick sinus syndrome, but last note from Dr. Praveen Park notes from 11/29/21 is not mentioned anymore, will resolve the problem:   Andrzej Lopez  is not  exercising , but staying active, and is adherent to low salt diet. Blood pressure is well controlled at home. Cardiac symptoms  dyspnea, fatigue and lower extremity edema. Patient denies  chest pain, chest pressure/discomfort, claudication, exertional chest pressure/discomfort, irregular heart beat, near-syncope, orthopnea, palpitations, paroxysmal nocturnal dyspnea, syncope and tachypnea. Use of agents associated with hypertension: none. History of target organ damage: angina/ prior myocardial infarction, chronic kidney disease, heart failure and prior coronary revascularization. Patient has history of CABG x3, and 4 stents for coronary artery disease. CEA left side. BP controlled. Andrzej Lopez reports compliance with BP medications, and tolerates them well, denies side effects. BP Readings from Last 3 Encounters:   04/07/22 128/72   12/17/21 (!) 160/75   11/17/21 120/80        Pulse is Normal.    Pulse Readings from Last 3 Encounters:   04/07/22 66   12/17/21 60   11/17/21 67         Hyperlipidemia:  No new myalgias or GI upset on atorvastatin (Lipitor) and eztemibe (Zetia). Medication compliance: compliant all of the time. Patient is  following a low fat, low cholesterol diet. LDL is Normal.    Lab Results   Component Value Date    LDLCALC 43 02/22/2019    LDLCHOLESTEROL 31 03/17/2021       Laura complains of Right elbow skin lesion, has been scabbing and hurting since August.   Has history of Nose skin cancer, BCC and cheeck cancer.    Has dermatology in [facility] [place]    Has difficulty hearing. she has history of wax impaction. [x]Negative depression screening. PHQ Scores 4/7/2022 11/17/2021 3/16/2021 8/28/2020 5/19/2020 3/10/2020 9/3/2019   PHQ2 Score 0 0 0 0 0 0 0   PHQ9 Score 0 0 0 0 0 0 0       Prior to Visit Medications    Medication Sig Taking? Authorizing Provider   famotidine (PEPCID) 40 MG tablet TAKE 1 TABLET Mayra Kelsey MD   potassium chloride (KLOR-CON M) 20 MEQ extended release tablet Take 1 tablet by mouth daily Take with furosemide Yes Tania Raymond MD   Handleandro Campos MISC by Does not apply route Can't walk greater than 200 feet. Expires in 5 years. Yes Tania Raymond MD   FreeStyle Lancets MISC USE TO TEST BLOOD SUGAR TWICE A DAY Yes Tania Raymond MD   blood glucose monitor strips Testing once a day. Please dispense according to patients insurance. Yes Tania Raymond MD   Lancet Device MISC For use with lancets for blood glucose checks Yes Tania Raymond MD   mirtazapine (REMERON) 7.5 MG tablet Take 1-2 tablets by mouth nightly For insomnia and anxiety. Call for refills or dose adjustment.  Yes Tania Raymond MD   XARELTO 20 MG TABS tablet TAKE 1 TABLET DAILY WITH BREAKFAST Yes Tania Raymond MD   ezetimibe (ZETIA) 10 MG tablet TAKE 1 TABLET NIGHTLY Yes Tania Raymond MD   amLODIPine (NORVASC) 2.5 MG tablet TAKE 1 TABLET DAILY Yes Tania Raymond MD   allopurinol (ZYLOPRIM) 100 MG tablet TAKE 1 TABLET TWICE A DAY Yes Tania Raymond MD   metoprolol tartrate (LOPRESSOR) 25 MG tablet TAKE 1 TABLET TWICE A DAY Yes Tania Raymond MD   atorvastatin (LIPITOR) 80 MG tablet TAKE 1 TABLET NIGHTLY Yes Tania Raymond MD   furosemide (LASIX) 40 MG tablet TAKE 1 TABLET DAILY Yes Tania Raymond MD   glimepiride (AMARYL) 4 MG tablet TAKE 1 TABLET TWICE A DAY Yes Tania Raymond MD   ascorbic acid (V-R VITAMIN C) 250 MG tablet Take 1 tablet by mouth 2 times daily Yes Marc Parrish MD cyanocobalamin ([facility] VITAMIN B12) 1000 MCG tablet Take 1 tablet by mouth daily Yes Effie Mahmood MD   Blood Glucose Monitoring Suppl (FREESTYLE LITE) SARY use as directed Yes Historical Provider, MD   nitroGLYCERIN (NITROSTAT) 0.4 MG SL tablet Place 1 tablet under the tongue every 5 minutes as needed for Chest pain Yes Karen Escobar MD   Biotin 300 MCG TABS Take 600 mcg by mouth every other day  Yes Historical Provider, MD   Cholecalciferol (VITAMIN D3) 1000 UNITS TABS Take 1 tablet by mouth daily Yes Historical Provider, MD   ferrous sulfate (IRON 325) 325 (65 Fe) MG tablet Take 1 tablet by mouth every other day  Austin Carrillo MD   Semaglutide 3 MG TABS Take 3 mg by mouth daily 1st step  Patient not taking: Reported on 4/7/2022  Effie Mahmood MD            Social History     Tobacco Use    Smoking status: Never Smoker    Smokeless tobacco: Never Used   Vaping Use    Vaping Use: Never used   Substance Use Topics    Alcohol use: No    Drug use: No         Review of Systems   Constitutional: Positive for fatigue. Negative for activity change, appetite change, chills, diaphoresis, fever and unexpected weight change. HENT: Positive for hearing loss. Eyes: Positive for visual disturbance (stable, chronic ). Respiratory: Positive for shortness of breath (BEE at baseline). Negative for cough, chest tightness and wheezing. Cardiovascular: Positive for leg swelling. Negative for chest pain and palpitations. Gastrointestinal: Positive for diarrhea. Negative for abdominal distention, abdominal pain, blood in stool, constipation, nausea and vomiting. Endocrine: Negative for cold intolerance, heat intolerance, polydipsia, polyphagia and polyuria. Skin: Positive for rash (right elbow  ). Neurological: Positive for numbness (feet). Hematological: Bruises/bleeds easily. Psychiatric/Behavioral: Positive for sleep disturbance (cannot sleep well since her  passed away).  Negative for dysphoric mood. The patient is not nervous/anxious.          -vital signs stable and within normal limits except Overweight per BMI and borderline low pulse ox  /72   Pulse 66   Temp 96.4 °F (35.8 °C)   Ht 5' 9\" (1.753 m)   Wt 195 lb 3.2 oz (88.5 kg)   SpO2 95%   BMI 28.83 kg/m²         Physical Exam  Vitals and nursing note reviewed. Constitutional:       General: She is not in acute distress. Appearance: Normal appearance. She is well-developed. She is not diaphoretic. HENT:      Head: Normocephalic and atraumatic. Right Ear: External ear normal. There is impacted cerumen. Left Ear: External ear normal. There is impacted cerumen. Mouth/Throat:      Comments: I did not examine the mouth due to coronavirus pandemic and wearing masks    Eyes:      General: Lids are normal. No scleral icterus. Right eye: No discharge. Left eye: No discharge. Extraocular Movements: Extraocular movements intact. Conjunctiva/sclera: Conjunctivae normal.   Neck:      Thyroid: No thyromegaly. Cardiovascular:      Rate and Rhythm: Normal rate and regular rhythm. Heart sounds: Murmur heard. Systolic murmur is present with a grade of 3/6. Comments: Has varicose veins, wearing compression stockings. Pulmonary:      Effort: Pulmonary effort is normal. No respiratory distress. Breath sounds: Normal breath sounds. No wheezing or rales. Chest:      Chest wall: No tenderness. Abdominal:      General: Bowel sounds are normal. There is no distension. Palpations: Abdomen is soft. There is no hepatomegaly or splenomegaly. Tenderness: There is no abdominal tenderness. Musculoskeletal:         General: Normal range of motion. Cervical back: Normal range of motion and neck supple. Right lower le+ Pitting Edema present. Left lower le+ Pitting Edema present. Skin:     General: Skin is warm and dry.       Capillary Refill: Capillary refill takes less than 2 seconds. Findings: No rash. Comments: Over the right elbow, on the olecranon, hyperkeratotic papule, prominent, 8 mm diameter,tender to touch, looks like skin corn or squamous cell carcinoma . There is no surrounding erythema. There is no bone tenderness   Neurological:      Mental Status: She is alert and oriented to person, place, and time. Cranial Nerves: No cranial nerve deficit. Motor: No abnormal muscle tone. Gait: Gait abnormal.      Comments: Up and go test more than 12 seconds. High risk for falls. Advised falls precautions    Psychiatric:         Mood and Affect: Mood normal.         Behavior: Behavior normal.         Thought Content: Thought content normal.         Judgment: Judgment normal.           I personally reviewed testing with patient.   Hyperglycemia  Chronic kidney disease stage II stable  High vitamin B12 level  High triglycerides  Otherwise labs within normal limits    Hospital Outpatient Visit on 04/04/2022   Component Date Value Ref Range Status    WBC 04/04/2022 9.3  3.5 - 11.3 k/uL Final    RBC 04/04/2022 4.57  3.95 - 5.11 m/uL Final    Hemoglobin 04/04/2022 13.8  11.9 - 15.1 g/dL Final    Hematocrit 04/04/2022 42.7  36.3 - 47.1 % Final    MCV 04/04/2022 93.4  82.6 - 102.9 fL Final    MCH 04/04/2022 30.2  25.2 - 33.5 pg Final    MCHC 04/04/2022 32.3  28.4 - 34.8 g/dL Final    RDW 04/04/2022 14.2  11.8 - 14.4 % Final    Platelets 47/29/1753 210  138 - 453 k/uL Final    MPV 04/04/2022 11.0  8.1 - 13.5 fL Final    NRBC Automated 04/04/2022 0.0  0.0 per 100 WBC Final    Glucose 04/04/2022 161* 70 - 99 mg/dL Final    BUN 04/04/2022 16  8 - 23 mg/dL Final    CREATININE 04/04/2022 0.64  0.50 - 0.90 mg/dL Final    Calcium 04/04/2022 9.5  8.6 - 10.4 mg/dL Final    Sodium 04/04/2022 142  135 - 144 mmol/L Final    Potassium 04/04/2022 4.2  3.7 - 5.3 mmol/L Final    Chloride 04/04/2022 102  98 - 107 mmol/L Final    CO2 04/04/2022 30 20 - 31 mmol/L Final    Anion Gap 04/04/2022 10  9 - 17 mmol/L Final    Alkaline Phosphatase 04/04/2022 55  35 - 104 U/L Final    ALT 04/04/2022 25  5 - 33 U/L Final    AST 04/04/2022 24  <32 U/L Final    Total Bilirubin 04/04/2022 1.40* 0.3 - 1.2 mg/dL Final    Total Protein 04/04/2022 7.0  6.4 - 8.3 g/dL Final    Albumin 04/04/2022 4.4  3.5 - 5.2 g/dL Final    Albumin/Globulin Ratio 04/04/2022 1.7  1.0 - 2.5 Final    GFR Non- 04/04/2022 >60  >60 mL/min Final    GFR  04/04/2022 >60  >60 mL/min Final    GFR Comment 04/04/2022        Final    Comment: Average GFR for 79or more years old:   76 mL/min/1.73sq m  Chronic Kidney Disease:   <60 mL/min/1.73sq m  Kidney failure:   <15 mL/min/1.73sq m              eGFR calculated using average adult body mass.  Additional eGFR calculator available at:        [email or web address]            Magnesium 04/04/2022 1.9  1.6 - 2.6 mg/dL Final    TSH 04/04/2022 2.82  0.30 - 5.00 uIU/mL Final    Vitamin B-12 04/04/2022 1313* 232 - 1245 pg/mL Final    Folate 04/04/2022 17.1  >4.8 ng/mL Final    Phosphorus 04/04/2022 2.9  2.6 - 4.5 mg/dL Final         Lab Results   Component Value Date    CHOL 96 03/17/2021    CHOL 98 04/13/2020    CHOL 114 02/22/2019     Lab Results   Component Value Date    TRIG 154 (H) 03/17/2021    TRIG 186 (H) 04/13/2020    TRIG 178 (H) 02/22/2019     Lab Results   Component Value Date    HDL 34 (L) 03/17/2021    HDL 32 (L) 04/13/2020    HDL 35.6 (L) 02/22/2019     Lab Results   Component Value Date    LDLCALC 43 02/22/2019    LDLCALC 34 06/09/2018    LDLCALC 46 10/10/2017    LDLCHOLESTEROL 31 03/17/2021    LDLCHOLESTEROL 29 04/13/2020    LDLCHOLESTEROL 54 01/24/2014     Lab Results   Component Value Date    CHOLHDLRATIO 2.8 03/17/2021    CHOLHDLRATIO 3.1 04/13/2020    CHOLHDLRATIO 3.2 02/22/2019     Lab Results   Component Value Date    IRON 126 12/14/2021    TIBC 302 12/14/2021 FERRITIN 92 12/14/2021       Orders Placed This Encounter   Medications    Probiotic Acidophilus (FLORANEX) TABS     Sig: Take 1 tablet by mouth 2 times daily     Dispense:  60 tablet     Refill:  0       Orders Placed This Encounter   Procedures    Clostridium Difficile Toxin/Antigen     Standing Status:   Future     Standing Expiration Date:   4/7/2023    Lipid Panel     Standing Status:   Future     Standing Expiration Date:   4/7/2023     Order Specific Question:   Is Patient Fasting?/# of Hours     Answer:   8-10 Hours, water ok to drink    Microalbumin / Creatinine Urine Ratio     Standing Status:   Future     Standing Expiration Date:   4/7/2023    CRISTIAN Chavis CNP, Otolaryngology, [facility]     Referral Priority:   Routine     Referral Type:   Eval and Treat     Referral Reason:   Specialty Services Required     Requested Specialty:   Otolaryngology     Number of Visits Requested:   1    POCT glycosylated hemoglobin (Hb A1C)       Medications Discontinued During This Encounter   Medication Reason    ascorbic acid (V-R VITAMIN C) 250 MG tablet Stop Taking at Discharge    Semaglutide 3 MG TABS Therapy completed    ferrous sulfate (IRON 325) 325 (65 Fe) MG tablet Therapy completed         On this date 4/7/2022 I have spent 35 minutes reviewing previous notes, test results and face to face with the patient discussing the diagnosis and importance of compliance with the treatment plan as well as documenting on the day of the visit. This note was completed by using the assistance of a speech-recognition program. However, inadvertent computerized transcription errors may be present. Although every effort was made to ensure accuracy, no guarantees can be provided that every mistake has been identified and corrected by editing . An electronic signature was used to authenticate this note.   Electronically signed by Lissett Loyd MD on 4/10/2022 at 11:00 PM

## 2022-04-07 NOTE — PROGRESS NOTES
Visit Information    Have you changed or started any medications since your last visit including any over-the-counter medicines, vitamins, or herbal medicines? yes -    Have you stopped taking any of your medications? Is so, why? -  yes -   Are you having any side effects from any of your medications? - yes -     Have you seen any other physician or provider since your last visit? yes -    Have you had any other diagnostic tests since your last visit?  no   Have you been seen in the emergency room and/or had an admission in a hospital since we last saw you?  no   Have you had your routine dental cleaning in the past 6 months?  yes -      Do you have an active MyChart account? If no, what is the barrier?   Yes    Patient Care Team:  Moody Patton MD as PCP - General (Family Medicine)  Moody Patton MD as PCP - [facility]  Torrance Goodpasture, MD as Consulting Physician (Ophthalmology)  Willim Closs, MD as Consulting Physician (Cardiology)  Lele Trejo as Consulting Physician (Neurology)  Alma Adrian MD as Consulting Physician (Urology)  Ольга Lee MD as Consulting Physician (Nephrology)  Jane Irby MD as Consulting Physician (Internal Medicine Cardiovascular Disease)  Ghazala Mitchell MD as Consulting Physician (Gastroenterology)  Mac Barreto MD as Consulting Physician (Hematology and Oncology)  Terry Myers MD as Surgeon (Orthopedic Surgery)    Medical History Review  Past Medical, Family, and Social History reviewed and does contribute to the patient presenting condition    Health Maintenance   Topic Date Due    DTaP/Tdap/Td vaccine (1 - Tdap) Never done    Shingles Vaccine (2 of 3) 11/26/2017    Lipid screen  03/17/2022    Depression Screen  11/17/2022    Annual Wellness Visit (AWV)  11/18/2022    Potassium monitoring  04/04/2023    Creatinine monitoring  04/04/2023    Colorectal Cancer Screen  03/19/2031    Flu vaccine  Completed    Pneumococcal 65+ years Vaccine  Completed    COVID-19 Vaccine  Completed    Hepatitis A vaccine  Aged Out    Hib vaccine  Aged Out    Meningococcal (ACWY) vaccine  Aged Out           st

## 2022-04-07 NOTE — RESULT ENCOUNTER NOTE
Addressed during office visit today,A1c  improved, continue treatment recommended during the office visit.

## 2022-04-10 PROBLEM — H61.23 BILATERAL IMPACTED CERUMEN: Status: ACTIVE | Noted: 2021-11-17

## 2022-04-10 PROBLEM — R15.2 INCONTINENCE OF FECES WITH FECAL URGENCY: Status: ACTIVE | Noted: 2022-04-10

## 2022-04-10 PROBLEM — I47.29 PAROXYSMAL VT: Status: RESOLVED | Noted: 2021-03-16 | Resolved: 2022-04-10

## 2022-04-10 PROBLEM — R15.9 INCONTINENCE OF FECES WITH FECAL URGENCY: Status: ACTIVE | Noted: 2022-04-10

## 2022-04-10 PROBLEM — L98.9 SKIN LESION OF RIGHT ARM: Status: ACTIVE | Noted: 2022-04-10

## 2022-04-10 PROBLEM — I47.20 PAROXYSMAL VT: Status: RESOLVED | Noted: 2021-03-16 | Resolved: 2022-04-10

## 2022-04-10 ASSESSMENT — ENCOUNTER SYMPTOMS
BLOOD IN STOOL: 0
DIARRHEA: 1

## 2022-04-12 ENCOUNTER — HOSPITAL ENCOUNTER (OUTPATIENT)
Age: 87
Setting detail: SPECIMEN
Discharge: HOME | End: 2022-04-12
Payer: MEDICARE

## 2022-04-12 DIAGNOSIS — R15.2 INCONTINENCE OF FECES WITH FECAL URGENCY: ICD-10-CM

## 2022-04-12 DIAGNOSIS — R15.9 INCONTINENCE OF FECES WITH FECAL URGENCY: ICD-10-CM

## 2022-04-12 LAB
C DIFF AG + TOXIN: NEGATIVE
SPECIMEN DESCRIPTION: NORMAL

## 2022-04-12 PROCEDURE — 87324 CLOSTRIDIUM AG IA: CPT

## 2022-04-12 PROCEDURE — 87449 NOS EACH ORGANISM AG IA: CPT

## 2022-04-12 NOTE — RESULT ENCOUNTER NOTE
Please notify patient: Negative for C. difficile   Future Appointments  6/8/2022   10:30 AM   Colt Kessler MD          [address]  8/12/2022  1:30 PM    Zaria Corona MD     [facility]  11/22/2022 9:00 AM    [facility]     [facility]

## 2022-04-18 ENCOUNTER — HOSPITAL ENCOUNTER (OUTPATIENT)
Age: 87
Setting detail: SPECIMEN
Discharge: HOME | End: 2022-04-18

## 2022-04-18 DIAGNOSIS — N18.2 TYPE 2 DIABETES MELLITUS WITH STAGE 2 CHRONIC KIDNEY DISEASE, WITHOUT LONG-TERM CURRENT USE OF INSULIN (HCC): ICD-10-CM

## 2022-04-18 DIAGNOSIS — E78.5 HYPERLIPIDEMIA WITH TARGET LDL LESS THAN 70: ICD-10-CM

## 2022-04-18 DIAGNOSIS — E11.22 TYPE 2 DIABETES MELLITUS WITH STAGE 3 CHRONIC KIDNEY DISEASE, WITHOUT LONG-TERM CURRENT USE OF INSULIN (HCC): ICD-10-CM

## 2022-04-18 DIAGNOSIS — I12.9 BENIGN HYPERTENSION WITH CKD (CHRONIC KIDNEY DISEASE) STAGE III (HCC): ICD-10-CM

## 2022-04-18 DIAGNOSIS — N18.30 BENIGN HYPERTENSION WITH CKD (CHRONIC KIDNEY DISEASE) STAGE III (HCC): ICD-10-CM

## 2022-04-18 DIAGNOSIS — E11.22 TYPE 2 DIABETES MELLITUS WITH STAGE 2 CHRONIC KIDNEY DISEASE, WITHOUT LONG-TERM CURRENT USE OF INSULIN (HCC): ICD-10-CM

## 2022-04-18 DIAGNOSIS — N18.30 TYPE 2 DIABETES MELLITUS WITH STAGE 3 CHRONIC KIDNEY DISEASE, WITHOUT LONG-TERM CURRENT USE OF INSULIN (HCC): ICD-10-CM

## 2022-04-18 LAB
CHOLESTEROL/HDL RATIO: 3.3
CHOLESTEROL: 114 MG/DL
CREATININE URINE: 173.2 MG/DL (ref 28–217)
HDLC SERPL-MCNC: 35 MG/DL
LDL CHOLESTEROL: 45 MG/DL (ref 0–130)
MICROALBUMIN/CREAT 24H UR: 61 MG/L
MICROALBUMIN/CREAT UR-RTO: 35 MCG/MG CREAT
TRIGL SERPL-MCNC: 171 MG/DL

## 2022-04-18 RX ORDER — ATORVASTATIN CALCIUM 80 MG/1
TABLET, FILM COATED ORAL
Qty: 90 TABLET | Refills: 3 | Status: SHIPPED | OUTPATIENT
Start: 2022-04-18

## 2022-04-18 RX ORDER — FUROSEMIDE 40 MG/1
TABLET ORAL
Qty: 90 TABLET | Refills: 3 | Status: SHIPPED | OUTPATIENT
Start: 2022-04-18

## 2022-04-18 RX ORDER — GLIMEPIRIDE 4 MG/1
TABLET ORAL
Qty: 180 TABLET | Refills: 3 | Status: SHIPPED | OUTPATIENT
Start: 2022-04-18

## 2022-04-18 NOTE — TELEPHONE ENCOUNTER
Please Approve or Refuse.   Send to Pharmacy per Pt's Request:      Next Visit Date:  8/12/2022   Last Visit Date: 4/7/2022    Hemoglobin A1C (%)   Date Value   04/07/2022 7.2   11/17/2021 7.6   08/11/2021 8.0             ( goal A1C is < 7)   BP Readings from Last 3 Encounters:   04/07/22 128/72   12/17/21 (!) 160/75   11/17/21 120/80          (goal 120/80)  BUN   Date Value Ref Range Status   04/04/2022 16 8 - 23 mg/dL Final     CREATININE   Date Value Ref Range Status   04/04/2022 0.64 0.50 - 0.90 mg/dL Final     Potassium   Date Value Ref Range Status   04/04/2022 4.2 3.7 - 5.3 mmol/L Final

## 2022-04-19 NOTE — RESULT ENCOUNTER NOTE
Please notify patient: Only the lipids and the urine microalbumin done  Cholesterol is good just triglycerides mildly high, cut down the sweets and fried food and fatty foods  Proteins in the urine are improving  Her diabetes is improving  Lab Results       Component                Value               Date                       LABA1C                   7.2                 04/07/2022                 LABA1C                   7.6                 11/17/2021                 LABA1C                   8.0                 08/11/2021                  Future Appointments  6/8/2022   10:30 AM   Mikhail Hermosillo MD          [address]  8/12/2022  1:30 PM    Kady Person MD     [facility]  11/22/2022 9:00 AM    Kady Person MD     [facility]

## 2022-04-21 ENCOUNTER — TELEPHONE (OUTPATIENT)
Dept: ORTHOPEDIC SURGERY | Age: 87
End: 2022-04-21

## 2022-04-21 NOTE — TELEPHONE ENCOUNTER
Patient was seen at an urgent care and had xrays done. She called [facility] to get a copy and was told she would have to go to [facility] to obtain. Patient will not go all the way to the hospital.     They told her our office can fax over a request and they will mail us the xrays.      [facility]   Fax. [phone]    Please call patient with any questions  Thank you

## 2022-05-02 ENCOUNTER — OFFICE VISIT (OUTPATIENT)
Dept: ORTHOPEDIC SURGERY | Age: 87
End: 2022-05-02
Payer: MEDICARE

## 2022-05-02 DIAGNOSIS — M25.551 HIP PAIN, CHRONIC, RIGHT: Primary | ICD-10-CM

## 2022-05-02 DIAGNOSIS — G89.29 HIP PAIN, CHRONIC, RIGHT: Primary | ICD-10-CM

## 2022-05-02 PROCEDURE — G8417 CALC BMI ABV UP PARAM F/U: HCPCS | Performed by: ORTHOPAEDIC SURGERY

## 2022-05-02 PROCEDURE — 1036F TOBACCO NON-USER: CPT | Performed by: ORTHOPAEDIC SURGERY

## 2022-05-02 PROCEDURE — 4040F PNEUMOC VAC/ADMIN/RCVD: CPT | Performed by: ORTHOPAEDIC SURGERY

## 2022-05-02 PROCEDURE — 99203 OFFICE O/P NEW LOW 30 MIN: CPT | Performed by: ORTHOPAEDIC SURGERY

## 2022-05-02 PROCEDURE — G8428 CUR MEDS NOT DOCUMENT: HCPCS | Performed by: ORTHOPAEDIC SURGERY

## 2022-05-02 PROCEDURE — 1090F PRES/ABSN URINE INCON ASSESS: CPT | Performed by: ORTHOPAEDIC SURGERY

## 2022-05-02 PROCEDURE — 1123F ACP DISCUSS/DSCN MKR DOCD: CPT | Performed by: ORTHOPAEDIC SURGERY

## 2022-05-02 NOTE — PROGRESS NOTES
Luly Stanton M.D.            [address] [facility] [address] [facility],[address], [address] [facility]           Dept Phone: [phone]           Dept Fax:  [address]           Emily Rogers          Dept Phone: [phone]           Dept Fax:  [phone]      Chief Compliant:  Chief Complaint   Patient presents with    Pain     Rt hip        History of Present Illness: This is a 80 y.o. female who presents to the clinic today for evaluation / follow up of right hip and groin pain. Patient is a 51-year-old female who has a history of bilateral total knees done by me the right 1999 and the left in 2002. She has no complaints with her knees whatsoever patient states that she developed some groin pain with associated with activity. She did not have an injury or fall. The pain was to the point where she had difficulty ambulating and she did go to an urgent urgent care center. She had x-rays taken at a [facility] urgent care which show a portion to our system. Patient states that the hip and groin pain that she had previous has gotten somewhat better but still present. She is ambulating however and not using any assistive devices today. .       Review of Systems   Constitutional: Negative for fever, chills, sweats. Eyes: Negative for changes in vision, or pain. HENT: Negative for ear ache, epistaxis, or sore throat. Respiratory/Cardio: Negative for Chest pain, palpitations, SOB, or cough. Gastrointestinal: Negative for abdominal pain, N/V/D. Genitourinary: Negative for dysuria, frequency, urgency, or hematuria. Neurological: Negative for headache, numbness, or weakness. Integumentary: Negative for rash, itching, laceration, or abrasion.    Musculoskeletal: Positive for Pain (Rt hip)       Physical Exam:  Constitutional: Patient is oriented to person, place, and time. Patient appears well-developed and well nourished. HENT: Negative otherwise noted  Head: Normocephalic and Atraumatic  Nose: Normal  Eyes: Conjunctivae and EOM are normal  Neck: Normal range of motion Neck supple. Respiratory/Cardio: Effort normal. No respiratory distress. Musculoskeletal: Examination of patient's right hip notes that she has no trochanteric tenderness she has no pubic tenderness she does have some moderate pain on FADIR and some minimal pain on RHONDA ER. She has a mildly positive Stinchfield's. She has negative straight leg raise no other contributory findings. I did not examine the patient's niece that she has no complaints with these and very happy with her knee replacements that are 21years old on the right and 21years old on the left    Neurological: Patient is alert and oriented to person, place, and time. Normal strenght. No sensory deficit. Skin: Skin is warm and dry  Psychiatric: Behavior is normal. Thought content normal.  Nursing note and vitals reviewed. Labs and Imaging:     XR taken today: None taken the office today however patient did have x-rays that were pushed into our nucleus from the [address] [place] [address] system. The shoulder multiple views of the patient's pelvis as well as right hip. Patient is not noted to have any acute injury. She does have some moderate joint space narrowing of her joint but is relatively age-specific. She has some mild cam and pincer osteophytes did not recognize any obvious avascular process. No evidence for any pubic rami injury or sacral injury as well. She does have some modest degenerative joint disease of the lower lumbar spine but certainly age-specific    No results found.         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 female who presents to the clinic today for evaluation / follow up of early DJD right hip. Past History:    Current Outpatient Medications:     furosemide (LASIX) 40 MG tablet, TAKE 1 TABLET DAILY, Disp: 90 tablet, Rfl: 3    glimepiride (AMARYL) 4 MG tablet, TAKE 1 TABLET TWICE A DAY, Disp: 180 tablet, Rfl: 3    atorvastatin (LIPITOR) 80 MG tablet, TAKE 1 TABLET NIGHTLY, Disp: 90 tablet, Rfl: 3    Probiotic Acidophilus (FLORANEX) TABS, Take 1 tablet by mouth 2 times daily, Disp: 60 tablet, Rfl: 0    famotidine (PEPCID) 40 MG tablet, TAKE 1 TABLET EVERY EVENING, Disp: 90 tablet, Rfl: 3    potassium chloride (KLOR-CON M) 20 MEQ extended release tablet, Take 1 tablet by mouth daily Take with furosemide, Disp: 90 tablet, Rfl: 3    Handicap Placard MISC, by Does not apply route Can't walk greater than 200 feet. Expires in 5 years. , Disp: 1 each, Rfl: 0    FreeStyle Lancets MISC, USE TO TEST BLOOD SUGAR TWICE A DAY, Disp: 300 each, Rfl: 3    blood glucose monitor strips, Testing once a day. Please dispense according to patients insurance., Disp: 300 strip, Rfl: 3    Lancet Device MISC, For use with lancets for blood glucose checks, Disp: 1 each, Rfl: 0    mirtazapine (REMERON) 7.5 MG tablet, Take 1-2 tablets by mouth nightly For insomnia and anxiety. Call for refills or dose adjustment. , Disp: 60 tablet, Rfl: 0    XARELTO 20 MG TABS tablet, TAKE 1 TABLET DAILY WITH BREAKFAST, Disp: 90 tablet, Rfl: 3    ezetimibe (ZETIA) 10 MG tablet, TAKE 1 TABLET NIGHTLY, Disp: 90 tablet, Rfl: 3    amLODIPine (NORVASC) 2.5 MG tablet, TAKE 1 TABLET DAILY, Disp: 90 tablet, Rfl: 3    allopurinol (ZYLOPRIM) 100 MG tablet, TAKE 1 TABLET TWICE A DAY, Disp: 180 tablet, Rfl: 3    metoprolol tartrate (LOPRESSOR) 25 MG tablet, TAKE 1 TABLET TWICE A DAY, Disp: 180 tablet, Rfl: 3    cyanocobalamin (CVS VITAMIN B12) 1000 MCG tablet, Take 1 tablet by mouth daily, Disp: 30 tablet, Rfl: 0    Blood Glucose Monitoring Suppl (FREESTYLE LITE) SARY, use as directed, Disp: , Rfl: 0    nitroGLYCERIN (NITROSTAT) 0.4 MG SL tablet, Place 1 tablet under the tongue every 5 minutes as needed for Chest pain, Disp: 25 tablet, Rfl: 1    Biotin 300 MCG TABS, Take 600 mcg by mouth every other day , Disp: , Rfl:     Cholecalciferol (VITAMIN D3) 1000 UNITS TABS, Take 1 tablet by mouth daily, Disp: , Rfl:   Allergies   Allergen Reactions    Brilinta [Ticagrelor]      Gi BLEED    Ampicillin Other (See Comments)    Clopidogrel Bisulfate Itching    Diovan [Valsartan] Other (See Comments)     cough    Lantus [Insulin Glargine] Nausea Only     Stomach \"quivered\" after taking it, palpitations    Metformin And Related Diarrhea     Chronic kidney disease stage 3     Social History     Socioeconomic History    Marital status:       Spouse name: Not on file    Number of children: Not on file    Years of education: Not on file    Highest education level: Not on file   Occupational History    Not on file   Tobacco Use    Smoking status: Never Smoker    Smokeless tobacco: Never Used   Vaping Use    Vaping Use: Never used   Substance and Sexual Activity    Alcohol use: No    Drug use: No    Sexual activity: Not Currently     Partners: Male   Other Topics Concern    Not on file   Social History Narrative    Not on file     Social Determinants of Health     Financial Resource Strain: Low Risk     Difficulty of Paying Living Expenses: Not very hard   Food Insecurity: No Food Insecurity    Worried About Running Out of Food in the Last Year: Never true    Citlaly of Food in the Last Year: Never true   Transportation Needs: No Transportation Needs    Lack of Transportation (Medical): No    Lack of Transportation (Non-Medical): No   Physical Activity: Insufficiently Active    Days of Exercise per Week: 2 days    Minutes of Exercise per Session: 60 min   Stress: No Stress Concern Present    Feeling of Stress : Only a little   Social Connections: Moderately Isolated    Frequency of Communication with Friends and Family: More than three times a week    Frequency of Social Gatherings with Friends and Family: More than three times a week    Attends Jew Services: Never    Active Member of Clubs or Organizations: Yes    Attends Club or Organization Meetings: More than 4 times per year    Marital Status:    Intimate Partner Violence:     Fear of Current or Ex-Partner: Not on file    Emotionally Abused: Not on file    Physically Abused: Not on file    Sexually Abused: Not on file   Housing Stability: [address] Unable to Pay for Housing in the Last Year: No    Number of Places Lived in the Last Year: 1    Unstable Housing in the Last Year: No     Past Medical History:   Diagnosis Date    Arthritis     Basal cell carcinoma of nose     Bronchitis     HX. OF     CAD (coronary artery disease)     Carpal tunnel syndrome     left hand    Chronic diastolic heart failure ([facility] 3/25/2021    CKD (chronic kidney disease), stage III (HCC) 8/7/2019    Colon cancer ([facility]     Diabetes mellitus ([facility]     DVT (deep venous thrombosis) ([facility] 7/10/2019    GI bleed 3/17/2021    Gout     Hematuria     History of coronary artery bypass graft x 3 7/2/2020    Hx of blood clots     ED.  LEGS, ED. 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 of stenting x 3.    APPENDECTOMY      BREAST SURGERY      INFECTED MILK DUCT LT.    CARDIAC SURGERY      CABG x 3 vessels and 3 stents    CAROTID ENDARTERECTOMY Left 3-3-16    CARPAL TUNNEL RELEASE Right     CHOLECYSTECTOMY      COLON SURGERY      colectomy, PRECANCEROUS POLYPS    COLONOSCOPY      X5, HX PRECANCEROUS POLYPS    COLONOSCOPY N/A 3/19/2021    COLONOSCOPY POLYPECTOMY REMOVAL HOT SNARE performed by Derek Pickard MD at [address] [place] [address] vessels    CYST REMOVAL  10/07/2016    EXCISION OF SEBACEOUS CYST REMOVED FROM BACK X3    CYSTOSCOPY Bilateral 1/21/2020    CYSTOSCOPY RETROGRADE PYELOGRAM performed by Damien Hammond MD at [facility], [address] Right     INDEX FINGER AND THUMB.    HYSTERECTOMY      JOINT REPLACEMENT Left 03/29/2010    TKA    JOINT REPLACEMENT Right 02/16/1999    TKA    LITHOTRIPSY      X 3    NOSE SURGERY  01/2022    SKIN BIOPSY      TOP OF NOSE (BASAL CELL)    UPPER GASTROINTESTINAL ENDOSCOPY N/A 3/19/2021    [facility]      vein ablation on legs     Family History   Problem Relation Age of Onset    Stroke Mother     Hypertension Mother     Heart Disease Father         AAA    Stroke Sister     Parkinsonism Brother     Cancer Sister         lung    Alcohol Abuse Sister    Plan  Patient was told that she does have some modest arthritis but certainly at age 80 certainly does not raise much concern.   I think she would benefit from an IR injection to her right hip and she is eager to do this. She is very happy with her knees. We will have her back here per her request call if she has problems prior to that time      Provider Attestation:  Julia Granado, personally performed the services described in this documentation. All medical record entries made by the scribe were at my direction and in my presence. I have reviewed the chart and discharge instructions and agree that the records reflect my personal performance and is accurate and complete. Kimmy Guidry MD. 05/02/22      Please note that this chart was generated using voice recognition Dragon dictation software. Although every effort was made to ensure the accuracy of this automated transcription, some errors in transcription may have occurred.

## 2022-05-03 ENCOUNTER — TELEPHONE (OUTPATIENT)
Dept: INTERVENTIONAL RADIOLOGY/VASCULAR | Age: 87
End: 2022-05-03

## 2022-05-09 ENCOUNTER — HOSPITAL ENCOUNTER (OUTPATIENT)
Dept: INTERVENTIONAL RADIOLOGY/VASCULAR | Age: 87
Discharge: HOME | End: 2022-05-11

## 2022-05-09 DIAGNOSIS — M25.551 HIP PAIN, CHRONIC, RIGHT: ICD-10-CM

## 2022-05-09 DIAGNOSIS — G89.29 HIP PAIN, CHRONIC, RIGHT: ICD-10-CM

## 2022-05-09 RX ORDER — LIDOCAINE HYDROCHLORIDE 10 MG/ML
4 INJECTION, SOLUTION EPIDURAL; INFILTRATION; INTRACAUDAL; PERINEURAL ONCE
Status: DISCONTINUED | OUTPATIENT
Start: 2022-05-09 | End: 2022-05-12 | Stop reason: HOSPADM

## 2022-05-09 RX ORDER — BUPIVACAINE HYDROCHLORIDE 5 MG/ML
4 INJECTION, SOLUTION EPIDURAL; INTRACAUDAL ONCE
Status: DISCONTINUED | OUTPATIENT
Start: 2022-05-09 | End: 2022-05-12 | Stop reason: HOSPADM

## 2022-05-09 RX ORDER — METHYLPREDNISOLONE ACETATE 80 MG/ML
80 INJECTION, SUSPENSION INTRA-ARTICULAR; INTRALESIONAL; INTRAMUSCULAR; SOFT TISSUE ONCE
Status: DISCONTINUED | OUTPATIENT
Start: 2022-05-09 | End: 2022-05-12 | Stop reason: HOSPADM

## 2022-05-09 NOTE — PROGRESS NOTES
Declines procedure  She states her symptoms are not that debilitating and are improving.  Plans to reschedule if she decides to come back for procedure

## 2022-05-27 DIAGNOSIS — M1A.4790 OTHER SECONDARY CHRONIC GOUT OF FOOT WITHOUT TOPHUS, UNSPECIFIED LATERALITY: ICD-10-CM

## 2022-05-27 RX ORDER — ALLOPURINOL 100 MG/1
TABLET ORAL
Qty: 180 TABLET | Refills: 3 | Status: SHIPPED | OUTPATIENT
Start: 2022-05-27

## 2022-06-02 DIAGNOSIS — N18.30 BENIGN HYPERTENSION WITH CKD (CHRONIC KIDNEY DISEASE) STAGE III (HCC): ICD-10-CM

## 2022-06-02 DIAGNOSIS — I12.9 BENIGN HYPERTENSION WITH CKD (CHRONIC KIDNEY DISEASE) STAGE III (HCC): ICD-10-CM

## 2022-06-02 RX ORDER — AMLODIPINE BESYLATE 2.5 MG/1
TABLET ORAL
Qty: 90 TABLET | Refills: 3 | Status: SHIPPED | OUTPATIENT
Start: 2022-06-02

## 2022-06-04 ENCOUNTER — HOSPITAL ENCOUNTER (OUTPATIENT)
Age: 87
Discharge: HOME | End: 2022-06-04
Payer: MEDICARE

## 2022-06-04 DIAGNOSIS — Z86.711 HISTORY OF PULMONARY EMBOLISM: ICD-10-CM

## 2022-06-04 DIAGNOSIS — D47.2 MGUS (MONOCLONAL GAMMOPATHY OF UNKNOWN SIGNIFICANCE): ICD-10-CM

## 2022-06-04 DIAGNOSIS — E61.1 IRON DEFICIENCY: ICD-10-CM

## 2022-06-04 DIAGNOSIS — D64.9 ANEMIA, UNSPECIFIED TYPE: ICD-10-CM

## 2022-06-04 LAB
ABSOLUTE EOS #: 0.2 K/UL (ref 0–0.4)
ABSOLUTE LYMPH #: 2.2 K/UL (ref 1–4.8)
ABSOLUTE MONO #: 0.6 K/UL (ref 0.1–1.3)
ANION GAP SERPL CALCULATED.3IONS-SCNC: 9 MMOL/L (ref 9–17)
BASOPHILS # BLD: 1 % (ref 0–2)
BASOPHILS ABSOLUTE: 0 K/UL (ref 0–0.2)
BUN BLDV-MCNC: 15 MG/DL (ref 8–23)
CALCIUM SERPL-MCNC: 9.4 MG/DL (ref 8.6–10.4)
CHLORIDE BLD-SCNC: 105 MMOL/L (ref 98–107)
CO2: 27 MMOL/L (ref 20–31)
CREAT SERPL-MCNC: 0.82 MG/DL (ref 0.5–0.9)
EOSINOPHILS RELATIVE PERCENT: 3 % (ref 0–4)
FERRITIN: 77 NG/ML (ref 13–150)
FREE KAPPA/LAMBDA RATIO: 1.48 (ref 0.26–1.65)
GFR AFRICAN AMERICAN: >60 ML/MIN
GFR NON-AFRICAN AMERICAN: >60 ML/MIN
GFR SERPL CREATININE-BSD FRML MDRD: ABNORMAL ML/MIN/{1.73_M2}
GLUCOSE BLD-MCNC: 184 MG/DL (ref 70–99)
HCT VFR BLD CALC: 38.8 % (ref 36–46)
HEMOGLOBIN: 13.1 G/DL (ref 12–16)
IRON SATURATION: 23 % (ref 20–55)
IRON: 68 UG/DL (ref 37–145)
KAPPA FREE LIGHT CHAINS QNT: 2.09 MG/DL (ref 0.37–1.94)
LAMBDA FREE LIGHT CHAINS QNT: 1.41 MG/DL (ref 0.57–2.63)
LYMPHOCYTES # BLD: 32 % (ref 24–44)
MCH RBC QN AUTO: 30.3 PG (ref 26–34)
MCHC RBC AUTO-ENTMCNC: 33.9 G/DL (ref 31–37)
MCV RBC AUTO: 89.5 FL (ref 80–100)
MONOCYTES # BLD: 9 % (ref 1–7)
PDW BLD-RTO: 14.9 % (ref 11.5–14.9)
PLATELET # BLD: 197 K/UL (ref 150–450)
PMV BLD AUTO: 8.3 FL (ref 6–12)
POTASSIUM SERPL-SCNC: 5.1 MMOL/L (ref 3.7–5.3)
RBC # BLD: 4.33 M/UL (ref 4–5.2)
SEG NEUTROPHILS: 55 % (ref 36–66)
SEGMENTED NEUTROPHILS ABSOLUTE COUNT: 4 K/UL (ref 1.3–9.1)
SODIUM BLD-SCNC: 141 MMOL/L (ref 135–144)
TOTAL IRON BINDING CAPACITY: 299 UG/DL (ref 250–450)
UNSATURATED IRON BINDING CAPACITY: 231 UG/DL (ref 112–347)
WBC # BLD: 7.1 K/UL (ref 3.5–11)

## 2022-06-04 PROCEDURE — 84165 PROTEIN E-PHORESIS SERUM: CPT

## 2022-06-04 PROCEDURE — 86334 IMMUNOFIX E-PHORESIS SERUM: CPT

## 2022-06-04 PROCEDURE — 80048 BASIC METABOLIC PNL TOTAL CA: CPT

## 2022-06-04 PROCEDURE — 85025 COMPLETE CBC W/AUTO DIFF WBC: CPT

## 2022-06-04 PROCEDURE — 83540 ASSAY OF IRON: CPT

## 2022-06-04 PROCEDURE — 83550 IRON BINDING TEST: CPT

## 2022-06-04 PROCEDURE — 84155 ASSAY OF PROTEIN SERUM: CPT

## 2022-06-04 PROCEDURE — 83883 ASSAY NEPHELOMETRY NOT SPEC: CPT

## 2022-06-04 PROCEDURE — 36415 COLL VENOUS BLD VENIPUNCTURE: CPT

## 2022-06-04 PROCEDURE — 82728 ASSAY OF FERRITIN: CPT

## 2022-06-06 LAB
ALBUMIN (CALCULATED): 4.6 G/DL (ref 3.2–5.2)
ALBUMIN PERCENT: 69 % (ref 45–65)
ALPHA 1 PERCENT: 2 % (ref 3–6)
ALPHA 2 PERCENT: 10 % (ref 6–13)
ALPHA-1-GLOBULIN: 0.1 G/DL (ref 0.1–0.4)
ALPHA-2-GLOBULIN: 0.7 G/DL (ref 0.5–0.9)
BETA GLOBULIN: 0.6 G/DL (ref 0.5–1.1)
BETA PERCENT: 10 % (ref 11–19)
GAMMA GLOBULIN %: 10 % (ref 9–20)
GAMMA GLOBULIN: 0.6 G/DL (ref 0.5–1.5)
PATHOLOGIST: ABNORMAL
PATHOLOGIST: NORMAL
PROTEIN ELECTROPHORESIS, SERUM: ABNORMAL
SERUM IFX INTERP: NORMAL
TOTAL PROT. SUM,%: 101 % (ref 98–102)
TOTAL PROT. SUM: 6.6 G/DL (ref 6.3–8.2)
TOTAL PROTEIN: 6.7 G/DL (ref 6.4–8.3)

## 2022-06-08 ENCOUNTER — OFFICE VISIT (OUTPATIENT)
Dept: ONCOLOGY | Age: 87
End: 2022-06-08
Payer: MEDICARE

## 2022-06-08 ENCOUNTER — TELEPHONE (OUTPATIENT)
Dept: ONCOLOGY | Age: 87
End: 2022-06-08

## 2022-06-08 VITALS
BODY MASS INDEX: 28.77 KG/M2 | DIASTOLIC BLOOD PRESSURE: 72 MMHG | WEIGHT: 194.8 LBS | SYSTOLIC BLOOD PRESSURE: 155 MMHG | HEART RATE: 56 BPM | TEMPERATURE: 96.6 F

## 2022-06-08 DIAGNOSIS — Z79.01 ANTICOAGULATED: ICD-10-CM

## 2022-06-08 DIAGNOSIS — Z86.711 HISTORY OF PULMONARY EMBOLISM: ICD-10-CM

## 2022-06-08 DIAGNOSIS — E61.1 IRON DEFICIENCY: ICD-10-CM

## 2022-06-08 DIAGNOSIS — D64.9 ANEMIA, UNSPECIFIED TYPE: Primary | ICD-10-CM

## 2022-06-08 DIAGNOSIS — D47.2 MGUS (MONOCLONAL GAMMOPATHY OF UNKNOWN SIGNIFICANCE): ICD-10-CM

## 2022-06-08 PROCEDURE — 1036F TOBACCO NON-USER: CPT | Performed by: INTERNAL MEDICINE

## 2022-06-08 PROCEDURE — G8427 DOCREV CUR MEDS BY ELIG CLIN: HCPCS | Performed by: INTERNAL MEDICINE

## 2022-06-08 PROCEDURE — G8417 CALC BMI ABV UP PARAM F/U: HCPCS | Performed by: INTERNAL MEDICINE

## 2022-06-08 PROCEDURE — 1123F ACP DISCUSS/DSCN MKR DOCD: CPT | Performed by: INTERNAL MEDICINE

## 2022-06-08 PROCEDURE — 1090F PRES/ABSN URINE INCON ASSESS: CPT | Performed by: INTERNAL MEDICINE

## 2022-06-08 PROCEDURE — 99214 OFFICE O/P EST MOD 30 MIN: CPT | Performed by: INTERNAL MEDICINE

## 2022-06-08 RX ORDER — FERROUS SULFATE 325(65) MG
325 TABLET ORAL
Qty: 90 TABLET | Refills: 1 | Status: SHIPPED | OUTPATIENT
Start: 2022-06-08

## 2022-06-08 NOTE — TELEPHONE ENCOUNTER
rv in 6 months with labs    ]RV scheduled 12/7/22 @ 1:30pm    PT was given AVS and appt schedule    Electronically signed by Samanta Mascorro on 6/8/2022 at 11:55 AM

## 2022-06-08 NOTE — PROGRESS NOTES
Liz Garcia                                                                                                                  6/8/2022  MRN:   [id number]  YOB: 1933  PCP:                           Lissett Loyd MD  Referring Physician: No ref. provider found  Treating Physician Name: Glynn Small MD      Reason for visit:  Chief Complaint   Patient presents with    Follow-up     review of status disease    Discuss Labs   Establish care and for further work-up and treatment recommendations    Current problems:  Anemia  Iron deficiency  B12 deficiency  GI bleed  History of pulmonary embolism on chronic anticoagulation  Coronary artery disease  History of colon cancer 2006 s/p surgery with no adjuvant chemotherapy    Active and recent treatments:  Oral B12 supplementation  Oral iron    Summary of Case/History:    Liz Garcia a 80 y. o.female is a patient with anemia. Recent lab work showed iron deficiency anemia with fluctuations in HGB dating back to 2013 and has been consistently low since 08/2020 with worsening. She was in house 03/2021 following last lab work and dark stool, she underwent EGD and colonoscopy, gastritis and benign polyps found, negative otherwise. She received some IV iron while in house. Her lab work also showed paraproteinemia. She has history of PE and takes Xarelto and was taken off Brilinta while in house. She is taking B12 and has been taken off of Fosamax. She has history of cardiac stenting and follows with cardiology. She has history of colon cancer and underwent resection in 2006, no recurrence. She reports history of kidney stones and feels she passed one yesterday, 04/06/2021. She has resumed regular Dionte-Chi. Interim History: Patient presents to the clinic for a follow-up visit and to discuss results of her lab work-up. She reports she has been doing well overall but has had some bowel issues with diarrhea and has GI consult tomorrow.  She ran out of oral iron a few months ago. She continues with Xarelto with no issues. During this visit patient's allergy, social, medical, surgical history and medications were reviewed and updated. Past Medical History:   Past Medical History:   Diagnosis Date    Arthritis     Basal cell carcinoma of nose     Bronchitis     HX. OF     CAD (coronary artery disease)     Carpal tunnel syndrome     left hand    Chronic diastolic heart failure ([address] 3/25/2021    CKD (chronic kidney disease), stage III (HCC) 8/7/2019    Colon cancer ([address]     Diabetes mellitus ([address]     DVT (deep venous thrombosis) ([address] 7/10/2019    GI bleed 3/17/2021    Gout     Hematuria     History of coronary artery bypass graft x 3 7/2/2020    Hx of blood clots     ED.  LEGS, ED. 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 of stenting x 3.    APPENDECTOMY      BREAST SURGERY      INFECTED MILK DUCT LT.    CARDIAC SURGERY      CABG x 3 vessels and 3 stents    CAROTID ENDARTERECTOMY Left 3-3-16    CARPAL TUNNEL RELEASE Right     CHOLECYSTECTOMY      COLON SURGERY      colectomy, PRECANCEROUS POLYPS    COLONOSCOPY      X5, HX PRECANCEROUS POLYPS    COLONOSCOPY N/A 3/19/2021    COLONOSCOPY POLYPECTOMY REMOVAL HOT SNARE performed by Dean Burgos MD at [facility] CORONARY ARTERY BYPASS GRAFT      X3 vessels    CYST REMOVAL  10/07/2016    EXCISION OF SEBACEOUS CYST REMOVED FROM BACK X3    CYSTOSCOPY Bilateral 1/21/2020    CYSTOSCOPY RETROGRADE PYELOGRAM performed by Mayra Montague MD at [facility], ESOPHAGUS      FINGER TRIGGER RELEASE Right     INDEX FINGER AND THUMB.    HYSTERECTOMY (CERVIX STATUS UNKNOWN)      JOINT REPLACEMENT Left 03/29/2010    TKA    JOINT REPLACEMENT Right 02/16/1999    TKA    LITHOTRIPSY      X 3    NOSE SURGERY  01/2022    SKIN BIOPSY      TOP OF NOSE (BASAL CELL)    UPPER GASTROINTESTINAL ENDOSCOPY N/A 3/19/2021    EGD BIOPSY performed by Kae Morel MD at [facility] [address]      vein ablation on legs       Patient Family Social History:     Social History     Socioeconomic History    Marital status:      Spouse name: None    Number of children: None    Years of education: None    Highest education level: None   Occupational History    None   Tobacco Use    Smoking status: Never Smoker    Smokeless tobacco: Never Used   Vaping Use    Vaping Use: Never used   Substance and Sexual Activity    Alcohol use: No    Drug use: No    Sexual activity: Not Currently     Partners: Male   Other Topics Concern    None   Social History Narrative    None     Social Determinants of Health     Financial Resource Strain: Low Risk     Difficulty of Paying Living Expenses: Not very hard   Food Insecurity: No Food Insecurity    Worried About Running Out of Food in the Last Year: Never true    Citlaly of Food in the Last Year: Never true   Transportation Needs: No Transportation Needs    Lack of Transportation (Medical): No    Lack of Transportation (Non-Medical): No   Physical Activity: Insufficiently Active    Days of Exercise per Week: 2 days    Minutes of Exercise per Session: 60 min   Stress: No Stress Concern Present    Feeling of Stress : Only a little   Social Connections:  Moderately Isolated    Frequency of Communication with Friends and Family: More than three times a week    Frequency of Social Gatherings with Friends and Family: More than three times a week    Attends Catholic Services: Never    Active Member of Clubs or Organizations: Yes    Attends Club or Organization Meetings: More than 4 times per year    Marital Status:    Intimate Partner Violence:     Fear of Current or Ex-Partner: Not on file    Emotionally Abused: Not on file    Physically Abused: Not on file    Sexually Abused: Not on file   Housing Stability: Low Risk     Unable to Pay for Housing in the Last Year: No    Number of Places Lived in the Last Year: 1    Unstable Housing in the Last Year: No     Family History   Problem Relation Age of Onset    Stroke Mother     Hypertension Mother     Heart Disease Father         AAA    Stroke Sister     Parkinsonism Brother     Cancer Sister         lung    Alcohol Abuse Sister        Current Medications:     Current Outpatient Medications   Medication Sig Dispense Refill    amLODIPine (NORVASC) 2.5 MG tablet TAKE 1 TABLET DAILY 90 tablet 3    metoprolol tartrate (LOPRESSOR) 25 MG tablet TAKE 1 TABLET TWICE A  tablet 3    allopurinol (ZYLOPRIM) 100 MG tablet TAKE 1 TABLET TWICE A  tablet 3    furosemide (LASIX) 40 MG tablet TAKE 1 TABLET DAILY 90 tablet 3    glimepiride (AMARYL) 4 MG tablet TAKE 1 TABLET TWICE A  tablet 3    atorvastatin (LIPITOR) 80 MG tablet TAKE 1 TABLET NIGHTLY 90 tablet 3    famotidine (PEPCID) 40 MG tablet TAKE 1 TABLET EVERY EVENING 90 tablet 3    potassium chloride (KLOR-CON M) 20 MEQ extended release tablet Take 1 tablet by mouth daily Take with furosemide 90 tablet 3    Handicap [facility] by Does not apply route Can't walk greater than 200 feet. Expires in 5 years. 1 each 0    FreeStyle Lancets MISC USE TO TEST BLOOD SUGAR TWICE A  each 3    blood glucose monitor strips Testing once a day.   Please dispense according to patients insurance. 300 strip 3    Lancet Device MISC For use with lancets for blood glucose checks 1 each 0    mirtazapine (REMERON) 7.5 MG tablet Take 1-2 tablets by mouth nightly For insomnia and anxiety. Call for refills or dose adjustment. 60 tablet 0    XARELTO 20 MG TABS tablet TAKE 1 TABLET DAILY WITH BREAKFAST 90 tablet 3    ezetimibe (ZETIA) 10 MG tablet TAKE 1 TABLET NIGHTLY 90 tablet 3    cyanocobalamin ([employer] VITAMIN B12) 1000 MCG tablet Take 1 tablet by mouth daily 30 tablet 0    Blood Glucose Monitoring Suppl (FREESTYLE LITE) SARY use as directed  0    nitroGLYCERIN (NITROSTAT) 0.4 MG SL tablet Place 1 tablet under the tongue every 5 minutes as needed for Chest pain 25 tablet 1    Biotin 300 MCG TABS Take 600 mcg by mouth every other day       Cholecalciferol (VITAMIN D3) 1000 UNITS TABS Take 1 tablet by mouth daily       No current facility-administered medications for this visit. Allergies:   Brilinta [ticagrelor], Ampicillin, Clopidogrel bisulfate, Diovan [valsartan], Lantus [insulin glargine], and Metformin and related    Review of Systems:    Constitutional: No fever or chills. No night sweats, no weight loss   Eyes: No eye discharge, double vision, or eye pain   HEENT: negative for sore mouth, sore throat, hoarseness and voice change   Respiratory: negative for cough , sputum, dyspnea, wheezing, hemoptysis, chest pain   Cardiovascular: negative for chest pain, dyspnea, palpitations, orthopnea, PND   Gastrointestinal: negative for nausea, vomiting, constipation, abdominal pain, Dysphagia, hematemesis and hematochezia + diarrhea   Genitourinary: negative for frequency, dysuria, nocturia, urinary incontinence, and hematuria   Integument: negative for rash, skin lesions, bruises.    Hematologic/Lymphatic: negative for easy bruising, bleeding, lymphadenopathy, or petechiae   Endocrine: negative for heat or cold intolerance,weight changes, change in bowel habits and hair loss   Musculoskeletal: negative for myalgias, arthralgias, pain, joint swelling,and bone pain   Neurological: negative for headaches, dizziness, seizures, weakness, numbness; +burining pain in left hand. Physical Exam:    Vitals: BP (!) 155/72   Pulse 56   Temp (!) 96.6 °F (35.9 °C) (Temporal)   Wt 194 lb 12.8 oz (88.4 kg)   BMI 28.77 kg/m²   General appearance - well appearing, no in pain or distress  Mental status - AAO X3  Eyes - pupils equal and reactive, extraocular eye movements intact  Mouth - mucous membranes moist, pharynx normal without lesions  Neck - supple, no significant adenopathy  Lymphatics - no palpable lymphadenopathy, no hepatosplenomegaly  Chest - clear to auscultation, no wheezes, rales or rhonchi, symmetric air entry  Heart - normal rate, regular rhythm, normal S1, S2, no murmurs  Abdomen - soft, nontender, nondistended, no masses or organomegaly  Neurological - alert, oriented, normal speech, no focal findings or movement disorder noted  Extremities - peripheral pulses normal, no pedal edema, no clubbing or cyanosis  Skin - normal coloration and turgor, no rashes, no suspicious skin lesions noted       DATA:    CBC:   No results for input(s): WBC, HGB, PLT in the last 72 hours. BMP:    No results for input(s): NA, K, CL, CO2, BUN, CREATININE, GLUCOSE in the last 72 hours. Results for orders placed or performed during the hospital encounter of 06/04/22   Immunofixation serum profile   Result Value Ref Range    Serum IFX Interp       A ZONE OF RESTRICTION IS PRESENT IN THE GAMMAGLOBULIN REGION. CONFIRMED BY IMMUNOFIXATION TO BE MONOCLONAL    Pathologist ELECTRONICALLY SIGNED. Odalys Hollins M.D.     Lampeter/Lambda Free Lt Chains, Serum Quant   Result Value Ref Range    Kappa Free Light Chains QNT 2.09 (H) 0.37 - 1.94 mg/dL    Lambda Free Light Chains QNT 1.41 0.57 - 2.63 mg/dL    Free Kappa/Lambda Ratio 1.48 0.26 - 1.65   Electrophoresis Protein, Serum without Reflex to Immunofixation   Result Value Ref Range    Total Protein 6.7 6.4 - 8.3 g/dL    Albumin (calculated) 4.6 3.2 - 5.2 g/dL    Albumin % 69 (H) 45 - 65 %    Alpha-1-Globulin 0.1 0.1 - 0.4 g/dL    Alpha 1 % 2 (L) 3 - 6 %    Alpha-2-Globulin 0.7 0.5 - 0.9 g/dL    Alpha 2 % 10 6 - 13 %    Beta Globulin 0.6 0.5 - 1.1 g/dL    Beta Percent 10 (L) 11 - 19 %    Gamma Globulin 0.6 0.5 - 1.5 g/dL    Gamma Globulin % 10 9 - 20 %    Total Prot. Sum 6.6 6.3 - 8.2 g/dL    Total Prot. Sum,% 101 98 - 102 %    Protein Electrophoresis, Serum       A ZONE OF RESTRICTION IS PRESENT IN THE GAMMAGLOBULIN REGION. CONFIRMED BY IMMUNOFIXATION TO BE MONOCLONAL    Pathologist ELECTRONICALLY SIGNED.  Shan Encarnacion M.D.    CBC Auto Differential   Result Value Ref Range    WBC 7.1 3.5 - 11.0 k/uL    RBC 4.33 4.0 - 5.2 m/uL    Hemoglobin 13.1 12.0 - 16.0 g/dL    Hematocrit 38.8 36 - 46 %    MCV 89.5 80 - 100 fL    MCH 30.3 26 - 34 pg    MCHC 33.9 31 - 37 g/dL    RDW 14.9 11.5 - 14.9 %    Platelets 430 166 - 521 k/uL    MPV 8.3 6.0 - 12.0 fL    Seg Neutrophils 55 36 - 66 %    Lymphocytes 32 24 - 44 %    Monocytes 9 (H) 1 - 7 %    Eosinophils % 3 0 - 4 %    Basophils 1 0 - 2 %    Segs Absolute 4.00 1.3 - 9.1 k/uL    Absolute Lymph # 2.20 1.0 - 4.8 k/uL    Absolute Mono # 0.60 0.1 - 1.3 k/uL    Absolute Eos # 0.20 0.0 - 0.4 k/uL    Basophils Absolute 0.00 0.0 - 0.2 k/uL   Basic Metabolic Panel   Result Value Ref Range    Glucose 184 (H) 70 - 99 mg/dL    BUN 15 8 - 23 mg/dL    CREATININE 0.82 0.50 - 0.90 mg/dL    Calcium 9.4 8.6 - 10.4 mg/dL    Sodium 141 135 - 144 mmol/L    Potassium 5.1 3.7 - 5.3 mmol/L    Chloride 105 98 - 107 mmol/L    CO2 27 20 - 31 mmol/L    Anion Gap 9 9 - 17 mmol/L    GFR Non-African American >60 >60 mL/min    GFR African American >60 >60 mL/min    GFR Comment         Iron and TIBC   Result Value Ref Range    Iron 68 37 - 145 ug/dL    TIBC 299 250 - 450 ug/dL    Iron Saturation 23 20 - 55 %    UIBC 231 112 - 347 ug/dL Ferritin   Result Value Ref Range    Ferritin 77 13 - 150 ng/mL           Impression:  Anemia  Iron deficiency  B12 deficiency  GI bleed  History of pulmonary embolism on chronic anticoagulation  Coronary artery disease  History of colon cancer 2006 s/p surgery with no adjuvant chemotherapy  Paraproteinemia, likely MGUS    Plan:  I reviewed her chart and follow up with other specialists, her mammogram was negative and we reviewed guidelines. Her lab work was reviewed, her HGB and iron studies are in range, some decrease in ferritin, all other counts and electrolytes are in range, her MGUS is stable and I reviewed diagnosis. Clinically she is doing well overall, she has diarrhea and follow up with GI tomorrow to discuss. We will resume oral iron once daily and I am refilling. Return in 6 months. Scribe Attestation   This note was created by Angelic Echevarria acting as scribe for the physician signing this note  Electronically Signed  Angleic Echevarria, 6/8/2022  Scribe, [employer]. Attending Attestation   Note was reviewed and edited. I am in agreement with the note as entered    Nayla Helm MD      This note is created with the assistance of a speech recognition program.  While intending to generate a document that actually reflects the content of the visit, the document can still have some errors including those of syntax and sound a like substitutions which may escape proof reading. It such instances, actual meaning can be extrapolated by contextual diversion.

## 2022-06-09 ENCOUNTER — OFFICE VISIT (OUTPATIENT)
Dept: GASTROENTEROLOGY | Age: 87
End: 2022-06-09
Payer: MEDICARE

## 2022-06-09 VITALS
DIASTOLIC BLOOD PRESSURE: 75 MMHG | BODY MASS INDEX: 28.21 KG/M2 | SYSTOLIC BLOOD PRESSURE: 155 MMHG | TEMPERATURE: 97.3 F | HEART RATE: 62 BPM | WEIGHT: 191 LBS

## 2022-06-09 DIAGNOSIS — C18.9 ADENOCARCINOMA, COLON (HCC): ICD-10-CM

## 2022-06-09 DIAGNOSIS — D36.9 ADENOMATOUS POLYP: ICD-10-CM

## 2022-06-09 DIAGNOSIS — R15.9 FULL INCONTINENCE OF FECES: ICD-10-CM

## 2022-06-09 DIAGNOSIS — R19.7 DIARRHEA, UNSPECIFIED TYPE: Primary | ICD-10-CM

## 2022-06-09 DIAGNOSIS — K29.00 ACUTE SUPERFICIAL GASTRITIS WITHOUT HEMORRHAGE: ICD-10-CM

## 2022-06-09 DIAGNOSIS — D50.0 ANEMIA, BLOOD LOSS: ICD-10-CM

## 2022-06-09 PROCEDURE — 1123F ACP DISCUSS/DSCN MKR DOCD: CPT | Performed by: INTERNAL MEDICINE

## 2022-06-09 PROCEDURE — G8427 DOCREV CUR MEDS BY ELIG CLIN: HCPCS | Performed by: INTERNAL MEDICINE

## 2022-06-09 PROCEDURE — G8417 CALC BMI ABV UP PARAM F/U: HCPCS | Performed by: INTERNAL MEDICINE

## 2022-06-09 PROCEDURE — 1090F PRES/ABSN URINE INCON ASSESS: CPT | Performed by: INTERNAL MEDICINE

## 2022-06-09 PROCEDURE — 1036F TOBACCO NON-USER: CPT | Performed by: INTERNAL MEDICINE

## 2022-06-09 PROCEDURE — 99214 OFFICE O/P EST MOD 30 MIN: CPT | Performed by: INTERNAL MEDICINE

## 2022-06-09 RX ORDER — LOPERAMIDE HYDROCHLORIDE 2 MG/1
2 CAPSULE ORAL 4 TIMES DAILY PRN
Qty: 30 CAPSULE | Refills: 1 | Status: SHIPPED | OUTPATIENT
Start: 2022-06-09 | End: 2022-07-11 | Stop reason: SDUPTHER

## 2022-06-09 ASSESSMENT — ENCOUNTER SYMPTOMS
RECTAL PAIN: 0
WHEEZING: 0
ANAL BLEEDING: 0
NAUSEA: 0
ABDOMINAL PAIN: 0
ABDOMINAL DISTENTION: 0
DIARRHEA: 1
VOMITING: 0
COUGH: 0
BLOOD IN STOOL: 1
TROUBLE SWALLOWING: 0
CHOKING: 0
CONSTIPATION: 0

## 2022-06-09 NOTE — PROGRESS NOTES
GI CLINIC FOLLOW UP    INTERVAL HISTORY:   No referring provider defined for this encounter. Chief Complaint   Patient presents with    Anemia     Pt is f/u on anemia    Diarrhea     Patient states she has colon cancer with bowel resection in 2006. Since then she has had urgency, but the last year she has been worse to the point of incontinence. HISTORY OF PRESENT ILLNESS: Ms.Betty EDEL Bourgeois is a 80 y.o. female , referred for evaluation of* GIB , anemia       Here for f/u   We saw her last year as a n in pt with significant anemia and we did an EGD and colonoscopy   did not follow here   Today she is here    with anew problem today with diarrhea as below   Saw her in house for :  Anemia with melena  Severe iron deficiency  History of colon cancer status post resection  Egd/colon done 3/2021   Labs 10/4/2021  normal CBC /LFTs/BUN/Cr   On xeralto   On Pepcid     Today    diarrhea with incontinence with some accident   diarrhea erratic , one day would have 3 bms   some days none   no solid stool for > 1 month    c diff was negative 4/22    started to take [address] [place] 6/4/22 normal CBC/LFTS /iron sat   Had tears with 2 of her deliveries       Past Medical,Family, and Social History reviewed and does contribute to the patient presentingcondition. Patient's PMH/PSH,SH,PSYCH Hx, MEDs, ALLERGIES, and ROS were all reviewed and updated in the appropriate sections. PAST MEDICAL HISTORY:  Past Medical History:   Diagnosis Date    Arthritis     Basal cell carcinoma of nose     Bronchitis     HX.  OF     CAD (coronary artery disease)     Carpal tunnel syndrome     left hand    Chronic diastolic heart failure ([address] 3/25/2021    CKD (chronic kidney disease), stage III (HCC) 8/7/2019    Colon cancer ([address]     Diabetes mellitus ([address]     DVT (deep venous thrombosis) ([address] 7/10/2019    GI bleed 3/17/2021    Gout     Hematuria     History of coronary artery bypass graft x 3 7/2/2020    Hx of blood clots     ED.  LEGS, ED. 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46    Ophthalmoplegic migraine headache     Osteoarthritis     Other pulmonary embolism without acute cor pulmonale ([facility] 4/30/2013    Personal history of colon cancer 4/12/2021    2006    S/P coronary artery stent placement X 3 6/29/2020    SSS (sick sinus syndrome) ([facility] 4/11/2016    TIA (transient ischemic attack)     Uterine cancer ([facility]     UTI due to Klebsiella species 12/14/2020    Wears glasses        Past Surgical History:   Procedure Laterality Date    ANGIOPLASTY      hx. of stenting x 3.    APPENDECTOMY      BREAST SURGERY      INFECTED MILK DUCT LT.    CARDIAC SURGERY      CABG x 3 vessels and 3 stents    CAROTID ENDARTERECTOMY Left 3-3-16    CARPAL TUNNEL RELEASE Right     CHOLECYSTECTOMY      COLON SURGERY      colectomy, PRECANCEROUS POLYPS    COLONOSCOPY      X5, HX PRECANCEROUS POLYPS    COLONOSCOPY N/A 3/19/2021    COLONOSCOPY POLYPECTOMY REMOVAL HOT SNARE performed by Sapna Alcantar MD at [facility].      X3 vessels    CYST REMOVAL  10/07/2016    EXCISION OF SEBACEOUS CYST REMOVED FROM BACK X3    CYSTOSCOPY Bilateral 1/21/2020    CYSTOSCOPY RETROGRADE PYELOGRAM performed by Naomi Stinson MD at [facility], [address] Right     INDEX FINGER AND THUMB.    HYSTERECTOMY (CERVIX STATUS UNKNOWN)      JOINT REPLACEMENT Left 03/29/2010    TKA    JOINT REPLACEMENT Right 02/16/1999    TKA    LITHOTRIPSY      X 3    NOSE SURGERY  01/2022    SKIN BIOPSY      TOP OF NOSE (BASAL CELL)    UPPER GASTROINTESTINAL ENDOSCOPY N/A 3/19/2021    EGD BIOPSY performed by Sapna Alcantar MD at [facility] [address]      vein ablation on legs       CURRENT MEDICATIONS:    Current Outpatient Medications:   loperamide (RA ANTI-DIARRHEAL) 2 MG capsule, Take 1 capsule by mouth 4 times daily as needed for Diarrhea, Disp: 30 capsule, Rfl: 1    ferrous sulfate (IRON 325) 325 (65 Fe) MG tablet, Take 1 tablet by mouth daily (with breakfast), Disp: 90 tablet, Rfl: 1    amLODIPine (NORVASC) 2.5 MG tablet, TAKE 1 TABLET DAILY, Disp: 90 tablet, Rfl: 3    metoprolol tartrate (LOPRESSOR) 25 MG tablet, TAKE 1 TABLET TWICE A DAY, Disp: 180 tablet, Rfl: 3    allopurinol (ZYLOPRIM) 100 MG tablet, TAKE 1 TABLET TWICE A DAY, Disp: 180 tablet, Rfl: 3    furosemide (LASIX) 40 MG tablet, TAKE 1 TABLET DAILY, Disp: 90 tablet, Rfl: 3    glimepiride (AMARYL) 4 MG tablet, TAKE 1 TABLET TWICE A DAY, Disp: 180 tablet, Rfl: 3    atorvastatin (LIPITOR) 80 MG tablet, TAKE 1 TABLET NIGHTLY, Disp: 90 tablet, Rfl: 3    famotidine (PEPCID) 40 MG tablet, TAKE 1 TABLET EVERY EVENING, Disp: 90 tablet, Rfl: 3    potassium chloride (KLOR-CON M) 20 MEQ extended release tablet, Take 1 tablet by mouth daily Take with furosemide, Disp: 90 tablet, Rfl: 3    Handicap Placard [facility], by Does not apply route Can't walk greater than 200 feet. Expires in 5 years. , Disp: 1 each, Rfl: 0    FreeStyle Lancets MISC, USE TO TEST BLOOD SUGAR TWICE A DAY, Disp: 300 each, Rfl: 3    blood glucose monitor strips, Testing once a day. Please dispense according to patients insurance., Disp: 300 strip, Rfl: 3    Lancet Device MISC, For use with lancets for blood glucose checks, Disp: 1 each, Rfl: 0    mirtazapine (REMERON) 7.5 MG tablet, Take 1-2 tablets by mouth nightly For insomnia and anxiety. Call for refills or dose adjustment. , Disp: 60 tablet, Rfl: 0    XARELTO 20 MG TABS tablet, TAKE 1 TABLET DAILY WITH BREAKFAST, Disp: 90 tablet, Rfl: 3    ezetimibe (ZETIA) 10 MG tablet, TAKE 1 TABLET NIGHTLY, Disp: 90 tablet, Rfl: 3    cyanocobalamin ([employer] VITAMIN B12) 1000 MCG tablet, Take 1 tablet by mouth daily, Disp: 30 tablet, Rfl: 0    Blood Glucose Monitoring Suppl (FREESTYLE LITE) SARY, use as directed, Disp: , Rfl: 0    nitroGLYCERIN (NITROSTAT) 0.4 MG SL tablet, Place 1 tablet under the tongue every 5 minutes as needed for Chest pain, Disp: 25 tablet, Rfl: 1    Biotin 300 MCG TABS, Take 600 mcg by mouth every other day , Disp: , Rfl:     Cholecalciferol (VITAMIN D3) 1000 UNITS TABS, Take 1 tablet by mouth daily, Disp: , Rfl:     ALLERGIES:   Allergies   Allergen Reactions    Brilinta [Ticagrelor]      Gi BLEED    Ampicillin Other (See Comments)    Clopidogrel Bisulfate Itching    Diovan [Valsartan] Other (See Comments)     cough    Lantus [Insulin Glargine] Nausea Only     Stomach \"quivered\" after taking it, palpitations    Metformin And Related Diarrhea     Chronic kidney disease stage 3       FAMILY HISTORY:       Problem Relation Age of Onset    Stroke Mother     Hypertension Mother     Heart Disease Father         AAA    Stroke Sister     Parkinsonism Brother     Cancer Sister         lung    Alcohol Abuse Sister          SOCIAL HISTORY:   Social History     Socioeconomic History    Marital status:       Spouse name: Not on file    Number of children: Not on file    Years of education: Not on file    Highest education level: Not on file   Occupational History    Not on file   Tobacco Use    Smoking status: Never Smoker    Smokeless tobacco: Never Used   Vaping Use    Vaping Use: Never used   Substance and Sexual Activity    Alcohol use: No    Drug use: No    Sexual activity: Not Currently     Partners: Male   Other Topics Concern    Not on file   Social History Narrative    Not on file     Social Determinants of Health     Financial Resource Strain: Low Risk     Difficulty of Paying Living Expenses: Not very hard   Food Insecurity: No Food Insecurity    Worried About Running Out of Food in the Last Year: Never true    Citlaly of Food in the Last Year: Never true   Transportation Needs: No Transportation Needs    Lack of Transportation (Medical): No    Lack of Transportation (Non-Medical): No   Physical Activity: Insufficiently Active    Days of Exercise per Week: 2 days    Minutes of Exercise per Session: 60 min   Stress: No Stress Concern Present    Feeling of Stress : Only a little   Social Connections: Moderately Isolated    Frequency of Communication with Friends and Family: More than three times a week    Frequency of Social Gatherings with Friends and Family: More than three times a week    Attends Gnosticism Services: Never    Active Member of Clubs or Organizations: Yes    Attends Club or Organization Meetings: More than 4 times per year    Marital Status:    Intimate Partner Violence:     Fear of Current or Ex-Partner: Not on file    Emotionally Abused: Not on file    Physically Abused: Not on file    Sexually Abused: Not on file   Housing Stability: [address] Unable to Pay for Housing in the Last Year: No    Number of [place] in the Last Year: 1    Unstable Housing in the Last Year: No       REVIEW OF SYSTEMS: A 12-point review of systemswas obtained and pertinent positives and negatives were enumerated above in the history of present illness. All other reviewed systems / symptoms were negative. Review of Systems   Constitutional: Negative for appetite change, fatigue and unexpected weight change. HENT: Negative for trouble swallowing. Eyes: Positive for visual disturbance (glasses). Respiratory: Negative for cough, choking and wheezing. Cardiovascular: Negative for chest pain, palpitations and leg swelling. Gastrointestinal: Positive for blood in stool and diarrhea (urgency/incontinence). Negative for abdominal distention, abdominal pain, anal bleeding, constipation, nausea, rectal pain and vomiting. Genitourinary: Negative for difficulty urinating. Allergic/Immunologic: Negative for environmental allergies and food allergies.    Neurological: Negative for dizziness, weakness, light-headedness, numbness and headaches. Hematological: Bruises/bleeds easily. Psychiatric/Behavioral: Negative for sleep disturbance. The patient is not nervous/anxious. LABORATORY DATA: Reviewed  Lab Results   Component Value Date    WBC 7.1 06/04/2022    HGB 13.1 06/04/2022    HCT 38.8 06/04/2022    MCV 89.5 06/04/2022     06/04/2022     06/04/2022    K 5.1 06/04/2022     06/04/2022    CO2 27 06/04/2022    BUN 15 06/04/2022    CREATININE 0.82 06/04/2022    LABALBU 4.4 04/04/2022    BILITOT 1.40 (H) 04/04/2022    ALKPHOS 55 04/04/2022    AST 24 04/04/2022    ALT 25 04/04/2022    INR 2.0 08/30/2021         Lab Results   Component Value Date    RBC 4.33 06/04/2022    HGB 13.1 06/04/2022    MCV 89.5 06/04/2022    MCH 30.3 06/04/2022    MCHC 33.9 06/04/2022    RDW 14.9 06/04/2022    MPV 8.3 06/04/2022    BASOPCT 1 06/04/2022    LYMPHSABS 2.20 06/04/2022    MONOSABS 0.60 06/04/2022    NEUTROABS 4.00 06/04/2022    EOSABS 0.20 06/04/2022    BASOSABS 0.00 06/04/2022         DIAGNOSTIC TESTING:     No results found. PHYSICAL EXAMINATION: Vital signs reviewed per the nursing documentation. BP (!) 155/75   Pulse 62   Temp 97.3 °F (36.3 °C)   Wt 191 lb (86.6 kg)   BMI 28.21 kg/m²   Body mass index is 28.21 kg/m². Physical Exam  Vitals and nursing note reviewed. Constitutional:       General: She is not in acute distress. Appearance: She is well-developed. She is not diaphoretic. HENT:      Head: Normocephalic. Mouth/Throat:      Pharynx: No oropharyngeal exudate. Eyes:      General: No scleral icterus. Pupils: Pupils are equal, round, and reactive to light. Neck:      Thyroid: No thyromegaly. Vascular: No JVD. Trachea: No tracheal deviation. Cardiovascular:      Rate and Rhythm: Normal rate and regular rhythm. Heart sounds: Normal heart sounds. No murmur heard.       Pulmonary:      Effort: Pulmonary effort is normal. No respiratory distress. Breath sounds: Normal breath sounds. No wheezing. Abdominal:      General: Bowel sounds are normal. There is no distension. Palpations: Abdomen is soft. Tenderness: There is no abdominal tenderness. There is no guarding or rebound. Comments: No ascites   Musculoskeletal:         General: Normal range of motion. Cervical back: Normal range of motion and neck supple. Skin:     General: Skin is warm. Coloration: Skin is not pale. Findings: No erythema or rash. Comments: She is not diaphoretic   Neurological:      Mental Status: She is alert and oriented to person, place, and time. Deep Tendon Reflexes: Reflexes are normal and symmetric. Psychiatric:         Behavior: Behavior normal.         Thought Content: Thought content normal.         Judgment: Judgment normal.           IMPRESSION: Ms. Marlene Beck is a 80 y.o. female with      Diagnosis Orders   1. Diarrhea, unspecified type     2. Full incontinence of feces     3. Anemia, blood loss     4. Adenomatous polyp     5. Acute superficial gastritis without hemorrhage     6. Adenocarcinoma, colon ([address]  CT ABDOMEN PELVIS W IV CONTRAST    CEA     Will proceed with the above  Repeat the CAT scan and the CEA to rule out recurrence of any malignancy  We will treat her with Lomotil  Diet/life style/natural hx /complication of the dx were all explained in details   Past medical, past surgical, social history, psychiatric history, medications or allergies, all reviewed and  updated    Thank you for allowing me to participate in the care of Ms. House. For any further questions please do not hesitate to contact me. I have reviewed and agree with the ROS entered by the MA/RN. Note is dictated utilizing voice recognition software. Unfortunately this leads to occasional typographical errors. Please contact our office if you have any questions.       Reva Crane MD  [facility]  O: #[phone]

## 2022-06-15 ENCOUNTER — APPOINTMENT (OUTPATIENT)
Dept: CT IMAGING | Age: 87
End: 2022-06-15
Payer: MEDICARE

## 2022-06-15 ENCOUNTER — APPOINTMENT (OUTPATIENT)
Dept: GENERAL RADIOLOGY | Age: 87
End: 2022-06-15
Payer: MEDICARE

## 2022-06-15 ENCOUNTER — HOSPITAL ENCOUNTER (EMERGENCY)
Age: 87
Discharge: HOME | End: 2022-06-15
Attending: EMERGENCY MEDICINE
Payer: MEDICARE

## 2022-06-15 VITALS
RESPIRATION RATE: 15 BRPM | HEIGHT: 69 IN | SYSTOLIC BLOOD PRESSURE: 140 MMHG | HEART RATE: 58 BPM | BODY MASS INDEX: 28.44 KG/M2 | OXYGEN SATURATION: 97 % | DIASTOLIC BLOOD PRESSURE: 66 MMHG | WEIGHT: 192 LBS | TEMPERATURE: 97.9 F

## 2022-06-15 DIAGNOSIS — S80.01XA CONTUSION OF RIGHT KNEE, INITIAL ENCOUNTER: ICD-10-CM

## 2022-06-15 DIAGNOSIS — S09.90XA CLOSED HEAD INJURY, INITIAL ENCOUNTER: Primary | ICD-10-CM

## 2022-06-15 PROCEDURE — 99284 EMERGENCY DEPT VISIT MOD MDM: CPT

## 2022-06-15 PROCEDURE — 70450 CT HEAD/BRAIN W/O DYE: CPT

## 2022-06-15 PROCEDURE — 6370000000 HC RX 637 (ALT 250 FOR IP): Performed by: EMERGENCY MEDICINE

## 2022-06-15 PROCEDURE — 73562 X-RAY EXAM OF KNEE 3: CPT

## 2022-06-15 RX ORDER — ACETAMINOPHEN 500 MG
1000 TABLET ORAL ONCE
Status: COMPLETED | OUTPATIENT
Start: 2022-06-15 | End: 2022-06-15

## 2022-06-15 RX ADMIN — ACETAMINOPHEN 1000 MG: 500 TABLET ORAL at 11:21

## 2022-06-15 ASSESSMENT — PAIN SCALES - GENERAL
PAINLEVEL_OUTOF10: 5
PAINLEVEL_OUTOF10: 3
PAINLEVEL_OUTOF10: 5
PAINLEVEL_OUTOF10: 5

## 2022-06-15 ASSESSMENT — PAIN - FUNCTIONAL ASSESSMENT
PAIN_FUNCTIONAL_ASSESSMENT: 0-10
PAIN_FUNCTIONAL_ASSESSMENT: 0-10

## 2022-06-15 ASSESSMENT — PAIN DESCRIPTION - DESCRIPTORS: DESCRIPTORS: DISCOMFORT

## 2022-06-15 ASSESSMENT — PAIN DESCRIPTION - LOCATION
LOCATION: HEAD
LOCATION: RIB CAGE

## 2022-06-15 ASSESSMENT — LIFESTYLE VARIABLES: HOW OFTEN DO YOU HAVE A DRINK CONTAINING ALCOHOL: NEVER

## 2022-06-15 ASSESSMENT — PAIN DESCRIPTION - ORIENTATION: ORIENTATION: RIGHT;LEFT

## 2022-06-15 ASSESSMENT — PAIN DESCRIPTION - PAIN TYPE: TYPE: ACUTE PAIN

## 2022-06-15 ASSESSMENT — PAIN DESCRIPTION - FREQUENCY: FREQUENCY: CONTINUOUS

## 2022-06-15 NOTE — ED NOTES

## 2022-06-15 NOTE — ED TRIAGE NOTES
Pt states she rolled out of bed and hit her head on the night table and fell on the floor two days ago. She has an abrasion to her forehead, rt arm and bruising around eyes. Pt states she is having dizziness and is worried about a concussion.

## 2022-06-15 NOTE — ED PROVIDER NOTES
EMERGENCY DEPARTMENT ENCOUNTER    Pt Name: Derek Castelan  MRN: [id number]  [place] 12/10/1933  Date of evaluation: 6/15/22  CHIEF COMPLAINT       Chief Complaint   Patient presents with    Fall    Dizziness     HISTORY OF PRESENT ILLNESS     Head Injury  Location:  Frontal  Time since incident: Monday night, rolled out of bed, hit front of forehead, having a mild headache and dizziness since then. Pain details:     Quality:  Aching    Severity:  Mild    Duration:  3 days    Timing:  Constant    Progression:  Unchanged  Chronicity:  New  Relieved by:  Nothing  Worsened by:  Nothing  Ineffective treatments:  None tried  Associated symptoms comment:  Right knee also sore, fell on it    Able to walk  On xarelto    REVIEW OF SYSTEMS     Review of Systems   All other systems reviewed and are negative. PASTMEDICAL HISTORY     Past Medical History:   Diagnosis Date    Arthritis     Basal cell carcinoma of nose     Bronchitis     HX. OF     CAD (coronary artery disease)     Carpal tunnel syndrome     left hand    Chronic diastolic heart failure ([address] 3/25/2021    CKD (chronic kidney disease), stage III (HCC) 8/7/2019    Colon cancer ([address]     Diabetes mellitus ([address]     DVT (deep venous thrombosis) ([address] 7/10/2019    GI bleed 3/17/2021    Gout     Hematuria     History of coronary artery bypass graft x 3 7/2/2020    Hx of blood clots     ED.  LEGS, ED. 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46    Ophthalmoplegic migraine headache     Osteoarthritis     Other pulmonary embolism without acute cor pulmonale ([address] 4/30/2013    Personal history of colon cancer 4/12/2021    2006    S/P coronary artery stent placement X 3 6/29/2020    SSS (sick sinus syndrome) ([address] 4/11/2016    TIA (transient ischemic attack)     Uterine cancer ([address]     UTI due to Klebsiella species 12/14/2020    Wears glasses      Past Problem List  Patient Active Problem List   Diagnosis Code    Gout of foot M10.9    Type 2 diabetes mellitus with stage 2 chronic kidney disease, without long-term current use of insulin (HCC) E11.22, N18.2    Coronary artery disease involving coronary bypass graft of native heart without angina pectoris I25.810    Calcium kidney stone N20.0    Coronary artery disease due to lipid rich plaque I25.10, I25.83    Hyperlipidemia with target LDL less than 70 E78.5    Occlusion and stenosis of bilateral carotid arteries I65.23    Benign hypertension with CKD (chronic kidney disease), stage II I12.9, N18.2    Chronic anticoagulation Z79.01    Osteopenia M85.80    Psychophysiological insomnia F51.04    Bilateral carotid artery stenosis I65.23    Carpal tunnel syndrome of left wrist G56.02    History of pulmonary embolus (PE) Z86.711    History of DVT (deep vein thrombosis) Z86.718    Hematemesis K92.0    History of coronary artery stent placement Z95.5    History of coronary artery bypass graft x 3 Z95.1    Bilateral hearing loss H91.93    Anemia D64.9    Seborrheic dermatitis L21.9    MGUS (monoclonal gammopathy of unknown significance) D47.2    Vitamin B 12 deficiency E53.8    Chronic diastolic heart failure (HCC) I50.32    History of colon cancer Z85.038    Bilateral carotid bruits R09.89    History of left-sided carotid endarterectomy Z98.890    History of TIA (transient ischemic attack) Z86.73    Mild aortic stenosis I35.0    Bilateral impacted cerumen H61.23    Incontinence of feces with fecal urgency R15.9, R15.2    Skin lesion of right arm L98.9     SURGICAL HISTORY       Past Surgical History:   Procedure Laterality Date    ANGIOPLASTY      hx. of stenting x 3.    APPENDECTOMY      BREAST SURGERY      INFECTED MILK DUCT LT.    CARDIAC SURGERY      CABG x 3 vessels and 3 stents    CAROTID ENDARTERECTOMY Left 3-3-16    CARPAL TUNNEL RELEASE Right     CHOLECYSTECTOMY      COLON SURGERY      colectomy, PRECANCEROUS POLYPS    COLONOSCOPY      X5, HX PRECANCEROUS POLYPS    COLONOSCOPY N/A 3/19/2021    COLONOSCOPY POLYPECTOMY REMOVAL HOT SNARE performed by Danielle Allen MD at [facility].      X3 vessels    CYST REMOVAL  10/07/2016    EXCISION OF SEBACEOUS CYST REMOVED FROM BACK X3    CYSTOSCOPY Bilateral 1/21/2020    CYSTOSCOPY RETROGRADE PYELOGRAM performed by Quoc Childress MD at [facility], [address] Right     INDEX FINGER AND THUMB.    HYSTERECTOMY (CERVIX STATUS UNKNOWN)      JOINT REPLACEMENT Left 03/29/2010    TKA    JOINT REPLACEMENT Right 02/16/1999    TKA    LITHOTRIPSY      X 3    NOSE SURGERY  01/2022    SKIN BIOPSY      TOP OF NOSE (BASAL CELL)    UPPER GASTROINTESTINAL ENDOSCOPY N/A 3/19/2021    EGD BIOPSY performed by Danielle lAlen MD at [facility]      vein ablation on legs     CURRENT MEDICATIONS       Discharge Medication List as of 6/15/2022 12:40 PM      CONTINUE these medications which have NOT CHANGED    Details   loperamide (RA ANTI-DIARRHEAL) 2 MG capsule Take 1 capsule by mouth 4 times daily as needed for Diarrhea, Disp-30 capsule, R-1Normal      ferrous sulfate (IRON 325) 325 (65 Fe) MG tablet Take 1 tablet by mouth daily (with breakfast), Disp-90 tablet, R-1Normal      amLODIPine (NORVASC) 2.5 MG tablet TAKE 1 TABLET DAILY, Disp-90 tablet, R-3Normal      metoprolol tartrate (LOPRESSOR) 25 MG tablet TAKE 1 TABLET TWICE A DAY, Disp-180 tablet, R-3Normal      allopurinol (ZYLOPRIM) 100 MG tablet TAKE 1 TABLET TWICE A DAY, Disp-180 tablet, R-3Normal      furosemide (LASIX) 40 MG tablet TAKE 1 TABLET DAILY, Disp-90 tablet, R-3Normal      glimepiride (AMARYL) 4 MG tablet TAKE 1 TABLET TWICE A DAY, Disp-180 tablet, R-3Normal      atorvastatin (LIPITOR) 80 MG tablet TAKE 1 TABLET NIGHTLY, Disp-90 tablet, R-3Normal famotidine (PEPCID) 40 MG tablet TAKE 1 TABLET EVERY EVENING, Disp-90 tablet, R-3Normal      potassium chloride (KLOR-CON M) 20 MEQ extended release tablet Take 1 tablet by mouth daily Take with furosemide, Disp-90 tablet, R-3Normal      Handicap Placard [facility] Starting Thu 2021, Disp-1 each, R-0, PrintCan't walk greater than 200 feet. Expires in 5 years. FreeStyle Lancets MISC Disp-300 each, R-3, NormalUSE TO TEST BLOOD SUGAR TWICE A DAY      blood glucose monitor strips Testing once a day. Please dispense according to patients insurance., Disp-300 strip, R-3, Normal      Lancet Device MISC Disp-1 each, R-0, NormalFor use with lancets for blood glucose checks      mirtazapine (REMERON) 7.5 MG tablet Take 1-2 tablets by mouth nightly For insomnia and anxiety. Call for refills or dose adjustment. , Disp-60 tablet, R-0Normal      XARELTO 20 MG TABS tablet TAKE 1 TABLET DAILY WITH BREAKFAST, Disp-90 tablet, R-3Normal      ezetimibe (ZETIA) 10 MG tablet TAKE 1 TABLET NIGHTLY, Disp-90 tablet, R-3Normal      cyanocobalamin ([employer] VITAMIN B12) 1000 MCG tablet Take 1 tablet by mouth daily, Disp-30 tablet, R-0NO PRINT      Blood Glucose Monitoring Suppl (FREESTYLE LITE) SARY R-0, Historical Med      nitroGLYCERIN (NITROSTAT) 0.4 MG SL tablet Place 1 tablet under the tongue every 5 minutes as needed for Chest pain, Disp-25 tablet, R-1      Biotin 300 MCG TABS Take 600 mcg by mouth every other day Historical Med      Cholecalciferol (VITAMIN D3) 1000 UNITS TABS Take 1 tablet by mouth daily           ALLERGIES     is allergic to brilinta [ticagrelor], ampicillin, clopidogrel bisulfate, diovan [valsartan], lantus [insulin glargine], and metformin and related. FAMILY HISTORY     She indicated that her mother is . She indicated that her father is . She indicated that the status of her brother is unknown.      SOCIAL HISTORY       Social History     Tobacco Use    Smoking status: Never Smoker    Smokeless focal deficit present. Mental Status: She is alert and oriented to person, place, and time. Coordination: Coordination normal.      Comments: GCS 15  Strength in arms 5/5  Strength in legs 5/5  Sensation intact bl arms and legs  No facial droop  Speech is clear, no dysarthria or aphasia  No ataxia in arms or legs  No gaze preference or nystagums  Visual fields intact     Psychiatric:         Mood and Affect: Mood normal.         Behavior: Behavior normal.         Thought Content: Thought content normal.         Judgment: Judgment normal.         MEDICAL DECISION MAKING:   Do not suspect stroke or ami or acs or sepsis or metabolic abnormality  Ct neg  Xray right knee neg  She is ambulatory  Discussed with patient anticipatory guidance, discharge instructions, follow up PCP 24 hours            Procedures    DIAGNOSTIC RESULTS     RADIOLOGY:All plain film, CT, MRI, and formal ultrasound images (except ED bedside ultrasound) are read by the radiologist, see reports below, unless otherwisenoted in MDM or here. CT HEAD WO CONTRAST   Final Result   Mild central and cortical cerebral atrophy. Mild chronic deep white matter ischemic changes      No acute intracranial abnormalities are noted. XR KNEE RIGHT (3 VIEWS)   Final Result   No acute findings. LABS: All lab results were reviewed by myself, and all abnormals are listed below. Labs Reviewed - No data to display    EMERGENCY DEPARTMENTCOURSE:         Vitals:    Vitals:    06/15/22 1114 06/15/22 1134 06/15/22 1145 06/15/22 1159   BP: (!) 156/65  (!) 136/58 (!) 140/66   Pulse: 67 66 64 58   Resp: 13 18 13 15   Temp:       SpO2: 96% 96% 97% 97%   Weight:       Height:           The patient was given the following medications while in the emergency department:  Orders Placed This Encounter   Medications    acetaminophen (TYLENOL) tablet 1,000 mg       FINAL IMPRESSION      1. Closed head injury, initial encounter    2.  Contusion of right knee, initial encounter          DISPOSITION/PLAN   DISPOSITION Decision To Discharge 06/15/2022 12:33:41 PM      PATIENT REFERRED TO:  Tania Raymond MD  [address]  [address] [place] [address]  [phone]    Schedule an appointment as soon as possible for a visit in 1 day      DISCHARGE MEDICATIONS:  Discharge Medication List as of 6/15/2022 12:40 PM        The care is provided during an unprecedented national emergency due to the novel coronavirus, COVID 19.   MD Connor Addison MD  06/15/22 [id number]

## 2022-06-23 ENCOUNTER — HOSPITAL ENCOUNTER (OUTPATIENT)
Dept: CT IMAGING | Age: 87
Discharge: HOME | End: 2022-06-25
Payer: MEDICARE

## 2022-06-23 DIAGNOSIS — C18.9 ADENOCARCINOMA, COLON (HCC): ICD-10-CM

## 2022-06-23 PROCEDURE — 6360000004 HC RX CONTRAST MEDICATION: Performed by: INTERNAL MEDICINE

## 2022-06-23 PROCEDURE — 2580000003 HC RX 258: Performed by: INTERNAL MEDICINE

## 2022-06-23 PROCEDURE — 74177 CT ABD & PELVIS W/CONTRAST: CPT

## 2022-06-23 RX ORDER — 0.9 % SODIUM CHLORIDE 0.9 %
80 INTRAVENOUS SOLUTION INTRAVENOUS ONCE
Status: COMPLETED | OUTPATIENT
Start: 2022-06-23 | End: 2022-06-23

## 2022-06-23 RX ORDER — SODIUM CHLORIDE 0.9 % (FLUSH) 0.9 %
10 SYRINGE (ML) INJECTION 2 TIMES DAILY
Status: DISCONTINUED | OUTPATIENT
Start: 2022-06-23 | End: 2022-06-26 | Stop reason: HOSPADM

## 2022-06-23 RX ADMIN — SODIUM CHLORIDE, PRESERVATIVE FREE 10 ML: 5 INJECTION INTRAVENOUS at 14:40

## 2022-06-23 RX ADMIN — IOPAMIDOL 75 ML: 755 INJECTION, SOLUTION INTRAVENOUS at 14:40

## 2022-06-23 RX ADMIN — SODIUM CHLORIDE 80 ML: 9 INJECTION, SOLUTION INTRAVENOUS at 14:40

## 2022-07-11 ENCOUNTER — TELEPHONE (OUTPATIENT)
Dept: GASTROENTEROLOGY | Age: 87
End: 2022-07-11

## 2022-07-11 RX ORDER — LOPERAMIDE HYDROCHLORIDE 2 MG/1
2 CAPSULE ORAL 4 TIMES DAILY PRN
Qty: 360 CAPSULE | Refills: 1 | Status: SHIPPED | OUTPATIENT
Start: 2022-07-11

## 2022-07-11 NOTE — TELEPHONE ENCOUNTER
Patient called office for CT results. Results are reviewed with pt. She stated she is still having the same trouble she was having before. Patient states she is not taking  Imodium because it says as needed. She would have to take it all the time. Pt is advised to take it once a day then up to 3 more times if needed. She is also advised to be aware of getting constipated. She will start taking it daily tomorrow. She states she also only has 12 pills left. If new script can be given, 901 day [facility]. Writer prepared and signed script per Dr Jenifer Sweeney.

## 2022-08-03 DIAGNOSIS — E78.5 HYPERLIPIDEMIA WITH TARGET LDL LESS THAN 100: ICD-10-CM

## 2022-08-03 RX ORDER — EZETIMIBE 10 MG/1
TABLET ORAL
Qty: 90 TABLET | Refills: 3 | Status: SHIPPED | OUTPATIENT
Start: 2022-08-03

## 2022-08-10 ENCOUNTER — HOSPITAL ENCOUNTER (OUTPATIENT)
Age: 87
Discharge: HOME | End: 2022-08-10
Payer: MEDICARE

## 2022-08-10 DIAGNOSIS — C18.9 ADENOCARCINOMA, COLON (HCC): ICD-10-CM

## 2022-08-10 LAB — CARCINOEMBRYONIC ANTIGEN: 1.7 NG/ML

## 2022-08-10 PROCEDURE — 82378 CARCINOEMBRYONIC ANTIGEN: CPT

## 2022-08-10 PROCEDURE — 36415 COLL VENOUS BLD VENIPUNCTURE: CPT

## 2022-08-12 ENCOUNTER — OFFICE VISIT (OUTPATIENT)
Dept: FAMILY MEDICINE CLINIC | Age: 87
End: 2022-08-12
Payer: MEDICARE

## 2022-08-12 VITALS
WEIGHT: 194 LBS | DIASTOLIC BLOOD PRESSURE: 80 MMHG | HEIGHT: 69 IN | SYSTOLIC BLOOD PRESSURE: 110 MMHG | HEART RATE: 61 BPM | BODY MASS INDEX: 28.73 KG/M2 | TEMPERATURE: 97.3 F | OXYGEN SATURATION: 98 %

## 2022-08-12 DIAGNOSIS — N20.0 KIDNEY STONE ON RIGHT SIDE: ICD-10-CM

## 2022-08-12 DIAGNOSIS — Z85.038 PERSONAL HISTORY OF COLON CANCER: ICD-10-CM

## 2022-08-12 DIAGNOSIS — N18.2 BENIGN HYPERTENSION WITH CKD (CHRONIC KIDNEY DISEASE), STAGE II: ICD-10-CM

## 2022-08-12 DIAGNOSIS — I50.32 CHRONIC DIASTOLIC HEART FAILURE (HCC): ICD-10-CM

## 2022-08-12 DIAGNOSIS — K43.9 VENTRAL HERNIA WITHOUT OBSTRUCTION OR GANGRENE: ICD-10-CM

## 2022-08-12 DIAGNOSIS — Z23 ENCOUNTER FOR IMMUNIZATION: ICD-10-CM

## 2022-08-12 DIAGNOSIS — E78.5 HYPERLIPIDEMIA WITH TARGET LDL LESS THAN 70: ICD-10-CM

## 2022-08-12 DIAGNOSIS — E11.22 TYPE 2 DIABETES MELLITUS WITH STAGE 2 CHRONIC KIDNEY DISEASE, WITHOUT LONG-TERM CURRENT USE OF INSULIN (HCC): Primary | ICD-10-CM

## 2022-08-12 DIAGNOSIS — N18.2 TYPE 2 DIABETES MELLITUS WITH STAGE 2 CHRONIC KIDNEY DISEASE, WITHOUT LONG-TERM CURRENT USE OF INSULIN (HCC): Primary | ICD-10-CM

## 2022-08-12 DIAGNOSIS — I12.9 BENIGN HYPERTENSION WITH CKD (CHRONIC KIDNEY DISEASE), STAGE II: ICD-10-CM

## 2022-08-12 LAB — HBA1C MFR BLD: 8 %

## 2022-08-12 PROCEDURE — 3052F HG A1C>EQUAL 8.0%<EQUAL 9.0%: CPT | Performed by: FAMILY MEDICINE

## 2022-08-12 PROCEDURE — G8417 CALC BMI ABV UP PARAM F/U: HCPCS | Performed by: FAMILY MEDICINE

## 2022-08-12 PROCEDURE — G8427 DOCREV CUR MEDS BY ELIG CLIN: HCPCS | Performed by: FAMILY MEDICINE

## 2022-08-12 PROCEDURE — 83036 HEMOGLOBIN GLYCOSYLATED A1C: CPT | Performed by: FAMILY MEDICINE

## 2022-08-12 PROCEDURE — 1090F PRES/ABSN URINE INCON ASSESS: CPT | Performed by: FAMILY MEDICINE

## 2022-08-12 PROCEDURE — 1123F ACP DISCUSS/DSCN MKR DOCD: CPT | Performed by: FAMILY MEDICINE

## 2022-08-12 PROCEDURE — 99214 OFFICE O/P EST MOD 30 MIN: CPT | Performed by: FAMILY MEDICINE

## 2022-08-12 PROCEDURE — 1036F TOBACCO NON-USER: CPT | Performed by: FAMILY MEDICINE

## 2022-08-12 RX ORDER — GLUCOSAMINE HCL/CHONDROITIN SU 500-400 MG
CAPSULE ORAL
Qty: 100 STRIP | Refills: 3 | Status: SHIPPED | OUTPATIENT
Start: 2022-08-12

## 2022-08-12 RX ORDER — BLOOD-GLUCOSE METER
KIT MISCELLANEOUS
Qty: 1 KIT | Refills: 0 | Status: SHIPPED | OUTPATIENT
Start: 2022-08-12

## 2022-08-12 ASSESSMENT — ENCOUNTER SYMPTOMS
VOMITING: 0
BLOOD IN STOOL: 0
WHEEZING: 0
DIARRHEA: 1
COUGH: 0
NAUSEA: 0
CONSTIPATION: 0
ABDOMINAL DISTENTION: 1
ABDOMINAL PAIN: 0
CHEST TIGHTNESS: 0

## 2022-08-12 ASSESSMENT — PATIENT HEALTH QUESTIONNAIRE - PHQ9
SUM OF ALL RESPONSES TO PHQ QUESTIONS 1-9: 0
SUM OF ALL RESPONSES TO PHQ QUESTIONS 1-9: 0
1. LITTLE INTEREST OR PLEASURE IN DOING THINGS: 0
SUM OF ALL RESPONSES TO PHQ QUESTIONS 1-9: 0
SUM OF ALL RESPONSES TO PHQ QUESTIONS 1-9: 0
2. FEELING DOWN, DEPRESSED OR HOPELESS: 0
SUM OF ALL RESPONSES TO PHQ9 QUESTIONS 1 & 2: 0

## 2022-08-12 NOTE — RESULT ENCOUNTER NOTE
Addressed during office visit today, A1c 8, worsening diabetes, continue treatment recommended during the office visit.

## 2022-08-12 NOTE — PROGRESS NOTES
Visit Information    Have you changed or started any medications since your last visit including any over-the-counter medicines, vitamins, or herbal medicines? {YES/NO DEFAULT:21114}   Have you stopped taking any of your medications? Is so, why? -  {YES/NO DEFAULT:21114}  Are you having any side effects from any of your medications? - {YES/NO DEFAULT:21114}    Have you seen any other physician or provider since your last visit? {YES/NO DEFAULT:21114}   Have you had any other diagnostic tests since your last visit? {YES/NO DEFAULT:21114}   Have you been seen in the emergency room and/or had an admission in a hospital since we last saw you? {YES/NO DEFAULT:21114}   Have you had your routine dental cleaning in the past 6 months? {YES/NO DEFAULT:21114}     Do you have an active MyChart account? If no, what is the barrier?   {yes AK:[id number]}    Patient Care Team:  Sumit Odell MD as PCP - General (Family Medicine)  Sumit Odell MD as PCP - [facility]  Rajiv Jordan MD as Consulting Physician (Ophthalmology)  Ruchi Mo MD as Consulting Physician (Cardiology)  Ara Chase as Consulting Physician (Neurology)  Broderick Gutierrez MD as Consulting Physician (Urology)  Kendall Rodríguez MD as Consulting Physician (Nephrology)  Abe Mcdermott MD as Consulting Physician (Internal Medicine Cardiovascular Disease)  Michell Renae MD as Consulting Physician (Gastroenterology)  Hector Abbott MD as Consulting Physician (Hematology and Oncology)  Aspen Davidson MD as Surgeon (Orthopedic Surgery)    Medical History Review  Past Medical, Family, and Social History reviewed and {DOES/NOT:54590} contribute to the patient presenting condition    Health Maintenance   Topic Date Due    DTaP/Tdap/Td vaccine (1 - Tdap) Never done    Shingles vaccine (2 of 3) 11/26/2017    Pneumococcal 65+ years Vaccine (2 - PCV) 06/12/2019    COVID-19 Vaccine (3 - Booster for Cortez Peter series) 07/13/2021    Flu vaccine (1) 09/01/2022    Annual Wellness Visit (AWV)  11/18/2022    Depression Screen  04/07/2023    Lipids  04/18/2023    Colorectal Cancer Screen  03/19/2031    Hepatitis A vaccine  Aged Out    Hib vaccine  Aged Out    Meningococcal (ACWY) vaccine  Aged Out

## 2022-08-12 NOTE — PROGRESS NOTES
Ron Reese (:  12/10/1933) is a 80 y.o. female,Established patient, here for evaluation of the following chief complaint(s): Diabetes, Hypertension, Other (Right groin pain), Hyperlipidemia, and Results (CT abdomen )      ASSESSMENT/PLAN:    1. Type 2 diabetes mellitus with stage 2 chronic kidney disease, without long-term current use of insulin ([facility])  Worsening  -     POCT glycosylated hemoglobin (Hb A1C) 8  Lab Results   Component Value Date    LABA1C 8.0 2022    LABA1C 7.2 2022    LABA1C 7.6 2021       -     glucose monitoring (FREESTYLE FREEDOM) kit; Disp-1 kit, R-0, NormalPlease supply Patient with a glucose monitoring kit that insurance will cover. -     blood glucose monitor strips; Testing once a day. Please dispense according to patients insurance., Disp-100 strip, R-3, Normal  -     Lancets 30G MISC; Disp-100 each, R-3, NormalTesting once a day. Please dispense according to patients insurance. -     Comprehensive Metabolic Panel; Future  -     CBC; Future  -advised home blood glucose testing  daily  -daily feet exam, Foot care: advised to wash feet daily, pat dry and apply lotion at night, avoiding between toes. Need to look at feet daily and report to a physician any signs of inflammation or skin damage  -annual dilated eye exam  -Low carb, low fat diet, increase fruits and vegetables, and exercise 4-5 times a day 30-40 minutes a day discussed  -continue current treatment glimepiride 4 mg twice a day  -continue statin Lipitor  She denies episodes of hypoglycemia  2. Chronic diastolic heart failure (HCC)  Stable  Lab Results   Component Value Date    LVEF 61 2020   Continue metoprolol 25 Mg twice a day, furosemide 40 Mg, with potassium 20 mEq daily    Recheck labs  -     Comprehensive Metabolic Panel; Future  -     CBC; Future  -     Magnesium; Future  -     Brain Natriuretic Peptide; Future    3.  Benign hypertension with CKD (chronic kidney disease), stage II  Stable chronic kidney disease stage II  Well controlled HTN. Continue current treatment. Will recheck labs. -     Comprehensive Metabolic Panel; Future  -     CBC; Future  -     Magnesium; Future  -     Phosphorus; Future  -     Urinalysis with Reflex to Culture; Future    4. Hyperlipidemia with target LDL less than 70  Well controlled. Continue current treatment. Zetia 10 mg, Lipitor 80 mg  Will recheck labs. Lab Results   Component Value Date    LDLCALC 43 02/22/2019    LDLCHOLESTEROL 45 04/18/2022       5. Ventral hernia without obstruction or gangrene  Stable  Observation only due to her advanced age and comorbidities  Careful review of urgent symptoms and need for immediate medical attention if condition worsens. Patient expressed understanding. Advised to go to ED if severe symptoms develop. Patient expressed understanding. 6. Personal history of colon cancer  Chronic diarrhea  Continue probiotics, Imodium as needed  7. Kidney stone on right side 8 mm, large  -     Paula Sepulveda MD, Urology, [place]  -     Urinalysis with Reflex to Culture; Future  8. Encounter for immunization  -     pneumococcal 20-valent conjugat (PREVNAR) 0.5 ML GEORGIE inj; Inject 0.5 mLs into the muscle once for 1 dose, Disp-0.5 mL, R-0Normal      Laura received counseling on the following healthy behaviors: nutrition, exercise, medication adherence, and falls precautions  Reviewed prior labs and health maintenance  Discussed use, benefit, and side effects of prescribed medications. Barriers to medication compliance addressed. Patient given educational materials - see patient instructions  Was a self-tracking handout given in paper form or via [email or web address]? Yes  All patient questions answered. Patient voiced understanding. The patient's past medical,surgical, social, and family history as well as her current medications and allergies were reviewed as documented in today's encounter.     Medications, labs, diagnostic studies, consultations and follow-up as documented in this encounter. Return for KEEP APPT. Data Unavailable    Future Appointments   Date Time Provider Sherly Pitt   11/3/2022  2:15 PM Harvey Martin MD [facility]   11/22/2022  9:00 AM [facility]   12/7/2022  1:30 PM [facility]         SUBJECTIVE/OBJECTIVE:    Kendal Colon reports she was seen by the GI specialist for the persistent diarrhea. Has been taking Align from over-the-counter, still having diarrhea . She was started by GI on Imodium, she is not sure if it is helping yet. GI did CT abdomen. Patient does have history of colon cancer. She also says she fell in June hit her had, was seen in ED on 6/15/2022  Patient also says she was seen at urgent care for right hip  Had injection 5/2/2022 Dr. Telly Houser, which helped. She denies any hip pain  CT abdomen--showed small spleen cyst, 2 small cysts in the pancreas, advised follow-up with GI, 8 mm right kidney stone, ventral hernia. She denies abdominal pain or blood in the stool. She reports good appetite. She gets urgency of defecation otherwise she has accidents. Patient also reports in the past she did see urology, who recommended intervention for the kidney stone, but she was not cleared by Dr. Levon Santos at that time. FINDINGS:   Lower Chest: There is mild bibasilar atelectasis and scar, which is unchanged. Organs: Liver is within normal limits. Gallbladder is surgically absent. There is a small cystic lesion at the anterior spleen, measuring 1.4 cm. Spleen is otherwise unremarkable. Tiny cystic lesion at the pancreatic tail,   measuring 0.9 cm. Another 0.9 cm cyst is seen in the pancreatic body. Adrenal glands are within normal limits. There is symmetric enhancement of the kidneys. No hydronephrosis or   perinephric inflammation. There is a 0.8 cm calculus at the lower pole of   the right kidney. GI/Bowel: There is a rectosigmoid anastomosis. There is a small   infraumbilical ventral hernia, which contains a knuckle of transverse colon. There is no abnormal bowel distention or focal pericolonic inflammation. The   appendix is normal.  No free air or ascites. Pelvis: There is a small area of nodularity along the right gonadal vein,   which is unchanged, measuring 1.2 cm. No evidence of intrapelvic   lymphadenopathy. Urinary bladder is incompletely distended. Uterus is   surgically absent. Peritoneum/Retroperitoneum: There is moderate atherosclerosis. No abdominal   aortic aneurysm. No retroperitoneal or mesenteric lymphadenopathy. Bones/Soft Tissues: There is no acute or suspicious osseous abnormality. Visualized superficial soft tissues are within normal limits. Diabetes Mellitus Type II, Follow-up:    Current symptoms/problems include hyperglycemia, paresthesia of the feet, and visual disturbances. Symptoms have been present for several years. Known diabetic complications: nephropathy, peripheral neuropathy, and cardiovascular disease  Cardiovascular risk factors: advanced age (older than 54 for men, 72 for women), diabetes mellitus, dyslipidemia, and hypertension    Medication compliance:  compliant all of the time  Current diabetic medications include oral agent (monotherapy): glimepiride (Amaryl). Stopped Rybelsus due to worsening diarrhea, however she admits since she has stopped it, diarrhea is still not better. Eye exam current (within one year): yes  Current diet: in general, a \"healthy\" diet      Barriers: impairment: Advanced age, multiple comorbidities    Current monitoring regimen: home blood tests - daily  Home blood sugar records: fasting range: 160, 145, 125  Any episodes of hypoglycemia? no    Is She on ACE inhibitor or angiotensin II receptor blocker? No      reports that she has never smoked.  She has never used smokeless tobacco. Counseling given: Yes      Daily Aspirin? No      A1c is 8, worsening. Lab Results   Component Value Date    LABA1C 8.0 08/12/2022    LABA1C 7.2 04/07/2022    LABA1C 7.6 11/17/2021       Urine microabumin is Elevated. Lab Results   Component Value Date    LABMICR 35 (H) 04/18/2022          Wt Readings from Last 3 Encounters:   08/12/22 194 lb (88 kg)   06/15/22 192 lb (87.1 kg)   06/09/22 191 lb (86.6 kg)         Hypertension, congestive heart failure, chronic kidney disease stage II, coronary artery disease:   Gabby Price  is not  exercising , but staying active, and is adherent to low salt diet. Blood pressure is well controlled at home. Cardiac symptoms  dyspnea, fatigue, and lower extremity edema. Patient denies  chest pain, chest pressure/discomfort, claudication, exertional chest pressure/discomfort, irregular heart beat, near-syncope, orthopnea, palpitations, paroxysmal nocturnal dyspnea, syncope, and tachypnea. Use of agents associated with hypertension: None  History of target organ damage: angina/ prior myocardial infarction, chronic kidney disease, heart failure, and prior coronary revascularization. Has history of CABG x3, 4 stents, carotid endarterectomy on the left side   Saw Dr. Odalys Pierce in April, no medications changes. BP controlled. Gabby Price reports compliance with BP medications, and tolerates them well, denies side effects. BP Readings from Last 3 Encounters:   08/12/22 110/80   06/15/22 (!) 140/66   06/09/22 (!) 155/75        Pulse is Normal.    Pulse Readings from Last 3 Encounters:   08/12/22 61   06/15/22 58   06/09/22 62         Hyperlipidemia:  No new myalgias or GI upset on atorvastatin (Lipitor) and eztemibe (Zetia). Medication compliance: compliant all of the time. Patient is  following a low fat, low cholesterol diet.      LDL is Normal.    Lab Results   Component Value Date    LDLCALC 43 02/22/2019    LDLCHOLESTEROL 45 04/18/2022                [x]Negative depression screening. PHQ Scores 8/12/2022 4/7/2022 11/17/2021 3/16/2021 8/28/2020 5/19/2020 3/10/2020   PHQ2 Score 0 0 0 0 0 0 0   PHQ9 Score 0 0 0 0 0 0 0       Prior to Visit Medications    Medication Sig Taking? Authorizing Provider   ezetimibe (ZETIA) 10 MG tablet TAKE 1 TABLET NIGHTLY Yes Franklyn Macdonald MD   loperamide (RA ANTI-DIARRHEAL) 2 MG capsule Take 1 capsule by mouth 4 times daily as needed for Diarrhea Yes Abbey Bland MD   ferrous sulfate (IRON 325) 325 (65 Fe) MG tablet Take 1 tablet by mouth daily (with breakfast) Yes Jian Burger MD   amLODIPine (NORVASC) 2.5 MG tablet TAKE 1 TABLET DAILY Yes Franklyn Macdonald MD   metoprolol tartrate (LOPRESSOR) 25 MG tablet TAKE 1 TABLET TWICE A DAY Yes Franklyn Macdonald MD   allopurinol (ZYLOPRIM) 100 MG tablet TAKE 1 TABLET TWICE A DAY Yes Franklyn Macdonald MD   furosemide (LASIX) 40 MG tablet TAKE 1 TABLET DAILY Yes Franklyn Macdonald MD   glimepiride (AMARYL) 4 MG tablet TAKE 1 TABLET TWICE A DAY Yes Franklyn Macdonald MD   atorvastatin (LIPITOR) 80 MG tablet TAKE 1 TABLET NIGHTLY Yes Franklyn Macdonald MD   famotidine (PEPCID) 40 MG tablet TAKE 1 TABLET EVERY EVENING Yes Harvey Martin MD   potassium chloride (KLOR-CON M) 20 MEQ extended release tablet Take 1 tablet by mouth daily Take with furosemide Yes Franklyn Macdonald MD   Handicap Placard MISC by Does not apply route Can't walk greater than 200 feet. Expires in 5 years. Yes Franklyn Macdonald MD   FreeStyle Lancets MISC USE TO TEST BLOOD SUGAR TWICE A DAY Yes Franklyn Macdonald MD   blood glucose monitor strips Testing once a day. Please dispense according to patients insurance.  Yes Franklyn Macdonald MD   Lancet Device MISC For use with lancets for blood glucose checks Yes Franklyn Macdonald MD   XARELTO 20 MG TABS tablet TAKE 1 TABLET DAILY WITH BREAKFAST Yes Franklyn Macdonald MD   cyanocobalamin ([facility] VITAMIN B12) 1000 MCG tablet Take 1 tablet by mouth daily Yes Franklyn Macdonald MD Blood Glucose Monitoring Suppl (FREESTYLE LITE) SARY use as directed Yes Historical Provider, MD   nitroGLYCERIN (NITROSTAT) 0.4 MG SL tablet Place 1 tablet under the tongue every 5 minutes as needed for Chest pain Yes Topher Srinivasan MD   Biotin 300 MCG TABS Take 600 mcg by mouth every other day  Yes Historical Provider, MD   Cholecalciferol (VITAMIN D3) 1000 UNITS TABS Take 1 tablet by mouth daily Yes Historical Provider, MD   mirtazapine (REMERON) 7.5 MG tablet Take 1-2 tablets by mouth nightly For insomnia and anxiety. Call for refills or dose adjustment. Kimmie England MD            Social History     Tobacco Use    Smoking status: Never    Smokeless tobacco: Never   Vaping Use    Vaping Use: Never used   Substance Use Topics    Alcohol use: No    Drug use: No         Review of Systems   Constitutional:  Positive for fatigue. Negative for activity change, appetite change, chills, diaphoresis, fever and unexpected weight change. HENT:  Positive for hearing loss (chronic). Eyes:  Positive for visual disturbance (stable, chronic). Respiratory:  Positive for shortness of breath (BEE at baseline). Negative for cough, chest tightness and wheezing. Cardiovascular:  Positive for leg swelling. Negative for chest pain and palpitations. Gastrointestinal:  Positive for abdominal distention (hernia) and diarrhea. Negative for abdominal pain, blood in stool, constipation, nausea and vomiting. Endocrine: Negative for cold intolerance, heat intolerance, polydipsia, polyphagia and polyuria. Skin:  Positive for rash (right elbow). Neurological:  Positive for numbness (feet). Hematological:  Bruises/bleeds easily. Psychiatric/Behavioral:  Positive for sleep disturbance (cannot sleep well since her  passed away). Negative for dysphoric mood.  The patient is not nervous/anxious.        -vital signs stable and within normal limits except overweight per BMI  /80   Pulse 61   Temp 97.3 °F Gait: Gait abnormal.      Comments: Up and go test more than 12 seconds. High risk for falls. Falls precautions discussed   Psychiatric:         Mood and Affect: Mood normal.         Behavior: Behavior normal.         Thought Content: Thought content normal.         Judgment: Judgment normal.         I personally reviewed testing with patient. Hyperglycemia  High triglycerides  Otherwise labs within normal limits    Hospital Outpatient Visit on 08/10/2022   Component Date Value Ref Range Status    CEA 08/10/2022 1.7  <3.9 ng/mL Final    Comment: The Roche \"ECLIA\" assay is used. Results obtained with different assay methods cannot be   used interchangeably.          Lab Results   Component Value Date    WBC 7.1 06/04/2022    HGB 13.1 06/04/2022    HCT 38.8 06/04/2022    MCV 89.5 06/04/2022     06/04/2022       Lab Results   Component Value Date/Time     06/04/2022 11:03 AM    K 5.1 06/04/2022 11:03 AM     06/04/2022 11:03 AM    CO2 27 06/04/2022 11:03 AM    BUN 15 06/04/2022 11:03 AM    CREATININE 0.82 06/04/2022 11:03 AM    GLUCOSE 184 06/04/2022 11:03 AM    GLUCOSE 170 02/14/2020 01:15 PM    CALCIUM 9.4 06/04/2022 11:03 AM        Lab Results   Component Value Date    ALT 25 04/04/2022    AST 24 04/04/2022    ALKPHOS 55 04/04/2022    BILITOT 1.40 (H) 04/04/2022       Lab Results   Component Value Date    TSH 2.82 04/04/2022       Lab Results   Component Value Date    CHOL 114 04/18/2022    CHOL 96 03/17/2021    CHOL 98 04/13/2020     Lab Results   Component Value Date    TRIG 171 (H) 04/18/2022    TRIG 154 (H) 03/17/2021    TRIG 186 (H) 04/13/2020     Lab Results   Component Value Date    HDL 35 (L) 04/18/2022    HDL 34 (L) 03/17/2021    HDL 32 (L) 04/13/2020     Lab Results   Component Value Date    LDLCALC 43 02/22/2019    LDLCALC 34 06/09/2018    LDLCALC 46 10/10/2017    LDLCHOLESTEROL 45 04/18/2022    LDLCHOLESTEROL 31 03/17/2021    LDLCHOLESTEROL 29 04/13/2020     Lab Results Component Value Date    CHOLHDLRATIO 3.3 04/18/2022    CHOLHDLRATIO 2.8 03/17/2021    CHOLHDLRATIO 3.1 04/13/2020         Lab Results   Component Value Date    KGPFEDJQ33 1313 (H) 04/04/2022     Lab Results   Component Value Date    FOLATE 17.1 04/04/2022     Lab Results   Component Value Date    VITD25 33.9 02/14/2020         Orders Placed This Encounter   Medications    pneumococcal 20-valent conjugat (PREVNAR) 0.5 ML GEORGIE inj     Sig: Inject 0.5 mLs into the muscle once for 1 dose     Dispense:  0.5 mL     Refill:  0    glucose monitoring (FREESTYLE FREEDOM) kit     Sig: Please supply Patient with a glucose monitoring kit that insurance will cover. Dispense:  1 kit     Refill:  0    blood glucose monitor strips     Sig: Testing once a day. Please dispense according to patients insurance. Dispense:  100 strip     Refill:  3    Lancets 30G MISC     Sig: Testing once a day. Please dispense according to patients insurance. Dispense:  100 each     Refill:  3       Orders Placed This Encounter   Procedures    Comprehensive Metabolic Panel     Standing Status:   Future     Standing Expiration Date:   10/9/2022    CBC     Standing Status:   Future     Standing Expiration Date:   8/12/2023    Magnesium     Standing Status:   Future     Standing Expiration Date:   8/12/2023    Phosphorus     Standing Status:   Future     Standing Expiration Date:   8/12/2023    Brain Natriuretic Peptide     Standing Status:   Future     Standing Expiration Date:   8/12/2023    Urinalysis with Reflex to Culture     Standing Status:   Future     Standing Expiration Date:   8/12/2023     Order Specific Question:   SPECIFY(EX-CATH,MIDSTREAM,CYSTO,ETC)?      Answer:   Deanna Pruitt MD, Urology, [place]     Referral Priority:   Routine     Referral Type:   Eval and Treat     Referral Reason:   Specialty Services Required     Referred to Provider:   Rachel Andino MD     Requested Specialty:   Urology     Number of Visits Requested:   1    POCT glycosylated hemoglobin (Hb A1C)       Medications Discontinued During This Encounter   Medication Reason    blood glucose monitor strips REORDER         On this date 8/12/2022 I have spent 35 minutes reviewing previous notes, test results and face to face with the patient discussing the diagnosis and importance of compliance with the treatment plan as well as documenting on the day of the visit. This note was completed by using the assistance of a speech-recognition program. However, inadvertent computerized transcription errors may be present. Although every effort was made to ensure accuracy, no guarantees can be provided that every mistake has been identified and corrected by editing . An electronic signature was used to authenticate this note.   Electronically signed by Martina Miranda MD on 8/13/2022 at 10:32 PM

## 2022-08-12 NOTE — PATIENT INSTRUCTIONS
Patient Education        Learning About Carbohydrate (Carb) Counting and Eating Out When You Have Diabetes  Why plan your meals? Meal planning can be a key part of managing diabetes. Planning meals and snacks with the right balance of carbohydrate, protein, and fat can help you keep yourblood sugar at the target level you set with your doctor. You don't have to eat special foods. You can eat what your family eats, including sweets once in a while. But you do have to pay attention to how oftenyou eat and how much you eat of certain foods. You may want to work with a dietitian or a diabetes educator. They can give you tips and meal ideas and can answer your questions about meal planning. This health professional can also help you reach a healthy weight if that is one ofyour goals. What should you know about eating carbs? Managing the amount of carbohydrate (carbs) you eat is an important part ofhealthy meals when you have diabetes. Carbohydrate is found in many foods. Learn which foods have carbs. And learn the amounts of carbs in different foods. Bread, cereal, pasta, and rice have about 15 grams of carbs in a serving. A serving is 1 slice of bread (1 ounce), ½ cup of cooked cereal, or 1/3 cup of cooked pasta or rice. Fruits have 15 grams of carbs in a serving. A serving is 1 small fresh fruit, such as an apple or orange; ½ of a banana; ½ cup of cooked or canned fruit; ½ cup of fruit juice; 1 cup of melon or raspberries; or 2 tablespoons of dried fruit. Milk and no-sugar-added yogurt have 15 grams of carbs in a serving. A serving is 1 cup of milk or 3/4 cup (6 oz) of no-sugar-added yogurt. Starchy vegetables have 15 grams of carbs in a serving. A serving is ½ cup of mashed potatoes or sweet potato; 1 cup winter squash; ½ of a small baked potato; ½ cup of cooked beans; or ½ cup cooked corn or green peas.   Learn how much carbs to eat each day and at each meal. A dietitian or certified diabetes educator can teach you how to keep track of the amount of carbs you eat. This is called carbohydrate counting. If you are not sure how to count carbohydrate grams, use the plate method to plan meals. It is a quick way to make sure that you have a balanced meal. It also can help you manage the amount of carbohydrate you eat at meals. Divide your plate by types of foods. Put non-starchy vegetables on half the plate, meat or other protein food on one-quarter of the plate, and a grain or starchy vegetable in the final quarter of the plate. To this you can add a small piece of fruit and 1 cup of milk or yogurt, depending on how many carbs you are supposed to eat at a meal.  Try to eat about the same amount of carbs at each meal. Do not \"save up\" your daily allowance of carbs to eat at one meal.  Proteins have very little or no carbs. Examples of proteins are beef, chicken, turkey, fish, eggs, tofu, cheese, cottage cheese, and peanut butter. How can you eat out and still eat healthy? Learn to estimate the serving sizes of foods that have carbohydrate. If you measure food at home, it will be easier to estimate the amount in a serving of restaurant food. If the meal you order has too much carbohydrate (such as potatoes, corn, or baked beans), ask to have a low-carbohydrate food instead. Ask for a salad or non-starchy vegetables like broccoli, cauliflower, green beans, or peppers. If you eat more carbohydrate at a meal than you had planned, take a walk or do other exercise. This will help lower your blood sugar. What are some tips for eating healthy? Limit saturated fat, such as the fat from meat and dairy products. This is a healthy choice because people who have diabetes are at higher risk of heart disease. So choose lean cuts of meat and nonfat or low-fat dairy products. Use olive or canola oil instead of butter or shortening when cooking. Don't skip meals.  Your blood sugar may drop too low if you skip meals and take insulin or certain medicines for diabetes. Check with your doctor before you drink alcohol. Alcohol can cause your blood sugar to drop too low. Alcohol can also cause a bad reaction if you take certain diabetes medicines. Follow-up care is a key part of your treatment and safety. Be sure to make and go to all appointments, and call your doctor if you are having problems. It's also a good idea to know your test results and keep alist of the medicines you take. Where can you learn more? Go to https://Imaginatik.Altocom. org and sign in to your [employer] account. Enter Z635 in the GenoSpace box to learn more about \"Learning About Carbohydrate (Carb) Counting and Eating Out When You Have Diabetes. \"     If you do not have an account, please click on the \"Sign Up Now\" link. Current as of: September 8, 2021               Content Version: 13.3  © 3466-3923 [employer]wise, Incorporated. Care instructions adapted under license by [facility] ([facility]). If you have questions about a medical condition or this instruction, always ask your healthcare professional. [address] any warranty or liability for your use of this information.

## 2022-08-13 PROBLEM — K43.9 VENTRAL HERNIA WITHOUT OBSTRUCTION OR GANGRENE: Status: ACTIVE | Noted: 2022-08-13

## 2022-08-13 ASSESSMENT — ENCOUNTER SYMPTOMS: SHORTNESS OF BREATH: 1

## 2022-08-15 ENCOUNTER — HOSPITAL ENCOUNTER (OUTPATIENT)
Age: 87
Discharge: HOME | End: 2022-08-15
Payer: MEDICARE

## 2022-08-15 DIAGNOSIS — N18.2 TYPE 2 DIABETES MELLITUS WITH STAGE 2 CHRONIC KIDNEY DISEASE, WITHOUT LONG-TERM CURRENT USE OF INSULIN (HCC): ICD-10-CM

## 2022-08-15 DIAGNOSIS — K75.2 NONSPECIFIC REACTIVE HEPATITIS: Primary | ICD-10-CM

## 2022-08-15 DIAGNOSIS — K86.2 PANCREATIC CYST: ICD-10-CM

## 2022-08-15 DIAGNOSIS — I12.9 BENIGN HYPERTENSION WITH CKD (CHRONIC KIDNEY DISEASE), STAGE II: ICD-10-CM

## 2022-08-15 DIAGNOSIS — N18.2 BENIGN HYPERTENSION WITH CKD (CHRONIC KIDNEY DISEASE), STAGE II: ICD-10-CM

## 2022-08-15 DIAGNOSIS — N20.0 KIDNEY STONE ON RIGHT SIDE: ICD-10-CM

## 2022-08-15 DIAGNOSIS — E11.22 TYPE 2 DIABETES MELLITUS WITH STAGE 2 CHRONIC KIDNEY DISEASE, WITHOUT LONG-TERM CURRENT USE OF INSULIN (HCC): ICD-10-CM

## 2022-08-15 DIAGNOSIS — I50.32 CHRONIC DIASTOLIC HEART FAILURE (HCC): ICD-10-CM

## 2022-08-15 LAB
ALBUMIN SERPL-MCNC: 4.4 G/DL (ref 3.5–5.2)
ALP BLD-CCNC: 61 U/L (ref 35–104)
ALT SERPL-CCNC: 27 U/L (ref 5–33)
ANION GAP SERPL CALCULATED.3IONS-SCNC: 8 MMOL/L (ref 9–17)
AST SERPL-CCNC: 34 U/L
BACTERIA: NORMAL
BILIRUB SERPL-MCNC: 1.63 MG/DL (ref 0.3–1.2)
BILIRUBIN URINE: NEGATIVE
BUN BLDV-MCNC: 14 MG/DL (ref 8–23)
CALCIUM SERPL-MCNC: 9.7 MG/DL (ref 8.6–10.4)
CASTS UA: NORMAL /LPF
CHLORIDE BLD-SCNC: 99 MMOL/L (ref 98–107)
CO2: 29 MMOL/L (ref 20–31)
COLOR: YELLOW
CREAT SERPL-MCNC: 0.62 MG/DL (ref 0.5–0.9)
EPITHELIAL CELLS UA: NORMAL /HPF
GFR AFRICAN AMERICAN: >60 ML/MIN
GFR NON-AFRICAN AMERICAN: >60 ML/MIN
GFR SERPL CREATININE-BSD FRML MDRD: ABNORMAL ML/MIN/{1.73_M2}
GLUCOSE BLD-MCNC: 184 MG/DL (ref 70–99)
GLUCOSE URINE: NEGATIVE
HCT VFR BLD CALC: 38.8 % (ref 36–46)
HEMOGLOBIN: 13 G/DL (ref 12–16)
KETONES, URINE: NEGATIVE
LEUKOCYTE ESTERASE, URINE: ABNORMAL
MAGNESIUM: 2 MG/DL (ref 1.6–2.6)
MCH RBC QN AUTO: 30.1 PG (ref 26–34)
MCHC RBC AUTO-ENTMCNC: 33.5 G/DL (ref 31–37)
MCV RBC AUTO: 89.9 FL (ref 80–100)
NITRITE, URINE: NEGATIVE
PDW BLD-RTO: 14.7 % (ref 11.5–14.9)
PH UA: 5.5 (ref 5–8)
PHOSPHORUS: 3.2 MG/DL (ref 2.6–4.5)
PLATELET # BLD: 220 K/UL (ref 150–450)
PMV BLD AUTO: 8.6 FL (ref 6–12)
POTASSIUM SERPL-SCNC: 4.8 MMOL/L (ref 3.7–5.3)
PRO-BNP: 115 PG/ML
PROTEIN UA: ABNORMAL
RBC # BLD: 4.32 M/UL (ref 4–5.2)
RBC UA: NORMAL /HPF
SODIUM BLD-SCNC: 136 MMOL/L (ref 135–144)
SPECIFIC GRAVITY UA: 1.02 (ref 1–1.03)
TOTAL PROTEIN: 7.1 G/DL (ref 6.4–8.3)
TURBIDITY: CLEAR
URINE HGB: NEGATIVE
UROBILINOGEN, URINE: NORMAL
WBC # BLD: 7.6 K/UL (ref 3.5–11)
WBC UA: NORMAL /HPF

## 2022-08-15 PROCEDURE — 84100 ASSAY OF PHOSPHORUS: CPT

## 2022-08-15 PROCEDURE — 80053 COMPREHEN METABOLIC PANEL: CPT

## 2022-08-15 PROCEDURE — 85027 COMPLETE CBC AUTOMATED: CPT

## 2022-08-15 PROCEDURE — 81001 URINALYSIS AUTO W/SCOPE: CPT

## 2022-08-15 PROCEDURE — 36415 COLL VENOUS BLD VENIPUNCTURE: CPT

## 2022-08-15 PROCEDURE — 83735 ASSAY OF MAGNESIUM: CPT

## 2022-08-15 PROCEDURE — 83880 ASSAY OF NATRIURETIC PEPTIDE: CPT

## 2022-08-15 NOTE — RESULT ENCOUNTER NOTE
Please notify patient: No urinary tract infection, good. Blood glucose high 184, to cut down the sweets.   2 liver tests are very mildly high, I will order an ultrasound of the liver,  and pancreas  Otherwise labs within normal limits  continue current treatment    Future Appointments  11/3/2022  2:15 PM    Harvey Martin MD            [facility]  11/22/2022 9:00 AM    Flex Ji MD     [facility]  12/7/2022  1:30 PM    Donna Blackwell MD          [address]

## 2022-08-23 ENCOUNTER — HOSPITAL ENCOUNTER (OUTPATIENT)
Dept: ULTRASOUND IMAGING | Age: 87
Discharge: HOME | End: 2022-08-25
Payer: MEDICARE

## 2022-08-23 DIAGNOSIS — K75.2 NONSPECIFIC REACTIVE HEPATITIS: ICD-10-CM

## 2022-08-23 DIAGNOSIS — K86.2 PANCREATIC CYST: ICD-10-CM

## 2022-08-23 PROBLEM — K76.0 FATTY LIVER: Status: ACTIVE | Noted: 2022-08-23

## 2022-08-23 PROCEDURE — 76705 ECHO EXAM OF ABDOMEN: CPT

## 2022-08-23 NOTE — RESULT ENCOUNTER NOTE
Please notify patient: Mildly enlarged liver, mild fatty liver, no lesions which is good  To avoid fried food, fatty foods, pop and alcoholic beverages if she drinks any      Future Appointments  9/1/2022   9:10 AM    Angela Loza MD           [facility] [employer]  11/3/2022  2:15 PM    Harvey Cotton MD            [facility]  11/22/2022 9:00 AM    Fara Merino MD     [facility]  12/7/2022  1:30 PM    Marzena Clifford MD          [id number]

## 2022-09-08 ENCOUNTER — OFFICE VISIT (OUTPATIENT)
Dept: UROLOGY | Age: 87
End: 2022-09-08
Payer: MEDICARE

## 2022-09-08 VITALS
HEIGHT: 69 IN | TEMPERATURE: 96.8 F | HEART RATE: 76 BPM | BODY MASS INDEX: 28.73 KG/M2 | WEIGHT: 194 LBS | SYSTOLIC BLOOD PRESSURE: 132 MMHG | DIASTOLIC BLOOD PRESSURE: 75 MMHG

## 2022-09-08 DIAGNOSIS — N20.0 KIDNEY STONE: Primary | ICD-10-CM

## 2022-09-08 PROCEDURE — 1123F ACP DISCUSS/DSCN MKR DOCD: CPT | Performed by: UROLOGY

## 2022-09-08 PROCEDURE — 1036F TOBACCO NON-USER: CPT | Performed by: UROLOGY

## 2022-09-08 PROCEDURE — 1090F PRES/ABSN URINE INCON ASSESS: CPT | Performed by: UROLOGY

## 2022-09-08 PROCEDURE — G8417 CALC BMI ABV UP PARAM F/U: HCPCS | Performed by: UROLOGY

## 2022-09-08 PROCEDURE — G8427 DOCREV CUR MEDS BY ELIG CLIN: HCPCS | Performed by: UROLOGY

## 2022-09-08 PROCEDURE — 99214 OFFICE O/P EST MOD 30 MIN: CPT | Performed by: UROLOGY

## 2022-09-08 ASSESSMENT — ENCOUNTER SYMPTOMS
SHORTNESS OF BREATH: 0
WHEEZING: 0
ABDOMINAL PAIN: 0
NAUSEA: 0
BACK PAIN: 0
EYE PAIN: 0
VOMITING: 0
EYE REDNESS: 0
CONSTIPATION: 0
COUGH: 0

## 2022-09-08 NOTE — PROGRESS NOTES
Review of Systems   Constitutional:  Negative for chills, fatigue and fever. Eyes:  Negative for pain, redness and visual disturbance. Respiratory:  Negative for cough, shortness of breath and wheezing. Cardiovascular:  Negative for chest pain and leg swelling. Gastrointestinal:  Negative for abdominal pain, constipation, nausea and vomiting. Genitourinary:  Negative for difficulty urinating, dysuria, flank pain, frequency, hematuria and urgency. Musculoskeletal:  Negative for back pain, joint swelling and myalgias. Skin:  Negative for rash and wound. Neurological:  Negative for dizziness, tremors and numbness. Hematological:  Does not bruise/bleed easily.

## 2022-09-08 NOTE — PROGRESS NOTES
[address] [facility] [id number]  Dept: [address] [facility] Urology Office Note - Established    Patient:  Stefan Jerome  YOB: 1933  Date: 9/8/2022    The patient is a 80 y.o. female whopresents today for evaluation of the following problems:   Chief Complaint   Patient presents with    1 Year Follow Up     Kidney stones        HPI  Here for kidney stones, ct in June showed 8mm lower stone. Urinating ok, no dysuria, no hematuria    Summary of old records: N/A    Additional History: N/A    Procedures Today: N/A    Urinalysis today:  No results found for this visit on 09/08/22. Imaging Reviewed during this Office Visit: none  (results were independently reviewed by physician and radiology report verified)    AUA Symptom Score (9/8/2022): Last BUN and creatinine:  Lab Results   Component Value Date    BUN 14 08/15/2022     Lab Results   Component Value Date    CREATININE 0.62 08/15/2022       Additional Lab/Culture results: none    PAST MEDICAL, FAMILY AND SOCIAL HISTORY UPDATE:  Past Medical History:   Diagnosis Date    Arthritis     Basal cell carcinoma of nose     Bronchitis     HX. OF     CAD (coronary artery disease)     Carpal tunnel syndrome     left hand    Chronic diastolic heart failure ([address] 3/25/2021    CKD (chronic kidney disease), stage III ([address] 8/7/2019    Colon cancer ([address]     Diabetes mellitus ([address]     DVT (deep venous thrombosis) ([address] 7/10/2019    Fatty liver 8/23/2022    GI bleed 3/17/2021    Gout     Hematuria     History of coronary artery bypass graft x 3 7/2/2020    Hx of blood clots     ED.  LEGS, ED. LUNGS ,REASON FOR BEING ON XARELTO    Hyperlipidemia     Hypertension     Kidney stones     Lymphedema     MGUS (monoclonal gammopathy of unknown significance) 3/18/2021    MI, old 1989    Ophthalmoplegic migraine headache Refill    glucose monitoring (FREESTYLE FREEDOM) kit Please supply Patient with a glucose monitoring kit that insurance will cover. 1 kit 0    blood glucose monitor strips Testing once a day. Please dispense according to patients insurance. 100 strip 3    Lancets 30G MISC Testing once a day. Please dispense according to patients insurance. 100 each 3    ezetimibe (ZETIA) 10 MG tablet TAKE 1 TABLET NIGHTLY 90 tablet 3    loperamide (RA ANTI-DIARRHEAL) 2 MG capsule Take 1 capsule by mouth 4 times daily as needed for Diarrhea 360 capsule 1    ferrous sulfate (IRON 325) 325 (65 Fe) MG tablet Take 1 tablet by mouth daily (with breakfast) 90 tablet 1    amLODIPine (NORVASC) 2.5 MG tablet TAKE 1 TABLET DAILY 90 tablet 3    metoprolol tartrate (LOPRESSOR) 25 MG tablet TAKE 1 TABLET TWICE A  tablet 3    allopurinol (ZYLOPRIM) 100 MG tablet TAKE 1 TABLET TWICE A  tablet 3    furosemide (LASIX) 40 MG tablet TAKE 1 TABLET DAILY 90 tablet 3    glimepiride (AMARYL) 4 MG tablet TAKE 1 TABLET TWICE A  tablet 3    atorvastatin (LIPITOR) 80 MG tablet TAKE 1 TABLET NIGHTLY 90 tablet 3    famotidine (PEPCID) 40 MG tablet TAKE 1 TABLET EVERY EVENING 90 tablet 3    potassium chloride (KLOR-CON M) 20 MEQ extended release tablet Take 1 tablet by mouth daily Take with furosemide 90 tablet 3    Handicap Placard MISC by Does not apply route Can't walk greater than 200 feet. Expires in 5 years. 1 each 0    FreeStyle Lancets MISC USE TO TEST BLOOD SUGAR TWICE A  each 3    Lancet Device MISC For use with lancets for blood glucose checks 1 each 0    mirtazapine (REMERON) 7.5 MG tablet Take 1-2 tablets by mouth nightly For insomnia and anxiety. Call for refills or dose adjustment.  60 tablet 0    XARELTO 20 MG TABS tablet TAKE 1 TABLET DAILY WITH BREAKFAST 90 tablet 3    cyanocobalamin ([employer] VITAMIN B12) 1000 MCG tablet Take 1 tablet by mouth daily 30 tablet 0    Blood Glucose Monitoring Suppl (FREESTYLE LITE) SARY use as directed  0    nitroGLYCERIN (NITROSTAT) 0.4 MG SL tablet Place 1 tablet under the tongue every 5 minutes as needed for Chest pain 25 tablet 1    Biotin 300 MCG TABS Take 600 mcg by mouth every other day       Cholecalciferol (VITAMIN D3) 1000 UNITS TABS Take 1 tablet by mouth daily        (All medications reviewed and updated by provider sincelast office visit or hospitalization)   Brilinta [ticagrelor], Ampicillin, Clopidogrel bisulfate, Diovan [valsartan], Lantus [insulin glargine], and Metformin and related  Social History     Tobacco Use   Smoking Status Never   Smokeless Tobacco Never      (If patient a smoker, smoking cessation counseling offered)     Social History     Substance and Sexual Activity   Alcohol Use No       REVIEW OF SYSTEMS:  Review of Systems      Physical Exam:      Vitals:    09/08/22 1144   BP: 132/75   Pulse: 76   Temp: 96.8 °F (36 °C)     Body mass index is 28.65 kg/m². Patient is a 80 y.o. female in noacute distress and alert and oriented to person, place and time. Physical Exam  Constitutional: Patient in no acute distress. Neuro: Alert andoriented to person, place and time. Psych: Mood normal, affect normal  Skin: No rash noted  HEENT: Head: Normocephalic and atraumatic  Conjunctivae and EOM are normal. Pupils are equal, round  Nose: Normal  Right External Ear: Normal; Left External Ear: Normal  Mouth: Mucosa Moist  Neck: Supple  Lungs: Respiratory effort is normal  Cardiovascular: Warm & Pink        and Plan      1. Kidney stone           Plan:      Return in about 3 weeks (around 9/29/2022) for Follow up. Prescriptions Ordered:  No orders of the defined types were placed in this encounter. Orders Placed:  Orders Placed This Encounter   Procedures    XR ABDOMEN (KUB) (SINGLE AP VIEW)     Standing Status:   Future     Standing Expiration Date:   9/8/2023            Jacinto Donahue MD    Agree with the ROS entered by the MA.

## 2022-09-15 ENCOUNTER — TELEPHONE (OUTPATIENT)
Dept: FAMILY MEDICINE CLINIC | Age: 87
End: 2022-09-15

## 2022-09-15 DIAGNOSIS — R47.81 SLURRED SPEECH: Primary | ICD-10-CM

## 2022-09-15 NOTE — TELEPHONE ENCOUNTER
Please advise the patient to go to the emergency room, to rule out stroke    I also placed her referral     Diagnosis Orders   1.  Slurred speech  [facility], [place], Neurology, [place]           Future Appointments   Date Time Provider Sherly Kiseri   10/6/2022 10:40 AM Marva Cooper MD [address] [place] [address]   11/3/2022  2:15 PM Harvey Tejeda MD [facility]   11/22/2022  9:00 AM Joel Fontanez MD [facility]   12/7/2022  1:30 PM Rikki Alvarenga MD [address] [place] [address]

## 2022-09-15 NOTE — TELEPHONE ENCOUNTER
Noted. Thank you!     Future Appointments   Date Time Provider Sherly Kiseri   10/6/2022 10:40 AM Vijay Harper MD [address] [place] [address]   11/3/2022  2:15 PM Harvey Leslie MD [facility]   11/22/2022  9:00 AM Franklyn Macdonald MD [facility]   12/7/2022  1:30 PM [facility], MD [address] [place] [address]

## 2022-09-15 NOTE — TELEPHONE ENCOUNTER
SPOKE WITH PATIENT REGARDING REFERRAL/ED VERBALIZED UNDERSTANDING     PATIENT STATES SHE ISN'T GOING TO THE ED DUE TO THIS IS SOMETHING THAT HAPPENED LAST WEEK AND SHE WAS HOPING FOR A REFERRAL TO NEUROLOGY SO SHE CAN SEE WHAT IS GOING ON DUE TO HAPPENED BEFORE.  I DID GIVE HER INFORMATION ON REFERRAL

## 2022-09-15 NOTE — TELEPHONE ENCOUNTER
Incoming call came from pt. She stated that she has been having some on/off slur speech when this episode happens she can't get words out and she is blurting out random things. She stated that she will like a referral for a neurologist.    Also she denies any other symptoms.

## 2022-10-03 ENCOUNTER — HOSPITAL ENCOUNTER (OUTPATIENT)
Dept: GENERAL RADIOLOGY | Age: 87
Discharge: HOME | End: 2022-10-05
Payer: MEDICARE

## 2022-10-03 ENCOUNTER — HOSPITAL ENCOUNTER (OUTPATIENT)
Age: 87
Discharge: HOME | End: 2022-10-05
Payer: MEDICARE

## 2022-10-03 DIAGNOSIS — N20.0 KIDNEY STONE: ICD-10-CM

## 2022-10-03 PROCEDURE — 74018 RADEX ABDOMEN 1 VIEW: CPT

## 2022-10-06 ENCOUNTER — OFFICE VISIT (OUTPATIENT)
Dept: UROLOGY | Age: 87
End: 2022-10-06
Payer: MEDICARE

## 2022-10-06 VITALS — HEIGHT: 69 IN | WEIGHT: 194 LBS | BODY MASS INDEX: 28.73 KG/M2

## 2022-10-06 DIAGNOSIS — N20.0 KIDNEY STONE: Primary | ICD-10-CM

## 2022-10-06 PROCEDURE — 1123F ACP DISCUSS/DSCN MKR DOCD: CPT | Performed by: UROLOGY

## 2022-10-06 PROCEDURE — G8417 CALC BMI ABV UP PARAM F/U: HCPCS | Performed by: UROLOGY

## 2022-10-06 PROCEDURE — G8427 DOCREV CUR MEDS BY ELIG CLIN: HCPCS | Performed by: UROLOGY

## 2022-10-06 PROCEDURE — G8484 FLU IMMUNIZE NO ADMIN: HCPCS | Performed by: UROLOGY

## 2022-10-06 PROCEDURE — 1036F TOBACCO NON-USER: CPT | Performed by: UROLOGY

## 2022-10-06 PROCEDURE — 99213 OFFICE O/P EST LOW 20 MIN: CPT | Performed by: UROLOGY

## 2022-10-06 PROCEDURE — 1090F PRES/ABSN URINE INCON ASSESS: CPT | Performed by: UROLOGY

## 2022-10-06 ASSESSMENT — ENCOUNTER SYMPTOMS
GASTROINTESTINAL NEGATIVE: 1
EYES NEGATIVE: 1
EYE PAIN: 0
NAUSEA: 0
EYE REDNESS: 0
CONSTIPATION: 0
WHEEZING: 0
VOMITING: 0
RESPIRATORY NEGATIVE: 1
SHORTNESS OF BREATH: 0
ABDOMINAL PAIN: 0
COUGH: 0
DIARRHEA: 0
BACK PAIN: 0

## 2022-10-06 NOTE — PROGRESS NOTES
[address] [facility] [id number]  [facility]    Patient:  Josafat Amezquita  YOB: 1933  Date: 10/6/2022    The patient is a 80 y.o. female whopresents today for evaluation of the following problems:   Chief Complaint   Patient presents with    Nephrolithiasis     3 week with KUB       HPI  Here for f/u kidney stones. Had CT in June which showed 8mm lower stone. Did a KUB which does not show the stone. no flank pain or hematuria. Summary of old records: N/A    Additional History: N/A    Procedures Today: N/A    Urinalysis today:  No results found for this visit on 10/06/22. Imaging Reviewed during this Office Visit: none  (results were independently reviewed by physician and radiology report verified)    AUA Symptom Score (10/6/2022): Last BUN and creatinine:  Lab Results   Component Value Date    BUN 14 08/15/2022     Lab Results   Component Value Date    CREATININE 0.62 08/15/2022       Additional Lab/Culture results: none    PAST MEDICAL, FAMILY AND SOCIAL HISTORY UPDATE:  Past Medical History:   Diagnosis Date    Arthritis     Basal cell carcinoma of nose     Bronchitis     HX. OF     CAD (coronary artery disease)     Carpal tunnel syndrome     left hand    Chronic diastolic heart failure ([address] 3/25/2021    CKD (chronic kidney disease), stage III ([address] 8/7/2019    Colon cancer ([address]     Diabetes mellitus ([address]     DVT (deep venous thrombosis) ([address] 7/10/2019    Fatty liver 8/23/2022    GI bleed 3/17/2021    Gout     Hematuria     History of coronary artery bypass graft x 3 7/2/2020    Hx of blood clots     ED.  LEGS, ED. LUNGS ,REASON FOR BEING ON XARELTO    Hyperlipidemia     Hypertension     Kidney stones     Lymphedema     MGUS (monoclonal gammopathy of unknown significance) 3/18/2021    MI, old 46 Ophthalmoplegic migraine headache     Osteoarthritis     Other pulmonary embolism without acute cor pulmonale ([facility] 4/30/2013    Personal history of colon cancer 4/12/2021 2006    S/P coronary artery stent placement X 3 6/29/2020    SSS (sick sinus syndrome) ([facility] 4/11/2016    TIA (transient ischemic attack)     Uterine cancer ([facility]     UTI due to Klebsiella species 12/14/2020    Wears glasses      Past Surgical History:   Procedure Laterality Date    ANGIOPLASTY      hx. of stenting x 3. APPENDECTOMY      BREAST SURGERY      INFECTED MILK DUCT LT. CARDIAC SURGERY      CABG x 3 vessels and 3 stents    CAROTID ENDARTERECTOMY Left 3-3-16    CARPAL TUNNEL RELEASE Right     CHOLECYSTECTOMY      COLON SURGERY      colectomy, PRECANCEROUS POLYPS    COLONOSCOPY      X5, HX PRECANCEROUS POLYPS    COLONOSCOPY N/A 3/19/2021    COLONOSCOPY POLYPECTOMY REMOVAL HOT SNARE performed by Keenan Medrano MD at [address] vessels    CYST REMOVAL  10/07/2016    EXCISION OF SEBACEOUS CYST REMOVED FROM BACK X3    CYSTOSCOPY Bilateral 1/21/2020    CYSTOSCOPY RETROGRADE PYELOGRAM performed by Nikolas Solitario MD at [address], [address] Right     INDEX FINGER AND THUMB.     HYSTERECTOMY (CERVIX STATUS UNKNOWN)      JOINT REPLACEMENT Left 03/29/2010    TKA    JOINT REPLACEMENT Right 02/16/1999    TKA    LITHOTRIPSY      X 3    NOSE SURGERY  01/2022    SKIN BIOPSY      TOP OF NOSE (BASAL CELL)    UPPER GASTROINTESTINAL ENDOSCOPY N/A 3/19/2021    EGD BIOPSY performed by Keenan Medrano MD at [address]      vein ablation on legs     Family History   Problem Relation Age of Onset    Stroke Mother     Hypertension Mother     Heart Disease Father         AAA    Stroke Sister     Parkinsonism Brother     Cancer Sister         lung    Alcohol Abuse Sister      No outpatient medications have been marked as taking for the 10/6/22 encounter (Office Visit) with Marika Goodson MD.      (All medications reviewed and updated by provider sincelast office visit or hospitalization)   Brilinta [ticagrelor], Ampicillin, Clopidogrel bisulfate, Diovan [valsartan], Lantus [insulin glargine], and Metformin and related  Social History     Tobacco Use   Smoking Status Never   Smokeless Tobacco Never      (If patient a smoker, smoking cessation counseling offered)     Social History     Substance and Sexual Activity   Alcohol Use No       REVIEW OF SYSTEMS:  Review of Systems      Physical Exam:    There were no vitals filed for this visit. Body mass index is 28.65 kg/m². Patient is a 80 y.o. female in noacute distress and alert and oriented to person, place and time. Physical Exam  Constitutional: Patient in no acute distress. Neuro: Alert andoriented to person, place and time. Psych: Mood normal, affect normal  Skin: No rash noted  HEENT: Head: Normocephalic and atraumatic  Conjunctivae and EOM are normal. Pupils are equal, round  Nose: Normal  Right External Ear: Normal; Left External Ear: Normal  Mouth: Mucosa Moist  Neck: Supple  Lungs: Respiratory effort is normal  Cardiovascular: Warm & Pink      and Plan      1. Kidney stone           Plan:    Cont to watch given high cardiac risk  May want to check with cardio in future  Return in about 6 months (around 4/6/2023) for Follow up. Prescriptions Ordered:  No orders of the defined types were placed in this encounter. Orders Placed:  Orders Placed This Encounter   Procedures    US RENAL LIMITED     This procedure can be scheduled via [employer]. Access your [employer] account by visiting [email or web address]. Standing Status:   Future     Standing Expiration Date:   10/6/2023              Marika Goodson MD    Agree with the ROS entered by the MA.

## 2022-11-30 DIAGNOSIS — D47.2 MGUS (MONOCLONAL GAMMOPATHY OF UNKNOWN SIGNIFICANCE): Primary | ICD-10-CM

## 2022-12-02 ENCOUNTER — TELEPHONE (OUTPATIENT)
Dept: FAMILY MEDICINE CLINIC | Age: 87
End: 2022-12-02

## 2022-12-02 DIAGNOSIS — R47.81 SLURRED SPEECH: Primary | ICD-10-CM

## 2022-12-02 NOTE — TELEPHONE ENCOUNTER
----- Message from Aria Blue sent at 12/1/2022  1:34 PM EST -----  Subject: Referral Request    Reason for referral request? Please send referral to [facility]   Neurologist. Please fax to [phone]  Provider patient wants to be referred to(if known):     Provider Phone Number(if known): Additional Information for Provider? Patient has called multiple times to   get scheduled at Neuro in [place] and was unable to schedule.  Told to call   back in Sept, Oct, November and now January.   ---------------------------------------------------------------------------  --------------  [address]    [phone]; OK to leave message on voicemail  ---------------------------------------------------------------------------  --------------

## 2022-12-02 NOTE — TELEPHONE ENCOUNTER
Please fax the referral  Please let the patient know to get her high dosage flu shot as soon as possible at the pharmacy or nurse visit    Diagnosis Orders   1.  Slurred speech  External Referral To Neurology           Future Appointments   Date Time Provider Sherly Pitt   12/7/2022  1:30 PM Dario Burgess MD [address] [place] [address]   12/15/2022  3:30 PM Harvey Thomson MD [facility]   12/27/2022  8:30 AM LISSY Blank - CNP [facility]   4/13/2023 10:40 AM Jarrell Oscar MD [facility]

## 2022-12-05 ENCOUNTER — HOSPITAL ENCOUNTER (OUTPATIENT)
Age: 87
Discharge: HOME | End: 2022-12-05
Payer: MEDICARE

## 2022-12-05 DIAGNOSIS — D64.9 ANEMIA, UNSPECIFIED TYPE: ICD-10-CM

## 2022-12-05 DIAGNOSIS — D47.2 MGUS (MONOCLONAL GAMMOPATHY OF UNKNOWN SIGNIFICANCE): ICD-10-CM

## 2022-12-05 DIAGNOSIS — Z86.711 HISTORY OF PULMONARY EMBOLISM: ICD-10-CM

## 2022-12-05 DIAGNOSIS — E61.1 IRON DEFICIENCY: ICD-10-CM

## 2022-12-05 DIAGNOSIS — Z79.01 ANTICOAGULATED: ICD-10-CM

## 2022-12-05 LAB
ABSOLUTE EOS #: 0.2 K/UL (ref 0–0.4)
ABSOLUTE LYMPH #: 2.5 K/UL (ref 1–4.8)
ABSOLUTE MONO #: 0.9 K/UL (ref 0.1–1.3)
BASOPHILS # BLD: 1 % (ref 0–2)
BASOPHILS ABSOLUTE: 0.1 K/UL (ref 0–0.2)
EOSINOPHILS RELATIVE PERCENT: 2 % (ref 0–4)
HCT VFR BLD CALC: 40.5 % (ref 36–46)
HEMOGLOBIN: 13.5 G/DL (ref 12–16)
LYMPHOCYTES # BLD: 26 % (ref 24–44)
MCH RBC QN AUTO: 29.9 PG (ref 26–34)
MCHC RBC AUTO-ENTMCNC: 33.2 G/DL (ref 31–37)
MCV RBC AUTO: 90.1 FL (ref 80–100)
MONOCYTES # BLD: 9 % (ref 1–7)
PDW BLD-RTO: 15.1 % (ref 11.5–14.9)
PLATELET # BLD: 229 K/UL (ref 150–450)
PMV BLD AUTO: 8.5 FL (ref 6–12)
RBC # BLD: 4.5 M/UL (ref 4–5.2)
SEG NEUTROPHILS: 62 % (ref 36–66)
SEGMENTED NEUTROPHILS ABSOLUTE COUNT: 6.2 K/UL (ref 1.3–9.1)
WBC # BLD: 9.9 K/UL (ref 3.5–11)

## 2022-12-05 PROCEDURE — 84155 ASSAY OF PROTEIN SERUM: CPT

## 2022-12-05 PROCEDURE — 86334 IMMUNOFIX E-PHORESIS SERUM: CPT

## 2022-12-05 PROCEDURE — 36415 COLL VENOUS BLD VENIPUNCTURE: CPT

## 2022-12-05 PROCEDURE — 84165 PROTEIN E-PHORESIS SERUM: CPT

## 2022-12-05 PROCEDURE — 82746 ASSAY OF FOLIC ACID SERUM: CPT

## 2022-12-05 PROCEDURE — 83521 IG LIGHT CHAINS FREE EACH: CPT

## 2022-12-05 PROCEDURE — 85025 COMPLETE CBC W/AUTO DIFF WBC: CPT

## 2022-12-05 PROCEDURE — 82607 VITAMIN B-12: CPT

## 2022-12-05 NOTE — TELEPHONE ENCOUNTER
Spoke with patient and she was informed that referral was faxed and will call later on today to schedule appointment. Patient states she will think about the flu shot.

## 2022-12-06 LAB
FOLATE: 10.5 NG/ML
FREE KAPPA/LAMBDA RATIO: 1.48 (ref 0.26–1.65)
KAPPA FREE LIGHT CHAINS QNT: 2.21 MG/DL (ref 0.37–1.94)
LAMBDA FREE LIGHT CHAINS QNT: 1.49 MG/DL (ref 0.57–2.63)
VITAMIN B-12: 1469 PG/ML (ref 232–1245)

## 2022-12-07 ENCOUNTER — OFFICE VISIT (OUTPATIENT)
Dept: ONCOLOGY | Age: 87
End: 2022-12-07
Payer: MEDICARE

## 2022-12-07 VITALS
DIASTOLIC BLOOD PRESSURE: 67 MMHG | TEMPERATURE: 96.9 F | WEIGHT: 191.7 LBS | BODY MASS INDEX: 28.31 KG/M2 | HEART RATE: 67 BPM | SYSTOLIC BLOOD PRESSURE: 156 MMHG

## 2022-12-07 DIAGNOSIS — Z86.711 HISTORY OF PULMONARY EMBOLISM: ICD-10-CM

## 2022-12-07 DIAGNOSIS — D64.9 ANEMIA, UNSPECIFIED TYPE: ICD-10-CM

## 2022-12-07 DIAGNOSIS — D47.2 MGUS (MONOCLONAL GAMMOPATHY OF UNKNOWN SIGNIFICANCE): Primary | ICD-10-CM

## 2022-12-07 DIAGNOSIS — Z79.01 ANTICOAGULATED: ICD-10-CM

## 2022-12-07 LAB
ALBUMIN (CALCULATED): 4.3 G/DL (ref 3.2–5.2)
ALBUMIN PERCENT: 65 % (ref 45–65)
ALPHA 1 PERCENT: 2 % (ref 3–6)
ALPHA 2 PERCENT: 12 % (ref 6–13)
ALPHA-1-GLOBULIN: 0.2 G/DL (ref 0.1–0.4)
ALPHA-2-GLOBULIN: 0.8 G/DL (ref 0.5–0.9)
BETA GLOBULIN: 0.7 G/DL (ref 0.5–1.1)
BETA PERCENT: 10 % (ref 11–19)
GAMMA GLOBULIN %: 11 % (ref 9–20)
GAMMA GLOBULIN: 0.7 G/DL (ref 0.5–1.5)
PATHOLOGIST: ABNORMAL
PATHOLOGIST: NORMAL
PROTEIN ELECTROPHORESIS, SERUM: ABNORMAL
SERUM IFX INTERP: NORMAL
TOTAL PROT. SUM,%: 100 % (ref 98–102)
TOTAL PROT. SUM: 6.7 G/DL (ref 6.3–8.2)
TOTAL PROTEIN: 6.6 G/DL (ref 6.4–8.3)

## 2022-12-07 PROCEDURE — 1123F ACP DISCUSS/DSCN MKR DOCD: CPT | Performed by: INTERNAL MEDICINE

## 2022-12-07 PROCEDURE — 1036F TOBACCO NON-USER: CPT | Performed by: INTERNAL MEDICINE

## 2022-12-07 PROCEDURE — G8427 DOCREV CUR MEDS BY ELIG CLIN: HCPCS | Performed by: INTERNAL MEDICINE

## 2022-12-07 PROCEDURE — 99211 OFF/OP EST MAY X REQ PHY/QHP: CPT | Performed by: INTERNAL MEDICINE

## 2022-12-07 PROCEDURE — G8484 FLU IMMUNIZE NO ADMIN: HCPCS | Performed by: INTERNAL MEDICINE

## 2022-12-07 PROCEDURE — 99214 OFFICE O/P EST MOD 30 MIN: CPT | Performed by: INTERNAL MEDICINE

## 2022-12-07 PROCEDURE — 1090F PRES/ABSN URINE INCON ASSESS: CPT | Performed by: INTERNAL MEDICINE

## 2022-12-07 PROCEDURE — G8417 CALC BMI ABV UP PARAM F/U: HCPCS | Performed by: INTERNAL MEDICINE

## 2022-12-07 NOTE — PROGRESS NOTES
Yancy Lee                                                                                                                  12/7/2022  MRN:   [id number]  YOB: 1933  PCP:                           Regina Leon MD  Referring Physician: No ref. provider found  Treating Physician Name: Liseth Ornelas MD      Reason for visit:  Chief Complaint   Patient presents with    Follow-up     Review status of disease    Discuss Labs    Other     Itching all over especially in my hands and feel constricted        Current problems:  Anemia  Iron deficiency  B12 deficiency  GI bleed  History of pulmonary embolism on chronic anticoagulation  Coronary artery disease  History of colon cancer 2006 s/p surgery with no adjuvant chemotherapy    Active and recent treatments:  Oral B12 supplementation  Oral iron    Summary of Case/History:    Yancy Lee a 80 y. o.female is a patient with anemia. Recent lab work showed iron deficiency anemia with fluctuations in HGB dating back to 2013 and has been consistently low since 08/2020 with worsening. She was in house 03/2021 following last lab work and dark stool, she underwent EGD and colonoscopy, gastritis and benign polyps found, negative otherwise. She received some IV iron while in house. Her lab work also showed paraproteinemia. She has history of PE and takes Xarelto and was taken off Brilinta while in house. She is taking B12 and has been taken off of Fosamax. She has history of cardiac stenting and follows with cardiology. She has history of colon cancer and underwent resection in 2006, no recurrence. She reports history of kidney stones and feels she passed one yesterday, 04/06/2021. She has resumed regular Dionte-Chi. Interim History:   Patient presents to the clinic for a follow-up visit and to discuss results of her lab work-up. She reports she is well and stable overall. Her poor sleep is unchanged. She continues with Xarelto unchanged. She has had some itching all over, it improved with Benadryl. During this visit patient's allergy, social, medical, surgical history and medications were reviewed and updated. Past Medical History:   Past Medical History:   Diagnosis Date    Arthritis     Basal cell carcinoma of nose     Bronchitis     HX. OF     CAD (coronary artery disease)     Carpal tunnel syndrome     left hand    Chronic diastolic heart failure ([address] 3/25/2021    CKD (chronic kidney disease), stage III ([address] 8/7/2019    Colon cancer ([address]     Diabetes mellitus ([address]     DVT (deep venous thrombosis) ([address] 7/10/2019    Fatty liver 8/23/2022    GI bleed 3/17/2021    Gout     Hematuria     History of coronary artery bypass graft x 3 7/2/2020    Hx of blood clots     ED. LEGS, ED. LUNGS ,REASON FOR BEING ON XARELTO    Hyperlipidemia     Hypertension     Kidney stones     Lymphedema     MGUS (monoclonal gammopathy of unknown significance) 3/18/2021    MI, old 1989    Ophthalmoplegic migraine headache     Osteoarthritis     Other pulmonary embolism without acute cor pulmonale ([address] 4/30/2013    Personal history of colon cancer 4/12/2021    2006    S/P coronary artery stent placement X 3 6/29/2020    SSS (sick sinus syndrome) ([address] 4/11/2016    TIA (transient ischemic attack)     Uterine cancer ([address]     UTI due to Klebsiella species 12/14/2020    Wears glasses        Past Surgical History:     Past Surgical History:   Procedure Laterality Date    ANGIOPLASTY      hx. of stenting x 3. APPENDECTOMY      BREAST SURGERY      INFECTED MILK DUCT LT.     CARDIAC SURGERY      CABG x 3 vessels and 3 stents    CAROTID ENDARTERECTOMY Left 3-3-16    CARPAL TUNNEL RELEASE Right     CHOLECYSTECTOMY      COLON SURGERY      colectomy, PRECANCEROUS POLYPS    COLONOSCOPY      X5, HX PRECANCEROUS POLYPS    COLONOSCOPY N/A 3/19/2021    COLONOSCOPY POLYPECTOMY REMOVAL HOT SNARE performed by Sasha Braun MD at [address] [place] [address] vessels    CYST REMOVAL  10/07/2016    EXCISION OF SEBACEOUS CYST REMOVED FROM BACK X3    CYSTOSCOPY Bilateral 1/21/2020    CYSTOSCOPY RETROGRADE PYELOGRAM performed by Arlet Edwards MD at [address], [address] Right     INDEX FINGER AND THUMB. HYSTERECTOMY (CERVIX STATUS UNKNOWN)      JOINT REPLACEMENT Left 03/29/2010    TKA    JOINT REPLACEMENT Right 02/16/1999    TKA    LITHOTRIPSY      X 3    NOSE SURGERY  01/2022    SKIN BIOPSY      TOP OF NOSE (BASAL CELL)    UPPER GASTROINTESTINAL ENDOSCOPY N/A 3/19/2021    EGD BIOPSY performed by Levon Ayon MD at [address]      vein ablation on legs       Patient Family Social History:     Social History     Socioeconomic History    Marital status:      Spouse name: None    Number of children: None    Years of education: None    Highest education level: None   Tobacco Use    Smoking status: Never    Smokeless tobacco: Never   Vaping Use    Vaping Use: Never used   Substance and Sexual Activity    Alcohol use: No    Drug use: No    Sexual activity: Not Currently     Partners: Male     Family History   Problem Relation Age of Onset    Stroke Mother     Hypertension Mother     Heart Disease Father         AAA    Stroke Sister     Parkinsonism Brother     Cancer Sister         lung    Alcohol Abuse Sister        Current Medications:     Current Outpatient Medications   Medication Sig Dispense Refill    glucose monitoring (FREESTYLE FREEDOM) kit Please supply Patient with a glucose monitoring kit that insurance will cover. 1 kit 0    blood glucose monitor strips Testing once a day. Please dispense according to patients insurance. 100 strip 3    Lancets 30G MISC Testing once a day. Please dispense according to patients insurance.  100 each 3    ezetimibe (ZETIA) 10 MG tablet TAKE 1 TABLET NIGHTLY 90 tablet 3    loperamide (RA ANTI-DIARRHEAL) 2 MG capsule Take 1 capsule by mouth 4 times daily as needed for Diarrhea 360 capsule 1    ferrous sulfate (IRON 325) 325 (65 Fe) MG tablet Take 1 tablet by mouth daily (with breakfast) 90 tablet 1    amLODIPine (NORVASC) 2.5 MG tablet TAKE 1 TABLET DAILY 90 tablet 3    metoprolol tartrate (LOPRESSOR) 25 MG tablet TAKE 1 TABLET TWICE A  tablet 3    allopurinol (ZYLOPRIM) 100 MG tablet TAKE 1 TABLET TWICE A  tablet 3    furosemide (LASIX) 40 MG tablet TAKE 1 TABLET DAILY 90 tablet 3    glimepiride (AMARYL) 4 MG tablet TAKE 1 TABLET TWICE A  tablet 3    atorvastatin (LIPITOR) 80 MG tablet TAKE 1 TABLET NIGHTLY 90 tablet 3    famotidine (PEPCID) 40 MG tablet TAKE 1 TABLET EVERY EVENING 90 tablet 3    potassium chloride (KLOR-CON M) 20 MEQ extended release tablet Take 1 tablet by mouth daily Take with furosemide 90 tablet 3    Handicap Placard MISC by Does not apply route Can't walk greater than 200 feet. Expires in 5 years. 1 each 0    FreeStyle Lancets MISC USE TO TEST BLOOD SUGAR TWICE A  each 3    Lancet Device MISC For use with lancets for blood glucose checks 1 each 0    mirtazapine (REMERON) 7.5 MG tablet Take 1-2 tablets by mouth nightly For insomnia and anxiety. Call for refills or dose adjustment. 60 tablet 0    XARELTO 20 MG TABS tablet TAKE 1 TABLET DAILY WITH BREAKFAST 90 tablet 3    cyanocobalamin ([employer] VITAMIN B12) 1000 MCG tablet Take 1 tablet by mouth daily 30 tablet 0    Blood Glucose Monitoring Suppl (FREESTYLE LITE) SARY use as directed  0    nitroGLYCERIN (NITROSTAT) 0.4 MG SL tablet Place 1 tablet under the tongue every 5 minutes as needed for Chest pain 25 tablet 1    Biotin 300 MCG TABS Take 600 mcg by mouth every other day       Cholecalciferol (VITAMIN D3) 1000 UNITS TABS Take 1 tablet by mouth daily       No current facility-administered medications for this visit.        Allergies:   Brilinta [ticagrelor], Ampicillin, Clopidogrel bisulfate, Diovan [valsartan], Lantus [insulin glargine], and Metformin and related    Review of Systems:    Constitutional: No fever or chills. No night sweats, no weight loss +insomnia, stable   Eyes: No eye discharge, double vision, or eye pain   HEENT: negative for sore mouth, sore throat, hoarseness and voice change   Respiratory: negative for cough , sputum, dyspnea, wheezing, hemoptysis, chest pain   Cardiovascular: negative for chest pain, dyspnea, palpitations, orthopnea, PND   Gastrointestinal: negative for nausea, vomiting, constipation, abdominal pain, Dysphagia, hematemesis and hematochezia + diarrhea   Genitourinary: negative for frequency, dysuria, nocturia, urinary incontinence, and hematuria   Integument: negative for rash, skin lesions, bruises. Hematologic/Lymphatic: negative for easy bruising, bleeding, lymphadenopathy, or petechiae   Endocrine: negative for heat or cold intolerance,weight changes, change in bowel habits and hair loss   Musculoskeletal: negative for myalgias, arthralgias, pain, joint swelling,and bone pain   Neurological: negative for headaches, dizziness, seizures, weakness, numbness; +burining pain in left hand.          Physical Exam:    Vitals: BP (!) 156/67   Pulse 67   Temp 96.9 °F (36.1 °C) (Temporal)   Wt 191 lb 11.2 oz (87 kg)   BMI 28.31 kg/m²   General appearance - well appearing, no in pain or distress  Mental status - AAO X3  Eyes - pupils equal and reactive, extraocular eye movements intact  Mouth - mucous membranes moist, pharynx normal without lesions  Neck - supple, no significant adenopathy  Lymphatics - no palpable lymphadenopathy, no hepatosplenomegaly  Chest - clear to auscultation, no wheezes, rales or rhonchi, symmetric air entry  Heart - normal rate, regular rhythm, normal S1, S2, no murmurs  Abdomen - soft, nontender, nondistended, no masses or organomegaly  Neurological - alert, oriented, normal speech, no focal findings or movement disorder noted  Extremities - peripheral pulses normal, no pedal edema, no clubbing or cyanosis  Skin - normal coloration and turgor, no rashes, no suspicious skin lesions noted       DATA:    CBC:   Recent Labs     12/05/22  1242   WBC 9.9   HGB 13.5        BMP:    No results for input(s): NA, K, CL, CO2, BUN, CREATININE, GLUCOSE in the last 72 hours. Results for orders placed or performed during the hospital encounter of 12/05/22   Immunofixation serum profile   Result Value Ref Range    Serum IFX Interp       A ZONE OF RESTRICTION IS PRESENT IN THE GAMMAGLOBULIN REGION. CONFIRMED BY IMMUNOFIXATION TO BE MONOCLONAL    Pathologist ELECTRONICALLY SIGNED. Leia Persaud M.D.     Vitamin B12 & Folate   Result Value Ref Range    Vitamin B-12 1469 (H) 232 - 1245 pg/mL    Folate 10.5 >4.8 ng/mL   CBC with Auto Differential   Result Value Ref Range    WBC 9.9 3.5 - 11.0 k/uL    RBC 4.50 4.0 - 5.2 m/uL    Hemoglobin 13.5 12.0 - 16.0 g/dL    Hematocrit 40.5 36 - 46 %    MCV 90.1 80 - 100 fL    MCH 29.9 26 - 34 pg    MCHC 33.2 31 - 37 g/dL    RDW 15.1 (H) 11.5 - 14.9 %    Platelets 860 395 - 321 k/uL    MPV 8.5 6.0 - 12.0 fL    Seg Neutrophils 62 36 - 66 %    Lymphocytes 26 24 - 44 %    Monocytes 9 (H) 1 - 7 %    Eosinophils % 2 0 - 4 %    Basophils 1 0 - 2 %    Segs Absolute 6.20 1.3 - 9.1 k/uL    Absolute Lymph # 2.50 1.0 - 4.8 k/uL    Absolute Mono # 0.90 0.1 - 1.3 k/uL    Absolute Eos # 0.20 0.0 - 0.4 k/uL    Basophils Absolute 0.10 0.0 - 0.2 k/uL   Kappa/Lambda Quantitative Free Light Chains, Serum   Result Value Ref Range    Kappa Free Light Chains QNT 2.21 (H) 0.37 - 1.94 mg/dL    Lambda Free Light Chains QNT 1.49 0.57 - 2.63 mg/dL    Free Kappa/Lambda Ratio 1.48 0.26 - 1.65   Electrophoresis Protein, Serum   Result Value Ref Range    Total Protein 6.6 6.4 - 8.3 g/dL    Albumin (calculated) 4.3 3.2 - 5.2 g/dL    Albumin % 65 45 - 65 %    Alpha-1-Globulin 0.2 0.1 - 0.4 g/dL    Alpha 1 % 2 (L) 3 - 6 %    Alpha-2-Globulin 0.8 0.5 - 0.9 g/dL    Alpha 2 % 12 6 - 13 %    Beta Globulin 0.7 0.5 - 1.1 g/dL    Beta Percent 10 (L) 11 - 19 %    Gamma Globulin 0.7 0.5 - 1.5 g/dL    Gamma Globulin % 11 9 - 20 %    Total Prot. Sum 6.7 6.3 - 8.2 g/dL    Total Prot. Sum,% 100 98 - 102 %    Protein Electrophoresis, Serum       A ZONE OF RESTRICTION IS PRESENT IN THE GAMMAGLOBULIN REGION. CONFIRMED BY IMMUNOFIXATION TO BE MONOCLONAL    Pathologist ELECTRONICALLY SIGNED. Danielle Snyder M.D. Impression:  Anemia  Iron deficiency  B12 deficiency  GI bleed  History of pulmonary embolism on chronic anticoagulation  Coronary artery disease  History of colon cancer 2006 s/p surgery with no adjuvant chemotherapy  Paraproteinemia, likely MGUS    Plan:  I had a detailed discussion with the patient. Personally went over results of her lab work-up and the relevant clinical data. Clinically patient continues to do well. Work-up does not show any evidence of endorgan damage from the paraproteinemia. Patient is not anemic. Patient has adequate kidney function. Calcium is within range. Continues to be on anticoagulation without any adverse events. Continues to be on oral iron  Denies any signs or symptoms concerning for venous thromboembolism  Discussed natural history of paraproteinemia. Clinically I suspect patient has MGUS. Continue surveillance. Return to clinic in 6 months. Scribe Attestation   This note was created by Christian Cortes acting as scribe for the physician signing this note  Electronically Signed  Christian Cortes, 12/7/2022  Scribe, [employer]. Attending Attestation   Note was reviewed and edited. I am in agreement with the note as entered      Erik Kelly MD      This note is created with the assistance of a speech recognition program.  While intending to generate a document that actually reflects the content of the visit, the document can still have some errors including those of syntax and sound a like substitutions which may escape proof reading.   It such instances, actual meaning can be extrapolated by contextual diversion.

## 2022-12-08 ENCOUNTER — TELEPHONE (OUTPATIENT)
Dept: ONCOLOGY | Age: 87
End: 2022-12-08

## 2022-12-08 NOTE — TELEPHONE ENCOUNTER
avs from 12/7/22      Rv in 6 months with labs prio r      Rv scheduled for 6/7 @ 1:30 pm   Pt will have labs drawn one week prior to RV    Pt was given AVS and appointment schedule    Electronically signed by Ksenia Dior on 12/8/2022 at 9:06 AM

## 2022-12-15 ENCOUNTER — OFFICE VISIT (OUTPATIENT)
Dept: GASTROENTEROLOGY | Age: 87
End: 2022-12-15
Payer: MEDICARE

## 2022-12-15 VITALS
BODY MASS INDEX: 28.06 KG/M2 | DIASTOLIC BLOOD PRESSURE: 67 MMHG | SYSTOLIC BLOOD PRESSURE: 127 MMHG | HEART RATE: 68 BPM | WEIGHT: 190 LBS | TEMPERATURE: 96.8 F

## 2022-12-15 DIAGNOSIS — R74.01 ELEVATED AST (SGOT): ICD-10-CM

## 2022-12-15 DIAGNOSIS — Z86.010 HX OF COLONIC POLYP: ICD-10-CM

## 2022-12-15 DIAGNOSIS — R15.9 FULL INCONTINENCE OF FECES: ICD-10-CM

## 2022-12-15 DIAGNOSIS — C18.9 ADENOCARCINOMA, COLON (HCC): Primary | ICD-10-CM

## 2022-12-15 PROCEDURE — 1090F PRES/ABSN URINE INCON ASSESS: CPT | Performed by: INTERNAL MEDICINE

## 2022-12-15 PROCEDURE — G8427 DOCREV CUR MEDS BY ELIG CLIN: HCPCS | Performed by: INTERNAL MEDICINE

## 2022-12-15 PROCEDURE — 1036F TOBACCO NON-USER: CPT | Performed by: INTERNAL MEDICINE

## 2022-12-15 PROCEDURE — 1123F ACP DISCUSS/DSCN MKR DOCD: CPT | Performed by: INTERNAL MEDICINE

## 2022-12-15 PROCEDURE — G8417 CALC BMI ABV UP PARAM F/U: HCPCS | Performed by: INTERNAL MEDICINE

## 2022-12-15 PROCEDURE — G8484 FLU IMMUNIZE NO ADMIN: HCPCS | Performed by: INTERNAL MEDICINE

## 2022-12-15 PROCEDURE — 99214 OFFICE O/P EST MOD 30 MIN: CPT | Performed by: INTERNAL MEDICINE

## 2022-12-15 ASSESSMENT — ENCOUNTER SYMPTOMS
CONSTIPATION: 0
CHOKING: 0
TROUBLE SWALLOWING: 0
WHEEZING: 0
ANAL BLEEDING: 0
RECTAL PAIN: 0
ABDOMINAL DISTENTION: 0
COUGH: 0
ABDOMINAL PAIN: 0
NAUSEA: 0
DIARRHEA: 1
BLOOD IN STOOL: 1
SHORTNESS OF BREATH: 0
VOMITING: 0

## 2022-12-15 NOTE — PROGRESS NOTES
GI CLINIC FOLLOW UP    INTERVAL HISTORY:   No referring provider defined for this encounter. Chief Complaint   Patient presents with    Anemia     Patient is f/u on CT and labs. She has anemia and hx of colon cancer. HISTORY OF PRESENT ILLNESS: Ms.Betty EDEL Escamilla is a 80 y.o. female , referred for evaluation of  hx colon ca (2006), colon polyps  GIB , anemia         Here for f/u   History of colon cancer status post resection  Egd/colon done 3/2021, please refer to the result  We saw her last year as an in pt with significant anemia and we did an EGD and colonoscopy as above in March 2021    Last visit CEA/ct were ordered   Ct negative mets  CEA is normal   Due for colonosocpy 3/23  Reviewing her labs and imaging  Labs elevated ast/bili    CT scan in June 2022     Impression   1. No acute process in the abdomen or pelvis. No evidence of metastatic   disease. Rectosigmoid anastomosis noted. 2.  Small infraumbilical ventral hernia, which contains a small segment of   transverse colon. No bowel obstruction. 3.  Additional chronic findings described above. Past Medical,Family, and Social History reviewed and does contribute to the patient presentingcondition. Patient's PMH/PSH,SH,PSYCH Hx, MEDs, ALLERGIES, and ROS were all reviewed and updated in the appropriate sections. PAST MEDICAL HISTORY:  Past Medical History:   Diagnosis Date    Arthritis     Basal cell carcinoma of nose     Bronchitis     HX. OF     CAD (coronary artery disease)     Carpal tunnel syndrome     left hand    Chronic diastolic heart failure ([address] 3/25/2021    CKD (chronic kidney disease), stage III ([address] 8/7/2019    Colon cancer ([address]     Diabetes mellitus ([address]     DVT (deep venous thrombosis) ([address] 7/10/2019    Fatty liver 8/23/2022    GI bleed 3/17/2021    Gout     Hematuria     History of coronary artery bypass graft x 3 7/2/2020    Hx of blood clots     ED.  LEGS, ED. LUNGS ,REASON FOR BEING ON XARELTO    Hyperlipidemia     Hypertension     Kidney stones     Lymphedema     MGUS (monoclonal gammopathy of unknown significance) 3/18/2021    MI, old 1989    Ophthalmoplegic migraine headache     Osteoarthritis     Other pulmonary embolism without acute cor pulmonale ([facility] [address] 4/30/2013    Personal history of colon cancer 4/12/2021 2006    S/P coronary artery stent placement X 3 6/29/2020    SSS (sick sinus syndrome) ([facility] [address] 4/11/2016    TIA (transient ischemic attack)     Uterine cancer ([facility] [address]     UTI due to Klebsiella species 12/14/2020    Wears glasses        Past Surgical History:   Procedure Laterality Date    ANGIOPLASTY      hx. of stenting x 3. APPENDECTOMY      BREAST SURGERY      INFECTED MILK DUCT LT. CARDIAC SURGERY      CABG x 3 vessels and 3 stents    CAROTID ENDARTERECTOMY Left 3-3-16    CARPAL TUNNEL RELEASE Right     CHOLECYSTECTOMY      COLON SURGERY      colectomy, PRECANCEROUS POLYPS    COLONOSCOPY      X5, HX PRECANCEROUS POLYPS    COLONOSCOPY N/A 3/19/2021    COLONOSCOPY POLYPECTOMY REMOVAL HOT SNARE performed by Elayne Mercado MD at [address] vessels    CYST REMOVAL  10/07/2016    EXCISION OF SEBACEOUS CYST REMOVED FROM BACK X3    CYSTOSCOPY Bilateral 1/21/2020    CYSTOSCOPY RETROGRADE PYELOGRAM performed by Del Gerardo MD at [address], [address] Right     INDEX FINGER AND THUMB.     HYSTERECTOMY (CERVIX STATUS UNKNOWN)      JOINT REPLACEMENT Left 03/29/2010    TKA    JOINT REPLACEMENT Right 02/16/1999    TKA    LITHOTRIPSY      X 3    NOSE SURGERY  01/2022    SKIN BIOPSY      TOP OF NOSE (BASAL CELL)    UPPER GASTROINTESTINAL ENDOSCOPY N/A 3/19/2021    EGD BIOPSY performed by Elayne Mercado MD at [address]      vein ablation on legs       CURRENT MEDICATIONS:    Current Outpatient Medications:     glucose monitoring (FREESTYLE FREEDOM) kit, Please supply Patient with a glucose monitoring kit that insurance will cover. , Disp: 1 kit, Rfl: 0    blood glucose monitor strips, Testing once a day. Please dispense according to patients insurance., Disp: 100 strip, Rfl: 3    Lancets 30G MISC, Testing once a day. Please dispense according to patients insurance., Disp: 100 each, Rfl: 3    ezetimibe (ZETIA) 10 MG tablet, TAKE 1 TABLET NIGHTLY, Disp: 90 tablet, Rfl: 3    loperamide (RA ANTI-DIARRHEAL) 2 MG capsule, Take 1 capsule by mouth 4 times daily as needed for Diarrhea, Disp: 360 capsule, Rfl: 1    ferrous sulfate (IRON 325) 325 (65 Fe) MG tablet, Take 1 tablet by mouth daily (with breakfast), Disp: 90 tablet, Rfl: 1    amLODIPine (NORVASC) 2.5 MG tablet, TAKE 1 TABLET DAILY, Disp: 90 tablet, Rfl: 3    metoprolol tartrate (LOPRESSOR) 25 MG tablet, TAKE 1 TABLET TWICE A DAY, Disp: 180 tablet, Rfl: 3    allopurinol (ZYLOPRIM) 100 MG tablet, TAKE 1 TABLET TWICE A DAY, Disp: 180 tablet, Rfl: 3    furosemide (LASIX) 40 MG tablet, TAKE 1 TABLET DAILY, Disp: 90 tablet, Rfl: 3    glimepiride (AMARYL) 4 MG tablet, TAKE 1 TABLET TWICE A DAY, Disp: 180 tablet, Rfl: 3    atorvastatin (LIPITOR) 80 MG tablet, TAKE 1 TABLET NIGHTLY, Disp: 90 tablet, Rfl: 3    famotidine (PEPCID) 40 MG tablet, TAKE 1 TABLET EVERY EVENING, Disp: 90 tablet, Rfl: 3    potassium chloride (KLOR-CON M) 20 MEQ extended release tablet, Take 1 tablet by mouth daily Take with furosemide, Disp: 90 tablet, Rfl: 3    Handicap Placard MISC, by Does not apply route Can't walk greater than 200 feet. Expires in 5 years. , Disp: 1 each, Rfl: 0    FreeStyle Lancets MISC, USE TO TEST BLOOD SUGAR TWICE A DAY, Disp: 300 each, Rfl: 3    Lancet Device MISC, For use with lancets for blood glucose checks, Disp: 1 each, Rfl: 0    mirtazapine (REMERON) 7.5 MG tablet, Take 1-2 tablets by mouth nightly For insomnia and anxiety. Call for refills or dose adjustment. , Disp: 60 tablet, Rfl: 0    XARELTO 20 MG TABS tablet, TAKE 1 TABLET DAILY WITH BREAKFAST, Disp: 90 tablet, Rfl: 3    cyanocobalamin ([employer] VITAMIN B12) 1000 MCG tablet, Take 1 tablet by mouth daily, Disp: 30 tablet, Rfl: 0    Blood Glucose Monitoring Suppl (FREESTYLE LITE) SARY, use as directed, Disp: , Rfl: 0    nitroGLYCERIN (NITROSTAT) 0.4 MG SL tablet, Place 1 tablet under the tongue every 5 minutes as needed for Chest pain, Disp: 25 tablet, Rfl: 1    Biotin 300 MCG TABS, Take 600 mcg by mouth every other day , Disp: , Rfl:     Cholecalciferol (VITAMIN D3) 1000 UNITS TABS, Take 1 tablet by mouth daily, Disp: , Rfl:     ALLERGIES:   Allergies   Allergen Reactions    Brilinta [Ticagrelor]      Gi BLEED    Ampicillin Other (See Comments)    Clopidogrel Bisulfate Itching    Diovan [Valsartan] Other (See Comments)     cough    Lantus [Insulin Glargine] Nausea Only     Stomach \"quivered\" after taking it, palpitations    Metformin And Related Diarrhea     Chronic kidney disease stage 3       FAMILY HISTORY:       Problem Relation Age of Onset    Stroke Mother     Hypertension Mother     Heart Disease Father         AAA    Stroke Sister     Parkinsonism Brother     Cancer Sister         lung    Alcohol Abuse Sister          SOCIAL HISTORY:   Social History     Socioeconomic History    Marital status:       Spouse name: Not on file    Number of children: Not on file    Years of education: Not on file    Highest education level: Not on file   Occupational History    Not on file   Tobacco Use    Smoking status: Never    Smokeless tobacco: Never   Vaping Use    Vaping Use: Never used   Substance and Sexual Activity    Alcohol use: No    Drug use: No    Sexual activity: Not Currently     Partners: Male   Other Topics Concern    Not on file   Social History Narrative    Not on file     Social Determinants of Health     Financial Resource Strain: Not on file   Food Insecurity: Not on file   Transportation Needs: Not on file   Physical Activity: Not on file   Stress: Not on file   Social Connections: Not on file Intimate Partner Violence: Not on file   Housing Stability: Not on file       REVIEW OF SYSTEMS: A 12-point review of systemswas obtained and pertinent positives and negatives were enumerated above in the history of present illness. All other reviewed systems / symptoms were negative. Review of Systems   Constitutional:  Negative for appetite change, fatigue and unexpected weight change. HENT:  Negative for trouble swallowing. Eyes:  Positive for visual disturbance (glasses). Respiratory:  Negative for cough, choking, shortness of breath and wheezing. Cardiovascular:  Negative for chest pain, palpitations and leg swelling. Gastrointestinal:  Positive for blood in stool and diarrhea (urgency/incontinence). Negative for abdominal distention, abdominal pain, anal bleeding, constipation, nausea, rectal pain and vomiting. Genitourinary:  Negative for difficulty urinating. Allergic/Immunologic: Negative for environmental allergies and food allergies. Neurological:  Negative for dizziness, weakness, light-headedness, numbness and headaches. Hematological:  Bruises/bleeds easily. Psychiatric/Behavioral:  Negative for sleep disturbance. The patient is not nervous/anxious.           LABORATORY DATA: Reviewed  Lab Results   Component Value Date    WBC 9.9 12/05/2022    HGB 13.5 12/05/2022    HCT 40.5 12/05/2022    MCV 90.1 12/05/2022     12/05/2022     08/15/2022    K 4.8 08/15/2022    CL 99 08/15/2022    CO2 29 08/15/2022    BUN 14 08/15/2022    CREATININE 0.62 08/15/2022    LABALBU 4.4 08/15/2022    BILITOT 1.63 (H) 08/15/2022    ALKPHOS 61 08/15/2022    AST 34 (H) 08/15/2022    ALT 27 08/15/2022    INR 2.0 08/30/2021         Lab Results   Component Value Date    RBC 4.50 12/05/2022    HGB 13.5 12/05/2022    MCV 90.1 12/05/2022    MCH 29.9 12/05/2022    MCHC 33.2 12/05/2022    RDW 15.1 (H) 12/05/2022    MPV 8.5 12/05/2022    BASOPCT 1 12/05/2022    LYMPHSABS 2.50 12/05/2022    MONOSABS 0.90 12/05/2022    NEUTROABS 6.20 12/05/2022    EOSABS 0.20 12/05/2022    BASOSABS 0.10 12/05/2022         DIAGNOSTIC TESTING:     No results found. PHYSICAL EXAMINATION: Vital signs reviewed per the nursing documentation. /67   Pulse 68   Temp 96.8 °F (36 °C)   Wt 190 lb (86.2 kg)   BMI 28.06 kg/m²   Body mass index is 28.06 kg/m². Physical Exam  Vitals and nursing note reviewed. Constitutional:       General: She is not in acute distress. Appearance: She is well-developed. She is not diaphoretic. HENT:      Head: Normocephalic. Mouth/Throat:      Pharynx: No oropharyngeal exudate. Eyes:      General: No scleral icterus. Pupils: Pupils are equal, round, and reactive to light. Neck:      Thyroid: No thyromegaly. Vascular: No JVD. Trachea: No tracheal deviation. Cardiovascular:      Rate and Rhythm: Normal rate and regular rhythm. Heart sounds: Normal heart sounds. No murmur heard. Pulmonary:      Effort: Pulmonary effort is normal. No respiratory distress. Breath sounds: Normal breath sounds. No wheezing. Abdominal:      General: Bowel sounds are normal. There is no distension. Palpations: Abdomen is soft. Tenderness: There is no abdominal tenderness. There is no guarding or rebound. Comments: No ascites   Musculoskeletal:         General: Normal range of motion. Cervical back: Normal range of motion and neck supple. Skin:     General: Skin is warm. Coloration: Skin is not pale. Findings: No erythema or rash. Comments: She is not diaphoretic   Neurological:      Mental Status: She is alert and oriented to person, place, and time. Deep Tendon Reflexes: Reflexes are normal and symmetric. Psychiatric:         Behavior: Behavior normal.         Thought Content: Thought content normal.         Judgment: Judgment normal.         IMPRESSION: Ms. Kimberlee Templeton is a 80 y.o. female with      Diagnosis Orders   1. Adenocarcinoma, colon ([facility] [address]        2. Hx of colonic polyp        3. Full incontinence of feces        4. Elevated AST (SGOT)  Hepatitis B Surface Antigen    Hepatitis C Antibody    JOE    Iron and TIBC    MITOCHONDRIAL ANTIBODIES, M2, IGG    Smooth Muscle Antibody Quant        Will proceed with the above  Evaluated the elevation the liver enzymes  Otherwise she is doing well  Her anemia seems to resolve      Diet/life style/natural hx /complication of the dx were all explained in details   Past medical, past surgical, social history, psychiatric history, medications or allergies, all reviewed and  updated    Thank you for allowing me to participate in the care of Ms. House. For any further questions please do not hesitate to contact me. I have reviewed and agree with the ROS entered by the MA/RN. Note is dictated utilizing voice recognition software. Unfortunately this leads to occasional typographical errors. Please contact our office if you have any questions.       Flower Lozada MD  [facility]  O: #[phone]

## 2022-12-20 RX ORDER — PNEUMOCOCCAL 20-VALENT CONJUGATE VACCINE 2.2; 2.2; 2.2; 2.2; 2.2; 2.2; 2.2; 2.2; 2.2; 2.2; 2.2; 2.2; 2.2; 2.2; 2.2; 2.2; 4.4; 2.2; 2.2; 2.2 UG/.5ML; UG/.5ML; UG/.5ML; UG/.5ML; UG/.5ML; UG/.5ML; UG/.5ML; UG/.5ML; UG/.5ML; UG/.5ML; UG/.5ML; UG/.5ML; UG/.5ML; UG/.5ML; UG/.5ML; UG/.5ML; UG/.5ML; UG/.5ML; UG/.5ML; UG/.5ML
1 INJECTION, SUSPENSION INTRAMUSCULAR ONCE
COMMUNITY
Start: 2022-08-12

## 2022-12-20 NOTE — PROGRESS NOTES
Medicare Annual Wellness Visit    Adela Riley is here for [facility] AWV    Chief Complaint   Patient presents with    Medicare AWV     CHF/ CAD  Adela Riley is a 80 y.o. female with a known history of congestive heart failure. Current symptoms include: exertional chest pressure/discomfort. She denies chest pain, dyspnea, irregular heart beat, and palpitations. The symptoms are aggravated by  exertion, and relieved by rest, . Patient's current treatment includes : ntg Lasix, Xarelto. Patient is compliant all of the time with her therapy/medications. She states she is compliant with low salt diet. Patient is established with Dr. Branden Linda. Twice a week    Lab Results   Component Value Date    LVEF 61 06/16/2020   . Lab Results   Component Value Date/Time    PROBNP 115 08/15/2022 11:45 AM    PROBNP 78 08/12/2021 11:59 AM     HYPERTENSION  Adela Riley has a well controlled hypertension. she is currently on metoptolol. Norvasc. Patient's most recent BP in the office was stable. she reports stable BP readings at home. Patient denies any adverse reaction to this therapy. she denies any CP, SOB, HA, or palpitations. Patient admits to exercising and adheres to low salt diet. No history of organ damage due to condition noted. Lab Results   Component Value Date/Time    CREATININE 0.62 08/15/2022 11:45 AM     CHRONIC KIDNEY DISEASE  Darron Cervantes has a stage 2 CKD. Patient is under the care of DR. Elena York. -- will see him next week. Lab Results   Component Value Date/Time    K 4.8 08/15/2022 11:45 AM    BUN 14 08/15/2022 11:45 AM    CREATININE 0.62 08/15/2022 11:45 AM    LABGLOM >60 08/15/2022 11:45 AM    GFRAA >60 08/15/2022 11:45 AM      DIABETES MELLITUS  Patient has a  stable Diabetes Mellitus. Current therapy includes Glimeperide- no change. Patient is responding well with this therapy. Patient reports home glucose monitoring as Stable readings. Patient denieshypoglycemia episodes such as{symptoms. Patient denies neither vomiting nor diarrhea. Lab Results   Component Value Date/Time    LABA1C 7.2 12/27/2022 08:37 AM    LABA1C 8.0 08/12/2022 01:52 PM      HYPERLIPIDEMIA  Sherri House is currently on atorvastatin (Lipitor) and eztemibe (Zetia). Patient denies adverse reaction to this medication. Compliance with treatment thus far has been good. The patient is known to have coexisting coronary artery disease. The CVD Risk score (Kumarino, et al., 2008) failed to calculate for the following reasons: The 2008 CVD risk score is only valid for ages 27 to 76  Lab Results   Component Value Date/Time    CHOL 114 04/18/2022 11:27 AM    CHOL 104 01/16/2015 12:00 AM    HDL 35 (L) 04/18/2022 11:27 AM    LDLCHOLESTEROL 45 04/18/2022 11:27 AM    CHOLHDLRATIO 3.3 04/18/2022 11:27 AM    TRIG 171 (H) 04/18/2022 11:27 AM    VLDL NOT REPORTED (H) 03/17/2021 11:52 AM      IBS D  Patient currently on  Immodium 1 tabs a day. OSTEOPENIA  She is Post Menopausal and due for a Dexa Scan. She is encouraged to  Limit alcohol consumption to 2 drinks a day or less. Don't smoke. Engage in weight-bearing exercise to maintain and increase bone mass. Sherri Bamuan is due for Influenza vaccine. Denies side effects from prior flu shots. Denies allergy to eggs. Denies history of Guillain-Barré syndrome. Billjenna Hoskinsfolk  is due for Tetanus Diphtheria vaccination. Patient's last dose of TD was unknown. she denies any previous adverse reaction to this type of vaccine. Sherri Bauman is due for Varicella vaccine. her  indication is age over 48. Benefits of getting immunization against shingles discussed, and patient is agreeable. she  denies side effects to prior immunizations. .    DEPRESSION - negative screening  Jian Silva she also denies suicidal/homicidal ideation, plan or intent. PHQ-2 Over the past 2 weeks, how often have you been bothered by any of the following problems?   Little interest or pleasure in doing things: Not at all  Feeling down, depressed, or hopeless: Not at all  PHQ-2 Score: 0  PHQ-9 Over the past 2 weeks, how often have you been bothered by any of the following problems? PHQ-9 Total Score: 0  PHQ-9 Total Score: 0   No flowsheet data found. Amb Fall Risk Assessment and TUG Test 12/27/2022   Do you feel unsteady or are you worried about falling?  no   2 or more falls in past year? no   Fall with injury in past year? no   Timed Up and Go Test > 12 seconds? -     Amb Fall Risk Assessment and TUG Test 12/27/2022   Do you feel unsteady or are you worried about falling?  no   2 or more falls in past year? no   Fall with injury in past year? no   Timed Up and Go Test > 12 seconds? -     Controlled Substance Monitoring:    Acute and Chronic Pain Monitoring:   RX Monitoring 12/27/2022   Periodic Controlled Substance Monitoring No signs of potential drug abuse or diversion identified. Assessment & Plan   Medicare annual wellness visit, subsequent  Stable  DISCUSSED AND ADVISED TO:   Don't smoke. Eat heart-healthy foods. Be active. Talk to your doctor about what type and level of exercise is safe for you. Stay at a healthy weight. Lose weight if you need to. Avoid alcohol if it triggers symptoms. Manage other health problems such as high blood pressure, high cholesterol, and diabetes. Avoid getting sick from the flu. Get a flu shot every year. Manage stress, sleep at least 7 hours every night. -     Tetanus-Diphth-Acell Pertussis (BOOSTRIX) 5-2.5-18.5 LF-MCG/0.5 injection; Inject 0.5 mLs into the muscle once for 1 dose, Disp-1 each, R-0Normal  -     zoster recombinant adjuvanted vaccine [facility]) 50 MCG/0.5ML SUSR injection; Inject 0.5 mLs into the muscle once for 1 dose 1 dose now and repeat in 2-6 months, Disp-0.5 mL, R-0Normal  -     Influenza, FLUAD, (age 72 y+), IM, PF, 0.5 mL  -     CHERYL IRAIS DIGITAL SCREEN BILATERAL;  Future  -     POCT glycosylated hemoglobin (Hb A1C)  -     DME Order for [facility]) as OP  Chronic diastolic heart failure (HCC)  Stable  Continue with the therapy. DISCUSSED and ADVISED TO:  Weigh daily and log. Keep regular activity as tolerated. Cut down on salt intake. Keep appointment with the cardiologist.  Charlie Giraldo to the ER for CP and SOB not relieved with rest.    -     rivaroxaban (XARELTO) 20 MG TABS tablet; TAKE 1 TABLET DAILY WITH BREAKFAST, Disp-90 tablet, R-3Normal  -     DME Order for (Specify) as OP  Benign hypertension with CKD (chronic kidney disease), stage II  Stable  Continue renal consult  Advised to increase fluid intake  Report decrease urine output  Decrease salt intake   Type 2 diabetes mellitus with stage 2 chronic kidney disease, without long-term current use of insulin (HCC)  -     POCT glycosylated hemoglobin (Hb A1C)  Hyperlipidemia with target LDL less than 70  Stable  Continue therapy. Advised to decrease the consumption of red meats, fried foods, trans fats, sweets, sugary beverages. Advised to increase fish, vegetables, and fruits consumption. Advised to add fiber or OTC supplements in diet. Discussed weight loss which will result in improvement of lipids levels. Advised to increase daily physical activities and add regular exercises. History of coronary artery bypass graft x 3  Stable  DISCUSSED and ADVISED TO:  Discussed serious causes of CP.   Advised to go to the ER for worsening CP/SOB         -     rivaroxaban (XARELTO) 20 MG TABS tablet; TAKE 1 TABLET DAILY WITH BREAKFAST, Disp-90 tablet, R-3Normal  -     DME Order for (Specify) as OP  Osteopenia of multiple sites  Declined dexascan  -     DME Order for (Specify) as OP  Chronic anticoagulation  Stable  Continue to monitor  refilles  -     rivaroxaban (XARELTO) 20 MG TABS tablet; TAKE 1 TABLET DAILY WITH BREAKFAST, Disp-90 tablet, R-3Normal  -     DME Order for (Specify) as OP  Breast cancer screening by mammograms  Stable  Recommended  Mammogram    -     CHERYL IRAIS DIGITAL SCREEN BILATERAL; Future  Need for influenza vaccination  Stable  Recommended by CDC. No current infection. Denies previous adverse reaction to vaccination.    -     Influenza, FLUAD, (age 72 y+), IM, PF, 0.5 mL  Need for Tdap vaccination  Stable  Recommended by CDC. No current infection. Denies previous adverse reaction to vaccination.    -     Tetanus-Diphth-Acell Pertussis ([address]) 5-2.5-18.5 LF-MCG/0.5 injection; Inject 0.5 mLs into the muscle once for 1 dose, Disp-1 each, R-0Normal  Need for varicella vaccine  Stable  Recommended by CDC. No current infection. Denies previous adverse reaction to vaccination. -     zoster recombinant adjuvanted vaccine [facility]) 50 MCG/0.5ML SUSR injection; Inject 0.5 mLs into the muscle once for 1 dose 1 dose now and repeat in 2-6 months, Disp-0.5 mL, R-0Normal      Recommendations for Preventive Services Due: see orders and patient instructions/AVS.  Recommended screening schedule for the next 5-10 years is provided to the patient in written form: see Patient Instructions/AVS.     Return in 4 months (on 4/27/2023) for Medicare Annual Wellness Visit in 1 year, Chronic conditions, Appt w/ Dr. Quoc Negro. Subjective   The following acute and/or chronic problems were also addressed today:  above    Patient's complete Health Risk Assessment and screening values have been reviewed and are found in Flowsheets. The following problems were reviewed today and where indicated follow up appointments were made and/or referrals ordered. Positive Risk Factor Screenings with Interventions:                   Hearing Screen:  Do you or your family notice any trouble with your hearing that hasn't been managed with hearing aids?: (!) Yes  Review of Systems   Constitutional:  Negative for chills and fever. HENT:  Positive for hearing loss and postnasal drip. Eyes:  Positive for visual disturbance (wears glasses). Respiratory: Negative. Negative for shortness of breath and wheezing. Cardiovascular: Negative. Negative for chest pain and palpitations. Gastrointestinal:  Positive for diarrhea. Negative for abdominal pain. Endocrine: Negative. Musculoskeletal:  Negative for arthralgias. Allergic/Immunologic: Positive for environmental allergies. Neurological:  Negative for headaches. Psychiatric/Behavioral:  Negative for decreased concentration, sleep disturbance and suicidal ideas. The patient is nervous/anxious. Interventions:  Had a hearing test, will get a OTC hearing aid. Vision Screen:  Do you have difficulty driving, watching TV, or doing any of your daily activities because of your eyesight?: No  Have you had an eye exam within the past year?: Yes  Vision Screening    Right eye Left eye Both eyes   Without correction      With correction 20/50 20/50 20/50       Interventions:   Patient encouraged to make appointment with their eye specialist    Safety:  Do you have either shower bars, grab bars, non-slip mats or non-slip surfaces in your shower or bathtub?: (!) No  Do you always fasten your seatbelt when you are in a car?: (!) No  Interventions:  See above       CV Risk Counseling:  Patient was asked about her current diet and exercise habits, and personalized advice was provided regarding recommended lifestyle changes. Patient's individual cardiovascular disease risk factors, including diabetes mellitus, dyslipidemia, and hypertension, were discussed, as well as the likely benefits of lifestyle changes. Based upon patient's motivation to change her behavior, the following plan was agreed upon to work toward lowering cardiovascular disease risk: low saturated fat, low cholesterol diet, DASH diet, and increase physical activity, as tolerated. Aspirin use for primary prevention of cardiovascular disease for men 45-79 and women 55-79: Not indicated.            Objective   Vitals:    12/27/22 0827   BP: 130/70   Pulse: 54   Temp: 97.8 °F (36.6 °C)   SpO2: 99%   Weight: 189 lb (85.7 kg)   Height: 5' 9\" (1.753 m)      Body mass index is 27.91 kg/m². Physical Exam  Vitals and nursing note reviewed. Constitutional:       General: She is not in acute distress. Appearance: Normal appearance. She is well-developed. She is obese. She is not diaphoretic. HENT:      Head: Normocephalic. Mouth/Throat:      Pharynx: Posterior oropharyngeal erythema present. Eyes:      General: Lids are normal. No scleral icterus. Right eye: No discharge. Left eye: No discharge. Conjunctiva/sclera: Conjunctivae normal.   Neck:      Thyroid: No thyromegaly. Cardiovascular:      Rate and Rhythm: Normal rate and regular rhythm. Heart sounds: Murmur heard. Crescendo systolic murmur is present with a grade of 3/6. Comments: bilateral ankles swelling, varicose veins noted  Pulmonary:      Effort: Pulmonary effort is normal. No respiratory distress. Breath sounds: Normal breath sounds. No wheezing or rales. Chest:      Chest wall: No tenderness. Abdominal:      General: Bowel sounds are normal. There is no distension. Palpations: Abdomen is soft. There is no hepatomegaly or splenomegaly. Musculoskeletal:         General: No tenderness. Normal range of motion. Cervical back: Normal range of motion and neck supple. Right lower leg: No edema. Left lower leg: No edema. Skin:     General: Skin is warm and dry. Capillary Refill: Capillary refill takes less than 2 seconds. Findings: No rash. Neurological:      Mental Status: She is alert and oriented to person, place, and time. Cranial Nerves: No cranial nerve deficit. Motor: No abnormal muscle tone. Gait: Gait abnormal.      Comments: Up and go test more than 12 seconds. High risk for falls. Falls precautions discussed   Psychiatric:         Mood and Affect: Mood normal.         Behavior: Behavior normal.         Thought Content:  Thought content normal. Judgment: Judgment normal.            Allergies   Allergen Reactions    Brilinta [Ticagrelor]      Gi BLEED    Ampicillin Other (See Comments)    Clopidogrel Bisulfate Itching    Diovan [Valsartan] Other (See Comments)     cough    Lantus [Insulin Glargine] Nausea Only     Stomach \"quivered\" after taking it, palpitations    Metformin And Related Diarrhea     Chronic kidney disease stage 3     Prior to Visit Medications    Medication Sig Taking? Authorizing Provider   Tetanus-Diphth-Acell Pertussis (BOOSTRIX) 5-2.5-18.5 LF-MCG/0.5 injection Inject 0.5 mLs into the muscle once for 1 dose Yes LISSY Blank CNP   zoster recombinant adjuvanted vaccine (SHINGRIX) 50 MCG/0.5ML SUSR injection Inject 0.5 mLs into the muscle once for 1 dose 1 dose now and repeat in 2-6 months Yes LISSY Blank CNP   rivaroxaban (XARELTO) 20 MG TABS tablet TAKE 1 TABLET DAILY WITH BREAKFAST Yes ILSSY Blank CNP   pneumococcal 20-valent conjugat (PREVNAR 20) 0.5 ML GEORGIE inj Inject 1 mL into the muscle once Yes Historical Provider, MD   glucose monitoring (FREESTYLE Cold Crate) kit Please supply Patient with a glucose monitoring kit that insurance will cover. Yes Regina Leon MD   blood glucose monitor strips Testing once a day. Please dispense according to patients insurance. Yes Regina Leon MD   Lancets 30G MISC Testing once a day. Please dispense according to patients insurance.  Yes Regina Leon MD   ezetimibe (ZETIA) 10 MG tablet TAKE 1 TABLET NIGHTLY Yes Regina Leon MD   loperamide (RA ANTI-DIARRHEAL) 2 MG capsule Take 1 capsule by mouth 4 times daily as needed for Diarrhea Yes Harvey Martin MD   ferrous sulfate (IRON 325) 325 (65 Fe) MG tablet Take 1 tablet by mouth daily (with breakfast) Yes Dustin Collins MD   amLODIPine (NORVASC) 2.5 MG tablet TAKE 1 TABLET DAILY Yes Regina Leon MD   metoprolol tartrate (LOPRESSOR) 25 MG tablet TAKE 1 TABLET TWICE A DAY Yes Madi Chapin MD   allopurinol (ZYLOPRIM) 100 MG tablet TAKE 1 TABLET TWICE A DAY Yes Madi Chapin MD   furosemide (LASIX) 40 MG tablet TAKE 1 TABLET DAILY Yes Madi Chapin MD   glimepiride (AMARYL) 4 MG tablet TAKE 1 TABLET TWICE A DAY Yes Madi Chapin MD   atorvastatin (LIPITOR) 80 MG tablet TAKE 1 TABLET NIGHTLY Yes Madi Chapin MD   famotidine (PEPCID) 40 MG tablet TAKE 1 TABLET EVERY EVENING Yes Harvey Martin MD   potassium chloride (KLOR-CON M) 20 MEQ extended release tablet Take 1 tablet by mouth daily Take with furosemide Yes Madi Chapin MD   Handicap Placard MISC by Does not apply route Can't walk greater than 200 feet. Expires in 5 years. Yes Madi Chapin MD   FreeStyle Lancets MISC USE TO TEST BLOOD SUGAR TWICE A DAY Yes Madi Chapin MD   Lancet Device MISC For use with lancets for blood glucose checks Yes Madi Chapin MD   mirtazapine (REMERON) 7.5 MG tablet Take 1-2 tablets by mouth nightly For insomnia and anxiety. Call for refills or dose adjustment.  Yes Madi Chapin MD   cyanocobalamin ([employer] VITAMIN B12) 1000 MCG tablet Take 1 tablet by mouth daily Yes Madi Chapin MD   Blood Glucose Monitoring Suppl (FREESTYLE LITE) SARY use as directed Yes Historical Provider, MD   nitroGLYCERIN (NITROSTAT) 0.4 MG SL tablet Place 1 tablet under the tongue every 5 minutes as needed for Chest pain Yes Nish Pike MD   Biotin 300 MCG TABS Take 600 mcg by mouth every other day  Yes Historical Provider, MD   Cholecalciferol (VITAMIN D3) 1000 UNITS TABS Take 1 tablet by mouth daily Yes Historical Provider, MD Montesinos (Including outside providers/suppliers regularly involved in providing care):   Patient Care Team:  Madi Chapin MD as PCP - General (Family Medicine)  Madi [facility]in [facility]  Amy Cloud MD as Consulting Physician (Ophthalmology)  Lucinda Wei MD as Consulting Physician (Cardiology)  Radha Hooper MD as Consulting Physician (Neurology)  Danie Grijalva MD as Consulting Physician (Urology)  Paulino Selby MD as Consulting Physician (Nephrology)  Madeline Atkins MD as Consulting Physician (Internal Medicine Cardiovascular Disease)  Anum Mckeon MD as Consulting Physician (Gastroenterology)  Giles Hinds MD as Consulting Physician (Hematology and Oncology)  Leif Gonzalez MD as Surgeon (Orthopedic Surgery)     Reviewed and updated this visit:  Tobacco  Allergies  Meds  Problems  Med Hx  Surg Hx  Soc Hx  Fam Hx               This note was completed by using the assistance of a speech-recognition program. However, inadvertent computerized transcription errors may be present. Although every effort was made to ensure accuracy, no guarantees can be provided that every mistake has been identified and corrected by editing.   Electronically signed by LISSY Mace CNP on 12/27/22 at 8:36 AM EST

## 2022-12-27 ENCOUNTER — OFFICE VISIT (OUTPATIENT)
Dept: FAMILY MEDICINE CLINIC | Age: 87
End: 2022-12-27
Payer: MEDICARE

## 2022-12-27 VITALS
HEIGHT: 69 IN | HEART RATE: 54 BPM | BODY MASS INDEX: 27.99 KG/M2 | OXYGEN SATURATION: 99 % | TEMPERATURE: 97.8 F | DIASTOLIC BLOOD PRESSURE: 70 MMHG | WEIGHT: 189 LBS | SYSTOLIC BLOOD PRESSURE: 130 MMHG

## 2022-12-27 DIAGNOSIS — E11.22 TYPE 2 DIABETES MELLITUS WITH STAGE 2 CHRONIC KIDNEY DISEASE, WITHOUT LONG-TERM CURRENT USE OF INSULIN (HCC): ICD-10-CM

## 2022-12-27 DIAGNOSIS — Z23 NEED FOR INFLUENZA VACCINATION: ICD-10-CM

## 2022-12-27 DIAGNOSIS — Z23 NEED FOR VARICELLA VACCINE: ICD-10-CM

## 2022-12-27 DIAGNOSIS — Z23 NEED FOR TDAP VACCINATION: ICD-10-CM

## 2022-12-27 DIAGNOSIS — E78.5 HYPERLIPIDEMIA WITH TARGET LDL LESS THAN 70: ICD-10-CM

## 2022-12-27 DIAGNOSIS — Z95.1 HISTORY OF CORONARY ARTERY BYPASS GRAFT X 3: ICD-10-CM

## 2022-12-27 DIAGNOSIS — Z12.31 BREAST CANCER SCREENING BY MAMMOGRAM: ICD-10-CM

## 2022-12-27 DIAGNOSIS — M85.89 OSTEOPENIA OF MULTIPLE SITES: ICD-10-CM

## 2022-12-27 DIAGNOSIS — N18.2 BENIGN HYPERTENSION WITH CKD (CHRONIC KIDNEY DISEASE), STAGE II: ICD-10-CM

## 2022-12-27 DIAGNOSIS — I12.9 BENIGN HYPERTENSION WITH CKD (CHRONIC KIDNEY DISEASE), STAGE II: ICD-10-CM

## 2022-12-27 DIAGNOSIS — Z00.00 MEDICARE ANNUAL WELLNESS VISIT, SUBSEQUENT: Primary | ICD-10-CM

## 2022-12-27 DIAGNOSIS — Z79.01 CHRONIC ANTICOAGULATION: ICD-10-CM

## 2022-12-27 DIAGNOSIS — I50.32 CHRONIC DIASTOLIC HEART FAILURE (HCC): ICD-10-CM

## 2022-12-27 DIAGNOSIS — N18.2 TYPE 2 DIABETES MELLITUS WITH STAGE 2 CHRONIC KIDNEY DISEASE, WITHOUT LONG-TERM CURRENT USE OF INSULIN (HCC): ICD-10-CM

## 2022-12-27 LAB — HBA1C MFR BLD: 7.2 %

## 2022-12-27 PROCEDURE — 3051F HG A1C>EQUAL 7.0%<8.0%: CPT | Performed by: FAMILY MEDICINE

## 2022-12-27 PROCEDURE — 1123F ACP DISCUSS/DSCN MKR DOCD: CPT | Performed by: FAMILY MEDICINE

## 2022-12-27 PROCEDURE — G8427 DOCREV CUR MEDS BY ELIG CLIN: HCPCS | Performed by: FAMILY MEDICINE

## 2022-12-27 PROCEDURE — 83036 HEMOGLOBIN GLYCOSYLATED A1C: CPT | Performed by: FAMILY MEDICINE

## 2022-12-27 PROCEDURE — 90694 VACC AIIV4 NO PRSRV 0.5ML IM: CPT | Performed by: FAMILY MEDICINE

## 2022-12-27 PROCEDURE — G8417 CALC BMI ABV UP PARAM F/U: HCPCS | Performed by: FAMILY MEDICINE

## 2022-12-27 PROCEDURE — G0439 PPPS, SUBSEQ VISIT: HCPCS | Performed by: FAMILY MEDICINE

## 2022-12-27 PROCEDURE — 1090F PRES/ABSN URINE INCON ASSESS: CPT | Performed by: FAMILY MEDICINE

## 2022-12-27 PROCEDURE — G0008 ADMIN INFLUENZA VIRUS VAC: HCPCS | Performed by: FAMILY MEDICINE

## 2022-12-27 PROCEDURE — 99214 OFFICE O/P EST MOD 30 MIN: CPT | Performed by: FAMILY MEDICINE

## 2022-12-27 PROCEDURE — 1036F TOBACCO NON-USER: CPT | Performed by: FAMILY MEDICINE

## 2022-12-27 PROCEDURE — G8484 FLU IMMUNIZE NO ADMIN: HCPCS | Performed by: FAMILY MEDICINE

## 2022-12-27 RX ORDER — ZOSTER VACCINE RECOMBINANT, ADJUVANTED 50 MCG/0.5
0.5 KIT INTRAMUSCULAR ONCE
Qty: 0.5 ML | Refills: 0 | Status: SHIPPED | OUTPATIENT
Start: 2022-12-27 | End: 2022-12-27

## 2022-12-27 SDOH — ECONOMIC STABILITY: INCOME INSECURITY: IN THE LAST 12 MONTHS, WAS THERE A TIME WHEN YOU WERE NOT ABLE TO PAY THE MORTGAGE OR RENT ON TIME?: NO

## 2022-12-27 SDOH — ECONOMIC STABILITY: FOOD INSECURITY: WITHIN THE PAST 12 MONTHS, YOU WORRIED THAT YOUR FOOD WOULD RUN OUT BEFORE YOU GOT MONEY TO BUY MORE.: NEVER TRUE

## 2022-12-27 SDOH — ECONOMIC STABILITY: FOOD INSECURITY: WITHIN THE PAST 12 MONTHS, THE FOOD YOU BOUGHT JUST DIDN'T LAST AND YOU DIDN'T HAVE MONEY TO GET MORE.: NEVER TRUE

## 2022-12-27 ASSESSMENT — LIFESTYLE VARIABLES: HOW MANY STANDARD DRINKS CONTAINING ALCOHOL DO YOU HAVE ON A TYPICAL DAY: PATIENT DOES NOT DRINK

## 2022-12-27 ASSESSMENT — PATIENT HEALTH QUESTIONNAIRE - PHQ9
SUM OF ALL RESPONSES TO PHQ QUESTIONS 1-9: 0
SUM OF ALL RESPONSES TO PHQ QUESTIONS 1-9: 0
SUM OF ALL RESPONSES TO PHQ9 QUESTIONS 1 & 2: 0
1. LITTLE INTEREST OR PLEASURE IN DOING THINGS: 0
SUM OF ALL RESPONSES TO PHQ QUESTIONS 1-9: 0
SUM OF ALL RESPONSES TO PHQ QUESTIONS 1-9: 0
2. FEELING DOWN, DEPRESSED OR HOPELESS: 0

## 2022-12-27 ASSESSMENT — VISUAL ACUITY
OS_CC: 20/50
OD_CC: 20/50

## 2022-12-27 ASSESSMENT — ENCOUNTER SYMPTOMS
RESPIRATORY NEGATIVE: 1
WHEEZING: 0
ABDOMINAL PAIN: 0
DIARRHEA: 1
SHORTNESS OF BREATH: 0

## 2022-12-27 ASSESSMENT — SOCIAL DETERMINANTS OF HEALTH (SDOH): HOW HARD IS IT FOR YOU TO PAY FOR THE VERY BASICS LIKE FOOD, HOUSING, MEDICAL CARE, AND HEATING?: NOT HARD AT ALL

## 2022-12-27 NOTE — PATIENT INSTRUCTIONS
Preventing Falls: Care Instructions  Overview     Getting around your home safely can be a challenge if you have injuries or health problems that make it easy for you to fall. Loose rugs and furniture in walkways are among the dangers for many older people who have problems walking or who have poor eyesight. People who have conditions such as arthritis, osteoporosis, or dementia also have to be careful not to fall. You can make your home safer with a few simple measures. Follow-up care is a key part of your treatment and safety. Be sure to make and go to all appointments, and call your doctor if you are having problems. It's also a good idea to know your test results and keep a list of the medicines you take. How can you care for yourself at home? Taking care of yourself  Exercise regularly to improve your strength, muscle tone, and balance. Walk if you can. Swimming may be a good choice if you cannot walk easily. Have your vision and hearing checked each year or any time you notice a change. If you have trouble seeing and hearing, you might not be able to avoid objects and could lose your balance. Know the side effects of the medicines you take. Ask your doctor or pharmacist whether the medicines you take can affect your balance. Sleeping pills or sedatives can affect your balance. Limit the amount of alcohol you drink. Alcohol can impair your balance and other senses. Ask your doctor whether calluses or corns on your feet need to be removed. If you wear loose-fitting shoes because of calluses or corns, you can lose your balance and fall. Talk to your doctor if you have numbness in your feet. You may get dizzy if you do not drink enough water. To prevent dehydration, drink plenty of fluids. Choose water and other clear liquids. If you have kidney, heart, or liver disease and have to limit fluids, talk with your doctor before you increase the amount of fluids you drink.   Preventing falls at home  Remove raised doorway thresholds, throw rugs, and clutter. Repair loose carpet or raised areas in the floor. Move furniture and electrical cords to keep them out of walking paths. Use nonskid floor wax, and wipe up spills right away, especially on ceramic tile floors. If you use a walker or cane, put rubber tips on it. If you use crutches, clean the bottoms of them regularly with an abrasive pad, such as steel wool. Keep your house well lit, especially stairways, porches, and outside walkways. Use night-lights in areas such as hallways and bathrooms. Add extra light switches or use remote switches (such as switches that go on or off when you clap your hands) to make it easier to turn lights on if you have to get up during the night. Install sturdy handrails on stairways. Move items in your cabinets so that the things you use a lot are on the lower shelves (about waist level). Keep a cordless phone and a flashlight with new batteries by your bed. If possible, put a phone in each of the main rooms of your house, or carry a cell phone in case you fall and cannot reach a phone. Or, you can wear a device around your neck or wrist. You push a button that sends a signal for help. Wear low-heeled shoes that fit well and give your feet good support. Use footwear with nonskid soles. Check the heels and soles of your shoes for wear. Repair or replace worn heels or soles. Do not wear socks without shoes on smooth floors, such as wood. Walk on the grass when the sidewalks are slippery. If you live in an area that gets snow and ice in the winter, sprinkle salt on slippery steps and sidewalks. Or ask a family member or friend to do this for you. Preventing falls in the bath  Install grab bars and nonskid mats inside and outside your shower or tub and near the toilet and sinks. Use shower chairs and bath benches. Use a hand-held shower head that will allow you to sit while showering.   Get into a tub or shower by tub before you sit down. Nonskid mats  Water makes both the floor of the tub and the bathroom floor slippery. You can buy adhesive strips that stick to the floor of the tub. Or you can use a nonskid tub mat. Outside the tub, make sure you use a rug with a nonskid bottom. Don't use a plain towel. Hand-held shower faucet  With a hand-held faucet, you can get clean without having to lower yourself into the tub. Hang a shower curtain to keep water from spilling out onto the floor. Follow-up care is a key part of your treatment and safety. Be sure to make and go to all appointments, and call your doctor if you are having problems. It's also a good idea to know your test results and keep a list of the medicines you take. Current as of: March 9, 2022               Content Version: 13.5  © 2006-2022 [employer]. Care instructions adapted under license by [facility] ([facility]). If you have questions about a medical condition or this instruction, always ask your healthcare professional. [place] [address] any warranty or liability for your use of this information. Learning About Getting In and Out of a Car Safely  Introduction  If you have problems with mobility or balance, getting into a car may be difficult. It's easiest and safest to sit down in the car first and then move your legs into the car after you're seated. And if the ground is slick or icy, this method is safer for everyone. Try this:  When you get to the door, turn around so that you're facing away from the car. Reach back to hold on to something stable, such as the seat or the door frame. Sit down so that you are sitting sideways on the seat. Be careful not to hit your head on the top of the door jamb. Slide around so that you're facing front, and lift your first leg in. Then lift your second leg in.   To get out of the car, do the same steps in reverse order:  When the door is open, lift your first leg out the door and put your foot on the ground. As you do the same with your second leg, slide around so you are facing out the door. Use the seat or door frame for support as you lean forward and push yourself up to a standing position. If your car seat has fabric upholstery, you might find that it's hard to slide around. Try covering the seat with something to make it easier to slide on, like a piece of plastic or vinyl. Make sure it doesn't get in the way of your seat belt. If you still have trouble, ask your physical therapist or occupational therapist to show you the best way to get in and out of a car. They can also tell you about tools that can make this easier for you. What tools can help you get in and out of a car? There are a number of devices that can make getting in and out of the car easier. You can find them at medical supply or auto stores or online. And if you don't already have one, think about getting a disabled parking permit. Your doctor can help you. The doctor will fill out a form that you can take to the local licensing or tax office. These permits may be permanent or temporary. Grab bar and door strap  There are several types of handholds that you can install on the frame of your car door or beside the door. They can give you something to hold on to as you get in and out of the car seat. Swivel seat  A swivel seat is like a lazy Tamika Brand or a turntable. You sit down facing sideways and then use it to turn forward as you pull your legs in. Seat belt extender  You may have a hard time with a normal seat belt. An extension may help you find and reach the end of the belt more easily. Follow-up care is a key part of your treatment and safety. Be sure to make and go to all appointments, and call your doctor if you are having problems. It's also a good idea to know your test results and keep a list of the medicines you take. Where can you learn more?   Go to http://www.woods.com/ and enter K991 to learn more about \"Learning About Getting In and Out of a Car Safely. \"  Current as of: March 9, 2022               Content Version: 13.5  © 2006-2022 Healthwise, [place]. Care instructions adapted under license by [facility] ([facility]). If you have questions about a medical condition or this instruction, always ask your healthcare professional. [address] any warranty or liability for your use of this information. Learning About Getting In and Out of a Bathtub Safely  Introduction  Many falls happen during bathing. All that water makes the bathroom a slippery place. You may no longer be able to step over the tub wall comfortably and safely. You might lean on things that aren't meant to support your weight, like a towel bar or the shower curtain. There are several types of aids that will help keep you safe. You can buy them at [employer] or home improvement stores or online. What tools can help you get in and out of a tub? Tub rail and grab bar  There are many types of bars and rails you can install on the walls next to your tub or on the edge of the tub. They will help keep you safe while you're getting in or out of the tub. It's important to have them permanently installed, rather than attached with suction. Transfer bench  There are several kinds of benches or seats to use in the bathtub. One type sits in the tub and extends out over the side. You sit on the bench. Lift one leg at a time into the tub, sliding your body over as you do so. Another common tub bench sits inside the tub. To use this type you need to be able to safely step into the tub before you sit down. Nonskid mats  Water makes both the floor of the tub and the bathroom floor slippery. You can buy adhesive strips that stick to the floor of the tub. Or you can use a nonskid tub mat. Outside the tub, make sure you use a rug with a nonskid bottom. Don't use a plain towel.   Hand-held shower faucet  With a hand-held faucet, you can get clean without having to lower yourself into the tub. Hang a shower curtain to keep water from spilling out onto the floor. Follow-up care is a key part of your treatment and safety. Be sure to make and go to all appointments, and call your doctor if you are having problems. It's also a good idea to know your test results and keep a list of the medicines you take. Current as of: March 9, 2022               Content Version: 13.5  © 2006-2022 Healthwise, "[employer]". Care instructions adapted under license by [facility] ([facility]). If you have questions about a medical condition or this instruction, always ask your healthcare professional. [place] [address] any warranty or liability for your use of this information. Advance Directives: Care Instructions  Overview  An advance directive is a legal way to state your wishes at the end of your life. It tells your family and your doctor what to do if you can't say what you want. There are two main types of advance directives. You can change them any time your wishes change. Living will. This form tells your family and your doctor your wishes about life support and other treatment. The form is also called a declaration. Medical power of . This form lets you name a person to make treatment decisions for you when you can't speak for yourself. This person is called a health care agent (health care proxy, health care surrogate). The form is also called a durable power of  for health care. If you do not have an advance directive, decisions about your medical care may be made by a family member, or by a doctor or a  who doesn't know you. It may help to think of an advance directive as a gift to the people who care for you. If you have one, they won't have to make tough decisions by themselves.   For more information, including forms for your state, see the [employer] website (www.caringinfo.org/planning/advance-directives/). Follow-up care is a key part of your treatment and safety. Be sure to make and go to all appointments, and call your doctor if you are having problems. It's also a good idea to know your test results and keep a list of the medicines you take. What should you include in an advance directive? Many states have a unique advance directive form. (It may ask you to address specific issues.) Or you might use a universal form that's approved by many states. If your form doesn't tell you what to address, it may be hard to know what to include in your advance directive. Use the questions below to help you get started. Who do you want to make decisions about your medical care if you are not able to? What life-support measures do you want if you have a serious illness that gets worse over time or can't be cured? What are you most afraid of that might happen? (Maybe you're afraid of having pain, losing your independence, or being kept alive by machines.)  Where would you prefer to die? (Your home? A hospital? A nursing home?)  Do you want to donate your organs when you die? Do you want certain Zoroastrianism practices performed before you die? When should you call for help? Be sure to contact your doctor if you have any questions. Where can you learn more? Go to http://www.davidson.com/ and enter R264 to learn more about \"Advance Directives: Care Instructions. \"  Current as of: June 16, 2022               Content Version: 13.5  © 2195-5938 Healthwise, [employer]. Care instructions adapted under license by [facility] [address] If you have questions about a medical condition or this instruction, always ask your healthcare professional. [place] [address] any warranty or liability for your use of this information. Personalized Preventive Plan for Robin Ny - 12/27/2022  Medicare offers a range of preventive health benefits.  Some of the tests and screenings are paid in full while other may be subject to a deductible, co-insurance, and/or copay. Some of these benefits include a comprehensive review of your medical history including lifestyle, illnesses that may run in your family, and various assessments and screenings as appropriate. After reviewing your medical record and screening and assessments performed today your provider may have ordered immunizations, labs, imaging, and/or referrals for you. A list of these orders (if applicable) as well as your Preventive Care list are included within your After Visit Summary for your review. Other Preventive Recommendations:    A preventive eye exam performed by an eye specialist is recommended every 1-2 years to screen for glaucoma; cataracts, macular degeneration, and other eye disorders. A preventive dental visit is recommended every 6 months. Try to get at least 150 minutes of exercise per week or 10,000 steps per day on a pedometer . Order or download the FREE \"Exercise & Physical Activity: Your Everyday Guide\" from [employer]. Call 4-719.453.6274 or search [employer] online. You need 8352-2575 mg of calcium and 0322-7853 IU of vitamin D per day. It is possible to meet your calcium requirement with diet alone, but a vitamin D supplement is usually necessary to meet this goal.  When exposed to the sun, use a sunscreen that protects against both UVA and UVB radiation with an SPF of 30 or greater. Reapply every 2 to 3 hours or after sweating, drying off with a towel, or swimming. Always wear a seat belt when traveling in a car. Always wear a helmet when riding a bicycle or motorcycle.

## 2022-12-27 NOTE — PROGRESS NOTES
Visit Information    Have you changed or started any medications since your last visit including any over-the-counter medicines, vitamins, or herbal medicines? no   Are you having any side effects from any of your medications? -  no  Have you stopped taking any of your medications? Is so, why? -  no    Have you seen any other physician or provider since your last visit? No  Have you had any other diagnostic tests since your last visit? No  Have you been seen in the emergency room and/or had an admission to a hospital since we last saw you? No  Have you had your routine dental cleaning in the past 6 months? yes -     Have you activated your [email or web address] account? If not, what are your barriers?  Yes     Patient Care Team:  Karen Santos MD as PCP - General (Family Medicine)  Karen Santos MD as PCP - [facility]  Liseth Gonzalez MD as Consulting Physician (Ophthalmology)  Masood Bhat MD as Consulting Physician (Cardiology)  Mindy Delgado MD as Consulting Physician (Neurology)  Chevy Tong MD as Consulting Physician (Urology)  Juice Muhammad MD as Consulting Physician (Nephrology)  Edi Mckenzie MD as Consulting Physician (Internal Medicine Cardiovascular Disease)  Dwight Aguilera MD as Consulting Physician (Gastroenterology)  Kodi Krause MD as Consulting Physician (Hematology and Oncology)  Joshua Devine MD as Surgeon (Orthopedic Surgery)    Medical History Review  Past Medical, Family, and Social History reviewed and does contribute to the patient presenting condition    Health Maintenance   Topic Date Due    DTaP/Tdap/Td vaccine (1 - Tdap) Never done    Flu vaccine (1) 08/01/2022    Shingles vaccine (3 of 3) 10/10/2022    Annual Wellness Visit (AWV)  11/18/2022    Lipids  04/18/2023    Depression Screen  08/12/2023    Colorectal Cancer Screen  03/19/2031    Pneumococcal 65+ years Vaccine  Completed    COVID-19 Vaccine  Completed    Hepatitis A vaccine  Aged Out    Hib vaccine  Aged Out    Meningococcal (ACWY) vaccine  Aged Out

## 2023-01-04 DIAGNOSIS — E11.22 TYPE 2 DIABETES MELLITUS WITH STAGE 2 CHRONIC KIDNEY DISEASE, WITHOUT LONG-TERM CURRENT USE OF INSULIN (HCC): ICD-10-CM

## 2023-01-04 DIAGNOSIS — N18.2 TYPE 2 DIABETES MELLITUS WITH STAGE 2 CHRONIC KIDNEY DISEASE, WITHOUT LONG-TERM CURRENT USE OF INSULIN (HCC): ICD-10-CM

## 2023-01-05 DIAGNOSIS — N18.2 TYPE 2 DIABETES MELLITUS WITH STAGE 2 CHRONIC KIDNEY DISEASE, WITHOUT LONG-TERM CURRENT USE OF INSULIN (HCC): Primary | ICD-10-CM

## 2023-01-05 DIAGNOSIS — E11.22 TYPE 2 DIABETES MELLITUS WITH STAGE 2 CHRONIC KIDNEY DISEASE, WITHOUT LONG-TERM CURRENT USE OF INSULIN (HCC): Primary | ICD-10-CM

## 2023-01-05 DIAGNOSIS — I50.32 CHRONIC DIASTOLIC HEART FAILURE (HCC): ICD-10-CM

## 2023-01-05 RX ORDER — BLOOD-GLUCOSE METER
KIT MISCELLANEOUS
Qty: 300 STRIP | Refills: 3 | Status: SHIPPED | OUTPATIENT
Start: 2023-01-05

## 2023-01-05 NOTE — TELEPHONE ENCOUNTER
Please Approve or Refuse.   Send to Pharmacy per Pt's Request:      Next Visit Date:  4/27/2023   Last Visit Date: 12/27/2022    Hemoglobin A1C (%)   Date Value   12/27/2022 7.2   08/12/2022 8.0   04/07/2022 7.2             ( goal A1C is < 7)   BP Readings from Last 3 Encounters:   12/27/22 130/70   12/15/22 127/67   12/07/22 (!) 156/67          (goal 120/80)  BUN   Date Value Ref Range Status   08/15/2022 14 8 - 23 mg/dL Final     Creatinine   Date Value Ref Range Status   08/15/2022 0.62 0.50 - 0.90 mg/dL Final     Potassium   Date Value Ref Range Status   08/15/2022 4.8 3.7 - 5.3 mmol/L Final

## 2023-01-05 NOTE — TELEPHONE ENCOUNTER
Please advise patient someone from [facility] will contact her for remote monitoring to prevent admission and emergency room visits related to diabetes and congestive heart failure, referral placed in this encounter    Patient Active Problem List   Diagnosis    Gout of foot    Type 2 diabetes mellitus with stage 2 chronic kidney disease, without long-term current use of insulin ([address]    Coronary artery disease involving coronary bypass graft of native heart without angina pectoris    Kidney stone on right side    Coronary artery disease due to lipid rich plaque    Hyperlipidemia with target LDL less than 70    Occlusion and stenosis of bilateral carotid arteries    Benign hypertension with CKD (chronic kidney disease), stage II    Chronic anticoagulation    Osteopenia    Psychophysiological insomnia    Bilateral carotid artery stenosis    Carpal tunnel syndrome of left wrist    History of pulmonary embolus (PE)    History of DVT (deep vein thrombosis)    Hematemesis    History of coronary artery stent placement    History of coronary artery bypass graft x 3    Bilateral hearing loss    Anemia    Seborrheic dermatitis    MGUS (monoclonal gammopathy of unknown significance)    Vitamin B 12 deficiency    Chronic diastolic heart failure (HCC)    Personal history of colon cancer    Bilateral carotid bruits    History of left-sided carotid endarterectomy    History of TIA (transient ischemic attack)    Mild aortic stenosis    Bilateral impacted cerumen    Incontinence of feces with fecal urgency    Skin lesion of right arm    Ventral hernia without obstruction or gangrene    Pancreatic cyst    Nonspecific reactive hepatitis    Fatty liver    Slurred speech

## 2023-01-09 ENCOUNTER — CARE COORDINATION (OUTPATIENT)
Dept: CARE COORDINATION | Age: 88
End: 2023-01-09

## 2023-01-09 NOTE — CARE COORDINATION
Attempted to reach patient for ACM enrollment and possible RPM. No answer. Left a message on her voicemail with call back number. Will try to reach her tomorrow.

## 2023-01-10 ENCOUNTER — CARE COORDINATION (OUTPATIENT)
Dept: CARE COORDINATION | Age: 88
End: 2023-01-10

## 2023-01-10 DIAGNOSIS — E11.22 TYPE 2 DIABETES MELLITUS WITH STAGE 2 CHRONIC KIDNEY DISEASE, WITHOUT LONG-TERM CURRENT USE OF INSULIN (HCC): ICD-10-CM

## 2023-01-10 DIAGNOSIS — I50.32 CHRONIC DIASTOLIC HEART FAILURE (HCC): Primary | ICD-10-CM

## 2023-01-10 DIAGNOSIS — N18.2 TYPE 2 DIABETES MELLITUS WITH STAGE 2 CHRONIC KIDNEY DISEASE, WITHOUT LONG-TERM CURRENT USE OF INSULIN (HCC): ICD-10-CM

## 2023-01-10 NOTE — PROGRESS NOTES
Noted. Thank you!     Future Appointments   Date Time Provider Sherly Pitt   4/13/2023 10:40 AM Renate Kelly MD [address] [facility]   4/27/2023  2:00 PM Dee Mata MD [facility]   5/18/2023  2:30 PM Harvey Deluna MD [facility]   6/7/2023  1:30 PM Val Doshi MD [address] [place] [address]

## 2023-01-10 NOTE — PROGRESS NOTES
1/10/23 1:31 PM       Remote Patient Monitoring Treatment Plan    Received request from [facility]HENRIETTA Ramey RN to order remote patient monitoring for in home monitoring of Kidney Disease , CHF, and Diabetes and order completed. Patient will be monitoring blood pressure   glucose  pulse ox   weight. Patient will engage in Remote Patient Monitoring each day to develop the skills necessary for self management. RPM Care Team Responsibilities:   Alerts will be reviewed daily and addressed within 2-4 hours during operational hours (Monday -Friday 9 am-4 pm)  Alert response and intervention documented in patient medical record  Alert response escalated to PCP per protocol and documented in patient medical record  Patient monitored over approximately  days  Discharge from program based on self-management readiness    See care coordination encounters for additional details.       Jimmy Nunez DNP, FNP-C, Remote Patient Monitoring NP, (Ph) [phone]

## 2023-01-10 NOTE — CARE COORDINATION
Remote Patient Monitoring Enrollment Note      Date/Time:  1/10/2023 1:08 PM    Offered patient enrollment in the [facility] Remote Patient Monitoring (RPM) program for in home monitoring for Kidney Disease , CHF, and Diabetes. Patient accepted RPM services. Patient will be monitoring the following daily:  blood pressure reading  glucose reading  pulse ox reading  pulse ox heart rate  weight    ACM reviewed the information below with patient:    Emergency Contact (name and contact number): Mallory Massey- [phone]. She states this is her son who lives in [place]. [x] A member from the care coordination team will reach out to notify the patient once the RPM kit is ordered. [x] Once the kit is delivered, the [facility] team will contact the patient after UPS deliver to assist with set up. [x] Determined BP cuff size: regular (9.05\"-15.74\")      [x] Determined weight scale: regular (<330lbs)                                                 [x] Hours of ACM monitoring - Monday-Friday [id number]                         All questions about RPM program answered at this time.

## 2023-01-10 NOTE — CARE COORDINATION
Remote Patient Kit Ordering Note      Date/Time:  1/10/2023 1:27 PM      [x] CCSS confirmed patient shipping address  [x] Patient will receive package over the next 2-4 business days. Someone 21 years or older must be present to sign for UPS delivery. [x] Patient to contact virtual installation-specific phone number listed in the patient instructions. [x] If the patient does not contact HRS within 24 hours, an [employer] [facility] will call the patient directly: If the patient does not answer, HRS will follow up with the clinical team notifying them about the unsuccessful attempt to contact the patient. HRS will make three call attempts to the patient. [] LPN will contact patient once equipment is active to welcome them to the program.                                                         [] Hours of RPM monitoring - Monday-Friday [id number]                     All questions answered at this time. ACM made aware the RPM kit has been ordered. CCSS notified patient of RPM equipment order.

## 2023-01-10 NOTE — CARE COORDINATION
Ambulatory Care Coordination Note  1/10/2023    ACC: Kim Miller RN  Summary  Date Care Coordination Episode Started:  January 10, 2023    Reason for Call Today:     CC Enrollment    Reason patient is in Care Coordination:     PCP Referral  RPM  VOLODYMYR 72%  CKD  CHF  Diabetes    Topics Discussed Today:     General- call to [facility] for ACM enrollment. Introduced myself and reason for call. Explained RPM program and she is agreeable to enrollment. [facility] has a history of diabetes, CHF, CKD, colon cancer, dvt/PE. She lives alone in her apartment. She is very active. Still drives. Does her own shopping and cooking. She attends [employer] class 3x/week, offered through the [employer]. She goes to the [facility] for some activities. Her sons live in [facility] and [place]. She follows with Dr. Carrie Henry for cardiology. Last OV was in November. Had an Echo done. Her EF is 65-70%. Her A1C is 7.2, down slightly from 8.0. She states she does have an occasional sweet. Interventions completed today:    Referral sent for RPM program    Assessments completed today:     Fall risk  Initial assessment  Medication reconciliation  Diabetes, CHF, General    Care Coordinator plan of care:     Continue medications as prescribed  Monitor weight, BP, and pulse ox daily with RPM program  Complete lab work prior to visit with GI and  hem/onc   Complete mammogram, renal ultrasound, and MRI as ordered  Continue to stay active doing [employer] 3x/week  Will follow up next week to assess for any symptoms; will complete SDOH; will set goals with Dmitry Xie patient enrollment in the Remote Patient Monitoring (RPM) program for in-home monitoring: Yes, patient enrolled.     Ambulatory Care Coordination Assessment    Care Coordination Protocol  Referral from Primary Care Provider: Yes  Week 1 - Initial Assessment     Do you have all of your prescriptions and are they filled?: Yes  Barriers to medication adherence: None  Are you able to afford your medications?: Yes  How often do you have trouble taking your medications the way you have been told to take them?: I always take them as prescribed. Do you have Home O2 Therapy?: No      Ability to seek help/take action for Emergent Urgent situations i.e. fire, crime, inclement weather or health crisis. : Independent  Ability to ambulate to restroom: Independent  Ability handle personal hygeine needs (bathing/dressing/grooming): Independent  Ability to manage Medications: Independent  Ability to prepare Food Preparation: Independent  Ability to maintain home (clean home, laundry): Independent  Ability to drive and/or has transportation: Independent  Ability to do shopping: Independent  Ability to manage finances: Independent  Is patient able to live independently?: Yes     Current Housing: Apartment        Per the Fall Risk Screening, did the patient have 2 or more falls or 1 fall with injury in the past year?: No     Frequent urination at night?: No  Do you use rails/bars?: Yes  Do you have a non-slip tub mat?: No     Are you experiencing loss of meaning?: No  Are you experiencing loss of hope and peace?: No     Thinking about your patient's physical health needs, are there any symptoms or problems (risk indicators) you are unsure about that require further investigation?: No identified areas of uncertainly or problems already being investigated   Are the patients physical health problems impacting on their mental well-being?: No identified areas of concern   Are there any problems with your patients lifestyle behaviors (alcohol, drugs, diet, exercise) that are impacting on physical or mental well-being?: No identified areas of concern   Do you have any other concerns about your patients mental well-being?  How would you rate their severity and impact on the patient?: No identified areas of concern   How would you rate their home environment in terms of safety and stability (including domestic violence, insecure housing, neighbor harassment)?: Consistently safe, supportive, stable, no identified problems   How do daily activities impact on the patient's well-being? (include current or anticipated unemployment, work, caregiving, access to transportation or other): No identified problems or perceived positive benefits   How would you rate their social network (family, work, friends)?: Good participation with social networks   How would you rate their financial resources (including ability to afford all required medical care)?: Financially secure, resources adequate, no identified problems   How wells does the patient now understand their health and well-being (symptoms, signs or risk factors) and what they need to do to manage their health?: Reasonable to good understanding and already engages in managing health or is willing to undertake better management   How well do you think your patient can engage in healthcare discussions? (Barriers include language, deafness, aphasia, alcohol or drug problems, learning difficulties, concentration): Clear and open communication, no identified barriers   Do other services need to be involved to help this patient?: Other care/services in place and adequate   Are current services involved with this patient well-coordinated? (Include coordination with other services you are now recommendation): All required care/services in place and well-coordinated   Suggested Interventions and Community Resources   Other Services or Interventions: Remote Patient Monitoring   Medi Set or Pill Pack: Declined   Registered Dietician: Not Started   Zone Management Tools: In Process                  Prior to Admission medications    Medication Sig Start Date End Date Taking?  Authorizing Provider   blood glucose test strips (FREESTYLE LITE) strip USE TO TEST TWICE DAILY 1/5/23   Madi Chapin MD   rivaroxaban (XARELTO) 20 MG TABS tablet TAKE 1 TABLET DAILY WITH BREAKFAST 12/27/22   Telly Lozano, APRN - CNP   pneumococcal 20-valent conjugat (PREVNAR 20) 0.5 ML GEORGIE inj Inject 1 mL into the muscle once 8/12/22   Historical Provider, MD   glucose monitoring (FREESTYLE FREEDOM) kit Please supply Patient with a glucose monitoring kit that insurance will cover. 8/12/22   Lena Ruano MD   Lancets 30G MISC Testing once a day. Please dispense according to patients insurance. 8/12/22   Lena Ruano MD   ezetimibe (ZETIA) 10 MG tablet TAKE 1 TABLET NIGHTLY 8/3/22   Lena Ruano MD   loperamide (RA ANTI-DIARRHEAL) 2 MG capsule Take 1 capsule by mouth 4 times daily as needed for Diarrhea 7/11/22   Harvey Martin MD   ferrous sulfate (IRON 325) 325 (65 Fe) MG tablet Take 1 tablet by mouth daily (with breakfast) 6/8/22   Jay Encarnacion MD   amLODIPine (NORVASC) 2.5 MG tablet TAKE 1 TABLET DAILY 6/2/22   Lena Ruano MD   metoprolol tartrate (LOPRESSOR) 25 MG tablet TAKE 1 TABLET TWICE A DAY 5/27/22   Lena Ruano MD   allopurinol (ZYLOPRIM) 100 MG tablet TAKE 1 TABLET TWICE A DAY 5/27/22   Lena Ruano MD   furosemide (LASIX) 40 MG tablet TAKE 1 TABLET DAILY 4/18/22   Lena Ruano MD   glimepiride (AMARYL) 4 MG tablet TAKE 1 TABLET TWICE A DAY 4/18/22   Lena Ruano MD   atorvastatin (LIPITOR) 80 MG tablet TAKE 1 TABLET NIGHTLY 4/18/22   Lena Ruano MD   famotidine (PEPCID) 40 MG tablet TAKE 1 TABLET EVERY EVENING 3/21/22   Harvey Martin MD   potassium chloride (KLOR-CON M) 20 MEQ extended release tablet Take 1 tablet by mouth daily Take with furosemide 1/31/22   Lena Ruano MD   Handicap Placard MISC by Does not apply route Can't walk greater than 200 feet. Expires in 5 years.  11/18/21   Lena Ruano MD   FreeStyle Lancets MISC USE TO TEST BLOOD SUGAR TWICE A DAY 11/17/21   Lena Ruano MD   Lancet Device MISC For use with lancets for blood glucose checks 11/17/21   MD klever Butlerpine (REMERON) 7.5 MG tablet Take 1-2 tablets by mouth nightly For insomnia and anxiety. Call for refills or dose adjustment. 11/17/21   Coco Thorne MD   cyanocobalamin ([facility] VITAMIN B12) 1000 MCG tablet Take 1 tablet by mouth daily 4/6/21   Coco Thorne MD   Blood Glucose Monitoring Suppl (FREESTYLE LITE) SARY use as directed 8/12/16   Historical Provider, MD   nitroGLYCERIN (NITROSTAT) 0.4 MG SL tablet Place 1 tablet under the tongue every 5 minutes as needed for Chest pain 5/24/16   Tomas Shaw MD   Biotin 300 MCG TABS Take 600 mcg by mouth every other day     Historical Provider, MD   Cholecalciferol (VITAMIN D3) 1000 UNITS TABS Take 1 tablet by mouth daily    Historical Provider, MD       Future Appointments   Date Time Provider Sherly Pitt   4/13/2023 10:40 AM Alphonse David MD [facility]   4/27/2023  2:00 PM Coco Thorne MD [facility] [place]   5/18/2023  2:30 PM Sarath Villalta MD [facility]   6/7/2023  1:30 PM Alyssa Hilario MD [address] [place] [address]     ,   Diabetes Assessment    Medic Alert ID: No  Meal Planning: None   How often do you test your blood sugar?: Other (Comment: Tests \"a couple time a week\")   Do you have barriers with adherence to non-pharmacologic self-management interventions?  (Nutrition/Exercise/Self-Monitoring): No   Have you ever had to go to the ED for symptoms of low blood sugar?: No       No patient-reported symptoms        , and   Congestive Heart Failure Assessment    Are you currently restricting fluids?: No Restriction  Do you understand a low sodium diet?: Yes  Do you understand how to read food labels?: Yes  Do you salt your food before tasting it?: No     No patient-reported symptoms      Symptoms:  None: Yes      Symptom course: stable  Weight trend: stable  Salt intake watch compared to last visit: stable

## 2023-01-14 ENCOUNTER — HOSPITAL ENCOUNTER (OUTPATIENT)
Age: 88
Discharge: HOME | End: 2023-01-14
Payer: MEDICARE

## 2023-01-14 LAB
ALBUMIN SERPL-MCNC: 4 G/DL (ref 3.5–5.2)
ALP BLD-CCNC: 65 U/L (ref 35–104)
ALT SERPL-CCNC: 20 U/L (ref 5–33)
ANION GAP SERPL CALCULATED.3IONS-SCNC: 8 MMOL/L (ref 9–17)
AST SERPL-CCNC: 25 U/L
BILIRUB SERPL-MCNC: 1.6 MG/DL (ref 0.3–1.2)
BUN BLDV-MCNC: 13 MG/DL (ref 8–23)
CALCIUM SERPL-MCNC: 9.4 MG/DL (ref 8.6–10.4)
CHLORIDE BLD-SCNC: 99 MMOL/L (ref 98–107)
CO2: 28 MMOL/L (ref 20–31)
CREAT SERPL-MCNC: 0.69 MG/DL (ref 0.5–0.9)
GFR SERPL CREATININE-BSD FRML MDRD: >60 ML/MIN/1.73M2
GLUCOSE BLD-MCNC: 166 MG/DL (ref 70–99)
POTASSIUM SERPL-SCNC: 4.4 MMOL/L (ref 3.7–5.3)
SEDIMENTATION RATE, ERYTHROCYTE: 14 MM/HR (ref 0–30)
SODIUM BLD-SCNC: 135 MMOL/L (ref 135–144)
TOTAL PROTEIN: 7.2 G/DL (ref 6.4–8.3)

## 2023-01-14 PROCEDURE — 36415 COLL VENOUS BLD VENIPUNCTURE: CPT

## 2023-01-14 PROCEDURE — 80053 COMPREHEN METABOLIC PANEL: CPT

## 2023-01-14 PROCEDURE — 85652 RBC SED RATE AUTOMATED: CPT

## 2023-01-19 ENCOUNTER — CARE COORDINATION (OUTPATIENT)
Dept: CARE COORDINATION | Age: 88
End: 2023-01-19

## 2023-01-19 NOTE — CARE COORDINATION
Ambulatory Care Coordination Note  1/19/2023    ACC: Justice Saldana RN  Summary  Date Care Coordination Episode Started:  January 10, 2023    Reason for Call Today:     CC Follow Up    Reason patient is in Care Coordination:     PCP Referral  RPM  VOLODYMYR 72%  CKD  CHF  Diabetes    Topics Discussed Today:     CHF- denies any symptoms today. States she is doing well. Discussed RPM because she hadn't activated the equipment yet. She had a couple questions that [facility] clarified for her. She wanted to make sure the equipment and tablet weren't replacing the office visits with her provider. She adamantly refuses to do a virtual visit with a provider and was worried that this would be replacing. Reassured her that the RPM monitoring was strictly a monitoring device to help support her and her provider. She stated that she would call the number to get it set up after hanging up with ACM. She denies any needs today. Interventions completed today:    Assessments completed today:     Initial assessment-completed  Medication reconciliation  Diabetes, CHF, General Health    Care Coordinator plan of care:     Continue medications as prescribed  Monitor weight, BP, and pulse ox daily with RPM program  Complete lab work prior to visit with GI and  hem/onc   Complete mammogram, renal ultrasound, and MRI as ordered  Continue to stay active doing Dionte Chi 3x/week  Will follow up next week to assess for any symptoms; will complete SDOH; will set goals with Chao Jarquin patient enrollment in the Remote Patient Monitoring (RPM) program for in-home monitoring: Yes, patient enrolled. Lab Results       None            Care Coordination Interventions    Referral from Primary Care Provider: Yes  Suggested Interventions and Community Resources  Medi Set or Pill Pack: Declined  Registered Dietician: Not Started  Zone Management Tools:  In Process  Other Services or Interventions: Remote Patient Monitoring          Goals Addressed This Visit's Progress     Self Monitoring   No change     Self-Monitored Blood Glucose - I will check my blood sugar Fasting blood sugar  I will notify my provider of any trends of increasing or decreasing blood sugars over a 1 month period. I will notify my provider if I have any blood sugar readings less than 70 more than 2 times a month. Daily Weights - I will weight myself as directed - Daily and write down weights  I will notify my provider of any increase in weight by 3 or more pounds in 2 days OR 5 or more pounds in a week. Blood Pressure - I will take my blood pressure as directed - Daily  I will notify my provider of any trends of increasing or decreasing blood pressures over a month period of time. I will notify my provider of any changes in blood pressure associated with symptoms of dizziness, falls, passing out, headache, confusion/change in mental status. Other Self-Monitoring - I will monitor my oxygen level - Daily    None Recently Recorded    Barriers: lack of education  Plan for overcoming my barriers: Care Management; Remote Patient Monitoring  Confidence: 10/10  Anticipated Goal Completion Date: 4/30/2023                Prior to Admission medications    Medication Sig Start Date End Date Taking? Authorizing Provider   blood glucose test strips (FREESTYLE LITE) strip USE TO TEST TWICE DAILY 1/5/23   Kiersten Hassan MD   rivaroxaban (XARELTO) 20 MG TABS tablet TAKE 1 TABLET DAILY WITH BREAKFAST 12/27/22   Telly Lozano, APRN - CNP   pneumococcal 20-valent conjugat (PREVNAR 20) 0.5 ML GEORGIE inj Inject 1 mL into the muscle once 8/12/22   Historical Provider, MD   glucose monitoring (FREESTYLE FREEDOM) kit Please supply Patient with a glucose monitoring kit that insurance will cover. 8/12/22   Kiersten Hassan MD   Lancets 30G MISC Testing once a day. Please dispense according to patients insurance.  8/12/22   Kiersten Hassan MD   ezetimibe (ZETIA) 10 MG tablet TAKE 1 TABLET NIGHTLY 8/3/22   Jose Morejon MD   loperamide (RA ANTI-DIARRHEAL) 2 MG capsule Take 1 capsule by mouth 4 times daily as needed for Diarrhea 7/11/22   Harvey Martin MD   ferrous sulfate (IRON 325) 325 (65 Fe) MG tablet Take 1 tablet by mouth daily (with breakfast) 6/8/22   Fredy Sinclair MD   amLODIPine (NORVASC) 2.5 MG tablet TAKE 1 TABLET DAILY 6/2/22   Jose Morejon MD   metoprolol tartrate (LOPRESSOR) 25 MG tablet TAKE 1 TABLET TWICE A DAY 5/27/22   Jose Morejon MD   allopurinol (ZYLOPRIM) 100 MG tablet TAKE 1 TABLET TWICE A DAY 5/27/22   Jose Morejon MD   furosemide (LASIX) 40 MG tablet TAKE 1 TABLET DAILY 4/18/22   Jose Morejon MD   glimepiride (AMARYL) 4 MG tablet TAKE 1 TABLET TWICE A DAY 4/18/22   Jose Morejon MD   atorvastatin (LIPITOR) 80 MG tablet TAKE 1 TABLET NIGHTLY 4/18/22   Jose Morejon MD   famotidine (PEPCID) 40 MG tablet TAKE 1 TABLET EVERY EVENING 3/21/22   Harvey Martin MD   potassium chloride (KLOR-CON M) 20 MEQ extended release tablet Take 1 tablet by mouth daily Take with furosemide 1/31/22   Jose Morejon MD   Handicap Placard MISC by Does not apply route Can't walk greater than 200 feet. Expires in 5 years. 11/18/21   Jose Morejon MD   FreeStyle Lancets MISC USE TO TEST BLOOD SUGAR TWICE A DAY 11/17/21   Jose Morejon MD   Lancet Device MISC For use with lancets for blood glucose checks 11/17/21   Jose Morejon MD   mirtazapine (REMERON) 7.5 MG tablet Take 1-2 tablets by mouth nightly For insomnia and anxiety. Call for refills or dose adjustment.  11/17/21   Jose Morejon MD   cyanocobalamin ([employer] VITAMIN B12) 1000 MCG tablet Take 1 tablet by mouth daily 4/6/21   Jose Morejon MD   Blood Glucose Monitoring Suppl (FREESTYLE LITE) SARY use as directed 8/12/16   Historical Provider, MD   nitroGLYCERIN (NITROSTAT) 0.4 MG SL tablet Place 1 tablet under the tongue every 5 minutes as needed for Chest pain 5/24/16   Fermin Medina MD   Biotin 300 MCG TABS Take 600 mcg by mouth every other day     Historical Provider, MD   Cholecalciferol (VITAMIN D3) 1000 UNITS TABS Take 1 tablet by mouth daily    Historical Provider, MD       Future Appointments   Date Time Provider Sherly Kiseri   4/13/2023 10:40 AM Jayne Sparks MD [address] [place] [address]   4/27/2023  2:00 PM Zachary Carbajal MD [facility]   5/18/2023  2:30 PM [facility]   6/7/2023  1:30 PM Guillermo Young MD [address] [place] [address]   ,   Diabetes Assessment    Medic Alert ID: No  Meal Planning: None   How often do you test your blood sugar?: Other (Comment: Tests \"a couple time a week\")   Do you have barriers with adherence to non-pharmacologic self-management interventions?  (Nutrition/Exercise/Self-Monitoring): No   Have you ever had to go to the ED for symptoms of low blood sugar?: No       No patient-reported symptoms        , and   Congestive Heart Failure Assessment    Are you currently restricting fluids?: No Restriction  Do you understand a low sodium diet?: Yes  Do you understand how to read food labels?: Yes  Do you salt your food before tasting it?: No     No patient-reported symptoms      Symptoms:  None: Yes      Symptom course: stable

## 2023-01-25 DIAGNOSIS — N18.30 BENIGN HYPERTENSION WITH CKD (CHRONIC KIDNEY DISEASE) STAGE III (HCC): ICD-10-CM

## 2023-01-25 DIAGNOSIS — I12.9 BENIGN HYPERTENSION WITH CKD (CHRONIC KIDNEY DISEASE) STAGE III (HCC): ICD-10-CM

## 2023-01-25 RX ORDER — POTASSIUM CHLORIDE 20 MEQ/1
TABLET, EXTENDED RELEASE ORAL
Qty: 90 TABLET | Refills: 3 | Status: SHIPPED | OUTPATIENT
Start: 2023-01-25

## 2023-01-25 NOTE — TELEPHONE ENCOUNTER
Please Approve or Refuse.  Send to Pharmacy per Pt's Request:      Next Visit Date:  4/27/2023   Last Visit Date: 12/27/2022    Hemoglobin A1C (%)   Date Value   12/27/2022 7.2   08/12/2022 8.0   04/07/2022 7.2             ( goal A1C is < 7)   BP Readings from Last 3 Encounters:   12/27/22 130/70   12/15/22 127/67   12/07/22 (!) 156/67          (goal 120/80)  BUN   Date Value Ref Range Status   01/14/2023 13 8 - 23 mg/dL Final     Creatinine   Date Value Ref Range Status   01/14/2023 0.69 0.50 - 0.90 mg/dL Final     Potassium   Date Value Ref Range Status   01/14/2023 4.4 3.7 - 5.3 mmol/L Final

## 2023-01-26 ENCOUNTER — HOSPITAL ENCOUNTER (OUTPATIENT)
Dept: MRI IMAGING | Facility: CLINIC | Age: 88
Discharge: HOME | End: 2023-01-28

## 2023-01-26 DIAGNOSIS — G45.9 TIA (TRANSIENT ISCHEMIC ATTACK): ICD-10-CM

## 2023-01-26 DIAGNOSIS — G43.109 COMPLICATED MIGRAINE: ICD-10-CM

## 2023-01-26 DIAGNOSIS — R56.9 FOCAL SEIZURE (HCC): ICD-10-CM

## 2023-01-27 ENCOUNTER — CARE COORDINATION (OUTPATIENT)
Dept: CARE COORDINATION | Age: 88
End: 2023-01-27

## 2023-01-31 DIAGNOSIS — E11.22 TYPE 2 DIABETES MELLITUS WITH STAGE 2 CHRONIC KIDNEY DISEASE, WITHOUT LONG-TERM CURRENT USE OF INSULIN (HCC): ICD-10-CM

## 2023-01-31 DIAGNOSIS — I50.32 CHRONIC DIASTOLIC HEART FAILURE (HCC): Primary | ICD-10-CM

## 2023-01-31 DIAGNOSIS — N18.2 TYPE 2 DIABETES MELLITUS WITH STAGE 2 CHRONIC KIDNEY DISEASE, WITHOUT LONG-TERM CURRENT USE OF INSULIN (HCC): ICD-10-CM

## 2023-01-31 NOTE — CARE COORDINATION
Ambulatory Care Coordination Note  1/31/2023    ACC: Emelie Dance, RN  Summary  Date Care Coordination Episode Started:  January 10, 2023     CC Follow up    Reason patient is in Care Coordination:     PCP Referral  RPM  ANDREWDANY 72%  CKD  CHF  Diabetes    Topics Discussed Today:     - Rashida Winter states she is doing well. She denies any symptoms. She decided that she does not want to do the RPM program. Will send message to support team to discontinue and arrange for  of equipment. She said she was supposed to get her MRI brain done last week, but due to her 4 cardiac stents, they had to reschedule the MRI at [facility]. The machines are apparently different and she is able to have it done at [facility]. She has follow up with neurology next month. She denies any needs today. Interventions completed today:    Patient declining RPM services, will send message to have it discontinued. Assessments completed today:     Fall risk- completed  Initial assessment- completed  Medication reconciliation  Diabetes, CHF, General Health    Care Coordinator plan of care:     Continue medications as prescribed  Monitor weight, BS, and BP and keep records  Complete lab work prior to visit with GI and  hem/onc   Complete mammogram, renal ultrasound, and MRI as ordered  Continue to stay active doing Dionte Chi 3x/week  Will follow up in two weeks to assess for any symptoms    Offered patient enrollment in the Remote Patient Monitoring (RPM) program for in-home monitoring: Patient declined. Lab Results       None            Care Coordination Interventions    Referral from Primary Care Provider: Yes  Suggested Interventions and Community Resources  Medi Set or Pill Pack: Declined  Registered Dietician: Not Started  Zone Management Tools:  In Process  Other Services or Interventions: Remote Patient Monitoring- declined          Goals Addressed                   This Visit's Progress     Self Monitoring   Improving Self-Monitored Blood Glucose - I will check my blood sugar Fasting blood sugar  I will notify my provider of any trends of increasing or decreasing blood sugars over a 1 month period. I will notify my provider if I have any blood sugar readings less than 70 more than 2 times a month. Daily Weights - I will weight myself as directed - Daily and write down weights  I will notify my provider of any increase in weight by 3 or more pounds in 2 days OR 5 or more pounds in a week. Blood Pressure - I will take my blood pressure as directed - Daily  I will notify my provider of any trends of increasing or decreasing blood pressures over a month period of time. I will notify my provider of any changes in blood pressure associated with symptoms of dizziness, falls, passing out, headache, confusion/change in mental status. Other Self-Monitoring - I will monitor my oxygen level - Daily    None Recently Recorded    Barriers: lack of education  Plan for overcoming my barriers: Care Management; Remote Patient Monitoring  Confidence: 10/10  Anticipated Goal Completion Date: 4/30/2023                Prior to Admission medications    Medication Sig Start Date End Date Taking? Authorizing Provider   potassium chloride (KLOR-CON M) 20 MEQ extended release tablet TAKE 1 TABLET DAILY, TAKE WITH FUROSEMIDE 1/25/23   Karen Santos MD   blood glucose test strips (FREESTYLE LITE) strip USE TO TEST TWICE DAILY 1/5/23   Karen Santos MD   rivaroxaban (XARELTO) 20 MG TABS tablet TAKE 1 TABLET DAILY WITH BREAKFAST 12/27/22   Telly Lozano, APRN - CNP   pneumococcal 20-valent conjugat (PREVNAR 20) 0.5 ML GEORGIE inj Inject 1 mL into the muscle once 8/12/22   Historical Provider, MD   glucose monitoring (FREESTYLE FREEDOM) kit Please supply Patient with a glucose monitoring kit that insurance will cover. 8/12/22   Karen Santos MD   Lancets 30G MISC Testing once a day. Please dispense according to patients insurance. 8/12/22   Regina Leon MD   ezetimibe (ZETIA) 10 MG tablet TAKE 1 TABLET NIGHTLY 8/3/22   Regina Leon MD   loperamide (RA ANTI-DIARRHEAL) 2 MG capsule Take 1 capsule by mouth 4 times daily as needed for Diarrhea 7/11/22   Harvey Martin MD   ferrous sulfate (IRON 325) 325 (65 Fe) MG tablet Take 1 tablet by mouth daily (with breakfast) 6/8/22   Dustin Collins MD   amLODIPine (NORVASC) 2.5 MG tablet TAKE 1 TABLET DAILY 6/2/22   Regina Leon MD   metoprolol tartrate (LOPRESSOR) 25 MG tablet TAKE 1 TABLET TWICE A DAY 5/27/22   Regina Leon MD   allopurinol (ZYLOPRIM) 100 MG tablet TAKE 1 TABLET TWICE A DAY 5/27/22   Regina Leon MD   furosemide (LASIX) 40 MG tablet TAKE 1 TABLET DAILY 4/18/22   Regina Leon MD   glimepiride (AMARYL) 4 MG tablet TAKE 1 TABLET TWICE A DAY 4/18/22   Regina Leon MD   atorvastatin (LIPITOR) 80 MG tablet TAKE 1 TABLET NIGHTLY 4/18/22   Regina Leon MD   famotidine (PEPCID) 40 MG tablet TAKE 1 TABLET EVERY EVENING 3/21/22   Harvey Martin MD   Handicap Placard MISC by Does not apply route Can't walk greater than 200 feet. Expires in 5 years. 11/18/21   MD Rudi AlonzoStyle Lancets MISC USE TO TEST BLOOD SUGAR TWICE A DAY 11/17/21   Regina Leon MD   Lancet Device MISC For use with lancets for blood glucose checks 11/17/21   Regina Leon MD   mirtazapine (REMERON) 7.5 MG tablet Take 1-2 tablets by mouth nightly For insomnia and anxiety. Call for refills or dose adjustment.  11/17/21   Regina Leon MD   cyanocobalamin ([facility] VITAMIN B12) 1000 MCG tablet Take 1 tablet by mouth daily 4/6/21   Regina Leon MD   Blood Glucose Monitoring Suppl (FREESTYLE LITE) SARY use as directed 8/12/16   Historical Provider, MD   nitroGLYCERIN (NITROSTAT) 0.4 MG SL tablet Place 1 tablet under the tongue every 5 minutes as needed for Chest pain 5/24/16   Aaron Carlson MD   Biotin 300 MCG TABS Take 600 mcg by mouth every other day     Historical Provider, MD   Cholecalciferol (VITAMIN D3) 1000 UNITS TABS Take 1 tablet by mouth daily    Historical Provider, MD       Future Appointments   Date Time Provider Sherly Smithisti   2/9/2023  4:00 PM [facility] MRI  [facility] MRI [facility] Radiolog   4/13/2023 10:40 AM Emelyn Chavez MD [facility]   4/27/2023  2:00 PM Jane Rodríguez MD  [facility]   5/18/2023  2:30 PM Lela Mcghee MD [facility] GI [facility]   6/7/2023  1:30 PM Jazmine Contreras MD [address] [place] [address]   ,   Diabetes Assessment    Medic Alert ID: No  Meal Planning: None   How often do you test your blood sugar?: Other (Comment: Tests \"a couple time a week\")   Do you have barriers with adherence to non-pharmacologic self-management interventions?  (Nutrition/Exercise/Self-Monitoring): No   Have you ever had to go to the ED for symptoms of low blood sugar?: No       No patient-reported symptoms   Do you have hyperglycemia symptoms?: No   Do you have hypoglycemia symptoms?: No   Blood Sugar Monitoring Regimen: Morning Fasting   Blood Sugar Trends: No Change        , and   Congestive Heart Failure Assessment    Are you currently restricting fluids?: No Restriction  Do you understand a low sodium diet?: Yes  Do you understand how to read food labels?: Yes  Do you salt your food before tasting it?: No     No patient-reported symptoms      Symptoms:  None: Yes      Symptom course: stable  Weight trend: stable  Salt intake watch compared to last visit: stable

## 2023-01-31 NOTE — PROGRESS NOTES
Noted. Thank you!     Future Appointments   Date Time Provider Sherly Pitt   2/9/2023  4:00 PM [facility] MRI  [facility] MRI [facility] Radiolog   4/13/2023 10:40 AM John Almaraz MD [facility]   [facility]27[facility]2023  [facility]   [facility]18[facility]2023  [facility]30 [facility]   6/7/2023  1:30 PM Len Bray MD [address] [place] [address]

## 2023-01-31 NOTE — PROGRESS NOTES
1/31/23 5:38 PM     Remote Patient Order Discontinued    Received request from Elizabeth Ramey RN to discontinue order for remote patient monitoring of Kidney Disease , CHF, and Diabetes and order completed.      Jimmy Nunez DNP, FNP-C, Remote Patient Monitoring NP, () [phone]

## 2023-02-01 ENCOUNTER — CARE COORDINATION (OUTPATIENT)
Dept: CARE COORDINATION | Age: 88
End: 2023-02-01

## 2023-02-01 NOTE — CARE COORDINATION
CCSS placed call to patient to arrange RPM kit  through [facility]. Reviewed with patient how to pack equipment in original packing. Verified patient's availability to schedule UPS  time. UPS  time requested. Anticipated  date range 02/02-02/04/2023.

## 2023-02-09 ENCOUNTER — HOSPITAL ENCOUNTER (OUTPATIENT)
Dept: MRI IMAGING | Age: 88
Discharge: HOME | End: 2023-02-11
Payer: MEDICARE

## 2023-02-09 PROCEDURE — 70553 MRI BRAIN STEM W/O & W/DYE: CPT

## 2023-02-09 PROCEDURE — 6360000004 HC RX CONTRAST MEDICATION: Performed by: PSYCHIATRY & NEUROLOGY

## 2023-02-09 PROCEDURE — A9579 GAD-BASE MR CONTRAST NOS,1ML: HCPCS | Performed by: PSYCHIATRY & NEUROLOGY

## 2023-02-09 RX ADMIN — GADOTERIDOL 18 ML: 279.3 INJECTION, SOLUTION INTRAVENOUS at 16:48

## 2023-02-23 ENCOUNTER — APPOINTMENT (OUTPATIENT)
Dept: GENERAL RADIOLOGY | Age: 88
DRG: 392 | End: 2023-02-23
Payer: MEDICARE

## 2023-02-23 ENCOUNTER — HOSPITAL ENCOUNTER (INPATIENT)
Age: 88
LOS: 1 days | Discharge: HOME | DRG: 392 | End: 2023-02-24
Attending: EMERGENCY MEDICINE | Admitting: INTERNAL MEDICINE
Payer: MEDICARE

## 2023-02-23 DIAGNOSIS — R07.9 CHEST PAIN, UNSPECIFIED TYPE: Primary | ICD-10-CM

## 2023-02-23 DIAGNOSIS — R77.8 ELEVATED TROPONIN: ICD-10-CM

## 2023-02-23 PROBLEM — E53.8 VITAMIN B 12 DEFICIENCY: Status: RESOLVED | Noted: 2021-04-12 | Resolved: 2023-02-23

## 2023-02-23 LAB
ABSOLUTE EOS #: 0.1 K/UL (ref 0–0.4)
ABSOLUTE LYMPH #: 1.9 K/UL (ref 1–4.8)
ABSOLUTE MONO #: 0.6 K/UL (ref 0.1–1.3)
ALBUMIN SERPL-MCNC: 4.2 G/DL (ref 3.5–5.2)
ALP SERPL-CCNC: 56 U/L (ref 35–104)
ALT SERPL-CCNC: 28 U/L (ref 5–33)
ANION GAP SERPL CALCULATED.3IONS-SCNC: 10 MMOL/L (ref 9–17)
AST SERPL-CCNC: 25 U/L
BASOPHILS # BLD: 1 % (ref 0–2)
BASOPHILS ABSOLUTE: 0 K/UL (ref 0–0.2)
BILIRUB SERPL-MCNC: 1.5 MG/DL (ref 0.3–1.2)
BUN SERPL-MCNC: 15 MG/DL (ref 8–23)
CALCIUM SERPL-MCNC: 9.6 MG/DL (ref 8.6–10.4)
CHLORIDE SERPL-SCNC: 100 MMOL/L (ref 98–107)
CO2 SERPL-SCNC: 26 MMOL/L (ref 20–31)
CREAT SERPL-MCNC: 0.68 MG/DL (ref 0.5–0.9)
EOSINOPHILS RELATIVE PERCENT: 2 % (ref 0–4)
GFR SERPL CREATININE-BSD FRML MDRD: >60 ML/MIN/1.73M2
GLUCOSE BLD-MCNC: 123 MG/DL (ref 65–105)
GLUCOSE BLD-MCNC: 89 MG/DL (ref 65–105)
GLUCOSE SERPL-MCNC: 188 MG/DL (ref 70–99)
HCT VFR BLD AUTO: 38.5 % (ref 36–46)
HGB BLD-MCNC: 12.6 G/DL (ref 12–16)
INR PPP: 2.1
LIPASE SERPL-CCNC: 17 U/L (ref 13–60)
LYMPHOCYTES # BLD: 25 % (ref 24–44)
MAGNESIUM SERPL-MCNC: 1.9 MG/DL (ref 1.6–2.6)
MCH RBC QN AUTO: 29.2 PG (ref 26–34)
MCHC RBC AUTO-ENTMCNC: 32.9 G/DL (ref 31–37)
MCV RBC AUTO: 88.8 FL (ref 80–100)
MONOCYTES # BLD: 9 % (ref 1–7)
PARTIAL THROMBOPLASTIN TIME: 38.7 SEC (ref 24–36)
PDW BLD-RTO: 15.1 % (ref 11.5–14.9)
PLATELET # BLD AUTO: 207 K/UL (ref 150–450)
PMV BLD AUTO: 8.2 FL (ref 6–12)
POTASSIUM SERPL-SCNC: 4.4 MMOL/L (ref 3.7–5.3)
PROT SERPL-MCNC: 6.9 G/DL (ref 6.4–8.3)
PROTHROMBIN TIME: 23.7 SEC (ref 11.8–14.6)
RBC # BLD: 4.33 M/UL (ref 4–5.2)
SEG NEUTROPHILS: 63 % (ref 36–66)
SEGMENTED NEUTROPHILS ABSOLUTE COUNT: 4.9 K/UL (ref 1.3–9.1)
SODIUM SERPL-SCNC: 136 MMOL/L (ref 135–144)
TROPONIN I SERPL DL<=0.01 NG/ML-MCNC: 20 NG/L (ref 0–14)
TROPONIN I SERPL DL<=0.01 NG/ML-MCNC: 22 NG/L (ref 0–14)
WBC # BLD AUTO: 7.6 K/UL (ref 3.5–11)

## 2023-02-23 PROCEDURE — 83735 ASSAY OF MAGNESIUM: CPT

## 2023-02-23 PROCEDURE — 85610 PROTHROMBIN TIME: CPT

## 2023-02-23 PROCEDURE — 96374 THER/PROPH/DIAG INJ IV PUSH: CPT

## 2023-02-23 PROCEDURE — 85025 COMPLETE CBC W/AUTO DIFF WBC: CPT

## 2023-02-23 PROCEDURE — 6370000000 HC RX 637 (ALT 250 FOR IP): Performed by: STUDENT IN AN ORGANIZED HEALTH CARE EDUCATION/TRAINING PROGRAM

## 2023-02-23 PROCEDURE — 93005 ELECTROCARDIOGRAM TRACING: CPT | Performed by: EMERGENCY MEDICINE

## 2023-02-23 PROCEDURE — 36415 COLL VENOUS BLD VENIPUNCTURE: CPT

## 2023-02-23 PROCEDURE — 99223 1ST HOSP IP/OBS HIGH 75: CPT | Performed by: INTERNAL MEDICINE

## 2023-02-23 PROCEDURE — 2580000003 HC RX 258: Performed by: STUDENT IN AN ORGANIZED HEALTH CARE EDUCATION/TRAINING PROGRAM

## 2023-02-23 PROCEDURE — 83690 ASSAY OF LIPASE: CPT

## 2023-02-23 PROCEDURE — 2060000000 HC ICU INTERMEDIATE R&B

## 2023-02-23 PROCEDURE — 80053 COMPREHEN METABOLIC PANEL: CPT

## 2023-02-23 PROCEDURE — 2500000003 HC RX 250 WO HCPCS: Performed by: EMERGENCY MEDICINE

## 2023-02-23 PROCEDURE — 2580000003 HC RX 258: Performed by: EMERGENCY MEDICINE

## 2023-02-23 PROCEDURE — 6370000000 HC RX 637 (ALT 250 FOR IP): Performed by: EMERGENCY MEDICINE

## 2023-02-23 PROCEDURE — 99285 EMERGENCY DEPT VISIT HI MDM: CPT

## 2023-02-23 PROCEDURE — 84484 ASSAY OF TROPONIN QUANT: CPT

## 2023-02-23 PROCEDURE — A4216 STERILE WATER/SALINE, 10 ML: HCPCS | Performed by: EMERGENCY MEDICINE

## 2023-02-23 PROCEDURE — 71045 X-RAY EXAM CHEST 1 VIEW: CPT

## 2023-02-23 PROCEDURE — 85730 THROMBOPLASTIN TIME PARTIAL: CPT

## 2023-02-23 PROCEDURE — 82947 ASSAY GLUCOSE BLOOD QUANT: CPT

## 2023-02-23 RX ORDER — AMLODIPINE BESYLATE 2.5 MG/1
2.5 TABLET ORAL DAILY
Status: DISCONTINUED | OUTPATIENT
Start: 2023-02-24 | End: 2023-02-24 | Stop reason: HOSPADM

## 2023-02-23 RX ORDER — EZETIMIBE 10 MG/1
10 TABLET ORAL NIGHTLY
Status: DISCONTINUED | OUTPATIENT
Start: 2023-02-23 | End: 2023-02-24 | Stop reason: HOSPADM

## 2023-02-23 RX ORDER — ACETAMINOPHEN 650 MG/1
650 SUPPOSITORY RECTAL EVERY 6 HOURS PRN
Status: DISCONTINUED | OUTPATIENT
Start: 2023-02-23 | End: 2023-02-24 | Stop reason: HOSPADM

## 2023-02-23 RX ORDER — POLYETHYLENE GLYCOL 3350 17 G/17G
17 POWDER, FOR SOLUTION ORAL DAILY PRN
Status: DISCONTINUED | OUTPATIENT
Start: 2023-02-23 | End: 2023-02-24 | Stop reason: HOSPADM

## 2023-02-23 RX ORDER — SODIUM CHLORIDE 0.9 % (FLUSH) 0.9 %
5-40 SYRINGE (ML) INJECTION PRN
Status: DISCONTINUED | OUTPATIENT
Start: 2023-02-23 | End: 2023-02-24 | Stop reason: HOSPADM

## 2023-02-23 RX ORDER — DEXTROSE MONOHYDRATE 100 MG/ML
INJECTION, SOLUTION INTRAVENOUS CONTINUOUS PRN
Status: DISCONTINUED | OUTPATIENT
Start: 2023-02-23 | End: 2023-02-24 | Stop reason: HOSPADM

## 2023-02-23 RX ORDER — ACETAMINOPHEN 325 MG/1
650 TABLET ORAL EVERY 6 HOURS PRN
Status: DISCONTINUED | OUTPATIENT
Start: 2023-02-23 | End: 2023-02-24 | Stop reason: HOSPADM

## 2023-02-23 RX ORDER — NITROGLYCERIN 0.4 MG/1
0.4 TABLET SUBLINGUAL EVERY 5 MIN PRN
Status: DISCONTINUED | OUTPATIENT
Start: 2023-02-23 | End: 2023-02-24 | Stop reason: HOSPADM

## 2023-02-23 RX ORDER — FUROSEMIDE 40 MG/1
40 TABLET ORAL DAILY
Status: DISCONTINUED | OUTPATIENT
Start: 2023-02-24 | End: 2023-02-24 | Stop reason: HOSPADM

## 2023-02-23 RX ORDER — PANTOPRAZOLE SODIUM 40 MG/1
40 TABLET, DELAYED RELEASE ORAL
Status: DISCONTINUED | OUTPATIENT
Start: 2023-02-24 | End: 2023-02-24 | Stop reason: HOSPADM

## 2023-02-23 RX ORDER — INSULIN LISPRO 100 [IU]/ML
0-4 INJECTION, SOLUTION INTRAVENOUS; SUBCUTANEOUS
Status: DISCONTINUED | OUTPATIENT
Start: 2023-02-23 | End: 2023-02-24 | Stop reason: HOSPADM

## 2023-02-23 RX ORDER — ONDANSETRON 2 MG/ML
4 INJECTION INTRAMUSCULAR; INTRAVENOUS EVERY 6 HOURS PRN
Status: DISCONTINUED | OUTPATIENT
Start: 2023-02-23 | End: 2023-02-24 | Stop reason: HOSPADM

## 2023-02-23 RX ORDER — LOPERAMIDE HYDROCHLORIDE 2 MG/1
2 CAPSULE ORAL 4 TIMES DAILY PRN
Status: DISCONTINUED | OUTPATIENT
Start: 2023-02-23 | End: 2023-02-24 | Stop reason: HOSPADM

## 2023-02-23 RX ORDER — LANOLIN ALCOHOL/MO/W.PET/CERES
600 CREAM (GRAM) TOPICAL EVERY OTHER DAY
Status: DISCONTINUED | OUTPATIENT
Start: 2023-02-23 | End: 2023-02-23

## 2023-02-23 RX ORDER — ALLOPURINOL 100 MG/1
100 TABLET ORAL 2 TIMES DAILY
Status: DISCONTINUED | OUTPATIENT
Start: 2023-02-23 | End: 2023-02-24 | Stop reason: HOSPADM

## 2023-02-23 RX ORDER — ATORVASTATIN CALCIUM 80 MG/1
80 TABLET, FILM COATED ORAL NIGHTLY
Status: DISCONTINUED | OUTPATIENT
Start: 2023-02-24 | End: 2023-02-24 | Stop reason: HOSPADM

## 2023-02-23 RX ORDER — VITAMIN B COMPLEX
1 TABLET ORAL DAILY
Status: DISCONTINUED | OUTPATIENT
Start: 2023-02-23 | End: 2023-02-24 | Stop reason: HOSPADM

## 2023-02-23 RX ORDER — INSULIN LISPRO 100 [IU]/ML
0-4 INJECTION, SOLUTION INTRAVENOUS; SUBCUTANEOUS NIGHTLY
Status: DISCONTINUED | OUTPATIENT
Start: 2023-02-23 | End: 2023-02-24 | Stop reason: HOSPADM

## 2023-02-23 RX ORDER — SODIUM CHLORIDE 0.9 % (FLUSH) 0.9 %
5-40 SYRINGE (ML) INJECTION EVERY 12 HOURS SCHEDULED
Status: DISCONTINUED | OUTPATIENT
Start: 2023-02-23 | End: 2023-02-24 | Stop reason: HOSPADM

## 2023-02-23 RX ORDER — POTASSIUM CHLORIDE 20 MEQ/1
20 TABLET, EXTENDED RELEASE ORAL DAILY
Status: DISCONTINUED | OUTPATIENT
Start: 2023-02-24 | End: 2023-02-24 | Stop reason: HOSPADM

## 2023-02-23 RX ORDER — POTASSIUM CHLORIDE 20 MEQ/1
40 TABLET, EXTENDED RELEASE ORAL PRN
Status: DISCONTINUED | OUTPATIENT
Start: 2023-02-23 | End: 2023-02-24 | Stop reason: HOSPADM

## 2023-02-23 RX ORDER — ASPIRIN 81 MG/1
324 TABLET, CHEWABLE ORAL ONCE
Status: COMPLETED | OUTPATIENT
Start: 2023-02-23 | End: 2023-02-23

## 2023-02-23 RX ORDER — LANOLIN ALCOHOL/MO/W.PET/CERES
1000 CREAM (GRAM) TOPICAL DAILY
Status: DISCONTINUED | OUTPATIENT
Start: 2023-02-24 | End: 2023-02-23

## 2023-02-23 RX ORDER — POTASSIUM CHLORIDE 7.45 MG/ML
10 INJECTION INTRAVENOUS PRN
Status: DISCONTINUED | OUTPATIENT
Start: 2023-02-23 | End: 2023-02-24 | Stop reason: HOSPADM

## 2023-02-23 RX ORDER — SODIUM CHLORIDE 9 MG/ML
INJECTION, SOLUTION INTRAVENOUS PRN
Status: DISCONTINUED | OUTPATIENT
Start: 2023-02-23 | End: 2023-02-24 | Stop reason: HOSPADM

## 2023-02-23 RX ORDER — ONDANSETRON 4 MG/1
4 TABLET, ORALLY DISINTEGRATING ORAL EVERY 8 HOURS PRN
Status: DISCONTINUED | OUTPATIENT
Start: 2023-02-23 | End: 2023-02-24 | Stop reason: HOSPADM

## 2023-02-23 RX ORDER — CALCIUM CARBONATE 200(500)MG
500 TABLET,CHEWABLE ORAL 3 TIMES DAILY PRN
Status: DISCONTINUED | OUTPATIENT
Start: 2023-02-23 | End: 2023-02-24 | Stop reason: HOSPADM

## 2023-02-23 RX ADMIN — ASPIRIN 324 MG: 81 TABLET, CHEWABLE ORAL at 13:45

## 2023-02-23 RX ADMIN — ALLOPURINOL 100 MG: 100 TABLET ORAL at 19:39

## 2023-02-23 RX ADMIN — SODIUM CHLORIDE, PRESERVATIVE FREE 10 ML: 5 INJECTION INTRAVENOUS at 19:39

## 2023-02-23 RX ADMIN — EZETIMIBE 10 MG: 10 TABLET ORAL at 19:38

## 2023-02-23 RX ADMIN — FAMOTIDINE 20 MG: 10 INJECTION, SOLUTION INTRAVENOUS at 12:07

## 2023-02-23 RX ADMIN — METOPROLOL TARTRATE 25 MG: 25 TABLET, FILM COATED ORAL at 19:39

## 2023-02-23 RX ADMIN — Medication 1000 UNITS: at 18:34

## 2023-02-23 ASSESSMENT — PAIN DESCRIPTION - LOCATION
LOCATION: BACK
LOCATION: BACK

## 2023-02-23 ASSESSMENT — ENCOUNTER SYMPTOMS
BLOOD IN STOOL: 0
SHORTNESS OF BREATH: 0
CONSTIPATION: 0
DIARRHEA: 0
SINUS PAIN: 0
SINUS PRESSURE: 0
WHEEZING: 0
EYE PAIN: 0
NAUSEA: 0
COUGH: 0
EYE REDNESS: 0
PHOTOPHOBIA: 0
COLOR CHANGE: 0
VOMITING: 0
CHEST TIGHTNESS: 0
BACK PAIN: 0
ABDOMINAL PAIN: 0
SORE THROAT: 0
DIARRHEA: 1
ABDOMINAL DISTENTION: 0

## 2023-02-23 ASSESSMENT — PAIN SCALES - GENERAL
PAINLEVEL_OUTOF10: 3
PAINLEVEL_OUTOF10: 5

## 2023-02-23 ASSESSMENT — LIFESTYLE VARIABLES
HOW MANY STANDARD DRINKS CONTAINING ALCOHOL DO YOU HAVE ON A TYPICAL DAY: PATIENT DOES NOT DRINK
HOW OFTEN DO YOU HAVE A DRINK CONTAINING ALCOHOL: NEVER

## 2023-02-23 ASSESSMENT — PAIN - FUNCTIONAL ASSESSMENT: PAIN_FUNCTIONAL_ASSESSMENT: 0-10

## 2023-02-23 NOTE — ED PROVIDER NOTES
EMERGENCY DEPARTMENT ENCOUNTER    Pt Name: Yancy Lee  MRN: [id number]  [place] 12/10/1933  Date of evaluation: 2/23/23  CHIEF COMPLAINT       Chief Complaint   Patient presents with    Chest Pain    Back Pain     HISTORY OF PRESENT ILLNESS   Patient is a -year-old female with history of hypertension, diabetes, CABG with stents, hyperlipidemia that presents with epigastric burning that started approximately 1 AM last night. Patient states she took Tums and one of her nitros and pain resolved enough for her to fall asleep. Patient states she woke up again approximately 5 AM with pain that continued. Patient states does have history of heartburn. Patient denies any radiation of this pain. No shortness of breath. Patient was seen approximately 2 years ago with chest pain and shortness of breath and was transferred to [facility] for cardiac stent as she had problematic stress test.  Patient is no distress at this time. Patient states this pain does not feel like her pain she had a couple years ago. Patient states pain worsened when she was laying on her back and she described this as burning as well. REVIEW OF SYSTEMS     Review of Systems   Constitutional:  Negative for fever. HENT:  Negative for ear pain and sore throat. Eyes:  Negative for pain and redness. Respiratory:  Negative for cough and shortness of breath. Cardiovascular:  Positive for chest pain. Gastrointestinal:  Negative for abdominal pain, diarrhea and vomiting. Genitourinary:  Negative for dysuria and frequency. Musculoskeletal:  Negative for arthralgias and back pain. Neurological:  Negative for dizziness and weakness. PASTMEDICAL HISTORY     Past Medical History:   Diagnosis Date    Arthritis     Basal cell carcinoma of nose     Bronchitis     HX.  OF     CAD (coronary artery disease)     Carpal tunnel syndrome     left hand    Chronic diastolic heart failure ([facility] [address] 3/25/2021    CKD (chronic kidney disease), stage III ([facility] [address] 8/7/2019    Colon cancer ([facility] [address]     Diabetes mellitus ([facility] [address]     DVT (deep venous thrombosis) ([facility] [address] 7/10/2019    Fatty liver 8/23/2022    GI bleed 3/17/2021    Gout     Hematuria     History of coronary artery bypass graft x 3 7/2/2020    Hx of blood clots     ED.  LEGS, ED. LUNGS ,REASON FOR BEING ON XARELTO    Hyperlipidemia     Hypertension     Kidney stones     Lymphedema     MGUS (monoclonal gammopathy of unknown significance) 3/18/2021    MI, old 1989    Ophthalmoplegic migraine headache     Osteoarthritis     Other pulmonary embolism without acute cor pulmonale ([facility] [address] 4/30/2013    Personal history of colon cancer 4/12/2021    2006    S/P coronary artery stent placement X 3 6/29/2020    SSS (sick sinus syndrome) ([facility] [address] 4/11/2016    TIA (transient ischemic attack)     Uterine cancer ([facility] [address]     UTI due to Klebsiella species 12/14/2020    Wears glasses      Past Problem List  Patient Active Problem List   Diagnosis Code    Gout of foot M10.9    Diabetes mellitus type 2, noninsulin dependent ([facility] [address] E11.9    Coronary artery disease involving coronary bypass graft of native heart without angina pectoris I25.810    Kidney stone on right side N20.0    Coronary artery disease due to lipid rich plaque I25.10, I25.83    Hyperlipidemia with target LDL less than 70 E78.5    Occlusion and stenosis of bilateral carotid arteries I65.23    Benign hypertension with CKD (chronic kidney disease), stage II I12.9, N18.2    Chronic anticoagulation Z79.01    Osteopenia M85.80    Psychophysiological insomnia F51.04    Bilateral carotid artery stenosis I65.23    Carpal tunnel syndrome of left wrist G56.02    History of pulmonary embolus (PE) Z86.711    History of DVT (deep vein thrombosis) Z86.718    Hematemesis K92.0    History of coronary artery stent placement Z95.5    History of coronary artery bypass graft x 3 Z95.1    Bilateral hearing loss H91.93    Anemia D64.9    Seborrheic dermatitis L21.9    MGUS (monoclonal gammopathy of unknown significance) D47.2    Vitamin B 12 deficiency E53.8    Chronic diastolic heart failure (HCC) I50.32    Personal history of colon cancer Z85.038    Bilateral carotid bruits R09.89    History of left-sided carotid endarterectomy Z98.890    History of TIA (transient ischemic attack) Z86.73    Mild aortic stenosis I35.0    Bilateral impacted cerumen H61.23    Incontinence of feces with fecal urgency R15.9, R15.2    Skin lesion of right arm L98.9    Ventral hernia without obstruction or gangrene K43.9    Pancreatic cyst K86.2    Nonspecific reactive hepatitis K75.2    Fatty liver K76.0    Slurred speech R47.81    Chest pain R07.9     SURGICAL HISTORY       Past Surgical History:   Procedure Laterality Date    ANGIOPLASTY      hx. of stenting x 3. APPENDECTOMY      BREAST SURGERY      INFECTED MILK DUCT LT. CARDIAC SURGERY      CABG x 3 vessels and 3 stents    CAROTID ENDARTERECTOMY Left 3-3-16    CARPAL TUNNEL RELEASE Right     CHOLECYSTECTOMY      COLON SURGERY      colectomy, PRECANCEROUS POLYPS    COLONOSCOPY      X5, HX PRECANCEROUS POLYPS    COLONOSCOPY N/A 3/19/2021    COLONOSCOPY POLYPECTOMY REMOVAL HOT SNARE performed by Meme Aguirre MD at [address] vessels    CYST REMOVAL  10/07/2016    EXCISION OF SEBACEOUS CYST REMOVED FROM BACK X3    CYSTOSCOPY Bilateral 1/21/2020    CYSTOSCOPY RETROGRADE PYELOGRAM performed by Jarrell Oscar MD at [address], [address] Right     INDEX FINGER AND THUMB.     HYSTERECTOMY (CERVIX STATUS UNKNOWN)      JOINT REPLACEMENT Left 03/29/2010    TKA    JOINT REPLACEMENT Right 02/16/1999    TKA    LITHOTRIPSY      X 3    NOSE SURGERY  01/2022    SKIN BIOPSY      TOP OF NOSE (BASAL CELL)    UPPER GASTROINTESTINAL ENDOSCOPY N/A 3/19/2021    EGD BIOPSY performed by Meme Aguirre MD at [address]      vein ablation on legs     CURRENT MEDICATIONS       Previous Medications ALLOPURINOL (ZYLOPRIM) 100 MG TABLET    TAKE 1 TABLET TWICE A DAY    AMLODIPINE (NORVASC) 2.5 MG TABLET    TAKE 1 TABLET DAILY    ATORVASTATIN (LIPITOR) 80 MG TABLET    TAKE 1 TABLET NIGHTLY    BIOTIN 300 MCG TABS    Take 600 mcg by mouth every other day     BLOOD GLUCOSE MONITORING SUPPL (FREESTYLE LITE) SARY    use as directed    BLOOD GLUCOSE TEST STRIPS (FREESTYLE LITE) STRIP    USE TO TEST TWICE DAILY    CHOLECALCIFEROL (VITAMIN D3) 1000 UNITS TABS    Take 1 tablet by mouth daily    CYANOCOBALAMIN ([facility] VITAMIN B12) 1000 MCG TABLET    Take 1 tablet by mouth daily    EZETIMIBE (ZETIA) 10 MG TABLET    TAKE 1 TABLET NIGHTLY    FAMOTIDINE (PEPCID) 40 MG TABLET    TAKE 1 TABLET EVERY EVENING    FERROUS SULFATE (IRON 325) 325 (65 FE) MG TABLET    Take 1 tablet by mouth daily (with breakfast)    FREESTYLE LANCETS MISC    USE TO TEST BLOOD SUGAR TWICE A DAY    FUROSEMIDE (LASIX) 40 MG TABLET    TAKE 1 TABLET DAILY    GLIMEPIRIDE (AMARYL) 4 MG TABLET    TAKE 1 TABLET TWICE A DAY    GLUCOSE MONITORING (FREESTYLE FREEDOM) KIT    Please supply Patient with a glucose monitoring kit that insurance will cover. HANDICAP PLACARD MISC    by Does not apply route Can't walk greater than 200 feet. Expires in 5 years. LANCET DEVICE MISC    For use with lancets for blood glucose checks    LANCETS 30G MISC    Testing once a day. Please dispense according to patients insurance. LOPERAMIDE (RA ANTI-DIARRHEAL) 2 MG CAPSULE    Take 1 capsule by mouth 4 times daily as needed for Diarrhea    METOPROLOL TARTRATE (LOPRESSOR) 25 MG TABLET    TAKE 1 TABLET TWICE A DAY    MIRTAZAPINE (REMERON) 7.5 MG TABLET    Take 1-2 tablets by mouth nightly For insomnia and anxiety. Call for refills or dose adjustment.     NITROGLYCERIN (NITROSTAT) 0.4 MG SL TABLET    Place 1 tablet under the tongue every 5 minutes as needed for Chest pain    PNEUMOCOCCAL 20-VALENT CONJUGAT (PREVNAR 20) 0.5 ML GEORGIE INJ    Inject 1 mL into the muscle once    POTASSIUM CHLORIDE (KLOR-CON M) 20 MEQ EXTENDED RELEASE TABLET    TAKE 1 TABLET DAILY, TAKE WITH FUROSEMIDE    RIVAROXABAN (XARELTO) 20 MG TABS TABLET    TAKE 1 TABLET DAILY WITH BREAKFAST     ALLERGIES     is allergic to brilinta [ticagrelor], ampicillin, clopidogrel bisulfate, diovan [valsartan], lantus [insulin glargine], and metformin and related. FAMILY HISTORY     She indicated that her mother is . She indicated that her father is . She indicated that the status of her brother is unknown. SOCIAL HISTORY       Social History     Tobacco Use    Smoking status: Never    Smokeless tobacco: Never   Vaping Use    Vaping Use: Never used   Substance Use Topics    Alcohol use: No    Drug use: No     PHYSICAL EXAM     INITIAL VITALS: BP (!) 110/49   Pulse 68   Temp 98.1 °F (36.7 °C)   Resp 18   SpO2 98%    Physical Exam  Vitals and nursing note reviewed. Constitutional:       General: She is not in acute distress. Appearance: Normal appearance. HENT:      Head: Normocephalic and atraumatic. Right Ear: External ear normal.      Left Ear: External ear normal.   Eyes:      Extraocular Movements: Extraocular movements intact. Conjunctiva/sclera: Conjunctivae normal.   Cardiovascular:      Rate and Rhythm: Normal rate and regular rhythm. Heart sounds: No murmur heard. Pulmonary:      Effort: Pulmonary effort is normal. No respiratory distress. Breath sounds: Normal breath sounds. Abdominal:      Palpations: Abdomen is soft. Tenderness: There is no abdominal tenderness. There is no guarding or rebound. Musculoskeletal:         General: Normal range of motion. Cervical back: Normal range of motion. Skin:     General: Skin is warm and dry. Neurological:      General: No focal deficit present. Mental Status: She is alert and oriented to person, place, and time.        MEDICAL DECISION MAKING / ED COURSE:         1)  Number and Complexity of Problems Addressed at this Encounter  Problem List This Visit: Chest pain    Differential Diagnosis: ACS, reflux    Diagnoses Considered but Do Not Suspect: Pneumonia    Pertinent Comorbid Conditions: Hypertension, diabetes, hyperlipidemia, CAD    2)  Data Reviewed and Analyzed  (Lab and radiology tests/orders below in next section)        My EKG interpretation:    EKG Interpretation    Interpreted by emergency department physician    Rhythm: normal sinus   Rate: normal, 66  Axis: normal  Ectopy: none  Conduction: normal  ST Segments: no acute change  T Waves: inversion in  aVl, unchanged from previous EKG  Q Waves: none    Clinical Impression: no acute changes, normal sinus    Sherman Gustafson DO          Decision Rules/Scores utilized: Heart score of 5 for risk factors, age and slight elevation of troponin        3)  Treatment and Disposition    ED Course as of 02/23/23 1548   Thu Feb 23, 2023   1300 Patient with no pain at this time. [MT]   6385 Patient had first troponin of 22. Her second repeat troponin was at a level of 20. Patient does have some cardiac risk factors and previous history so we will consult hospitalist at this time for possible admission. [MT]   5358 Discussed with Dr. Rusty Zavala and will admit patient. Requested consultation to cardiologist [MT]   6695 Patient has heart score of 5 with mild elevation of troponins. Aspirin was given. Patient's chest pain has resolved in the ED. [MT]   [id number] Spoke with Dr. Ainsley Mejia, cardiology. Looked at EKG vs previous from 8/30/21. Pt has slight t wave morphology change in V2 and looks large vs R wave. Will order repeat EKG. No ST elevations. Also possible new q wave in lead III. No other changes at this time. Can order ECHO for tomorrow.   [MT]      ED Course User Index  [MT] Sherman Gustafson DO         Disposition discussion with patient/family, Shared Decision Making: Patient understands and agrees with admission for chest pain observation secondary to her risk factors and slight elevation of troponin. Pain seems to resolved in the ER          DATA FOR LAB AND RADIOLOGY TESTS ORDERED BELOW ARE REVIEWED BY THE ED CLINICIAN:    RADIOLOGY: All x-rays, CT, MRI, and formal ultrasound images (except ED bedside ultrasound) are read by the radiologist, see reports below, unless otherwise noted in MDM or here. Reports below are reviewed by myself. XR CHEST PORTABLE   Final Result   No acute cardiopulmonary findings             LABS: Lab orders shown below, the results are reviewed by myself, and all abnormals are listed below.   Labs Reviewed   CBC WITH AUTO DIFFERENTIAL - Abnormal; Notable for the following components:       Result Value    RDW 15.1 (*)     Monocytes 9 (*)     All other components within normal limits   COMPREHENSIVE METABOLIC PANEL - Abnormal; Notable for the following components:    Glucose 188 (*)     Total Bilirubin 1.5 (*)     All other components within normal limits   TROPONIN - Abnormal; Notable for the following components:    Troponin, High Sensitivity 22 (*)     All other components within normal limits   TROPONIN - Abnormal; Notable for the following components:    Troponin, High Sensitivity 20 (*)     All other components within normal limits   PROTIME-INR - Abnormal; Notable for the following components:    Protime 23.7 (*)     All other components within normal limits   APTT - Abnormal; Notable for the following components:    PTT 38.7 (*)     All other components within normal limits   MAGNESIUM   LIPASE       Vitals Reviewed:    Vitals:    02/23/23 1136 02/23/23 1200 02/23/23 1211 02/23/23 1500   BP: 128/68 (!) 106/49 (!) 106/49 (!) 110/49   Pulse: 68 63 58 68   Resp: 18 12 15 18   Temp: 98.3 °F (36.8 °C)   98.1 °F (36.7 °C)   SpO2: 97%   98%     MEDICATIONS GIVEN TO PATIENT THIS ENCOUNTER:  Orders Placed This Encounter   Medications    famotidine (PEPCID) 20 mg in sodium chloride (PF) 0.9 % 10 mL injection    aspirin chewable tablet 324 mg     DISCHARGE PRESCRIPTIONS:  New Prescriptions    No medications on file     PHYSICIAN CONSULTS ORDERED THIS ENCOUNTER:  IP CONSULT TO HOSPITALIST  IP CONSULT TO CARDIOLOGY  FINAL IMPRESSION      1. Chest pain, unspecified type    2. Elevated troponin          DISPOSITION/PLAN   DISPOSITION Admitted 02/23/2023 03:09:29 PM      OUTPATIENT FOLLOW UP THE PATIENT:  No follow-up provider specified.     DO Baljinder Cruz, DO  02/23/23 [address], DO  02/23/23 [address] [id number], DO  02/23/23 1549

## 2023-02-23 NOTE — ED NOTES
Report given to [address], RN from [facility]  Report method by phone   The following was reviewed with receiving RN:   Current vital signs:  BP (!) 110/49   Pulse 68   Temp 98.1 °F (36.7 °C)   Resp 18   SpO2 98%                MEWS Score: 1     Any medication or safety alerts were reviewed. Any pending diagnostics and notifications were also reviewed, as well as any safety concerns or issues, abnormal labs, abnormal imaging, and abnormal assessment findings. Questions were answered.           Levi Allen RN  02/23/23 2109

## 2023-02-23 NOTE — PROGRESS NOTES
Medication History completed:    New medications: none    Medications discontinued: none    Medications flagged for review:  Ferrous sulfate 325 mg (patient not taking)   Mirtazapine 7.5 mg (patient not taking)     Changes to dosing: none    Stated allergies:   Brilinta, Clopidogrel: itching, excessive bleeding   Ampicillin: unspecified  Diovan: cough   Lantus: nausea  Metformin: GI upset     Other pertinent information: patient's home medications verified with [facility]. home delivery pharmacy. Patient noted she stopped taking her ferrous sulfate due to achieving normal iron blood levels.      Stanislaw Cortes, PharmD candidate [id number]

## 2023-02-23 NOTE — ED NOTES
Mode of arrival (squad #, walk in, police, etc) : walk in        Chief complaint(s): chest pain/ possible indigestion         Arrival Note (brief scenario, treatment PTA, etc). : pt states she has been having pain in her chest and back. Pt unsure if it is indigestion but states she has a significant cardiac hx and took 3 nitro before coming to the ER with no relief. Pt alert and oriented x4. EKG obtained in triage. C= \"Have you ever felt that you should Cut down on your drinking? \"  No  A= \"Have people Annoyed you by criticizing your drinking? \"  No  G= \"Have you ever felt bad or Guilty about your drinking? \"  No  E= \"Have you ever had a drink as an Eye-opener first thing in the morning to steady your nerves or to help a hangover? \"  No      Deferred []      Reason for deferring: N/A    *If yes to two or more: probable alcohol abuse. Arlene Irizarry Connor, RN  02/23/23 [id number]

## 2023-02-23 NOTE — PROGRESS NOTES
[facility]     HISTORY AND PHYSICAL EXAMINATION            Date:   2/23/2023  Patient name:  Robin Ny  Date of admission:  2/23/2023 11:37 AM  MRN:   [id number]  Account:  [de-identified]  YOB: 1933  PCP:    Melina Juares MD  Room:   10/10  Code Status:    Prior    Chief Complaint:     Chief Complaint   Patient presents with    Chest Pain    Back Pain     History Obtained From:     patient    History of Present Illness: The patient is a 80 y.o. Non- / non  female who presents withChest Pain and Back Pain   and she is admitted to the hospital for investigating and ruling out ACS. Patient is a pleasant 27-year-old lady with history of coronary disease status post CABG3 and 5 stenting most recently was in 2020. Patient also has a history of type 2 diabetes mellitus without insulin dependence, hyperlipidemia, history of DVT and PE, history of colon ca status post resection in 2006    Ms. Jaspal Grullon states that she over ate last night, cannot sleep at 5 AM that time she was having burning sensation/chest pain on the lower portion of the sternum, that radiates to her back. Patient initially took nitroglycerin pill and Tums which seemed to help then went to bed. Patient woke up at 9 AM with the same symptoms, took more nitroglycerin without significant improvement or symptoms. Patient denies radiation of pain to the shoulders, jaw, or neck. Patient also denies any diaphoresis or shortness of breath associated with the symptoms. Patient has not had any similar symptoms previously. The symptoms are not similar to when she had a heart attack. Patient also denies any palpitation, denies lower extremity edema.   Patient said that she wears her stockings and she does not have any significant edema in her lower extremities. Patient is compliant with follow-up with her cardiologist Dr. Deb Spencer. In ED, patient had a troponin 22 followed by 20. The patient was stable blood pressure was 128/68. Another test was soft 110/49. Patient was given aspirin 324 mg, continued on home meds. Past Medical History:     Past Medical History:   Diagnosis Date    Arthritis     Basal cell carcinoma of nose     Bronchitis     HX. OF     CAD (coronary artery disease)     Carpal tunnel syndrome     left hand    Chronic diastolic heart failure ([address] 3/25/2021    CKD (chronic kidney disease), stage III ([address] 8/7/2019    Colon cancer ([address]     Diabetes mellitus ([address]     DVT (deep venous thrombosis) ([address] 7/10/2019    Fatty liver 8/23/2022    GI bleed 3/17/2021    Gout     Hematuria     History of coronary artery bypass graft x 3 7/2/2020    Hx of blood clots     ED. LEGS, ED. LUNGS ,REASON FOR BEING ON XARELTO    Hyperlipidemia     Hypertension     Kidney stones     Lymphedema     MGUS (monoclonal gammopathy of unknown significance) 3/18/2021    MI, old 1989    Ophthalmoplegic migraine headache     Osteoarthritis     Other pulmonary embolism without acute cor pulmonale ([address] 4/30/2013    Personal history of colon cancer 4/12/2021 2006    S/P coronary artery stent placement X 3 6/29/2020    SSS (sick sinus syndrome) ([address] 4/11/2016    TIA (transient ischemic attack)     Uterine cancer ([address]     UTI due to Klebsiella species 12/14/2020    Wears glasses         Past SurgicalHistory:     Past Surgical History:   Procedure Laterality Date    ANGIOPLASTY      hx. of stenting x 3. APPENDECTOMY      BREAST SURGERY      INFECTED MILK DUCT LT.     CARDIAC SURGERY      CABG x 3 vessels and 3 stents    CAROTID ENDARTERECTOMY Left 3-3-16    CARPAL TUNNEL RELEASE Right     CHOLECYSTECTOMY      COLON SURGERY      colectomy, PRECANCEROUS POLYPS    COLONOSCOPY      X5, HX PRECANCEROUS POLYPS    COLONOSCOPY N/A 3/19/2021    COLONOSCOPY POLYPECTOMY REMOVAL HOT SNARE performed by Elayne Mercado MD at [facility]      X3 vessels    CYST REMOVAL  10/07/2016    EXCISION OF SEBACEOUS CYST REMOVED FROM BACK X3    CYSTOSCOPY Bilateral 1/21/2020    CYSTOSCOPY RETROGRADE PYELOGRAM performed by Del Gerardo MD at [address], [address] Right     INDEX FINGER AND THUMB. HYSTERECTOMY (CERVIX STATUS UNKNOWN)      JOINT REPLACEMENT Left 03/29/2010    TKA    JOINT REPLACEMENT Right 02/16/1999    TKA    LITHOTRIPSY      X 3    NOSE SURGERY  01/2022    SKIN BIOPSY      TOP OF NOSE (BASAL CELL)    UPPER GASTROINTESTINAL ENDOSCOPY N/A 3/19/2021    EGD BIOPSY performed by Elayne Mercado MD at [address]      vein ablation on legs        Medications Prior to Admission:        Prior to Admission medications    Medication Sig Start Date End Date Taking? Authorizing Provider   potassium chloride (KLOR-CON M) 20 MEQ extended release tablet TAKE 1 TABLET DAILY, TAKE WITH FUROSEMIDE 1/25/23   Kisha Oconnell MD   blood glucose test strips (FREESTYLE LITE) strip USE TO TEST TWICE DAILY 1/5/23   Kisha Oconnell MD   rivaroxaban (XARELTO) 20 MG TABS tablet TAKE 1 TABLET DAILY WITH BREAKFAST 12/27/22   Telly Lozano, APRN - CNP   pneumococcal 20-valent conjugat (PREVNAR 20) 0.5 ML GEORGIE inj Inject 1 mL into the muscle once 8/12/22   Historical Provider, MD   glucose monitoring (FREESTYLE FREEDOM) kit Please supply Patient with a glucose monitoring kit that insurance will cover. 8/12/22   Kisha Oconnell MD   Lancets 30G MISC Testing once a day. Please dispense according to patients insurance.  8/12/22   Kisha Oconnell MD   ezetimibe (ZETIA) 10 MG tablet TAKE 1 TABLET NIGHTLY 8/3/22   Kisha Oconnell MD   loperamide (RA ANTI-DIARRHEAL) 2 MG capsule Take 1 capsule by mouth 4 times daily as needed for Diarrhea 7/11/22   Harvey Martin MD   ferrous sulfate (IRON 325) 325 (65 Fe) MG tablet Take 1 tablet by mouth daily (with breakfast)  Patient not taking: No sig reported 6/8/22   [facility], MD   amLODIPine (NORVASC) 2.5 MG tablet TAKE 1 TABLET DAILY 6/2/22   Tej Mccloud MD   metoprolol tartrate (LOPRESSOR) 25 MG tablet TAKE 1 TABLET TWICE A DAY 5/27/22   Tej Mccloud MD   allopurinol (ZYLOPRIM) 100 MG tablet TAKE 1 TABLET TWICE A DAY 5/27/22   Tej Mccloud MD   furosemide (LASIX) 40 MG tablet TAKE 1 TABLET DAILY 4/18/22   Tej Mccloud MD   glimepiride (AMARYL) 4 MG tablet TAKE 1 TABLET TWICE A DAY 4/18/22   Tej Mccloud MD   atorvastatin (LIPITOR) 80 MG tablet TAKE 1 TABLET NIGHTLY 4/18/22   Tej Mccloud MD   famotidine (PEPCID) 40 MG tablet TAKE 1 TABLET EVERY EVENING 3/21/22   Harvey Martin MD   Handicap Placard MISC by Does not apply route Can't walk greater than 200 feet. Expires in 5 years. 11/18/21   Tej Mccloud MD   FreeStyle Lancets MISC USE TO TEST BLOOD SUGAR TWICE A DAY 11/17/21   Tej Mccloud MD   Lancet Device MISC For use with lancets for blood glucose checks 11/17/21   Tej Mccloud MD   mirtazapine (REMERON) 7.5 MG tablet Take 1-2 tablets by mouth nightly For insomnia and anxiety. Call for refills or dose adjustment.   Patient not taking: No sig reported 11/17/21   Tej Mccloud MD   cyanocobalamin ([employer] VITAMIN B12) 1000 MCG tablet Take 1 tablet by mouth daily 4/6/21   Tej Mccloud MD   Blood Glucose Monitoring Suppl (FREESTYLE LITE) SARY use as directed 8/12/16   Historical Provider, MD   nitroGLYCERIN (NITROSTAT) 0.4 MG SL tablet Place 1 tablet under the tongue every 5 minutes as needed for Chest pain 5/24/16   Kasandra Cerna MD   Biotin 300 MCG TABS Take 600 mcg by mouth every other day     Historical Provider, MD   Cholecalciferol (VITAMIN D3) 1000 UNITS TABS Take 1 tablet by mouth daily    Historical Provider, MD        Allergies:     Brilinta [ticagrelor], Ampicillin, Clopidogrel bisulfate, Diovan [valsartan], Lantus [insulin glargine], and Metformin and related    Social History:     Tobacco:    reports that she has never smoked. She has never used smokeless tobacco.  Alcohol:      reports no history of alcohol use. Drug Use:  reports no history of drug use. Family History:     Family History   Problem Relation Age of Onset    Stroke Mother     Hypertension Mother     Heart Disease Father         AAA    Stroke Sister     Parkinsonism Brother     Cancer Sister         lung    Alcohol Abuse Sister        Review of Systems:     Positive and Negative as described in HPI. Review of Systems   Constitutional:  Negative for activity change, appetite change, diaphoresis and fatigue. HENT:  Negative for congestion, ear pain, sinus pressure and sinus pain. Eyes:  Negative for photophobia. Respiratory:  Negative for cough, chest tightness, shortness of breath and wheezing. Cardiovascular:  Positive for chest pain. Negative for palpitations and leg swelling. Gastrointestinal:  Positive for diarrhea. Negative for abdominal distention, abdominal pain, blood in stool, constipation, nausea and vomiting. Endocrine: Negative for polyuria. Genitourinary:  Negative for dysuria, frequency and urgency. Skin:  Negative for color change. Neurological:  Negative for dizziness, light-headedness and headaches. Psychiatric/Behavioral:  Negative for agitation and self-injury. Physical Exam:   BP (!) 110/49   Pulse 68   Temp 98.1 °F (36.7 °C)   Resp 18   SpO2 98%   Temp (24hrs), Av.2 °F (36.8 °C), Min:98.1 °F (36.7 °C), Max:98.3 °F (36.8 °C)    No results for input(s): POCGLU in the last 72 hours. No intake or output data in the 24 hours ending 23 1531    Physical Exam  Vitals and nursing note reviewed. Constitutional:       Appearance: Normal appearance. HENT:      Head: Normocephalic.       Nose: Nose normal.      Mouth/Throat:      Mouth: Mucous membranes are moist. Eyes:      Conjunctiva/sclera: Conjunctivae normal.   Cardiovascular:      Rate and Rhythm: Normal rate and regular rhythm. Pulses: Normal pulses. Heart sounds: Normal heart sounds. Pulmonary:      Effort: Pulmonary effort is normal.      Breath sounds: Normal breath sounds. Abdominal:      General: Abdomen is flat. There is no distension. Palpations: Abdomen is soft. Tenderness: There is abdominal tenderness (Epigastric). There is no rebound. Musculoskeletal:         General: No swelling or tenderness. Cervical back: Neck supple. Right lower leg: Edema (trace) present. Left lower leg: Edema (trace) present. Neurological:      Mental Status: She is alert. Mental status is at baseline.    Psychiatric:         Mood and Affect: Mood normal.       Investigations:     Laboratory Testing:  Recent Results (from the past 24 hour(s))   CBC with Auto Differential    Collection Time: 02/23/23 12:02 PM   Result Value Ref Range    WBC 7.6 3.5 - 11.0 k/uL    RBC 4.33 4.0 - 5.2 m/uL    Hemoglobin 12.6 12.0 - 16.0 g/dL    Hematocrit 38.5 36 - 46 %    MCV 88.8 80 - 100 fL    MCH 29.2 26 - 34 pg    MCHC 32.9 31 - 37 g/dL    RDW 15.1 (H) 11.5 - 14.9 %    Platelets 195 381 - 026 k/uL    MPV 8.2 6.0 - 12.0 fL    Seg Neutrophils 63 36 - 66 %    Lymphocytes 25 24 - 44 %    Monocytes 9 (H) 1 - 7 %    Eosinophils % 2 0 - 4 %    Basophils 1 0 - 2 %    Segs Absolute 4.90 1.3 - 9.1 k/uL    Absolute Lymph # 1.90 1.0 - 4.8 k/uL    Absolute Mono # 0.60 0.1 - 1.3 k/uL    Absolute Eos # 0.10 0.0 - 0.4 k/uL    Basophils Absolute 0.00 0.0 - 0.2 k/uL   CMP    Collection Time: 02/23/23 12:02 PM   Result Value Ref Range    Glucose 188 (H) 70 - 99 mg/dL    BUN 15 8 - 23 mg/dL    Creatinine 0.68 0.50 - 0.90 mg/dL    Est, Glom Filt Rate >60 >60 mL/min/1.73m2    Calcium 9.6 8.6 - 10.4 mg/dL    Sodium 136 135 - 144 mmol/L    Potassium 4.4 3.7 - 5.3 mmol/L    Chloride 100 98 - 107 mmol/L    CO2 26 20 - 31 mmol/L Anion Gap 10 9 - 17 mmol/L    Alkaline Phosphatase 56 35 - 104 U/L    ALT 28 5 - 33 U/L    AST 25 <32 U/L    Total Bilirubin 1.5 (H) 0.3 - 1.2 mg/dL    Total Protein 6.9 6.4 - 8.3 g/dL    Albumin 4.2 3.5 - 5.2 g/dL   Magnesium    Collection Time: 02/23/23 12:02 PM   Result Value Ref Range    Magnesium 1.9 1.6 - 2.6 mg/dL   Troponin    Collection Time: 02/23/23 12:02 PM   Result Value Ref Range    Troponin, High Sensitivity 22 (H) 0 - 14 ng/L   Protime-INR    Collection Time: 02/23/23 12:02 PM   Result Value Ref Range    Protime 23.7 (H) 11.8 - 14.6 sec    INR 2.1    APTT    Collection Time: 02/23/23 12:02 PM   Result Value Ref Range    PTT 38.7 (H) 24.0 - 36.0 sec   Lipase    Collection Time: 02/23/23 12:02 PM   Result Value Ref Range    Lipase 17 13 - 60 U/L   Troponin    Collection Time: 02/23/23  1:53 PM   Result Value Ref Range    Troponin, High Sensitivity 20 (H) 0 - 14 ng/L       Imaging/Diagnostics:  XR CHEST PORTABLE    Result Date: 2/23/2023  EXAMINATION: ONE XRAY VIEW OF THE CHEST 2/23/2023 11:53 am COMPARISON: Chest x-ray dated 30 August 2021 HISTORY: ORDERING SYSTEM PROVIDED HISTORY: chest pain TECHNOLOGIST PROVIDED HISTORY: chest pain Reason for Exam: chest pain FINDINGS: Stable cardiomediastinal silhouette. Status post median sternotomy. No pneumothorax or pleural effusion. No acute airspace infiltrate. No acute cardiopulmonary findings     MRI BRAIN W WO CONTRAST    Result Date: 2/9/2023  EXAMINATION: MRI OF THE BRAIN WITHOUT AND WITH CONTRAST  2/9/2023 4:05 pm TECHNIQUE: Multiplanar multisequence MRI of the head/brain was performed without and with the administration of intravenous contrast. COMPARISON: 06/15/2022 HISTORY: ORDERING SYSTEM PROVIDED HISTORY: Focal seizure ([address] TECHNOLOGIST PROVIDED HISTORY: STAT Creatinine as needed:->No Reason for Exam: Pt c/o having episodes with interrupted speech usually lasting about 30mins. Ordered states seizure disorder, seizure protocal scanned.   Pt has dental implants resulting in artifact. FINDINGS: INTRACRANIAL STRUCTURES/VENTRICLES:  There is no acute infarct. No mass effect or midline shift. No evidence of an acute intracranial hemorrhage. The ventricles and sulci are normal in size and configuration. The sellar/suprasellar regions appear unremarkable. The normal signal voids within the major intracranial vessels appear maintained. No abnormal focus of enhancement is seen within the brain. There are few scattered foci of T2/FLAIR hyperintensity in the subcortical and periventricular white matter, consistent with microangiopathic change. The hippocampi are symmetric. ORBITS: The visualized portion of the orbits demonstrate no acute abnormality. SINUSES: The visualized paranasal sinuses and mastoid air cells demonstrate no acute abnormality. BONES/SOFT TISSUES: The bone marrow signal intensity appears normal. The soft tissues demonstrate no acute abnormality. 1. No acute intracranial abnormality. 2. Minimal microangiopathic change. RECOMMENDATIONS: Unavailable       Assessment :      Primary Problem  Chest pain    Active Hospital Problems    Diagnosis Date Noted    Chest pain [R07.9] 02/23/2023     Priority: Medium   This is a 80-year-old female patient with history of coronary disease status post CABG and 5 PCI with stents. Patient also has history of hyper lipidemia, diabetes type 2, on long-term anticoagulants for history of DVT and PE. Patient presents with chest pain that was burning in nature, going to the back. Patient denies any associated symptoms such as duration of pain to the shoulders, diaphoresis or palpitation. Patient pain occurred after she had a large meal, could be primarily due to GERD cardiology were consulted, echo was ordered. Patient admitted to rule out ACS and monitor.   Awaiting cardiology input    Plan:     Patient status Admit as inpatient in the  Progressive Unit/Step down    Chest pain possibly gastric in nature, need to rule out ACS. Underlying hx of CABG*3 and coronary stenting *5  - Patient pain and discomfort started after she had a large meal overnight, associated with epigastric tenderness on exam.  Pain without radiation to her shoulder, neck or jaw. - Patient will be restarted on her Xarelto 20 mg daily  - Continue metoprolol, amlodipine and furosemide to be given with potassium chloride daily. - We will order echocardiogram, cardiology consulted, appreciate recommendations.  - Start patient on Protonix 40 mg daily. Diabetes Mellitus  - Patient will be started on low-dose sliding scale. - POC G.  - Hypoglycemic orders in place  Hyperlipidemia  - Continue ezetimibe and atorvastatin  Chronic anticoagulation for DVT and PE history  - Continue patient on Xarelto 20 mg daily. History of chronic diarrhea  - Continue patient loperamide as needed        Consultations:   IP CONSULT TO HOSPITALIST  IP CONSULT TO CARDIOLOGY     Patient is admitted as inpatient status because of co-morbiditieslisted above, severity of signs and symptoms as outlined, requirement for current medical therapies and most importantly because of direct risk to patient if care not provided in a hospital setting.     Jesusita Leach MD  2/23/2023  3:31 PM    Copy sent to Dr. Melina Juares MD

## 2023-02-23 NOTE — PLAN OF CARE
Problem: Discharge Planning  Goal: Discharge to home or other facility with appropriate resources  Outcome: Progressing  Flowsheets (Taken 2/23/2023 1628)  Discharge to home or other facility with appropriate resources:   Arrange for needed discharge resources and transportation as appropriate   Identify discharge learning needs (meds, wound care, etc)   Identify barriers to discharge with patient and caregiver     Problem: Pain  Goal: Verbalizes/displays adequate comfort level or baseline comfort level  Outcome: Progressing  Flowsheets (Taken 2/23/2023 1808)  Verbalizes/displays adequate comfort level or baseline comfort level:   Encourage patient to monitor pain and request assistance   Assess pain using appropriate pain scale   Implement non-pharmacological measures as appropriate and evaluate response  Note: Patient pain controlled this shift. Problem: Safety - Adult  Goal: Free from fall injury  Outcome: Progressing  Flowsheets (Taken 2/23/2023 1808)  Free From Fall Injury: Instruct family/caregiver on patient safety  Note: The patient remained free from falls this shift, call light within reach, bed in locked and lowest position. Side rails up x2. Continue to monitor closely. Problem: ABCDS Injury Assessment  Goal: Absence of physical injury  2/23/2023 1810 by Grey Bosworth, RN  Outcome: Progressing  2/23/2023 1808 by Grey Bosworth, RN  Outcome: Progressing  Flowsheets (Taken 2/23/2023 1808)  Absence of Physical Injury: Implement safety measures based on patient assessment  Note: Fall assessment performed and appropriate measures implemented. Room freed from clutter. Bed in lowest position with wheels locked. Call light in place. ID band in place.        Problem: Cardiovascular - Adult  Goal: Maintains optimal cardiac output and hemodynamic stability  Outcome: Progressing  Goal: Absence of cardiac dysrhythmias or at baseline  Outcome: Progressing

## 2023-02-24 ENCOUNTER — APPOINTMENT (OUTPATIENT)
Dept: CT IMAGING | Age: 88
DRG: 392 | End: 2023-02-24
Payer: MEDICARE

## 2023-02-24 ENCOUNTER — APPOINTMENT (OUTPATIENT)
Dept: NON INVASIVE DIAGNOSTICS | Age: 88
DRG: 392 | End: 2023-02-24
Payer: MEDICARE

## 2023-02-24 VITALS
OXYGEN SATURATION: 98 % | SYSTOLIC BLOOD PRESSURE: 148 MMHG | HEIGHT: 69 IN | TEMPERATURE: 98.1 F | DIASTOLIC BLOOD PRESSURE: 52 MMHG | RESPIRATION RATE: 12 BRPM | HEART RATE: 64 BPM | WEIGHT: 188.49 LBS | BODY MASS INDEX: 27.92 KG/M2

## 2023-02-24 LAB
ABSOLUTE EOS #: 0.2 K/UL (ref 0–0.4)
ABSOLUTE LYMPH #: 2.4 K/UL (ref 1–4.8)
ABSOLUTE MONO #: 0.7 K/UL (ref 0.1–1.3)
ANION GAP SERPL CALCULATED.3IONS-SCNC: 10 MMOL/L (ref 9–17)
BASOPHILS # BLD: 1 % (ref 0–2)
BASOPHILS ABSOLUTE: 0 K/UL (ref 0–0.2)
BUN SERPL-MCNC: 14 MG/DL (ref 8–23)
CALCIUM SERPL-MCNC: 9 MG/DL (ref 8.6–10.4)
CHLORIDE SERPL-SCNC: 104 MMOL/L (ref 98–107)
CO2 SERPL-SCNC: 26 MMOL/L (ref 20–31)
CREAT SERPL-MCNC: 0.67 MG/DL (ref 0.5–0.9)
D DIMER BLD IA.RAPID-MCNC: 0.3 MG/L FEU (ref 0–0.59)
EOSINOPHILS RELATIVE PERCENT: 2 % (ref 0–4)
GFR SERPL CREATININE-BSD FRML MDRD: >60 ML/MIN/1.73M2
GLUCOSE BLD-MCNC: 136 MG/DL (ref 65–105)
GLUCOSE BLD-MCNC: 156 MG/DL (ref 65–105)
GLUCOSE BLD-MCNC: 198 MG/DL (ref 65–105)
GLUCOSE SERPL-MCNC: 137 MG/DL (ref 70–99)
HCT VFR BLD AUTO: 38.2 % (ref 36–46)
HGB BLD-MCNC: 12.7 G/DL (ref 12–16)
LV EF: 68 %
LVEF MODALITY: NORMAL
LYMPHOCYTES # BLD: 29 % (ref 24–44)
MCH RBC QN AUTO: 29.8 PG (ref 26–34)
MCHC RBC AUTO-ENTMCNC: 33.2 G/DL (ref 31–37)
MCV RBC AUTO: 89.7 FL (ref 80–100)
MONOCYTES # BLD: 9 % (ref 1–7)
PDW BLD-RTO: 15.1 % (ref 11.5–14.9)
PLATELET # BLD AUTO: 172 K/UL (ref 150–450)
PMV BLD AUTO: 8.2 FL (ref 6–12)
POTASSIUM SERPL-SCNC: 4.1 MMOL/L (ref 3.7–5.3)
RBC # BLD: 4.26 M/UL (ref 4–5.2)
SEG NEUTROPHILS: 59 % (ref 36–66)
SEGMENTED NEUTROPHILS ABSOLUTE COUNT: 5 K/UL (ref 1.3–9.1)
SODIUM SERPL-SCNC: 140 MMOL/L (ref 135–144)
WBC # BLD AUTO: 8.3 K/UL (ref 3.5–11)

## 2023-02-24 PROCEDURE — 99233 SBSQ HOSP IP/OBS HIGH 50: CPT | Performed by: INTERNAL MEDICINE

## 2023-02-24 PROCEDURE — 80048 BASIC METABOLIC PNL TOTAL CA: CPT

## 2023-02-24 PROCEDURE — 82947 ASSAY GLUCOSE BLOOD QUANT: CPT

## 2023-02-24 PROCEDURE — 97162 PT EVAL MOD COMPLEX 30 MIN: CPT

## 2023-02-24 PROCEDURE — 85379 FIBRIN DEGRADATION QUANT: CPT

## 2023-02-24 PROCEDURE — 6370000000 HC RX 637 (ALT 250 FOR IP): Performed by: STUDENT IN AN ORGANIZED HEALTH CARE EDUCATION/TRAINING PROGRAM

## 2023-02-24 PROCEDURE — 97166 OT EVAL MOD COMPLEX 45 MIN: CPT

## 2023-02-24 PROCEDURE — 93306 TTE W/DOPPLER COMPLETE: CPT

## 2023-02-24 PROCEDURE — 36415 COLL VENOUS BLD VENIPUNCTURE: CPT

## 2023-02-24 PROCEDURE — 72128 CT CHEST SPINE W/O DYE: CPT

## 2023-02-24 PROCEDURE — 85025 COMPLETE CBC W/AUTO DIFF WBC: CPT

## 2023-02-24 PROCEDURE — 2580000003 HC RX 258: Performed by: STUDENT IN AN ORGANIZED HEALTH CARE EDUCATION/TRAINING PROGRAM

## 2023-02-24 RX ORDER — LIDOCAINE 4 G/G
1 PATCH TOPICAL DAILY
Status: DISCONTINUED | OUTPATIENT
Start: 2023-02-24 | End: 2023-02-24 | Stop reason: HOSPADM

## 2023-02-24 RX ORDER — LIDOCAINE 4 G/G
1 PATCH TOPICAL DAILY
Qty: 10 PATCH | Refills: 0 | Status: SHIPPED | OUTPATIENT
Start: 2023-02-25

## 2023-02-24 RX ORDER — CALCIUM CARBONATE 200(500)MG
500 TABLET,CHEWABLE ORAL 3 TIMES DAILY PRN
Qty: 60 TABLET | Refills: 0 | Status: SHIPPED | OUTPATIENT
Start: 2023-02-24 | End: 2023-03-26

## 2023-02-24 RX ORDER — PANTOPRAZOLE SODIUM 40 MG/1
40 TABLET, DELAYED RELEASE ORAL
Qty: 30 TABLET | Refills: 3 | Status: SHIPPED | OUTPATIENT
Start: 2023-02-25

## 2023-02-24 RX ADMIN — FUROSEMIDE 40 MG: 40 TABLET ORAL at 09:36

## 2023-02-24 RX ADMIN — AMLODIPINE BESYLATE 2.5 MG: 2.5 TABLET ORAL at 14:39

## 2023-02-24 RX ADMIN — ACETAMINOPHEN 650 MG: 325 TABLET ORAL at 05:08

## 2023-02-24 RX ADMIN — POTASSIUM CHLORIDE 20 MEQ: 1500 TABLET, EXTENDED RELEASE ORAL at 09:36

## 2023-02-24 RX ADMIN — SODIUM CHLORIDE, PRESERVATIVE FREE 10 ML: 5 INJECTION INTRAVENOUS at 09:42

## 2023-02-24 RX ADMIN — METOPROLOL TARTRATE 25 MG: 25 TABLET, FILM COATED ORAL at 14:39

## 2023-02-24 RX ADMIN — ALLOPURINOL 100 MG: 100 TABLET ORAL at 09:36

## 2023-02-24 RX ADMIN — PANTOPRAZOLE SODIUM 40 MG: 40 TABLET, DELAYED RELEASE ORAL at 05:09

## 2023-02-24 RX ADMIN — Medication 1000 UNITS: at 09:36

## 2023-02-24 ASSESSMENT — PAIN SCALES - GENERAL
PAINLEVEL_OUTOF10: 0
PAINLEVEL_OUTOF10: 2
PAINLEVEL_OUTOF10: 0
PAINLEVEL_OUTOF10: 3
PAINLEVEL_OUTOF10: 5
PAINLEVEL_OUTOF10: 0
PAINLEVEL_OUTOF10: 2
PAINLEVEL_OUTOF10: 0

## 2023-02-24 ASSESSMENT — PAIN DESCRIPTION - LOCATION: LOCATION: KNEE

## 2023-02-24 ASSESSMENT — PAIN DESCRIPTION - ORIENTATION: ORIENTATION: RIGHT;LEFT

## 2023-02-24 ASSESSMENT — PAIN DESCRIPTION - DESCRIPTORS: DESCRIPTORS: ACHING

## 2023-02-24 NOTE — PROGRESS NOTES
Physical Therapy        Physical Therapy Cancel Note      DATE: 2023    NAME: Winnie Garner  MRN: [id number]   : 12/10/1933      Patient not seen this date for Physical Therapy due to:    2023 at 1105- pt out of room at echo per nursing. Will check back after lunch if time permits.        Electronically signed by Naomi Block PT on 2023 at 11:12 AM

## 2023-02-24 NOTE — PLAN OF CARE
Problem: Discharge Planning  Goal: Discharge to home or other facility with appropriate resources  2/24/2023 0213 by Elmer Moreland RN  Outcome: Progressing  2/23/2023 1808 by Viktoria Murillo RN  Outcome: Progressing  Flowsheets (Taken 2/23/2023 1628)  Discharge to home or other facility with appropriate resources:   Arrange for needed discharge resources and transportation as appropriate   Identify discharge learning needs (meds, wound care, etc)   Identify barriers to discharge with patient and caregiver     Problem: Pain  Goal: Verbalizes/displays adequate comfort level or baseline comfort level  2/24/2023 0213 by Elmer Moreland RN  Outcome: Progressing  Note: Denies pain. Telemetry NSR. Await cardiac evaluation in am.  2/23/2023 1808 by Viktoria Murillo RN  Outcome: Progressing  Flowsheets (Taken 2/23/2023 1808)  Verbalizes/displays adequate comfort level or baseline comfort level:   Encourage patient to monitor pain and request assistance   Assess pain using appropriate pain scale   Implement non-pharmacological measures as appropriate and evaluate response  Note: Patient pain controlled this shift. Problem: Safety - Adult  Goal: Free from fall injury  2/24/2023 0213 by Elmer Moreland RN  Outcome: Progressing  Note: Patient alert and oriented. Up per self with steady gait. Instructed to call nurse if help needed. 2/23/2023 1808 by Viktoria Murillo RN  Outcome: Progressing  Flowsheets (Taken 2/23/2023 1808)  Free From Fall Injury: Instruct family/caregiver on patient safety  Note: The patient remained free from falls this shift, call light within reach, bed in locked and lowest position. Side rails up x2. Continue to monitor closely.       Problem: ABCDS Injury Assessment  Goal: Absence of physical injury  2/24/2023 2468 by Elmer Moreland RN  Outcome: Progressing  2/23/2023 1810 by Viktoria Murillo RN  Outcome: Progressing  2/23/2023 1808 by Viktoria Murillo RN  Outcome: Progressing  Flowsheets (Taken 2/23/2023 1808)  Absence of Physical Injury: Implement safety measures based on patient assessment  Note: Fall assessment performed and appropriate measures implemented. Room freed from clutter. Bed in lowest position with wheels locked. Call light in place. ID band in place. Problem: Cardiovascular - Adult  Goal: Maintains optimal cardiac output and hemodynamic stability  2/24/2023 0213 by Daryle Self, RN  Outcome: Progressing  Note: Trop slightly elevated. Await cardiology to see.   2/23/2023 1810 by Sameera Taylor RN  Outcome: Progressing  Goal: Absence of cardiac dysrhythmias or at baseline  2/24/2023 0213 by Daryle Self, RN  Outcome: Progressing  2/23/2023 1810 by Sameera Taylor RN  Outcome: Progressing

## 2023-02-24 NOTE — CARE COORDINATION
Case Management Assessment  Initial Evaluation    Date/Time of Evaluation: 2/24/2023 1:48 PM  Assessment Completed by: Franny Wiley RN    If patient is discharged prior to next notation, then this note serves as note for discharge by case management. Patient Name: Tati Fernandez                   YOB: 1933  Diagnosis: Chest pain [R07.9]  Elevated troponin [R77.8]  Chest pain, unspecified type [R07.9]                   Date / Time: 2/23/2023 11:37 AM    Patient Admission Status: Inpatient   Readmission Risk (Low < 19, Mod (19-27), High > 27): Readmission Risk Score: 12.3    Current PCP: Justino Sebastian MD  PCP verified by CM? Yes    Chart Reviewed: Yes      History Provided by: Patient  Patient Orientation: Alert and Oriented    Patient Cognition: Alert    Hospitalization in the last 30 days (Readmission):  No    If yes, Readmission Assessment in CM Navigator will be completed. Advance Directives:      Code Status: Full Code   Patient's Primary Decision Maker is: Legal Next of Kin    Primary Decision Maker: Camila Ruiz - Child - [phone]    Primary Decision Maker: Tori Stanley Child - [phone]    Discharge Planning:    Patient lives with: Alone Type of Home: Apartment  Primary Care Giver: Self  Patient Support Systems include: Friends/Neighbors, Children   Current Financial resources: Medicare  Current community resources: None  Current services prior to admission: None            Current DME:              Type of Home Care services:  None    ADLS  Prior functional level: Independent in ADLs/IADLs  Current functional level: Independent in ADLs/IADLs    PT AM-PAC:   /24  OT AM-PAC:   /24    Family can provide assistance at DC: Yes  Would you like Case Management to discuss the discharge plan with any other family members/significant others, and if so, who?  No  Plans to Return to Present Housing: Yes  Other Identified Issues/Barriers to RETURNING to current housing: no  Potential Assistance needed at discharge: N/A            Potential DME:  n/a  Patient expects to discharge to: [address] [place] [address] for transportation at discharge: Self    Financial    Payor: MEDICARE / Plan: MEDICARE PART A AND B / Product Type: *No Product type* /     Does insurance require precert for SNF: No    Potential assistance Purchasing Medications: No  Meds-to-Beds request:        [address], [address] [phone] - F [phone]  [address] [facility] [address]  Phone: [phone] Fax: Wil[facility] #[phone] - [address] [place] [address] [phone] [facility] [phone]  [address] [place] [address]  [address] [place] [id number]  Phone: [phone] Fax: [phone]      Notes:    Factors facilitating achievement of predicted outcomes: Motivated, Cooperative, and Pleasant    Barriers to discharge: n/a    Additional Case Management Notes: from home alone in 1st floor apt. DME: walker, [place], GINO. Declined VNS. Denies needs. The Plan for Transition of Care is related to the following treatment goals of Chest pain [R07.9]  Elevated troponin [R77.8]  Chest pain, unspecified type [T66.4]    IF APPLICABLE: The Patient and/or patient representative Elena Drake and her family were provided with a choice of provider and agrees with the discharge plan. Freedom of choice list with basic dialogue that supports the patient's individualized plan of care/goals and shares the quality data associated with the providers was provided to: Patient   Patient Representative Name:       The Patient and/or Patient Representative Agree with the Discharge Plan?  Yes    Oz Bryan RN  Case Management Department  Ph: [phone] Fax: [phone]

## 2023-02-24 NOTE — PROGRESS NOTES
Physical Therapy  Facility/Department: [facility] PROGRESSIVE CARE  Physical Therapy Initial Assessment    Name: Maninder De La Garza  : 12/10/1933  MRN: [id number]  Date of Service: 2023    Discharge Recommendations:  Continue to assess pending progress   PT Equipment Recommendations  Equipment Needed:  (TBD)      Patient Diagnosis(es): The primary encounter diagnosis was Chest pain, unspecified type. A diagnosis of Elevated troponin was also pertinent to this visit. Past Medical History:  has a past medical history of Arthritis, Basal cell carcinoma of nose, Bronchitis, CAD (coronary artery disease), Carpal tunnel syndrome, Chronic diastolic heart failure ([address] CKD (chronic kidney disease), stage III ([address] Colon cancer ([address] Diabetes mellitus ([address] DVT (deep venous thrombosis) ([facility] Fatty liver, GI bleed, Gout, Hematuria, History of coronary artery bypass graft x 3, Hx of blood clots, Hyperlipidemia, Hypertension, Kidney stones, Lymphedema, MGUS (monoclonal gammopathy of unknown significance), MI, old, Ophthalmoplegic migraine headache, Osteoarthritis, Other pulmonary embolism without acute cor pulmonale (HCC), Personal history of colon cancer, S/P coronary artery stent placement X 3, SSS (sick sinus syndrome) ([address] TIA (transient ischemic attack), Uterine cancer ([address] UTI due to Klebsiella species, and Wears glasses. Past Surgical History:  has a past surgical history that includes Cholecystectomy; Appendectomy; Hysterectomy; Coronary artery bypass graft; Lithotripsy; Carpal tunnel release (Right); Finger trigger release (Right); Colon surgery; Dilatation, esophagus; skin biopsy; angioplasty; Carotid endarterectomy (Left, 3-3-16); Colonoscopy; cyst removal (10/07/2016); Breast surgery; joint replacement (Left, 2010); joint replacement (Right, 1999); Cardiac surgery; vascular surgery; Cystoscopy (Bilateral, 2020); Upper gastrointestinal endoscopy (N/A, 3/19/2021);  Colonoscopy (N/A, 3/19/2021); and Nose surgery (01/2022). Assessment   Body Structures, Functions, Activity Limitations Requiring Skilled Therapeutic Intervention: Decreased functional mobility ; Decreased endurance;Decreased balance  Assessment: continue per POC to maxmize potential for safe D/C  Treatment Diagnosis: impaired mobility due to weakness and balance issues  Specific Instructions for Next Treatment: advance gait distance and instruct in HEP  Therapy Prognosis: Good  Decision Making: Medium Complexity  History: pt admitted due to chest pain  Exam: ROM, MMT, balance and mobility assessments  Clinical Presentation: SBA for sit <> stand, CGA x 1 for gait w/o AD 20' x 2, FALL RISK  Barriers to Learning: none  Requires PT Follow-Up: Yes  Activity Tolerance  Activity Tolerance: Patient tolerated treatment well     Plan   Physcial Therapy Plan  General Plan:  (2-3 treatments/ 3 days)  Specific Instructions for Next Treatment: advance gait distance and instruct in HEP  Current Treatment Recommendations: Strengthening, Home exercise program, Safety education & training, Gait training, Balance training, Functional mobility training, Patient/Caregiver education & training, Equipment evaluation, education, & procurement, Endurance training  Safety Devices  Type of Devices: Call light within reach, Left in chair, Nurse notified, Patient at risk for falls     Restrictions  Restrictions/Precautions  Restrictions/Precautions: Fall Risk, General Precautions, Up as Tolerated (peripheral IV right forearm)  Required Braces or Orthoses?: No  Implants present? : Metal implants (B TKA; cardiac stents, ? sternal wires from CABG)     Subjective   Pain: Pt denies pain but states that she is sore in her back that is a 3/10  General  Patient assessed for rehabilitation services?: Yes  Additional Pertinent Hx: Patient is a -year-old female with history of hypertension, diabetes, CABG with stents, hyperlipidemia that presents with epigastric burning that started approximately 1 AM last night. Patient states she took Tums and one of her nitros and pain resolved enough for her to fall asleep. Patient states she woke up again approximately 5 AM with pain that continue  Response To Previous Treatment: Not applicable  Family / Caregiver Present: No  Referring Practitioner: Dr. Ross Ash  Referral Date : 02/23/23  Diagnosis: chest pain  Follows Commands: Within Functional Limits  Other (Comment): OK per nurse to proceed w/ PT evaluation  Subjective  Subjective: pt eating lunch tray when therapists entered the room. Pt reports that she is hungry as she was NPO for testing this a.m. Social/Functional History  Social/Functional History  Lives With: Alone  Type of Home: Apartment (1st floor)  Home Layout: One level  Home Access: Level entry  Bathroom Shower/Tub: Walk-in shower, Curtain  Bathroom Toilet: Handicap height (sink close by toilet)  Bathroom Equipment: Grab bars in shower, Built-in shower seat  Bathroom Accessibility: Walker accessible  Home Equipment: Trinity Montanez, rolling, Rollator, Cane, Reacher  ADL Assistance: Independent  Homemaking Assistance: Independent  Homemaking Responsibilities: Yes  Ambulation Assistance: Independent  Transfer Assistance: Independent  Active : Yes  Mode of Transportation: SUV  IADL Comments: Pt reports sleeping in adjustable bed but does not use functions  Additional Comments: Neighbor that lives across the street is good help and able to assist as needed  Vision/Hearing  Vision  Vision: Impaired  Vision Exceptions: Wears glasses at all times  Hearing  Hearing: Exceptions to [facility]  Hearing Exceptions: Hard of hearing/hearing concerns; No hearing aid    Cognition   Orientation  Overall Orientation Status: Within Functional Limits  Cognition  Overall Cognitive Status: WFL     Objective   O2 Device: None (Room air)     Observation/Palpation  Observation: peripheral IV right forearm        AROM RLE (degrees)  RLE AROM: WFL  AROM LLE (degrees)  LLE AROM : WFL  AROM RUE (degrees)  RUE General AROM: see OT for UE assessment  AROM LUE (degrees)  LUE General AROM: see OT for UE assessment  Strength RLE  Comment: grossly 4/5  Strength LLE  Comment: grossly 4/5  Strength RUE  Comment: see OT for UE assessment  Strength LUE  Comment: see OT for UE assessment     Sensation  Overall Sensation Status: WFL (Pt denies)     Bed mobility  Bed Mobility Comments: pt sitting in chair at the start and end of session  Transfers  Sit to Stand: Stand by assistance  Stand to Sit: Stand by assistance  Stand Pivot Transfers: Contact guard assistance (no device)  Ambulation  Surface: Level tile  Device: No Device  Assistance: Contact guard assistance  Quality of Gait: LOB x 2 to the left which pt self corrected, increased lateral sway  Distance: 20' x 2  Comments: FALL RISK  Stairs/Curb  Stairs?: No (level entry at home)     Balance  Sitting - Static: Good  Sitting - Dynamic: Good  Standing - Static: Good (no device)  Standing - Dynamic: Good;- (no device)           OutComes Score                                                  AM-PAC Score  AM-PAC Inpatient Mobility Raw Score : 18 (02/24/23 1419)  AM-PAC Inpatient T-Scale Score : 43.63 (02/24/23 1419)  Mobility Inpatient CMS 0-100% Score: 46.58 (02/24/23 1419)  Mobility Inpatient CMS G-Code Modifier : CK (02/24/23 1419)          Tinneti Score       Goals  Short Term Goals  Time Frame for Short Term Goals: 2-3 treatments/ 3 days  Short Term Goal 1: pt to tolerate 1/2 hour of therapuetic exercise and activity  Short Term Goal 2: pt to demonstrate good technique w/ exercise program for general strengthening, balance activity and energy conservation.   Short Term Goal 3: pt to demonstrate independent bed mobility from a flat surface  Short Term Goal 4: pt to demonstrate independent transfers  Short Term Goal 5: pt to demonstrate independent gait 150'  Short Term Goal 6: pt to demonstrate good ambulatory balance  Patient Goals   Patient Goals : return home       Education  Patient Education  Education Given To: Patient  Education Provided: Role of Therapy;Plan of Care  Education Method: Demonstration;Verbal  Barriers to Learning: None  Education Outcome: Verbalized understanding;Demonstrated understanding      Therapy Time   Individual Concurrent Group Co-treatment   Time In 1419         Time Out 1432         Minutes NANCY Navarro

## 2023-02-24 NOTE — PROGRESS NOTES
[address] [employer]    PROGRESS NOTE             2/24/2023    7:30 AM    Name:   Willy Ruiz  MRN:     [id number]     Acct:      [de-identified]   Room:   2088/2088-[facility] Day:  1  Admit Date:  2/23/2023 11:37 AM    PCP:  Madi Chapin MD  Code Status:  Full Code    Subjective:     C/C:   Chief Complaint   Patient presents with    Chest Pain    Back Pain     Interval History Status: improved. Patient seen and examined at the bed side, no new acute events overnight. Patient denies chest pain. However, she still complains of epigastric discomfort. Patient also has sore back from laying in bed. Notes from nursing staff and Consults had been reviewed, and the overnight progress had been checked with the nursing staff as well. Brief History:     The patient is a 80 y.o. Non- / non  female who presents withChest Pain and Back Pain   and she is admitted to the hospital for investigating and ruling out ACS. Patient is a pleasant 72-year-old lady with history of coronary disease status post CABG3 and 5 stenting most recently was in 2020. Patient also has a history of type 2 diabetes mellitus without insulin dependence, hyperlipidemia, history of DVT and PE, history of colon ca status post resection in 2006     Ms. Blas Chawla states that she over ate last night, cannot sleep at 5 AM that time she was having burning sensation/chest pain on the lower portion of the sternum, that radiates to her back. Patient initially took nitroglycerin pill and Tums which seemed to help then went to bed. Patient woke up at 9 AM with the same symptoms, took more nitroglycerin without significant improvement or symptoms. Patient denies radiation of pain to the shoulders, jaw, or neck. Patient also denies any diaphoresis or shortness of breath associated with the symptoms. Patient has not had any similar symptoms previously.   The symptoms are not similar to when she had a heart attack. Patient also denies any palpitation, denies lower extremity edema. Patient said that she wears her stockings and she does not have any significant edema in her lower extremities. Patient is compliant with follow-up with her cardiologist Dr. Roya Rivera. In ED, patient had a troponin 22 followed by 20. The patient was stable blood pressure was 128/68. Another test was soft 110/49. Patient was given aspirin 324 mg, continued on home meds. Review of Systems:     CONSTITUTIONAL:  no fevers  Psych: Normal mood and affect, no depression, no suicidal ideation. EYES: negative for blury vision  HEENT: No headaches, no nasal congestion, no difficulty swallowing  RESPIRATORY:negative for dyspnea, no wheezing, no Cough  CARDIOVASCULAR: Positive for chest pain, no palpitations  GASTROINTESTINAL: no nausea, no vomiting, no change in bowel habits, no abdominal pain   GENITOURINARY: negative for dysuria, no hematuria   MUSCULOSKELETAL: no joint pains, no muscle aches, no swelling of joints or extremities  NEUROLOGICAL: No weakness or numbness      Medications: Allergies:     Allergies   Allergen Reactions    Brilinta [Ticagrelor]      Gi BLEED    Ampicillin Other (See Comments)    Clopidogrel Bisulfate Itching    Diovan [Valsartan] Other (See Comments)     cough    Lantus [Insulin Glargine] Nausea Only     Stomach \"quivered\" after taking it, palpitations    Metformin And Related Diarrhea     Chronic kidney disease stage 3       Current Meds:   Scheduled Meds:    sodium chloride flush  5-40 mL IntraVENous 2 times per day    pantoprazole  40 mg Oral QAM AC    allopurinol  100 mg Oral BID    amLODIPine  2.5 mg Oral Daily    atorvastatin  80 mg Oral Nightly    Vitamin D  1 tablet Oral Daily    ezetimibe  10 mg Oral Nightly    furosemide  40 mg Oral Daily    metoprolol tartrate  25 mg Oral BID    potassium chloride  20 mEq Oral Daily    rivaroxaban  20 mg Oral Daily insulin lispro  0-4 Units SubCUTAneous TID WC    insulin lispro  0-4 Units SubCUTAneous Nightly     Continuous Infusions:    sodium chloride      dextrose       PRN Meds: sodium chloride flush, sodium chloride, ondansetron **OR** ondansetron, polyethylene glycol, acetaminophen **OR** acetaminophen, potassium chloride **OR** potassium alternative oral replacement **OR** potassium chloride, calcium carbonate, loperamide, nitroGLYCERIN, glucose, dextrose bolus **OR** dextrose bolus, glucagon (rDNA), dextrose    Data:     Past Medical History:   has a past medical history of Arthritis, Basal cell carcinoma of nose, Bronchitis, CAD (coronary artery disease), Carpal tunnel syndrome, Chronic diastolic heart failure ([facility] [address] CKD (chronic kidney disease), stage III ([facility] [address] Colon cancer ([facility] [address] Diabetes mellitus ([facility] [facility] [facility] thrombosis) [facility] Fatty liver, GI bleed, Gout, Hematuria, History of coronary artery bypass graft x 3, Hx of blood clots, Hyperlipidemia, Hypertension, Kidney stones, Lymphedema, MGUS (monoclonal gammopathy of unknown significance), MI, old, Ophthalmoplegic migraine headache, Osteoarthritis, Other pulmonary embolism without acute cor pulmonale ([facility] [facility] Personal history of colon cancer, S/P coronary artery stent placement X 3, SSS (sick sinus syndrome) ([facility] TIA (transient ischemic attack), Uterine cancer ([facility] UTI due to Klebsiella species, and Wears glasses. Social History:   reports that she has never smoked. She has never used smokeless tobacco. She reports that she does not drink alcohol and does not use drugs. Family History:   Family History   Problem Relation Age of Onset    Stroke Mother     Hypertension Mother     Heart Disease Father         AAA    Stroke Sister     Parkinsonism Brother     Cancer Sister         lung    Alcohol Abuse Sister        Vitals:  BP (!) 138/57 Comment: Simultaneous filing. User may not have seen previous data.   Pulse 58   Temp 97.4 °F (36.3 °C) (Oral) Comment: Simultaneous filing. User may not have seen previous data. Comment (Src): Simultaneous filing. User may not have seen previous data. Resp 12 Comment: Simultaneous filing. User may not have seen previous data. Ht 5' 9\" (1.753 m)   Wt 188 lb 7.9 oz (85.5 kg)   SpO2 96%   BMI 27.84 kg/m²   Temp (24hrs), Av.1 °F (36.7 °C), Min:97.4 °F (36.3 °C), Max:98.5 °F (36.9 °C)      Recent Labs     23  1708 23  2146 23  0623   POCGLU 89 123* 136*       I/O(24Hr): No intake or output data in the 24 hours ending 23 0730    Labs:    CBC:   Lab Results   Component Value Date/Time    WBC 8.3 2023 05:02 AM    RBC 4.26 2023 05:02 AM    RBC 4.62 2020 01:15 PM    HGB 12.7 2023 05:02 AM    HCT 38.2 2023 05:02 AM    MCV 89.7 2023 05:02 AM    MCH 29.8 2023 05:02 AM    MCHC 33.2 2023 05:02 AM    RDW 15.1 2023 05:02 AM     2023 05:02 AM    MPV 8.2 2023 05:02 AM       Lab Results   Component Value Date/Time    SPECIAL NOT REPORTED 2021 12:04 PM     Lab Results   Component Value Date/Time    CULTURE KLEBSIELLA PNEUMONIAE >153667 CFU/ML (A) 2021 12:04 PM         Radiology:    XR CHEST PORTABLE    Result Date: 2023  EXAMINATION: ONE XRAY VIEW OF THE CHEST 2023 11:53 am COMPARISON: Chest x-ray dated 2021 HISTORY: ORDERING SYSTEM PROVIDED HISTORY: chest pain TECHNOLOGIST PROVIDED HISTORY: chest pain Reason for Exam: chest pain FINDINGS: Stable cardiomediastinal silhouette. Status post median sternotomy. No pneumothorax or pleural effusion. No acute airspace infiltrate. No acute cardiopulmonary findings         Physical Examination:        PHYSICAL EXAM:  General Appearance  Alert , awake, not in acute distress  Psych: Normal mood and affect, normal behavior, not agitated, maintaining good eye contact   HEENT - Head is normocephalic, atraumatic.   Neck: supple, no rigidity, normal ROM, no neck swellings, normal thyroid gland  Lungs - Bilateral equal air entry, no wheezes, rales or rhonchi, aeration good  Cardiovascular - Heart sounds are normal.  Regular rhythm, normal rate without murmur, gallop or rub. Abdomen - Soft, epigastric tenderness present. nondistended, no masses or organomegaly  Neurologic - There are no new focal motor or sensory deficits  Skin - No bruising or bleeding on exposed skin area  Extremities - No cyanosis, clubbing. Trace edema      Assessment:        Primary Problem  Chest pain    Active Hospital Problems    Diagnosis Date Noted    History of coronary artery bypass graft x 3 [Z95.1] 07/02/2020     Priority: High    Chest pain [R07.9] 02/23/2023     Priority: Medium    Diabetes mellitus type 2, noninsulin dependent ([facility] [E11.9] 10/13/2016     Priority: Medium    History of pulmonary embolus (PE) [Z86.711] 05/25/2020    Chronic anticoagulation [Z79.01] 05/25/2020    Hyperlipidemia with target LDL less than 70 [E78.5] 07/10/2019     This is a 54-year-old female patient with history of coronary disease status post CABG and 5 PCI with stents. Patient also has history of hyper lipidemia, diabetes type 2, on long-term anticoagulants for history of DVT and PE. Patient presents with chest pain that was burning in nature, going to the back. Patient denies any associated symptoms such as duration of pain to the shoulders, diaphoresis or palpitation. Patient pain occurred after she had a large meal, could be primarily due to GERD cardiology were consulted, echo was ordered. Patient admitted to rule out ACS and monitor. Awaiting cardiology input    Plan:        Chest pain possibly gastric in nature, need to rule out ACS. Underlying hx of CABG*3 and coronary stenting *5  - Patient pain and discomfort started after she had a large meal overnight, associated with epigastric tenderness on exam.  Pain without radiation to her shoulder, neck or jaw.   - Patient will be restarted on her Xarelto 20 mg daily  - Continue metoprolol, amlodipine and furosemide to be given with potassium chloride daily. - We will order echocardiogram, cardiology consulted, appreciate recommendations.  - Start patient on Protonix 40 mg daily. Diabetes Mellitus  - Patient will be started on low-dose sliding scale. - POC G.  - Hypoglycemic orders in place  Hyperlipidemia  - Continue ezetimibe and atorvastatin  Chronic anticoagulation for DVT and PE history  - Continue patient on Xarelto 20 mg daily. History of chronic diarrhea  - Continue patient loperamide as needed      GI prophylaxis: Protonix 40 mg daily    Jaki Farias MD  2/24/2023  7:30 AM     Attending Physician Statement  I have discussed the care of Luz Elena Thomas and I have examined the patient myselft and taken ros and hpi , including pertinent history and exam findings,  with the resident. I have reviewed the key elements of all parts of the encounter with the resident. I agree with the assessment, plan and orders as documented by the resident.   27-year-old lady with a history of colon cancer status post end-to-end anastomosis history of DVT on oral anticoagulation admitted with mid back pain dull in nature sensitive to touch she took nitro did not help  Echocardiogram per cardiology is pending history of grade 1 diastolic congestive heart failure  Pain is reproducible somewhat in the mid upper back we will do CT noncontrast thoracic spine  We will do a D-dimer if positive will need CT chest rule out PE I understand patient has been on oral anticoagulant    Electronically signed by Tito Sanchez MD

## 2023-02-24 NOTE — PROGRESS NOTES
[address]   OCCUPATIONAL THERAPY MISSED TREATMENT NOTE   INPATIENT   Date: 23  Patient Name: Karma Grajeda       Room: [facility]1336-  MRN: [id number]   Account #: [de-identified]    : 12/10/1933  (80 y.o.)  Gender: female                 REASON FOR MISSED TREATMENT:  Patient at testing and/or off the floor   -    1105 Pt out of room for echo. Occupational therapy will attempt again if time allows.            Robina Butts, OT

## 2023-02-24 NOTE — PROGRESS NOTES
[address] [facility] [place]   Occupational Therapy Evaluation  Date: 23  Patient Name: Dedra Born       Room: 5638/1221-41  MRN: [id number]  Account: [de-identified]   : 12/10/1933  (80 y.o.) Gender: female     Discharge Recommendations: The patient's needs are being met with no further Occupational Therapy recommended at discharge. Referring Practitioner: Charm Hashimoto, MD  Diagnosis: Chest pain         Past Medical History:  has a past medical history of Arthritis, Basal cell carcinoma of nose, Bronchitis, CAD (coronary artery disease), Carpal tunnel syndrome, Chronic diastolic heart failure ([address] CKD (chronic kidney disease), stage III ([address] Colon cancer ([address] Diabetes mellitus ([address] DVT (deep venous thrombosis) ([address] Fatty liver, GI bleed, Gout, Hematuria, History of coronary artery bypass graft x 3, Hx of blood clots, Hyperlipidemia, Hypertension, Kidney stones, Lymphedema, MGUS (monoclonal gammopathy of unknown significance), MI, old, Ophthalmoplegic migraine headache, Osteoarthritis, Other pulmonary embolism without acute cor pulmonale ([address] Personal history of colon cancer, S/P coronary artery stent placement X 3, SSS (sick sinus syndrome) ([address] TIA (transient ischemic attack), Uterine cancer ([address] UTI due to Klebsiella species, and Wears glasses. Past Surgical History:   has a past surgical history that includes Cholecystectomy; Appendectomy; Hysterectomy; Coronary artery bypass graft; Lithotripsy; Carpal tunnel release (Right); Finger trigger release (Right); Colon surgery; Dilatation, esophagus; skin biopsy; angioplasty; Carotid endarterectomy (Left, 3-3-16); Colonoscopy; cyst removal (10/07/2016); Breast surgery; joint replacement (Left, 2010); joint replacement (Right, 1999); Cardiac surgery; vascular surgery; Cystoscopy (Bilateral, 2020); Upper gastrointestinal endoscopy (N/A, 3/19/2021);  Colonoscopy (N/A, 3/19/2021); and Nose surgery (01/2022). Restrictions  Restrictions/Precautions  Restrictions/Precautions: Fall Risk;General Precautions  Required Braces or Orthoses?: No  Implants present? : Metal implants (B TKA; cardiac stents)      Vitals  Vitals  Heart Rate: 64  Heart Rate Source: Monitor  BP: (!) 148/52  BP Location: Right upper arm  BP Method: Automatic  Patient Position: Up in chair  MAP (Calculated): 84  Resp: 12  SpO2: 98 %  O2 Device: None (Room air)     Subjective  Subjective: \"I do ayush chi 3 time a week. \" Pt was pleasant and agreeable to OT/PT eval  Comments: Diogo Jo RN for OT/PT eval  Subjective  Pain: Pt denies pain but states that she is sore in her back that is a 3/10      Social/Functional History  Social/Functional History  Lives With: Alone  Type of Home: Apartment (1st floor)  Home Layout: One level  Home Access: Level entry  Bathroom Shower/Tub: Walk-in shower, Curtain  Bathroom Toilet: Handicap height (sink close by toilet)  Bathroom Equipment: Grab bars in shower, Built-in shower seat  Bathroom Accessibility: Walker accessible  Home Equipment: Walker, rolling, Rollator, Cane, Reacher  ADL Assistance: Independent  Homemaking Assistance: Independent  Homemaking Responsibilities: Yes  Ambulation Assistance: Independent  Transfer Assistance: Independent  Active : Yes  Mode of Transportation: SUV  IADL Comments: Pt reports sleeping in adjustable bed but does not use functions  Additional Comments: Neighbor that lives across the street is good help and able to assist as needed      Objective  Orientation  Overall Orientation Status: Within Functional Limits  Cognition  Overall Cognitive Status: WFL   Sensation  Overall Sensation Status: WFL (Pt denies)    ADL  Feeding: Independent  Grooming: Setup  UE Bathing: Setup  LE Bathing: Stand by assistance  UE Dressing: Setup  LE Dressing: Stand by assistance  Toileting: Stand by assistance  Additional Comments: ADL scores based on clinical reasoning and skilled observation unless otherwise noted. UE Function  LUE AROM (degrees)  LUE AROM : WFL  Left Hand AROM (degrees)  Left Hand AROM: WFL  Tone LUE  LUE Tone: Normotonic  LUE Strength  Gross LUE Strength: WFL  L Hand General: 4+/5    RUE AROM (degrees)  RUE AROM : WFL  Right Hand AROM (degrees)  Right Hand AROM: WFL  Tone RUE  RUE Tone: Normotonic  RUE Strength  Gross RUE Strength: WFL  R Hand General: 4+/5         Fine Motor Skills/Coordination  Coordination  Movements Are Fluid And Coordinated: Yes              Bed Mobility  Bed mobility  Bed Mobility Comments: pt sitting in chair at the start and end of session    Balance  Balance  Sitting Balance: Independent  Standing Balance: Contact guard assistance (Slight LOB with CGA to right self)       Transfers  Transfers  Sit to stand: Stand by assistance  Stand to sit: Stand by assistance    Functional Mobility  Functional - Mobility Device: No device  Activity: Other (to/from door)  Assist Level: Contact guard assistance  Functional Mobility Comments: CGA for safety with slight LOB. pt able to right self    Assessment  Assessment  Assessment: Pt SBA for self care tasks at this time. CGA-SBA with mobility. PT to address higher level balance. No further OT needed.   Prognosis: Good  Decision Making: Medium Complexity  No Skilled OT: Safe to return home, No OT goals identified    Activity Tolerance  Activity Tolerance: Patient Tolerated treatment well    Safety Devices  Type of Devices: Call light within reach, Left in chair, Nurse notified    Patient Education  Patient Education  Education Given To: Patient  Education Provided: Role of Therapy, Plan of Care  Education Method: Verbal  Barriers to Learning: None  Education Outcome: Verbalized understanding      Functional Outcome Measures  AM-PAC Daily Activity - Inpatient   How much help is needed for putting on and taking off regular lower body clothing?: A Little  How much help is needed for bathing (which includes washing, rinsing, drying)?: A Little  How much help is needed for toileting (which includes using toilet, bedpan, or urinal)?: A Little  How much help is needed for putting on and taking off regular upper body clothing?: A Little  How much help is needed for taking care of personal grooming?: A Little  How much help for eating meals?: None  AM-[facility] Inpatient Daily Activity Raw Score: 19  AM-PAC Inpatient ADL T-Scale Score : 40.22  ADL Inpatient CMS 0-100% Score: 42.8  ADL Inpatient CMS G-Code Modifier : CK       Goals     Short Term Goals  Time Frame for Short Term Goals: D/C OT    Plan  Occupational Therapy Plan  Times Per Week: D/C OT      OT Individual Minutes  OT Individual Minutes  Time In: 8762  Time Out: [address]  Minutes: 18           Electronically signed by AYLIN Yates on 2/24/23 at 3:28 PM EST

## 2023-02-24 NOTE — CONSULTS
[address],  [facility] [place]  [phone]    HISTORY & PHYSICAL / CONSULT NOTE     Vijay Hugo    PCP:  Delta Holguin MD    Date of Admission:  2/23/2023  Date of Consultation:  2/24/2023 8:37 AM    Consult for  elevated troponin    SUBJECTIVE     History of Present Illness:  Vijay Hugo is a 80 y.o. female  with history of coronary disease status post CABG and multiple stents, chronic diastolic heart failure, moderate aortic stenosis, mild MR, hypertension, TIA, DVT / PE on chronic anticoagulation, left carotid end arterectomy, hypertension, hyperlipidemia , advanced age. Presented to [facility] yesterday with complaint of pain in her abdomen. Stated that pain is localized in the  epigastric area, started yesterday morning and lasted for several hours until she came to the hospital.  Received x3 sublingual nitro which is not help relieve the pain. Stated she was also having some discomfort in her upper back, positional.  Denies any associated nausea or sweats or shortness of breath. No palpitations or dizziness. No orthopnea or leg edema. On arrival EKG showed patient normal sinus rhythm with nonspecific T-wave changes. No ischemic changes noted on it. High sensitivity troponin trended 22, followed by 20. Vitals reviewed. Labs reviewed. Chest x-ray reviewed. At time of visit, patient stated that her epigastric pain has improved, although has tenderness in her epigastric area. Reports having diarrhea. Denies any nausea or vomiting. No fever chills. Review of telemetry showing that patient was in sinus bradycardia, heart rate in the mid 50s, occasional PACs. No significant events overnight. Previous Medical History:    Past Medical History:   Diagnosis Date    Arthritis     Basal cell carcinoma of nose     Bronchitis     HX.  OF     CAD (coronary artery disease)     Carpal tunnel syndrome     left hand    Chronic diastolic heart failure ([address] 3/25/2021    CKD (chronic kidney disease), stage III ([address] 8/7/2019    Colon cancer ([address]     Diabetes mellitus ([address]     DVT (deep venous thrombosis) ([address] 7/10/2019    Fatty liver 8/23/2022    GI bleed 3/17/2021    Gout     Hematuria     History of coronary artery bypass graft x 3 7/2/2020    Hx of blood clots     ED. LEGS, ED. LUNGS ,REASON FOR BEING ON XARELTO    Hyperlipidemia     Hypertension     Kidney stones     Lymphedema     MGUS (monoclonal gammopathy of unknown significance) 3/18/2021    MI, old 1989    Ophthalmoplegic migraine headache     Osteoarthritis     Other pulmonary embolism without acute cor pulmonale ([address] 4/30/2013    Personal history of colon cancer 4/12/2021    2006    S/P coronary artery stent placement X 3 6/29/2020    SSS (sick sinus syndrome) ([address] 4/11/2016    TIA (transient ischemic attack)     Uterine cancer ([address]     UTI due to Klebsiella species 12/14/2020    Wears glasses        Previous Surgical History:    Past Surgical History:   Procedure Laterality Date    ANGIOPLASTY      hx. of stenting x 3. APPENDECTOMY      BREAST SURGERY      INFECTED MILK DUCT LT. CARDIAC SURGERY      CABG x 3 vessels and 3 stents    CAROTID ENDARTERECTOMY Left 3-3-16    CARPAL TUNNEL RELEASE Right     CHOLECYSTECTOMY      COLON SURGERY      colectomy, PRECANCEROUS POLYPS    COLONOSCOPY      X5, HX PRECANCEROUS POLYPS    COLONOSCOPY N/A 3/19/2021    COLONOSCOPY POLYPECTOMY REMOVAL HOT SNARE performed by Elkin Rosado MD at [address]  vessels    CYST REMOVAL  10/07/2016    EXCISION OF SEBACEOUS CYST REMOVED FROM BACK X3    CYSTOSCOPY Bilateral 1/21/2020    CYSTOSCOPY RETROGRADE PYELOGRAM performed by Anai Bonilla MD at [address] [facility], [address] Right     INDEX FINGER AND THUMB.     HYSTERECTOMY (CERVIX STATUS UNKNOWN)      JOINT REPLACEMENT Left 03/29/2010    TKA    JOINT REPLACEMENT Right 02/16/1999    TKA LITHOTRIPSY      X 3    NOSE SURGERY  01/2022    SKIN BIOPSY      TOP OF NOSE (BASAL CELL)    UPPER GASTROINTESTINAL ENDOSCOPY N/A 3/19/2021    EGD BIOPSY performed by Isabel Garrison MD at [address]      vein ablation on legs       Allergies:     Allergies   Allergen Reactions    Brilinta [Ticagrelor]      Gi BLEED    Ampicillin Other (See Comments)    Clopidogrel Bisulfate Itching    Diovan [Valsartan] Other (See Comments)     cough    Lantus [Insulin Glargine] Nausea Only     Stomach \"quivered\" after taking it, palpitations    Metformin And Related Diarrhea     Chronic kidney disease stage 3        Hospital Meds:    Current Facility-Administered Medications   Medication Dose Route Frequency Provider Last Rate Last Admin    sodium chloride flush 0.9 % injection 5-40 mL  5-40 mL IntraVENous 2 times per day Ibeth Romano MD   10 mL at 02/23/23 1939    sodium chloride flush 0.9 % injection 5-40 mL  5-40 mL IntraVENous PRN Ibeth Romano MD        0.9 % sodium chloride infusion   IntraVENous PRN Ibeth Romano MD        ondansetron (ZOFRAN-ODT) disintegrating tablet 4 mg  4 mg Oral Q8H PRN Ibeth Romano MD        Or    ondansetron [facility]) injection 4 mg  4 mg IntraVENous Q6H PRN Ibeth Romano MD        polyethylene glycol (GLYCOLAX) packet 17 g  17 g Oral Daily PRN Ibeth Romano MD        acetaminophen (TYLENOL) tablet 650 mg  650 mg Oral Q6H PRN Ibeth Romano MD   650 mg at 02/24/23 9223    Or    acetaminophen (TYLENOL) suppository 650 mg  650 mg Rectal Q6H PRN Ibeth Romano MD        potassium chloride (KLOR-CON M) extended release tablet 40 mEq  40 mEq Oral PRN Ibeth Romano MD        Or    potassium bicarb-citric acid (EFFER-K) effervescent tablet 40 mEq  40 mEq Oral PRN Ibeth Romano MD        Or    potassium chloride 10 mEq/100 mL IVPB (Peripheral Line)  10 mEq IntraVENous PRN Ana Paula Gross MD        pantoprazole (PROTONIX) tablet 40 mg  40 mg Oral QAM AC Ana Paula Gross MD   40 mg at 02/24/23 0509    calcium carbonate (TUMS) chewable tablet 500 mg  500 mg Oral TID PRN Ana Paula Gross MD        allopurinol (ZYLOPRIM) tablet 100 mg  100 mg Oral BID Ana Paula Gross MD   100 mg at 02/23/23 1939    amLODIPine (NORVASC) tablet 2.5 mg  2.5 mg Oral Daily Ana Paula Gross MD        atorvastatin (LIPITOR) tablet 80 mg  80 mg Oral Nightly Ana Paula Gross MD        Vitamin D (CHOLECALCIFEROL) tablet 1,000 Units  1 tablet Oral Daily Ana Paula Gross MD   1,000 Units at 02/23/23 1834    ezetimibe (ZETIA) tablet 10 mg  10 mg Oral Nightly Ana Paula Gross MD   10 mg at 02/23/23 1938    furosemide (LASIX) tablet 40 mg  40 mg Oral Daily Ana Paula Gross MD        loperamide (IMODIUM) capsule 2 mg  2 mg Oral 4x Daily PRN Ana Paula Gross MD        metoprolol tartrate (LOPRESSOR) tablet 25 mg  25 mg Oral BID Ana Paula Gross MD   25 mg at 02/23/23 1939    nitroGLYCERIN (NITROSTAT) SL tablet 0.4 mg  0.4 mg SubLINGual Q5 Min PRN Ana Paula Gross MD        potassium chloride (KLOR-CON M) extended release tablet 20 mEq  20 mEq Oral Daily Ana Paula Gross MD        rivaroxaban (XARELTO) tablet 20 mg  20 mg Oral Daily Ana Paula Gross MD        glucose chewable tablet 16 g  4 tablet Oral PRN Ana Paula Gross MD        dextrose bolus 10% 125 mL  125 mL IntraVENous PRN Ana Paula Gross MD        Or    dextrose bolus 10% 250 mL  250 mL IntraVENous PRN Ana Paula Gross MD        glucagon (rDNA) injection 1 mg  1 mg SubCUTAneous PRN Ana Paula Gross MD        dextrose 10 % infusion   IntraVENous Continuous PRN Ana Paula Gross MD        insulin lispro (HUMALOG) injection vial 0-4 Units  0-4 Units SubCUTAneous TID WC Venus Herrmann MD        insulin lispro (HUMALOG) injection vial 0-4 Units  0-4 Units SubCUTAneous Nightly Venus Herrmann MD           Home Meds:    Prior to Admission medications    Medication Sig Start Date End Date Taking? Authorizing Provider   potassium chloride (KLOR-CON M) 20 MEQ extended release tablet TAKE 1 TABLET DAILY, TAKE WITH FUROSEMIDE 1/25/23   Shari Medina MD   blood glucose test strips (FREESTYLE LITE) strip USE TO TEST TWICE DAILY 1/5/23   Shari Medina MD   rivaroxaban (XARELTO) 20 MG TABS tablet TAKE 1 TABLET DAILY WITH BREAKFAST 12/27/22   Telly Lozano, APRN - CNP   pneumococcal 20-valent conjugat (PREVNAR 20) 0.5 ML GEORGIE inj Inject 1 mL into the muscle once 8/12/22   Historical Provider, MD   glucose monitoring (FREESTYLE FREEDOM) kit Please supply Patient with a glucose monitoring kit that insurance will cover. 8/12/22   Shari Medina MD   Lancets 30G MISC Testing once a day. Please dispense according to patients insurance.  8/12/22   Shari Medina MD   ezetimibe (ZETIA) 10 MG tablet TAKE 1 TABLET NIGHTLY 8/3/22   Shari Medina MD   loperamide (RA ANTI-DIARRHEAL) 2 MG capsule Take 1 capsule by mouth 4 times daily as needed for Diarrhea 7/11/22   Harvey Martin MD   ferrous sulfate (IRON 325) 325 (65 Fe) MG tablet Take 1 tablet by mouth daily (with breakfast)  Patient not taking: No sig reported 6/8/22   [facility], MD   amLODIPine (NORVASC) 2.5 MG tablet TAKE 1 TABLET DAILY 6/2/22   Shari Medina MD   metoprolol tartrate (LOPRESSOR) 25 MG tablet TAKE 1 TABLET TWICE A DAY 5/27/22   Shari Medina MD   allopurinol (ZYLOPRIM) 100 MG tablet TAKE 1 TABLET TWICE A DAY 5/27/22   Shari Medina MD   furosemide (LASIX) 40 MG tablet TAKE 1 TABLET DAILY 4/18/22   Shari Medina MD   glimepiride (AMARYL) 4 MG tablet TAKE 1 TABLET TWICE A DAY 4/18/22 Regina Leon MD   atorvastatin (LIPITOR) 80 MG tablet TAKE 1 TABLET NIGHTLY 4/18/22   Regina Leon MD   famotidine (PEPCID) 40 MG tablet TAKE 1 TABLET EVERY EVENING 3/21/22   Harvey Martin MD   Handicajudie Placard MISC by Does not apply route Can't walk greater than 200 feet. Expires in 5 years. 11/18/21   Regina Leon MD   FreeStyle Lancets MISC USE TO TEST BLOOD SUGAR TWICE A DAY 11/17/21   Regina Leon MD   Lancet Device MISC For use with lancets for blood glucose checks 11/17/21   Regina Leon MD   mirtazapine (REMERON) 7.5 MG tablet Take 1-2 tablets by mouth nightly For insomnia and anxiety. Call for refills or dose adjustment. Patient not taking: No sig reported 11/17/21   Regina Leon MD   cyanocobalamin ([employer] VITAMIN B12) 1000 MCG tablet Take 1 tablet by mouth daily 4/6/21   Regina Leon MD   Blood Glucose Monitoring Suppl (FREESTYLE LITE) SARY use as directed 8/12/16   Historical Provider, MD   nitroGLYCERIN (NITROSTAT) 0.4 MG SL tablet Place 1 tablet under the tongue every 5 minutes as needed for Chest pain 5/24/16   Aaron Carlson MD   Biotin 300 MCG TABS Take 600 mcg by mouth every other day     Historical Provider, MD   Cholecalciferol (VITAMIN D3) 1000 UNITS TABS Take 1 tablet by mouth daily    Historical Provider, MD        Social History:  TOBACCO:   reports that she has never smoked. She has never used smokeless tobacco.  ETOH:   reports no history of alcohol use. DRUGS:  reports no history of drug use. OCCUPATION:      Family History:   Family History   Problem Relation Age of Onset    Stroke Mother     Hypertension Mother     Heart Disease Father         AAA    Stroke Sister     Parkinsonism Brother     Cancer Sister         lung    Alcohol Abuse Sister         Review of Systems:   Constitutional: there has been no unanticipated weight loss, no change in energy level, sleep pattern, or activity level.      Eyes: No visual changes or diplopia, no scleral icterus. ENT: No Headaches, hearing loss or vertigo. No sore throat  Cardiovascular:  See HPI   Respiratory: No cough or wheezing, no sputum production, no hematemesis. Gastrointestinal: No abdominal pain, no constipation, no diarrhea, no hematochezia, no melena. Genitourinary: No dysuria, trouble voiding, or hematuria  Musculoskeletal:  No gait disturbance, weakness or joint complaints  Integumentary: No rash or pruritis  Neurological: No headache, focal muscle weakness, focal numbness or tingling. Hematologic/Lymphatic: No abnormal bruising or bleeding, blood clots. Allergic/Immunologic: No nasal congestion or hives    OBJECTIVE     LAST LABS:   CBC: @LABRCNTIP(WBC:3,HGB:3,HCT:3,MCV:3,PLT:3)@      [email or web address]  PT/INR: @LABRCNTIP(PROTIME:3,INR:3)@  APTT: @LABRCNTIP(aPTT:3)@  MAG: @LABRCNTIP(MG:3)@  D Dimer: @LABRCNTIP(DDIMER:3)@  Troponin I @LABRCNTIP(TROPONINI:3)@  ProBNP @LABRCNTIP(BNP:3)@  Lipid Panel:    Lab Results   Component Value Date/Time    CHOL 114 04/18/2022 11:27 AM    CHOL 104 01/16/2015 12:00 AM    TRIG 171 04/18/2022 11:27 AM    HDL 35 04/18/2022 11:27 AM     Liver Panel: No results found for: ALB  HgA1C:  No components found for: HGBA1C    ABG: No components found for: ABGSAMPLETYP, ABGBODYTEMP, ABGPHCORRFOR, VCVBTI5MPFALQ, ABGPHCORRFOOR, ABGPH, ABGPCO2, ABGPO2, ABGBASEEXCES, ABGBASEDEFIC, ABGHCO3, DNKD0KMG, ABGENDTIDALC, ABGALLENSTES, ABGSPO2, ABGSAMEPLESIT, [id number], ABGOXYGENSOUE    RADIOLOGY:  [unfilled]    PHYSICAL EXAM  Admission Weight: Weight: 188 lb 7.9 oz (85.5 kg)  No intake/output data recorded. Weight change:    Wt Readings from Last 3 Encounters:   02/23/23 188 lb 7.9 oz (85.5 kg)   01/26/23 185 lb (83.9 kg)   12/27/22 189 lb (85.7 kg)       Vitals:  Vitals:    02/23/23 1830 02/23/23 2315 02/24/23 0645 02/24/23 0657   BP: 139/79 (!) 139/40 (!) 138/57    Pulse: 69 70 58 58 Resp: 17 18 12    Temp: 98.1 °F (36.7 °C) 98.5 °F (36.9 °C) 97.4 °F (36.3 °C)    TempSrc: Oral  Oral    SpO2: 96% 97% 96%    Weight:       Height:           Admit Weight  Weight: 188 lb 7.9 oz (85.5 kg)  Last 3 Weights  [unfilled]  Body mass index is 27.84 kg/m². INTAKE/OUTPUT  No intake/output data recorded. No intake or output data in the 24 hours ending 02/24/23 0837    General appearance: Alert oriented and cooperative, in no acute distress  Skin:  Warm and dry to touch  Head: Normocephalic, without obvious abnormality, atraumatic  Eyes: Conjunctivae unremarkable, EOMs intact, sclera non icteric  Neck: No JVD, no carotid bruit, neck supple, trachea midline  Lungs: Clear to ausculation bilaterally, no use of accessory muscles. Heart[de-identified] RRR with normal S1 and S2 , grade II systolic murmur at second right intercostal space  Abdomen: Soft, +  Tenderness in the epigastric area without guarding or rebound, bowel sounds normal  Extremities: No edema  Neurologic: Oriented to time, person and place, affect appropriate, no focal/major motor or sensory defects noted  Psychiatric:  Appropriate mood, memory and judgment    ASSESSMENT      1. Nonspecific mildly elevated troponin -  peak high sensitivity troponin 22, stable, likely demand ischemia   2. History of CABG and multiple stents, remote   3. Chronic diastolic heart failure, normal EF TTE 11/2022   4. Moderate aortic stenosis, mild MR   5. Epigastric pain and tenderness, ?? Gastritis/ ulcer   6. History of DVT / PE on Xarelto   7. History of left carotid endarterectomy   8. Hypertension   9. History of TIA    PLAN     -  patient stable from a cardiac standpoint at time of visit. Has some epigastric pain and tenderness on palpation,  likely GI in nature. Recommend GI evaluation, will defer to primary team.  -  Echocardiogram ordered ordered and pending.   Follow-up on EF and wall motion, aortic valve stenosis severity.  -  No plan for ischemic workup at this time unless noted to have LV dysfunction or  wall motion abnormality. If normal EF and wall motion, no further cardiac workup will be planned. -  Continue Lipitor, Xarelto, Lopressor, Zetia and Norvasc at current doses. -  Will plan as needed per echo results. If no change compared to  previous echo, can plan for follow-up as outpatient within 3 weeks from discharge. Brandon Wharton MD    This note was completed using a voice transcription system. Every effort was made to ensure accuracy. However, inadvertent computerized transcription errors may be present.

## 2023-02-24 NOTE — PROGRESS NOTES
Physician Progress Note      Maritza Piña  CSN #:                  [id number]  :                       12/10/1933  ADMIT DATE:       2023 11:37 AM  DISCH DATE:  Vearl Barthel  PROVIDER #:        Juan Machuca          QUERY TEXT:    Pt admitted with chest pain. Pt noted to have history of heartburn. If   possible, please document in progress notes and discharge summary if you are   evaluating and/or treating any of the following: The medical record reflects the following:  Risk Factors: Hx heartburn, CAD  Clinical Indicators: per ED notes--> presents with epigastric burning that   started approximately 1 AM last night. Patient states she took Tums and one   of her nitros and pain resolved enough for her to fall asleep. Patient states   she woke up again approximately 5 AM with pain that continued. Patient   states does have history of heartburn; per H/P--> Patient pain occurred after   she had a large meal, could be primarily due to GERD, CP possibly gastric in   nature, need to r/o ACS; Cardio consult-->  Has some epigastric pain and   tenderness on palpation,  likely GI in nature. Re  Treatment: PO Aspirin, IV Pepcid x1, PO Protonix daily, Troponin x2, Cardio   consult, monitoring, ECHO  Options provided:  -- Chest pain due to GERD  -- Chest pain due to gastritis  -- Chest pain due to, please specify cause of chest pain. -- Other - I will add my own diagnosis  -- Disagree - Not applicable / Not valid  -- Disagree - Clinically unable to determine / Unknown  -- Refer to Clinical Documentation Reviewer    PROVIDER RESPONSE TEXT:    This patient has chest pain due to GERD.  Need to rule out thoracic spine   abnormalities giving back pain as well    Query created by: Cris Nickerson on 2023 1:00 PM      Electronically signed by:  Juan Machuca 2023 1:42 PM

## 2023-02-24 NOTE — PLAN OF CARE
Problem: Discharge Planning  Goal: Discharge to home or other facility with appropriate resources  2/24/2023 1650 by Saede Julian RN  Outcome: Adequate for Discharge  2/24/2023 1649 by Saeed Julian RN  Outcome: Progressing     Problem: Pain  Goal: Verbalizes/displays adequate comfort level or baseline comfort level  2/24/2023 1650 by Saeed Julian RN  Outcome: Adequate for Discharge  2/24/2023 1649 by Saeed Julian RN  Outcome: Progressing     Problem: Safety - Adult  Goal: Free from fall injury  2/24/2023 1650 by Saeed Julian RN  Outcome: Adequate for Discharge  2/24/2023 1649 by Saeed Julian RN  Outcome: Progressing     Problem: ABCDS Injury Assessment  Goal: Absence of physical injury  2/24/2023 1650 by Saeed Julian RN  Outcome: Adequate for Discharge  2/24/2023 1649 by Saeed Julian RN  Outcome: Progressing     Problem: Cardiovascular - Adult  Goal: Maintains optimal cardiac output and hemodynamic stability  2/24/2023 1650 by Saeed Julian RN  Outcome: Adequate for Discharge  2/24/2023 1649 by Saeed Julian RN  Outcome: Progressing  Goal: Absence of cardiac dysrhythmias or at baseline  2/24/2023 1650 by Saeed Julian RN  Outcome: Adequate for Discharge  2/24/2023 1649 by Saeed Julian RN  Outcome: Progressing     Problem: Chronic Conditions and Co-morbidities  Goal: Patient's chronic conditions and co-morbidity symptoms are monitored and maintained or improved  2/24/2023 1650 by Saeed Julian RN  Outcome: Adequate for Discharge  2/24/2023 1649 by Saeed Julian RN  Outcome: Progressing

## 2023-02-24 NOTE — ACP (ADVANCE CARE PLANNING)
Advance Care Planning     Advance Care Planning Activator (Inpatient)  Conversation Note      Date of ACP Conversation: 2/24/2023     Conversation Conducted with: Patient with Decision Making Capacity    ACP Activator: Hernandez Noel RN    Health Care Decision Maker:     Current Designated Health Care Decision Maker:     Primary Decision Maker: Kaci Zee - Awa - [phone]    Primary Decision Maker: Alexander Zhou - [phone]  Click here to complete Healthcare Decision Makers including section of the Healthcare Decision Maker Relationship (ie \"Primary\")  Today we documented Decision Maker(s) consistent with Legal Next of Kin hierarchy. Care Preferences    Ventilation: \"If you were in your present state of health and suddenly became very ill and were unable to breathe on your own, what would your preference be about the use of a ventilator (breathing machine) if it were available to you? \"      Would the patient desire the use of ventilator (breathing machine)?: yes    \"If your health worsens and it becomes clear that your chance of recovery is unlikely, what would your preference be about the use of a ventilator (breathing machine) if it were available to you? \"     Would the patient desire the use of ventilator (breathing machine)?: No      Resuscitation  \"CPR works best to restart the heart when there is a sudden event, like a heart attack, in someone who is otherwise healthy. Unfortunately, CPR does not typically restart the heart for people who have serious health conditions or who are very sick. \"    \"In the event your heart stopped as a result of an underlying serious health condition, would you want attempts to be made to restart your heart (answer \"yes\" for attempt to resuscitate) or would you prefer a natural death (answer \"no\" for do not attempt to resuscitate)? \" yes       [] Yes   [] No   Educated Patient / Decision Maker regarding differences between Advance Directives and portable DNR orders.     Length of ACP Conversation in minutes:      Conversation Outcomes:  ACP discussion completed    Follow-up plan:    [] Schedule follow-up conversation to continue planning  [] Referred individual to Provider for additional questions/concerns   [] Advised patient/agent/surrogate to review completed ACP document and update if needed with changes in condition, patient preferences or care setting    [] This note routed to one or more involved healthcare providers

## 2023-02-24 NOTE — H&P
[facility]     HISTORY AND PHYSICAL EXAMINATION            Date:   2/23/2023  Patient name:  Tatianna Daly  Date of admission:  2/23/2023 11:37 AM  MRN:   [id number]  Account:  [de-identified]  YOB: 1933  PCP:    Claria Mcardle, MD  Room:   10/10  Code Status:    Prior    Chief Complaint:     Chief Complaint   Patient presents with    Chest Pain    Back Pain     History Obtained From:     patient    History of Present Illness: The patient is a 80 y.o. Non- / non  female who presents withChest Pain and Back Pain   and she is admitted to the hospital for investigating and ruling out ACS. Patient is a pleasant 80-year-old lady with history of coronary disease status post CABG3 and 5 stenting most recently was in 2020. Patient also has a history of type 2 diabetes mellitus without insulin dependence, hyperlipidemia, history of DVT and PE, history of colon ca status post resection in 2006    Ms. Key Glass states that she over ate last night, cannot sleep at 5 AM that time she was having burning sensation/chest pain on the lower portion of the sternum, that radiates to her back. Patient initially took nitroglycerin pill and Tums which seemed to help then went to bed. Patient woke up at 9 AM with the same symptoms, took more nitroglycerin without significant improvement or symptoms. Patient denies radiation of pain to the shoulders, jaw, or neck. Patient also denies any diaphoresis or shortness of breath associated with the symptoms. Patient has not had any similar symptoms previously. The symptoms are not similar to when she had a heart attack. Patient also denies any palpitation, denies lower extremity edema.   Patient said that she wears her stockings and she does not have any significant edema in her lower extremities. Patient is compliant with follow-up with her cardiologist Dr. Komal Almaraz. In ED, patient had a troponin 22 followed by 20. The patient was stable blood pressure was 128/68. Another test was soft 110/49. Patient was given aspirin 324 mg, continued on home meds. Past Medical History:     Past Medical History:   Diagnosis Date    Arthritis     Basal cell carcinoma of nose     Bronchitis     HX. OF     CAD (coronary artery disease)     Carpal tunnel syndrome     left hand    Chronic diastolic heart failure ([address] 3/25/2021    CKD (chronic kidney disease), stage III ([address] 8/7/2019    Colon cancer ([address]     Diabetes mellitus ([address]     DVT (deep venous thrombosis) ([address] 7/10/2019    Fatty liver 8/23/2022    GI bleed 3/17/2021    Gout     Hematuria     History of coronary artery bypass graft x 3 7/2/2020    Hx of blood clots     ED. LEGS, ED. LUNGS ,REASON FOR BEING ON XARELTO    Hyperlipidemia     Hypertension     Kidney stones     Lymphedema     MGUS (monoclonal gammopathy of unknown significance) 3/18/2021    MI, old 1989    Ophthalmoplegic migraine headache     Osteoarthritis     Other pulmonary embolism without acute cor pulmonale ([address] 4/30/2013    Personal history of colon cancer 4/12/2021 2006    S/P coronary artery stent placement X 3 6/29/2020    SSS (sick sinus syndrome) ([address] 4/11/2016    TIA (transient ischemic attack)     Uterine cancer ([address]     UTI due to Klebsiella species 12/14/2020    Wears glasses         Past SurgicalHistory:     Past Surgical History:   Procedure Laterality Date    ANGIOPLASTY      hx. of stenting x 3. APPENDECTOMY      BREAST SURGERY      INFECTED MILK DUCT LT.     CARDIAC SURGERY      CABG x 3 vessels and 3 stents    CAROTID ENDARTERECTOMY Left 3-3-16    CARPAL TUNNEL RELEASE Right     CHOLECYSTECTOMY      COLON SURGERY      colectomy, PRECANCEROUS POLYPS    COLONOSCOPY      X5, HX PRECANCEROUS POLYPS    COLONOSCOPY N/A 3/19/2021    COLONOSCOPY POLYPECTOMY REMOVAL HOT SNARE performed by Sasha Braun MD at [facility]      X3 vessels    CYST REMOVAL  10/07/2016    EXCISION OF SEBACEOUS CYST REMOVED FROM BACK X3    CYSTOSCOPY Bilateral 1/21/2020    CYSTOSCOPY RETROGRADE PYELOGRAM performed by Radha Nieves MD at [address], [address] Right     INDEX FINGER AND THUMB. HYSTERECTOMY (CERVIX STATUS UNKNOWN)      JOINT REPLACEMENT Left 03/29/2010    TKA    JOINT REPLACEMENT Right 02/16/1999    TKA    LITHOTRIPSY      X 3    NOSE SURGERY  01/2022    SKIN BIOPSY      TOP OF NOSE (BASAL CELL)    UPPER GASTROINTESTINAL ENDOSCOPY N/A 3/19/2021    EGD BIOPSY performed by Sasha Braun MD at [address]      vein ablation on legs        Medications Prior to Admission:        Prior to Admission medications    Medication Sig Start Date End Date Taking? Authorizing Provider   potassium chloride (KLOR-CON M) 20 MEQ extended release tablet TAKE 1 TABLET DAILY, TAKE WITH FUROSEMIDE 1/25/23   Ina Sanford MD   blood glucose test strips (FREESTYLE LITE) strip USE TO TEST TWICE DAILY 1/5/23   Ina Sanford MD   rivaroxaban (XARELTO) 20 MG TABS tablet TAKE 1 TABLET DAILY WITH BREAKFAST 12/27/22   Telly Lozano, APRN - CNP   pneumococcal 20-valent conjugat (PREVNAR 20) 0.5 ML GEORGIE inj Inject 1 mL into the muscle once 8/12/22   Historical Provider, MD   glucose monitoring (FREESTYLE FREEDOM) kit Please supply Patient with a glucose monitoring kit that insurance will cover. 8/12/22   Ina Sanford MD   Lancets 30G MISC Testing once a day. Please dispense according to patients insurance.  8/12/22   Ina Sanford MD   ezetimibe (ZETIA) 10 MG tablet TAKE 1 TABLET NIGHTLY 8/3/22   Ina Sanford MD   loperamide (RA ANTI-DIARRHEAL) 2 MG capsule Take 1 capsule by mouth 4 times daily as needed for Diarrhea 7/11/22   Harvey Martin MD   ferrous sulfate (IRON 325) 325 (65 Fe) MG tablet Take 1 tablet by mouth daily (with breakfast)  Patient not taking: No sig reported 6/8/22   [facility], MD   amLODIPine (NORVASC) 2.5 MG tablet TAKE 1 TABLET DAILY 6/2/22   Tej Mccloud MD   metoprolol tartrate (LOPRESSOR) 25 MG tablet TAKE 1 TABLET TWICE A DAY 5/27/22   Tej Mccloud MD   allopurinol (ZYLOPRIM) 100 MG tablet TAKE 1 TABLET TWICE A DAY 5/27/22   Tej Mccloud MD   furosemide (LASIX) 40 MG tablet TAKE 1 TABLET DAILY 4/18/22   Tej Mccloud MD   glimepiride (AMARYL) 4 MG tablet TAKE 1 TABLET TWICE A DAY 4/18/22   Tej Mccloud MD   atorvastatin (LIPITOR) 80 MG tablet TAKE 1 TABLET NIGHTLY 4/18/22   Tej Mccloud MD   famotidine (PEPCID) 40 MG tablet TAKE 1 TABLET EVERY EVENING 3/21/22   Harvey Martin MD   Handicap Placard MISC by Does not apply route Can't walk greater than 200 feet. Expires in 5 years. 11/18/21   Tej Mccloud MD   FreeStyle Lancets MISC USE TO TEST BLOOD SUGAR TWICE A DAY 11/17/21   Tej Mccloud MD   Lancet Device MISC For use with lancets for blood glucose checks 11/17/21   Tej Mccloud MD   mirtazapine (REMERON) 7.5 MG tablet Take 1-2 tablets by mouth nightly For insomnia and anxiety. Call for refills or dose adjustment.   Patient not taking: No sig reported 11/17/21   Tej Mccloud MD   cyanocobalamin ([employer] VITAMIN B12) 1000 MCG tablet Take 1 tablet by mouth daily 4/6/21   Tej Mccloud MD   Blood Glucose Monitoring Suppl (FREESTYLE LITE) SARY use as directed 8/12/16   Historical Provider, MD   nitroGLYCERIN (NITROSTAT) 0.4 MG SL tablet Place 1 tablet under the tongue every 5 minutes as needed for Chest pain 5/24/16   Kasandra Cerna MD   Biotin 300 MCG TABS Take 600 mcg by mouth every other day     Historical Provider, MD   Cholecalciferol (VITAMIN D3) 1000 UNITS TABS Take 1 tablet by mouth daily    Historical Provider, MD        Allergies:     Brilinta [ticagrelor], Ampicillin, Clopidogrel bisulfate, Diovan [valsartan], Lantus [insulin glargine], and Metformin and related    Social History:     Tobacco:    reports that she has never smoked. She has never used smokeless tobacco.  Alcohol:      reports no history of alcohol use. Drug Use:  reports no history of drug use. Family History:     Family History   Problem Relation Age of Onset    Stroke Mother     Hypertension Mother     Heart Disease Father         AAA    Stroke Sister     Parkinsonism Brother     Cancer Sister         lung    Alcohol Abuse Sister        Review of Systems:     Positive and Negative as described in HPI. Review of Systems   Constitutional:  Negative for activity change, appetite change, diaphoresis and fatigue. HENT:  Negative for congestion, ear pain, sinus pressure and sinus pain. Eyes:  Negative for photophobia. Respiratory:  Negative for cough, chest tightness, shortness of breath and wheezing. Cardiovascular:  Positive for chest pain. Negative for palpitations and leg swelling. Gastrointestinal:  Positive for diarrhea. Negative for abdominal distention, abdominal pain, blood in stool, constipation, nausea and vomiting. Endocrine: Negative for polyuria. Genitourinary:  Negative for dysuria, frequency and urgency. Skin:  Negative for color change. Neurological:  Negative for dizziness, light-headedness and headaches. Psychiatric/Behavioral:  Negative for agitation and self-injury. Physical Exam:   BP (!) 110/49   Pulse 68   Temp 98.1 °F (36.7 °C)   Resp 18   SpO2 98%   Temp (24hrs), Av.2 °F (36.8 °C), Min:98.1 °F (36.7 °C), Max:98.3 °F (36.8 °C)    No results for input(s): POCGLU in the last 72 hours. No intake or output data in the 24 hours ending 23 1531    Physical Exam  Vitals and nursing note reviewed. Constitutional:       Appearance: Normal appearance. HENT:      Head: Normocephalic.       Nose: Nose normal.      Mouth/Throat:      Mouth: Mucous membranes are moist. Eyes:      Conjunctiva/sclera: Conjunctivae normal.   Cardiovascular:      Rate and Rhythm: Normal rate and regular rhythm. Pulses: Normal pulses. Heart sounds: Normal heart sounds. Pulmonary:      Effort: Pulmonary effort is normal.      Breath sounds: Normal breath sounds. Abdominal:      General: Abdomen is flat. There is no distension. Palpations: Abdomen is soft. Tenderness: There is abdominal tenderness (Epigastric). There is no rebound. Musculoskeletal:         General: No swelling or tenderness. Cervical back: Neck supple. Right lower leg: Edema (trace) present. Left lower leg: Edema (trace) present. Neurological:      Mental Status: She is alert. Mental status is at baseline.    Psychiatric:         Mood and Affect: Mood normal.       Investigations:     Laboratory Testing:  Recent Results (from the past 24 hour(s))   CBC with Auto Differential    Collection Time: 02/23/23 12:02 PM   Result Value Ref Range    WBC 7.6 3.5 - 11.0 k/uL    RBC 4.33 4.0 - 5.2 m/uL    Hemoglobin 12.6 12.0 - 16.0 g/dL    Hematocrit 38.5 36 - 46 %    MCV 88.8 80 - 100 fL    MCH 29.2 26 - 34 pg    MCHC 32.9 31 - 37 g/dL    RDW 15.1 (H) 11.5 - 14.9 %    Platelets 153 719 - 492 k/uL    MPV 8.2 6.0 - 12.0 fL    Seg Neutrophils 63 36 - 66 %    Lymphocytes 25 24 - 44 %    Monocytes 9 (H) 1 - 7 %    Eosinophils % 2 0 - 4 %    Basophils 1 0 - 2 %    Segs Absolute 4.90 1.3 - 9.1 k/uL    Absolute Lymph # 1.90 1.0 - 4.8 k/uL    Absolute Mono # 0.60 0.1 - 1.3 k/uL    Absolute Eos # 0.10 0.0 - 0.4 k/uL    Basophils Absolute 0.00 0.0 - 0.2 k/uL   CMP    Collection Time: 02/23/23 12:02 PM   Result Value Ref Range    Glucose 188 (H) 70 - 99 mg/dL    BUN 15 8 - 23 mg/dL    Creatinine 0.68 0.50 - 0.90 mg/dL    Est, Glom Filt Rate >60 >60 mL/min/1.73m2    Calcium 9.6 8.6 - 10.4 mg/dL    Sodium 136 135 - 144 mmol/L    Potassium 4.4 3.7 - 5.3 mmol/L    Chloride 100 98 - 107 mmol/L    CO2 26 20 - 31 mmol/L Anion Gap 10 9 - 17 mmol/L    Alkaline Phosphatase 56 35 - 104 U/L    ALT 28 5 - 33 U/L    AST 25 <32 U/L    Total Bilirubin 1.5 (H) 0.3 - 1.2 mg/dL    Total Protein 6.9 6.4 - 8.3 g/dL    Albumin 4.2 3.5 - 5.2 g/dL   Magnesium    Collection Time: 02/23/23 12:02 PM   Result Value Ref Range    Magnesium 1.9 1.6 - 2.6 mg/dL   Troponin    Collection Time: 02/23/23 12:02 PM   Result Value Ref Range    Troponin, High Sensitivity 22 (H) 0 - 14 ng/L   Protime-INR    Collection Time: 02/23/23 12:02 PM   Result Value Ref Range    Protime 23.7 (H) 11.8 - 14.6 sec    INR 2.1    APTT    Collection Time: 02/23/23 12:02 PM   Result Value Ref Range    PTT 38.7 (H) 24.0 - 36.0 sec   Lipase    Collection Time: 02/23/23 12:02 PM   Result Value Ref Range    Lipase 17 13 - 60 U/L   Troponin    Collection Time: 02/23/23  1:53 PM   Result Value Ref Range    Troponin, High Sensitivity 20 (H) 0 - 14 ng/L       Imaging/Diagnostics:  XR CHEST PORTABLE    Result Date: 2/23/2023  EXAMINATION: ONE XRAY VIEW OF THE CHEST 2/23/2023 11:53 am COMPARISON: Chest x-ray dated 30 August 2021 HISTORY: ORDERING SYSTEM PROVIDED HISTORY: chest pain TECHNOLOGIST PROVIDED HISTORY: chest pain Reason for Exam: chest pain FINDINGS: Stable cardiomediastinal silhouette. Status post median sternotomy. No pneumothorax or pleural effusion. No acute airspace infiltrate. No acute cardiopulmonary findings     MRI BRAIN W WO CONTRAST    Result Date: 2/9/2023  EXAMINATION: MRI OF THE BRAIN WITHOUT AND WITH CONTRAST  2/9/2023 4:05 pm TECHNIQUE: Multiplanar multisequence MRI of the head/brain was performed without and with the administration of intravenous contrast. COMPARISON: 06/15/2022 HISTORY: ORDERING SYSTEM PROVIDED HISTORY: Focal seizure ([address] TECHNOLOGIST PROVIDED HISTORY: STAT Creatinine as needed:->No Reason for Exam: Pt c/o having episodes with interrupted speech usually lasting about 30mins. Ordered states seizure disorder, seizure protocal scanned.   Pt has dental implants resulting in artifact. FINDINGS: INTRACRANIAL STRUCTURES/VENTRICLES:  There is no acute infarct. No mass effect or midline shift. No evidence of an acute intracranial hemorrhage. The ventricles and sulci are normal in size and configuration. The sellar/suprasellar regions appear unremarkable. The normal signal voids within the major intracranial vessels appear maintained. No abnormal focus of enhancement is seen within the brain. There are few scattered foci of T2/FLAIR hyperintensity in the subcortical and periventricular white matter, consistent with microangiopathic change. The hippocampi are symmetric. ORBITS: The visualized portion of the orbits demonstrate no acute abnormality. SINUSES: The visualized paranasal sinuses and mastoid air cells demonstrate no acute abnormality. BONES/SOFT TISSUES: The bone marrow signal intensity appears normal. The soft tissues demonstrate no acute abnormality. 1. No acute intracranial abnormality. 2. Minimal microangiopathic change. RECOMMENDATIONS: Unavailable       Assessment :      Primary Problem  Chest pain    Active Hospital Problems    Diagnosis Date Noted    Chest pain [R07.9] 02/23/2023     Priority: Medium   This is a 80-year-old female patient with history of coronary disease status post CABG and 5 PCI with stents. Patient also has history of hyper lipidemia, diabetes type 2, on long-term anticoagulants for history of DVT and PE. Patient presents with chest pain that was burning in nature, going to the back. Patient denies any associated symptoms such as duration of pain to the shoulders, diaphoresis or palpitation. Patient pain occurred after she had a large meal, could be primarily due to GERD cardiology were consulted, echo was ordered. Patient admitted to rule out ACS and monitor.   Awaiting cardiology input    Plan:     Patient status Admit as inpatient in the  Progressive Unit/Step down    Chest pain possibly gastric in nature, need to rule out ACS. Underlying hx of CABG*3 and coronary stenting *5  - Patient pain and discomfort started after she had a large meal overnight, associated with epigastric tenderness on exam.  Pain without radiation to her shoulder, neck or jaw. - Patient will be restarted on her Xarelto 20 mg daily  - Continue metoprolol, amlodipine and furosemide to be given with potassium chloride daily. - We will order echocardiogram, cardiology consulted, appreciate recommendations.  - Start patient on Protonix 40 mg daily. Diabetes Mellitus  - Patient will be started on low-dose sliding scale. - POC G.  - Hypoglycemic orders in place  Hyperlipidemia  - Continue ezetimibe and atorvastatin  Chronic anticoagulation for DVT and PE history  - Continue patient on Xarelto 20 mg daily. History of chronic diarrhea  - Continue patient loperamide as needed        Consultations:   IP CONSULT TO HOSPITALIST  IP CONSULT TO CARDIOLOGY     Patient is admitted as inpatient status because of co-morbiditieslisted above, severity of signs and symptoms as outlined, requirement for current medical therapies and most importantly because of direct risk to patient if care not provided in a hospital setting. Christiana Mckenzie MD  2/23/2023  3:31 PM    Copy sent to [facility]  Attending Physician Statement  I have discussed the care of Caesar Mina and I have examined the patient myselft and taken ros and hpi , including pertinent history and exam findings,  with the resident. I have reviewed the key elements of all parts of the encounter with the resident. I agree with the assessment, plan and orders as documented by the resident.   31-year-old lady with a history of colon cancer status post end-to-end anastomosis history of DVT on oral anticoagulation admitted with mid back pain dull in nature sensitive to touch she took nitro did not help  Echocardiogram per cardiology is pending history of grade 1 diastolic congestive heart failure  Pain is reproducible somewhat in the mid upper back we will do CT noncontrast thoracic spine  We will do a D-dimer if positive will need CT chest rule out PE I understand patient has been on oral anticoagulant      Electronically signed by Paul Zavala MD

## 2023-02-27 LAB
EKG ATRIAL RATE: 66 BPM
EKG P AXIS: 33 DEGREES
EKG P-R INTERVAL: 160 MS
EKG Q-T INTERVAL: 412 MS
EKG QRS DURATION: 90 MS
EKG QTC CALCULATION (BAZETT): 431 MS
EKG R AXIS: 41 DEGREES
EKG T AXIS: 93 DEGREES
EKG VENTRICULAR RATE: 66 BPM

## 2023-02-27 PROCEDURE — 93010 ELECTROCARDIOGRAM REPORT: CPT | Performed by: INTERNAL MEDICINE

## 2023-02-27 NOTE — DISCHARGE SUMMARY
[address]    Discharge Summary     Patient ID: Alyce Martin  :  12/10/1933   MRN: [id number]     ACCOUNT:  [de-identified]   Patient's PCP: Swetha Joseph MD  Admit Date: 2023   Discharge Date: 2023  Length of Stay: 1  Code Status:  Prior  Admitting Physician: Ashley Sorto MD  Discharge Physician: Antonina Quijano MD     Active Discharge Diagnoses:       Primary Problem  Chest pain      [place] Problems    Diagnosis Date Noted    History of coronary artery bypass graft x 3 [Z95.1] 2020     Priority: High    Chest pain [R07.9] 2023     Priority: Medium    Diabetes mellitus type 2, noninsulin dependent ([address] [E11.9] 10/13/2016     Priority: Medium    History of pulmonary embolus (PE) [Z86.711] 2020    Chronic anticoagulation [Z79.01] 2020    Hyperlipidemia with target LDL less than 70 [E78.5] 07/10/2019       Admission Condition:  good     Discharged Condition: good    Hospital Stay:       Hospital Course:  Alyce Martin is a 80 y.o. female who was admitted for the management of with history of CAD status post CABG and 5 stents most recently in . Patient has a history of type II DM, HLP, DVT/PE, history of colon CA status postresection . Patient states that she felt that she over ate at night, presented after she had some burning sensation/chest pain lower portion of the sternum and epigastric which radiated to her back. Patient initially took some nitroglycerin pills and thousand which seemed to help and went to bed woke up again at 9 AM with the same complaints. On ED patient had troponin of 22 followed by 20. Patient blood pressure was 128/68. Patient was given aspirin and continued home meds. Patient had echocardiogram and cardiology were consulted. Patient follows with Dr. Ofelia Newman      Echocardiogram showed an ejection fraction of 65 to 70%.   (Left ventricular mild hypertrophy. Patient was then discharged as pain was most likely presenting gastric origin. Patient to follow-up as an outpatient with her gastroenterologist    Significant therapeutic interventions: None    Significant Diagnostic Studies:   Labs / Micro:  CBC:   Lab Results   Component Value Date/Time    WBC 8.3 02/24/2023 05:02 AM    RBC 4.26 02/24/2023 05:02 AM    RBC 4.62 02/14/2020 01:15 PM    HGB 12.7 02/24/2023 05:02 AM    HCT 38.2 02/24/2023 05:02 AM    MCV 89.7 02/24/2023 05:02 AM    MCH 29.8 02/24/2023 05:02 AM    MCHC 33.2 02/24/2023 05:02 AM    RDW 15.1 02/24/2023 05:02 AM     02/24/2023 05:02 AM     Troponin: WNL     Radiology:    CT THORACIC SPINE WO CONTRAST    Result Date: 2/24/2023  EXAMINATION: CT OF THE THORACIC SPINE WITHOUT CONTRAST  2/24/2023 1:09 pm: TECHNIQUE: CT of the thoracic spine was performed without the administration of intravenous contrast. Multiplanar reformatted images are provided for review. Automated exposure control, iterative reconstruction, and/or weight based adjustment of the mA/kV was utilized to reduce the radiation dose to as low as reasonably achievable. COMPARISON: None. HISTORY: ORDERING SYSTEM PROVIDED HISTORY: mid back pain TECHNOLOGIST PROVIDED HISTORY: mid back pain Reason for Exam: mid back pain FINDINGS: BONES/ALIGNMENT: There is kyphosis in the thoracic spine without significant listhesis. Thoracic vertebral body heights are maintained. There is no acute fracture. DEGENERATIVE CHANGES: There are multilevel degenerative changes of the thoracic spine. SOFT TISSUES: No paraspinal mass is seen. 1. No acute abnormality of the thoracic spine. 2. Kyphosis with multilevel degenerative change.      XR CHEST PORTABLE    Result Date: 2/23/2023  EXAMINATION: ONE XRAY VIEW OF THE CHEST 2/23/2023 11:53 am COMPARISON: Chest x-ray dated 30 August 2021 HISTORY: ORDERING SYSTEM PROVIDED HISTORY: chest pain TECHNOLOGIST PROVIDED HISTORY: chest pain Reason for Exam: chest pain FINDINGS: Stable cardiomediastinal silhouette. Status post median sternotomy. No pneumothorax or pleural effusion. No acute airspace infiltrate. No acute cardiopulmonary findings     MRI BRAIN W WO CONTRAST    Result Date: 2/9/2023  EXAMINATION: MRI OF THE BRAIN WITHOUT AND WITH CONTRAST  2/9/2023 4:05 pm TECHNIQUE: Multiplanar multisequence MRI of the head/brain was performed without and with the administration of intravenous contrast. COMPARISON: 06/15/2022 HISTORY: ORDERING SYSTEM PROVIDED HISTORY: Focal seizure ([address] TECHNOLOGIST PROVIDED HISTORY: STAT Creatinine as needed:->No Reason for Exam: Pt c/o having episodes with interrupted speech usually lasting about 30mins. Ordered states seizure disorder, seizure protocal scanned. Pt has dental implants resulting in artifact. FINDINGS: INTRACRANIAL STRUCTURES/VENTRICLES:  There is no acute infarct. No mass effect or midline shift. No evidence of an acute intracranial hemorrhage. The ventricles and sulci are normal in size and configuration. The sellar/suprasellar regions appear unremarkable. The normal signal voids within the major intracranial vessels appear maintained. No abnormal focus of enhancement is seen within the brain. There are few scattered foci of T2/FLAIR hyperintensity in the subcortical and periventricular white matter, consistent with microangiopathic change. The hippocampi are symmetric. ORBITS: The visualized portion of the orbits demonstrate no acute abnormality. SINUSES: The visualized paranasal sinuses and mastoid air cells demonstrate no acute abnormality. BONES/SOFT TISSUES: The bone marrow signal intensity appears normal. The soft tissues demonstrate no acute abnormality. 1. No acute intracranial abnormality. 2. Minimal microangiopathic change.  RECOMMENDATIONS: Unavailable         Consultations:    Consults:     Final Specialist Recommendations/Findings:   IP CONSULT TO HOSPITALIST  IP CONSULT TO SOCIAL WORK  IP CONSULT TO CARDIOLOGY      The patient was seen and examined on day of discharge and this discharge summary is in conjunction with any daily progress note from day of discharge. Discharge plan:       Disposition: Home    Physician Follow Up:     No follow-up provider specified. Requiring Further Evaluation/Follow Up POST HOSPITALIZATION/Incidental Findings: Continue to follow-up with gastroenterologist and cardiologist    Diet: cardiac diet    Activity: As tolerated    Instructions to Patient:     Discharge Medications:      Medication List        START taking these medications      calcium carbonate 500 MG chewable tablet  Commonly known as: TUMS  Take 1 tablet by mouth 3 times daily as needed for Heartburn     lidocaine 4 % external patch  Place 1 patch onto the skin daily     pantoprazole 40 MG tablet  Commonly known as: PROTONIX  Take 1 tablet by mouth every morning (before breakfast)            CONTINUE taking these medications      allopurinol 100 MG tablet  Commonly known as: ZYLOPRIM  TAKE 1 TABLET TWICE A DAY     amLODIPine 2.5 MG tablet  Commonly known as: NORVASC  TAKE 1 TABLET DAILY     atorvastatin 80 MG tablet  Commonly known as: LIPITOR  TAKE 1 TABLET NIGHTLY     Biotin 300 MCG Tabs     cyanocobalamin 1000 MCG tablet  Commonly known as: [employer] VITAMIN B12  Take 1 tablet by mouth daily     ezetimibe 10 MG tablet  Commonly known as: ZETIA  TAKE 1 TABLET NIGHTLY     * FreeStyle Lancets Misc  USE TO TEST BLOOD SUGAR TWICE A DAY     * Lancets 30G Misc  Testing once a day. Please dispense according to patients insurance. * FreeStyle Lite Francisca     * glucose monitoring kit  Please supply Patient with a glucose monitoring kit that insurance will cover.      FREESTYLE LITE strip  Generic drug: blood glucose test strips  USE TO TEST TWICE DAILY     furosemide 40 MG tablet  Commonly known as: LASIX  TAKE 1 TABLET DAILY     glimepiride 4 MG tablet  Commonly known as: AMARYL  TAKE 1 TABLET TWICE A DAY     Handicap Placard Misc  by Does not apply route Can't walk greater than 200 feet. Expires in 5 years. Lancet Device Misc  For use with lancets for blood glucose checks     loperamide 2 MG capsule  Commonly known as: RA Anti-Diarrheal  Take 1 capsule by mouth 4 times daily as needed for Diarrhea     metoprolol tartrate 25 MG tablet  Commonly known as: LOPRESSOR  TAKE 1 TABLET TWICE A DAY     nitroGLYCERIN 0.4 MG SL tablet  Commonly known as: NITROSTAT  Place 1 tablet under the tongue every 5 minutes as needed for Chest pain     potassium chloride 20 MEQ extended release tablet  Commonly known as: KLOR-CON M  TAKE 1 TABLET DAILY, TAKE WITH FUROSEMIDE     Prevnar 20 0.5 ML Sandra inj  Generic drug: pneumococcal 20-valent conjugat     rivaroxaban 20 MG Tabs tablet  Commonly known as: Xarelto  TAKE 1 TABLET DAILY WITH BREAKFAST     vitamin D3 25 MCG (1000 UT) Tabs tablet  Commonly known as: CHOLECALCIFEROL           * This list has 4 medication(s) that are the same as other medications prescribed for you. Read the directions carefully, and ask your doctor or other care provider to review them with you. STOP taking these medications      famotidine 40 MG tablet  Commonly known as: PEPCID     ferrous sulfate 325 (65 Fe) MG tablet  Commonly known as: IRON 325     mirtazapine 7.5 MG tablet  Commonly known as: REMERON               Where to Get Your Medications        These medications were sent to [address]  [facility] [address]      Phone: [phone]   calcium carbonate 500 MG chewable tablet  lidocaine 4 % external patch  pantoprazole 40 MG tablet             Electronically signed by   Jose Guerrero MD  2/27/2023  2:02 PM      Thank you Dr. Melinda Guallpa MD for the opportunity to be involved in this patient's care.

## 2023-03-17 ENCOUNTER — CARE COORDINATION (OUTPATIENT)
Dept: CARE COORDINATION | Age: 88
End: 2023-03-17

## 2023-03-17 NOTE — CARE COORDINATION
Ambulatory Care Coordination Note  3/17/2023    Patient Current Location:  Home: [address] [address]     ACM contacted the patient by telephone. Verified name and  with patient as identifiers. Provided introduction to self, and explanation of the ACM role. Challenges to be reviewed by the provider   Additional needs identified to be addressed with provider: No  none               Method of communication with provider: none. ACM: NILAY Ruffin for follow up. She is doing well. She went to her [employer] class this morning and just got home from having her hair done. She had her MRI brain done and it was negative for anything acute. She denies any chest pain, swelling, or shortness of breath. She said she has trouble sleeping at times. She thinks it's probably age related but some days she doesn't sleep at all. Encouraged her to discuss with her PCP at her appt next month. She had follow up with vascular and cardiology. No new orders. She had a f/u with neurology. She is to have an EEG done. Recommendations to take a low-dose 81 mg aspirin daily. Dea Pock denies any needs, questions, or concerns. She has contact number for AC if any future needs. Will graduate from [employer]. Offered patient enrollment in the Remote Patient Monitoring (RPM) program for in-home monitoring: NA. Lab Results       None            Care Coordination Interventions    Referral from Primary Care Provider: Yes  Suggested Interventions and Community Resources  Medi Set or Pill Pack: Declined  Zone Management Tools: Completed  Other Services or Interventions: Remote Patient Monitoring- declined          Goals Addressed                   This Visit's Progress     COMPLETED: Self Monitoring   On track     Self-Monitored Blood Glucose - I will check my blood sugar Fasting blood sugar  I will notify my provider of any trends of increasing or decreasing blood sugars over a 1 month period.   I will notify my provider if I have any blood sugar readings less than 70 more than 2 times a month. Daily Weights - I will weight myself as directed - Daily and write down weights  I will notify my provider of any increase in weight by 3 or more pounds in 2 days OR 5 or more pounds in a week. Blood Pressure - I will take my blood pressure as directed - Daily  I will notify my provider of any trends of increasing or decreasing blood pressures over a month period of time. I will notify my provider of any changes in blood pressure associated with symptoms of dizziness, falls, passing out, headache, confusion/change in mental status. Other Self-Monitoring - I will monitor my oxygen level - Daily    None Recently Recorded    Barriers: lack of education  Plan for overcoming my barriers: Care Management; Remote Patient Monitoring  Confidence: 10/10  Anticipated Goal Completion Date: 4/30/2023                Future Appointments   Date Time Provider Sherly Pitt   4/13/2023 10:40 AM Mayte Ch MD [facility] [place] [facility]\""   4/27/2023  2:00 PM [facility]   5/18/2023  2:30 PM [facility]   6/7/2023  1:30 PM Faustino Tariq MD [address] [place] [address]   ,   Diabetes Assessment    Medic Alert ID: No  Meal Planning: None   How often do you test your blood sugar?: Other (Comment: Tests \"a couple time a week\")   Do you have barriers with adherence to non-pharmacologic self-management interventions?  (Nutrition/Exercise/Self-Monitoring): No   Have you ever had to go to the ED for symptoms of low blood sugar?: No       No patient-reported symptoms   Do you have hyperglycemia symptoms?: No   Do you have hypoglycemia symptoms?: No   Blood Sugar Trends: No Change        , and   Congestive Heart Failure Assessment    Are you currently restricting fluids?: No Restriction  Do you understand a low sodium diet?: Yes  Do you understand how to read food labels?: Yes  Do you salt your food before tasting it?: No     No patient-reported symptoms      Symptoms:  None: Yes      Symptom course: stable  Weight trend: stable  Salt intake watch compared to last visit: stable

## 2023-04-15 NOTE — PROGRESS NOTES
Dr. Nguyễn Fernando, anesthesia, was contacted and informed of patient's history and planned surgery. Cardiac clearance requested. Surgery scheduling will notify Dr. Refugio Zhou office who will be responsible for making sure the clearance is obtained and is in the chart for surgery. Per Dr Alphonso Hurt note on 10/1/20 cardiac clearance was denied, per Jasmin Cardenas at Dr Refugio Zhou office clearance was denied because Dr Ivonne Buerger doesn't want to stop blood thinners at this time however per Jasmin Cardenas she doesn't have to stop blood thinners for this procedure, she is trying to get a hold of Dr Ivonne Buerger and ask if blood thinners do not need to be held is it ok to proceed with procedure. Patient is aware of situation and Dr Nguyễn Fernando was notified as well, per Dr Nguyễn Fernando cardiac clearance is required.
rt/pain, upper leg

## 2023-04-27 ENCOUNTER — HOSPITAL ENCOUNTER (OUTPATIENT)
Dept: GENERAL RADIOLOGY | Age: 88
Discharge: HOME | End: 2023-04-29
Payer: MEDICARE

## 2023-04-27 ENCOUNTER — OFFICE VISIT (OUTPATIENT)
Dept: FAMILY MEDICINE CLINIC | Age: 88
End: 2023-04-27
Payer: MEDICARE

## 2023-04-27 ENCOUNTER — HOSPITAL ENCOUNTER (OUTPATIENT)
Age: 88
Discharge: HOME | End: 2023-04-29
Payer: MEDICARE

## 2023-04-27 VITALS
DIASTOLIC BLOOD PRESSURE: 84 MMHG | HEART RATE: 64 BPM | BODY MASS INDEX: 30.72 KG/M2 | HEIGHT: 69 IN | WEIGHT: 207.4 LBS | SYSTOLIC BLOOD PRESSURE: 126 MMHG | TEMPERATURE: 97.6 F | OXYGEN SATURATION: 97 %

## 2023-04-27 DIAGNOSIS — Z78.0 POSTMENOPAUSAL STATE: ICD-10-CM

## 2023-04-27 DIAGNOSIS — E11.22 TYPE 2 DIABETES MELLITUS WITH STAGE 3A CHRONIC KIDNEY DISEASE, WITHOUT LONG-TERM CURRENT USE OF INSULIN (HCC): ICD-10-CM

## 2023-04-27 DIAGNOSIS — I12.9 BENIGN HYPERTENSION WITH CKD (CHRONIC KIDNEY DISEASE) STAGE III (HCC): ICD-10-CM

## 2023-04-27 DIAGNOSIS — M25.532 LEFT WRIST PAIN: ICD-10-CM

## 2023-04-27 DIAGNOSIS — D47.2 MGUS (MONOCLONAL GAMMOPATHY OF UNKNOWN SIGNIFICANCE): ICD-10-CM

## 2023-04-27 DIAGNOSIS — M25.532 LEFT WRIST PAIN: Primary | ICD-10-CM

## 2023-04-27 DIAGNOSIS — Z12.31 ENCOUNTER FOR SCREENING MAMMOGRAM FOR BREAST CANCER: ICD-10-CM

## 2023-04-27 DIAGNOSIS — Z85.038 PERSONAL HISTORY OF COLON CANCER: ICD-10-CM

## 2023-04-27 DIAGNOSIS — M85.80 OSTEOPENIA DETERMINED BY X-RAY: ICD-10-CM

## 2023-04-27 DIAGNOSIS — I50.32 CHRONIC DIASTOLIC HEART FAILURE (HCC): ICD-10-CM

## 2023-04-27 DIAGNOSIS — N18.31 TYPE 2 DIABETES MELLITUS WITH STAGE 3A CHRONIC KIDNEY DISEASE, WITHOUT LONG-TERM CURRENT USE OF INSULIN (HCC): ICD-10-CM

## 2023-04-27 DIAGNOSIS — E78.5 HYPERLIPIDEMIA WITH TARGET LDL LESS THAN 70: ICD-10-CM

## 2023-04-27 DIAGNOSIS — N18.30 BENIGN HYPERTENSION WITH CKD (CHRONIC KIDNEY DISEASE) STAGE III (HCC): ICD-10-CM

## 2023-04-27 LAB — HBA1C MFR BLD: 7 %

## 2023-04-27 PROCEDURE — 99214 OFFICE O/P EST MOD 30 MIN: CPT | Performed by: FAMILY MEDICINE

## 2023-04-27 PROCEDURE — 83036 HEMOGLOBIN GLYCOSYLATED A1C: CPT | Performed by: FAMILY MEDICINE

## 2023-04-27 PROCEDURE — 1036F TOBACCO NON-USER: CPT | Performed by: FAMILY MEDICINE

## 2023-04-27 PROCEDURE — G8417 CALC BMI ABV UP PARAM F/U: HCPCS | Performed by: FAMILY MEDICINE

## 2023-04-27 PROCEDURE — 73110 X-RAY EXAM OF WRIST: CPT

## 2023-04-27 PROCEDURE — 1123F ACP DISCUSS/DSCN MKR DOCD: CPT | Performed by: FAMILY MEDICINE

## 2023-04-27 PROCEDURE — 3051F HG A1C>EQUAL 7.0%<8.0%: CPT | Performed by: FAMILY MEDICINE

## 2023-04-27 PROCEDURE — G8427 DOCREV CUR MEDS BY ELIG CLIN: HCPCS | Performed by: FAMILY MEDICINE

## 2023-04-27 PROCEDURE — 1090F PRES/ABSN URINE INCON ASSESS: CPT | Performed by: FAMILY MEDICINE

## 2023-04-27 RX ORDER — ZINC GLUCONATE 50 MG
TABLET ORAL DAILY
COMMUNITY

## 2023-04-27 SDOH — ECONOMIC STABILITY: FOOD INSECURITY: WITHIN THE PAST 12 MONTHS, YOU WORRIED THAT YOUR FOOD WOULD RUN OUT BEFORE YOU GOT MONEY TO BUY MORE.: NEVER TRUE

## 2023-04-27 SDOH — ECONOMIC STABILITY: INCOME INSECURITY: HOW HARD IS IT FOR YOU TO PAY FOR THE VERY BASICS LIKE FOOD, HOUSING, MEDICAL CARE, AND HEATING?: NOT HARD AT ALL

## 2023-04-27 SDOH — ECONOMIC STABILITY: FOOD INSECURITY: WITHIN THE PAST 12 MONTHS, THE FOOD YOU BOUGHT JUST DIDN'T LAST AND YOU DIDN'T HAVE MONEY TO GET MORE.: NEVER TRUE

## 2023-04-27 ASSESSMENT — ENCOUNTER SYMPTOMS
CHEST TIGHTNESS: 0
SHORTNESS OF BREATH: 0
COUGH: 0
NAUSEA: 0
DIARRHEA: 1
ABDOMINAL DISTENTION: 0
CONSTIPATION: 0
WHEEZING: 0
VOMITING: 0
ABDOMINAL PAIN: 0

## 2023-04-27 ASSESSMENT — PATIENT HEALTH QUESTIONNAIRE - PHQ9
1. LITTLE INTEREST OR PLEASURE IN DOING THINGS: 0
SUM OF ALL RESPONSES TO PHQ QUESTIONS 1-9: 0
SUM OF ALL RESPONSES TO PHQ QUESTIONS 1-9: 0
SUM OF ALL RESPONSES TO PHQ9 QUESTIONS 1 & 2: 0
SUM OF ALL RESPONSES TO PHQ QUESTIONS 1-9: 0
2. FEELING DOWN, DEPRESSED OR HOPELESS: 0
SUM OF ALL RESPONSES TO PHQ QUESTIONS 1-9: 0

## 2023-05-10 ENCOUNTER — HOSPITAL ENCOUNTER (OUTPATIENT)
Age: 88
Discharge: HOME | End: 2023-05-10
Payer: MEDICARE

## 2023-05-10 DIAGNOSIS — R74.01 ELEVATED AST (SGOT): ICD-10-CM

## 2023-05-10 LAB
HBV SURFACE AG SER QL: NONREACTIVE
HCV AB SER QL: NONREACTIVE
IRON SATURATION: 26 % (ref 20–55)
IRON SERPL-MCNC: 84 UG/DL (ref 37–145)
TIBC SERPL-MCNC: 324 UG/DL (ref 250–450)
UNSATURATED IRON BINDING CAPACITY: 240 UG/DL (ref 112–347)

## 2023-05-10 PROCEDURE — 87340 HEPATITIS B SURFACE AG IA: CPT

## 2023-05-10 PROCEDURE — 86225 DNA ANTIBODY NATIVE: CPT

## 2023-05-10 PROCEDURE — 86256 FLUORESCENT ANTIBODY TITER: CPT

## 2023-05-10 PROCEDURE — 83550 IRON BINDING TEST: CPT

## 2023-05-10 PROCEDURE — 36415 COLL VENOUS BLD VENIPUNCTURE: CPT

## 2023-05-10 PROCEDURE — 86803 HEPATITIS C AB TEST: CPT

## 2023-05-10 PROCEDURE — 86038 ANTINUCLEAR ANTIBODIES: CPT

## 2023-05-10 PROCEDURE — 83516 IMMUNOASSAY NONANTIBODY: CPT

## 2023-05-10 PROCEDURE — 83540 ASSAY OF IRON: CPT

## 2023-05-12 LAB — SMOOTH MUSCLE ANTIBODY: 21 UNITS (ref 0–19)

## 2023-05-13 LAB
ANA SER QL IA: NEGATIVE
DSDNA IGG SER QL IA: <0.5 IU/ML
MITOCHONDRIAL ANTIBODY: 4.2 U/ML (ref 0–4)
NUCLEAR IGG SER IA-RTO: 0.3 U/ML
SMOOTH MUSCLE AB IGG TITER: NORMAL

## 2023-05-18 ENCOUNTER — OFFICE VISIT (OUTPATIENT)
Dept: GASTROENTEROLOGY | Age: 88
End: 2023-05-18
Payer: MEDICARE

## 2023-05-18 VITALS
SYSTOLIC BLOOD PRESSURE: 139 MMHG | DIASTOLIC BLOOD PRESSURE: 66 MMHG | TEMPERATURE: 97 F | HEART RATE: 61 BPM | WEIGHT: 188 LBS | BODY MASS INDEX: 27.76 KG/M2

## 2023-05-18 DIAGNOSIS — R15.9 FULL INCONTINENCE OF FECES: ICD-10-CM

## 2023-05-18 DIAGNOSIS — C18.9 ADENOCARCINOMA, COLON (HCC): Primary | ICD-10-CM

## 2023-05-18 DIAGNOSIS — R19.7 DIARRHEA, UNSPECIFIED TYPE: ICD-10-CM

## 2023-05-18 DIAGNOSIS — Z86.010 HX OF COLONIC POLYP: ICD-10-CM

## 2023-05-18 DIAGNOSIS — D50.0 ANEMIA, BLOOD LOSS: ICD-10-CM

## 2023-05-18 DIAGNOSIS — R74.01 ELEVATED AST (SGOT): ICD-10-CM

## 2023-05-18 PROCEDURE — 99214 OFFICE O/P EST MOD 30 MIN: CPT | Performed by: INTERNAL MEDICINE

## 2023-05-18 PROCEDURE — 1123F ACP DISCUSS/DSCN MKR DOCD: CPT | Performed by: INTERNAL MEDICINE

## 2023-05-18 PROCEDURE — 1090F PRES/ABSN URINE INCON ASSESS: CPT | Performed by: INTERNAL MEDICINE

## 2023-05-18 PROCEDURE — G8427 DOCREV CUR MEDS BY ELIG CLIN: HCPCS | Performed by: INTERNAL MEDICINE

## 2023-05-18 PROCEDURE — G8417 CALC BMI ABV UP PARAM F/U: HCPCS | Performed by: INTERNAL MEDICINE

## 2023-05-18 PROCEDURE — 1036F TOBACCO NON-USER: CPT | Performed by: INTERNAL MEDICINE

## 2023-05-18 RX ORDER — BISACODYL 5 MG/1
TABLET, DELAYED RELEASE ORAL
Qty: 4 TABLET | Refills: 0 | Status: SHIPPED | OUTPATIENT
Start: 2023-05-18

## 2023-05-18 RX ORDER — POLYETHYLENE GLYCOL 3350, SODIUM SULFATE ANHYDROUS, SODIUM BICARBONATE, SODIUM CHLORIDE, POTASSIUM CHLORIDE 236; 22.74; 6.74; 5.86; 2.97 G/4L; G/4L; G/4L; G/4L; G/4L
4 POWDER, FOR SOLUTION ORAL ONCE
Qty: 4000 ML | Refills: 0 | Status: SHIPPED | OUTPATIENT
Start: 2023-05-18 | End: 2023-05-18

## 2023-05-18 ASSESSMENT — ENCOUNTER SYMPTOMS
NAUSEA: 0
ABDOMINAL PAIN: 0
BLOOD IN STOOL: 0
CHOKING: 0
ANAL BLEEDING: 0
CONSTIPATION: 0
VOMITING: 0
ABDOMINAL DISTENTION: 0
DIARRHEA: 1
WHEEZING: 0
COUGH: 0
SHORTNESS OF BREATH: 0
TROUBLE SWALLOWING: 0
RECTAL PAIN: 0

## 2023-05-22 ENCOUNTER — TELEPHONE (OUTPATIENT)
Dept: GASTROENTEROLOGY | Age: 88
End: 2023-05-22

## 2023-05-22 NOTE — TELEPHONE ENCOUNTER
Dr. Junie Willard,  I have sent a xarelto clearance to your office, pt has upcoming colonoscopy with Dr. Josiane Chawla 6/8/23. Please advise if pt is clear or not, I have also faxed clearance to your office and it is scanned into media.     Thanks,  Sri Nino- Surgery Scheduler

## 2023-05-25 ENCOUNTER — HOSPITAL ENCOUNTER (OUTPATIENT)
Dept: PREADMISSION TESTING | Age: 88
Discharge: HOME | End: 2023-05-29

## 2023-05-26 VITALS — HEIGHT: 69 IN | WEIGHT: 188 LBS | BODY MASS INDEX: 27.85 KG/M2

## 2023-05-26 RX ORDER — ASPIRIN 81 MG/1
81 TABLET ORAL DAILY
COMMUNITY

## 2023-05-29 DIAGNOSIS — I12.9 BENIGN HYPERTENSION WITH CKD (CHRONIC KIDNEY DISEASE) STAGE III (HCC): ICD-10-CM

## 2023-05-29 DIAGNOSIS — N18.30 BENIGN HYPERTENSION WITH CKD (CHRONIC KIDNEY DISEASE) STAGE III (HCC): ICD-10-CM

## 2023-05-30 RX ORDER — AMLODIPINE BESYLATE 2.5 MG/1
TABLET ORAL
Qty: 90 TABLET | Refills: 3 | Status: SHIPPED | OUTPATIENT
Start: 2023-05-30

## 2023-05-30 NOTE — TELEPHONE ENCOUNTER
Please Approve or Refuse.   [facility] to Pharm[facility] per [facility]'s Request[facility]      Next Visit Date:  8/29/2023   Last Visit Date: 4/27/2023    Hemoglobin A1C (%)   Date Value   04/27/2023 7.0   12/27/2022 7.2   08/12/2022 8.0             ( goal A1C is < 7)   BP Readings from Last 3 Encounters:   05/18/23 139/66   04/27/23 126/84   04/13/23 112/62          (goal 120/80)  BUN   Date Value Ref Range Status   02/24/2023 14 8 - 23 mg/dL Final     Creatinine   Date Value Ref Range Status   02/24/2023 0.67 0.50 - 0.90 mg/dL Final     Potassium   Date Value Ref Range Status   02/24/2023 4.1 3.7 - 5.3 mmol/L Final

## 2023-05-31 ENCOUNTER — HOSPITAL ENCOUNTER (OUTPATIENT)
Dept: WOMENS IMAGING | Age: 88
Discharge: HOME | End: 2023-06-02
Payer: MEDICARE

## 2023-05-31 DIAGNOSIS — Z78.0 POSTMENOPAUSAL STATE: ICD-10-CM

## 2023-05-31 DIAGNOSIS — Z12.31 ENCOUNTER FOR SCREENING MAMMOGRAM FOR BREAST CANCER: ICD-10-CM

## 2023-05-31 PROCEDURE — 77063 BREAST TOMOSYNTHESIS BI: CPT

## 2023-05-31 PROCEDURE — 77080 DXA BONE DENSITY AXIAL: CPT

## 2023-05-31 NOTE — RESULT ENCOUNTER NOTE
Please notify patient: DEXA scan shows bones are weaker, osteopenia, moderate severe, she needs treatment  She would benefit from starting Fosamax once a week or Boniva once a month, if no issues with her teeth, let me know and I will send a prescription    Future Appointments  6/7/2023   1:30 PM    Liberty Neri MD          [address]  8/29/2023  2:30 PM    Theo Marks MD     [facility]               [facility]  10/19/2023 1:45 PM    Harvey Mendez MD            [address]

## 2023-06-06 NOTE — PRE-PROCEDURE INSTRUCTIONS
No answer, left message ? Unable to leave message ? When were you told to arrive at hospital ?  [facility]    Do you have a  ?yes    Are you on any blood thinners ? yes            If yes when did you stop taking ? Stopped on Monday    Do you have your prep Rx filled and instruction ? yes    Nothing to eat the day before , only clear liquids. yes    Are you experiencing any covid symptoms ? no    Do you have any infections or rash we should be aware of ?no      Do you have the Hibiclens soap to use the night before and the morning of surgery ? Nothing to eat or drink after midnight, only a sip of water to take any medication instructed to take the night before. yes  Wear comfortable clothing, leave any valuables at home, remove any jewelry and body piercing .  yes

## 2023-06-07 ENCOUNTER — ANESTHESIA EVENT (OUTPATIENT)
Dept: ENDOSCOPY | Age: 88
End: 2023-06-07
Payer: MEDICARE

## 2023-06-08 ENCOUNTER — HOSPITAL ENCOUNTER (OUTPATIENT)
Age: 88
Setting detail: OUTPATIENT SURGERY
Discharge: HOME | End: 2023-06-08
Attending: INTERNAL MEDICINE | Admitting: INTERNAL MEDICINE
Payer: MEDICARE

## 2023-06-08 ENCOUNTER — ANESTHESIA (OUTPATIENT)
Dept: ENDOSCOPY | Age: 88
End: 2023-06-08
Payer: MEDICARE

## 2023-06-08 VITALS
OXYGEN SATURATION: 95 % | WEIGHT: 188 LBS | TEMPERATURE: 97.3 F | SYSTOLIC BLOOD PRESSURE: 152 MMHG | RESPIRATION RATE: 20 BRPM | DIASTOLIC BLOOD PRESSURE: 69 MMHG | BODY MASS INDEX: 27.85 KG/M2 | HEART RATE: 68 BPM | HEIGHT: 69 IN

## 2023-06-08 DIAGNOSIS — C18.9 ADENOCARCINOMA OF COLON (HCC): ICD-10-CM

## 2023-06-08 LAB
GLUCOSE BLD-MCNC: 126 MG/DL (ref 65–105)
GLUCOSE BLD-MCNC: 167 MG/DL (ref 65–105)

## 2023-06-08 PROCEDURE — 3700000001 HC ADD 15 MINUTES (ANESTHESIA): Performed by: INTERNAL MEDICINE

## 2023-06-08 PROCEDURE — 6360000002 HC RX W HCPCS: Performed by: NURSE ANESTHETIST, CERTIFIED REGISTERED

## 2023-06-08 PROCEDURE — 7100000011 HC PHASE II RECOVERY - ADDTL 15 MIN: Performed by: INTERNAL MEDICINE

## 2023-06-08 PROCEDURE — 82947 ASSAY GLUCOSE BLOOD QUANT: CPT

## 2023-06-08 PROCEDURE — 2580000003 HC RX 258: Performed by: ANESTHESIOLOGY

## 2023-06-08 PROCEDURE — 2500000003 HC RX 250 WO HCPCS: Performed by: ANESTHESIOLOGY

## 2023-06-08 PROCEDURE — 88305 TISSUE EXAM BY PATHOLOGIST: CPT

## 2023-06-08 PROCEDURE — 2709999900 HC NON-CHARGEABLE SUPPLY: Performed by: INTERNAL MEDICINE

## 2023-06-08 PROCEDURE — 3609010600 HC COLONOSCOPY POLYPECTOMY SNARE/COLD BIOPSY: Performed by: INTERNAL MEDICINE

## 2023-06-08 PROCEDURE — 3700000000 HC ANESTHESIA ATTENDED CARE: Performed by: INTERNAL MEDICINE

## 2023-06-08 PROCEDURE — 7100000010 HC PHASE II RECOVERY - FIRST 15 MIN: Performed by: INTERNAL MEDICINE

## 2023-06-08 RX ORDER — SODIUM CHLORIDE 0.9 % (FLUSH) 0.9 %
5-40 SYRINGE (ML) INJECTION PRN
Status: DISCONTINUED | OUTPATIENT
Start: 2023-06-08 | End: 2023-06-08 | Stop reason: HOSPADM

## 2023-06-08 RX ORDER — LABETALOL HYDROCHLORIDE 5 MG/ML
10 INJECTION, SOLUTION INTRAVENOUS
Status: DISCONTINUED | OUTPATIENT
Start: 2023-06-08 | End: 2023-06-08 | Stop reason: HOSPADM

## 2023-06-08 RX ORDER — SODIUM CHLORIDE 0.9 % (FLUSH) 0.9 %
5-40 SYRINGE (ML) INJECTION EVERY 12 HOURS SCHEDULED
Status: DISCONTINUED | OUTPATIENT
Start: 2023-06-08 | End: 2023-06-08 | Stop reason: HOSPADM

## 2023-06-08 RX ORDER — DIPHENHYDRAMINE HYDROCHLORIDE 50 MG/ML
12.5 INJECTION INTRAMUSCULAR; INTRAVENOUS
Status: DISCONTINUED | OUTPATIENT
Start: 2023-06-08 | End: 2023-06-08 | Stop reason: HOSPADM

## 2023-06-08 RX ORDER — PROPOFOL 10 MG/ML
INJECTION, EMULSION INTRAVENOUS PRN
Status: DISCONTINUED | OUTPATIENT
Start: 2023-06-08 | End: 2023-06-08 | Stop reason: SDUPTHER

## 2023-06-08 RX ORDER — MEPERIDINE HYDROCHLORIDE 25 MG/ML
12.5 INJECTION INTRAMUSCULAR; INTRAVENOUS; SUBCUTANEOUS EVERY 5 MIN PRN
Status: DISCONTINUED | OUTPATIENT
Start: 2023-06-08 | End: 2023-06-08 | Stop reason: HOSPADM

## 2023-06-08 RX ORDER — HYDRALAZINE HYDROCHLORIDE 20 MG/ML
10 INJECTION INTRAMUSCULAR; INTRAVENOUS
Status: DISCONTINUED | OUTPATIENT
Start: 2023-06-08 | End: 2023-06-08 | Stop reason: HOSPADM

## 2023-06-08 RX ORDER — PROPOFOL 10 MG/ML
INJECTION, EMULSION INTRAVENOUS CONTINUOUS PRN
Status: DISCONTINUED | OUTPATIENT
Start: 2023-06-08 | End: 2023-06-08 | Stop reason: SDUPTHER

## 2023-06-08 RX ORDER — ONDANSETRON 2 MG/ML
4 INJECTION INTRAMUSCULAR; INTRAVENOUS
Status: DISCONTINUED | OUTPATIENT
Start: 2023-06-08 | End: 2023-06-08 | Stop reason: HOSPADM

## 2023-06-08 RX ORDER — SODIUM CHLORIDE 9 MG/ML
INJECTION, SOLUTION INTRAVENOUS PRN
Status: DISCONTINUED | OUTPATIENT
Start: 2023-06-08 | End: 2023-06-08 | Stop reason: HOSPADM

## 2023-06-08 RX ORDER — LIDOCAINE HYDROCHLORIDE 10 MG/ML
1 INJECTION, SOLUTION EPIDURAL; INFILTRATION; INTRACAUDAL; PERINEURAL
Status: COMPLETED | OUTPATIENT
Start: 2023-06-08 | End: 2023-06-08

## 2023-06-08 RX ORDER — SODIUM CHLORIDE 9 MG/ML
INJECTION, SOLUTION INTRAVENOUS CONTINUOUS
Status: DISCONTINUED | OUTPATIENT
Start: 2023-06-08 | End: 2023-06-08 | Stop reason: HOSPADM

## 2023-06-08 RX ORDER — FENTANYL CITRATE 0.05 MG/ML
25 INJECTION, SOLUTION INTRAMUSCULAR; INTRAVENOUS EVERY 5 MIN PRN
Status: DISCONTINUED | OUTPATIENT
Start: 2023-06-08 | End: 2023-06-08 | Stop reason: HOSPADM

## 2023-06-08 RX ORDER — ACETAMINOPHEN 325 MG/1
650 TABLET ORAL
Status: DISCONTINUED | OUTPATIENT
Start: 2023-06-08 | End: 2023-06-08 | Stop reason: HOSPADM

## 2023-06-08 RX ORDER — METOCLOPRAMIDE HYDROCHLORIDE 5 MG/ML
10 INJECTION INTRAMUSCULAR; INTRAVENOUS
Status: DISCONTINUED | OUTPATIENT
Start: 2023-06-08 | End: 2023-06-08 | Stop reason: HOSPADM

## 2023-06-08 RX ADMIN — SODIUM CHLORIDE: 9 INJECTION, SOLUTION INTRAVENOUS at 08:29

## 2023-06-08 RX ADMIN — LIDOCAINE HYDROCHLORIDE 1 ML: 10 INJECTION, SOLUTION EPIDURAL; INFILTRATION; INTRACAUDAL; PERINEURAL at 08:29

## 2023-06-08 RX ADMIN — PROPOFOL 50 MG: 10 INJECTION, EMULSION INTRAVENOUS at 09:25

## 2023-06-08 RX ADMIN — PROPOFOL 125 MCG/KG/MIN: 10 INJECTION, EMULSION INTRAVENOUS at 09:27

## 2023-06-08 ASSESSMENT — ENCOUNTER SYMPTOMS
ANAL BLEEDING: 0
SHORTNESS OF BREATH: 0
BLOOD IN STOOL: 0
STRIDOR: 0
DIARRHEA: 1
NAUSEA: 0
VOMITING: 0

## 2023-06-08 ASSESSMENT — LIFESTYLE VARIABLES: SMOKING_STATUS: 0

## 2023-06-08 ASSESSMENT — PAIN - FUNCTIONAL ASSESSMENT: PAIN_FUNCTIONAL_ASSESSMENT: 0-10

## 2023-06-08 NOTE — ANESTHESIA PRE PROCEDURE
02/24/2023 05:02 AM     02/24/2023 05:02 AM    CO2 26 02/24/2023 05:02 AM    BUN 14 02/24/2023 05:02 AM    CREATININE 0.67 02/24/2023 05:02 AM    GFRAA >60 08/15/2022 11:45 AM    LABGLOM >60 02/24/2023 05:02 AM    GLUCOSE 137 02/24/2023 05:02 AM    GLUCOSE 170 02/14/2020 01:15 PM    PROT 6.9 02/23/2023 12:02 PM    CALCIUM 9.0 02/24/2023 05:02 AM    BILITOT 1.5 02/23/2023 12:02 PM    ALKPHOS 56 02/23/2023 12:02 PM    AST 25 02/23/2023 12:02 PM    ALT 28 02/23/2023 12:02 PM       POC Tests: No results for input(s): POCGLU, POCNA, POCK, POCCL, POCBUN, POCHEMO, POCHCT in the last 72 hours. Coags:   Lab Results   Component Value Date/Time    PROTIME 23.7 02/23/2023 12:02 PM    PROTIME 32.1 02/21/2014 12:46 PM    INR 2.1 02/23/2023 12:02 PM    APTT 38.7 02/23/2023 12:02 PM       HCG (If Applicable): No results found for: PREGTESTUR, PREGSERUM, HCG, HCGQUANT     ABGs: No results found for: PHART, PO2ART, CUN8RDO, HOY5QPZ, BEART, P9UOMXDV     Type & Screen (If Applicable):  No results found for: LABABO, LABRH    Drug/Infectious Status (If Applicable):  Lab Results   Component Value Date/Time    HEPCAB NONREACTIVE 05/10/2023 11:38 AM       COVID-19 Screening (If Applicable):   Lab Results   Component Value Date/Time    COVID19 Not Detected 03/18/2021 09:19 AM           Anesthesia Evaluation  Patient summary reviewed and Nursing notes reviewed no history of anesthetic complications:   Airway: Mallampati: III  TM distance: >3 FB   Neck ROM: full  Mouth opening: > = 3 FB   Dental:          Pulmonary:Negative Pulmonary ROS and normal exam  breath sounds clear to auscultation      (-) pneumonia, COPD, asthma, shortness of breath, recent URI, sleep apnea, rhonchi, wheezes, rales, stridor, not a current smoker and no decreased breath sounds          Patient did not smoke on day of surgery.                  Cardiovascular:  Exercise tolerance: good (>4 METS),   (+) hypertension: no interval change, valvular problems/murmurs: General Sunscreen Counseling: Sun protect with sunscreen and clothing. Remember to reapply sunscreen. Detail Level: Generalized

## 2023-06-08 NOTE — H&P
HISTORY and [address]       NAME:  Caesar Mina  MRN: [id number]   YOB: 1933   Date: 6/8/2023   Age: 80 y.o. Gender: female       COMPLAINT AND PRESENT HISTORY:     Caesar Mina is 80 y.o.,  female, presents for COLONOSCOPY DIAGNOSTIC   Primary dx: Adenocarcinoma of colon ([facility] [C18.9]. HPI:  See portion of the note below per Dr Rosa Giles from office visit on 5/18/2023  Ms. Caesar Mina is a 80 y.o. female , referred for evaluation of     Here for f/u   History of colon cancer status post resection  Egd/colon done 3/2021, please refer to the result  We saw her last year as an in pt with significant anemia and we did an EGD and colonoscopy as above in March 2021     Last visit CEA/ct were ordered   Ct negative mets  CEA is normal   Due for colonosocpy 3/23  Last visit labs : for liver dx   Border line AMA/ASMA, but normal JOE   On prtotonix ? ? No GERD         Diarrhea  but not always , 1-2 BMS/day , urgent    Loss of control sometimes  Labs 2/23: normal LFTS/CBC      Update HPI    Caesar Mina is 80 y.o.,   female, having a Diagnostic Colonoscopy. Prior Colonoscopy was done 2021 with polyp removed. Patient has hx of Colon Polyps, no hx of Diverticulosis. Patient  has hx of Colon Cancer. Patient reports changes in bowel habits. She has intermittent diarrhea since her colon surgery colectomy done 10-12 years ago , but it getting worse in the past two years. No GI /Rectal bleeding, experiencing red/ black/ BRBPR stools. Patient has no  history of abd. pain , no nausea or vomiting,   Patient denies any Dysphagia. Pt has no hx of GERD. Pt denies fever/chills, chest pain or SOB. Review of additional significant medical hx:  (See chart for additional detail, including current medications /see ROS for current S/S):   CAD, CKD, HTN, HLD, MI, CABGx3 vessels and 3 stents  PT DENIES CHEST PAIN .  PALPITATION , DIZZINESS  OR

## 2023-06-08 NOTE — ANESTHESIA POSTPROCEDURE EVALUATION
POST- ANESTHESIA EVALUATION       Pt Name: Tatianna Daly  MRN: [id number]  YOB: 1933  Date of evaluation: 6/8/2023  Time:  11:51 AM      BP (!) 152/69   Pulse 68   Temp 97 °F (36.1 °C) (Infrared)   Resp 14   Ht 5' 9\" (1.753 m)   Wt 188 lb (85.3 kg)   SpO2 95%   BMI 27.76 kg/m²      Consciousness Level  Awake  Cardiopulmonary Status  Stable  Pain Adequately Treated YES  Nausea / Vomiting  NO  Adequate Hydration  YES  Anesthesia Related Complications NONE      Electronically signed by Yoni Nazario MD on 6/8/2023 at 11:51 AM       Department of Anesthesiology  Postprocedure Note    Patient: Tatianna Daly  MRN: [id number]  YOB: 1933  Date of evaluation: 6/8/2023      Procedure Summary     Date: 06/08/23 Room / Location: [facility] 03 / [facility] ENDO    Anesthesia Start: 2350 Anesthesia Stop: 4021    Procedure: COLONOSCOPY POLYPECTOMY SNARE/HOT BIOPSY Diagnosis:       Adenocarcinoma of colon ([address]      (Adenocarcinoma of colon ([address] [C18.9])    Surgeons: Soumya Cerda MD Responsible Provider: Yoni Nazario MD    Anesthesia Type: general ASA Status: 3          Anesthesia Type: No value filed.     Kyle Phase I:      Kyle Phase II: Kyle Score: 10      Anesthesia Post Evaluation

## 2023-06-08 NOTE — DISCHARGE INSTRUCTIONS
to.  Follow-up care is a key part of your treatment and safety. Be sure to make and go to all appointments, and call your doctor if you are having problems. It's also a good idea to know your test results and keep a list of the medicines you take. Call your Doctor if you have any of the following:             Passing blood rectally or vomiting blood (it may be red or black). Persistent nausea or vomiting. Severe abdominal or chest pain, not relieved by passing gas. Fever of 100 or more, chills or excessive sweating. Redness or swelling at the IV site. If you experience shortness of breath or severe chest pain, call 911. Where can you learn more? Go to https://[employer]. org and sign in to your MyRoll account. Enter E264 in the PinPay box to learn more about Colonoscopy: What to Expect at Home.     If you do not have an account, please click on the Sign Up Now link. © 1223-3821 Healthwise, [employer]. Care instructions adapted under license by [facility]. This care instruction is for use with your licensed healthcare professional. If you have questions about a medical condition or this instruction, always ask your healthcare professional. [place] [address] any warranty or liability for your use of this information. Content Version: 7.7.375246; Last Revised: February 20, 2013           Colon Polypectomy   (Colon Polyp Removal)       Definition   A colon polypectomy is the removal of polyps from the inside lining of the colon (large intestine). A polyp is a mass of tissue. Some types of polyps have the potential to develop into cancer. Most polyps can be removed during a colonoscopy or sigmoidoscopy . A Colon Polyp        [address] [place] [address].   Reasons for Procedure   The purpose of the surgery is to remove a polyp. It is done for cancer prevention.    In rare cases, larger polyps can

## 2023-06-08 NOTE — OP NOTE
reviewing the biopsies and discussing the case with the patient based on her status and how aggressive she wants to be      Electronically signed by Krystyna Monroy MD  on 6/8/2023 at 10:33 AM

## 2023-06-09 LAB — SURGICAL PATHOLOGY REPORT: NORMAL

## 2023-06-14 ENCOUNTER — OFFICE VISIT (OUTPATIENT)
Dept: ONCOLOGY | Age: 88
End: 2023-06-14
Payer: MEDICARE

## 2023-06-14 VITALS
SYSTOLIC BLOOD PRESSURE: 148 MMHG | DIASTOLIC BLOOD PRESSURE: 69 MMHG | WEIGHT: 191 LBS | OXYGEN SATURATION: 95 % | HEART RATE: 68 BPM | BODY MASS INDEX: 28.21 KG/M2 | TEMPERATURE: 96.7 F | RESPIRATION RATE: 18 BRPM

## 2023-06-14 DIAGNOSIS — Z79.01 ANTICOAGULATED: ICD-10-CM

## 2023-06-14 DIAGNOSIS — D47.2 MGUS (MONOCLONAL GAMMOPATHY OF UNKNOWN SIGNIFICANCE): Primary | ICD-10-CM

## 2023-06-14 DIAGNOSIS — Z86.711 HISTORY OF PULMONARY EMBOLISM: ICD-10-CM

## 2023-06-14 PROCEDURE — G8417 CALC BMI ABV UP PARAM F/U: HCPCS | Performed by: INTERNAL MEDICINE

## 2023-06-14 PROCEDURE — 1123F ACP DISCUSS/DSCN MKR DOCD: CPT | Performed by: INTERNAL MEDICINE

## 2023-06-14 PROCEDURE — 99211 OFF/OP EST MAY X REQ PHY/QHP: CPT | Performed by: INTERNAL MEDICINE

## 2023-06-14 PROCEDURE — 1036F TOBACCO NON-USER: CPT | Performed by: INTERNAL MEDICINE

## 2023-06-14 PROCEDURE — 1090F PRES/ABSN URINE INCON ASSESS: CPT | Performed by: INTERNAL MEDICINE

## 2023-06-14 PROCEDURE — G8427 DOCREV CUR MEDS BY ELIG CLIN: HCPCS | Performed by: INTERNAL MEDICINE

## 2023-06-14 PROCEDURE — 99214 OFFICE O/P EST MOD 30 MIN: CPT | Performed by: INTERNAL MEDICINE

## 2023-06-16 DIAGNOSIS — R15.9 FULL INCONTINENCE OF FECES: ICD-10-CM

## 2023-06-16 DIAGNOSIS — C18.9 ADENOCARCINOMA, COLON (HCC): ICD-10-CM

## 2023-06-26 DIAGNOSIS — N18.30 TYPE 2 DIABETES MELLITUS WITH STAGE 3 CHRONIC KIDNEY DISEASE, WITHOUT LONG-TERM CURRENT USE OF INSULIN (HCC): ICD-10-CM

## 2023-06-26 DIAGNOSIS — E11.22 TYPE 2 DIABETES MELLITUS WITH STAGE 3 CHRONIC KIDNEY DISEASE, WITHOUT LONG-TERM CURRENT USE OF INSULIN (HCC): ICD-10-CM

## 2023-06-26 RX ORDER — GLIMEPIRIDE 1 MG/1
TABLET ORAL
Qty: 180 TABLET | Refills: 3 | Status: SHIPPED | OUTPATIENT
Start: 2023-06-26

## 2023-07-31 DIAGNOSIS — E78.5 HYPERLIPIDEMIA WITH TARGET LDL LESS THAN 100: ICD-10-CM

## 2023-07-31 RX ORDER — EZETIMIBE 10 MG/1
TABLET ORAL
Qty: 90 TABLET | Refills: 3 | Status: SHIPPED | OUTPATIENT
Start: 2023-07-31

## 2023-07-31 NOTE — TELEPHONE ENCOUNTER
Please Approve or Refuse.   [facility]      Next Visit Date:  8/29/2023   Last Visit Date: 4/27/2023    Hemoglobin A1C (%)   Date Value   04/27/2023 7.0   12/27/2022 7.2   08/12/2022 8.0             ( goal A1C is < 7)   BP Readings from Last 3 Encounters:   06/14/23 (!) 148/69   06/08/23 (!) 152/69   05/18/23 139/66          (goal 120/80)  BUN   Date Value Ref Range Status   02/24/2023 14 8 - 23 mg/dL Final     Creatinine   Date Value Ref Range Status   02/24/2023 0.67 0.50 - 0.90 mg/dL Final     Potassium   Date Value Ref Range Status   02/24/2023 4.1 3.7 - 5.3 mmol/L Final

## 2023-07-31 NOTE — TELEPHONE ENCOUNTER
Please Approve or Refuse.   Send to Pharmacy per Pt's Request:      Next Visit Date:  8/29/2023   Last Visit Date: 4/27/2023    Hemoglobin A1C (%)   Date Value   04/27/2023 7.0   12/27/2022 7.2   08/12/2022 8.0             ( goal A1C is < 7)   BP Readings from Last 3 Encounters:   06/14/23 (!) 148/69   06/08/23 (!) 152/69   05/18/23 139/66          (goal 120/80)  BUN   Date Value Ref Range Status   02/24/2023 14 8 - 23 mg/dL Final     Creatinine   Date Value Ref Range Status   02/24/2023 0.67 0.50 - 0.90 mg/dL Final     Potassium   Date Value Ref Range Status   02/24/2023 4.1 3.7 - 5.3 mmol/L Final

## 2023-08-24 ENCOUNTER — OFFICE VISIT (OUTPATIENT)
Dept: GASTROENTEROLOGY | Age: 88
End: 2023-08-24
Payer: MEDICARE

## 2023-08-24 VITALS
TEMPERATURE: 97.3 F | SYSTOLIC BLOOD PRESSURE: 136 MMHG | DIASTOLIC BLOOD PRESSURE: 63 MMHG | WEIGHT: 182 LBS | BODY MASS INDEX: 26.88 KG/M2

## 2023-08-24 DIAGNOSIS — D12.6 SERRATED ADENOMA OF COLON: Primary | ICD-10-CM

## 2023-08-24 DIAGNOSIS — R15.9 FULL INCONTINENCE OF FECES: ICD-10-CM

## 2023-08-24 DIAGNOSIS — C18.9 ADENOCARCINOMA, COLON (HCC): ICD-10-CM

## 2023-08-24 PROCEDURE — 1036F TOBACCO NON-USER: CPT | Performed by: INTERNAL MEDICINE

## 2023-08-24 PROCEDURE — G8427 DOCREV CUR MEDS BY ELIG CLIN: HCPCS | Performed by: INTERNAL MEDICINE

## 2023-08-24 PROCEDURE — 99214 OFFICE O/P EST MOD 30 MIN: CPT | Performed by: INTERNAL MEDICINE

## 2023-08-24 PROCEDURE — G8417 CALC BMI ABV UP PARAM F/U: HCPCS | Performed by: INTERNAL MEDICINE

## 2023-08-24 PROCEDURE — 1090F PRES/ABSN URINE INCON ASSESS: CPT | Performed by: INTERNAL MEDICINE

## 2023-08-24 PROCEDURE — 1123F ACP DISCUSS/DSCN MKR DOCD: CPT | Performed by: INTERNAL MEDICINE

## 2023-08-24 ASSESSMENT — ENCOUNTER SYMPTOMS
RECTAL PAIN: 0
COUGH: 0
SORE THROAT: 0
TROUBLE SWALLOWING: 0
CONSTIPATION: 0
DIARRHEA: 1
ANAL BLEEDING: 0
CHOKING: 0
WHEEZING: 0
BLOOD IN STOOL: 0
ABDOMINAL DISTENTION: 0
VOICE CHANGE: 0
ABDOMINAL PAIN: 0
NAUSEA: 0
SHORTNESS OF BREATH: 0
VOMITING: 0

## 2023-08-24 NOTE — PROGRESS NOTES
after reviewing the biopsies and discussing the case with the patient based on her status and how aggressive she wants to be        Electronically signed by Ame Yan MD  on 6/8/2023 at 10:33 AM         -- Diagnosis --   A. SIGMOID COLON, BIOPSIES:   - ADENOMATOUS POLYP (TUBULAR ADENOMA). B.  CECUM, BIOPSIES:   - ADENOMATOUS POLYP (TUBULAR ADENOMA), MULTIPLE BIOPSIES.   - SESSILE SERRATED ADENOMA, ONE BIOPSY. - NEGATIVE FOR MALIGNANCY. C.  TRANSVERSE COLON, BIOPSIES:   - ADENOMATOUS POLYP (TUBULAR ADENOMA), MULTIPLE BIOPSIES. Bubba Founds. Rosalina Flowers,   **Electronically Signed Out**         sls/6/9/2023       Clinical Information   Pre-Op Diagnosis:  ADENOCARCINOMA OF COLON   Operative Findings:  SIGMOID POLYP; CECUM MASS; TRANSVERSE COLON POLYP   Operation Performed:  COLONOSCOPY POLYPECTOMY SNARE/HOT BIOPSY   cd         Source of Specimen   A: SIGMOID POLYP   B: CECUM MASS   C: TRANSVERSE COLON POLYP     Labs b: normal LFTS 6/23        Past Medical,Family, and Social History reviewed and does contribute to the patient presentingcondition. I did review all the labs results available for the labs which were ordered by the primary care physician, and the other consultants, we search on epic at [facility] and all the available care everywhere epic    I did review all the imaging studies of the abdomen available on EMR, ordered by the primary care physician and the other consultant    I did review all the pathology from the biopsies done on the previous endoscopies    Patient's PMH/PSH,SH,PSYCH Hx, MEDs, ALLERGIES, and ROS were all reviewed and updated in the appropriate sections. PAST MEDICAL HISTORY:  Past Medical History:   Diagnosis Date    Arthritis     Basal cell carcinoma of nose     Bronchitis     HX.  OF     CAD (coronary artery disease)     Carpal tunnel syndrome     left hand    Chronic diastolic heart failure ([address]) 3/25/2021    CKD (chronic kidney disease), stage III (HCC) 8/7/2019    Colon

## 2023-08-25 ENCOUNTER — HOSPITAL ENCOUNTER (OUTPATIENT)
Age: 88
Discharge: HOME | End: 2023-08-25
Payer: MEDICARE

## 2023-08-25 DIAGNOSIS — Z79.01 ANTICOAGULATED: ICD-10-CM

## 2023-08-25 DIAGNOSIS — D47.2 MGUS (MONOCLONAL GAMMOPATHY OF UNKNOWN SIGNIFICANCE): ICD-10-CM

## 2023-08-25 DIAGNOSIS — Z86.711 HISTORY OF PULMONARY EMBOLISM: ICD-10-CM

## 2023-08-25 LAB
25(OH)D3 SERPL-MCNC: 29.9 NG/ML
ALBUMIN SERPL-MCNC: 4.1 G/DL (ref 3.5–5.2)
ALP SERPL-CCNC: 66 U/L (ref 35–104)
ALT SERPL-CCNC: 40 U/L (ref 5–33)
ANION GAP SERPL CALCULATED.3IONS-SCNC: 11 MMOL/L (ref 9–17)
AST SERPL-CCNC: 49 U/L
BILIRUB SERPL-MCNC: 1 MG/DL (ref 0.3–1.2)
BNP SERPL-MCNC: 139 PG/ML
BUN SERPL-MCNC: 15 MG/DL (ref 8–23)
CALCIUM SERPL-MCNC: 9.6 MG/DL (ref 8.6–10.4)
CHLORIDE SERPL-SCNC: 101 MMOL/L (ref 98–107)
CHOLEST SERPL-MCNC: 123 MG/DL
CHOLESTEROL/HDL RATIO: 3.2
CO2 SERPL-SCNC: 28 MMOL/L (ref 20–31)
CREAT SERPL-MCNC: 0.6 MG/DL (ref 0.5–0.9)
ERYTHROCYTE [DISTWIDTH] IN BLOOD BY AUTOMATED COUNT: 15.8 % (ref 11.5–14.9)
FOLATE SERPL-MCNC: 16.5 NG/ML
GFR SERPL CREATININE-BSD FRML MDRD: >60 ML/MIN/1.73M2
GLUCOSE SERPL-MCNC: 162 MG/DL (ref 70–99)
HCT VFR BLD AUTO: 40.4 % (ref 36–46)
HDLC SERPL-MCNC: 38 MG/DL
HGB BLD-MCNC: 13.3 G/DL (ref 12–16)
LDLC SERPL CALC-MCNC: 49 MG/DL (ref 0–130)
MAGNESIUM SERPL-MCNC: 2.1 MG/DL (ref 1.6–2.6)
MCH RBC QN AUTO: 28.9 PG (ref 26–34)
MCHC RBC AUTO-ENTMCNC: 32.9 G/DL (ref 31–37)
MCV RBC AUTO: 87.9 FL (ref 80–100)
PLATELET # BLD AUTO: 235 K/UL (ref 150–450)
PMV BLD AUTO: 9.4 FL (ref 6–12)
POTASSIUM SERPL-SCNC: 5 MMOL/L (ref 3.7–5.3)
PROT SERPL-MCNC: 7.2 G/DL (ref 6.4–8.3)
RBC # BLD AUTO: 4.59 M/UL (ref 4–5.2)
SODIUM SERPL-SCNC: 140 MMOL/L (ref 135–144)
TRIGL SERPL-MCNC: 180 MG/DL
TSH SERPL DL<=0.05 MIU/L-ACNC: 3.44 UIU/ML (ref 0.3–5)
URATE SERPL-MCNC: 4.5 MG/DL (ref 2.4–5.7)
VIT B12 SERPL-MCNC: 1609 PG/ML (ref 232–1245)
WBC OTHER # BLD: 7.6 K/UL (ref 3.5–11)

## 2023-08-25 PROCEDURE — 80061 LIPID PANEL: CPT

## 2023-08-25 PROCEDURE — 85027 COMPLETE CBC AUTOMATED: CPT

## 2023-08-25 PROCEDURE — 83880 ASSAY OF NATRIURETIC PEPTIDE: CPT

## 2023-08-25 PROCEDURE — 82607 VITAMIN B-12: CPT

## 2023-08-25 PROCEDURE — 80053 COMPREHEN METABOLIC PANEL: CPT

## 2023-08-25 PROCEDURE — 82306 VITAMIN D 25 HYDROXY: CPT

## 2023-08-25 PROCEDURE — 82746 ASSAY OF FOLIC ACID SERUM: CPT

## 2023-08-25 PROCEDURE — 36415 COLL VENOUS BLD VENIPUNCTURE: CPT

## 2023-08-25 PROCEDURE — 84550 ASSAY OF BLOOD/URIC ACID: CPT

## 2023-08-25 PROCEDURE — 84443 ASSAY THYROID STIM HORMONE: CPT

## 2023-08-25 PROCEDURE — 83735 ASSAY OF MAGNESIUM: CPT

## 2023-08-27 RX ORDER — POLYETHYLENE GLYCOL 3350, SODIUM SULFATE ANHYDROUS, SODIUM BICARBONATE, SODIUM CHLORIDE, POTASSIUM CHLORIDE 236; 22.74; 6.74; 5.86; 2.97 G/4L; G/4L; G/4L; G/4L; G/4L
4 POWDER, FOR SOLUTION ORAL ONCE
Qty: 4000 ML | Refills: 0 | Status: SHIPPED | OUTPATIENT
Start: 2023-08-27 | End: 2023-08-27

## 2023-08-27 RX ORDER — BISACODYL 5 MG/1
TABLET, DELAYED RELEASE ORAL
Qty: 4 TABLET | Refills: 0 | Status: SHIPPED | OUTPATIENT
Start: 2023-08-27

## 2023-08-28 PROBLEM — Z78.0 POSTMENOPAUSAL STATE: Status: ACTIVE | Noted: 2023-08-28

## 2023-08-28 PROBLEM — C18.9 ADENOCARCINOMA OF LARGE INTESTINE (HCC): Status: ACTIVE | Noted: 2023-08-28

## 2023-08-28 PROBLEM — Z12.31 ENCOUNTER FOR SCREENING MAMMOGRAM FOR MALIGNANT NEOPLASM OF BREAST: Status: ACTIVE | Noted: 2023-08-28

## 2023-08-28 PROBLEM — M25.532 LEFT WRIST PAIN: Status: ACTIVE | Noted: 2023-08-28

## 2023-08-28 PROBLEM — Z79.899 DRUG THERAPY: Status: ACTIVE | Noted: 2023-08-28

## 2023-08-29 ENCOUNTER — OFFICE VISIT (OUTPATIENT)
Dept: FAMILY MEDICINE CLINIC | Age: 88
End: 2023-08-29
Payer: MEDICARE

## 2023-08-29 DIAGNOSIS — E78.5 HYPERLIPIDEMIA WITH TARGET LDL LESS THAN 70: ICD-10-CM

## 2023-08-29 DIAGNOSIS — E11.22 TYPE 2 DIABETES MELLITUS WITH STAGE 3A CHRONIC KIDNEY DISEASE, WITHOUT LONG-TERM CURRENT USE OF INSULIN (HCC): Primary | ICD-10-CM

## 2023-08-29 DIAGNOSIS — H61.21 IMPACTED CERUMEN OF RIGHT EAR: ICD-10-CM

## 2023-08-29 DIAGNOSIS — K76.0 FATTY LIVER: ICD-10-CM

## 2023-08-29 DIAGNOSIS — D47.2 MGUS (MONOCLONAL GAMMOPATHY OF UNKNOWN SIGNIFICANCE): ICD-10-CM

## 2023-08-29 DIAGNOSIS — I25.810 CORONARY ARTERY DISEASE INVOLVING CORONARY BYPASS GRAFT OF NATIVE HEART WITHOUT ANGINA PECTORIS: ICD-10-CM

## 2023-08-29 DIAGNOSIS — M85.80 OSTEOPENIA DETERMINED BY X-RAY: ICD-10-CM

## 2023-08-29 DIAGNOSIS — I50.32 CHRONIC DIASTOLIC HEART FAILURE (HCC): ICD-10-CM

## 2023-08-29 DIAGNOSIS — I12.9 BENIGN HYPERTENSION WITH CKD (CHRONIC KIDNEY DISEASE) STAGE III (HCC): ICD-10-CM

## 2023-08-29 DIAGNOSIS — N18.31 TYPE 2 DIABETES MELLITUS WITH STAGE 3A CHRONIC KIDNEY DISEASE, WITHOUT LONG-TERM CURRENT USE OF INSULIN (HCC): Primary | ICD-10-CM

## 2023-08-29 DIAGNOSIS — N18.30 BENIGN HYPERTENSION WITH CKD (CHRONIC KIDNEY DISEASE) STAGE III (HCC): ICD-10-CM

## 2023-08-29 LAB — HBA1C MFR BLD: 7.1 %

## 2023-08-29 PROCEDURE — G8427 DOCREV CUR MEDS BY ELIG CLIN: HCPCS | Performed by: FAMILY MEDICINE

## 2023-08-29 PROCEDURE — 1123F ACP DISCUSS/DSCN MKR DOCD: CPT | Performed by: FAMILY MEDICINE

## 2023-08-29 PROCEDURE — 83036 HEMOGLOBIN GLYCOSYLATED A1C: CPT | Performed by: FAMILY MEDICINE

## 2023-08-29 PROCEDURE — 99214 OFFICE O/P EST MOD 30 MIN: CPT | Performed by: FAMILY MEDICINE

## 2023-08-29 PROCEDURE — G8417 CALC BMI ABV UP PARAM F/U: HCPCS | Performed by: FAMILY MEDICINE

## 2023-08-29 PROCEDURE — 1036F TOBACCO NON-USER: CPT | Performed by: FAMILY MEDICINE

## 2023-08-29 PROCEDURE — 3051F HG A1C>EQUAL 7.0%<8.0%: CPT | Performed by: FAMILY MEDICINE

## 2023-08-29 PROCEDURE — 1090F PRES/ABSN URINE INCON ASSESS: CPT | Performed by: FAMILY MEDICINE

## 2023-08-29 RX ORDER — AMMONIUM LACTATE 12 G/100G
LOTION TOPICAL
COMMUNITY
Start: 2023-07-11

## 2023-08-29 RX ORDER — ALENDRONATE SODIUM 35 MG/1
35 TABLET ORAL
Qty: 4 TABLET | Refills: 3 | Status: SHIPPED | OUTPATIENT
Start: 2023-08-29

## 2023-08-29 RX ORDER — POLYETHYLENE GLYCOL 3350, SODIUM SULFATE ANHYDROUS, SODIUM BICARBONATE, SODIUM CHLORIDE, POTASSIUM CHLORIDE 236; 22.74; 6.74; 5.86; 2.97 G/4L; G/4L; G/4L; G/4L; G/4L
POWDER, FOR SOLUTION ORAL
COMMUNITY
Start: 2023-08-27

## 2023-08-29 RX ORDER — GLUCOSAMINE HCL/CHONDROITIN SU 500-400 MG
CAPSULE ORAL
Qty: 100 STRIP | Refills: 3 | Status: SHIPPED | OUTPATIENT
Start: 2023-08-29

## 2023-08-29 RX ORDER — CHOLECALCIFEROL (VITAMIN D3) 50 MCG
2000 TABLET ORAL DAILY
Qty: 90 TABLET | Refills: 3 | Status: SHIPPED | OUTPATIENT
Start: 2023-08-29

## 2023-08-29 RX ORDER — BLOOD-GLUCOSE METER
1 KIT MISCELLANEOUS DAILY
Qty: 1 KIT | Refills: 0 | Status: SHIPPED | OUTPATIENT
Start: 2023-08-29 | End: 2023-09-05

## 2023-08-29 RX ORDER — PNEUMOCOCCAL 20-VALENT CONJUGATE VACCINE 2.2; 2.2; 2.2; 2.2; 2.2; 2.2; 2.2; 2.2; 2.2; 2.2; 2.2; 2.2; 2.2; 2.2; 2.2; 2.2; 4.4; 2.2; 2.2; 2.2 UG/.5ML; UG/.5ML; UG/.5ML; UG/.5ML; UG/.5ML; UG/.5ML; UG/.5ML; UG/.5ML; UG/.5ML; UG/.5ML; UG/.5ML; UG/.5ML; UG/.5ML; UG/.5ML; UG/.5ML; UG/.5ML; UG/.5ML; UG/.5ML; UG/.5ML; UG/.5ML
1 INJECTION, SUSPENSION INTRAMUSCULAR
COMMUNITY
Start: 2022-08-12 | End: 2023-08-29 | Stop reason: ALTCHOICE

## 2023-08-29 RX ORDER — AMOXICILLIN 500 MG/1
CAPSULE ORAL
COMMUNITY
Start: 2023-07-13 | End: 2023-09-05

## 2023-08-29 SDOH — ECONOMIC STABILITY: FOOD INSECURITY: WITHIN THE PAST 12 MONTHS, YOU WORRIED THAT YOUR FOOD WOULD RUN OUT BEFORE YOU GOT MONEY TO BUY MORE.: NEVER TRUE

## 2023-08-29 SDOH — ECONOMIC STABILITY: FOOD INSECURITY: WITHIN THE PAST 12 MONTHS, THE FOOD YOU BOUGHT JUST DIDN'T LAST AND YOU DIDN'T HAVE MONEY TO GET MORE.: NEVER TRUE

## 2023-08-29 SDOH — ECONOMIC STABILITY: INCOME INSECURITY: HOW HARD IS IT FOR YOU TO PAY FOR THE VERY BASICS LIKE FOOD, HOUSING, MEDICAL CARE, AND HEATING?: NOT HARD AT ALL

## 2023-08-29 ASSESSMENT — ENCOUNTER SYMPTOMS
ABDOMINAL PAIN: 0
COUGH: 0
VOMITING: 0
CONSTIPATION: 0
CHEST TIGHTNESS: 0
ABDOMINAL DISTENTION: 0
NAUSEA: 0
WHEEZING: 0

## 2023-08-29 ASSESSMENT — PATIENT HEALTH QUESTIONNAIRE - PHQ9
SUM OF ALL RESPONSES TO PHQ9 QUESTIONS 1 & 2: 0
1. LITTLE INTEREST OR PLEASURE IN DOING THINGS: 0
SUM OF ALL RESPONSES TO PHQ QUESTIONS 1-9: 0
2. FEELING DOWN, DEPRESSED OR HOPELESS: 0
SUM OF ALL RESPONSES TO PHQ QUESTIONS 1-9: 0

## 2023-08-29 NOTE — RESULT ENCOUNTER NOTE
Addressed during office visit today, A1c 7.1, stable diabetes, continue treatment recommended during the office visit.

## 2023-08-29 NOTE — PROGRESS NOTES
Isis Mcgarry (:  12/10/1933) is a 80 y.o. female,Established patient, here for evaluation of the following chief complaint(s): Diabetes (NEEDS NEW BLOOD SUGAR METER DUE TO CURRENT ONE NOT WORKING ANY MORE), Hypertension, Hyperlipidemia, and Congestive Heart Failure      ASSESSMENT/PLAN:    1. Type 2 diabetes mellitus with stage 3a chronic kidney disease, without long-term current use of insulin ([facility])  Stable type 2 diabetes mellitus  Stable chronic kidney disease stage III a  -     POCT glycosylated hemoglobin (Hb A1C) 7.1    Lab Results   Component Value Date    LABA1C 7.1 2023    LABA1C 7.0 2023    LABA1C 7.2 2022      -     glucose monitoring kit; DAILY Starting 2023, Disp-1 kit, R-0, NormalTesting daily  -     blood glucose monitor strips; Testing once a day. Please dispense according to patients insurance., Disp-100 strip, R-3, Normal  -     Lancets 30G MISC; Disp-100 each, R-3, NormalTesting once a day. Please dispense according to patients insurance. -     CBC with Auto Differential; Future  -     Comprehensive Metabolic Panel; Future  -advised home blood glucose testing  daily  -daily feet exam, Foot care: advised to wash feet daily, pat dry and apply lotion at night, avoiding between toes. Need to look at feet daily and report to a physician any signs of inflammation or skin damage  -annual dilated eye exam  -Low carb, low fat diet, increase fruits and vegetables, and exercise 4-5 times a day 30-40 minutes a day discussed  -continue current treatment glimepiride 1 mg twice a day  -continue Aspirin 81 Mg  -continue statin Lipitor 80 Mg    2. Benign hypertension with CKD (chronic kidney disease) stage III (HCC)  Stable chronic kidney disease stage IIIa  Well-controlled hypertension  Discussed low salt diet and BP and pulse monitoring.   Recheck labs  To continue metoprolol 25 Mg twice a day, amlodipine 2.5 Mg daily, furosemide 40 Mg daily, with potassium 20 mEq  -

## 2023-08-29 NOTE — PROGRESS NOTES
Visit Information    Have you changed or started any medications since your last visit including any over-the-counter medicines, vitamins, or herbal medicines? no   Have you stopped taking any of your medications? Is so, why? -  no  Are you having any side effects from any of your medications? - no    Have you seen any other physician or provider since your last visit? yes -    Have you had any other diagnostic tests since your last visit? yes -    Have you been seen in the emergency room and/or had an admission in a hospital since we last saw you?  no   Have you had your routine dental cleaning in the past 6 months?  yes -      Do you have an active MyChart account? If no, what is the barrier?   NO    Patient Care Team:  Shy Ch MD as PCP - General (Family Medicine)  [facility]  Remy Wilburn MD as Consulting Physician (Ophthalmology)  Junior Martinez MD as Consulting Physician (Cardiology)  Mariia Zabala MD as Consulting Physician (Neurology)  Dori Thomas MD as Consulting Physician (Urology)  Florinda Herrera MD as Consulting Physician (Nephrology)  Chica Shen MD as Consulting Physician (Internal Medicine Cardiovascular Disease)  Mateus Dickson MD as Consulting Physician (Gastroenterology)  Domo Taylor MD as Consulting Physician (Hematology and Oncology)    Medical History Review  Past Medical, Family, and Social History reviewed and does contribute to the patient presenting condition    Health Maintenance   Topic Date Due    Flu vaccine (1) 08/01/2023    Colorectal Cancer Screen  09/08/2023    Annual Wellness Visit (AWV)  12/28/2023    Depression Screen  04/27/2024    Lipids  08/25/2024    DTaP/Tdap/Td vaccine (2 - Td or Tdap) 05/03/2033    Shingles vaccine  Completed    Pneumococcal 65+ years Vaccine  Completed    COVID-19 Vaccine  Completed    Hepatitis A vaccine  Aged Out    Hib vaccine  Aged Out    Meningococcal (ACWY) vaccine

## 2023-09-04 VITALS
HEART RATE: 55 BPM | DIASTOLIC BLOOD PRESSURE: 60 MMHG | HEIGHT: 69 IN | TEMPERATURE: 97.6 F | WEIGHT: 190.2 LBS | SYSTOLIC BLOOD PRESSURE: 136 MMHG | BODY MASS INDEX: 28.17 KG/M2 | OXYGEN SATURATION: 96 %

## 2023-09-04 PROBLEM — Z78.0 POSTMENOPAUSAL STATE: Status: RESOLVED | Noted: 2023-08-28 | Resolved: 2023-09-04

## 2023-09-04 PROBLEM — M25.532 LEFT WRIST PAIN: Status: RESOLVED | Noted: 2023-08-28 | Resolved: 2023-09-04

## 2023-09-04 PROBLEM — Z12.31 ENCOUNTER FOR SCREENING MAMMOGRAM FOR MALIGNANT NEOPLASM OF BREAST: Status: RESOLVED | Noted: 2023-08-28 | Resolved: 2023-09-04

## 2023-09-04 PROBLEM — R09.89 BILATERAL CAROTID BRUITS: Status: RESOLVED | Noted: 2021-06-09 | Resolved: 2023-09-04

## 2023-09-04 PROBLEM — Z79.899 DRUG THERAPY: Status: RESOLVED | Noted: 2023-08-28 | Resolved: 2023-09-04

## 2023-09-04 PROBLEM — K92.0 HEMATEMESIS: Status: RESOLVED | Noted: 2020-06-29 | Resolved: 2023-09-04

## 2023-09-04 PROBLEM — R47.81 SLURRED SPEECH: Status: RESOLVED | Noted: 2022-09-15 | Resolved: 2023-09-04

## 2023-09-04 PROBLEM — N20.0 KIDNEY STONE ON RIGHT SIDE: Status: RESOLVED | Noted: 2019-06-25 | Resolved: 2023-09-04

## 2023-09-04 PROBLEM — H61.23 BILATERAL IMPACTED CERUMEN: Status: RESOLVED | Noted: 2021-11-17 | Resolved: 2023-09-04

## 2023-09-04 PROBLEM — C18.9 ADENOCARCINOMA OF LARGE INTESTINE (HCC): Status: RESOLVED | Noted: 2023-08-28 | Resolved: 2023-09-04

## 2023-09-04 PROBLEM — L98.9 SKIN LESION OF RIGHT ARM: Status: RESOLVED | Noted: 2022-04-10 | Resolved: 2023-09-04

## 2023-09-04 PROBLEM — K75.2 NONSPECIFIC REACTIVE HEPATITIS: Status: RESOLVED | Noted: 2022-08-15 | Resolved: 2023-09-04

## 2023-09-04 PROBLEM — R07.9 CHEST PAIN: Status: RESOLVED | Noted: 2023-02-23 | Resolved: 2023-09-04

## 2023-09-04 ASSESSMENT — ENCOUNTER SYMPTOMS
SHORTNESS OF BREATH: 1
DIARRHEA: 1

## 2023-09-05 ENCOUNTER — TELEPHONE (OUTPATIENT)
Dept: GASTROENTEROLOGY | Age: 88
End: 2023-09-05

## 2023-09-05 RX ORDER — POLYETHYLENE GLYCOL 3350, SODIUM SULFATE ANHYDROUS, SODIUM BICARBONATE, SODIUM CHLORIDE, POTASSIUM CHLORIDE 236; 22.74; 6.74; 5.86; 2.97 G/4L; G/4L; G/4L; G/4L; G/4L
4 POWDER, FOR SOLUTION ORAL ONCE
Qty: 4000 ML | Refills: 0 | Status: SHIPPED | OUTPATIENT
Start: 2023-09-05 | End: 2023-09-05

## 2023-09-05 NOTE — TELEPHONE ENCOUNTER
Patient LVM stating that the pharmacy would not give her the golytely prep due to nothing being sent. After looking into this [facility] order [facility] for [facility] on [address] as requested. The patient was notified.

## 2023-09-06 ENCOUNTER — HOSPITAL ENCOUNTER (OUTPATIENT)
Dept: PREADMISSION TESTING | Age: 88
Discharge: HOME | End: 2023-09-10

## 2023-09-06 VITALS — WEIGHT: 188 LBS | HEIGHT: 69 IN | BODY MASS INDEX: 27.85 KG/M2

## 2023-09-06 NOTE — TELEPHONE ENCOUNTER
Writer rec'd clearance request from PAT, pt cardio clearance for xarelto  23 and pt needs updated clearance, updated cardiac clearance request sent to Dr. Melani Thorne office, Devan boyd f/u with their office.

## 2023-09-06 NOTE — PROGRESS NOTES
5022 DR. ISMON'S OFFICE NOTIFIED THAT PATIENT WAS WAITING ON THEM TO CALL HER AND INFORM HER WHEN TO STOP  Blake Skipper . THIS NURSE ALSO INQUIRES WHETHER CARDIAC CLEARANCE STILL VALID. STAFF REPLIED THEY WOULD CONTACT JD. 11:35 CALLED DR. TORRES'S OFFICE IN REGARDS TO CARDIAC CLEARANCE THAT WAS GIVEN IN MAY OF 2023 AND INFORMED PT. WOULD HAVE TO BE SEEN IN OFFICE AGAIN BEFORE 9-12-23 DR. SIMON'S OFFICE NOTIFIED OF NEED FOR UPDATED CARDIAC CLEARANCE PER FAXED REQUEST FOR CLEARANCE.
area.  When you are alert and stable, you will receive instructions and be prepared for discharge. INSTRUCTIONS READ TO JD AND UNDERSTANDING WAS VERIFIED.

## 2023-09-07 NOTE — TELEPHONE ENCOUNTER
Writer rec'd updated clearance from cardio, pt is to hold xarelto x 3 days start holding 9/9/23 and resume after procedure. Writer called pt and gave her instructions, pt voices understanding, writer called PAT and [address] notifying them of updated cardiac clearance is scanned into pt chart.

## 2023-09-08 NOTE — PRE-PROCEDURE INSTRUCTIONS
No answer, left message ? Unable to leave message ? When were you told to arrive at hospital ?  0730    Do you have a  ?yes    Are you on any blood thinners ? yes                  If yes when did you stop taking ?09/09    Do you have your prep Rx filled and instruction ? yes    Nothing to eat the day before , only clear liquids. yes    Are you experiencing any covid symptoms ? no    Do you have any infections or rash we should be aware of ?no      Do you have the Hibiclens soap to use the night before and the morning of surgery ?n/a    Nothing to eat or drink after midnight, only a sip of water to take any medication instructed to take the night before. yes  Wear comfortable clothing, leave any valuables at home, remove any jewelry and body piercing . yes

## 2023-09-11 ENCOUNTER — ANESTHESIA EVENT (OUTPATIENT)
Dept: ENDOSCOPY | Age: 88
End: 2023-09-11
Payer: MEDICARE

## 2023-09-12 ENCOUNTER — HOSPITAL ENCOUNTER (OUTPATIENT)
Age: 88
Setting detail: OUTPATIENT SURGERY
Discharge: HOME | End: 2023-09-12
Attending: INTERNAL MEDICINE | Admitting: INTERNAL MEDICINE
Payer: MEDICARE

## 2023-09-12 ENCOUNTER — ANESTHESIA (OUTPATIENT)
Dept: ENDOSCOPY | Age: 88
End: 2023-09-12
Payer: MEDICARE

## 2023-09-12 VITALS
DIASTOLIC BLOOD PRESSURE: 62 MMHG | OXYGEN SATURATION: 96 % | WEIGHT: 188 LBS | RESPIRATION RATE: 17 BRPM | BODY MASS INDEX: 27.85 KG/M2 | HEIGHT: 69 IN | TEMPERATURE: 98.7 F | SYSTOLIC BLOOD PRESSURE: 151 MMHG | HEART RATE: 74 BPM

## 2023-09-12 DIAGNOSIS — D12.6 SERRATED ADENOMA OF COLON: ICD-10-CM

## 2023-09-12 LAB
GLUCOSE BLD-MCNC: 125 MG/DL (ref 65–105)
GLUCOSE BLD-MCNC: 158 MG/DL (ref 65–105)

## 2023-09-12 PROCEDURE — 3609010600 HC COLONOSCOPY POLYPECTOMY SNARE/COLD BIOPSY: Performed by: INTERNAL MEDICINE

## 2023-09-12 PROCEDURE — 82947 ASSAY GLUCOSE BLOOD QUANT: CPT

## 2023-09-12 PROCEDURE — 3700000001 HC ADD 15 MINUTES (ANESTHESIA): Performed by: INTERNAL MEDICINE

## 2023-09-12 PROCEDURE — 2709999900 HC NON-CHARGEABLE SUPPLY: Performed by: INTERNAL MEDICINE

## 2023-09-12 PROCEDURE — 2580000003 HC RX 258: Performed by: ANESTHESIOLOGY

## 2023-09-12 PROCEDURE — 7100000011 HC PHASE II RECOVERY - ADDTL 15 MIN: Performed by: INTERNAL MEDICINE

## 2023-09-12 PROCEDURE — 2500000003 HC RX 250 WO HCPCS: Performed by: NURSE ANESTHETIST, CERTIFIED REGISTERED

## 2023-09-12 PROCEDURE — 7100000010 HC PHASE II RECOVERY - FIRST 15 MIN: Performed by: INTERNAL MEDICINE

## 2023-09-12 PROCEDURE — 6360000002 HC RX W HCPCS: Performed by: NURSE ANESTHETIST, CERTIFIED REGISTERED

## 2023-09-12 PROCEDURE — 88305 TISSUE EXAM BY PATHOLOGIST: CPT

## 2023-09-12 PROCEDURE — 3700000000 HC ANESTHESIA ATTENDED CARE: Performed by: INTERNAL MEDICINE

## 2023-09-12 PROCEDURE — 2500000003 HC RX 250 WO HCPCS: Performed by: ANESTHESIOLOGY

## 2023-09-12 RX ORDER — LIDOCAINE HYDROCHLORIDE 10 MG/ML
INJECTION, SOLUTION EPIDURAL; INFILTRATION; INTRACAUDAL; PERINEURAL PRN
Status: DISCONTINUED | OUTPATIENT
Start: 2023-09-12 | End: 2023-09-12 | Stop reason: SDUPTHER

## 2023-09-12 RX ORDER — SODIUM CHLORIDE 9 MG/ML
INJECTION, SOLUTION INTRAVENOUS PRN
Status: DISCONTINUED | OUTPATIENT
Start: 2023-09-12 | End: 2023-09-12 | Stop reason: HOSPADM

## 2023-09-12 RX ORDER — FENTANYL CITRATE 0.05 MG/ML
25 INJECTION, SOLUTION INTRAMUSCULAR; INTRAVENOUS EVERY 5 MIN PRN
Status: CANCELLED | OUTPATIENT
Start: 2023-09-12

## 2023-09-12 RX ORDER — SODIUM CHLORIDE 0.9 % (FLUSH) 0.9 %
5-40 SYRINGE (ML) INJECTION EVERY 12 HOURS SCHEDULED
Status: CANCELLED | OUTPATIENT
Start: 2023-09-12

## 2023-09-12 RX ORDER — SODIUM CHLORIDE, SODIUM LACTATE, POTASSIUM CHLORIDE, CALCIUM CHLORIDE 600; 310; 30; 20 MG/100ML; MG/100ML; MG/100ML; MG/100ML
INJECTION, SOLUTION INTRAVENOUS CONTINUOUS
Status: DISCONTINUED | OUTPATIENT
Start: 2023-09-12 | End: 2023-09-12 | Stop reason: HOSPADM

## 2023-09-12 RX ORDER — LIDOCAINE HYDROCHLORIDE 10 MG/ML
1 INJECTION, SOLUTION EPIDURAL; INFILTRATION; INTRACAUDAL; PERINEURAL
Status: COMPLETED | OUTPATIENT
Start: 2023-09-12 | End: 2023-09-12

## 2023-09-12 RX ORDER — PROPOFOL 10 MG/ML
INJECTION, EMULSION INTRAVENOUS PRN
Status: DISCONTINUED | OUTPATIENT
Start: 2023-09-12 | End: 2023-09-12 | Stop reason: SDUPTHER

## 2023-09-12 RX ORDER — SODIUM CHLORIDE 0.9 % (FLUSH) 0.9 %
5-40 SYRINGE (ML) INJECTION PRN
Status: CANCELLED | OUTPATIENT
Start: 2023-09-12

## 2023-09-12 RX ORDER — SODIUM CHLORIDE 0.9 % (FLUSH) 0.9 %
5-40 SYRINGE (ML) INJECTION EVERY 12 HOURS SCHEDULED
Status: DISCONTINUED | OUTPATIENT
Start: 2023-09-12 | End: 2023-09-12 | Stop reason: HOSPADM

## 2023-09-12 RX ORDER — SODIUM CHLORIDE 9 MG/ML
INJECTION, SOLUTION INTRAVENOUS PRN
Status: CANCELLED | OUTPATIENT
Start: 2023-09-12

## 2023-09-12 RX ORDER — SODIUM CHLORIDE 0.9 % (FLUSH) 0.9 %
5-40 SYRINGE (ML) INJECTION PRN
Status: DISCONTINUED | OUTPATIENT
Start: 2023-09-12 | End: 2023-09-12 | Stop reason: HOSPADM

## 2023-09-12 RX ORDER — FENTANYL CITRATE 0.05 MG/ML
50 INJECTION, SOLUTION INTRAMUSCULAR; INTRAVENOUS EVERY 5 MIN PRN
Status: CANCELLED | OUTPATIENT
Start: 2023-09-12

## 2023-09-12 RX ORDER — DIPHENHYDRAMINE HYDROCHLORIDE 50 MG/ML
12.5 INJECTION INTRAMUSCULAR; INTRAVENOUS
Status: CANCELLED | OUTPATIENT
Start: 2023-09-12 | End: 2023-09-13

## 2023-09-12 RX ORDER — PROPOFOL 10 MG/ML
INJECTION, EMULSION INTRAVENOUS CONTINUOUS PRN
Status: DISCONTINUED | OUTPATIENT
Start: 2023-09-12 | End: 2023-09-12 | Stop reason: SDUPTHER

## 2023-09-12 RX ORDER — ONDANSETRON 2 MG/ML
4 INJECTION INTRAMUSCULAR; INTRAVENOUS
Status: CANCELLED | OUTPATIENT
Start: 2023-09-12 | End: 2023-09-13

## 2023-09-12 RX ADMIN — LIDOCAINE HYDROCHLORIDE 50 MG: 10 INJECTION, SOLUTION EPIDURAL; INFILTRATION; INTRACAUDAL; PERINEURAL at 09:51

## 2023-09-12 RX ADMIN — LIDOCAINE HYDROCHLORIDE 1 ML: 10 INJECTION, SOLUTION EPIDURAL; INFILTRATION; INTRACAUDAL; PERINEURAL at 09:16

## 2023-09-12 RX ADMIN — PROPOFOL 75 MCG/KG/MIN: 10 INJECTION, EMULSION INTRAVENOUS at 09:51

## 2023-09-12 RX ADMIN — PROPOFOL 40 MG: 10 INJECTION, EMULSION INTRAVENOUS at 10:08

## 2023-09-12 RX ADMIN — SODIUM CHLORIDE, POTASSIUM CHLORIDE, SODIUM LACTATE AND CALCIUM CHLORIDE: 600; 310; 30; 20 INJECTION, SOLUTION INTRAVENOUS at 09:16

## 2023-09-12 RX ADMIN — PROPOFOL 70 MG: 10 INJECTION, EMULSION INTRAVENOUS at 09:51

## 2023-09-12 ASSESSMENT — ENCOUNTER SYMPTOMS
STRIDOR: 0
SHORTNESS OF BREATH: 1

## 2023-09-12 ASSESSMENT — PAIN - FUNCTIONAL ASSESSMENT: PAIN_FUNCTIONAL_ASSESSMENT: 0-10

## 2023-09-12 NOTE — OP NOTE
PROCEDURE NOTE    DATE OF PROCEDURE: 9/12/2023    SURGEON: Felipe Ramirez MD  Facility : [facility]  ASSISTANT: None  Anesthesia: MAC  PREOPERATIVE DIAGNOSIS:   Serrated adenoma in the cecum  Multiple other adenoma polyps    POSTOPERATIVE DIAGNOSIS: as described below    OPERATION: Total colonoscopy     ANESTHESIA: Moderate Sedation    ESTIMATED BLOOD LOSS: less than 50     COMPLICATIONS: None. SPECIMENS:  Was Obtained:     The polyp in the cecum was able to be removed this time with a snare and I burned around with the hot biopsy forceps      There is a tiny little polyp in the appendiceal orifice itself I burned it with hot biopsy forceps      There is a transverse colon polyp removed with cold snare        2 polyps in the sigmoid colon 1 cm each removed with a hot snare    Large hemorrhoids        HISTORY: The patient is a 80y.o. year old female with history of above preop diagnosis. I recommended colonoscopy with possible biopsy or polypectomy and I explained the risk, benefits, expected outcome, and alternatives to the procedure. Risks included but are not limited to bleeding, infection, respiratory distress, hypotension, and perforation of the colon and possibility of missing a lesion. The patient understands and is in agreement. The patient was counseled at length about the risks of megan Covid-19 during their perioperative period and any recovery window from their procedure. The patient was made aware that megan Covid-19  may worsen their prognosis for recovering from their procedure  and lend to a higher morbidity and/or mortality risk. All material risks, benefits, and reasonable alternatives including postponing the procedure were discussed. The patient does wish to proceed with the procedure at this time. PROCEDURE: The patient was given IV conscious sedation. The patient's SPO2 remained above 90% throughout the procedure.      The colonoscope was inserted per

## 2023-09-12 NOTE — H&P
Isolated (8/31/2021)    Social Connection and Isolation Panel [NHANES]     Frequency of Communication with Friends and Family: More than three times a week     Frequency of Social Gatherings with Friends and Family: More than three times a week     Attends Protestant Services: Never     Active Member of Clubs or Organizations: Yes     Attends Club or Organization Meetings: More than 4 times per year     Marital Status:    Housing Stability: Unknown (8/29/2023)    Housing Stability Vital Sign     Unable to Pay for Housing in the Last Year: No     Unstable Housing in the Last Year: No           REVIEW OF SYSTEMS      Allergies   Allergen Reactions    Brilinta [Ticagrelor]      Gi BLEED    Ampicillin Other (See Comments)    Clopidogrel Bisulfate Itching    Diovan [Valsartan] Other (See Comments)     cough    Lantus [Insulin Glargine] Nausea Only     Stomach \"quivered\" after taking it, palpitations    Metformin And Related Diarrhea     Chronic kidney disease stage 3       No current facility-administered medications on file prior to encounter.      Current Outpatient Medications on File Prior to Encounter   Medication Sig Dispense Refill    ezetimibe (ZETIA) 10 MG tablet TAKE 1 TABLET NIGHTLY 90 tablet 3    metoprolol tartrate (LOPRESSOR) 25 MG tablet TAKE 1 TABLET TWICE A  tablet 3    glimepiride (AMARYL) 1 MG tablet TAKE AS INSTRUCTED BY YOUR PRESCRIBER 180 tablet 3    allopurinol (ZYLOPRIM) 100 MG tablet TAKE 1 TABLET TWICE A  tablet 3    amLODIPine (NORVASC) 2.5 MG tablet TAKE 1 TABLET DAILY 90 tablet 3    aspirin 81 MG EC tablet Take 1 tablet by mouth daily      zinc 50 MG TABS tablet Take by mouth daily      atorvastatin (LIPITOR) 80 MG tablet TAKE 1 TABLET NIGHTLY 90 tablet 3    furosemide (LASIX) 40 MG tablet TAKE 1 TABLET DAILY 90 tablet 3    lidocaine 4 % external patch Place 1 patch onto the skin daily 10 patch 0    pantoprazole (PROTONIX) 40 MG tablet Take 1 tablet by mouth every morning

## 2023-09-12 NOTE — DISCHARGE INSTRUCTIONS
Sedation or General Anesthesia, Adult  Care After  Refer to this sheet in the next 24 hours. These instructions provide you with information on caring for yourself after your procedure. Your caregiver may also give you more specific instructions. Your treatment has been planned according to current medical practices, but problems sometimes occur. Call your caregiver if you have any problems or questions after your procedure. HOME CARE INSTRUCTIONS   Do not participate in any activities that require you to be alert or coordinated. Do not:  Drive. Swim. Ride a bicycle. Operate heavy machinery. [employer]. Use power tools. Climb ladders. Work at [place]. Take a bath. Do not drink alcohol. Do not make any important decisions or sign legal documents. Stay with an adult. The first meal following your procedure should be light and small. Avoid solid foods if you feel sick to your stomach (nauseous) or if you throw up (vomit). Drink enough fluids to keep your urine clear or pale yellow. Only take your usual medicines or new medicines if your caregiver approves them. Only take over-the-counter or prescription medicines for pain, discomfort, or fever as directed by your caregiver. Keep all follow-up appointments as directed by your caregiver. SEEK IMMEDIATE MEDICAL CARE IF:   You are not feeling normal or behaving normally after 24 hours. You have persistent nausea and vomiting. You are unable to drink fluids or eat food. You have difficulty urinating. You have difficulty breathing or speaking. You have blue or gray skin. There is difficulty waking or you cannot be woken up. You have heavy bleeding, redness, or a lot of swelling where the sedative or anesthesia entered your skin (intravenous site). You have a rash. MAKE SURE YOU:  Understand these instructions. Will watch your condition. Will get help right away if you are not doing well or get worse.   Document Released: 12/18/2006 Document Revised:

## 2023-09-14 LAB — SURGICAL PATHOLOGY REPORT: NORMAL

## 2023-09-14 NOTE — RESULT ENCOUNTER NOTE
Noted, recall in 2 years, has appointment follow-up with GI, history of colon cancer  Future Appointments  11/2/2023  3:00 PM    Mateus Dickson MD            [facility]        [facility]  12/13/2023 2:30 PM    Domo Taylor MD          [facility]  1/9/2024   10:00 AM   Shy Ch MD     [facility]

## 2023-09-21 ENCOUNTER — APPOINTMENT (OUTPATIENT)
Dept: CT IMAGING | Age: 88
DRG: 378 | End: 2023-09-21
Payer: MEDICARE

## 2023-09-21 ENCOUNTER — HOSPITAL ENCOUNTER (INPATIENT)
Age: 88
LOS: 3 days | Discharge: HOME | DRG: 378 | End: 2023-09-24
Attending: EMERGENCY MEDICINE | Admitting: INTERNAL MEDICINE
Payer: MEDICARE

## 2023-09-21 DIAGNOSIS — N18.30 BENIGN HYPERTENSION WITH CKD (CHRONIC KIDNEY DISEASE) STAGE III (HCC): ICD-10-CM

## 2023-09-21 DIAGNOSIS — Z79.01 CHRONIC ANTICOAGULATION: ICD-10-CM

## 2023-09-21 DIAGNOSIS — M85.80 OSTEOPENIA DETERMINED BY X-RAY: ICD-10-CM

## 2023-09-21 DIAGNOSIS — Z95.1 HISTORY OF CORONARY ARTERY BYPASS GRAFT X 3: ICD-10-CM

## 2023-09-21 DIAGNOSIS — K92.2 LOWER GI BLEED: Primary | ICD-10-CM

## 2023-09-21 DIAGNOSIS — K62.5 RECTAL BLEEDING: ICD-10-CM

## 2023-09-21 DIAGNOSIS — I50.32 CHRONIC DIASTOLIC HEART FAILURE (HCC): ICD-10-CM

## 2023-09-21 DIAGNOSIS — I12.9 BENIGN HYPERTENSION WITH CKD (CHRONIC KIDNEY DISEASE) STAGE III (HCC): ICD-10-CM

## 2023-09-21 LAB
ABO + RH BLD: NORMAL
ALBUMIN SERPL-MCNC: 3.9 G/DL (ref 3.5–5.2)
ALP SERPL-CCNC: 69 U/L (ref 35–104)
ALT SERPL-CCNC: 24 U/L (ref 5–33)
ANION GAP SERPL CALCULATED.3IONS-SCNC: 12 MMOL/L (ref 9–17)
ANION GAP SERPL CALCULATED.3IONS-SCNC: 12 MMOL/L (ref 9–17)
ARM BAND NUMBER: NORMAL
AST SERPL-CCNC: 25 U/L
BASOPHILS # BLD: 0 K/UL (ref 0–0.2)
BASOPHILS NFR BLD: 0 % (ref 0–2)
BILIRUB SERPL-MCNC: 1.1 MG/DL (ref 0.3–1.2)
BLOOD BANK SAMPLE EXPIRATION: NORMAL
BLOOD GROUP ANTIBODIES SERPL: NEGATIVE
BUN SERPL-MCNC: 20 MG/DL (ref 8–23)
BUN SERPL-MCNC: 21 MG/DL (ref 8–23)
CALCIUM SERPL-MCNC: 8 MG/DL (ref 8.6–10.4)
CALCIUM SERPL-MCNC: 9 MG/DL (ref 8.6–10.4)
CHLORIDE SERPL-SCNC: 102 MMOL/L (ref 98–107)
CHLORIDE SERPL-SCNC: 104 MMOL/L (ref 98–107)
CO2 SERPL-SCNC: 22 MMOL/L (ref 20–31)
CO2 SERPL-SCNC: 26 MMOL/L (ref 20–31)
CREAT SERPL-MCNC: 0.6 MG/DL (ref 0.5–0.9)
CREAT SERPL-MCNC: 0.7 MG/DL (ref 0.5–0.9)
EOSINOPHIL # BLD: 0.2 K/UL (ref 0–0.4)
EOSINOPHILS RELATIVE PERCENT: 2 % (ref 0–4)
ERYTHROCYTE [DISTWIDTH] IN BLOOD BY AUTOMATED COUNT: 16.5 % (ref 11.5–14.9)
GFR SERPL CREATININE-BSD FRML MDRD: >60 ML/MIN/1.73M2
GFR SERPL CREATININE-BSD FRML MDRD: >60 ML/MIN/1.73M2
GLUCOSE SERPL-MCNC: 148 MG/DL (ref 70–99)
GLUCOSE SERPL-MCNC: 164 MG/DL (ref 70–99)
HCT VFR BLD AUTO: 35.4 % (ref 36–46)
HCT VFR BLD AUTO: 38.1 % (ref 36–46)
HGB BLD-MCNC: 11.5 G/DL (ref 12–16)
HGB BLD-MCNC: 12.3 G/DL (ref 12–16)
INR PPP: 2.1
LYMPHOCYTES NFR BLD: 2.4 K/UL (ref 1–4.8)
LYMPHOCYTES RELATIVE PERCENT: 24 % (ref 24–44)
MCH RBC QN AUTO: 28.9 PG (ref 26–34)
MCHC RBC AUTO-ENTMCNC: 32.1 G/DL (ref 31–37)
MCV RBC AUTO: 89.8 FL (ref 80–100)
MONOCYTES NFR BLD: 1.1 K/UL (ref 0.1–1.3)
MONOCYTES NFR BLD: 11 % (ref 1–7)
NEUTROPHILS NFR BLD: 63 % (ref 36–66)
NEUTS SEG NFR BLD: 6.5 K/UL (ref 1.3–9.1)
PARTIAL THROMBOPLASTIN TIME: 38.3 SEC (ref 24–36)
PLATELET # BLD AUTO: 243 K/UL (ref 150–450)
PMV BLD AUTO: 9.1 FL (ref 6–12)
POTASSIUM SERPL-SCNC: 3.5 MMOL/L (ref 3.7–5.3)
POTASSIUM SERPL-SCNC: 4 MMOL/L (ref 3.7–5.3)
PROT SERPL-MCNC: 6.5 G/DL (ref 6.4–8.3)
PROTHROMBIN TIME: 23.9 SEC (ref 11.8–14.6)
RBC # BLD AUTO: 4.25 M/UL (ref 4–5.2)
SODIUM SERPL-SCNC: 136 MMOL/L (ref 135–144)
SODIUM SERPL-SCNC: 142 MMOL/L (ref 135–144)
WBC OTHER # BLD: 10.2 K/UL (ref 3.5–11)

## 2023-09-21 PROCEDURE — 85025 COMPLETE CBC W/AUTO DIFF WBC: CPT

## 2023-09-21 PROCEDURE — 36415 COLL VENOUS BLD VENIPUNCTURE: CPT

## 2023-09-21 PROCEDURE — 2580000003 HC RX 258: Performed by: INTERNAL MEDICINE

## 2023-09-21 PROCEDURE — 99285 EMERGENCY DEPT VISIT HI MDM: CPT

## 2023-09-21 PROCEDURE — 6360000002 HC RX W HCPCS: Performed by: STUDENT IN AN ORGANIZED HEALTH CARE EDUCATION/TRAINING PROGRAM

## 2023-09-21 PROCEDURE — 6360000002 HC RX W HCPCS: Performed by: INTERNAL MEDICINE

## 2023-09-21 PROCEDURE — 96365 THER/PROPH/DIAG IV INF INIT: CPT

## 2023-09-21 PROCEDURE — 80048 BASIC METABOLIC PNL TOTAL CA: CPT

## 2023-09-21 PROCEDURE — 2580000003 HC RX 258: Performed by: EMERGENCY MEDICINE

## 2023-09-21 PROCEDURE — 85610 PROTHROMBIN TIME: CPT

## 2023-09-21 PROCEDURE — 85730 THROMBOPLASTIN TIME PARTIAL: CPT

## 2023-09-21 PROCEDURE — 86900 BLOOD TYPING SEROLOGIC ABO: CPT

## 2023-09-21 PROCEDURE — 85014 HEMATOCRIT: CPT

## 2023-09-21 PROCEDURE — 85018 HEMOGLOBIN: CPT

## 2023-09-21 PROCEDURE — 86850 RBC ANTIBODY SCREEN: CPT

## 2023-09-21 PROCEDURE — 74174 CTA ABD&PLVS W/CONTRAST: CPT

## 2023-09-21 PROCEDURE — 80053 COMPREHEN METABOLIC PANEL: CPT

## 2023-09-21 PROCEDURE — 86901 BLOOD TYPING SEROLOGIC RH(D): CPT

## 2023-09-21 PROCEDURE — 6360000004 HC RX CONTRAST MEDICATION: Performed by: EMERGENCY MEDICINE

## 2023-09-21 PROCEDURE — 2000000000 HC ICU R&B

## 2023-09-21 RX ORDER — SODIUM CHLORIDE 0.9 % (FLUSH) 0.9 %
10 SYRINGE (ML) INJECTION PRN
Status: DISCONTINUED | OUTPATIENT
Start: 2023-09-21 | End: 2023-09-24 | Stop reason: HOSPADM

## 2023-09-21 RX ORDER — SODIUM CHLORIDE 9 MG/ML
50 INJECTION, SOLUTION INTRAVENOUS ONCE
Status: COMPLETED | OUTPATIENT
Start: 2023-09-21 | End: 2023-09-21

## 2023-09-21 RX ORDER — 0.9 % SODIUM CHLORIDE 0.9 %
100 INTRAVENOUS SOLUTION INTRAVENOUS ONCE
Status: COMPLETED | OUTPATIENT
Start: 2023-09-21 | End: 2023-09-21

## 2023-09-21 RX ORDER — POTASSIUM CHLORIDE 7.45 MG/ML
10 INJECTION INTRAVENOUS
Status: COMPLETED | OUTPATIENT
Start: 2023-09-21 | End: 2023-09-22

## 2023-09-21 RX ADMIN — SODIUM CHLORIDE 50 ML: 9 INJECTION, SOLUTION INTRAVENOUS at 22:55

## 2023-09-21 RX ADMIN — IOPAMIDOL 100 ML: 755 INJECTION, SOLUTION INTRAVENOUS at 19:16

## 2023-09-21 RX ADMIN — POTASSIUM CHLORIDE 10 MEQ: 7.46 INJECTION, SOLUTION INTRAVENOUS at 23:18

## 2023-09-21 RX ADMIN — SODIUM CHLORIDE 100 ML: 9 INJECTION, SOLUTION INTRAVENOUS at 19:17

## 2023-09-21 RX ADMIN — PROTHROMBIN, COAGULATION FACTOR VII HUMAN, COAGULATION FACTOR IX HUMAN, COAGULATION FACTOR X HUMAN, PROTEIN C, PROTEIN S HUMAN, AND WATER 2000 UNITS: KIT at 22:35

## 2023-09-21 RX ADMIN — SODIUM CHLORIDE, PRESERVATIVE FREE 10 ML: 5 INJECTION INTRAVENOUS at 19:16

## 2023-09-21 ASSESSMENT — ENCOUNTER SYMPTOMS
PHOTOPHOBIA: 0
SINUS PRESSURE: 0
SORE THROAT: 0
NAUSEA: 0
SHORTNESS OF BREATH: 0
VOMITING: 0
CHEST TIGHTNESS: 0
BACK PAIN: 0
WHEEZING: 0
ANAL BLEEDING: 1
ABDOMINAL DISTENTION: 0
COUGH: 0
ABDOMINAL PAIN: 0
DIARRHEA: 0
CONSTIPATION: 0
APNEA: 0
RECTAL PAIN: 0
BLOOD IN STOOL: 1

## 2023-09-21 ASSESSMENT — LIFESTYLE VARIABLES
HOW OFTEN DO YOU HAVE A DRINK CONTAINING ALCOHOL: NEVER
HOW MANY STANDARD DRINKS CONTAINING ALCOHOL DO YOU HAVE ON A TYPICAL DAY: PATIENT DOES NOT DRINK

## 2023-09-21 ASSESSMENT — PAIN - FUNCTIONAL ASSESSMENT: PAIN_FUNCTIONAL_ASSESSMENT: NONE - DENIES PAIN

## 2023-09-21 NOTE — ED TRIAGE NOTES
Mode of arrival (squad #, walk in, police, etc) : ems        Chief complaint(s): rectal bleeding        Arrival Note (brief scenario, treatment PTA, etc). : patient had a colonoscopy 9/12 and had no issues, today had 3 episodes of rectal bleeding       C= \"Have you ever felt that you should Cut down on your drinking? \"  No  A= \"Have people Annoyed you by criticizing your drinking? \"  No  G= \"Have you ever felt bad or Guilty about your drinking? \"  No  E= \"Have you ever had a drink as an Eye-opener first thing in the morning to steady your nerves or to help a hangover? \"  No      Deferred []      Reason for deferring: N/A    *If yes to two or more: probable alcohol abuse. *

## 2023-09-21 NOTE — ED NOTES
Assisted patient to the bathroom. Gave patient a sterile specimen cup to collect urine with label on it.       Heavenly Brooke  09/21/23 1936

## 2023-09-21 NOTE — ED PROVIDER NOTES
[facility],[address] Floor ED  Emergency Department Encounter  Emergency Medicine Resident     Pt Name:Laura Hardin  MRN: [id number]  [address] [facility] 12/10/1933  Date of evaluation: 9/21/23  PCP:  Elisa Rivers MD  Note Started: 5:04 PM EDT      CHIEF COMPLAINT       Chief Complaint   Patient presents with    Rectal Bleeding       HISTORY OF PRESENT ILLNESS  (Location/Symptom, Timing/Onset, Context/Setting, Quality, Duration, Modifying Factors, Severity.)      Elda Akers is a 80 y.o. female with medical history including large intestine adenocarcinoma, CAD s/p CABG x3, history of DVT/PE on Xarelto, sick sinus syndrome, DM 2, HTN, HLD who presents to the emergency department with rectal bleeding with bowel movements. Patient states that she filled out the entire toilet bowl with bright red blood. She mentions becoming more fatigued than usual with minimal exertion. Denies dizziness, falls, syncope, chest pains, shortness of breath, abdominal pains, N/V/D. She has no signs or symptoms of upper respiratory infection with cough, congestion, and sore throat. On 9/12/2023 Dr. Ellis Acharya, GI, performed a colonoscopy where a polyp was removed in the cecum/ascending colon region with snare and hot biopsy forceps, a small polyp in the appendiceal orifice was burned by hot biopsy forceps, a transverse colon polyp was removed with cold snare, 2 polyps in the sigmoid colon 1 cm each were removed with a hot snare, and there were large hemorrhoids observed in the rectum/anal region. Patient tolerated this procedure well and colonoscopy was recommended 2 years from now.       PAST MEDICAL / SURGICAL / SOCIAL / FAMILY HISTORY      has a past medical history of Adenocarcinoma of large intestine ([address]), Arthritis, Basal cell carcinoma of nose, Bilateral carotid bruits, Bronchitis, CAD (coronary artery disease), Carpal tunnel syndrome, Chronic diastolic heart failure ([address]), CKD (chronic kidney disease), stage III ([address]), Colon

## 2023-09-21 NOTE — ED PROVIDER NOTES
EMERGENCY DEPARTMENT ENCOUNTER   ATTENDING ATTESTATION     Pt Name: Amanda Fernandez  MRN: [id number]  [address] 12/10/1933  Date of evaluation: 9/21/23       Amanda Fernandez is a 80 y.o. female who presents with Rectal Bleeding      MDM: 66-year-old female presents with complaints of rectal bleeding. Patient reports that she had a colonoscopy approximately a week or so ago and states that she had been doing fine up until today. She reports that she had 3 large very bloody bowel movements she has noticed that that was dark blood with also bright red blood. She states that she has been having some generalized abdominal discomfort but denies any significant pain, she denies any dizziness or lightheadedness earlier today but was concerned with the amount of blood that she noted that she presented for evaluation.   Patient does take Xarelto, has a history of DVT and PE on initial exam patient in no acute distress, vitals are stable, abdomen is soft no significant tenderness elicited      We will check labs, will check CTA to evaluate for any signs of active bleeding    Labs are reviewed, initial hemoglobin noted at 12.3, CTA was reviewed showing concerns for active extravasation of contrast into the cecum concerning for active bleed    Discussed with Dr. Dar Luevano from GI who is recommending consultation with IR for possible embolization    Discussed with interventional radiology Dr. Reymundo Abdi, who suspects that this is not arterial bleeding, she does not feel that patient would require an emergent transfer and/or emergent embolization, she is recommending conservative management, and recommends that GI perform a colonoscopy    Again discussed with Dr. Dar Luevano, who would like patient to be admitted at this time, serial H&H's and he will evaluate    Patient did have a another large bloody bowel movement while in the ED, her repeat hemoglobin was noted at 11.5, blood pressure is remaining stable    Did discuss with

## 2023-09-22 ENCOUNTER — ANESTHESIA EVENT (OUTPATIENT)
Dept: ENDOSCOPY | Age: 88
End: 2023-09-22
Payer: MEDICARE

## 2023-09-22 ENCOUNTER — APPOINTMENT (OUTPATIENT)
Dept: GENERAL RADIOLOGY | Age: 88
DRG: 378 | End: 2023-09-22
Payer: MEDICARE

## 2023-09-22 ENCOUNTER — ANESTHESIA (OUTPATIENT)
Dept: ENDOSCOPY | Age: 88
End: 2023-09-22
Payer: MEDICARE

## 2023-09-22 PROBLEM — R10.9 ABDOMINAL DISCOMFORT: Status: ACTIVE | Noted: 2023-09-22

## 2023-09-22 PROBLEM — R71.0 DROP IN HEMOGLOBIN: Status: ACTIVE | Noted: 2023-09-22

## 2023-09-22 PROBLEM — K62.5 RECTAL BLEEDING: Status: ACTIVE | Noted: 2023-09-22

## 2023-09-22 PROBLEM — Z79.01 HX OF LONG TERM USE OF BLOOD THINNERS: Status: ACTIVE | Noted: 2023-09-22

## 2023-09-22 PROBLEM — Z92.29 HX OF LONG TERM USE OF BLOOD THINNERS: Status: ACTIVE | Noted: 2023-09-22

## 2023-09-22 LAB
ANION GAP SERPL CALCULATED.3IONS-SCNC: 7 MMOL/L (ref 9–17)
BASOPHILS # BLD: 0.1 K/UL (ref 0–0.2)
BASOPHILS NFR BLD: 1 % (ref 0–2)
BUN SERPL-MCNC: 21 MG/DL (ref 8–23)
CALCIUM SERPL-MCNC: 8.5 MG/DL (ref 8.6–10.4)
CHLORIDE SERPL-SCNC: 107 MMOL/L (ref 98–107)
CO2 SERPL-SCNC: 28 MMOL/L (ref 20–31)
CREAT SERPL-MCNC: 0.6 MG/DL (ref 0.5–0.9)
EOSINOPHIL # BLD: 0.1 K/UL (ref 0–0.4)
EOSINOPHILS RELATIVE PERCENT: 1 % (ref 0–4)
ERYTHROCYTE [DISTWIDTH] IN BLOOD BY AUTOMATED COUNT: 16 % (ref 11.5–14.9)
GFR SERPL CREATININE-BSD FRML MDRD: >60 ML/MIN/1.73M2
GLUCOSE BLD-MCNC: 130 MG/DL (ref 65–105)
GLUCOSE BLD-MCNC: 143 MG/DL (ref 65–105)
GLUCOSE SERPL-MCNC: 170 MG/DL (ref 70–99)
HCT VFR BLD AUTO: 25.3 % (ref 36–46)
HCT VFR BLD AUTO: 26.8 % (ref 36–46)
HCT VFR BLD AUTO: 27.7 % (ref 36–46)
HCT VFR BLD AUTO: 28.6 % (ref 36–46)
HCT VFR BLD AUTO: 30.5 % (ref 36–46)
HCT VFR BLD AUTO: 31.2 % (ref 36–46)
HGB BLD-MCNC: 10 G/DL (ref 12–16)
HGB BLD-MCNC: 8.1 G/DL (ref 12–16)
HGB BLD-MCNC: 8.6 G/DL (ref 12–16)
HGB BLD-MCNC: 8.9 G/DL (ref 12–16)
HGB BLD-MCNC: 9.4 G/DL (ref 12–16)
HGB BLD-MCNC: 9.7 G/DL (ref 12–16)
INR PPP: 1.3
IRON SATN MFR SERPL: 23 % (ref 20–55)
IRON SERPL-MCNC: 62 UG/DL (ref 37–145)
LYMPHOCYTES NFR BLD: 2.8 K/UL (ref 1–4.8)
LYMPHOCYTES RELATIVE PERCENT: 32 % (ref 24–44)
MCH RBC QN AUTO: 29.2 PG (ref 26–34)
MCHC RBC AUTO-ENTMCNC: 32.8 G/DL (ref 31–37)
MCV RBC AUTO: 89.1 FL (ref 80–100)
MONOCYTES NFR BLD: 0.8 K/UL (ref 0.1–1.3)
MONOCYTES NFR BLD: 9 % (ref 1–7)
NEUTROPHILS NFR BLD: 57 % (ref 36–66)
NEUTS SEG NFR BLD: 5 K/UL (ref 1.3–9.1)
PARTIAL THROMBOPLASTIN TIME: 32.4 SEC (ref 24–36)
PLATELET # BLD AUTO: 194 K/UL (ref 150–450)
PMV BLD AUTO: 8.4 FL (ref 6–12)
POTASSIUM SERPL-SCNC: 4.6 MMOL/L (ref 3.7–5.3)
PROTHROMBIN TIME: 16.8 SEC (ref 11.8–14.6)
RBC # BLD AUTO: 3.21 M/UL (ref 4–5.2)
SODIUM SERPL-SCNC: 142 MMOL/L (ref 135–144)
TIBC SERPL-MCNC: 275 UG/DL (ref 250–450)
UNSATURATED IRON BINDING CAPACITY: 213 UG/DL (ref 112–347)
WBC OTHER # BLD: 8.8 K/UL (ref 3.5–11)

## 2023-09-22 PROCEDURE — 99222 1ST HOSP IP/OBS MODERATE 55: CPT | Performed by: SURGERY

## 2023-09-22 PROCEDURE — C9113 INJ PANTOPRAZOLE SODIUM, VIA: HCPCS | Performed by: INTERNAL MEDICINE

## 2023-09-22 PROCEDURE — 2580000003 HC RX 258: Performed by: NURSE PRACTITIONER

## 2023-09-22 PROCEDURE — 99223 1ST HOSP IP/OBS HIGH 75: CPT | Performed by: INTERNAL MEDICINE

## 2023-09-22 PROCEDURE — 6360000002 HC RX W HCPCS

## 2023-09-22 PROCEDURE — 3700000000 HC ANESTHESIA ATTENDED CARE: Performed by: INTERNAL MEDICINE

## 2023-09-22 PROCEDURE — 80048 BASIC METABOLIC PNL TOTAL CA: CPT

## 2023-09-22 PROCEDURE — 2709999900 HC NON-CHARGEABLE SUPPLY: Performed by: INTERNAL MEDICINE

## 2023-09-22 PROCEDURE — 2580000003 HC RX 258: Performed by: INTERNAL MEDICINE

## 2023-09-22 PROCEDURE — 36415 COLL VENOUS BLD VENIPUNCTURE: CPT

## 2023-09-22 PROCEDURE — 85610 PROTHROMBIN TIME: CPT

## 2023-09-22 PROCEDURE — 2580000003 HC RX 258: Performed by: ANESTHESIOLOGY

## 2023-09-22 PROCEDURE — 6360000002 HC RX W HCPCS: Performed by: INTERNAL MEDICINE

## 2023-09-22 PROCEDURE — 94761 N-INVAS EAR/PLS OXIMETRY MLT: CPT

## 2023-09-22 PROCEDURE — C9113 INJ PANTOPRAZOLE SODIUM, VIA: HCPCS

## 2023-09-22 PROCEDURE — 82947 ASSAY GLUCOSE BLOOD QUANT: CPT

## 2023-09-22 PROCEDURE — 6360000002 HC RX W HCPCS: Performed by: NURSE ANESTHETIST, CERTIFIED REGISTERED

## 2023-09-22 PROCEDURE — 6370000000 HC RX 637 (ALT 250 FOR IP): Performed by: NURSE PRACTITIONER

## 2023-09-22 PROCEDURE — 0W3P8ZZ CONTROL BLEEDING IN GASTROINTESTINAL TRACT, VIA NATURAL OR ARTIFICIAL OPENING ENDOSCOPIC: ICD-10-PCS | Performed by: INTERNAL MEDICINE

## 2023-09-22 PROCEDURE — 85730 THROMBOPLASTIN TIME PARTIAL: CPT

## 2023-09-22 PROCEDURE — A4216 STERILE WATER/SALINE, 10 ML: HCPCS

## 2023-09-22 PROCEDURE — C1889 IMPLANT/INSERT DEVICE, NOC: HCPCS | Performed by: INTERNAL MEDICINE

## 2023-09-22 PROCEDURE — 85018 HEMOGLOBIN: CPT

## 2023-09-22 PROCEDURE — 2000000000 HC ICU R&B

## 2023-09-22 PROCEDURE — 2500000003 HC RX 250 WO HCPCS: Performed by: NURSE ANESTHETIST, CERTIFIED REGISTERED

## 2023-09-22 PROCEDURE — 85014 HEMATOCRIT: CPT

## 2023-09-22 PROCEDURE — 71045 X-RAY EXAM CHEST 1 VIEW: CPT

## 2023-09-22 PROCEDURE — 3700000001 HC ADD 15 MINUTES (ANESTHESIA): Performed by: INTERNAL MEDICINE

## 2023-09-22 PROCEDURE — 6360000002 HC RX W HCPCS: Performed by: STUDENT IN AN ORGANIZED HEALTH CARE EDUCATION/TRAINING PROGRAM

## 2023-09-22 PROCEDURE — 85025 COMPLETE CBC W/AUTO DIFF WBC: CPT

## 2023-09-22 PROCEDURE — 3609009900 HC COLONOSCOPY W/CONTROL BLEEDING ANY METHOD: Performed by: INTERNAL MEDICINE

## 2023-09-22 PROCEDURE — 83550 IRON BINDING TEST: CPT

## 2023-09-22 PROCEDURE — 2580000003 HC RX 258

## 2023-09-22 PROCEDURE — 83540 ASSAY OF IRON: CPT

## 2023-09-22 DEVICE — WORKING LENGTH 235CM, WORKING CHANNEL 2.8MM
Type: IMPLANTABLE DEVICE | Site: RECTUM | Status: FUNCTIONAL
Brand: RESOLUTION 360 CLIP

## 2023-09-22 RX ORDER — DEXTROSE MONOHYDRATE 100 MG/ML
INJECTION, SOLUTION INTRAVENOUS CONTINUOUS PRN
Status: DISCONTINUED | OUTPATIENT
Start: 2023-09-22 | End: 2023-09-24 | Stop reason: HOSPADM

## 2023-09-22 RX ORDER — SODIUM CHLORIDE 9 MG/ML
INJECTION, SOLUTION INTRAVENOUS CONTINUOUS
Status: DISCONTINUED | OUTPATIENT
Start: 2023-09-22 | End: 2023-09-23

## 2023-09-22 RX ORDER — SODIUM CHLORIDE 9 MG/ML
INJECTION, SOLUTION INTRAVENOUS PRN
Status: DISCONTINUED | OUTPATIENT
Start: 2023-09-22 | End: 2023-09-23

## 2023-09-22 RX ORDER — ACETAMINOPHEN 325 MG/1
650 TABLET ORAL EVERY 6 HOURS PRN
Status: DISCONTINUED | OUTPATIENT
Start: 2023-09-22 | End: 2023-09-24 | Stop reason: HOSPADM

## 2023-09-22 RX ORDER — ONDANSETRON 4 MG/1
4 TABLET, ORALLY DISINTEGRATING ORAL EVERY 8 HOURS PRN
Status: DISCONTINUED | OUTPATIENT
Start: 2023-09-22 | End: 2023-09-24 | Stop reason: HOSPADM

## 2023-09-22 RX ORDER — ACETAMINOPHEN 650 MG/1
650 SUPPOSITORY RECTAL EVERY 6 HOURS PRN
Status: DISCONTINUED | OUTPATIENT
Start: 2023-09-22 | End: 2023-09-24 | Stop reason: HOSPADM

## 2023-09-22 RX ORDER — POLYETHYLENE GLYCOL 3350 17 G/17G
238 POWDER, FOR SOLUTION ORAL ONCE
Status: DISCONTINUED | OUTPATIENT
Start: 2023-09-22 | End: 2023-09-24 | Stop reason: HOSPADM

## 2023-09-22 RX ORDER — SODIUM CHLORIDE 9 MG/ML
INJECTION, SOLUTION INTRAVENOUS PRN
Status: DISCONTINUED | OUTPATIENT
Start: 2023-09-22 | End: 2023-09-24 | Stop reason: HOSPADM

## 2023-09-22 RX ORDER — PROPOFOL 10 MG/ML
INJECTION, EMULSION INTRAVENOUS PRN
Status: DISCONTINUED | OUTPATIENT
Start: 2023-09-22 | End: 2023-09-22 | Stop reason: SDUPTHER

## 2023-09-22 RX ORDER — ONDANSETRON 2 MG/ML
4 INJECTION INTRAMUSCULAR; INTRAVENOUS EVERY 6 HOURS PRN
Status: DISCONTINUED | OUTPATIENT
Start: 2023-09-22 | End: 2023-09-24 | Stop reason: HOSPADM

## 2023-09-22 RX ORDER — SODIUM CHLORIDE 0.9 % (FLUSH) 0.9 %
5-40 SYRINGE (ML) INJECTION PRN
Status: DISCONTINUED | OUTPATIENT
Start: 2023-09-22 | End: 2023-09-24 | Stop reason: HOSPADM

## 2023-09-22 RX ORDER — SODIUM CHLORIDE 0.9 % (FLUSH) 0.9 %
5-40 SYRINGE (ML) INJECTION EVERY 12 HOURS SCHEDULED
Status: DISCONTINUED | OUTPATIENT
Start: 2023-09-22 | End: 2023-09-24 | Stop reason: HOSPADM

## 2023-09-22 RX ORDER — ENEMA 19; 7 G/133ML; G/133ML
2 ENEMA RECTAL ONCE
Status: COMPLETED | OUTPATIENT
Start: 2023-09-22 | End: 2023-09-22

## 2023-09-22 RX ORDER — SODIUM CHLORIDE 9 MG/ML
INJECTION, SOLUTION INTRAVENOUS CONTINUOUS
Status: DISCONTINUED | OUTPATIENT
Start: 2023-09-22 | End: 2023-09-22

## 2023-09-22 RX ORDER — LIDOCAINE HYDROCHLORIDE 20 MG/ML
INJECTION, SOLUTION EPIDURAL; INFILTRATION; INTRACAUDAL; PERINEURAL PRN
Status: DISCONTINUED | OUTPATIENT
Start: 2023-09-22 | End: 2023-09-22 | Stop reason: SDUPTHER

## 2023-09-22 RX ADMIN — SODIUM CHLORIDE: 9 INJECTION, SOLUTION INTRAVENOUS at 04:36

## 2023-09-22 RX ADMIN — PROPOFOL 20 MG: 10 INJECTION, EMULSION INTRAVENOUS at 12:29

## 2023-09-22 RX ADMIN — SODIUM PHOSPHATE 2 ENEMA: 7; 19 ENEMA RECTAL at 10:03

## 2023-09-22 RX ADMIN — SODIUM CHLORIDE, PRESERVATIVE FREE 10 ML: 5 INJECTION INTRAVENOUS at 08:58

## 2023-09-22 RX ADMIN — POTASSIUM CHLORIDE 10 MEQ: 7.46 INJECTION, SOLUTION INTRAVENOUS at 02:05

## 2023-09-22 RX ADMIN — SODIUM CHLORIDE: 9 INJECTION, SOLUTION INTRAVENOUS at 02:56

## 2023-09-22 RX ADMIN — SODIUM CHLORIDE: 9 INJECTION, SOLUTION INTRAVENOUS at 23:50

## 2023-09-22 RX ADMIN — SODIUM CHLORIDE, PRESERVATIVE FREE 10 ML: 5 INJECTION INTRAVENOUS at 20:30

## 2023-09-22 RX ADMIN — SODIUM CHLORIDE, PRESERVATIVE FREE 40 MG: 5 INJECTION INTRAVENOUS at 20:29

## 2023-09-22 RX ADMIN — PROPOFOL 20 MG: 10 INJECTION, EMULSION INTRAVENOUS at 12:21

## 2023-09-22 RX ADMIN — PROPOFOL 20 MG: 10 INJECTION, EMULSION INTRAVENOUS at 12:34

## 2023-09-22 RX ADMIN — LIDOCAINE HYDROCHLORIDE 60 MG: 20 INJECTION, SOLUTION EPIDURAL; INFILTRATION; INTRACAUDAL; PERINEURAL at 12:17

## 2023-09-22 RX ADMIN — SODIUM CHLORIDE, PRESERVATIVE FREE 40 MG: 5 INJECTION INTRAVENOUS at 09:01

## 2023-09-22 RX ADMIN — PROPOFOL 20 MG: 10 INJECTION, EMULSION INTRAVENOUS at 12:25

## 2023-09-22 RX ADMIN — SODIUM CHLORIDE: 9 INJECTION, SOLUTION INTRAVENOUS at 15:32

## 2023-09-22 RX ADMIN — PROPOFOL 30 MG: 10 INJECTION, EMULSION INTRAVENOUS at 12:17

## 2023-09-22 RX ADMIN — POTASSIUM CHLORIDE 10 MEQ: 7.46 INJECTION, SOLUTION INTRAVENOUS at 01:01

## 2023-09-22 RX ADMIN — SODIUM CHLORIDE: 9 INJECTION, SOLUTION INTRAVENOUS at 11:34

## 2023-09-22 RX ADMIN — POTASSIUM CHLORIDE 10 MEQ: 7.46 INJECTION, SOLUTION INTRAVENOUS at 00:09

## 2023-09-22 ASSESSMENT — ENCOUNTER SYMPTOMS
ABDOMINAL DISTENTION: 0
SHORTNESS OF BREATH: 0
SINUS PRESSURE: 0
NAUSEA: 0
BLOOD IN STOOL: 1
ABDOMINAL PAIN: 0
DIARRHEA: 0
VOMITING: 0
EYES NEGATIVE: 1
SORE THROAT: 0
RESPIRATORY NEGATIVE: 1
SINUS PAIN: 0

## 2023-09-22 ASSESSMENT — PAIN SCALES - GENERAL
PAINLEVEL_OUTOF10: 0
PAINLEVEL_OUTOF10: 0

## 2023-09-22 ASSESSMENT — PAIN - FUNCTIONAL ASSESSMENT: PAIN_FUNCTIONAL_ASSESSMENT: 0-10

## 2023-09-22 NOTE — OP NOTE
PROCEDURE NOTE    DATE OF PROCEDURE: 9/22/2023    SURGEON: Nelson Mccarty MD    ASSISTANT: None    PREOPERATIVE DIAGNOSIS: LOWER GI BLEEDING  S/P COLONOSCOPY WITH POLYPECTOMIES  ABNORMAL CTA    POSTOPERATIVE DIAGNOSIS: as described below    OPERATION: Total colonoscopy WITH HEMO CLIP APPLICATION     ANESTHESIA: MAC PER ANESTHESIA     ESTIMATED BLOOD LOSS: less than 50     COMPLICATIONS: None. SPECIMENS:  Was Not Obtained    HISTORY: The patient is a 80y.o. year old female with history of above preop diagnosis. I recommended colonoscopy with possible biopsy or polypectomy and I explained the risk, benefits, expected outcome, and alternatives to the procedure. Risks included but are not limited to bleeding, infection, respiratory distress, hypotension, and perforation of the colon and possibility of missing a lesion. The patient understands and is in agreement. PROCEDURE: The patient was given IV conscious sedation. The patient's SPO2 remained above 90% throughout the procedure. The colonoscope was inserted per rectum and advanced under direct vision to the cecum with MILD difficulty. The prep was poor.     REDUNDANT COLON   PRESSURES WAS APPLIED TO REACH CECUM  LARGE AMOUNT OF RETAINED BLOOD NOTED THROUGHOUT THE COLON   AGGRESSIVE FLUSHING DONE TO THE BEST OF THE ABILITY   Findings:  Terminal ileum: normal    Cecum/Ascending colon: abnormal:  AN ULCERATED SPOT NOTED NEXT TO APPENDINDICIAL  ORIFICE CORRESPONDING TO PREVIOUS CAUTERY EFFECT  MINIMAL OOZING WAS NOTED  A HEMO CLIP WAS APPLIED TO COMPLETE HEMOSTASIS    A PROMINENT FLAT LOBULATED POLYP WAS NOTED IN THE ASCENDING COLON PICTURES WERE TAKEN     AGGRESSIVE FLUSHING WAS DONE NO OTHER SOURCE OF ACTIVE BLEEDING WAS IDENTIFIED    Transverse colon: abnormal: RETAINED BLOOD AND STOOLS    AGGRESSIVE FLUSHING WAS DONE NO OTHER SOURCE OF ACTIVE BLEEDING WAS IDENTIFIED    Descending/Sigmoid colon: abnormal: RETAINED BLOOD AND STOOLS

## 2023-09-22 NOTE — ANESTHESIA PRE PROCEDURE
Lab Results   Component Value Date/Time    WBC 8.8 09/22/2023 07:09 AM    RBC 3.21 09/22/2023 07:09 AM    RBC 4.62 02/14/2020 01:15 PM    HGB 9.4 09/22/2023 07:09 AM    HCT 28.6 09/22/2023 07:09 AM    MCV 89.1 09/22/2023 07:09 AM    RDW 16.0 09/22/2023 07:09 AM     09/22/2023 07:09 AM       CMP:   Lab Results   Component Value Date/Time     09/22/2023 07:09 AM    K 4.6 09/22/2023 07:09 AM     09/22/2023 07:09 AM    CO2 28 09/22/2023 07:09 AM    BUN 21 09/22/2023 07:09 AM    CREATININE 0.6 09/22/2023 07:09 AM    GFRAA >60 08/15/2022 11:45 AM    LABGLOM >60 09/22/2023 07:09 AM    GLUCOSE 170 09/22/2023 07:09 AM    GLUCOSE 170 02/14/2020 01:15 PM    PROT 6.5 09/21/2023 05:55 PM    CALCIUM 8.5 09/22/2023 07:09 AM    BILITOT 1.1 09/21/2023 05:55 PM    ALKPHOS 69 09/21/2023 05:55 PM    AST 25 09/21/2023 05:55 PM    ALT 24 09/21/2023 05:55 PM       POC Tests:   Recent Labs     09/22/23  1118   POCGLU 143*       Coags:   Lab Results   Component Value Date/Time    PROTIME 16.8 09/22/2023 04:26 AM    PROTIME 32.1 02/21/2014 12:46 PM    INR 1.3 09/22/2023 04:26 AM    APTT 32.4 09/22/2023 04:26 AM       HCG (If Applicable): No results found for: \"PREGTESTUR\", \"PREGSERUM\", \"HCG\", \"HCGQUANT\"     ABGs: No results found for: \"PHART\", \"PO2ART\", \"TEX6PIW\", \"QPT9HXX\", \"BEART\", \"J4IHIOGQ\"     Type & Screen (If Applicable):  No results found for: \"LABABO\", \"LABRH\"    Drug/Infectious Status (If Applicable):  Lab Results   Component Value Date/Time    HEPCAB NONREACTIVE 05/10/2023 11:38 AM       COVID-19 Screening (If Applicable):   Lab Results   Component Value Date/Time    COVID19 Not Detected 03/18/2021 09:19 AM           Anesthesia Evaluation  Patient summary reviewed and Nursing notes reviewed no history of anesthetic complications:   Airway: Mallampati: III  TM distance: >3 FB   Neck ROM: full  Mouth opening: > = 3 FB   Dental: normal exam         Pulmonary:Negative Pulmonary ROS breath sounds clear to

## 2023-09-22 NOTE — H&P
[address]     HISTORY AND PHYSICAL EXAMINATION            Date:   9/22/2023  Patient name:  Sachi Jamison  Date of admission:  9/21/2023  4:53 PM  MRN:   [id number]  Account:  [de-identified]  YOB: 1933  PCP:    Purvi Peng MD  Room:   2007/2007-01  Code Status:    Full Code    Chief Complaint:     Chief Complaint   Patient presents with    Rectal Bleeding       History Obtained From:     patient, electronic medical record    History of Present Illness:       Sachi Jamison is a 80 y.o. female with a past medical history of hypertension, hyperlipidemia, type 2 diabetes mellitus, DVT/PE on Xarelto, coronary artery disease s/p CABG x3, and colonic adenocarcinoma who presents with 1 day history of bright red bloody bowel movements. She states that she was in her usual state of health yesterday when she experienced 3 large, bright red, bloody bowel movements at home. She had associated fatigue with minimal exertion, becoming tired and lightheaded while vacuuming which is abnormal for her. She no other associated symptoms, alleviating or exacerbating factors. EMS was called, and the patient presented to [facility] ED, where she experienced 3 additional bloody bowel movements. The patient had recent colonoscopy on 9/12/23 where several polyps were removed, some with hot forceps and some with cold forceps, and was also notable for large hemorrhoids observed in the rectum and anus. On initial evaluation in the ED, the patient's hemoglobin was 12.3 and was hemodynamically stable on room air. This morning (9/22) patient's hemoglobin was 9.4. The patient was typed and screened positive for possible blood transfusion. The patient denies tobacco, alcohol, and street drug usage.   GI and interventional radiology were consulted for

## 2023-09-22 NOTE — CONSULTS
GI Consult Note:    Name: Juventino Van  MRN: [id number]     Acct: [de-identified]  Room: [facility] ENDO Pool/NONE    Admit Date: 9/21/2023  PCP: Navi Moraes MD    Physician Requesting Consult: Yan Gonzalez MD     Reason for Consult:      Rectal bleeding  Status post colonoscopy with polypectomies  Anemia  Abdominal discomfort  Abnormal imaging      Chief Complaint:     Chief Complaint   Patient presents with    Rectal Bleeding       History Obtained From:     Patient EMR with emergency room physicians    History of Present Illness:      Juventino Van is a  80 y.o.  female who presents with Rectal Bleeding    This 24-year-old  female has history significant for multiple chronic medical issues  She had a colonoscopy done about 10 days ago by my partner  Had polypectomies done  Currently was taking blood thinners Xarelto  She is started having aggressive GI bleeding was brought to the emergency room last night  Had extensive discussion with the emergency room physicians last night  A CTA was performed which has shown the following findings  Active extravasation of contrast into the cecum is seen. With that said, clear etiology of that GI bleeding is not detected. No  masses are identified. Small paramidline ventral hernia containing a short knuckle of large bowel,  but without evidence of obstruction.   Her blood thinners were stopped and she was admitted to the ICU  Interventional radiology was consulted  Surgery was also consulted by the residents  Patient was prepped for colonoscopy this morning with enemas  Since 4 AM in the morning she does not have any active bleeding  She is feeling weak tired and fatigued  Abdominal cramping and gas  Nausea vomiting hematemesis  Her previous records charts were all reviewed  Symptoms:  Onset:  Location:  abdomen  Duration:  day(s)  Severity:  moderate  Quality:  intermittent      Past Medical History:     Past Medical History:   Diagnosis Date
I personally evaluated the patient and directed the medical decision making with Resident/DAISY after the physical/radiologic exam and laboratory values were reviewed and confirmed. 81yo female with h/o CAD, DVT/PE on Xarelto, CKD, HLD, HTN, MGUS, coronary stents x3, SSS, and new diagnosis of Colon CA (cecal serrated adenoma). She was diagnosed in June during a colonoscopy. She has now underwent a completion polypectomy on 9/12/2023 and presented on 9/21/23 to the ED with GI bleeding and active contrast extravasation on CTA. Her hgb has decreased from 12.3 on admission to 9.4 although she currently remains hemodynamically stable. She did receive K centra O/N.     I agree with reversal of coagulopathy and the least invasive fashion to control bleeding were that to be GI intervention or IR intervention. As explained to the patient, an exploratory laparotomy and colectomy would only be performed for GI bleeding  in the setting of uncontrolled bleeding and hemodynamic instability after these 2 interventions were attempted and failed, but she is quite a poor surgical candidate. As such, no surgical interventions warranted at this time. Surgery will sign off. Sha Johnson MD         General Surgery  Consult    PATIENT NAME: Richard Lopez  AGE: 80 y.o. MEDICAL RECORD NO. [id number]  DATE: 9/22/2023  SURGEON: Bennie Cantu  PRIMARY CARE PHYSICIAN: Patrick Driscoll MD    Patient evaluated at the request of  Dr. Oneal Hancock  Reason for evaluation: active extravasation of cecum    Patient information was obtained from patient. History/Exam limitations: none.     IMPRESSION:     Patient Active Problem List   Diagnosis    Gout of foot    Type 2 diabetes mellitus with stage 3a chronic kidney disease, without long-term current use of insulin (HCC)    Coronary artery disease involving coronary bypass graft of native heart without angina pectoris    Coronary artery disease due to lipid rich plaque    Hyperlipidemia
Xarelto)  Coronary disease status post CABG x3 in June 2020  Sick sinus syndrome  History of left carotid endarterectomy  Type 2 diabetes  History of TIAs with slurred speech  Hypertension  Hypercholesterolemia  Never smoker  Full CODE STATUS        Plan:      Hemodynamically stable  Okay to proceed with colonoscopy from pulmonary and critical care standpoint  We will order chest x-ray  Order incentive spirometry  Decrease IV fluids to 100 mL an hour given underlying cardiac status and blood pressure stable  Monitor H&H's every 6  Remains on IV Protonix  Discontinue Xarelto-at this point I do not see the need for the patient to be on it  Transfer to intermediate ICU  Diabetes management per primary service

## 2023-09-22 NOTE — PLAN OF CARE
Problem: Discharge Planning  Goal: Discharge to home or other facility with appropriate resources  Outcome: Progressing  Flowsheets  Taken 9/22/2023 0410  Discharge to home or other facility with appropriate resources:   Identify barriers to discharge with patient and caregiver   Arrange for needed discharge resources and transportation as appropriate   Refer to discharge planning if patient needs post-hospital services based on physician order or complex needs related to functional status, cognitive ability or social support system  Taken 9/22/2023 0400  Discharge to home or other facility with appropriate resources:   Identify barriers to discharge with patient and caregiver   Arrange for needed discharge resources and transportation as appropriate   Refer to discharge planning if patient needs post-hospital services based on physician order or complex needs related to functional status, cognitive ability or social support system     Problem: Safety - Adult  Goal: Free from fall injury  Outcome: Progressing     Problem: ABCDS Injury Assessment  Goal: Absence of physical injury  Outcome: Progressing

## 2023-09-22 NOTE — ACP (ADVANCE CARE PLANNING)
follow-up conversation to continue planning  [] Referred individual to Provider for additional questions/concerns   [] Advised patient/agent/surrogate to review completed ACP document and update if needed with changes in condition, patient preferences or care setting    [] This note routed to one or more involved healthcare providers

## 2023-09-22 NOTE — CARE COORDINATION
Case Management Assessment  Initial Evaluation    Date/Time of Evaluation: 9/22/2023 3:06 PM  Assessment Completed by: Chinedu Nunez RN    If patient is discharged prior to next notation, then this note serves as note for discharge by case management. Patient Name: Tonya Martinez                   YOB: 1933  Diagnosis: Acute GI bleeding [K92.2]  Lower GI bleed [K92.2]                   Date / Time: 9/21/2023  4:53 PM    Patient Admission Status: Inpatient   Readmission Risk (Low < 19, Mod (19-27), High > 27): Readmission Risk Score: 14.4    Current PCP: Stevenson Meneses MD  PCP verified by CM? Yes    Chart Reviewed: Yes      History Provided by: Patient  Patient Orientation: Alert and Oriented ([facility])    Patient Cognition: Alert    Hospitalization in the last 30 days (Readmission):  No    If yes, Readmission Assessment in  Navigator will be completed. Advance Directives:      Code Status: Full Code   Patient's Primary Decision Maker is: Legal Next of Kin    Primary Decision Maker: Crista Chacko  Child - [phone]    Primary Decision Maker: Kelly Ferguson Child - [phone]    Discharge Planning:    Patient lives with: Alone Type of Home: Apartment  Primary Care Giver: Self  Patient Support Systems include: Family Members, Children, Friends/Neighbors   Current Financial resources: Medicare  Current community resources: None  Current services prior to admission: Durable Medical Equipment            Current DME: Walker, Shower Chair ([employer])            Type of Home Care services:  None    ADLS  Prior functional level: Independent in ADLs/IADLs  Current functional level: Independent in ADLs/IADLs    PT AM-PAC:   /24  OT AM-PAC:   /24    Family can provide assistance at DC: Yes  Would you like Case Management to discuss the discharge plan with any other family members/significant others, and if so, who?  No  Plans to Return to Present Housing: Yes  Other Identified Issues/Barriers to

## 2023-09-22 NOTE — PLAN OF CARE
Problem: Discharge Planning  Goal: Discharge to home or other facility with appropriate resources  9/22/2023 1410 by Nicko Tirado RN  Outcome: Progressing  Flowsheets (Taken 9/22/2023 1402)  Discharge to home or other facility with appropriate resources: Identify barriers to discharge with patient and caregiver   Pt to discharge home once medically stable. Problem: Safety - Adult  Goal: Free from fall injury  9/22/2023 1410 by Nicko Tirado RN  Outcome: Progressing  Flowsheets (Taken 9/22/2023 1401)  Free From Fall Injury: Instruct family/caregiver on patient safety   No injury noted this shift. Problem: ABCDS Injury Assessment  Goal: Absence of physical injury  9/22/2023 1410 by Nicko Tirado RN  Outcome: Progressing  Flowsheets (Taken 9/22/2023 1401)  Absence of Physical Injury: Implement safety measures based on patient assessment   No injury noted this shift. Problem: Chronic Conditions and Co-morbidities  Goal: Patient's chronic conditions and co-morbidity symptoms are monitored and maintained or improved  Outcome: Progressing  Flowsheets  Taken 9/22/2023 1402 by Nicko Tirado RN  Care Plan - Patient's Chronic Conditions and Co-Morbidity Symptoms are Monitored and Maintained or Improved: Monitor and assess patient's chronic conditions and comorbid symptoms for stability, deterioration, or improvement  Taken 9/22/2023 0400 by Cindy Miller RN  Care Plan - Patient's Chronic Conditions and Co-Morbidity Symptoms are Monitored and Maintained or Improved:   Collaborate with multidisciplinary team to address chronic and comorbid conditions and prevent exacerbation or deterioration   Monitor and assess patient's chronic conditions and comorbid symptoms for stability, deterioration, or improvement   Update acute care plan with appropriate goals if chronic or comorbid symptoms are exacerbated and prevent overall improvement and discharge   Pt continues in NSR. Monitoring.    Problem: Pain  Goal:

## 2023-09-22 NOTE — ED NOTES
Patient  passed melena. Fresh red Colour ,moderate  amount , inform to Dr. Braydon Bermeo ,   Ordered repeat   Labs with H&H.       Angelita Melgar RN  09/21/23 [id number]

## 2023-09-23 LAB
ANION GAP SERPL CALCULATED.3IONS-SCNC: 5 MMOL/L (ref 9–17)
BASOPHILS # BLD: 0 K/UL (ref 0–0.2)
BASOPHILS NFR BLD: 1 % (ref 0–2)
BUN SERPL-MCNC: 9 MG/DL (ref 8–23)
CALCIUM SERPL-MCNC: 7.9 MG/DL (ref 8.6–10.4)
CHLORIDE SERPL-SCNC: 110 MMOL/L (ref 98–107)
CO2 SERPL-SCNC: 26 MMOL/L (ref 20–31)
CREAT SERPL-MCNC: 0.5 MG/DL (ref 0.5–0.9)
EOSINOPHIL # BLD: 0.1 K/UL (ref 0–0.4)
EOSINOPHILS RELATIVE PERCENT: 2 % (ref 0–4)
ERYTHROCYTE [DISTWIDTH] IN BLOOD BY AUTOMATED COUNT: 16.8 % (ref 11.5–14.9)
GFR SERPL CREATININE-BSD FRML MDRD: >60 ML/MIN/1.73M2
GLUCOSE BLD-MCNC: 170 MG/DL (ref 65–105)
GLUCOSE SERPL-MCNC: 139 MG/DL (ref 70–99)
HCT VFR BLD AUTO: 24.6 % (ref 36–46)
HCT VFR BLD AUTO: 27 % (ref 36–46)
HGB BLD-MCNC: 7.8 G/DL (ref 12–16)
HGB BLD-MCNC: 8.6 G/DL (ref 12–16)
LYMPHOCYTES NFR BLD: 1.7 K/UL (ref 1–4.8)
LYMPHOCYTES RELATIVE PERCENT: 27 % (ref 24–44)
MCH RBC QN AUTO: 28.5 PG (ref 26–34)
MCHC RBC AUTO-ENTMCNC: 31.8 G/DL (ref 31–37)
MCV RBC AUTO: 89.6 FL (ref 80–100)
MONOCYTES NFR BLD: 0.5 K/UL (ref 0.1–1.3)
MONOCYTES NFR BLD: 9 % (ref 1–7)
NEUTROPHILS NFR BLD: 61 % (ref 36–66)
NEUTS SEG NFR BLD: 3.8 K/UL (ref 1.3–9.1)
PLATELET # BLD AUTO: 187 K/UL (ref 150–450)
PMV BLD AUTO: 8.4 FL (ref 6–12)
POTASSIUM SERPL-SCNC: 4.1 MMOL/L (ref 3.7–5.3)
RBC # BLD AUTO: 3.01 M/UL (ref 4–5.2)
SODIUM SERPL-SCNC: 141 MMOL/L (ref 135–144)
WBC OTHER # BLD: 6.2 K/UL (ref 3.5–11)

## 2023-09-23 PROCEDURE — 85014 HEMATOCRIT: CPT

## 2023-09-23 PROCEDURE — 6360000002 HC RX W HCPCS: Performed by: INTERNAL MEDICINE

## 2023-09-23 PROCEDURE — 2580000003 HC RX 258: Performed by: INTERNAL MEDICINE

## 2023-09-23 PROCEDURE — 85025 COMPLETE CBC W/AUTO DIFF WBC: CPT

## 2023-09-23 PROCEDURE — 36415 COLL VENOUS BLD VENIPUNCTURE: CPT

## 2023-09-23 PROCEDURE — APPSS30 APP SPLIT SHARED TIME 16-30 MINUTES: Performed by: NURSE PRACTITIONER

## 2023-09-23 PROCEDURE — A4216 STERILE WATER/SALINE, 10 ML: HCPCS | Performed by: INTERNAL MEDICINE

## 2023-09-23 PROCEDURE — 2060000000 HC ICU INTERMEDIATE R&B

## 2023-09-23 PROCEDURE — C9113 INJ PANTOPRAZOLE SODIUM, VIA: HCPCS | Performed by: INTERNAL MEDICINE

## 2023-09-23 PROCEDURE — 85018 HEMOGLOBIN: CPT

## 2023-09-23 PROCEDURE — 82947 ASSAY GLUCOSE BLOOD QUANT: CPT

## 2023-09-23 PROCEDURE — 99232 SBSQ HOSP IP/OBS MODERATE 35: CPT | Performed by: INTERNAL MEDICINE

## 2023-09-23 PROCEDURE — 80048 BASIC METABOLIC PNL TOTAL CA: CPT

## 2023-09-23 RX ORDER — LOPERAMIDE HYDROCHLORIDE 2 MG/1
2 CAPSULE ORAL 4 TIMES DAILY PRN
Status: DISCONTINUED | OUTPATIENT
Start: 2023-09-23 | End: 2023-09-24 | Stop reason: HOSPADM

## 2023-09-23 RX ORDER — SODIUM CHLORIDE 9 MG/ML
INJECTION, SOLUTION INTRAVENOUS CONTINUOUS
Status: CANCELLED | OUTPATIENT
Start: 2023-09-23

## 2023-09-23 RX ADMIN — SODIUM CHLORIDE, PRESERVATIVE FREE 40 MG: 5 INJECTION INTRAVENOUS at 20:13

## 2023-09-23 RX ADMIN — SODIUM CHLORIDE: 9 INJECTION, SOLUTION INTRAVENOUS at 08:32

## 2023-09-23 RX ADMIN — SODIUM CHLORIDE, PRESERVATIVE FREE 40 MG: 5 INJECTION INTRAVENOUS at 08:25

## 2023-09-23 RX ADMIN — SODIUM CHLORIDE, PRESERVATIVE FREE 10 ML: 5 INJECTION INTRAVENOUS at 20:14

## 2023-09-23 ASSESSMENT — PAIN SCALES - GENERAL: PAINLEVEL_OUTOF10: 0

## 2023-09-23 NOTE — PLAN OF CARE
Problem: Discharge Planning  Goal: Discharge to home or other facility with appropriate resources  Outcome: Progressing  Flowsheets (Taken 9/23/2023 0800 by Andreia Vargas RN)  Discharge to home or other facility with appropriate resources: Identify barriers to discharge with patient and caregiver     Problem: Safety - Adult  Goal: Free from fall injury  Outcome: Progressing  Flowsheets (Taken 9/23/2023 0800 by Andreia Vargas RN)  Free From Fall Injury: Instruct family/caregiver on patient safety     Problem: ABCDS Injury Assessment  Goal: Absence of physical injury  Outcome: Progressing  Flowsheets (Taken 9/23/2023 0800 by Andreia Vargas RN)  Absence of Physical Injury: Implement safety measures based on patient assessment     Problem: Chronic Conditions and Co-morbidities  Goal: Patient's chronic conditions and co-morbidity symptoms are monitored and maintained or improved  Outcome: Progressing  Flowsheets (Taken 9/23/2023 0800 by Andreia Vargas RN)  Care Plan - Patient's Chronic Conditions and Co-Morbidity Symptoms are Monitored and Maintained or Improved: Monitor and assess patient's chronic conditions and comorbid symptoms for stability, deterioration, or improvement     Problem: Pain  Goal: Verbalizes/displays adequate comfort level or baseline comfort level  Outcome: Progressing  Flowsheets (Taken 9/23/2023 0800 by Andreia Vargas RN)  Verbalizes/displays adequate comfort level or baseline comfort level: Assess pain using appropriate pain scale

## 2023-09-24 VITALS
RESPIRATION RATE: 16 BRPM | BODY MASS INDEX: 27.82 KG/M2 | DIASTOLIC BLOOD PRESSURE: 50 MMHG | WEIGHT: 187.83 LBS | HEART RATE: 87 BPM | TEMPERATURE: 98.3 F | SYSTOLIC BLOOD PRESSURE: 135 MMHG | HEIGHT: 69 IN | OXYGEN SATURATION: 98 %

## 2023-09-24 LAB
ANION GAP SERPL CALCULATED.3IONS-SCNC: 9 MMOL/L (ref 9–17)
BASOPHILS # BLD: 0 K/UL (ref 0–0.2)
BASOPHILS NFR BLD: 1 % (ref 0–2)
BUN SERPL-MCNC: 9 MG/DL (ref 8–23)
CALCIUM SERPL-MCNC: 8.5 MG/DL (ref 8.6–10.4)
CHLORIDE SERPL-SCNC: 108 MMOL/L (ref 98–107)
CO2 SERPL-SCNC: 23 MMOL/L (ref 20–31)
CREAT SERPL-MCNC: 0.6 MG/DL (ref 0.5–0.9)
EOSINOPHIL # BLD: 0.1 K/UL (ref 0–0.4)
EOSINOPHILS RELATIVE PERCENT: 3 % (ref 0–4)
ERYTHROCYTE [DISTWIDTH] IN BLOOD BY AUTOMATED COUNT: 16 % (ref 11.5–14.9)
GFR SERPL CREATININE-BSD FRML MDRD: >60 ML/MIN/1.73M2
GLUCOSE BLD-MCNC: 153 MG/DL (ref 65–105)
GLUCOSE BLD-MCNC: 167 MG/DL (ref 65–105)
GLUCOSE SERPL-MCNC: 143 MG/DL (ref 70–99)
HCT VFR BLD AUTO: 24.7 % (ref 36–46)
HCT VFR BLD AUTO: 25.3 % (ref 36–46)
HGB BLD-MCNC: 8 G/DL (ref 12–16)
HGB BLD-MCNC: 8.2 G/DL (ref 12–16)
LYMPHOCYTES NFR BLD: 2 K/UL (ref 1–4.8)
LYMPHOCYTES RELATIVE PERCENT: 36 % (ref 24–44)
MCH RBC QN AUTO: 28.8 PG (ref 26–34)
MCHC RBC AUTO-ENTMCNC: 32.4 G/DL (ref 31–37)
MCV RBC AUTO: 88.9 FL (ref 80–100)
MONOCYTES NFR BLD: 0.5 K/UL (ref 0.1–1.3)
MONOCYTES NFR BLD: 10 % (ref 1–7)
NEUTROPHILS NFR BLD: 50 % (ref 36–66)
NEUTS SEG NFR BLD: 2.9 K/UL (ref 1.3–9.1)
PLATELET # BLD AUTO: 160 K/UL (ref 150–450)
PMV BLD AUTO: 8.4 FL (ref 6–12)
POTASSIUM SERPL-SCNC: 3.5 MMOL/L (ref 3.7–5.3)
RBC # BLD AUTO: 2.78 M/UL (ref 4–5.2)
SODIUM SERPL-SCNC: 140 MMOL/L (ref 135–144)
WBC OTHER # BLD: 5.6 K/UL (ref 3.5–11)

## 2023-09-24 PROCEDURE — 6370000000 HC RX 637 (ALT 250 FOR IP)

## 2023-09-24 PROCEDURE — 80048 BASIC METABOLIC PNL TOTAL CA: CPT

## 2023-09-24 PROCEDURE — 2580000003 HC RX 258: Performed by: INTERNAL MEDICINE

## 2023-09-24 PROCEDURE — 85018 HEMOGLOBIN: CPT

## 2023-09-24 PROCEDURE — 82947 ASSAY GLUCOSE BLOOD QUANT: CPT

## 2023-09-24 PROCEDURE — 85014 HEMATOCRIT: CPT

## 2023-09-24 PROCEDURE — 6360000002 HC RX W HCPCS: Performed by: INTERNAL MEDICINE

## 2023-09-24 PROCEDURE — 85025 COMPLETE CBC W/AUTO DIFF WBC: CPT

## 2023-09-24 PROCEDURE — 6370000000 HC RX 637 (ALT 250 FOR IP): Performed by: NURSE PRACTITIONER

## 2023-09-24 PROCEDURE — C9113 INJ PANTOPRAZOLE SODIUM, VIA: HCPCS | Performed by: INTERNAL MEDICINE

## 2023-09-24 PROCEDURE — 36415 COLL VENOUS BLD VENIPUNCTURE: CPT

## 2023-09-24 PROCEDURE — A4216 STERILE WATER/SALINE, 10 ML: HCPCS | Performed by: INTERNAL MEDICINE

## 2023-09-24 PROCEDURE — 99232 SBSQ HOSP IP/OBS MODERATE 35: CPT | Performed by: INTERNAL MEDICINE

## 2023-09-24 RX ORDER — POTASSIUM CHLORIDE 20 MEQ/1
20 TABLET, EXTENDED RELEASE ORAL ONCE
Status: COMPLETED | OUTPATIENT
Start: 2023-09-24 | End: 2023-09-24

## 2023-09-24 RX ORDER — POTASSIUM CHLORIDE 20 MEQ/1
TABLET, EXTENDED RELEASE ORAL
Qty: 90 TABLET | Refills: 3 | Status: SHIPPED | OUTPATIENT
Start: 2023-09-24

## 2023-09-24 RX ADMIN — LOPERAMIDE HYDROCHLORIDE 2 MG: 2 CAPSULE ORAL at 15:10

## 2023-09-24 RX ADMIN — SODIUM CHLORIDE, PRESERVATIVE FREE 10 ML: 5 INJECTION INTRAVENOUS at 08:40

## 2023-09-24 RX ADMIN — SODIUM CHLORIDE, PRESERVATIVE FREE 40 MG: 5 INJECTION INTRAVENOUS at 08:40

## 2023-09-24 RX ADMIN — POTASSIUM CHLORIDE 20 MEQ: 1500 TABLET, EXTENDED RELEASE ORAL at 15:43

## 2023-09-24 NOTE — DISCHARGE SUMMARY
Consultations:    Consults:     Final Specialist Recommendations/Findings:   IP CONSULT TO GI  IP CONSULT TO INTERVENTIONAL RADIOLOGY  IP CONSULT TO CRITICAL CARE  IP CONSULT TO HOSPITALIST  IP CONSULT TO GENERAL SURGERY  IP CONSULT TO SPIRITUAL SERVICES  IP CONSULT TO SOCIAL WORK      The patient was seen and examined on day of discharge and this discharge summary is in conjunction with any daily progress note from day of discharge. Discharge plan:       Disposition: Home    Physician Follow Up:     Taylor Ashton MD  [facility], [address] [place] [address]  [phone]    Schedule an appointment as soon as possible for a visit in 3 week(s)  need colonoscopy to remove polyp       Requiring Further Evaluation/Follow Up POST HOSPITALIZATION/Incidental Findings: Follow-up PCP  Follow-up GI medicine  Repeat H&H in about a week    Diet: regular diet    Activity: As tolerated    Instructions to Patient:   Strict compliance to medication   follow-up with PCP  Follow-up GI medicine and schedule the colonoscopy to look for polyp    Discharge Medications:      Medication List        CONTINUE taking these medications      * FreeStyle Lancets Misc  USE TO TEST BLOOD SUGAR TWICE A DAY     * Lancets 30G Misc  Testing once a day. Please dispense according to patients insurance. FreeStyle Lite Francisca     Handicap Placard Misc  by Does not apply route Can't walk greater than 200 feet. Expires in 5 years. Lancet Device Misc  For use with lancets for blood glucose checks           * This list has 2 medication(s) that are the same as other medications prescribed for you. Read the directions carefully, and ask your doctor or other care provider to review them with you.                 ASK your doctor about these medications      alendronate 35 MG tablet  Commonly known as: FOSAMAX  Take 1 tablet by mouth every 7 days For bones     allopurinol 100 MG tablet  Commonly known as: ZYLOPRIM  TAKE 1 TABLET TWICE A

## 2023-09-24 NOTE — DISCHARGE INSTRUCTIONS
Can start taking Xarelto 2 days after discharge  Follow up with gastroenterology as scheduled for outpatient colonoscopy  Repeat blood work this week and follow up with primary care physician

## 2023-09-24 NOTE — DISCHARGE INSTR - COC
Continuity of Care Form    Patient Name: Juventino Van   :  12/10/1933  MRN:  [id number]    Admit date:  2023  Discharge date:  ***    Code Status Order: Full Code   Advance Directives:   Advance Care Flowsheet Documentation       Date/Time Healthcare Directive Type of Healthcare Directive Copy in [address] Agent's Name Healthcare Agent's Phone Number    23 [phone] No, patient does not have an advance directive for healthcare treatment -- -- -- -- --            Admitting Physician:  Alba Navas MD  PCP: Navi Moraes MD    Discharging Nurse: [facility] Unit/Room#: 2124/2124-01  Discharging Unit Phone Number: ***    Emergency Contact:   Extended Emergency Contact Information  Primary Emergency Contact: Dakota House  Address[facility] [place] [address] Phone: [phone]  Relation: Child  Secondary Emergency Contact: [address] of [address] [place] Phone: [phone]  Relation: Child    Past Surgical History:  Past Surgical History:   Procedure Laterality Date    ANGIOPLASTY      total of 4 heart stents    APPENDECTOMY      BREAST SURGERY      INFECTED MILK DUCT LT.     CARDIAC SURGERY      CABG x 3 vessels and 3 stents    CAROTID ENDARTERECTOMY Left 2016    CARPAL TUNNEL RELEASE Right     CHOLECYSTECTOMY      COLON SURGERY      colectomy, PRECANCEROUS POLYPS    COLONOSCOPY      X5, HX PRECANCEROUS POLYPS    COLONOSCOPY N/A 2021    COLONOSCOPY POLYPECTOMY REMOVAL HOT SNARE performed by Tigist Hdz MD at [address] [place] [address] N/A 2023    COLONOSCOPY POLYPECTOMY SNARE/HOT BIOPSY performed by Tigist Hdz MD at [place] [address] 2023    COLONOSCOPY POLYPECTOMY hot  SNARE/COLD BIOPSY performed by Tigist Hdz MD at [place] [address] 2023    COLONOSCOPY CONTROL HEMORRHAGE performed by Deuce Dunbar MD at [address] [place] [address]

## 2023-09-24 NOTE — PLAN OF CARE
Problem: Discharge Planning  Goal: Discharge to home or other facility with appropriate resources  9/24/2023 0400 by Jen Gonzalez RN  Outcome: Progressing     Problem: Safety - Adult  Goal: Free from fall injury  9/24/2023 0400 by Jen Gonzalez RN  Outcome: Progressing     Problem: ABCDS Injury Assessment  Goal: Absence of physical injury  9/24/2023 0400 by Jen Gonzalez RN  Outcome: Progressing     Problem: Chronic Conditions and Co-morbidities  Goal: Patient's chronic conditions and co-morbidity symptoms are monitored and maintained or improved  9/24/2023 0400 by Jen Gonzalez RN  Outcome: Progressing     Problem: Pain  Goal: Verbalizes/displays adequate comfort level or baseline comfort level  9/24/2023 0400 by Jen Gonzalez RN  Outcome: Progressing

## 2023-09-24 NOTE — DISCHARGE INSTR - DIET
Good nutrition is important when healing from an illness, injury, or surgery. Follow any nutrition recommendations given to you during your hospital stay. If you were given an oral nutrition supplement while in the hospital, continue to take this supplement at home. You can take it with meals, in-between meals, and/or before bedtime. These supplements can be purchased at most local grocery stores, pharmacies, and chain [employer]-stores. If you have any questions about your diet or nutrition, call the hospital and ask for the dietitian.     Easy to chew

## 2023-09-25 ENCOUNTER — TELEPHONE (OUTPATIENT)
Dept: FAMILY MEDICINE CLINIC | Age: 88
End: 2023-09-25

## 2023-09-25 ENCOUNTER — CLINICAL DOCUMENTATION ONLY (OUTPATIENT)
Facility: CLINIC | Age: 88
End: 2023-09-25

## 2023-09-25 NOTE — TELEPHONE ENCOUNTER
----- Message from Olimpia Fernandez sent at 9/25/2023  4:01 PM EDT -----  Subject: Message to Provider    QUESTIONS  Information for Provider? Patient is calling to see if she needs to do a   hospital f/u, was at [facility] this past thursday-Sunday. Had bleeding   excessively in her colon and was told to make hospital f/u , pt wants to   wait until january for her next apppointment but was told that would be   too late please advise and would like a call back to see if she really   needs the appointment.   ---------------------------------------------------------------------------  --------------  Kaitlynn Salem Honag  [phone]; OK to leave message on voicemail  ---------------------------------------------------------------------------  --------------  SCRIPT ANSWERS  Relationship to Patient?  Self

## 2023-09-25 NOTE — TELEPHONE ENCOUNTER
[facility] Transitions Initial Follow Up Call    Outreach made within 2 business days of discharge: Yes    Patient: Sandra Soto Patient : 12/10/1933   MRN: [id number]  Reason for Admission: There are no discharge diagnoses documented for the most recent discharge. Discharge Date: 23       Spoke with: Donny Enriquez    If Virtual visit made for hospital follow up, advise patient to make sure to have family member present to help assist with visit. Please make sure mobile number is updated in patient chart. Discharge department/facility: [address] [place] [address] Interactive Patient Contact:  Was patient able to fill all prescriptions: Yes  Was patient instructed to bring all medications to the follow-up visit: Yes  Is patient taking all medications as directed in the discharge summary?  Yes  Does patient understand their discharge instructions: Yes  Does patient have questions or concerns that need addressed prior to 7-14 day follow up office visit: yes -     Scheduled appointment with PCP within 7-14 days    Follow Up  Future Appointments   Date Time Provider [address] [place] [address]   2023  3:00 PM Harvey Martin MD [facility]   2023  2:30 PM Amanda Chisholm MD [address] [place] [address]   2024 10:00 AM Charu Downey MD [address]

## 2023-09-28 ENCOUNTER — HOSPITAL ENCOUNTER (OUTPATIENT)
Age: 88
Setting detail: SPECIMEN
Discharge: HOME | End: 2023-09-28

## 2023-09-28 ENCOUNTER — OFFICE VISIT (OUTPATIENT)
Dept: FAMILY MEDICINE CLINIC | Age: 88
End: 2023-09-28
Payer: MEDICARE

## 2023-09-28 VITALS
HEART RATE: 66 BPM | OXYGEN SATURATION: 96 % | TEMPERATURE: 97.4 F | SYSTOLIC BLOOD PRESSURE: 109 MMHG | DIASTOLIC BLOOD PRESSURE: 46 MMHG

## 2023-09-28 DIAGNOSIS — J02.9 SORE THROAT: ICD-10-CM

## 2023-09-28 DIAGNOSIS — J06.9 VIRAL URI: Primary | ICD-10-CM

## 2023-09-28 DIAGNOSIS — R05.1 ACUTE COUGH: ICD-10-CM

## 2023-09-28 DIAGNOSIS — J06.9 VIRAL URI: ICD-10-CM

## 2023-09-28 PROCEDURE — 1036F TOBACCO NON-USER: CPT

## 2023-09-28 PROCEDURE — 1090F PRES/ABSN URINE INCON ASSESS: CPT

## 2023-09-28 PROCEDURE — G8427 DOCREV CUR MEDS BY ELIG CLIN: HCPCS

## 2023-09-28 PROCEDURE — 1111F DSCHRG MED/CURRENT MED MERGE: CPT

## 2023-09-28 PROCEDURE — 1123F ACP DISCUSS/DSCN MKR DOCD: CPT

## 2023-09-28 PROCEDURE — G8417 CALC BMI ABV UP PARAM F/U: HCPCS

## 2023-09-28 PROCEDURE — 99213 OFFICE O/P EST LOW 20 MIN: CPT

## 2023-09-28 ASSESSMENT — ENCOUNTER SYMPTOMS
RHINORRHEA: 0
WHEEZING: 0
SINUS PAIN: 0
FACIAL SWELLING: 0
SWOLLEN GLANDS: 0
STRIDOR: 0
NAUSEA: 0
ABDOMINAL PAIN: 0
BACK PAIN: 0
EYE REDNESS: 0
CHANGE IN BOWEL HABIT: 0
ABDOMINAL DISTENTION: 0
SORE THROAT: 1
EYE ITCHING: 0
PHOTOPHOBIA: 0
CHEST TIGHTNESS: 0
VISUAL CHANGE: 0
CHOKING: 0
ANAL BLEEDING: 0
CONSTIPATION: 0
GASTROINTESTINAL NEGATIVE: 1
VOICE CHANGE: 0
APNEA: 0
SHORTNESS OF BREATH: 0
BLOOD IN STOOL: 0
EYES NEGATIVE: 1
TROUBLE SWALLOWING: 0
COUGH: 1
DIARRHEA: 0
COLOR CHANGE: 0
EYE DISCHARGE: 0
SINUS PRESSURE: 0
RECTAL PAIN: 0
EYE PAIN: 0
VOMITING: 0

## 2023-09-28 NOTE — PROGRESS NOTES
questions answered. Patient and/or parent voiced understanding.       Electronically signed by LISSY Nichols 9/28/2023 at 12:52 PM

## 2023-09-29 LAB
MICROORGANISM/AGENT SPEC: NORMAL
SARS-COV-2 RNA RESP QL NAA+PROBE: ABNORMAL
SARS-COV-2 RNA RESP QL NAA+PROBE: DETECTED
SOURCE: ABNORMAL
SPECIMEN DESCRIPTION: NORMAL

## 2023-10-04 ENCOUNTER — TELEPHONE (OUTPATIENT)
Dept: FAMILY MEDICINE CLINIC | Age: 88
End: 2023-10-04

## 2023-10-04 NOTE — TELEPHONE ENCOUNTER
Noted. Thank you! Please kindly advised that she should have kept the appointment, also: Has she had COVID? What color is the mucus?     Future Appointments   Date Time Provider [address]   10/12/2023  1:00 PM Sharia Merlin, MD [facility]   12/13/2023  2:30 PM Dianelys Caballero MD [address] [place] [address]   1/9/2024 10:00 AM Patrick Driscoll MD [facility]

## 2023-10-04 NOTE — TELEPHONE ENCOUNTER
Appointment was cancelled today, due to patient stating that her covid  symptoms has improved a little, she is just feeling very fatigue. Also she prefers to see her     She is currently taking tylenol   to help with pain. Will like to know what  recommends. Since all she is feeling now is fatigue. Denies other symptoms.  Also I have placed her on  wait/cancellation list.

## 2023-10-12 ENCOUNTER — OFFICE VISIT (OUTPATIENT)
Dept: GASTROENTEROLOGY | Age: 88
End: 2023-10-12
Payer: MEDICARE

## 2023-10-12 VITALS
SYSTOLIC BLOOD PRESSURE: 149 MMHG | DIASTOLIC BLOOD PRESSURE: 58 MMHG | WEIGHT: 184.4 LBS | HEART RATE: 56 BPM | BODY MASS INDEX: 27.23 KG/M2 | TEMPERATURE: 97.2 F

## 2023-10-12 DIAGNOSIS — R15.9 FULL INCONTINENCE OF FECES: ICD-10-CM

## 2023-10-12 DIAGNOSIS — R74.01 ELEVATED AST (SGOT): ICD-10-CM

## 2023-10-12 DIAGNOSIS — C18.9 ADENOCARCINOMA, COLON (HCC): ICD-10-CM

## 2023-10-12 DIAGNOSIS — D12.6 SERRATED ADENOMA OF COLON: Primary | ICD-10-CM

## 2023-10-12 PROCEDURE — G8417 CALC BMI ABV UP PARAM F/U: HCPCS | Performed by: INTERNAL MEDICINE

## 2023-10-12 PROCEDURE — 1111F DSCHRG MED/CURRENT MED MERGE: CPT | Performed by: INTERNAL MEDICINE

## 2023-10-12 PROCEDURE — 1123F ACP DISCUSS/DSCN MKR DOCD: CPT | Performed by: INTERNAL MEDICINE

## 2023-10-12 PROCEDURE — 1090F PRES/ABSN URINE INCON ASSESS: CPT | Performed by: INTERNAL MEDICINE

## 2023-10-12 PROCEDURE — 1036F TOBACCO NON-USER: CPT | Performed by: INTERNAL MEDICINE

## 2023-10-12 PROCEDURE — G8427 DOCREV CUR MEDS BY ELIG CLIN: HCPCS | Performed by: INTERNAL MEDICINE

## 2023-10-12 PROCEDURE — 99214 OFFICE O/P EST MOD 30 MIN: CPT | Performed by: INTERNAL MEDICINE

## 2023-10-12 PROCEDURE — G8484 FLU IMMUNIZE NO ADMIN: HCPCS | Performed by: INTERNAL MEDICINE

## 2023-10-12 ASSESSMENT — ENCOUNTER SYMPTOMS
TROUBLE SWALLOWING: 0
ABDOMINAL DISTENTION: 0
SORE THROAT: 0
ANAL BLEEDING: 0
VOICE CHANGE: 0
SHORTNESS OF BREATH: 0
ABDOMINAL PAIN: 0
DIARRHEA: 0
VOMITING: 0
COUGH: 0
NAUSEA: 0
BLOOD IN STOOL: 0
CONSTIPATION: 0
CHOKING: 0
RECTAL PAIN: 0
WHEEZING: 0

## 2023-10-12 NOTE — PROGRESS NOTES
08/28/2023    Lymphedema     MGUS (monoclonal gammopathy of unknown significance) 03/18/2021    MI, old 1989    Nonspecific reactive hepatitis 08/15/2022    Occlusion and stenosis of bilateral carotid arteries 02/03/2016    Ophthalmoplegic migraine headache     Osteoarthritis     Other pulmonary embolism without acute cor pulmonale ([address]) 04/30/2013    Personal history of colon cancer 04/12/2021    2006    S/P coronary artery stent placement X 3 06/29/2020    Slurred speech 09/15/2022    SSS (sick sinus syndrome) ([address]) 04/11/2016    TIA (transient ischemic attack)     Uterine cancer ([address])     UTI due to Klebsiella species 12/14/2020    Wears glasses        Past Surgical History:   Procedure Laterality Date    ANGIOPLASTY      total of 4 heart stents    APPENDECTOMY      BREAST SURGERY      INFECTED MILK DUCT LT. CARDIAC SURGERY      CABG x 3 vessels and 3 stents    CAROTID ENDARTERECTOMY Left 03/03/2016    CARPAL TUNNEL RELEASE Right     CHOLECYSTECTOMY      COLON SURGERY      colectomy, PRECANCEROUS POLYPS    COLONOSCOPY      X5, HX PRECANCEROUS POLYPS    COLONOSCOPY N/A 03/19/2021    COLONOSCOPY POLYPECTOMY REMOVAL HOT SNARE performed by Christ Burr MD at [place] [address] 6/8/2023    COLONOSCOPY POLYPECTOMY SNARE/HOT BIOPSY performed by Christ Burr MD at [place] [address] 9/12/2023    COLONOSCOPY POLYPECTOMY hot  SNARE/COLD BIOPSY performed by Christ Burr MD at [place] [address] 9/22/2023    COLONOSCOPY CONTROL HEMORRHAGE performed by Mariluz Moore MD at [facility] vessels    CYST REMOVAL  10/07/2016    EXCISION OF SEBACEOUS CYST REMOVED FROM BACK X3    CYSTOSCOPY Bilateral 01/21/2020    CYSTOSCOPY RETROGRADE PYELOGRAM performed by Tammie Becerra MD at [address] [facility], [facility], [facility] Right     INDEX [address]     HYSTERECTOMY (CERVIX STATUS UNKNOWN)      JOINT REPLACEMENT Left 03/29/2010    TKA

## 2023-10-16 ENCOUNTER — TELEPHONE (OUTPATIENT)
Dept: GASTROENTEROLOGY | Age: 88
End: 2023-10-16

## 2023-10-16 RX ORDER — POLYETHYLENE GLYCOL 3350, SODIUM SULFATE ANHYDROUS, SODIUM BICARBONATE, SODIUM CHLORIDE, POTASSIUM CHLORIDE 236; 22.74; 6.74; 5.86; 2.97 G/4L; G/4L; G/4L; G/4L; G/4L
4 POWDER, FOR SOLUTION ORAL ONCE
Qty: 4000 ML | Refills: 0 | Status: SHIPPED | OUTPATIENT
Start: 2023-10-16 | End: 2023-10-16

## 2023-10-16 NOTE — TELEPHONE ENCOUNTER
Patient's son called concerned why pt had to repeat colonoscopy. Writer explained she was not cleaned out for the previous colonoscopy and another need to be preformed. Mr Padron Sites acknowledged understanding.

## 2023-10-20 NOTE — TELEPHONE ENCOUNTER
Clearance to Dr Leslie Suazo office faxed on 10/13/23. For Lev. Called office and spoke to Lady Gonzalez who stated that the provider has been out of the office. Will discuss with him on Monday and let us know.

## 2023-10-25 ENCOUNTER — HOSPITAL ENCOUNTER (OUTPATIENT)
Dept: PREADMISSION TESTING | Age: 88
Discharge: HOME | End: 2023-10-29

## 2023-10-25 VITALS — WEIGHT: 182.5 LBS | HEIGHT: 69 IN | BODY MASS INDEX: 27.03 KG/M2

## 2023-10-25 NOTE — PROGRESS NOTES
Pre-op Instructions For Out-Patient Endoscopy Surgery    Medication Instructions:  Please stop herbs and any supplements now (includes vitamins and minerals). Please contact your surgeon and prescribing physician for pre-op instructions for any blood thinners. If you have inhalers/aerosol treatments at home, please use them the morning of your surgery and bring the inhalers with you to the hospital.    Please take the following medications the morning of your surgery with a sip of water:    METOPROLOL, AMLODIPINE. Surgery Instructions:  After midnight before surgery:  Do not eat or drink anything, including water, mints, gum, and hard candy. You may brush your teeth without swallowing. No smoking, chewing tobacco, or street drugs. Please shower or bathe before surgery. Please do not wear any cologne, lotion, powder, jewelry, piercings, perfume, makeup, nail polish, hair accessories, or hair spray on the day of surgery. Wear loose comfortable clothing. Leave your valuables at home but bring a payment source for any after-surgery prescriptions you plan to fill at [facility]. Bring a storage case for any glasses/contacts. An adult who is responsible for you MUST drive you home and should be with you for the first 24 hours after surgery. The Day of Surgery:  Arrive at [facility] AT [facility] Surgery Entrance at the time directed by your surgeon and check in at the desk. If you have a living will or healthcare power of , please bring a copy. You will be taken to the pre-op holding area where you will be prepared for surgery. A physical assessment will be performed by a nurse practitioner or house officer. Your IV will be started and you will meet your anesthesiologist.    When you go to surgery, your family will be directed to the surgical waiting room, where the doctor should speak with them after your surgery.     After surgery, you will be taken to the

## 2023-10-30 NOTE — PRE-PROCEDURE INSTRUCTIONS
Have you received your Prep? Any questions with prep instructions? Only Clear Liquid Diet day before. Nothing to eat after midnight day before procedure. Are you taking any blood thinners? If so, you need to Stop. Remove any jewelry and body piercings. Do you wear glasses? If so, please bring a case to store them in. Are you having any Covid symptoms? Do you have any new rashes, infections, etc. that we should be aware of? Do you have a ride home the day of surgery? It cannot be a cab or medical transportation.   Verify surgery time/date and what time to arrive at hospital.    NO ANSWER, HALEY LEFT

## 2023-10-31 ENCOUNTER — ANESTHESIA EVENT (OUTPATIENT)
Dept: ENDOSCOPY | Age: 88
End: 2023-10-31
Payer: MEDICARE

## 2023-10-31 NOTE — PRE-PROCEDURE INSTRUCTIONS
No answer, left message ? Unable to leave message ? When were you told to arrive at hospital ?  0800    Do you have a  ?yes    Are you on any blood thinners ? yes                 If yes when did you stop taking ? Friday    Do you have your prep Rx filled and instruction ? yes    Nothing to eat the day before , only clear liquids. yes    Are you experiencing any covid symptoms ? no    Do you have any infections or rash we should be aware of ?no      Do you have the Hibiclens soap to use the night before and the morning of surgery ? Nothing to eat or drink after midnight, only a sip of water to take any medication instructed to take the night before. yes  Wear comfortable clothing, leave any valuables at home, remove any jewelry and body piercing .  yes

## 2023-11-01 ENCOUNTER — ANESTHESIA (OUTPATIENT)
Dept: ENDOSCOPY | Age: 88
End: 2023-11-01
Payer: MEDICARE

## 2023-11-01 ENCOUNTER — HOSPITAL ENCOUNTER (OUTPATIENT)
Age: 88
Setting detail: OUTPATIENT SURGERY
Discharge: HOME | End: 2023-11-01
Attending: INTERNAL MEDICINE | Admitting: INTERNAL MEDICINE
Payer: MEDICARE

## 2023-11-01 VITALS
HEART RATE: 69 BPM | TEMPERATURE: 97 F | OXYGEN SATURATION: 96 % | RESPIRATION RATE: 11 BRPM | SYSTOLIC BLOOD PRESSURE: 145 MMHG | DIASTOLIC BLOOD PRESSURE: 58 MMHG

## 2023-11-01 DIAGNOSIS — C18.9 ADENOCARCINOMA OF COLON (HCC): ICD-10-CM

## 2023-11-01 DIAGNOSIS — D12.6 SERRATED ADENOMA OF COLON: ICD-10-CM

## 2023-11-01 LAB — GLUCOSE BLD-MCNC: 140 MG/DL (ref 65–105)

## 2023-11-01 PROCEDURE — 3609010400 HC COLONOSCOPY POLYPECTOMY HOT BIOPSY: Performed by: INTERNAL MEDICINE

## 2023-11-01 PROCEDURE — 6360000002 HC RX W HCPCS: Performed by: INTERNAL MEDICINE

## 2023-11-01 PROCEDURE — 3700000000 HC ANESTHESIA ATTENDED CARE: Performed by: INTERNAL MEDICINE

## 2023-11-01 PROCEDURE — 88305 TISSUE EXAM BY PATHOLOGIST: CPT

## 2023-11-01 PROCEDURE — C1889 IMPLANT/INSERT DEVICE, NOC: HCPCS | Performed by: INTERNAL MEDICINE

## 2023-11-01 PROCEDURE — 2500000003 HC RX 250 WO HCPCS: Performed by: NURSE ANESTHETIST, CERTIFIED REGISTERED

## 2023-11-01 PROCEDURE — 7100000010 HC PHASE II RECOVERY - FIRST 15 MIN: Performed by: INTERNAL MEDICINE

## 2023-11-01 PROCEDURE — 7100000011 HC PHASE II RECOVERY - ADDTL 15 MIN: Performed by: INTERNAL MEDICINE

## 2023-11-01 PROCEDURE — 6360000002 HC RX W HCPCS: Performed by: NURSE ANESTHETIST, CERTIFIED REGISTERED

## 2023-11-01 PROCEDURE — 3700000001 HC ADD 15 MINUTES (ANESTHESIA): Performed by: INTERNAL MEDICINE

## 2023-11-01 PROCEDURE — 82947 ASSAY GLUCOSE BLOOD QUANT: CPT

## 2023-11-01 PROCEDURE — 2709999900 HC NON-CHARGEABLE SUPPLY: Performed by: INTERNAL MEDICINE

## 2023-11-01 DEVICE — WORKING LENGTH 235CM, WORKING CHANNEL 2.8MM
Type: IMPLANTABLE DEVICE | Site: RECTUM | Status: FUNCTIONAL
Brand: RESOLUTION 360 CLIP

## 2023-11-01 RX ORDER — LIDOCAINE HYDROCHLORIDE 20 MG/ML
INJECTION, SOLUTION EPIDURAL; INFILTRATION; INTRACAUDAL; PERINEURAL PRN
Status: DISCONTINUED | OUTPATIENT
Start: 2023-11-01 | End: 2023-11-01 | Stop reason: SDUPTHER

## 2023-11-01 RX ORDER — SODIUM CHLORIDE 0.9 % (FLUSH) 0.9 %
5-40 SYRINGE (ML) INJECTION PRN
Status: DISCONTINUED | OUTPATIENT
Start: 2023-11-01 | End: 2023-11-01 | Stop reason: HOSPADM

## 2023-11-01 RX ORDER — SODIUM CHLORIDE 9 MG/ML
INJECTION, SOLUTION INTRAVENOUS PRN
Status: DISCONTINUED | OUTPATIENT
Start: 2023-11-01 | End: 2023-11-01 | Stop reason: HOSPADM

## 2023-11-01 RX ORDER — SIMETHICONE 20 MG/.3ML
EMULSION ORAL PRN
Status: DISCONTINUED | OUTPATIENT
Start: 2023-11-01 | End: 2023-11-01 | Stop reason: ALTCHOICE

## 2023-11-01 RX ORDER — SODIUM CHLORIDE 9 MG/ML
INJECTION, SOLUTION INTRAVENOUS CONTINUOUS
Status: DISCONTINUED | OUTPATIENT
Start: 2023-11-01 | End: 2023-11-01 | Stop reason: HOSPADM

## 2023-11-01 RX ORDER — SODIUM CHLORIDE 0.9 % (FLUSH) 0.9 %
5-40 SYRINGE (ML) INJECTION EVERY 12 HOURS SCHEDULED
Status: DISCONTINUED | OUTPATIENT
Start: 2023-11-01 | End: 2023-11-01 | Stop reason: HOSPADM

## 2023-11-01 RX ORDER — LIDOCAINE HYDROCHLORIDE 10 MG/ML
1 INJECTION, SOLUTION EPIDURAL; INFILTRATION; INTRACAUDAL; PERINEURAL
Status: DISCONTINUED | OUTPATIENT
Start: 2023-11-01 | End: 2023-11-01 | Stop reason: HOSPADM

## 2023-11-01 RX ORDER — PROPOFOL 10 MG/ML
INJECTION, EMULSION INTRAVENOUS CONTINUOUS PRN
Status: DISCONTINUED | OUTPATIENT
Start: 2023-11-01 | End: 2023-11-01 | Stop reason: SDUPTHER

## 2023-11-01 RX ORDER — EPINEPHRINE 1 MG/ML(1)
AMPUL (ML) INJECTION PRN
Status: DISCONTINUED | OUTPATIENT
Start: 2023-11-01 | End: 2023-11-01 | Stop reason: ALTCHOICE

## 2023-11-01 RX ADMIN — LIDOCAINE HYDROCHLORIDE 100 MG: 20 INJECTION, SOLUTION EPIDURAL; INFILTRATION; INTRACAUDAL; PERINEURAL at 10:51

## 2023-11-01 RX ADMIN — PROPOFOL 100 MCG/KG/MIN: 10 INJECTION, EMULSION INTRAVENOUS at 10:51

## 2023-11-01 ASSESSMENT — PAIN SCALES - GENERAL: PAINLEVEL_OUTOF10: 0

## 2023-11-01 ASSESSMENT — PAIN - FUNCTIONAL ASSESSMENT: PAIN_FUNCTIONAL_ASSESSMENT: 0-10

## 2023-11-01 NOTE — H&P
III ([address]) 08/07/2019    Colon cancer ([address])     COVID-19 09/2023    Diabetes mellitus ([address])     DVT (deep venous thrombosis) ([address]) 07/10/2019    Fatty liver 08/23/2022    GI bleed 03/17/2021    Gout     Heart murmur     Hematemesis 06/29/2020    Hematuria     History of coronary artery bypass graft x 3 07/02/2020    Hx of blood clots     ED. LEGS, ED. LUNGS ,REASON FOR BEING ON XARELTO    Hyperlipidemia     Hypertension     Kidney stone on right side 06/25/2019    Kidney stones     Left wrist pain 08/28/2023    Lymphedema     MGUS (monoclonal gammopathy of unknown significance) 03/18/2021    MI, old 1989    Nonspecific reactive hepatitis 08/15/2022    Occlusion and stenosis of bilateral carotid arteries 02/03/2016    Ophthalmoplegic migraine headache     Osteoarthritis     Other pulmonary embolism without acute cor pulmonale ([address]) 04/30/2013    Personal history of colon cancer 04/12/2021    2006    S/P coronary artery stent placement X 3 06/29/2020    Slurred speech 09/15/2022    SSS (sick sinus syndrome) ([address]) 04/11/2016    TIA (transient ischemic attack)     Uterine cancer ([address])     UTI due to Klebsiella species 12/14/2020    Wears glasses        SURGICAL HISTORY       Past Surgical History:   Procedure Laterality Date    ANGIOPLASTY      total of 4 heart stents    APPENDECTOMY      BREAST SURGERY      INFECTED MILK DUCT LT.     CARDIAC SURGERY      CABG x 3 vessels and 3 stents    CAROTID ENDARTERECTOMY Left 03/03/2016    CARPAL TUNNEL RELEASE Right     CHOLECYSTECTOMY      COLON SURGERY      colectomy, PRECANCEROUS POLYPS    COLONOSCOPY      X5, HX PRECANCEROUS POLYPS    COLONOSCOPY N/A 03/19/2021    COLONOSCOPY POLYPECTOMY REMOVAL HOT SNARE performed [place] [address] 6/8/2023    COLONOSCOPY POLYPECTOMY SNARE/HOT BIOPSY performed by Mojgan Johnson MD at [place] [address] 9/12/2023    COLONOSCOPY POLYPECTOMY hot  SNARE/COLD BIOPSY performed by Mojgan Johnson MD at [facility]

## 2023-11-01 NOTE — DISCHARGE INSTRUCTIONS
Incorporated. Care instructions adapted under license by [facility] ([facility]). If you have questions about a medical condition or this instruction, always ask your healthcare professional. [address] any warranty or liability for your use of this information.

## 2023-11-01 NOTE — ANESTHESIA PRE PROCEDURE
Department of Anesthesiology  Preprocedure Note       Name:  Reynold Chacko   Age:  80 y.o.  :  12/10/1933                                          MRN:  [id number]         Date:  2023      Surgeon: Eugenio Mckeon):  Malcolm Daniels MD    Procedure: Procedure(s):  COLONOSCOPY DIAGNOSTIC    Medications prior to admission:   Prior to Admission medications    Medication Sig Start Date End Date Taking? Authorizing Provider   rivaroxaban (XARELTO) 20 MG TABS tablet TAKE 1 TABLET DAILY WITH BREAKFAST 23   Angela Villatoro MD   potassium chloride (KLOR-CON M) 20 MEQ extended release tablet TAKE 1 TABLET DAILY, TAKE WITH FUROSEMIDE 23   Angela Villatoro MD   ammonium lactate (LAC-HYDRIN) 12 % lotion apply topically once daily to feet 23   Provider, MD Khanh   blood glucose monitor strips Testing once a day. Please dispense according to patients insurance. 23   Arthur Schwartz MD   Lancets 30G MISC Testing once a day. Please dispense according to patients insurance.  23   Arthur Schwartz MD   alendronate (FOSAMAX) 35 MG tablet Take 1 tablet by mouth every 7 days For bones  Patient taking differently: Take 1 tablet by mouth every 7 days For bones 23   Arthur Schwartz MD   vitamin D (CHOLECALCIFEROL) 50 MCG ( UT) TABS tablet Take 1 tablet by mouth daily 23   Arthur Schwartz MD   ezetimibe (ZETIA) 10 MG tablet TAKE 1 TABLET NIGHTLY 23   Arthur Schwartz MD   metoprolol tartrate (LOPRESSOR) 25 MG tablet TAKE 1 TABLET TWICE A DAY 23   Arthur Schwartz MD   glimepiride (AMARYL) 1 MG tablet TAKE AS INSTRUCTED BY YOUR PRESCRIBER 23   Danny Fierro MD   allopurinol (ZYLOPRIM) 100 MG tablet TAKE 1 TABLET TWICE A DAY 6/15/23   Arthur Schwartz MD   amLODIPine (NORVASC) 2.5 MG tablet TAKE 1 TABLET DAILY 23   Arthur Schwartz MD   aspirin 81 MG EC tablet Take 1 tablet by mouth daily    ProviderKhanh MD   zinc 50 MG TABS

## 2023-11-02 LAB — SURGICAL PATHOLOGY REPORT: NORMAL

## 2023-11-02 NOTE — RESULT ENCOUNTER NOTE
Management per GI  History of colon cancer, tubular adenomas x2, has appointment with GI coming up    Lab Results       Component                Value               Date                       CEA                      1.7                 08/10/2022                Future Appointments  12/13/2023 2:30 PM    Deepika Daniel MD         [facility]  1/9/2024   10:00 AM   Ritu Mitchell MD    [facility]               [facility]  4/17/2024  2:30 PM    Harvey Martin MD           [facility]        [facility]

## 2023-11-21 ENCOUNTER — CLINICAL DOCUMENTATION ONLY (OUTPATIENT)
Facility: CLINIC | Age: 88
End: 2023-11-21

## 2023-11-28 ENCOUNTER — TELEPHONE (OUTPATIENT)
Dept: FAMILY MEDICINE CLINIC | Age: 88
End: 2023-11-28

## 2023-11-28 NOTE — TELEPHONE ENCOUNTER
[facility] ([facility]) ED Follow up Call    Reason for ED visit:    11/17/2023  [facility] , this is kaity from Joann Borrero's office, just calling to see how you are doing after your recent ED visit. Did you receive discharge instructions? Yes  Do you understand the discharge instructions? Yes  Did the ED give you any new prescriptions? Yes  Were you able to fill your prescriptions? Yes      Do you have one of our red, yellow and green  Zone sheets that help you to determine when you should go to the ED? Yes      Do you need or want to make a follow up appt with your PCP? Yes    Do you have any further needs in the home i.e. Equipment?   No        FU appts/Provider:    Future Appointments   Date Time Provider [facility] [address]   11/30/2023  2:00 PM [facility]   12/13/2023  2:30 PM Shlomo Bone MD [address] [place] [address]   1/9/2024 10:00 AM [facility]ález [facility]ed [facility]   4/17/2024  2:30 PM [facility]

## 2023-11-30 ENCOUNTER — OFFICE VISIT (OUTPATIENT)
Dept: FAMILY MEDICINE CLINIC | Age: 88
End: 2023-11-30
Payer: MEDICARE

## 2023-11-30 VITALS
BODY MASS INDEX: 27.55 KG/M2 | HEART RATE: 65 BPM | OXYGEN SATURATION: 98 % | DIASTOLIC BLOOD PRESSURE: 80 MMHG | WEIGHT: 186 LBS | SYSTOLIC BLOOD PRESSURE: 130 MMHG | HEIGHT: 69 IN

## 2023-11-30 DIAGNOSIS — I44.1 AV BLOCK, 2ND DEGREE: Primary | ICD-10-CM

## 2023-11-30 DIAGNOSIS — I25.10 CORONARY ARTERY DISEASE DUE TO LIPID RICH PLAQUE: ICD-10-CM

## 2023-11-30 DIAGNOSIS — E11.22 TYPE 2 DIABETES MELLITUS WITH STAGE 3A CHRONIC KIDNEY DISEASE, WITHOUT LONG-TERM CURRENT USE OF INSULIN (HCC): ICD-10-CM

## 2023-11-30 DIAGNOSIS — I25.83 CORONARY ARTERY DISEASE DUE TO LIPID RICH PLAQUE: ICD-10-CM

## 2023-11-30 DIAGNOSIS — D50.8 OTHER IRON DEFICIENCY ANEMIA: ICD-10-CM

## 2023-11-30 DIAGNOSIS — N18.31 TYPE 2 DIABETES MELLITUS WITH STAGE 3A CHRONIC KIDNEY DISEASE, WITHOUT LONG-TERM CURRENT USE OF INSULIN (HCC): ICD-10-CM

## 2023-11-30 DIAGNOSIS — Z23 ENCOUNTER FOR IMMUNIZATION: ICD-10-CM

## 2023-11-30 DIAGNOSIS — I50.32 CHRONIC DIASTOLIC HEART FAILURE (HCC): ICD-10-CM

## 2023-11-30 LAB — HBA1C MFR BLD: 6.3 %

## 2023-11-30 PROCEDURE — 99214 OFFICE O/P EST MOD 30 MIN: CPT | Performed by: FAMILY MEDICINE

## 2023-11-30 PROCEDURE — 1123F ACP DISCUSS/DSCN MKR DOCD: CPT | Performed by: FAMILY MEDICINE

## 2023-11-30 PROCEDURE — G0008 ADMIN INFLUENZA VIRUS VAC: HCPCS | Performed by: FAMILY MEDICINE

## 2023-11-30 PROCEDURE — 1090F PRES/ABSN URINE INCON ASSESS: CPT | Performed by: FAMILY MEDICINE

## 2023-11-30 PROCEDURE — 90694 VACC AIIV4 NO PRSRV 0.5ML IM: CPT | Performed by: FAMILY MEDICINE

## 2023-11-30 PROCEDURE — 83036 HEMOGLOBIN GLYCOSYLATED A1C: CPT | Performed by: FAMILY MEDICINE

## 2023-11-30 PROCEDURE — 1036F TOBACCO NON-USER: CPT | Performed by: FAMILY MEDICINE

## 2023-11-30 PROCEDURE — G8484 FLU IMMUNIZE NO ADMIN: HCPCS | Performed by: FAMILY MEDICINE

## 2023-11-30 PROCEDURE — G8427 DOCREV CUR MEDS BY ELIG CLIN: HCPCS | Performed by: FAMILY MEDICINE

## 2023-11-30 PROCEDURE — G8417 CALC BMI ABV UP PARAM F/U: HCPCS | Performed by: FAMILY MEDICINE

## 2023-11-30 PROCEDURE — 3044F HG A1C LEVEL LT 7.0%: CPT | Performed by: FAMILY MEDICINE

## 2023-11-30 SDOH — ECONOMIC STABILITY: FOOD INSECURITY: WITHIN THE PAST 12 MONTHS, YOU WORRIED THAT YOUR FOOD WOULD RUN OUT BEFORE YOU GOT MONEY TO BUY MORE.: NEVER TRUE

## 2023-11-30 SDOH — ECONOMIC STABILITY: INCOME INSECURITY: HOW HARD IS IT FOR YOU TO PAY FOR THE VERY BASICS LIKE FOOD, HOUSING, MEDICAL CARE, AND HEATING?: NOT HARD AT ALL

## 2023-11-30 SDOH — ECONOMIC STABILITY: FOOD INSECURITY: WITHIN THE PAST 12 MONTHS, THE FOOD YOU BOUGHT JUST DIDN'T LAST AND YOU DIDN'T HAVE MONEY TO GET MORE.: NEVER TRUE

## 2023-11-30 ASSESSMENT — ENCOUNTER SYMPTOMS
CONSTIPATION: 0
COLOR CHANGE: 0
BACK PAIN: 1
ABDOMINAL PAIN: 0
WHEEZING: 0
SORE THROAT: 0
ABDOMINAL DISTENTION: 0
RHINORRHEA: 0
CHEST TIGHTNESS: 0
DIARRHEA: 0
VOMITING: 0
EYE REDNESS: 0
RECTAL PAIN: 0
BLOOD IN STOOL: 0
STRIDOR: 0
COUGH: 0
SINUS PRESSURE: 0
SHORTNESS OF BREATH: 1
TROUBLE SWALLOWING: 0
NAUSEA: 0

## 2023-11-30 NOTE — PROGRESS NOTES
Visit Information    Have you changed or started any medications since your last visit including any over-the-counter medicines, vitamins, or herbal medicines? no   Are you having any side effects from any of your medications? -  no  Have you stopped taking any of your medications? Is so, why? -  no    Have you seen any other physician or provider since your last visit? No  Have you had any other diagnostic tests since your last visit? No  Have you been seen in the emergency room and/or had an admission to a hospital since we last saw you? Yes - Records Obtained  Have you had your routine dental cleaning in the past 6 months? yes     Have you activated your [employer] account? If not, what are your barriers?  Yes     Patient Care Team:  Jian Hill MD as PCP - General (Family Medicine)  Jian Hill MD as PCP - Empaneled Provider  Yamila Haley MD as Consulting Physician (Ophthalmology)  Duane Cannon MD as Consulting Physician (Cardiology)  Gurvinder Figueredo MD as Consulting Physician (Neurology)  Vern Irvin MD as Consulting Physician (Urology)  Elliot Gasca MD as Consulting Physician (Nephrology)  Mike Awad MD as Consulting Physician (Internal Medicine Cardiovascular Disease)  Yen Buchanan MD as Consulting Physician (Gastroenterology)  Ron Bergeron MD as Consulting Physician (Hematology and Oncology)    Medical History Review  Past Medical, Family, and Social History reviewed and does contribute to the patient presenting condition    Health Maintenance   Topic Date Due    Hepatitis B vaccine (1 of 3 - Risk 3-dose series) Never done    Respiratory Syncytial Virus (RSV) age 61 yrs+ (3 - 1-dose 60+ series) Never done    Flu vaccine (1) 08/01/2023    COVID-19 Vaccine (7 - 2023-24 season) 09/01/2023    Annual Wellness Visit (AWV)  12/28/2023    Lipids  08/25/2024    Depression Screen  08/29/2024    Colorectal Cancer Screen  11/01/2024    DTaP/Tdap/Td vaccine (2 - Td
Sexual activity: Not Currently     Partners: Male   Other Topics Concern    Not on file   Social History Narrative    Not on file     Social Determinants of Health     Financial Resource Strain: Low Risk  (11/30/2023)    Overall Financial Resource Strain (CARDIA)     Difficulty of Paying Living Expenses: Not hard at all   Food Insecurity: No Food Insecurity (11/30/2023)    Hunger Vital Sign     Worried About Running Out of Food in the Last Year: Never true     Ran Out of Food in the Last Year: Never true   Transportation Needs: No Transportation Needs (11/30/2023)    PRAPARE - Transportation     Lack of Transportation (Medical): No     Lack of Transportation (Non-Medical): No   Physical Activity: Sufficiently Active (12/27/2022)    Exercise Vital Sign     Days of Exercise per Week: 3 days     Minutes of Exercise per Session: 100 min   Stress: No Stress Concern Present (8/31/2021)    [facility]     Feeling of Stress : Only a little   Social Connections: Moderately Isolated (8/31/2021)    Social Connection and Isolation Panel [NHANES]     Frequency of Communication with Friends and Family: More than three times a week     Frequency of Social Gatherings with Friends and Family: More than three times a week     Attends Orthodox Services: Never     Active Member of Clubs or Organizations: Yes     Attends Club or Organization Meetings: More than 4 times per year     Marital Status:     Intimate Partner Violence: Not on file   Housing Stability: Unknown (11/30/2023)    Housing Stability Vital Sign     Unable to Pay for Housing in the Last Year: No     Number of Places Lived in the Last Year: Not on file     Unstable Housing in the Last Year: No     Counseling given: Not Answered        Family History   Problem Relation Age of Onset    Stroke Mother     Hypertension Mother     Heart Disease Father         AAA    Stroke Sister     Parkinsonism Brother

## 2023-12-08 ENCOUNTER — HOSPITAL ENCOUNTER (OUTPATIENT)
Age: 88
Discharge: HOME | End: 2023-12-08
Payer: MEDICARE

## 2023-12-08 DIAGNOSIS — Z86.711 HISTORY OF PULMONARY EMBOLISM: ICD-10-CM

## 2023-12-08 DIAGNOSIS — D47.2 MGUS (MONOCLONAL GAMMOPATHY OF UNKNOWN SIGNIFICANCE): ICD-10-CM

## 2023-12-08 DIAGNOSIS — Z79.01 ANTICOAGULATED: ICD-10-CM

## 2023-12-08 LAB
ANION GAP SERPL CALCULATED.3IONS-SCNC: 11 MMOL/L (ref 9–17)
BASOPHILS # BLD: 0.08 K/UL (ref 0–0.2)
BASOPHILS NFR BLD: 1 % (ref 0–2)
BUN SERPL-MCNC: 24 MG/DL (ref 8–23)
CALCIUM SERPL-MCNC: 9.4 MG/DL (ref 8.6–10.4)
CHLORIDE SERPL-SCNC: 99 MMOL/L (ref 98–107)
CO2 SERPL-SCNC: 30 MMOL/L (ref 20–31)
CREAT SERPL-MCNC: 0.8 MG/DL (ref 0.5–0.9)
EOSINOPHIL # BLD: 0.24 K/UL (ref 0–0.4)
EOSINOPHILS RELATIVE PERCENT: 3 % (ref 0–4)
ERYTHROCYTE [DISTWIDTH] IN BLOOD BY AUTOMATED COUNT: 23.3 % (ref 11.5–14.9)
GFR SERPL CREATININE-BSD FRML MDRD: >60 ML/MIN/1.73M2
GLUCOSE SERPL-MCNC: 180 MG/DL (ref 70–99)
HCT VFR BLD AUTO: 36 % (ref 36–46)
HGB BLD-MCNC: 11 G/DL (ref 12–16)
LYMPHOCYTES NFR BLD: 2.05 K/UL (ref 1–4.8)
LYMPHOCYTES RELATIVE PERCENT: 26 % (ref 24–44)
MCH RBC QN AUTO: 23.8 PG (ref 26–34)
MCHC RBC AUTO-ENTMCNC: 30.7 G/DL (ref 31–37)
MCV RBC AUTO: 77.5 FL (ref 80–100)
MONOCYTES NFR BLD: 0.79 K/UL (ref 0.1–1.3)
MONOCYTES NFR BLD: 10 % (ref 1–7)
MORPHOLOGY: ABNORMAL
NEUTROPHILS NFR BLD: 60 % (ref 36–66)
NEUTS SEG NFR BLD: 4.74 K/UL (ref 1.3–9.1)
PLATELET # BLD AUTO: 234 K/UL (ref 150–450)
PMV BLD AUTO: 7.6 FL (ref 6–12)
POTASSIUM SERPL-SCNC: 4.5 MMOL/L (ref 3.7–5.3)
RBC # BLD AUTO: 4.64 M/UL (ref 4–5.2)
SODIUM SERPL-SCNC: 140 MMOL/L (ref 135–144)
WBC OTHER # BLD: 7.9 K/UL (ref 3.5–11)

## 2023-12-08 PROCEDURE — 85025 COMPLETE CBC W/AUTO DIFF WBC: CPT

## 2023-12-08 PROCEDURE — 84165 PROTEIN E-PHORESIS SERUM: CPT

## 2023-12-08 PROCEDURE — 86334 IMMUNOFIX E-PHORESIS SERUM: CPT

## 2023-12-08 PROCEDURE — 36415 COLL VENOUS BLD VENIPUNCTURE: CPT

## 2023-12-08 PROCEDURE — 83521 IG LIGHT CHAINS FREE EACH: CPT

## 2023-12-08 PROCEDURE — 84155 ASSAY OF PROTEIN SERUM: CPT

## 2023-12-08 PROCEDURE — 80048 BASIC METABOLIC PNL TOTAL CA: CPT

## 2023-12-09 LAB
ALBUMIN PERCENT: ABNORMAL %
ALBUMIN SERPL-MCNC: ABNORMAL G/DL
ALPHA 2 PERCENT: ABNORMAL %
ALPHA1 GLOB SERPL ELPH-MCNC: ABNORMAL %
ALPHA1 GLOB SERPL ELPH-MCNC: ABNORMAL G/DL
ALPHA2 GLOB SERPL ELPH-MCNC: ABNORMAL G/DL
B-GLOBULIN SERPL ELPH-MCNC: ABNORMAL %
B-GLOBULIN SERPL ELPH-MCNC: ABNORMAL G/DL
FREE KAPPA/LAMBDA RATIO: 1.75 (ref 0.26–1.65)
GAMMA GLOB SERPL ELPH-MCNC: ABNORMAL G/DL
GAMMA GLOBULIN %: ABNORMAL %
INTERPRETATION SERPL IFE-IMP: ABNORMAL
KAPPA LC FREE SER-MCNC: 26.6 MG/L (ref 3.7–19.4)
LAMBDA LC FREE SERPL-MCNC: 15.2 MG/L (ref 5.7–26.3)
M PROTEIN 2 SERPL ELPH-MCNC: ABNORMAL G/DL
M PROTEIN SERPL ELPH-MCNC: ABNORMAL G/DL
PATH REV: ABNORMAL
PATHOLOGIST: ABNORMAL
PROT PATTERN SERPL ELPH-IMP: ABNORMAL
PROT SERPL-MCNC: 7 G/DL (ref 6.4–8.3)
TOTAL PROT. SUM,%: ABNORMAL %
TOTAL PROT. SUM: ABNORMAL G/DL

## 2023-12-11 LAB
ALBUMIN PERCENT: 62 % (ref 45–65)
ALBUMIN SERPL-MCNC: 4.3 G/DL (ref 3.2–5.2)
ALPHA 2 PERCENT: 13 % (ref 6–13)
ALPHA1 GLOB SERPL ELPH-MCNC: 0.2 G/DL (ref 0.1–0.4)
ALPHA1 GLOB SERPL ELPH-MCNC: 3 % (ref 3–6)
ALPHA2 GLOB SERPL ELPH-MCNC: 0.9 G/DL (ref 0.5–0.9)
B-GLOBULIN SERPL ELPH-MCNC: 0.8 G/DL (ref 0.5–1.1)
B-GLOBULIN SERPL ELPH-MCNC: 11 % (ref 11–19)
FREE KAPPA/LAMBDA RATIO: 1.75 (ref 0.26–1.65)
GAMMA GLOB SERPL ELPH-MCNC: 0.9 G/DL (ref 0.5–1.5)
GAMMA GLOBULIN %: 12 % (ref 9–20)
INTERPRETATION SERPL IFE-IMP: ABNORMAL
KAPPA LC FREE SER-MCNC: 26.6 MG/L (ref 3.7–19.4)
LAMBDA LC FREE SERPL-MCNC: 15.2 MG/L (ref 5.7–26.3)
PATH REV: ABNORMAL
PATHOLOGIST: ABNORMAL
PROT PATTERN SERPL ELPH-IMP: ABNORMAL
PROT SERPL-MCNC: 7 G/DL (ref 6.4–8.3)
TOTAL PROT. SUM,%: 101 % (ref 98–102)
TOTAL PROT. SUM: 7.1 G/DL (ref 6.3–8.2)

## 2023-12-13 ENCOUNTER — TELEPHONE (OUTPATIENT)
Dept: ONCOLOGY | Age: 88
End: 2023-12-13

## 2023-12-13 NOTE — TELEPHONE ENCOUNTER
Rv in 4 months with labs priro       Patient was scheduled on 4/10/2024 with labs 1 week before.  Patient was given orders for labs      Patient was given AVS schedule and lab orders

## 2023-12-26 DIAGNOSIS — M85.80 OSTEOPENIA DETERMINED BY X-RAY: ICD-10-CM

## 2023-12-26 RX ORDER — ALENDRONATE SODIUM 35 MG/1
35 TABLET ORAL
Qty: 12 TABLET | Refills: 3 | Status: SHIPPED | OUTPATIENT
Start: 2023-12-26

## 2023-12-26 NOTE — TELEPHONE ENCOUNTER
Please Approve or Refuse.   Send to Pharmacy per Pt's Request:      Next Visit Date:  1/9/2024   Last Visit Date: 11/30/2023    Hemoglobin A1C (%)   Date Value   11/30/2023 6.3   08/29/2023 7.1   04/27/2023 7.0             ( goal A1C is < 7)   BP Readings from Last 3 Encounters:   12/13/23 (!) 146/71   11/30/23 130/80   11/01/23 (!) 145/58          (goal 120/80)  BUN   Date Value Ref Range Status   12/08/2023 24 (H) 8 - 23 mg/dL Final     Creatinine   Date Value Ref Range Status   12/08/2023 0.8 0.5 - 0.9 mg/dL Final     Potassium   Date Value Ref Range Status   12/08/2023 4.5 3.7 - 5.3 mmol/L Final

## 2024-01-09 ENCOUNTER — HOSPITAL ENCOUNTER (OUTPATIENT)
Age: 89
Setting detail: SPECIMEN
Discharge: HOME | End: 2024-01-09

## 2024-01-09 ENCOUNTER — HOSPITAL ENCOUNTER (OUTPATIENT)
Age: 89
Discharge: HOME | End: 2024-01-11
Payer: MEDICARE

## 2024-01-09 ENCOUNTER — HOSPITAL ENCOUNTER (OUTPATIENT)
Age: 89
Discharge: HOME | End: 2024-01-09
Payer: MEDICARE

## 2024-01-09 ENCOUNTER — HOSPITAL ENCOUNTER (OUTPATIENT)
Dept: GENERAL RADIOLOGY | Age: 89
Discharge: HOME | End: 2024-01-11
Payer: MEDICARE

## 2024-01-09 ENCOUNTER — OFFICE VISIT (OUTPATIENT)
Dept: FAMILY MEDICINE CLINIC | Age: 89
End: 2024-01-09
Payer: MEDICARE

## 2024-01-09 DIAGNOSIS — N39.0 URINARY TRACT INFECTION WITH HEMATURIA, SITE UNSPECIFIED: ICD-10-CM

## 2024-01-09 DIAGNOSIS — R31.9 URINARY TRACT INFECTION WITH HEMATURIA, SITE UNSPECIFIED: ICD-10-CM

## 2024-01-09 DIAGNOSIS — R10.9 ACUTE RIGHT FLANK PAIN: ICD-10-CM

## 2024-01-09 DIAGNOSIS — M85.80 OSTEOPENIA DETERMINED BY X-RAY: ICD-10-CM

## 2024-01-09 DIAGNOSIS — Z85.038 PERSONAL HISTORY OF COLON CANCER: ICD-10-CM

## 2024-01-09 DIAGNOSIS — N18.31 TYPE 2 DIABETES MELLITUS WITH STAGE 3A CHRONIC KIDNEY DISEASE, WITHOUT LONG-TERM CURRENT USE OF INSULIN (HCC): ICD-10-CM

## 2024-01-09 DIAGNOSIS — D47.2 MGUS (MONOCLONAL GAMMOPATHY OF UNKNOWN SIGNIFICANCE): ICD-10-CM

## 2024-01-09 DIAGNOSIS — Z00.00 MEDICARE ANNUAL WELLNESS VISIT, SUBSEQUENT: Primary | ICD-10-CM

## 2024-01-09 DIAGNOSIS — I50.32 CHRONIC DIASTOLIC HEART FAILURE (HCC): ICD-10-CM

## 2024-01-09 DIAGNOSIS — N20.0 KIDNEY STONE ON RIGHT SIDE: ICD-10-CM

## 2024-01-09 DIAGNOSIS — E11.22 TYPE 2 DIABETES MELLITUS WITH STAGE 3A CHRONIC KIDNEY DISEASE, WITHOUT LONG-TERM CURRENT USE OF INSULIN (HCC): ICD-10-CM

## 2024-01-09 PROBLEM — Z95.0 PACEMAKER: Status: ACTIVE | Noted: 2023-12-04

## 2024-01-09 LAB
25(OH)D3 SERPL-MCNC: 33.4 NG/ML
ALBUMIN SERPL-MCNC: 4.3 G/DL (ref 3.5–5.2)
ALP SERPL-CCNC: 64 U/L (ref 35–104)
ALT SERPL-CCNC: 31 U/L (ref 5–33)
ANION GAP SERPL CALCULATED.3IONS-SCNC: 11 MMOL/L (ref 9–17)
AST SERPL-CCNC: 30 U/L
BACTERIA URNS QL MICRO: NORMAL
BASOPHILS # BLD: 0.1 K/UL (ref 0–0.2)
BASOPHILS NFR BLD: 1 % (ref 0–2)
BILIRUB SERPL-MCNC: 1.1 MG/DL (ref 0.3–1.2)
BILIRUB UR QL STRIP: NEGATIVE
BILIRUBIN, POC: NEGATIVE
BLOOD URINE, POC: ABNORMAL
BNP SERPL-MCNC: 104 PG/ML
BUN SERPL-MCNC: 22 MG/DL (ref 8–23)
CALCIUM SERPL-MCNC: 9.7 MG/DL (ref 8.6–10.4)
CASTS #/AREA URNS LPF: NORMAL /LPF (ref 0–8)
CHLORIDE SERPL-SCNC: 102 MMOL/L (ref 98–107)
CLARITY UR: ABNORMAL
CLARITY, POC: CLEAR
CO2 SERPL-SCNC: 28 MMOL/L (ref 20–31)
COLOR UR: YELLOW
COLOR, POC: ABNORMAL
CREAT SERPL-MCNC: 0.9 MG/DL (ref 0.5–0.9)
EOSINOPHIL # BLD: 0.29 K/UL (ref 0–0.4)
EOSINOPHILS RELATIVE PERCENT: 3 % (ref 0–4)
EPI CELLS #/AREA URNS HPF: NORMAL /HPF (ref 0–5)
ERYTHROCYTE [DISTWIDTH] IN BLOOD BY AUTOMATED COUNT: 24.2 % (ref 11.5–14.9)
FOLATE SERPL-MCNC: 10.3 NG/ML
GFR SERPL CREATININE-BSD FRML MDRD: >60 ML/MIN/1.73M2
GLUCOSE SERPL-MCNC: 185 MG/DL (ref 70–99)
GLUCOSE UR STRIP-MCNC: NEGATIVE MG/DL
GLUCOSE URINE, POC: NEGATIVE
HCT VFR BLD AUTO: 39.8 % (ref 36–46)
HGB BLD-MCNC: 12.4 G/DL (ref 12–16)
HGB UR QL STRIP.AUTO: ABNORMAL
KETONES UR STRIP-MCNC: NEGATIVE MG/DL
KETONES, POC: NEGATIVE
LEUKOCYTE EST, POC: ABNORMAL
LEUKOCYTE ESTERASE UR QL STRIP: ABNORMAL
LYMPHOCYTES NFR BLD: 2.59 K/UL (ref 1–4.8)
LYMPHOCYTES RELATIVE PERCENT: 27 % (ref 24–44)
MAGNESIUM SERPL-MCNC: 2.1 MG/DL (ref 1.6–2.6)
MCH RBC QN AUTO: 24.5 PG (ref 26–34)
MCHC RBC AUTO-ENTMCNC: 31.3 G/DL (ref 31–37)
MCV RBC AUTO: 78.3 FL (ref 80–100)
MONOCYTES NFR BLD: 0.77 K/UL (ref 0.1–1.3)
MONOCYTES NFR BLD: 8 % (ref 1–7)
MORPHOLOGY: ABNORMAL
NEUTROPHILS NFR BLD: 61 % (ref 36–66)
NEUTS SEG NFR BLD: 5.85 K/UL (ref 1.3–9.1)
NITRITE UR QL STRIP: NEGATIVE
NITRITE, POC: NEGATIVE
PH UR STRIP: 5.5 [PH] (ref 5–8)
PH, POC: 6
PLATELET # BLD AUTO: 219 K/UL (ref 150–450)
PMV BLD AUTO: 8.1 FL (ref 6–12)
POTASSIUM SERPL-SCNC: 4.4 MMOL/L (ref 3.7–5.3)
PROT SERPL-MCNC: 7.4 G/DL (ref 6.4–8.3)
PROT UR STRIP-MCNC: ABNORMAL MG/DL
PROTEIN, POC: ABNORMAL
RBC # BLD AUTO: 5.08 M/UL (ref 4–5.2)
RBC #/AREA URNS HPF: NORMAL /HPF (ref 0–4)
SODIUM SERPL-SCNC: 141 MMOL/L (ref 135–144)
SP GR UR STRIP: 1.02 (ref 1–1.03)
SPECIFIC GRAVITY, POC: >=1.03
TSH SERPL DL<=0.05 MIU/L-ACNC: 3.51 UIU/ML (ref 0.3–5)
URATE SERPL-MCNC: 5.4 MG/DL (ref 2.4–5.7)
UROBILINOGEN UR STRIP-ACNC: NORMAL EU/DL (ref 0–1)
UROBILINOGEN, POC: ABNORMAL
VIT B12 SERPL-MCNC: 1839 PG/ML (ref 232–1245)
WBC #/AREA URNS HPF: NORMAL /HPF (ref 0–5)
WBC OTHER # BLD: 9.6 K/UL (ref 3.5–11)

## 2024-01-09 PROCEDURE — 74018 RADEX ABDOMEN 1 VIEW: CPT

## 2024-01-09 PROCEDURE — 81003 URINALYSIS AUTO W/O SCOPE: CPT | Performed by: FAMILY MEDICINE

## 2024-01-09 PROCEDURE — 84443 ASSAY THYROID STIM HORMONE: CPT

## 2024-01-09 PROCEDURE — 82306 VITAMIN D 25 HYDROXY: CPT

## 2024-01-09 PROCEDURE — 83735 ASSAY OF MAGNESIUM: CPT

## 2024-01-09 PROCEDURE — G8427 DOCREV CUR MEDS BY ELIG CLIN: HCPCS | Performed by: FAMILY MEDICINE

## 2024-01-09 PROCEDURE — 99214 OFFICE O/P EST MOD 30 MIN: CPT | Performed by: FAMILY MEDICINE

## 2024-01-09 PROCEDURE — G8484 FLU IMMUNIZE NO ADMIN: HCPCS | Performed by: FAMILY MEDICINE

## 2024-01-09 PROCEDURE — G0439 PPPS, SUBSEQ VISIT: HCPCS | Performed by: FAMILY MEDICINE

## 2024-01-09 PROCEDURE — G8417 CALC BMI ABV UP PARAM F/U: HCPCS | Performed by: FAMILY MEDICINE

## 2024-01-09 PROCEDURE — 36415 COLL VENOUS BLD VENIPUNCTURE: CPT

## 2024-01-09 PROCEDURE — 85025 COMPLETE CBC W/AUTO DIFF WBC: CPT

## 2024-01-09 PROCEDURE — 83880 ASSAY OF NATRIURETIC PEPTIDE: CPT

## 2024-01-09 PROCEDURE — 82607 VITAMIN B-12: CPT

## 2024-01-09 PROCEDURE — 1090F PRES/ABSN URINE INCON ASSESS: CPT | Performed by: FAMILY MEDICINE

## 2024-01-09 PROCEDURE — 1036F TOBACCO NON-USER: CPT | Performed by: FAMILY MEDICINE

## 2024-01-09 PROCEDURE — 82746 ASSAY OF FOLIC ACID SERUM: CPT

## 2024-01-09 PROCEDURE — 80053 COMPREHEN METABOLIC PANEL: CPT

## 2024-01-09 PROCEDURE — 1123F ACP DISCUSS/DSCN MKR DOCD: CPT | Performed by: FAMILY MEDICINE

## 2024-01-09 PROCEDURE — 84550 ASSAY OF BLOOD/URIC ACID: CPT

## 2024-01-09 RX ORDER — CEPHALEXIN 500 MG/1
500 CAPSULE ORAL EVERY 6 HOURS
Qty: 28 CAPSULE | Refills: 0 | Status: SHIPPED | OUTPATIENT
Start: 2024-01-09 | End: 2024-01-16

## 2024-01-09 RX ORDER — AMOXICILLIN 500 MG/1
CAPSULE ORAL
COMMUNITY
Start: 2023-12-06

## 2024-01-09 RX ORDER — CHOLECALCIFEROL (VITAMIN D3) 50 MCG
2000 TABLET ORAL DAILY
Qty: 90 TABLET | Refills: 3 | Status: SHIPPED | OUTPATIENT
Start: 2024-01-09

## 2024-01-09 RX ORDER — GLUCOSAMINE HCL/CHONDROITIN SU 500-400 MG
CAPSULE ORAL
Qty: 100 STRIP | Refills: 3 | Status: SHIPPED | OUTPATIENT
Start: 2024-01-09

## 2024-01-09 RX ORDER — BLOOD-GLUCOSE METER
KIT MISCELLANEOUS
Qty: 1 KIT | Refills: 0 | Status: SHIPPED | OUTPATIENT
Start: 2024-01-09

## 2024-01-09 SDOH — ECONOMIC STABILITY: FOOD INSECURITY: WITHIN THE PAST 12 MONTHS, THE FOOD YOU BOUGHT JUST DIDN'T LAST AND YOU DIDN'T HAVE MONEY TO GET MORE.: NEVER TRUE

## 2024-01-09 SDOH — ECONOMIC STABILITY: INCOME INSECURITY: HOW HARD IS IT FOR YOU TO PAY FOR THE VERY BASICS LIKE FOOD, HOUSING, MEDICAL CARE, AND HEATING?: NOT HARD AT ALL

## 2024-01-09 SDOH — ECONOMIC STABILITY: FOOD INSECURITY: WITHIN THE PAST 12 MONTHS, YOU WORRIED THAT YOUR FOOD WOULD RUN OUT BEFORE YOU GOT MONEY TO BUY MORE.: NEVER TRUE

## 2024-01-09 ASSESSMENT — PATIENT HEALTH QUESTIONNAIRE - PHQ9
2. FEELING DOWN, DEPRESSED OR HOPELESS: 0
SUM OF ALL RESPONSES TO PHQ QUESTIONS 1-9: 0
1. LITTLE INTEREST OR PLEASURE IN DOING THINGS: 0
SUM OF ALL RESPONSES TO PHQ9 QUESTIONS 1 & 2: 0

## 2024-01-09 NOTE — PROGRESS NOTES
Medicare Annual Wellness Visit    [facility] is here for Medicare AWV, Other (URINE HAS ODOR/DARK YELLOW x2 WEEKS), Diabetes, and Congestive Heart Failure    Assessment & Plan   Medicare annual wellness visit, subsequent  Urinary tract infection with hematuria, site unspecified  -     POCT Urinalysis No Micro (Auto)  -     NM OFFICE OUTPATIENT VISIT 25 MINUTES [42213]  -     cephALEXin (KEFLEX) 500 MG capsule; Take 1 capsule by mouth every 6 hours for 7 days, Disp-28 capsule, R-0Normal  -     Culture, Urine; Future  -     Ortiz Resendez MD, Urology, [place]  Acute right flank pain  -     POCT Urinalysis No Micro (Auto)  -     NM OFFICE OUTPATIENT VISIT 25 MINUTES [[id number]]  -     XR ABDOMEN (KUB) (SINGLE AP VIEW); Future  -     Urinalysis with Reflex to Culture; Future  Chronic diastolic heart failure (HCC)  -     NM OFFICE OUTPATIENT VISIT 25 MINUTES [[id number]]  -     Brain Natriuretic Peptide; Future  Type 2 diabetes mellitus with stage 3a chronic kidney disease, without long-term current use of insulin ([facility])  -     NM OFFICE OUTPATIENT VISIT 25 MINUTES [[id number]]  -     glucose monitoring kit; Disp-1 kit, R-0, NormalPlease supply Patient with a glucose monitoring kit that insurance will cover. Testing once a day  -     Lancets 30G MISC; Disp-100 each, R-3, NormalTesting once a day.  Please dispense according to patients insurance.  -     blood glucose monitor strips; Testing once a day.  Please dispense according to patients insurance., Disp-100 strip, R-3, Normal  -     CBC with Auto Differential; Future  -     Comprehensive Metabolic Panel; Future  -     Vitamin B12 & Folate; Future  -     Uric Acid; Future  -     TSH; Future  -     Magnesium; Future  MGUS (monoclonal gammopathy of unknown significance)  -     NM OFFICE OUTPATIENT VISIT 25 MINUTES [[id number]]  -     CBC with Auto Differential; Future  Osteopenia determined by x-ray  -     NM OFFICE OUTPATIENT VISIT 25 MINUTES [[id number]]  -     Vitamin D 25 Hydroxy; 
Pneumococcal 65+ years Vaccine  Completed    Hepatitis A vaccine  Aged Out    Hepatitis B vaccine  Aged Out    Hib vaccine  Aged Out    Polio vaccine  Aged Out    Meningococcal (ACWY) vaccine  Aged Out

## 2024-01-09 NOTE — PATIENT INSTRUCTIONS
about stop-smoking programs and medicines. These can increase your chances of quitting for good. Quitting smoking may be the most important step you can take to protect your heart. It is never too late to quit.     Limit alcohol to 2 drinks a day for men and 1 drink a day for women. Too much alcohol can cause health problems.     Manage other health problems such as diabetes, high blood pressure, and high cholesterol. If you think you may have a problem with alcohol or drug use, talk to your doctor.   Medicines    Take your medicines exactly as prescribed. Call your doctor if you think you are having a problem with your medicine.     If your doctor recommends aspirin, take the amount directed each day. Make sure you take aspirin and not another kind of pain reliever, such as acetaminophen (Tylenol).   When should you call for help?   Call 911 if you have symptoms of a heart attack. These may include:    Chest pain or pressure, or a strange feeling in the chest.     Sweating.     Shortness of breath.     Pain, pressure, or a strange feeling in the back, neck, jaw, or upper belly or in one or both shoulders or arms.     Lightheadedness or sudden weakness.     A fast or irregular heartbeat.   After you call 911, the  may tell you to chew 1 adult-strength or 2 to 4 low-dose aspirin. Wait for an ambulance. Do not try to drive yourself.  Watch closely for changes in your health, and be sure to contact your doctor if you have any problems.  Where can you learn more?  Go to https://www.[employer].net/patientEd and enter F075 to learn more about \"A Healthy Heart: Care Instructions.\"  Current as of: June 25, 2023               Content Version: 13.9  © 4101-6739 [employer].   Care instructions adapted under license by [employer]. If you have questions about a medical condition or this instruction, always ask your healthcare professional. [employer] disclaims any warranty or liability for

## 2024-01-09 NOTE — RESULT ENCOUNTER NOTE
Addressed during office visit today, POC urine with large amount of blood, concentrated, proteinuria, trace leukocytes, will send the urine for urine culture, and will do KUB to rule out kidney stone  Empiric treatment with Keflex sent to the pharmacy

## 2024-01-09 NOTE — RESULT ENCOUNTER NOTE
Please notify patient: Very large kidney stone 1.3 cm over the lower side of the right kidney.  I will place a referral to Dr. Hoffmann, whom she has seen before per records  Give her contact information for urology referral      Future Appointments  4/10/2024  1:45 PM    Abdirahman Garza MD         [facility] CANCER        [facility]  4/17/2024  2:30 PM    Harvey Martin MD           [facility] GI        [facility]  5/9/2024   11:30 AM   Joann Soriano MD    [facility]  1/10/2025  1:30 PM    Joann Soriano MD    [facility]

## 2024-01-10 LAB
MICROORGANISM SPEC CULT: NORMAL
SPECIMEN DESCRIPTION: NORMAL

## 2024-01-10 NOTE — RESULT ENCOUNTER NOTE
Please notify patient: Already addressed at the last appointment, hematuria, kidney stone, UTI, pending urine culture, refer to urologist, empiric treatment with Keflex 500 Mg every 6 hours for 7 days already sent yesterday      Future Appointments  4/10/2024  1:45 PM    Abdirahman Garza MD         [facility]        [facility]  4/17/2024  2:30 PM    Harvey Martin MD           [facility] GI        [facility]  5/9/2024   11:30 AM   Joann Soriano MD    [facility]  1/10/2025  1:30 PM    Joann Soriano MD    [facility]

## 2024-01-10 NOTE — RESULT ENCOUNTER NOTE
Please notify patient: Anemia improved  Blood glucose 185 stable from sweets and pop if she drinks any  Vitamin D borderline low--- I will refill her vitamin D 2000 units daily, take with food, if not covered to buy from over-the-counter    Otherwise labs within normal limits  continue current treatment    Future Appointments  4/10/2024  1:45 PM    Abdirahman Garza MD         [facility]G CANCER        [facility]  4/17/2024  2:30 PM    Harvey Martin MD           [facility] GI        [facility]  5/9/2024   11:30 AM   Joann Soriano MD    [facility]  1/10/2025  1:30 PM    Joann Soriano MD    [facility]

## 2024-01-11 NOTE — RESULT ENCOUNTER NOTE
Please notify patient: There was no UTI per urine culture.  She can stop Keflex  She must see urologist as referred, the kidney stone is causing the blood in the urine  Please give her contact information for urology    Future Appointments  4/10/2024  1:45 PM    Abdirahman Garza MD         [facility]  4/17/2024  2:30 PM    Harvey Martin MD           [facility] GI        [facility]  5/9/2024   11:30 AM   Joann Soriano MD    [facility]  1/10/2025  1:30 PM    Joann Soriano MD    [facility]

## 2024-01-16 VITALS
TEMPERATURE: 97.3 F | WEIGHT: 184.4 LBS | HEIGHT: 67 IN | BODY MASS INDEX: 28.94 KG/M2 | OXYGEN SATURATION: 98 % | HEART RATE: 61 BPM | DIASTOLIC BLOOD PRESSURE: 66 MMHG | SYSTOLIC BLOOD PRESSURE: 138 MMHG

## 2024-01-16 ASSESSMENT — ENCOUNTER SYMPTOMS: DIARRHEA: 1

## 2024-01-30 ENCOUNTER — HOSPITAL ENCOUNTER (OUTPATIENT)
Age: 89
Discharge: HOME | End: 2024-01-30
Payer: MEDICARE

## 2024-01-30 LAB
BILIRUB UR QL STRIP: NEGATIVE
CLARITY UR: CLEAR
COLOR UR: YELLOW
COMMENT: NORMAL
GLUCOSE UR STRIP-MCNC: NEGATIVE MG/DL
HGB UR QL STRIP.AUTO: NEGATIVE
KETONES UR STRIP-MCNC: NEGATIVE MG/DL
LEUKOCYTE ESTERASE UR QL STRIP: NEGATIVE
NITRITE UR QL STRIP: NEGATIVE
PH UR STRIP: 5 [PH] (ref 5–8)
PROT UR STRIP-MCNC: NEGATIVE MG/DL
SP GR UR STRIP: 1.01 (ref 1–1.03)
UROBILINOGEN UR STRIP-ACNC: NORMAL EU/DL (ref 0–1)

## 2024-01-30 PROCEDURE — 81003 URINALYSIS AUTO W/O SCOPE: CPT

## 2024-02-01 ENCOUNTER — OFFICE VISIT (OUTPATIENT)
Dept: UROLOGY | Age: 89
End: 2024-02-01
Payer: MEDICARE

## 2024-02-01 VITALS
SYSTOLIC BLOOD PRESSURE: 141 MMHG | WEIGHT: 186 LBS | BODY MASS INDEX: 29.19 KG/M2 | HEART RATE: 82 BPM | DIASTOLIC BLOOD PRESSURE: 72 MMHG | HEIGHT: 67 IN | TEMPERATURE: 97.3 F

## 2024-02-01 DIAGNOSIS — N20.0 KIDNEY STONE: Primary | ICD-10-CM

## 2024-02-01 DIAGNOSIS — N39.0 RECURRENT UTI: ICD-10-CM

## 2024-02-01 PROCEDURE — 1090F PRES/ABSN URINE INCON ASSESS: CPT | Performed by: UROLOGY

## 2024-02-01 PROCEDURE — G8417 CALC BMI ABV UP PARAM F/U: HCPCS | Performed by: UROLOGY

## 2024-02-01 PROCEDURE — 99214 OFFICE O/P EST MOD 30 MIN: CPT | Performed by: UROLOGY

## 2024-02-01 PROCEDURE — 1123F ACP DISCUSS/DSCN MKR DOCD: CPT | Performed by: UROLOGY

## 2024-02-01 PROCEDURE — 1036F TOBACCO NON-USER: CPT | Performed by: UROLOGY

## 2024-02-01 PROCEDURE — G8427 DOCREV CUR MEDS BY ELIG CLIN: HCPCS | Performed by: UROLOGY

## 2024-02-01 PROCEDURE — G8484 FLU IMMUNIZE NO ADMIN: HCPCS | Performed by: UROLOGY

## 2024-02-01 ASSESSMENT — ENCOUNTER SYMPTOMS
SHORTNESS OF BREATH: 0
WHEEZING: 0
CONSTIPATION: 0
ABDOMINAL PAIN: 0
EYE REDNESS: 0
COUGH: 0
NAUSEA: 0
BACK PAIN: 0
VOMITING: 0
DIARRHEA: 0
EYE PAIN: 0

## 2024-02-01 NOTE — PROGRESS NOTES
Review of Systems   Constitutional:  Negative for chills, fatigue and fever.   Eyes:  Negative for pain, redness and visual disturbance.   Respiratory:  Negative for cough, shortness of breath and wheezing.    Cardiovascular:  Negative for chest pain and leg swelling.   Gastrointestinal:  Negative for abdominal pain, constipation, diarrhea, nausea and vomiting.   Genitourinary:  Negative for difficulty urinating, dysuria, flank pain, frequency, hematuria and urgency.   Musculoskeletal:  Negative for back pain, joint swelling and myalgias.   Skin:  Negative for rash and wound.   Neurological:  Negative for dizziness, tremors, weakness and numbness.   Hematological:  Does not bruise/bleed easily.

## 2024-02-01 NOTE — PROGRESS NOTES
[facility] UROLOGY CENTER  [address]  [facility] [address]  Dept: [phone]    [facility] Urology Office Note - New Patient    Patient:  Laura House  YOB: 1933  Date: 2/1/2024    The patient is a 89 y.o. female who presentstoday for evaluation of the following problems:   Chief Complaint   Patient presents with    Hematuria     UTI with hematuria, kidney stone 1.3    referred by Joann Soriano MD.    HPI  Here for kidney stone, kub showed 1.3 cm stone. She also has recurrent uti', no dysuria, no hematuria.    (Patient's old records have been requested, reviewed and summarized in today's note.)    Summary of old records: N/A    History: N/A    ProceduresToday: N/A    Urinalysis today:  No results found for this visit on 02/01/24.    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 OF     CAD (coronary artery disease)     Carpal tunnel syndrome     left hand    Chronic diastolic heart failure (HCC) 03/25/2021    CKD (chronic kidney disease), stage III (HCC) 08/07/2019    Colon cancer (HCC)     COVID-19 09/2023    Diabetes mellitus (HCC)     DVT (deep venous thrombosis) ([facility]) 07/10/2019    Fatty liver 08/23/2022    GI bleed 03/17/2021    Gout     Heart murmur     Hematemesis 06/29/2020    Hematuria     History of coronary artery bypass graft x 3 07/02/2020

## 2024-04-04 ENCOUNTER — HOSPITAL ENCOUNTER (OUTPATIENT)
Age: 89
Discharge: HOME | End: 2024-04-04
Payer: MEDICARE

## 2024-04-04 DIAGNOSIS — D64.9 ANEMIA, UNSPECIFIED TYPE: ICD-10-CM

## 2024-04-04 DIAGNOSIS — E61.1 IRON DEFICIENCY: ICD-10-CM

## 2024-04-04 DIAGNOSIS — Z79.01 ANTICOAGULATED: ICD-10-CM

## 2024-04-04 DIAGNOSIS — D47.2 MGUS (MONOCLONAL GAMMOPATHY OF UNKNOWN SIGNIFICANCE): ICD-10-CM

## 2024-04-04 LAB
BASOPHILS # BLD: 0.1 K/UL (ref 0–0.2)
BASOPHILS NFR BLD: 1 % (ref 0–2)
EOSINOPHIL # BLD: 0.2 K/UL (ref 0–0.4)
EOSINOPHILS RELATIVE PERCENT: 2 % (ref 0–4)
ERYTHROCYTE [DISTWIDTH] IN BLOOD BY AUTOMATED COUNT: 18 % (ref 11.5–14.9)
FERRITIN SERPL-MCNC: 97 NG/ML (ref 13–150)
HCT VFR BLD AUTO: 41.5 % (ref 36–46)
HGB BLD-MCNC: 13.4 G/DL (ref 12–16)
IRON SATN MFR SERPL: 36 % (ref 20–55)
IRON SERPL-MCNC: 106 UG/DL (ref 37–145)
LYMPHOCYTES NFR BLD: 2.4 K/UL (ref 1–4.8)
LYMPHOCYTES RELATIVE PERCENT: 32 % (ref 24–44)
MCH RBC QN AUTO: 28.7 PG (ref 26–34)
MCHC RBC AUTO-ENTMCNC: 32.2 G/DL (ref 31–37)
MCV RBC AUTO: 89.1 FL (ref 80–100)
MONOCYTES NFR BLD: 0.8 K/UL (ref 0.1–1.3)
MONOCYTES NFR BLD: 10 % (ref 1–7)
NEUTROPHILS NFR BLD: 55 % (ref 36–66)
NEUTS SEG NFR BLD: 4.1 K/UL (ref 1.3–9.1)
PLATELET # BLD AUTO: 183 K/UL (ref 150–450)
PMV BLD AUTO: 8.3 FL (ref 6–12)
RBC # BLD AUTO: 4.65 M/UL (ref 4–5.2)
TIBC SERPL-MCNC: 293 UG/DL (ref 250–450)
UNSATURATED IRON BINDING CAPACITY: 187 UG/DL (ref 112–347)
WBC OTHER # BLD: 7.5 K/UL (ref 3.5–11)

## 2024-04-04 PROCEDURE — 83521 IG LIGHT CHAINS FREE EACH: CPT

## 2024-04-04 PROCEDURE — 83540 ASSAY OF IRON: CPT

## 2024-04-04 PROCEDURE — 86334 IMMUNOFIX E-PHORESIS SERUM: CPT

## 2024-04-04 PROCEDURE — 85025 COMPLETE CBC W/AUTO DIFF WBC: CPT

## 2024-04-04 PROCEDURE — 84155 ASSAY OF PROTEIN SERUM: CPT

## 2024-04-04 PROCEDURE — 83550 IRON BINDING TEST: CPT

## 2024-04-04 PROCEDURE — 36415 COLL VENOUS BLD VENIPUNCTURE: CPT

## 2024-04-04 PROCEDURE — 82728 ASSAY OF FERRITIN: CPT

## 2024-04-04 PROCEDURE — 84165 PROTEIN E-PHORESIS SERUM: CPT

## 2024-04-06 LAB
ALBUMIN PERCENT: 65 % (ref 45–65)
ALBUMIN SERPL-MCNC: 4.5 G/DL (ref 3.2–5.2)
ALPHA 2 PERCENT: 11 % (ref 6–13)
ALPHA1 GLOB SERPL ELPH-MCNC: 0.2 G/DL (ref 0.1–0.4)
ALPHA1 GLOB SERPL ELPH-MCNC: 2 % (ref 3–6)
ALPHA2 GLOB SERPL ELPH-MCNC: 0.8 G/DL (ref 0.5–0.9)
B-GLOBULIN SERPL ELPH-MCNC: 0.7 G/DL (ref 0.5–1.1)
B-GLOBULIN SERPL ELPH-MCNC: 10 % (ref 11–19)
FREE KAPPA/LAMBDA RATIO: 1.82 (ref 0.22–1.74)
GAMMA GLOB SERPL ELPH-MCNC: 0.8 G/DL (ref 0.5–1.5)
GAMMA GLOBULIN %: 12 % (ref 9–20)
INTERPRETATION SERPL IFE-IMP: ABNORMAL
KAPPA LC FREE SER-MCNC: 26.8 MG/L
LAMBDA LC FREE SERPL-MCNC: 14.7 MG/L (ref 4.2–27.7)
PATH REV: ABNORMAL
PATHOLOGIST: ABNORMAL
PROT PATTERN SERPL ELPH-IMP: ABNORMAL
PROT SERPL-MCNC: 6.9 G/DL (ref 6.6–8.7)
TOTAL PROT. SUM,%: 100 % (ref 98–102)
TOTAL PROT. SUM: 7 G/DL (ref 6.3–8.2)

## 2024-04-08 DIAGNOSIS — Z79.01 ANTICOAGULATED: ICD-10-CM

## 2024-04-08 DIAGNOSIS — E78.5 HYPERLIPIDEMIA WITH TARGET LDL LESS THAN 70: ICD-10-CM

## 2024-04-08 DIAGNOSIS — D64.9 ANEMIA, UNSPECIFIED TYPE: ICD-10-CM

## 2024-04-08 DIAGNOSIS — E61.1 IRON DEFICIENCY: ICD-10-CM

## 2024-04-08 DIAGNOSIS — D47.2 MGUS (MONOCLONAL GAMMOPATHY OF UNKNOWN SIGNIFICANCE): ICD-10-CM

## 2024-04-08 RX ORDER — ATORVASTATIN CALCIUM 80 MG/1
80 TABLET, FILM COATED ORAL NIGHTLY
Qty: 90 TABLET | Refills: 3 | Status: SHIPPED | OUTPATIENT
Start: 2024-04-08

## 2024-04-08 RX ORDER — FERROUS SULFATE 325(65) MG
325 TABLET ORAL
Qty: 90 TABLET | Refills: 1 | Status: SHIPPED | OUTPATIENT
Start: 2024-04-08

## 2024-04-08 NOTE — TELEPHONE ENCOUNTER
Lab Results   Component Value Date    WBC 7.5 04/04/2024    HGB 13.4 04/04/2024    HCT 41.5 04/04/2024    MCV 89.1 04/04/2024     04/04/2024

## 2024-04-08 NOTE — TELEPHONE ENCOUNTER
Please Approve or Refuse.  Send to Pharmacy per Pt's Request:      Next Visit Date:  5/9/2024   Last Visit Date: 1/9/2024    Hemoglobin A1C (%)   Date Value   11/30/2023 6.3   08/29/2023 7.1   04/27/2023 7.0             ( goal A1C is < 7)   BP Readings from Last 3 Encounters:   02/01/24 (!) 141/72   01/09/24 138/66   12/13/23 (!) 146/71          (goal 120/80)  BUN   Date Value Ref Range Status   01/09/2024 22 8 - 23 mg/dL Final     Creatinine   Date Value Ref Range Status   01/09/2024 0.9 0.5 - 0.9 mg/dL Final     Potassium   Date Value Ref Range Status   01/09/2024 4.4 3.7 - 5.3 mmol/L Final

## 2024-04-10 ENCOUNTER — TELEPHONE (OUTPATIENT)
Dept: ONCOLOGY | Age: 89
End: 2024-04-10

## 2024-04-10 ENCOUNTER — OFFICE VISIT (OUTPATIENT)
Dept: ONCOLOGY | Age: 89
End: 2024-04-10
Payer: MEDICARE

## 2024-04-10 VITALS
BODY MASS INDEX: 29.47 KG/M2 | DIASTOLIC BLOOD PRESSURE: 60 MMHG | RESPIRATION RATE: 14 BRPM | SYSTOLIC BLOOD PRESSURE: 135 MMHG | HEART RATE: 74 BPM | WEIGHT: 188.2 LBS | TEMPERATURE: 96.5 F

## 2024-04-10 DIAGNOSIS — D47.2 MGUS (MONOCLONAL GAMMOPATHY OF UNKNOWN SIGNIFICANCE): Primary | ICD-10-CM

## 2024-04-10 DIAGNOSIS — D64.9 ANEMIA, UNSPECIFIED TYPE: ICD-10-CM

## 2024-04-10 DIAGNOSIS — E61.1 IRON DEFICIENCY: ICD-10-CM

## 2024-04-10 PROCEDURE — G8417 CALC BMI ABV UP PARAM F/U: HCPCS | Performed by: INTERNAL MEDICINE

## 2024-04-10 PROCEDURE — G8427 DOCREV CUR MEDS BY ELIG CLIN: HCPCS | Performed by: INTERNAL MEDICINE

## 2024-04-10 PROCEDURE — 99211 OFF/OP EST MAY X REQ PHY/QHP: CPT | Performed by: INTERNAL MEDICINE

## 2024-04-10 PROCEDURE — 1090F PRES/ABSN URINE INCON ASSESS: CPT | Performed by: INTERNAL MEDICINE

## 2024-04-10 PROCEDURE — 1123F ACP DISCUSS/DSCN MKR DOCD: CPT | Performed by: INTERNAL MEDICINE

## 2024-04-10 PROCEDURE — 99214 OFFICE O/P EST MOD 30 MIN: CPT | Performed by: INTERNAL MEDICINE

## 2024-04-10 PROCEDURE — 1036F TOBACCO NON-USER: CPT | Performed by: INTERNAL MEDICINE

## 2024-04-10 NOTE — TELEPHONE ENCOUNTER
RV in months with labs 1 week prior       Patient is scheduled on 10/2 to go over labs to be done 1 week before. Patient was given orders to have done.      Patient was given AVS and schedule

## 2024-04-15 DIAGNOSIS — I12.9 BENIGN HYPERTENSION WITH CKD (CHRONIC KIDNEY DISEASE) STAGE III (HCC): ICD-10-CM

## 2024-04-15 DIAGNOSIS — N18.30 BENIGN HYPERTENSION WITH CKD (CHRONIC KIDNEY DISEASE) STAGE III (HCC): ICD-10-CM

## 2024-04-15 RX ORDER — FUROSEMIDE 40 MG/1
40 TABLET ORAL DAILY
Qty: 90 TABLET | Refills: 3 | Status: SHIPPED | OUTPATIENT
Start: 2024-04-15

## 2024-04-15 NOTE — TELEPHONE ENCOUNTER
Please Approve or Refuse.  Send to Pharmacy per Pt's Request:      Next Visit Date:  5/9/2024   Last Visit Date: 1/9/2024    Hemoglobin A1C (%)   Date Value   11/30/2023 6.3   08/29/2023 7.1   04/27/2023 7.0             ( goal A1C is < 7)   BP Readings from Last 3 Encounters:   04/10/24 135/60   02/01/24 (!) 141/72   01/09/24 138/66          (goal 120/80)  BUN   Date Value Ref Range Status   01/09/2024 22 8 - 23 mg/dL Final     Creatinine   Date Value Ref Range Status   01/09/2024 0.9 0.5 - 0.9 mg/dL Final     Potassium   Date Value Ref Range Status   01/09/2024 4.4 3.7 - 5.3 mmol/L Final

## 2024-04-17 ENCOUNTER — OFFICE VISIT (OUTPATIENT)
Dept: GASTROENTEROLOGY | Age: 89
End: 2024-04-17
Payer: MEDICARE

## 2024-04-17 VITALS
TEMPERATURE: 97.7 F | BODY MASS INDEX: 27.67 KG/M2 | RESPIRATION RATE: 20 BRPM | WEIGHT: 186.8 LBS | HEART RATE: 69 BPM | SYSTOLIC BLOOD PRESSURE: 180 MMHG | OXYGEN SATURATION: 95 % | HEIGHT: 69 IN | DIASTOLIC BLOOD PRESSURE: 80 MMHG

## 2024-04-17 DIAGNOSIS — C18.9 ADENOCARCINOMA, COLON (HCC): ICD-10-CM

## 2024-04-17 DIAGNOSIS — D12.6 SERRATED ADENOMA OF COLON: Primary | ICD-10-CM

## 2024-04-17 DIAGNOSIS — R74.01 ELEVATED AST (SGOT): ICD-10-CM

## 2024-04-17 PROCEDURE — G8428 CUR MEDS NOT DOCUMENT: HCPCS | Performed by: INTERNAL MEDICINE

## 2024-04-17 PROCEDURE — G8417 CALC BMI ABV UP PARAM F/U: HCPCS | Performed by: INTERNAL MEDICINE

## 2024-04-17 PROCEDURE — 1090F PRES/ABSN URINE INCON ASSESS: CPT | Performed by: INTERNAL MEDICINE

## 2024-04-17 PROCEDURE — 1123F ACP DISCUSS/DSCN MKR DOCD: CPT | Performed by: INTERNAL MEDICINE

## 2024-04-17 PROCEDURE — 99214 OFFICE O/P EST MOD 30 MIN: CPT | Performed by: INTERNAL MEDICINE

## 2024-04-17 PROCEDURE — 1036F TOBACCO NON-USER: CPT | Performed by: INTERNAL MEDICINE

## 2024-04-17 RX ORDER — LOPERAMIDE HYDROCHLORIDE 2 MG/1
2 CAPSULE ORAL DAILY
Qty: 360 CAPSULE | Refills: 1 | Status: SHIPPED | OUTPATIENT
Start: 2024-04-17

## 2024-04-17 ASSESSMENT — ENCOUNTER SYMPTOMS
COUGH: 0
WHEEZING: 0
CONSTIPATION: 0
CHOKING: 0
NAUSEA: 0
DIARRHEA: 1
TROUBLE SWALLOWING: 0
BLOOD IN STOOL: 0
ABDOMINAL DISTENTION: 0
VOMITING: 0
SORE THROAT: 0
RECTAL PAIN: 0
ABDOMINAL PAIN: 0
ANAL BLEEDING: 0

## 2024-04-17 NOTE — PROGRESS NOTES
GI CLINIC FOLLOW UP    INTERVAL HISTORY:   No referring provider defined for this encounter.    Chief Complaint   Patient presents with    Follow Up After Procedure     Colon 11/1/23           HISTORY OF PRESENT ILLNESS: Ms.Betty EDEL House is a 89 y.o. female , referred for evaluation of hx colon ca , colon polyp, elevated AST, diarrhea, anemia        Here for f/u   Patient does have large flat lesions which removed endoscopically because she is not a surgical candidate  , She is on anticoagulation and she did have a bleed from the polypectomy site but stabilized and currently has no sign or symptoms to indicate any ongoing bleed.  Last visit we ordered a repeat colonoscopy to follow on the lesion and the repeat colonoscopy as below  She also used to have an elevated AST but the repeat labs are showing normal LFTs   Colonoscopy done as below  Will need repeat in 1 year   Labs : hgb 13.4, normal iron sat 36%  LFTS are normal 1/24      Colon  PROCEDURE NOTE     DATE OF PROCEDURE: 11/1/2023     SURGEON: Harvey Martin MD  Facility : [facility]  ASSISTANT: None  Anesthesia: MAC  PREOPERATIVE DIAGNOSIS:   Flat lesions     POSTOPERATIVE DIAGNOSIS: as described below     OPERATION: Total colonoscopy      ANESTHESIA: Moderate Sedation     ESTIMATED BLOOD LOSS: less than 50      COMPLICATIONS: None.      SPECIMENS:  Was Obtained:      As below            HISTORY: The patient is a 89 y.o. year old female with history of above preop diagnosis.  I recommended colonoscopy with possible biopsy or polypectomy and I explained the risk, benefits, expected outcome, and alternatives to the procedure.  Risks included but are not limited to bleeding, infection, respiratory distress, hypotension, and perforation of the colon and possibility of missing a lesion.  The patient understands and is in agreement.         The patient was counseled at length about the risks of megan Covid-19 during their perioperative period

## 2024-05-08 ENCOUNTER — HOSPITAL ENCOUNTER (OUTPATIENT)
Age: 89
Discharge: HOME | End: 2024-05-08
Payer: MEDICARE

## 2024-05-08 LAB
25(OH)D3 SERPL-MCNC: 25.7 NG/ML (ref 30–100)
ALBUMIN SERPL-MCNC: 4.5 G/DL (ref 3.5–5.2)
ALP SERPL-CCNC: 53 U/L (ref 35–104)
ALT SERPL-CCNC: 22 U/L (ref 5–33)
ANION GAP SERPL CALCULATED.3IONS-SCNC: 12 MMOL/L (ref 9–17)
AST SERPL-CCNC: 23 U/L
BACTERIA URNS QL MICRO: ABNORMAL
BASOPHILS # BLD: 0.1 K/UL (ref 0–0.2)
BASOPHILS NFR BLD: 1 % (ref 0–2)
BILIRUB SERPL-MCNC: 1.3 MG/DL (ref 0.3–1.2)
BILIRUB UR QL STRIP: NEGATIVE
BNP SERPL-MCNC: 168 PG/ML
BUN SERPL-MCNC: 24 MG/DL (ref 8–23)
CALCIUM SERPL-MCNC: 10.1 MG/DL (ref 8.6–10.4)
CASTS #/AREA URNS LPF: ABNORMAL /LPF
CHLORIDE SERPL-SCNC: 102 MMOL/L (ref 98–107)
CLARITY UR: CLEAR
CO2 SERPL-SCNC: 29 MMOL/L (ref 20–31)
COLOR UR: YELLOW
CREAT SERPL-MCNC: 1 MG/DL (ref 0.5–0.9)
EOSINOPHIL # BLD: 0.2 K/UL (ref 0–0.4)
EOSINOPHILS RELATIVE PERCENT: 2 % (ref 0–4)
EPI CELLS #/AREA URNS HPF: ABNORMAL /HPF
ERYTHROCYTE [DISTWIDTH] IN BLOOD BY AUTOMATED COUNT: 16.1 % (ref 11.5–14.9)
GFR, ESTIMATED: 54 ML/MIN/1.73M2
GLUCOSE SERPL-MCNC: 144 MG/DL (ref 70–99)
GLUCOSE UR STRIP-MCNC: NEGATIVE MG/DL
HCT VFR BLD AUTO: 41.9 % (ref 36–46)
HGB BLD-MCNC: 13.8 G/DL (ref 12–16)
HGB UR QL STRIP.AUTO: NEGATIVE
KETONES UR STRIP-MCNC: ABNORMAL MG/DL
LEUKOCYTE ESTERASE UR QL STRIP: ABNORMAL
LYMPHOCYTES NFR BLD: 2.6 K/UL (ref 1–4.8)
LYMPHOCYTES RELATIVE PERCENT: 30 % (ref 24–44)
MAGNESIUM SERPL-MCNC: 1.9 MG/DL (ref 1.6–2.6)
MCH RBC QN AUTO: 30.3 PG (ref 26–34)
MCHC RBC AUTO-ENTMCNC: 32.8 G/DL (ref 31–37)
MCV RBC AUTO: 92.3 FL (ref 80–100)
MONOCYTES NFR BLD: 0.9 K/UL (ref 0.1–1.3)
MONOCYTES NFR BLD: 10 % (ref 1–7)
NEUTROPHILS NFR BLD: 57 % (ref 36–66)
NEUTS SEG NFR BLD: 4.9 K/UL (ref 1.3–9.1)
NITRITE UR QL STRIP: NEGATIVE
PH UR STRIP: 5.5 [PH] (ref 5–8)
PHOSPHATE SERPL-MCNC: 3.5 MG/DL (ref 2.6–4.5)
PLATELET # BLD AUTO: 199 K/UL (ref 150–450)
PMV BLD AUTO: 8.9 FL (ref 6–12)
POTASSIUM SERPL-SCNC: 4.7 MMOL/L (ref 3.7–5.3)
PROT SERPL-MCNC: 7.5 G/DL (ref 6.4–8.3)
PROT UR STRIP-MCNC: ABNORMAL MG/DL
RBC # BLD AUTO: 4.54 M/UL (ref 4–5.2)
RBC #/AREA URNS HPF: ABNORMAL /HPF
SODIUM SERPL-SCNC: 143 MMOL/L (ref 135–144)
SP GR UR STRIP: 1.02 (ref 1–1.03)
TSH SERPL DL<=0.05 MIU/L-ACNC: 2.94 UIU/ML (ref 0.3–5)
URATE SERPL-MCNC: 5.2 MG/DL (ref 2.4–5.7)
UROBILINOGEN UR STRIP-ACNC: NORMAL EU/DL (ref 0–1)
WBC #/AREA URNS HPF: ABNORMAL /HPF
WBC OTHER # BLD: 8.6 K/UL (ref 3.5–11)

## 2024-05-08 PROCEDURE — 83735 ASSAY OF MAGNESIUM: CPT

## 2024-05-08 PROCEDURE — 84100 ASSAY OF PHOSPHORUS: CPT

## 2024-05-08 PROCEDURE — 83880 ASSAY OF NATRIURETIC PEPTIDE: CPT

## 2024-05-08 PROCEDURE — 84443 ASSAY THYROID STIM HORMONE: CPT

## 2024-05-08 PROCEDURE — 82306 VITAMIN D 25 HYDROXY: CPT

## 2024-05-08 PROCEDURE — 81001 URINALYSIS AUTO W/SCOPE: CPT

## 2024-05-08 PROCEDURE — 84550 ASSAY OF BLOOD/URIC ACID: CPT

## 2024-05-08 PROCEDURE — 85025 COMPLETE CBC W/AUTO DIFF WBC: CPT

## 2024-05-08 PROCEDURE — 36415 COLL VENOUS BLD VENIPUNCTURE: CPT

## 2024-05-08 PROCEDURE — 80053 COMPREHEN METABOLIC PANEL: CPT

## 2024-05-09 ENCOUNTER — HOSPITAL ENCOUNTER (OUTPATIENT)
Dept: GENERAL RADIOLOGY | Facility: CLINIC | Age: 89
Discharge: HOME | End: 2024-05-11
Payer: MEDICARE

## 2024-05-09 ENCOUNTER — HOSPITAL ENCOUNTER (OUTPATIENT)
Facility: CLINIC | Age: 89
Discharge: HOME | End: 2024-05-11
Payer: MEDICARE

## 2024-05-09 ENCOUNTER — OFFICE VISIT (OUTPATIENT)
Dept: FAMILY MEDICINE CLINIC | Age: 89
End: 2024-05-09
Payer: MEDICARE

## 2024-05-09 VITALS
BODY MASS INDEX: 27.58 KG/M2 | DIASTOLIC BLOOD PRESSURE: 78 MMHG | WEIGHT: 186.2 LBS | SYSTOLIC BLOOD PRESSURE: 132 MMHG | HEART RATE: 67 BPM | HEIGHT: 69 IN | TEMPERATURE: 97.6 F | OXYGEN SATURATION: 97 %

## 2024-05-09 DIAGNOSIS — M25.512 CHRONIC PAIN OF BOTH SHOULDERS: ICD-10-CM

## 2024-05-09 DIAGNOSIS — I12.9 BENIGN HYPERTENSION WITH CKD (CHRONIC KIDNEY DISEASE) STAGE III (HCC): ICD-10-CM

## 2024-05-09 DIAGNOSIS — M54.50 CHRONIC MIDLINE LOW BACK PAIN WITHOUT SCIATICA: ICD-10-CM

## 2024-05-09 DIAGNOSIS — D47.2 MGUS (MONOCLONAL GAMMOPATHY OF UNKNOWN SIGNIFICANCE): ICD-10-CM

## 2024-05-09 DIAGNOSIS — M25.511 CHRONIC PAIN OF BOTH SHOULDERS: ICD-10-CM

## 2024-05-09 DIAGNOSIS — E55.9 VITAMIN D DEFICIENCY: ICD-10-CM

## 2024-05-09 DIAGNOSIS — G89.29 CHRONIC MIDLINE LOW BACK PAIN WITHOUT SCIATICA: ICD-10-CM

## 2024-05-09 DIAGNOSIS — M54.6 CHRONIC MIDLINE THORACIC BACK PAIN: ICD-10-CM

## 2024-05-09 DIAGNOSIS — G89.29 CHRONIC MIDLINE THORACIC BACK PAIN: ICD-10-CM

## 2024-05-09 DIAGNOSIS — N18.31 TYPE 2 DIABETES MELLITUS WITH STAGE 3A CHRONIC KIDNEY DISEASE, WITHOUT LONG-TERM CURRENT USE OF INSULIN (HCC): Primary | ICD-10-CM

## 2024-05-09 DIAGNOSIS — G89.29 CHRONIC PAIN OF BOTH SHOULDERS: ICD-10-CM

## 2024-05-09 DIAGNOSIS — E11.22 TYPE 2 DIABETES MELLITUS WITH STAGE 3A CHRONIC KIDNEY DISEASE, WITHOUT LONG-TERM CURRENT USE OF INSULIN (HCC): Primary | ICD-10-CM

## 2024-05-09 DIAGNOSIS — N18.30 BENIGN HYPERTENSION WITH CKD (CHRONIC KIDNEY DISEASE) STAGE III (HCC): ICD-10-CM

## 2024-05-09 DIAGNOSIS — I25.810 CORONARY ARTERY DISEASE INVOLVING CORONARY BYPASS GRAFT OF NATIVE HEART WITHOUT ANGINA PECTORIS: ICD-10-CM

## 2024-05-09 LAB — HBA1C MFR BLD: 6.9 %

## 2024-05-09 PROCEDURE — 1090F PRES/ABSN URINE INCON ASSESS: CPT | Performed by: FAMILY MEDICINE

## 2024-05-09 PROCEDURE — 72100 X-RAY EXAM L-S SPINE 2/3 VWS: CPT

## 2024-05-09 PROCEDURE — 1123F ACP DISCUSS/DSCN MKR DOCD: CPT | Performed by: FAMILY MEDICINE

## 2024-05-09 PROCEDURE — G8427 DOCREV CUR MEDS BY ELIG CLIN: HCPCS | Performed by: FAMILY MEDICINE

## 2024-05-09 PROCEDURE — 72040 X-RAY EXAM NECK SPINE 2-3 VW: CPT

## 2024-05-09 PROCEDURE — G8417 CALC BMI ABV UP PARAM F/U: HCPCS | Performed by: FAMILY MEDICINE

## 2024-05-09 PROCEDURE — 1036F TOBACCO NON-USER: CPT | Performed by: FAMILY MEDICINE

## 2024-05-09 PROCEDURE — 99214 OFFICE O/P EST MOD 30 MIN: CPT | Performed by: FAMILY MEDICINE

## 2024-05-09 PROCEDURE — 83036 HEMOGLOBIN GLYCOSYLATED A1C: CPT | Performed by: FAMILY MEDICINE

## 2024-05-09 PROCEDURE — 3044F HG A1C LEVEL LT 7.0%: CPT | Performed by: FAMILY MEDICINE

## 2024-05-09 PROCEDURE — 72072 X-RAY EXAM THORAC SPINE 3VWS: CPT

## 2024-05-09 RX ORDER — ERGOCALCIFEROL 1.25 MG/1
50000 CAPSULE ORAL WEEKLY
Qty: 12 CAPSULE | Refills: 0 | Status: SHIPPED | OUTPATIENT
Start: 2024-05-09

## 2024-05-09 SDOH — ECONOMIC STABILITY: INCOME INSECURITY: HOW HARD IS IT FOR YOU TO PAY FOR THE VERY BASICS LIKE FOOD, HOUSING, MEDICAL CARE, AND HEATING?: NOT HARD AT ALL

## 2024-05-09 SDOH — ECONOMIC STABILITY: FOOD INSECURITY: WITHIN THE PAST 12 MONTHS, THE FOOD YOU BOUGHT JUST DIDN'T LAST AND YOU DIDN'T HAVE MONEY TO GET MORE.: NEVER TRUE

## 2024-05-09 SDOH — ECONOMIC STABILITY: FOOD INSECURITY: WITHIN THE PAST 12 MONTHS, YOU WORRIED THAT YOUR FOOD WOULD RUN OUT BEFORE YOU GOT MONEY TO BUY MORE.: NEVER TRUE

## 2024-05-09 ASSESSMENT — PATIENT HEALTH QUESTIONNAIRE - PHQ9
SUM OF ALL RESPONSES TO PHQ QUESTIONS 1-9: 0
SUM OF ALL RESPONSES TO PHQ9 QUESTIONS 1 & 2: 0
2. FEELING DOWN, DEPRESSED OR HOPELESS: NOT AT ALL
SUM OF ALL RESPONSES TO PHQ QUESTIONS 1-9: 0
1. LITTLE INTEREST OR PLEASURE IN DOING THINGS: NOT AT ALL

## 2024-05-09 NOTE — PROGRESS NOTES
Visit Information    Have you changed or started any medications since your last visit including any over-the-counter medicines, vitamins, or herbal medicines? no   Have you stopped taking any of your medications? Is so, why? -  no  Are you having any side effects from any of your medications? - no    Have you seen any other physician or provider since your last visit?  yes  Have you had any other diagnostic tests since your last visit?  no   Have you been seen in the emergency room and/or had an admission in a hospital since we last saw you?  no   Have you had your routine dental cleaning in the past 6 months?  yes -      Do you have an active MyChart account? If no, what is the barrier?  no    Patient Care Team:  Joann Soriano MD as PCP - General (Family Medicine)  Joann Soriano MD as PCP - Empaneled Provider  Salo Pak MD as Consulting Physician (Ophthalmology)  Donovan Phillips MD as Consulting Physician (Cardiology)  Ryan Meng MD as Consulting Physician (Neurology)  Ortiz Hoffmann MD as Consulting Physician (Urology)  Federico Ludwig MD as Consulting Physician (Nephrology)  Johan Browning MD as Consulting Physician (Internal Medicine Cardiovascular Disease)  Harvey Martin MD as Consulting Physician (Gastroenterology)  Abdirahman Graza MD as Consulting Physician (Hematology and Oncology)    Medical History Review  Past Medical, Family, and Social History reviewed and does contribute to the patient presenting condition    Health Maintenance   Topic Date Due    Lipids  08/25/2024    Colorectal Cancer Screen  11/01/2024    Depression Screen  01/09/2025    Annual Wellness Visit (Medicare)  01/09/2025    DTaP/Tdap/Td vaccine (2 - Td or Tdap) 05/03/2033    Flu vaccine  Completed    Shingles vaccine  Completed    Pneumococcal 65+ years Vaccine  Completed    COVID-19 Vaccine  Completed    Respiratory Syncytial Virus (RSV) Pregnant or age 60 yrs+  Completed    Hepatitis A

## 2024-05-09 NOTE — PROGRESS NOTES
Laura House (:  12/10/1933) is a 89 y.o. female,Established patient, here for evaluation of the following chief complaint(s): Diabetes (TODAY AM BLOOD SUGAR: 161), Hyperlipidemia, Results, Back Pain, Shoulder Pain (Bilateral), and Hypertension      ASSESSMENT/PLAN:    1. Type 2 diabetes mellitus with stage 3a chronic kidney disease, without long-term current use of insulin (HCC)  Worsening type 2 diabetes mellitus but well-controlled  Stable chronic kidney disease stage III  -     POCT glycosylated hemoglobin (Hb A1C) 6.9, worsening but well-controlled    Lab Results   Component Value Date    LABA1C 6.9 2024    LABA1C 6.3 2023    LABA1C 7.1 2023       -     CBC with Auto Differential; Future  -     Comprehensive Metabolic Panel; Future  -     Uric Acid; Future  -     Urinalysis with Reflex to Culture; Future  -     TSH; Future  -     Magnesium; Future  -     Phosphorus; Future  -     Vitamin B12 & Folate; Future  -advised home blood glucose testing  daily  -daily feet exam, Foot care: advised to wash feet daily, pat dry and apply lotion at night, avoiding between toes. Need to look at feet daily and report to a physician any signs of inflammation or skin damage  -annual dilated eye exam  -Low carb, low fat diet, increase fruits and vegetables, and exercise 4-5 times a day 30-40 minutes a day discussed  -continue current treatment glimepiride 1 Mg twice a day  Continue aspirin 81 Mg daily  Continue Zetia 10 Mg daily, Lipitor 80 Mg daily  2. Benign hypertension with CKD (chronic kidney disease) stage III (HCC)  Stable chronic kidney disease stage IIIa  Well controlled HTN.  Continue current treatment.  Amlodipine 2.5 Mg daily, furosemide 40 Mg daily, metoprolol 25 Mg twice a day, potassium 20 mEq daily  Will recheck labs.   Discussed low salt diet and BP and pulse monitoring.      -     CBC with Auto Differential; Future  -     Comprehensive Metabolic Panel; Future  -     Uric Acid;

## 2024-05-09 NOTE — RESULT ENCOUNTER NOTE
Addressed during office visit today, hemoglobin A1c 6.9, worse than before but well-controlled, continue treatment recommended during the office visit.

## 2024-05-09 NOTE — PATIENT INSTRUCTIONS
Lab Results   Component Value Date    WBC 8.6 05/08/2024    HGB 13.8 05/08/2024    HCT 41.9 05/08/2024    MCV 92.3 05/08/2024     05/08/2024       Lab Results   Component Value Date/Time     05/08/2024 02:30 PM    K 4.7 05/08/2024 02:30 PM     05/08/2024 02:30 PM    CO2 29 05/08/2024 02:30 PM    BUN 24 05/08/2024 02:30 PM    CREATININE 1.0 05/08/2024 02:30 PM    GLUCOSE 144 05/08/2024 02:30 PM    GLUCOSE 170 02/14/2020 01:15 PM    CALCIUM 10.1 05/08/2024 02:30 PM        Lab Results   Component Value Date    ALT 22 05/08/2024    AST 23 05/08/2024    ALKPHOS 53 05/08/2024    BILITOT 1.3 (H) 05/08/2024       Lab Results   Component Value Date    TSH 2.94 05/08/2024       Lab Results   Component Value Date    CHOL 123 08/25/2023    CHOL 114 04/18/2022    CHOL 96 03/17/2021     Lab Results   Component Value Date    TRIG 180 (H) 08/25/2023    TRIG 171 (H) 04/18/2022    TRIG 154 (H) 03/17/2021     Lab Results   Component Value Date    HDL 38 (L) 08/25/2023    HDL 35 (L) 04/18/2022    HDL 34 (L) 03/17/2021     No components found for: \"LDLCALC\", \"LDLCHOLESTEROL\"    Lab Results   Component Value Date    CHOLHDLRATIO 3.2 08/25/2023    CHOLHDLRATIO 3.3 04/18/2022    CHOLHDLRATIO 2.8 03/17/2021       Lab Results   Component Value Date    LABA1C 6.3 11/30/2023       Lab Results   Component Value Date    QRBLUHPZ66 1839 (H) 01/09/2024       Lab Results   Component Value Date    FOLATE 10.3 01/09/2024       Lab Results   Component Value Date    IRON 106 04/04/2024    TIBC 293 04/04/2024    FERRITIN 97 04/04/2024       Lab Results   Component Value Date    VITD25 25.7 (L) 05/08/2024

## 2024-05-09 NOTE — RESULT ENCOUNTER NOTE
Please notify patient: If she misses the appointment today, blood in the urine but improved from prior, urine culture will not be done, she does have appointment with urologist  Vitamin D very low will need high dosage vitamin D  Chronic kidney disease stage IIIa, mild, to avoid dehydration and ibuprofen, naproxen, Aleve, Motrin.  Only acetaminophen or Tylenol if needed for pain  Blood glucose well-controlled 144    Otherwise labs within normal limits  continue current treatment    Future Appointments  5/9/2024   11:30 AM   Joann Soriano MD    [facility]  8/1/2024   10:10 AM   Ortiz Hoffmann MD          [facility]           [facility]  10/2/2024  1:30 PM    Abdirahman Garza MD         [facility]        [facility]  1/10/2025  1:30 PM    Joann Soriano MD    [facility]

## 2024-05-10 PROBLEM — M51.36 DDD (DEGENERATIVE DISC DISEASE), LUMBAR: Status: ACTIVE | Noted: 2024-05-10

## 2024-05-10 PROBLEM — M51.369 DDD (DEGENERATIVE DISC DISEASE), LUMBAR: Status: ACTIVE | Noted: 2024-05-10

## 2024-05-10 PROBLEM — M50.30 DDD (DEGENERATIVE DISC DISEASE), CERVICAL: Status: ACTIVE | Noted: 2024-05-10

## 2024-05-10 PROBLEM — M51.34 DDD (DEGENERATIVE DISC DISEASE), THORACIC: Status: ACTIVE | Noted: 2024-05-10

## 2024-05-10 NOTE — RESULT ENCOUNTER NOTE
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           [facility]  9/10/2024  11:00 AM   Joann Soriano MD    [facility]  10/2/2024  1:30 PM    Abdirahman Garza MD         [facility]        [facility]  1/10/2025  1:30 PM    Joann Soriano MD    [facility]

## 2024-05-10 NOTE — RESULT ENCOUNTER NOTE
Please notify patien x-ray thoracic spine also degenerative changes, start physical therapy   If pain persists after physical therapy then we can refer to pain management  Future Appointments  5/23/2024  1:00 PM    Eliezer Freeman, PT           [facility]         [facility]  8/1/2024   10:10 AM   Ortiz Hoffmann MD          [facility]           [facility]  9/10/2024  11:00 AM   [facility]  10/2/2024  1:30 PM    Abdirahman Garza MD         [facility]        [facility]  1/10/2025  1:30 PM    [facility]

## 2024-05-10 NOTE — RESULT ENCOUNTER NOTE
Please notify patient: Moderate to severe degenerative changes in the lumbar spine as expected for her age, start physical therapy   There are no fractures  If pain persists after physical therapy then we can refer her to pain management for injections  Future Appointments  5/23/2024  1:00 PM    Eliezer Freeman, PT           [facility]         [facility]  8/1/2024   10:10 AM   Ortiz Hoffmann MD          [facility]           [facility]  9/10/2024  11:00 AM   [facility]  10/2/2024  1:30 PM    Abdirahman Garza MD         [facility]        [facility]  1/10/2025  1:30 PM    Joann Soriano MD    [facility]

## 2024-05-13 PROBLEM — M54.6 CHRONIC MIDLINE THORACIC BACK PAIN: Status: ACTIVE | Noted: 2024-05-13

## 2024-05-13 PROBLEM — M54.50 CHRONIC MIDLINE LOW BACK PAIN WITHOUT SCIATICA: Status: ACTIVE | Noted: 2024-05-13

## 2024-05-13 PROBLEM — G89.29 CHRONIC MIDLINE LOW BACK PAIN WITHOUT SCIATICA: Status: ACTIVE | Noted: 2024-05-13

## 2024-05-13 PROBLEM — M25.512 CHRONIC PAIN OF BOTH SHOULDERS: Status: ACTIVE | Noted: 2024-05-13

## 2024-05-13 PROBLEM — G89.29 CHRONIC MIDLINE THORACIC BACK PAIN: Status: ACTIVE | Noted: 2024-05-13

## 2024-05-13 PROBLEM — M25.511 CHRONIC PAIN OF BOTH SHOULDERS: Status: ACTIVE | Noted: 2024-05-13

## 2024-05-13 PROBLEM — G89.29 CHRONIC PAIN OF BOTH SHOULDERS: Status: ACTIVE | Noted: 2024-05-13

## 2024-05-13 PROBLEM — E55.9 VITAMIN D DEFICIENCY: Status: ACTIVE | Noted: 2024-05-13

## 2024-05-23 ENCOUNTER — HOSPITAL ENCOUNTER (OUTPATIENT)
Dept: PHYSICAL THERAPY | Age: 89
Setting detail: THERAPIES SERIES
Discharge: HOME | End: 2024-05-23
Payer: MEDICARE

## 2024-05-23 PROCEDURE — 97162 PT EVAL MOD COMPLEX 30 MIN: CPT

## 2024-05-23 PROCEDURE — 97110 THERAPEUTIC EXERCISES: CPT

## 2024-05-23 NOTE — CONSULTS
[facility] Fall Risk Assessment    Patient Name:  Laura House  : 12/10/1933    Risk Factor Scale  Score   History of Falls [] Yes  [x] No 25  0 0   Secondary Diagnosis [] Yes  [x] No 15  0 0   Ambulatory Aid [] Furniture  [] Crutches/cane/walker  [x] None/bedrest/wheelchair/nurse 30  15  0 0   IV/Heparin Lock [] Yes  [x] No 20  0 0   Gait/Transferring [] Impaired  [] Weak  [x] Normal/bedrest/immobile 20  10  0 0   Mental Status [] Forgets limitations  [x] Oriented to own ability 15  0 0      Total:  0     Based on the Assessment score: check the appropriate box.    [x]  No intervention needed   Low =   Score of 0-24    []  Use standard prevention interventions Moderate =  Score of 24-44   [] Give patient handout and discuss fall prevention strategies   [] Establish goal of education for patient/family RE: fall prevention strategies    []  Use high risk prevention interventions High = Score of 45 and higher   [] Give patient handout and discuss fall prevention strategies   [] Establish goal of education for patient/family Re: fall prevention strategies   [] Discuss lifeline / other resources    Electronically signed by:   Eliezer Freeman PT  Date: 2024      
 This should help reduce her pain and improve her balance making her safer with mobility.    Problem list, as detailed above:   [x] ? Pain neck and back    [x] ? ROM neck, back, shoulders, hips    [x] ? Strength All 4 extremities   [x] ? Function: Walk, drive, shop  [x] ? Balance  [] Edema  [] Postural Deviations  [] Gait Deviations  [] Other    STG: (to be met in 10 treatments)  ? Pain:  Neck and Back to 5/10 at worst to facilitate ADLs and walking  ? ROM: Neck and trunk to at least 75% of normal to facilitate walking and housework with less pain and loss of balance episodes  ? Strength: bilateral hips to 3+/5 so pt can walk to grocery shop without pain with a cart  ? Function: Pt to perform all ADLs without loss of balance  Patient to be independent with home exercise program as demonstrated by performance with correct form without cues.  Demonstrate Knowledge of fall prevention 5/23/24 Goal Met  LTG: (to be met in 24 treatments)  Improve Tinetti score to 24/28 to prevent falls and help pt walk without loss of balance  Improve TUG Test time to 10 seconds to prevent falls  Improve strength all 4 extremities to wfl so pt can continue to live independently and have no loss of balance episodes while shopping and cleaning house    Patient goals:Learn exercises to correct posture    Rehab Potential:  [x] Good  [] Fair  [] Poor   Suggested Professional Referral:  [x] No  [] Yes:  Barriers to Goal Achievement:  [x] No  [] Yes:  Domestic Concerns:  [x] No  [] Yes:    Pt. Education:  [x] Plans/Goals, Risks/Benefits discussed  [x] Home exercise program  Method of Education: [x] Verbal  [x] Demo  [x] Written  Comprehension of Education:  [x] Verbalizes understanding.  [] Demonstrates understanding.  [x] Needs Review.  [] Demonstrates/verbalizes understanding of HEP/Ed previously given.    Access Code: [email or web address]  URL: [email or web address]  Date: 05/23/2024  Prepared by: Eliezer Freeman    Exercises  - Supine Bridge  - 2 x

## 2024-05-29 ENCOUNTER — OFFICE VISIT (OUTPATIENT)
Dept: UROLOGY | Age: 89
End: 2024-05-29
Payer: MEDICARE

## 2024-05-29 ENCOUNTER — HOSPITAL ENCOUNTER (OUTPATIENT)
Age: 89
Setting detail: SPECIMEN
Discharge: HOME | End: 2024-05-29

## 2024-05-29 VITALS
HEART RATE: 62 BPM | OXYGEN SATURATION: 98 % | TEMPERATURE: 97.5 F | RESPIRATION RATE: 16 BRPM | HEIGHT: 69 IN | BODY MASS INDEX: 28.17 KG/M2 | SYSTOLIC BLOOD PRESSURE: 124 MMHG | WEIGHT: 190.2 LBS | DIASTOLIC BLOOD PRESSURE: 64 MMHG

## 2024-05-29 DIAGNOSIS — R10.9 RIGHT FLANK PAIN: ICD-10-CM

## 2024-05-29 DIAGNOSIS — N20.0 KIDNEY STONE: Primary | ICD-10-CM

## 2024-05-29 PROCEDURE — G8417 CALC BMI ABV UP PARAM F/U: HCPCS | Performed by: NURSE PRACTITIONER

## 2024-05-29 PROCEDURE — 1090F PRES/ABSN URINE INCON ASSESS: CPT | Performed by: NURSE PRACTITIONER

## 2024-05-29 PROCEDURE — 1123F ACP DISCUSS/DSCN MKR DOCD: CPT | Performed by: NURSE PRACTITIONER

## 2024-05-29 PROCEDURE — G8427 DOCREV CUR MEDS BY ELIG CLIN: HCPCS | Performed by: NURSE PRACTITIONER

## 2024-05-29 PROCEDURE — 99213 OFFICE O/P EST LOW 20 MIN: CPT | Performed by: NURSE PRACTITIONER

## 2024-05-29 PROCEDURE — 1036F TOBACCO NON-USER: CPT | Performed by: NURSE PRACTITIONER

## 2024-05-29 ASSESSMENT — ENCOUNTER SYMPTOMS
SHORTNESS OF BREATH: 0
CONSTIPATION: 0
COUGH: 0
BACK PAIN: 0
WHEEZING: 0
ABDOMINAL PAIN: 0
VOMITING: 0
NAUSEA: 0
EYE PAIN: 0
EYE REDNESS: 0

## 2024-05-29 NOTE — PROGRESS NOTES
Review of Systems   Constitutional:  Negative for chills, fatigue and fever.   Eyes:  Negative for pain, redness and visual disturbance.   Respiratory:  Negative for cough, shortness of breath and wheezing.    Cardiovascular:  Negative for chest pain and leg swelling.   Gastrointestinal:  Negative for abdominal pain, constipation, nausea and vomiting.   Genitourinary:  Negative for difficulty urinating, dysuria, flank pain, frequency, hematuria and urgency.   Musculoskeletal:  Negative for back pain, joint swelling and myalgias.   Skin:  Negative for rash and wound.   Neurological:  Negative for dizziness, tremors and numbness.   Hematological:  Does not bruise/bleed easily.     
counseling offered)     Social History     Substance and Sexual Activity   Alcohol Use No       REVIEW OF SYSTEMS:  Review of Systems      Physical Exam:      Vitals:    05/29/24 1026   BP: 124/64   Pulse: 62   Resp: 16   Temp: 97.5 °F (36.4 °C)   SpO2: 98%     Body mass index is 28.07 kg/m².  Patient is a 89 y.o. female in noacute distress and alert and oriented to person, place and time.  Physical Exam  Constitutional: Patient in no acute distress.  Neuro: Alert andoriented to person, place and time.  Psych: Mood normal, affect normal  Abdomen: Soft, non-tender, non-distended with no CVA,  No flank tenderness  Bladder non-tender and not distended.  Musculoskeletal: Normal gait and station      and Plan      1. Kidney stone    2. Right flank pain           Plan:   Assessment & Plan   She passed a large stone.  Will send for stone analysis.  She continues to have right flank pain intermittently.  If this continues over the 2 weeks, she was told to get the renal ultrasound done.  I did place the order for that.   I can call her with the results.  General recommendations for stone prevention reviewed.  She will follow up with us in August as previously scheduled.    Return in about 3 months (around 8/29/2024).    Prescriptions Ordered:  No orders of the defined types were placed in this encounter.     Orders Placed:  Orders Placed This Encounter   Procedures    Stone Analysis     Standing Status:   Future     Standing Expiration Date:   5/29/2025     RENAL LIMITED     This procedure can be scheduled via [facility].      Standing Status:   Future     Standing Expiration Date:   5/24/2025     Order Specific Question:   Reason for exam:     Answer:   kidney stone, right flank pain.            LISSY Sanchez CNP    Reviewed and agree with the ROS entered by the MA.

## 2024-05-30 DIAGNOSIS — N20.0 KIDNEY STONE: ICD-10-CM

## 2024-06-02 LAB
STONE COMPOSITION: NORMAL
STONE DESCRIPTION: NORMAL
STONE MASS: 284 MG

## 2024-06-03 NOTE — FLOWSHEET NOTE
Stand Eyes closed                   Other:  Other:  Specific Instructions for next treatment: Review response to previous visit and HEP; Advance exercises from list above to improve, posture, flexibility, strength, and balance; Do not add all exercises at one visit       Patient Education/Home Program :   5/23: Access Code: [email or web address]  Exercises  - Supine Bridge  - 2 x daily - 7 x weekly - 1 sets - 10 reps  - Supine Lower Trunk Rotation  - 2 x daily - 7 x weekly - 1 sets - 10 reps  - Supine Posterior Pelvic Tilt  - 2 x daily - 7 x weekly - 1 sets - 10 reps  - Supine Shoulder Flexion Extension AAROM with Dowel  - 2 x daily - 7 x weekly - 1 sets - 10 reps  - Seated Cervical Retraction  - 2 x daily - 7 x weekly - 1 sets - 10 reps  - Seated Shoulder Shrugs  - 2 x daily - 7 x weekly - 1 sets - 10 reps  - Seated Scapular Retraction  - 2 x daily - 7 x weekly - 1 sets - 10 reps     Pt. Education:  [x] Yes  [] No  [x] Reviewed Prior HEP/Ed  Method of Education: [x] Verbal  [] Demo  [] Written  Comprehension of Education:  [] Verbalizes understanding.  [] Demonstrates understanding.  [] Needs review.  [x] Demonstrates/verbalizes HEP/Ed previously given.       Assessment: [] Progressing toward goals.    [] No change.     [x] Other: First session after evaluation. Initiated session with review of HEP and mat level stretching to reduce pain and muscle tension. Patient more limited on R side with all stretches. Able to complete some seated and standing exercises with emphasis on posture and technique. Frequent cues required for upright posture, core engagement, and reducing compensation with both seated and standing exercises.     [x] Patient would continue to benefit from skilled physical therapy services in order to: improve neck and upper flexibility and strength to help correct her posture. This should help reduce her pain and improve her balance making her safer with mobility.     GOALS:   STG: (to be met in 10

## 2024-06-04 ENCOUNTER — HOSPITAL ENCOUNTER (OUTPATIENT)
Dept: PHYSICAL THERAPY | Age: 89
Setting detail: THERAPIES SERIES
Discharge: HOME | End: 2024-06-04
Payer: MEDICARE

## 2024-06-04 PROCEDURE — 97110 THERAPEUTIC EXERCISES: CPT

## 2024-06-06 ENCOUNTER — HOSPITAL ENCOUNTER (OUTPATIENT)
Dept: PHYSICAL THERAPY | Age: 89
Setting detail: THERAPIES SERIES
Discharge: HOME | End: 2024-06-06
Payer: MEDICARE

## 2024-06-06 PROCEDURE — 97110 THERAPEUTIC EXERCISES: CPT

## 2024-06-10 DIAGNOSIS — M1A.4790 OTHER SECONDARY CHRONIC GOUT OF FOOT WITHOUT TOPHUS, UNSPECIFIED LATERALITY: ICD-10-CM

## 2024-06-10 RX ORDER — ALLOPURINOL 100 MG/1
TABLET ORAL
Qty: 180 TABLET | Refills: 3 | Status: SHIPPED | OUTPATIENT
Start: 2024-06-10

## 2024-06-10 NOTE — TELEPHONE ENCOUNTER
Please Approve or Refuse.  Send to Pharmacy per Pt's Request:      Next Visit Date:  9/10/2024   Last Visit Date: 5/9/2024    Hemoglobin A1C (%)   Date Value   05/09/2024 6.9   11/30/2023 6.3   08/29/2023 7.1             ( goal A1C is < 7)   BP Readings from Last 3 Encounters:   05/29/24 124/64   05/09/24 132/78   04/17/24 (!) 180/80          (goal 120/80)  BUN   Date Value Ref Range Status   05/08/2024 24 (H) 8 - 23 mg/dL Final     Creatinine   Date Value Ref Range Status   05/08/2024 1.0 (H) 0.5 - 0.9 mg/dL Final     Potassium   Date Value Ref Range Status   05/08/2024 4.7 3.7 - 5.3 mmol/L Final

## 2024-06-11 ENCOUNTER — HOSPITAL ENCOUNTER (OUTPATIENT)
Dept: PHYSICAL THERAPY | Age: 89
Setting detail: THERAPIES SERIES
Discharge: HOME | End: 2024-06-11
Payer: MEDICARE

## 2024-06-11 PROCEDURE — 97110 THERAPEUTIC EXERCISES: CPT

## 2024-06-11 NOTE — FLOWSHEET NOTE
Modalities     [x]  Ther Exercise 45 3   []  Manual Therapy     []  Ther Activities     []  Aquatics     []  Neuromuscular     [] Vasocompression     [] Gait Training     [] Dry needling        [] 1 or 2 muscles        [] 3 or more muscles     []  Other     Total Billable timed codes 45 3     Time In:1:40  PM          Time Out:2:30  PM    Electronically signed by:  Eliezer Freeman PT

## 2024-06-12 NOTE — FLOWSHEET NOTE
Other:  Other:  Specific Instructions for next treatment: Review response to previous visit and HEP; Advance exercises from list above to improve, posture, flexibility, strength, and balance; Do not add all exercises at one visit       Patient Education/Home Program :   5/23: Access Code: [email or web address]  Exercises  - Supine Bridge  - 2 x daily - 7 x weekly - 1 sets - 10 reps  - Supine Lower Trunk Rotation  - 2 x daily - 7 x weekly - 1 sets - 10 reps  - Supine Posterior Pelvic Tilt  - 2 x daily - 7 x weekly - 1 sets - 10 reps  - Supine Shoulder Flexion Extension AAROM with Dowel  - 2 x daily - 7 x weekly - 1 sets - 10 reps  - Seated Cervical Retraction  - 2 x daily - 7 x weekly - 1 sets - 10 reps  - Seated Shoulder Shrugs  - 2 x daily - 7 x weekly - 1 sets - 10 reps  - Seated Scapular Retraction  - 2 x daily - 7 x weekly - 1 sets - 10 reps     Pt. Education:  [x] Yes  [] No  [x] Reviewed Prior HEP/Ed  Method of Education: [x] Verbal  [] Demo  [] Written  Comprehension of Education:  [] Verbalizes understanding.  [] Demonstrates understanding.  [] Needs review.  [x] Demonstrates/verbalizes HEP/Ed previously given.       Assessment: [] Progressing toward goals.    [] No change.     [x] Other:  Continued with improving postural awareness and strengthening this date.  Began with seated stretches this date with added foam roll to improve thoracic mobility.  Foam roll was also added to scapular retraction to improve scapular activation.  Cues provided throughout the session to increase awareness to posture as she tends to perform thoracic extension when cued to \"sit up straight\".  Continued with mat level exercises with the patient very slow moving due to increased pain in the R knee and discomfort with bed mobility.  TC needed for proper PPT execution.  No increase in pain noted at the end of session.      [x] Patient would continue to benefit from skilled physical therapy services in order to: improve neck and  Go for blood tests as directed. Your doctor will do lab tests at regular visits to monitor the effects of this medicine. Please follow up with your doctor and keep your health care provider appointments.

## 2024-06-13 ENCOUNTER — HOSPITAL ENCOUNTER (OUTPATIENT)
Dept: PHYSICAL THERAPY | Age: 89
Setting detail: THERAPIES SERIES
Discharge: HOME | End: 2024-06-13
Payer: MEDICARE

## 2024-06-13 PROCEDURE — 97110 THERAPEUTIC EXERCISES: CPT

## 2024-06-13 NOTE — FLOWSHEET NOTE
[facility] Outpatient Physical Therapy   [address]   Phone: [phone]   Fax: [phone]    Physical Therapy Daily Treatment Note      Date:  2024  Patient Name:  Laura House    :  12/10/1933  MRN: [id number]  Physician: Joann Soriano MD                                      Insurance: Medicare;  for Life;  BMN, No Hard Max, No AUTH Required;  Track CAP  Medical Diagnosis:   M54.50, G89.29 (ICD-10-CM) - Chronic midline low back pain without sciatica   M54.6, G89.29 (ICD-10-CM) - Chronic midline thoracic back pain   M25.511, G89.29, M25.512 (ICD-10-CM) - Chronic pain of both shoulders   Rehab Codes: M25.611, M25.612  Onset Date: 2024                  Next 's appt: 9/10/24  Visit# / total visits:   Cancels/No Shows: 0/0    Precautions: Low fall risk     Subjective:  Patient reporting she has not done the quad stretch at home due to FOF.      Pain:  [x] Yes  [] No   Across the top of the back 5/10  R knee into R ankle 6/10  Pain altered Tx:  [x] No  [] Yes  Action:  Comments:    Objective:  Exercise Reps/ Time Weight/ Level Completed Comments   MAT TABLE          Pelvic Tilt 10x5''  x     LTR 10x  x     Bridge 5x2  x     Cane Shld Flex 10x2 3''  x     Supine serratus  10x2 With cane x  New    Lay on towel roll along spine to stretch chest          Modified Zachary 15\" x 3 Still sore x  attempted but not able to tolerate this date due to increased pain on both sides.   Side lying hip and quad stretch   ADD  Therapist assist- Pt not able to tolerate    Hip Add Isometrics  2\" x 10   x New    Angelo Ham stretch  10x 3\"  x     Angelo Piriformis stretch  2x15''  x      Side lying open books 10x        Single knee to chest 2x 30\"  x  added                 SEATED          Shrugs  10x ea  x     retraction 10x2  x Foam roll vertical   Chin tucks  10 x        Angelo Upper trap stretches          Thoracic ext stretch  10x3\"    foam roll   Thor

## 2024-06-17 ENCOUNTER — HOSPITAL ENCOUNTER (OUTPATIENT)
Dept: ULTRASOUND IMAGING | Age: 89
Discharge: HOME | End: 2024-06-19
Payer: MEDICARE

## 2024-06-17 DIAGNOSIS — N20.0 KIDNEY STONE: ICD-10-CM

## 2024-06-17 PROCEDURE — 76775 US EXAM ABDO BACK WALL LIM: CPT

## 2024-06-18 ENCOUNTER — HOSPITAL ENCOUNTER (OUTPATIENT)
Dept: PHYSICAL THERAPY | Age: 89
Setting detail: THERAPIES SERIES
End: 2024-06-18
Payer: MEDICARE

## 2024-06-19 ENCOUNTER — OFFICE VISIT (OUTPATIENT)
Dept: ORTHOPEDIC SURGERY | Age: 89
End: 2024-06-19
Payer: MEDICARE

## 2024-06-19 DIAGNOSIS — M25.562 PAIN IN BOTH KNEES, UNSPECIFIED CHRONICITY: Primary | ICD-10-CM

## 2024-06-19 DIAGNOSIS — M25.561 PAIN IN BOTH KNEES, UNSPECIFIED CHRONICITY: Primary | ICD-10-CM

## 2024-06-19 PROCEDURE — G8417 CALC BMI ABV UP PARAM F/U: HCPCS | Performed by: ORTHOPAEDIC SURGERY

## 2024-06-19 PROCEDURE — 99213 OFFICE O/P EST LOW 20 MIN: CPT | Performed by: ORTHOPAEDIC SURGERY

## 2024-06-19 PROCEDURE — G8428 CUR MEDS NOT DOCUMENT: HCPCS | Performed by: ORTHOPAEDIC SURGERY

## 2024-06-19 PROCEDURE — 1123F ACP DISCUSS/DSCN MKR DOCD: CPT | Performed by: ORTHOPAEDIC SURGERY

## 2024-06-19 PROCEDURE — 1090F PRES/ABSN URINE INCON ASSESS: CPT | Performed by: ORTHOPAEDIC SURGERY

## 2024-06-19 PROCEDURE — 1036F TOBACCO NON-USER: CPT | Performed by: ORTHOPAEDIC SURGERY

## 2024-06-19 NOTE — PROGRESS NOTES
[facility]                   Moy Boss M.D.            [address], [address]               [place], [place] [address]           Dept Phone: [phone]           Dept Fax:  [phone] [address]                       [address]           [place], [place] [address]          Dept Phone: [phone]           Dept Fax:  [phone]      Chief Compliant:  Chief Complaint   Patient presents with    Knee Pain     Rt knee pain        History of Present Illness:  This is a 89 y.o. female who presents to the clinic today for evaluation / follow up of right knee burning.  Patient is a very pleasant and active 89-year-old patient who is status post bilateral total knees.  The right one was done by me 25 years ago.  She denies any injury trauma or fall she denies any swelling of her knees she denies any heat or warmth or uncovered dental or other procedures.  She states this for the last 2 weeks she has noticed a little burning on the outside of her knee going down her leg a little bit..       Review of Systems   Constitutional: Negative for fever, chills, sweats.   Eyes: Negative for changes in vision, or pain.   HENT: Negative for ear ache, epistaxis, or sore throat.  Respiratory/Cardio: Negative for Chest pain, palpitations, SOB, or cough.  Gastrointestinal: Negative for abdominal pain, N/V/D.   Genitourinary: Negative for dysuria, frequency, urgency, or hematuria.   Neurological: Negative for headache, numbness, or weakness.   Integumentary: Negative for rash, itching, laceration, or abrasion.   Musculoskeletal: Positive for Knee Pain (Rt knee pain)       Physical Exam:  Constitutional: Patient is oriented to person, place, and time. Patient appears well-developed and well nourished.   HENT: Negative otherwise noted  Head: Normocephalic and Atraumatic  Nose: Normal  Eyes: Conjunctivae and EOM are normal  Neck: Normal range of motion Neck supple.

## 2024-06-20 ENCOUNTER — HOSPITAL ENCOUNTER (OUTPATIENT)
Dept: PHYSICAL THERAPY | Age: 89
Setting detail: THERAPIES SERIES
Discharge: HOME | End: 2024-06-20
Payer: MEDICARE

## 2024-06-20 PROCEDURE — 97110 THERAPEUTIC EXERCISES: CPT

## 2024-06-20 NOTE — FLOWSHEET NOTE
[facility] Outpatient Physical Therapy   [address] [place] [address]   Phone: [phone]   Fax: [phone]    Physical Therapy Daily Treatment Note      Date:  2024  Patient Name:  Laura House    :  12/10/1933  MRN: [id number]  Physician: Joann Soriano MD                                      Insurance: Medicare;  for Life;  BMN, No Hard Max, No AUTH Required;  Track CAP  Medical Diagnosis:   M54.50, G89.29 (ICD-10-CM) - Chronic midline low back pain without sciatica   M54.6, G89.29 (ICD-10-CM) - Chronic midline thoracic back pain   M25.511, G89.29, M25.512 (ICD-10-CM) - Chronic pain of both shoulders   Rehab Codes: M25.611, M25.612  Onset Date: 2024                  Next 's appt: 9/10/24  Visit# / total visits:   Cancels/No Shows: 0/0    Precautions: Low fall risk     Subjective:    Pt reports 4.5/10 in back and denies pain in knee, is more so \"uncomfortable\". Pt with no complaints from previous treatment and has been completing HEP.     Pain:  [x] Yes  [] No   Across the top of the back 5/10  Pain altered Tx:  [x] No  [] Yes  Action:  Comments:    Objective:  Exercise Reps/ Time Weight/ Level Completed Comments   MAT TABLE          Pelvic Tilt 10x5''  x     LTR 10x  x     Bridge 5x2  x  PPT to reduce discomfort in lower back   Cane Shld Flex 10x2 3''  x     Supine serratus  10x2 With cane     Lay on towel roll along spine to stretch chest          Modified Zachary 15\" x 3 Still sore   attempted but not able to tolerate this date due to increased pain on both sides.   Side lying hip and quad stretch     Therapist assist- Pt not able to tolerate    Hip Add Isometrics  2\" x 10   x    Angelo Ham stretch  10x 3\"  x    Angelo Piriformis stretch  2x15''       Side lying open books 10x   x    Single knee to chest 2x 30\"  x                  SEATED          Shrugs  10x ea  x     retraction 10x2  x Foam roll vertical   Chin tucks  10 x   x     Angelo Upper trap

## 2024-06-25 ENCOUNTER — HOSPITAL ENCOUNTER (OUTPATIENT)
Dept: PHYSICAL THERAPY | Age: 89
Setting detail: THERAPIES SERIES
Discharge: HOME | End: 2024-06-25
Payer: MEDICARE

## 2024-06-25 PROCEDURE — 97110 THERAPEUTIC EXERCISES: CPT

## 2024-06-25 NOTE — FLOWSHEET NOTE
[facility] Outpatient Physical Therapy   [address]   Phone: [phone]   Fax: [phone]    Physical Therapy Daily Treatment Note    Date:  2024  Patient Name:  Laura House    :  12/10/1933  MRN: [id number]  Physician: Joann Soriano MD                                      Insurance: Medicare;  for Life;  BMN, No Hard Max, No AUTH Required;  Track CAP  Medical Diagnosis:   M54.50, G89.29 (ICD-10-CM) - Chronic midline low back pain without sciatica   M54.6, G89.29 (ICD-10-CM) - Chronic midline thoracic back pain   M25.511, G89.29, M25.512 (ICD-10-CM) - Chronic pain of both shoulders   Rehab Codes: M25.611, M25.612  Onset Date: 2024                  Next 's appt: 9/10/24  Visit# / total visits:   Cancels/No Shows: 0/0    Precautions: Low fall risk     Subjective:    Pt reports no active pain in back or shoulders. Pt reports moderate soreness/ achiness in upper back due to HEP completeion.     Pain:  [] Yes  [x] No   Pain altered Tx:  [x] No  [] Yes  Action:  Comments:    Objective:  Exercise Reps/ Time Weight/ Level Completed Comments   MAT TABLE          Pelvic Tilt 10x5''  x     LTR 10x  x     Bridge 5x2  x  PPT to reduce discomfort in lower back   Cane Shld Flex 10x2 3''  x     Supine serratus  10x2 With cane     Lay on towel roll along spine to stretch chest          Modified Zachary 15\" x 3 Still sore   attempted but not able to tolerate this date due to increased pain on both sides.   Side lying hip and quad stretch     Therapist assist- Pt not able to tolerate    Hip Add Isometrics  2\" x 10       Angelo Ham stretch  10x 3\"  x    Angelo Piriformis stretch  2x15''       Side lying open books 10x       Single knee to chest 2x 30\"  x                  SEATED          Shrugs  10x ea  x     retraction 10x2  x Foam roll vertical   Chin tucks  10 x        Angelo Upper trap stretches  10x 3\" hold   x     Thoracic ext stretch  10x3\"    foam roll

## 2024-06-27 ENCOUNTER — HOSPITAL ENCOUNTER (OUTPATIENT)
Dept: PHYSICAL THERAPY | Age: 89
Setting detail: THERAPIES SERIES
Discharge: HOME | End: 2024-06-27
Payer: MEDICARE

## 2024-06-27 PROCEDURE — 97110 THERAPEUTIC EXERCISES: CPT

## 2024-06-27 NOTE — FLOWSHEET NOTE
[facility] Outpatient Physical Therapy   [address]   Phone: [phone]   Fax: [phone]    Physical Therapy Daily Treatment Note    Date:  2024  Patient Name:  Laura House    :  12/10/1933  MRN: [id number]  Physician: Joann Soriano MD                                      Insurance: Medicare;  for Life;  BMN, No Hard Max, No AUTH Required;  Track CAP  Medical Diagnosis:   M54.50, G89.29 (ICD-10-CM) - Chronic midline low back pain without sciatica   M54.6, G89.29 (ICD-10-CM) - Chronic midline thoracic back pain   M25.511, G89.29, M25.512 (ICD-10-CM) - Chronic pain of both shoulders   Rehab Codes: M25.611, M25.612  Onset Date: 2024                  Next 's appt: 9/10/24  Visit# / total visits:   Cancels/No Shows: 0/0    Precautions: Low fall risk     Subjective:    Pt reports denies pain in her back and shoulders as she presents for PT.  She reports she still gets soreness in her shoulders with use but pain is better overall.   She notes her right knee has been very sore.  She saw Dr Boss recently and he said the knee replacement looks fine.  She reports her right leg is more swollen than normal.    Pain:  [x] Yes  [] No Shoulder and back pain 0/10;  Right knee 7/10  Pain altered Tx:  [x] No  [] Yes  Action:  Comments:    Objective:  Exercise Reps/ Time Weight/ Level Completed Comments   MAT TABLE          Pelvic Tilt 10x5''  x     LTR 15x  x  Increased reps 24   Bridge 15  x  Increased reps  24   Cane Shld Flex 10x2 3''  x     Supine serratus  10x2 With cane x    Lay on towel roll along spine to stretch chest          Modified Zachary 15\" x 3 Still sore x  attempted but not able to tolerate this date due to increased pain on both sides.   Side lying hip and quad stretch     Therapist assist- Pt not able to tolerate    Hip Add Isometrics  2\" x 10       Angelo Ham stretch  10x 3\"  x    Angelo Piriformis stretch  2x15''  x     Side

## 2024-07-02 ENCOUNTER — HOSPITAL ENCOUNTER (OUTPATIENT)
Dept: PHYSICAL THERAPY | Age: 89
Setting detail: THERAPIES SERIES
Discharge: HOME | End: 2024-07-02
Payer: MEDICARE

## 2024-07-02 PROCEDURE — 97110 THERAPEUTIC EXERCISES: CPT

## 2024-07-02 NOTE — FLOWSHEET NOTE
hold  - Seated Thoracic Flexion and Rotation with Arms Crossed  - 2 x daily - 7 x weekly - 1 sets - 10 reps  - Seated Thoracic Lumbar Extension with Pectoralis Stretch  - 2 x daily - 7 x weekly - 1 sets - 10 reps    Access Code: [email or web address]  URL: [email or web address]  Date: 06/27/2024  Prepared by: Eliezer Freeman    Exercises  - Sidelying Hip Abduction  - 2 x daily - 7 x weekly - 1 sets - 10 reps  - Sidelying Shoulder External Rotation  - 2 x daily - 7 x weekly - 1 sets - 20 reps  - Seated Upper Trapezius Stretch  - 2 x daily - 7 x weekly - 1 sets - 5 reps - 10 seconds hold    Assessment: [x] Progressing toward goals. Treatment began with lumbar mobility exercises to reduce muscular tension, pt continues to demo tight hip flexors and experienced moderate difficultly/discomfort with end range stretching. Continued with UE exercises, pt reported increased discomfort during end range shoulder flexion, pt encouraged to work within a reduced range following. Pt with no questions or concerns following treatment.     [] No change.       [] Other:      [x] Patient would continue to benefit from skilled physical therapy services in order to: improve neck and upper flexibility and strength to help correct her posture. This should help reduce her pain and improve her balance making her safer with mobility.     GOALS:   STG: (to be met in 10 treatments)  ? Pain:  Neck and Back to 5/10 at worst to facilitate ADLs and walking  ? ROM: Neck and trunk to at least 75% of normal to facilitate walking and housework with less pain and loss of balance episodes  ? Strength: bilateral hips to 3+/5 so pt can walk to grocery shop without pain with a cart  ? Function: Pt to perform all ADLs without loss of balance  Patient to be independent with home exercise program as demonstrated by performance with correct form without cues.  Demonstrate Knowledge of fall prevention 5/23/24 Goal Met  LTG: (to be met in 24 treatments)  Improve Tinetti

## 2024-07-05 ENCOUNTER — TELEPHONE (OUTPATIENT)
Dept: FAMILY MEDICINE CLINIC | Age: 89
End: 2024-07-05

## 2024-07-05 ENCOUNTER — OFFICE VISIT (OUTPATIENT)
Dept: FAMILY MEDICINE CLINIC | Age: 89
End: 2024-07-05

## 2024-07-05 VITALS
SYSTOLIC BLOOD PRESSURE: 127 MMHG | HEIGHT: 69 IN | WEIGHT: 189.4 LBS | BODY MASS INDEX: 28.05 KG/M2 | HEART RATE: 59 BPM | TEMPERATURE: 97.9 F | DIASTOLIC BLOOD PRESSURE: 55 MMHG | OXYGEN SATURATION: 99 %

## 2024-07-05 DIAGNOSIS — Z86.718 HISTORY OF DVT OF LOWER EXTREMITY: ICD-10-CM

## 2024-07-05 DIAGNOSIS — L03.119 CELLULITIS OF LOWER EXTREMITY, UNSPECIFIED LATERALITY: Primary | ICD-10-CM

## 2024-07-05 DIAGNOSIS — I83.893 VARICOSE VEINS OF LEG WITH SWELLING, BILATERAL: ICD-10-CM

## 2024-07-05 RX ORDER — CEPHALEXIN 500 MG/1
500 CAPSULE ORAL 4 TIMES DAILY
Qty: 40 CAPSULE | Refills: 0 | Status: SHIPPED | OUTPATIENT
Start: 2024-07-05 | End: 2024-07-15

## 2024-07-05 ASSESSMENT — ENCOUNTER SYMPTOMS
RESPIRATORY NEGATIVE: 1
VOMITING: 0
CHANGE IN BOWEL HABIT: 0
ABDOMINAL PAIN: 0
COUGH: 0

## 2024-07-05 NOTE — PROGRESS NOTES
months ago  Hx chronic angelo LE edema - has not been wearing compression stockings due to heat outside  Encourage pt to wear her stockings, on AM, off HS    Well's score = 2 - moderate probability DVT  Due to moderate probability dvt will order angelo LE doppler studies - call with results  Xarelto rx take while await doppler studies  Angelo LE cellulitis on exam - keflex rx  Elevate angelo LE above heart level  If develop chest pain, sob, heart palpations or new or worrisome sx then call 911 or got to ER  Reviewed over-the-counter treatments for symptom management.  Return worse  Close f/u PCP  Pt verbalized agreement and understanding of POC       Return for make appt with Family Doc in 3-4 days for recheck.    Orders Placed This Encounter   Medications    cephALEXin (KEFLEX) 500 MG capsule     Sig: Take 1 capsule by mouth 4 times daily for 10 days     Dispense:  40 capsule     Refill:  0    rivaroxaban 15 & 20 MG Starter Pack     Sig: Take as directed on package.     Dispense:  1 each     Refill:  0         Patient given educational materials - see patient instructions.  Discussed use, benefit, and side effects of prescribed medications.  All patient questions answered.  Pt voicedunderstanding.    Electronically signed by LISSY Lopez CNP on 7/5/2024 at 7:28 PM

## 2024-07-05 NOTE — TELEPHONE ENCOUNTER
Noted. Thank you!  I called the patient at 5:15 PM and advised her to go to the urgent care, I gave her the address she confirmed that she knows where it is and she will go for an evaluation, rule out blood clots or cellulitis    Urgent care   [facility] walk-in care   [address], [place], [place], [address]     Phone #[phone] , on the side of [address], across the empty parking lot.  Hours:  Monday through Friday 8 AM to 8 PM  Saturday and Sunday 10 AM to 4 PM.      Future Appointments   Date Time Provider Department Center   7/9/2024  1:00 PM Eliezer Freeman, PT [facility]   7/11/2024  3:15 PM Susan Santos, PTA [facility]   8/1/2024 10:10 AM Ortiz Hoffmann MD [facility]   9/10/2024 11:00 AM Joann Soriano [place] [facility]   10/2/2024  1:30 PM Abdirahman Garza MD [facility]   1/10/2025  1:30 PM Joann Soriano MD [facility]

## 2024-07-05 NOTE — TELEPHONE ENCOUNTER
Patient called in stating she has swelling in both legs but right leg is really swollen, very painful.  States it hurts right below the knee, burns all the way down can barely walk.  Patient reports no chest pain, numbness or tingling   Has been elevating both legs and its not going down. Would like to know what she should do

## 2024-07-09 ENCOUNTER — HOSPITAL ENCOUNTER (OUTPATIENT)
Dept: VASCULAR LAB | Age: 89
Discharge: HOME | End: 2024-07-11
Payer: MEDICARE

## 2024-07-09 ENCOUNTER — HOSPITAL ENCOUNTER (OUTPATIENT)
Dept: PHYSICAL THERAPY | Age: 89
Setting detail: THERAPIES SERIES
Discharge: HOME | End: 2024-07-09
Payer: MEDICARE

## 2024-07-09 DIAGNOSIS — Z86.718 HISTORY OF DVT OF LOWER EXTREMITY: ICD-10-CM

## 2024-07-09 DIAGNOSIS — I83.893 VARICOSE VEINS OF LEG WITH SWELLING, BILATERAL: ICD-10-CM

## 2024-07-09 PROCEDURE — 93970 EXTREMITY STUDY: CPT | Performed by: SURGERY

## 2024-07-09 PROCEDURE — 97110 THERAPEUTIC EXERCISES: CPT

## 2024-07-09 PROCEDURE — 93970 EXTREMITY STUDY: CPT

## 2024-07-09 NOTE — PROGRESS NOTES
Thoracic Flexion and Rotation with Arms Crossed  - 2 x daily - 7 x weekly - 1 sets - 10 reps  - Seated Thoracic Lumbar Extension with Pectoralis Stretch  - 2 x daily - 7 x weekly - 1 sets - 10 reps    Access Code: [email or web address]  URL: [email or web address]  Date: 06/27/2024  Prepared by: Eliezer Freeman    Exercises  - Sidelying Hip Abduction  - 2 x daily - 7 x weekly - 1 sets - 10 reps  - Sidelying Shoulder External Rotation  - 2 x daily - 7 x weekly - 1 sets - 20 reps  - Seated Upper Trapezius Stretch  - 2 x daily - 7 x weekly - 1 sets - 5 reps - 10 seconds hold    Access Code: [email or web address]  URL: [email or web address]  Date: 07/09/2024  Prepared by: Eliezer Giest    Exercises  - Supine Hamstring Stretch  - 2 x daily - 7 x weekly - 1 sets - 4 reps - 15 seconds hold  - Supine Piriformis Stretch with Leg Straight  - 2 x daily - 7 x weekly - 1 sets - 4 reps - 15 seconds hold  - Supine Straight Leg Raises  - 2 x daily - 7 x weekly - 1 sets - 10 reps  - Seated Shoulder Horizontal Abduction with Resistance  - 2 x daily - 7 x weekly - 1 sets - 10 reps  - Shoulder External Rotation and Scapular Retraction with Resistance  - 2 x daily - 7 x weekly - 1 sets - 10 reps  Assessment: [x] Progressing toward goals. Pt has met 2 of 6 STGs and 0 of 3 LTGs.  She has met back and neck pain STG but reports right knee is more painful now than neck and back.  She notes she recently saw Ortho and was told her TKA in that knee looks good.  She demonstrated improved neck and trunk ROM and improved strength in all 4 extremities.  She has also improved balance as indicated by Tinetti and Tug Tests.  Pt is progressing well with PT and should continue to improve her flexibility, strength, and balance with skilled PT.  All of these improvements should result in reduced pain and improved safe mobility.  With final goal for pt to remain completely independent at [facility] and in community.    [] No change.       [] Other:      [x] Patient would continue

## 2024-07-11 ENCOUNTER — HOSPITAL ENCOUNTER (OUTPATIENT)
Dept: PHYSICAL THERAPY | Age: 89
Setting detail: THERAPIES SERIES
Discharge: HOME | End: 2024-07-11
Payer: MEDICARE

## 2024-07-11 PROCEDURE — 97110 THERAPEUTIC EXERCISES: CPT

## 2024-07-11 NOTE — PROGRESS NOTES
Isometric with Ball  - 2 x daily - 7 x weekly - 1 sets - 10 reps - 2 seconds hold  - Seated Thoracic Flexion and Rotation with Arms Crossed  - 2 x daily - 7 x weekly - 1 sets - 10 reps  - Seated Thoracic Lumbar Extension with Pectoralis Stretch  - 2 x daily - 7 x weekly - 1 sets - 10 reps    Access Code: [email or web address]  URL: [email or web address]  Date: 06/27/2024  Prepared by: Eliezer Giest    Exercises  - Sidelying Hip Abduction  - 2 x daily - 7 x weekly - 1 sets - 10 reps  - Sidelying Shoulder External Rotation  - 2 x daily - 7 x weekly - 1 sets - 20 reps  - Seated Upper Trapezius Stretch  - 2 x daily - 7 x weekly - 1 sets - 5 reps - 10 seconds hold    Access Code: [email or web address]  URL: [email or web address]  Date: 07/09/2024  Prepared by: Eliezer Giest    Exercises  - Supine Hamstring Stretch  - 2 x daily - 7 x weekly - 1 sets - 4 reps - 15 seconds hold  - Supine Piriformis Stretch with Leg Straight  - 2 x daily - 7 x weekly - 1 sets - 4 reps - 15 seconds hold  - Supine Straight Leg Raises  - 2 x daily - 7 x weekly - 1 sets - 10 reps  - Seated Shoulder Horizontal Abduction with Resistance  - 2 x daily - 7 x weekly - 1 sets - 10 reps  - Shoulder External Rotation and Scapular Retraction with Resistance  - 2 x daily - 7 x weekly - 1 sets - 10 reps    Assessment: [x] Progressing toward goals. Treatment began with UE therapeutic exercises to improve joint mobility and stability. Continued with UE Tband progressions, some fatigue noted but was able to complete full sets without pain. Continued with standing LE exercises to improve functional strength and muscular endurance, pt noted moderate fatigue following 4 standing exercises. Remaninig of treatment spent on mat level exercises due to fatigue. Pt required mod cueing for proper exercise technique and to work within pain free ranges. Pt with no questions or concerns following treatment.     [] No change.       [] Other:      [x] Patient would continue to benefit from

## 2024-07-16 ENCOUNTER — HOSPITAL ENCOUNTER (OUTPATIENT)
Dept: PHYSICAL THERAPY | Age: 89
Setting detail: THERAPIES SERIES
Discharge: HOME | End: 2024-07-16
Payer: MEDICARE

## 2024-07-16 PROCEDURE — 97110 THERAPEUTIC EXERCISES: CPT

## 2024-07-16 NOTE — FLOWSHEET NOTE
[facility] Outpatient Physical Therapy   [address]   Phone: [phone]   Fax: [phone]    Physical Therapy Daily Treatment Note    Date:  2024  Patient Name:  Laura House    :  12/10/1933  MRN: [id number]  Physician: Joann Soriano MD                                      Insurance: Medicare;  for Life;  BMN, No Hard Max, No AUTH Required;  Track CAP  Medical Diagnosis:   M54.50, G89.29 (ICD-10-CM) - Chronic midline low back pain without sciatica   M54.6, G89.29 (ICD-10-CM) - Chronic midline thoracic back pain   M25.511, G89.29, M25.512 (ICD-10-CM) - Chronic pain of both shoulders   Rehab Codes: M25.611, M25.612  Onset Date: 2024                  Next 's appt: 9/10/24  Visit# / total visits:   Cancels/No Shows: 0/0    Precautions: Low fall risk     Subjective:    Patient states she is very tired today, but has noticed her back pain is improving. States her R LE is bothering her, but has not done anything specific to increase this pain.     Pain:  [x] Yes  [] No  Right knee, 6/10     Back, 1/10  Pain altered Tx:  [x] No  [] Yes  Action:  Comments:    Objective:  Exercise Reps/ Time Weight/ Level Completed Comments   MAT TABLE          Pelvic Tilt 10x5''      LTR 10x5''  x    Bridge 15x  x 12 reps today   SLR 10x ea  x /16 added   Cane Shld Flex 10x2 3''       Supine serratus  10x2 With cane     Lay on towel roll along spine to stretch chest     x     Modified Zachary 15\" x 3   Increased discomfort at end range    Side lying hip and quad stretch    Increased discomfort at end range    Hip Add Isometrics  2\" x 10       Angelo Ham stretch  10x 3\"      Angelo Piriformis stretch  2x15''      Side lying open books 10x   x    Single knee to chest 2x 30\"      Side Lying Hip Abd 10 x each      Side Lying Shld ER 20 x each      Hooklying clamshells 10x2 Red x 7/16 added          SEATED          Shrugs  10x ea       Retraction 10x2  x Foam roll vertical

## 2024-07-18 ENCOUNTER — HOSPITAL ENCOUNTER (OUTPATIENT)
Dept: PHYSICAL THERAPY | Age: 89
Setting detail: THERAPIES SERIES
Discharge: HOME | End: 2024-07-18
Payer: MEDICARE

## 2024-07-18 NOTE — FLOWSHEET NOTE
[facility] Outpatient Physical Therapy   [address]   Phone: [phone]   Fax: [phone]    Physical Therapy Daily Treatment Note    Date:  2024  Patient Name:  Laura House    :  12/10/1933  MRN: [id number]  Physician: Joann Soriano MD                                      Insurance: Medicare;  for Life;  BMN, No Hard Max, No AUTH Required;  Track CAP  Medical Diagnosis:   M54.50, G89.29 (ICD-10-CM) - Chronic midline low back pain without sciatica   M54.6, G89.29 (ICD-10-CM) - Chronic midline thoracic back pain   M25.511, G89.29, M25.512 (ICD-10-CM) - Chronic pain of both shoulders   Rehab Codes: M25.611, M25.612  Onset Date: 2024                  Next 's appt: 9/10/24  Visit# / total visits:  (Count Corrected 24)  Cancels/No Shows: 0/0    Precautions: Low fall risk     Subjective:    Patient reports her right knee hurts more than anything else on her body.  She reports her knee feels better but still hurts.  Denies neck and back pain as she enters PT today    Pain:  [x] Yes  [] No  Right knee, 5/10     Back, 0/10  Pain altered Tx:  [x] No  [] Yes  Action:  Comments:    Objective:  Exercise Reps/ Time Weight/ Level Completed Comments   MAT TABLE          Pelvic Tilt 10x5''      LTR 10x5''  x    Bridge 15x  x    SLR 10x ea  x  added   Cane Shld Flex 10x2 3'' 2 # wand x     Supine serratus  10x2 With cane     Lay on towel roll along spine to stretch chest     x     Modified Zachary 15\" x 3   Increased discomfort at end range    Side lying hip and quad stretch 15\" x 4  x Increased discomfort at end range    Hip Add Isometrics  2\" x 10       Angelo Ham stretch  10x 3\"      Angelo Piriformis stretch  2x15''      Side lying open books 10x   x    Single knee to chest 2x 30\"      Side Lying Hip Abd 10 x each      Side Lying Shld ER 20 x each      Hooklying clamshells 10x2 Red x /16 added          SEATED          Shrugs  10x ea  x

## 2024-07-22 ENCOUNTER — HOSPITAL ENCOUNTER (OUTPATIENT)
Dept: PHYSICAL THERAPY | Age: 89
Setting detail: THERAPIES SERIES
Discharge: HOME | End: 2024-07-22
Payer: MEDICARE

## 2024-07-22 PROCEDURE — 97110 THERAPEUTIC EXERCISES: CPT

## 2024-07-22 NOTE — FLOWSHEET NOTE
[facility] Outpatient Physical Therapy   [address]   Phone: [phone]   Fax: [phone]    Physical Therapy Daily Treatment Note    Date:  2024  Patient Name:  Laura House    :  12/10/1933  MRN: [id number]  Physician: Joann Soriano MD                                      Insurance: Medicare;  for Life;  BMN, No Hard Max, No AUTH Required;  Track CAP  Medical Diagnosis:   M54.50, G89.29 (ICD-10-CM) - Chronic midline low back pain without sciatica   M54.6, G89.29 (ICD-10-CM) - Chronic midline thoracic back pain   M25.511, G89.29, M25.512 (ICD-10-CM) - Chronic pain of both shoulders   Rehab Codes: M25.611, M25.612  Onset Date: 2024                  Next 's appt: 9/10/24  Visit# / total visits:    Cancels/No Shows: 0/0    Precautions: Low fall risk     Subjective:    Patient continues to report pain in R knee, rating a 5/10. Pt reports pain is most aggravated with ambulation and is still active at rest. Pt reported no pain in neck or back.     Pain:  [x] Yes  [] No  Right knee, 5/10     Back, 0/10  Pain altered Tx:  [x] No  [] Yes  Action:  Comments:    Objective:  Exercise Reps/ Time Weight/ Level Completed Comments   MAT TABLE          Pelvic Tilt 10x5''  x    LTR 10x5''  x    Bridge 15x  x    SLR 10x ea  x  added   Cane Shld Flex 10x2 3'' 2 # wand x     Supine serratus  10x2 With cane     Lay on towel roll along spine to stretch chest          Modified Zachary 15\" x 3  x Increased discomfort at end range    Side lying hip and quad stretch 15\" x 4  x Increased discomfort at end range    Hip Add Isometrics  2\" x 10   x    Angelo Ham stretch  10x 3\"  x    Angelo Piriformis stretch  2x15''  x    Side lying open books 10x   x    Single knee to chest 2x 30\"  x    Side Lying Hip Abd 10 x each  x    Side Lying Shld ER 20 x each  x    Hooklying clamshells 10x2 Red x 16 added          SEATED          Shrugs  10x ea       Retraction 10x2   Foam

## 2024-07-24 ENCOUNTER — HOSPITAL ENCOUNTER (OUTPATIENT)
Dept: PHYSICAL THERAPY | Age: 89
Setting detail: THERAPIES SERIES
Discharge: HOME | End: 2024-07-24
Payer: MEDICARE

## 2024-07-24 PROCEDURE — 97110 THERAPEUTIC EXERCISES: CPT

## 2024-07-24 NOTE — FLOWSHEET NOTE
[facility] Outpatient Physical Therapy   [address]   Phone: [phone]   Fax: [phone]    Physical Therapy Daily Treatment Note    Date:  2024  Patient Name:  Laura House    :  12/10/1933  MRN: [id number]  Physician: Joann Soriano MD                                      Insurance: Medicare;  for Life;  BMN, No Hard Max, No AUTH Required;  Track CAP  Medical Diagnosis:   M54.50, G89.29 (ICD-10-CM) - Chronic midline low back pain without sciatica   M54.6, G89.29 (ICD-10-CM) - Chronic midline thoracic back pain   M25.511, G89.29, M25.512 (ICD-10-CM) - Chronic pain of both shoulders   Rehab Codes: M25.611, M25.612  Onset Date: 2024                  Next 's appt: 9/10/24  Visit# / total visits: 15/24   Cancels/No Shows: 0/0    Precautions: Low fall risk     Subjective:    Patient reports her neck and shoulders do not hurt at all.  Reports her back is stiff and aches a little.  Reports right knee is still sore    Pain:  [x] Yes  [] No  Right knee, 5/10     Back, 2/10  Pain altered Tx:  [x] No  [] Yes  Action:  Comments:    Objective:  Exercise Reps/ Time Weight/ Level Completed Comments   Nu Step L3   5'                   MAT TABLE          Pelvic Tilt 10x5''  x    LTR 10x5''  x    Bridge 15x  x    SLR 10x ea  x /16 added   Cane Shld Flex 10x2 3'' 2 # wand x     Supine serratus  10x2 With cane     Lay on towel roll along spine to stretch chest          Modified Zachary 15\" x 3  x Increased discomfort at end range    Side lying hip and quad stretch 15\" x 4   Increased discomfort at end range    Hip Add Isometrics  2\" x 10   x    Angelo Ham stretch  10x 3\"      Angelo Piriformis stretch  2x15''      Side lying open books 10x       Single knee to chest 2x 30\"      Side Lying Hip Abd 10 x each      Side Lying Shld ER 20 x each      Hooklying clamshells 10x2 Red  /16 added          SEATED          Shrugs  10x ea  x     Retraction 10x2  x Foam roll

## 2024-07-30 ENCOUNTER — HOSPITAL ENCOUNTER (OUTPATIENT)
Dept: PHYSICAL THERAPY | Age: 89
Setting detail: THERAPIES SERIES
Discharge: HOME | End: 2024-07-30
Payer: MEDICARE

## 2024-07-30 PROCEDURE — 97110 THERAPEUTIC EXERCISES: CPT

## 2024-08-01 ENCOUNTER — OFFICE VISIT (OUTPATIENT)
Dept: UROLOGY | Age: 89
End: 2024-08-01
Payer: MEDICARE

## 2024-08-01 ENCOUNTER — HOSPITAL ENCOUNTER (OUTPATIENT)
Dept: PHYSICAL THERAPY | Age: 89
Setting detail: THERAPIES SERIES
Discharge: HOME | End: 2024-08-01
Payer: MEDICARE

## 2024-08-01 VITALS
BODY MASS INDEX: 27.99 KG/M2 | OXYGEN SATURATION: 99 % | TEMPERATURE: 97.8 F | SYSTOLIC BLOOD PRESSURE: 112 MMHG | WEIGHT: 189 LBS | HEIGHT: 69 IN | DIASTOLIC BLOOD PRESSURE: 70 MMHG | HEART RATE: 68 BPM

## 2024-08-01 DIAGNOSIS — N20.0 KIDNEY STONES: Primary | ICD-10-CM

## 2024-08-01 PROCEDURE — 97112 NEUROMUSCULAR REEDUCATION: CPT

## 2024-08-01 PROCEDURE — G8417 CALC BMI ABV UP PARAM F/U: HCPCS | Performed by: UROLOGY

## 2024-08-01 PROCEDURE — G8427 DOCREV CUR MEDS BY ELIG CLIN: HCPCS | Performed by: UROLOGY

## 2024-08-01 PROCEDURE — 1123F ACP DISCUSS/DSCN MKR DOCD: CPT | Performed by: UROLOGY

## 2024-08-01 PROCEDURE — 1090F PRES/ABSN URINE INCON ASSESS: CPT | Performed by: UROLOGY

## 2024-08-01 PROCEDURE — 1036F TOBACCO NON-USER: CPT | Performed by: UROLOGY

## 2024-08-01 PROCEDURE — 99213 OFFICE O/P EST LOW 20 MIN: CPT | Performed by: UROLOGY

## 2024-08-01 PROCEDURE — 97110 THERAPEUTIC EXERCISES: CPT

## 2024-08-01 ASSESSMENT — ENCOUNTER SYMPTOMS
BACK PAIN: 0
EYE PAIN: 0
VOMITING: 0
RESPIRATORY NEGATIVE: 1
COUGH: 0
GASTROINTESTINAL NEGATIVE: 1
EYE REDNESS: 0
ALLERGIC/IMMUNOLOGIC NEGATIVE: 1
NAUSEA: 0
ABDOMINAL PAIN: 0
WHEEZING: 0
SHORTNESS OF BREATH: 0
EYES NEGATIVE: 1

## 2024-08-01 NOTE — FLOWSHEET NOTE
[facility] Outpatient Physical Therapy   [address]   Phone: [phone]   Fax: [phone]    Physical Therapy Daily Treatment Note    Date:  2024  Patient Name:  Laura House    :  12/10/1933  MRN: [id number]  Physician: Joann Soriano MD                                      Insurance: Medicare;  for Life;  BMN, No Hard Max, No AUTH Required;  Track CAP  Medical Diagnosis:   M54.50, G89.29 (ICD-10-CM) - Chronic midline low back pain without sciatica   M54.6, G89.29 (ICD-10-CM) - Chronic midline thoracic back pain   M25.511, G89.29, M25.512 (ICD-10-CM) - Chronic pain of both shoulders   Rehab Codes: M25.611, M25.612  Onset Date: 2024                  Next 's appt: 9/10/24  Visit# / total visits:    Cancels/No Shows: 0/0    Precautions: Low fall risk     Subjective:    Patient reports discomfort in shoulder \"but not pain\". Pt stated shoulders are starting to feel more loosened up. Pt continues to experience discomfort in R knee that radiates down leg.     Pain:  [] Yes  [x] No    Pain altered Tx:  [x] No  [] Yes  Action:  Comments:    Objective:  Exercise Reps/ Time Weight/ Level Completed Comments   Nu Step L3   5' x UE and LE's                 MAT TABLE          Pelvic Tilt 10x5''  x    LTR 10x5''  x    Bridge 15x      SLR 10x ea    added   Cane Shld Flex 10x2 3'' 2 # wand      Supine serratus  10x2 With cane     Lay on towel roll along spine to stretch chest          Modified Zachary 15\" x 3  x Increased discomfort at end range    Side lying hip and quad stretch 15\" x 4   Increased discomfort at end range    Hip Add Isometrics  2\" x 10       Angelo Ham stretch  10x 3\"  x    Angelo Piriformis stretch  2x15''  x    Side lying open books 10x       Single knee to chest 2x 30\"  x    Side Lying Hip Abd 10 x each      Side Lying Shld ER 20 x each      Hooklying clamshells 10x2 Red  /16 added          SEATED          Shrugs  10x ea       Retraction

## 2024-08-01 NOTE — PROGRESS NOTES
[facility] PHYSICIANS [facility] UROLOGY [facility]ENTER  [address]  [facility] [address]  Dept: [phone]    [facility] Urology Office Note - Established    Patient:  Laura House  YOB: 1933  Date: 8/1/2024    The patient is a 89 y.o. female whopresents today for evaluation of the following problems:   Chief Complaint   Patient presents with    Nephrolithiasis     6 month f/u         HPI  Here for stones, had renal u/s and is neg  Urinating ok    Summary of old records: N/A    Additional History: N/A    Procedures Today: N/A    Urinalysis today:  No results found for this visit on 08/01/24.    Imaging Reviewed during this Office Visit: none  (results were independently reviewed by physician and radiology report verified)    AUA Symptom Score (8/1/2024):                               Last BUN and creatinine:  Lab Results   Component Value Date    BUN 24 (H) 05/08/2024     Lab Results   Component Value Date    CREATININE 1.0 (H) 05/08/2024       Additional Lab/Culture results: none    PAST MEDICAL, FAMILY AND SOCIAL HISTORY UPDATE:  Past Medical History:   Diagnosis Date    Adenocarcinoma of large intestine (HCC) 08/28/2023    Arthritis     Basal cell carcinoma of nose     Bilateral carotid bruits 06/09/2021    Bronchitis     HX. OF     CAD (coronary artery disease)     Carpal tunnel syndrome     left hand    Chronic diastolic heart failure (HCC) 03/25/2021    CKD (chronic kidney disease), stage III (HCC) 08/07/2019    Colon cancer (HCC)     COVID-19 09/2023    DDD (degenerative disc disease), cervical 05/10/2024    DDD (degenerative disc disease), lumbar 05/10/2024    DDD (degenerative disc disease), thoracic 05/10/2024    Diabetes mellitus (HCC)     DVT (deep venous thrombosis) ([facility]) 07/10/2019    Fatty liver 08/23/2022    GI bleed 03/17/2021    Gout     Heart murmur     Hematemesis 06/29/2020    Hematuria     History of coronary artery bypass graft x 3

## 2024-08-03 NOTE — OP NOTE
with a clip on the polypectomy site still there      Large flat lesion measured 3-1/2 cm long by 1 cm wide hiding behind a fold is in the ascending colon above the ileocecal valve in the medial aspect of the colon was very difficult to see, nevertheless we were able to work on it retroflex  I was able to injected and lifted up with epinephrine and then EMR remove it with stiff snare  Then I placed a clip on  I have a feeling that we removed with piecemeal fashion    There is 2 polyps in the transverse colon 1 by the tattooed area was 1 cm which was also snared    And 2 small ones removed with a cold forceps      1 tiny polyp from the sigmoid also removed with cold forceps    Diverticulosis    Poor preparation    Large hemorrhoids and engorged veins in the rectum           Withdrawal Time was (minutes): 40    The colon was decompressed and the scope was removed. The patient tolerated the procedure well.      Recommendations/Plan:   Lifestyle and dietary modifications as discussed  F/U Biopsies  F/U In OfficeYes  Discussed with the family  Repeat colonoscopy [id number]  Hold anticoagulation and antiplatelets for another 2 to 3 days    Electronically signed by Mojgan Johnson MD  on 11/1/2023 at 11:52 AM
The patient is a 31y Female complaining of medical evaluation.

## 2024-08-06 ENCOUNTER — HOSPITAL ENCOUNTER (OUTPATIENT)
Dept: PHYSICAL THERAPY | Age: 89
Setting detail: THERAPIES SERIES
Discharge: HOME | End: 2024-08-06
Payer: MEDICARE

## 2024-08-06 PROCEDURE — 97112 NEUROMUSCULAR REEDUCATION: CPT

## 2024-08-06 PROCEDURE — 97110 THERAPEUTIC EXERCISES: CPT

## 2024-08-06 NOTE — FLOWSHEET NOTE
[facility] Outpatient Physical Therapy   [address]   Phone: [phone]   Fax: [phone]    Physical Therapy Daily Treatment Note    Date:  2024  Patient Name:  Laura House    :  12/10/1933  MRN: [id number]  Physician: Joann Soriano MD                                      Insurance: Medicare;  for Life;  BMN, No Hard Max, No AUTH Required;  Track CAP  Medical Diagnosis:   M54.50, G89.29 (ICD-10-CM) - Chronic midline low back pain without sciatica   M54.6, G89.29 (ICD-10-CM) - Chronic midline thoracic back pain   M25.511, G89.29, M25.512 (ICD-10-CM) - Chronic pain of both shoulders   Rehab Codes: M25.611, M25.612  Onset Date: 2024                  Next 's appt: 9/10/24  Visit# / total visits:    Cancels/No Shows: 0/0    Precautions: Low fall risk     Subjective:    Pt denies pain in back and continues to experience pain in R knee. Pt with no complaints from previous treatment.     Pain:  [x] Yes  [] No    Pain altered Tx:  [x] No  [] Yes  Action:  Comments:    Objective:  Exercise Reps/ Time Weight/ Level Completed Comments   Nu Step L3   5'  UE and LE's                 MAT TABLE          Pelvic Tilt 10x5''  x    LTR 10x5''  x    Bridge 15x      SLR 10x ea   / added   Cane Shld Flex 10x2 3'' 2 # wand      Supine serratus  10x2 With cane     Lay on towel roll along spine to stretch chest          Modified Zachary 15\" x 3  x Increased discomfort at end range    Side lying hip and quad stretch 15\" x 4   Increased discomfort at end range    Hip Add Isometrics  2\" x 10       Angelo Ham stretch  10x 3\"      Angelo Piriformis stretch  2x15''  x    Side lying open books 10x       Single knee to chest 2x 30\"  x    Side Lying Hip Abd 10 x each  x    Side Lying Shld ER 20 x each      Hooklying clamshells 10x2 Red  /16 added          SEATED          Shrugs  10x ea       Retraction 10x2   Foam roll vertical   Chin tucks  10 x        Angelo Upper trap

## 2024-08-06 NOTE — TELEPHONE ENCOUNTER
Please Approve or Refuse.  Send to Pharmacy per Pt's Request:      Next Visit Date:  9/10/2024   Last Visit Date: 5/9/2024    Hemoglobin A1C (%)   Date Value   05/09/2024 6.9   11/30/2023 6.3   08/29/2023 7.1             ( goal A1C is < 7)   BP Readings from Last 3 Encounters:   08/01/24 112/70   07/05/24 (!) 127/55   05/29/24 124/64          (goal 120/80)  BUN   Date Value Ref Range Status   05/08/2024 24 (H) 8 - 23 mg/dL Final     Creatinine   Date Value Ref Range Status   05/08/2024 1.0 (H) 0.5 - 0.9 mg/dL Final     Potassium   Date Value Ref Range Status   05/08/2024 4.7 3.7 - 5.3 mmol/L Final

## 2024-08-12 ENCOUNTER — HOSPITAL ENCOUNTER (OUTPATIENT)
Dept: PHYSICAL THERAPY | Age: 89
Setting detail: THERAPIES SERIES
Discharge: HOME | End: 2024-08-12
Payer: MEDICARE

## 2024-08-12 PROCEDURE — 97112 NEUROMUSCULAR REEDUCATION: CPT

## 2024-08-12 PROCEDURE — 97110 THERAPEUTIC EXERCISES: CPT

## 2024-08-12 NOTE — PROGRESS NOTES
[facility] Outpatient Physical Therapy/PROGRESS NOTE   [address]   Phone: [phone]   Fax: [phone]    Physical Therapy Daily Treatment Note    Date:  2024  Patient Name:  Laura House    :  12/10/1933  MRN: [id number]  Physician: Joann Soriano MD                                      Insurance: Medicare;  for Life;  BMN, No Hard Max, No AUTH Required;  Track CAP  Medical Diagnosis:   M54.50, G89.29 (ICD-10-CM) - Chronic midline low back pain without sciatica   M54.6, G89.29 (ICD-10-CM) - Chronic midline thoracic back pain   M25.511, G89.29, M25.512 (ICD-10-CM) - Chronic pain of both shoulders   Rehab Codes: M25.611, M25.612  Onset Date: 2024                  Next 's appt: 9/10/24  Visit# / total visits:    Cancels/No Shows: 0/0  Progress Note completed 24;  covers from 24--24  Next Progress Note due by visit 29  Precautions: Low fall risk     Subjective:    Pt reports she has some neck and back pain today.  Rates as 2/10 today and at worst.  Reports right knee is sore today and rates as 4/10.  She reports the knee is improved but still keeps her from sleeping in bed    Pain:  [x] Yes  [] No    Pain altered Tx:  [x] No  [] Yes  Action:  Comments:    Objective:  Exercise Reps/ Time Weight/ Level Completed Comments   Nu Step L3   5'  UE and LE's                 MAT TABLE          Pelvic Tilt 10x5''  x    LTR 10x5''  x    Bridge 15x      SLR 10x ea    added   Cane Shld Flex 10x2 3'' 2 # wand x     Supine serratus  10x2 With cane x    Lay on towel roll along spine to stretch chest          Modified Zachary 15\" x 3  x Increased discomfort at end range    Side lying hip and quad stretch 15\" x 4   Increased discomfort at end range    Hip Add Isometrics  2\" x 10       Angelo Ham stretch  10x 3\"      Angelo Piriformis stretch  2x15''  x    Side lying open books 10x       Single knee to chest 2x 30\"  x    Side Lying Hip Abd 10 x

## 2024-08-15 ENCOUNTER — OFFICE VISIT (OUTPATIENT)
Dept: FAMILY MEDICINE CLINIC | Age: 89
End: 2024-08-15

## 2024-08-15 VITALS
OXYGEN SATURATION: 98 % | HEART RATE: 64 BPM | HEIGHT: 69 IN | BODY MASS INDEX: 28.58 KG/M2 | WEIGHT: 193 LBS | DIASTOLIC BLOOD PRESSURE: 60 MMHG | SYSTOLIC BLOOD PRESSURE: 140 MMHG

## 2024-08-15 DIAGNOSIS — I83.893 SYMPTOMATIC VARICOSE VEINS, BILATERAL: ICD-10-CM

## 2024-08-15 DIAGNOSIS — E78.5 HYPERLIPIDEMIA WITH TARGET LDL LESS THAN 100: ICD-10-CM

## 2024-08-15 DIAGNOSIS — N18.30 BENIGN HYPERTENSION WITH CKD (CHRONIC KIDNEY DISEASE) STAGE III (HCC): ICD-10-CM

## 2024-08-15 DIAGNOSIS — G89.29 CHRONIC PAIN OF RIGHT KNEE: ICD-10-CM

## 2024-08-15 DIAGNOSIS — I12.9 BENIGN HYPERTENSION WITH CKD (CHRONIC KIDNEY DISEASE) STAGE III (HCC): ICD-10-CM

## 2024-08-15 DIAGNOSIS — M25.561 CHRONIC PAIN OF RIGHT KNEE: ICD-10-CM

## 2024-08-15 DIAGNOSIS — Z96.653 HISTORY OF BILATERAL KNEE REPLACEMENT: Primary | ICD-10-CM

## 2024-08-15 RX ORDER — TIZANIDINE 2 MG/1
2 TABLET ORAL NIGHTLY
Qty: 30 TABLET | Refills: 0 | Status: SHIPPED | OUTPATIENT
Start: 2024-08-15

## 2024-08-15 RX ORDER — AMLODIPINE BESYLATE 2.5 MG/1
2.5 TABLET ORAL DAILY
Qty: 90 TABLET | Refills: 3 | Status: SHIPPED | OUTPATIENT
Start: 2024-08-15

## 2024-08-15 RX ORDER — EZETIMIBE 10 MG/1
10 TABLET ORAL NIGHTLY
Qty: 90 TABLET | Refills: 3 | Status: SHIPPED | OUTPATIENT
Start: 2024-08-15

## 2024-08-15 RX ORDER — TIZANIDINE 4 MG/1
4 TABLET ORAL NIGHTLY
Qty: 30 TABLET | Refills: 0 | Status: SHIPPED | OUTPATIENT
Start: 2024-08-15 | End: 2024-08-15 | Stop reason: DRUGHIGH

## 2024-08-15 ASSESSMENT — ENCOUNTER SYMPTOMS
VOMITING: 0
SORE THROAT: 0
ABDOMINAL DISTENTION: 0
DIARRHEA: 0
SINUS PRESSURE: 0
ABDOMINAL PAIN: 0
COLOR CHANGE: 0
NAUSEA: 0
TROUBLE SWALLOWING: 0
STRIDOR: 0
RHINORRHEA: 0
CHEST TIGHTNESS: 0
BLOOD IN STOOL: 0
BACK PAIN: 0
SHORTNESS OF BREATH: 0
RECTAL PAIN: 0
COUGH: 0
EYE REDNESS: 0

## 2024-08-15 NOTE — PROGRESS NOTES
Chief Complaint   Patient presents with    Knee Pain           Edema         Laura House  here today for follow up on chronic medical problems, go over labs and/or diagnostic studies, and medication refills. Knee Pain (/) and Edema      HPI: Patient is here for sick call, complaining of bilateral leg swelling and pain in right knee.  Patient reports it has been going on long time, she has history of bilateral knee replacements.  Patient reports she is not able to sleep because she is in pain all the time more during the night.  She is trying to keep her knee or leg comfortable but whenever she moves her leg she feels burning sensation in both legs and that she wakes up.  She did see orthopedic in June and had x-ray of the both knees done.  The x-ray did not show any changes in the knee.    Patient did not do any medications to help with the pain except the lidocaine gel.  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  Patient reports the ablation did help.  Currently she is complaining of bilateral leg swelling stretching burning sensation, she is not on any muscle relaxant.  We discussed about the different options of the pain, including gabapentin low-dose but due to patient's age , will be hesitant to start her on gabapentin.    Patient is willing to try small dose of muscle relaxant.  Patient is currently on Lasix 40 mg to help with peripheral edema.      Hyperlipidemia is currently on statins needs medication refill has blood work ordered by PCP and has appointment scheduled.          BP (!) 140/60   Pulse 64   Ht 1.753 m (5' 9\")   Wt 87.5 kg (193 lb)   SpO2 98%   BMI 28.50 kg/m²    Body mass index is 28.5 kg/m².  Wt Readings from Last 3 Encounters:   08/15/24 87.5 kg (193 lb)   08/01/24 85.7 kg (189 lb)   07/05/24 85.9 kg (189 lb 6.4 oz)        [x]Negative depression screening.      5/9/2024    11:28 AM 1/9/2024     9:47 AM 8/29/2023     
Pneumococcal 65+ years Vaccine  Completed    Respiratory Syncytial Virus (RSV) Pregnant or age 60 yrs+  Completed    Hepatitis A vaccine  Aged Out    Hepatitis B vaccine  Aged Out    Hib vaccine  Aged Out    Polio vaccine  Aged Out    Meningococcal (ACWY) vaccine  Aged Out

## 2024-08-15 NOTE — PATIENT INSTRUCTIONS
Thank you for choosing [facility].  We know you have options when it comes to your healthcare; we appreciate that you chose us. Our goal is to provide exceptional  service and world class care to every patient.  You will be receiving a survey via email or text message asking for your feedback.  Please take a few minutes to share your thoughts about your recent visit. Your comments help us understand what we do well and ways we can improve.  Thank you in advance for your valuable feedback.

## 2024-08-21 ENCOUNTER — HOSPITAL ENCOUNTER (OUTPATIENT)
Dept: PHYSICAL THERAPY | Age: 89
Setting detail: THERAPIES SERIES
Discharge: HOME | End: 2024-08-21
Payer: MEDICARE

## 2024-08-21 PROCEDURE — 97112 NEUROMUSCULAR REEDUCATION: CPT

## 2024-08-21 PROCEDURE — 97110 THERAPEUTIC EXERCISES: CPT

## 2024-08-21 NOTE — FLOWSHEET NOTE
[facility] Outpatient Physical Therapy   [address]   Phone: [phone]   Fax: [phone]    Physical Therapy Daily Treatment Note    Date:  2024  Patient Name:  Laura House    :  12/10/1933  MRN: [id number]  Physician: Joann Soriano MD                                      Insurance: Medicare;  for Life;  BMN, No Hard Max, No AUTH Required;  Track CAP  Medical Diagnosis:   M54.50, G89.29 (ICD-10-CM) - Chronic midline low back pain without sciatica   M54.6, G89.29 (ICD-10-CM) - Chronic midline thoracic back pain   M25.511, G89.29, M25.512 (ICD-10-CM) - Chronic pain of both shoulders   Rehab Codes: M25.611, M25.612  Onset Date: 2024                  Next 's appt: 9/10/24  Visit# / total visits:    Cancels/No Shows: 0/0  Precautions: Low fall risk     Subjective:    Pt continues to have right knee soreness and pain, rating a 4/10. Pt also continues to have difficulty sleeping at night due to knee discomfort.    Pain:  [x] Yes  [] No    Pain altered Tx:  [x] No  [] Yes  Action:  Comments:    Objective:  Exercise Reps/ Time Weight/ Level Completed Comments   Nu Step L3   5'  UE and LE's                 MAT TABLE          Pelvic Tilt 10x5''  x    LTR 10x5''  x    Bridge 15x      SLR 10x ea  x 7/16 added   Cane Shld Flex 10x2 3'' 2 # wand      Supine serratus  10x2 With cane     Lay on towel roll along spine to stretch chest          Modified Zachary 15\" x 3   Increased discomfort at end range    Side lying hip and quad stretch 15\" x 4   Increased discomfort at end range    Hip Add Isometrics  2\" x 10       Angelo Ham stretch  10x 3\"      Angelo Piriformis stretch  2x15''      Side lying open books 10x       Single knee to chest 2x 30\"  x    Side Lying Hip Abd 10 x each  x    Side Lying Shld ER 20 x each      Hooklying clamshells 10x2 Red x 7/16 added          SEATED          Shrugs  10x ea       Retraction 10x2  x Foam roll vertical   Chin tucks  10

## 2024-08-28 ENCOUNTER — HOSPITAL ENCOUNTER (OUTPATIENT)
Dept: PHYSICAL THERAPY | Age: 89
Setting detail: THERAPIES SERIES
Discharge: HOME | End: 2024-08-28
Payer: MEDICARE

## 2024-08-28 PROCEDURE — 97110 THERAPEUTIC EXERCISES: CPT

## 2024-08-28 PROCEDURE — 97112 NEUROMUSCULAR REEDUCATION: CPT

## 2024-08-28 NOTE — DISCHARGE SUMMARY
[facility] Outpatient Physical Therapy   [address]   Phone: [phone]   Fax: [phone]    Physical Therapy Daily Treatment Note/DISCHARGE SUMMARY    Date:  2024  Patient Name:  Laura House    :  12/10/1933  MRN: [id number]  Physician: Joann Soriano MD                                      Insurance: Medicare;  for Life;  BMN, No Hard Max, No AUTH Required;  Track CAP  Medical Diagnosis:   M54.50, G89.29 (ICD-10-CM) - Chronic midline low back pain without sciatica   M54.6, G89.29 (ICD-10-CM) - Chronic midline thoracic back pain   M25.511, G89.29, M25.512 (ICD-10-CM) - Chronic pain of both shoulders   Rehab Codes: M25.611, M25.612  Onset Date: 2024                  Next 's appt: 9/10/24  Visit# / total visits:    Cancels/No Shows: 0/0  Precautions: Low fall risk     Subjective:    Pt reports her right knee feels better but still has some soreness.  She notes the biggest problem with her leg is it is uncomfortable to sleep.  She denies back or neck pain for some time    Pain:  [x] Yes  [] No    Pain altered Tx:  [x] No  [] Yes  Action:  Comments:    Objective:  Exercise Reps/ Time Weight/ Level Completed Comments   Nu Step L3   5'  UE and LE's                 MAT TABLE          Pelvic Tilt 10x5''  x    LTR 10x5''  x    Bridge 15x      SLR 10x ea  x  added   Cane Shld Flex 10x2 3'' 2 # wand      Supine serratus  10x2 With cane     Lay on towel roll along spine to stretch chest          Modified Zachary 15\" x 3  x Increased discomfort at end range    Side lying hip and quad stretch 15\" x 4   Increased discomfort at end range    Hip Add Isometrics  2\" x 10       Angelo Ham stretch  10x 3\"      Angelo Piriformis stretch  2x15''      Side lying open books 10x       Single knee to chest 2x 30\"  x    Side Lying Hip Abd 10 x each  x    Side Lying Shld ER 20 x each      Hooklying clamshells 10x2 Red x 16 added          SEATED          Shrugs  10x  06/27/2024  Prepared by: Eliezer Freeman    Exercises  - Sidelying Hip Abduction  - 2 x daily - 7 x weekly - 1 sets - 10 reps  - Sidelying Shoulder External Rotation  - 2 x daily - 7 x weekly - 1 sets - 20 reps  - Seated Upper Trapezius Stretch  - 2 x daily - 7 x weekly - 1 sets - 5 reps - 10 seconds hold    Access Code: [email or web address]  URL: [email or web address]  Date: 07/09/2024  Prepared by: Eliezer Giest    Exercises  - Supine Hamstring Stretch  - 2 x daily - 7 x weekly - 1 sets - 4 reps - 15 seconds hold  - Supine Piriformis Stretch with Leg Straight  - 2 x daily - 7 x weekly - 1 sets - 4 reps - 15 seconds hold  - Supine Straight Leg Raises  - 2 x daily - 7 x weekly - 1 sets - 10 reps  - Seated Shoulder Horizontal Abduction with Resistance  - 2 x daily - 7 x weekly - 1 sets - 10 reps  - Shoulder External Rotation and Scapular Retraction with Resistance  - 2 x daily - 7 x weekly - 1 sets - 10 reps    Assessment: [x] Progressing toward goals.  Pt has met 5 of 6 STGs.  She is still progressing toward all other goals.  Plan to discharge pt from PT today.  She is doing well and should be able to continue to improve with HEP.  Have reviewed HEP with pt and importance of continuing with.  She has made great progress with extremity and spinal flexibility and strength.  She has also made great progress with balance as indicated on Tinetti and TUG tests.  She does need to continue with exercises and to stay active.    She denied increased pain after today's session.  She did note fatigue after PT but did not need rest breaks today.     [] No change.       [] Other:      [x] Patient would continue to benefit from skilled physical therapy services in order to: improve neck and upper flexibility and strength to help correct her posture. This should help reduce her pain and improve her balance making her safer with mobility.     GOALS:   STG: (to be met in 10 treatments)  ? Pain:  Neck and Back to 5/10 at worst to facilitate ADLs

## 2024-09-05 DIAGNOSIS — E11.22 TYPE 2 DIABETES MELLITUS WITH STAGE 3 CHRONIC KIDNEY DISEASE, WITHOUT LONG-TERM CURRENT USE OF INSULIN (HCC): ICD-10-CM

## 2024-09-05 DIAGNOSIS — N18.30 TYPE 2 DIABETES MELLITUS WITH STAGE 3 CHRONIC KIDNEY DISEASE, WITHOUT LONG-TERM CURRENT USE OF INSULIN (HCC): ICD-10-CM

## 2024-09-05 RX ORDER — GLIMEPIRIDE 1 MG/1
1 TABLET ORAL 2 TIMES DAILY
Qty: 180 TABLET | Refills: 3 | Status: SHIPPED | OUTPATIENT
Start: 2024-09-05

## 2024-09-05 NOTE — TELEPHONE ENCOUNTER
Lab Results   Component Value Date    LABA1C 6.9 05/09/2024    LABA1C 6.3 11/30/2023    LABA1C 7.1 08/29/2023

## 2024-09-05 NOTE — TELEPHONE ENCOUNTER
Please Approve or Refuse.  [facility]      Next Visit Date:  9/10/2024   Last Visit Date: 8/15/2024    Hemoglobin A1C (%)   Date Value   05/09/2024 6.9   11/30/2023 6.3   08/29/2023 7.1             ( goal A1C is < 7)   BP Readings from Last 3 Encounters:   08/15/24 (!) 140/60   08/01/24 112/70   07/05/24 (!) 127/55          (goal 120/80)  BUN   Date Value Ref Range Status   05/08/2024 24 (H) 8 - 23 mg/dL Final     Creatinine   Date Value Ref Range Status   05/08/2024 1.0 (H) 0.5 - 0.9 mg/dL Final     Potassium   Date Value Ref Range Status   05/08/2024 4.7 3.7 - 5.3 mmol/L Final

## 2024-09-06 ENCOUNTER — HOSPITAL ENCOUNTER (OUTPATIENT)
Age: 89
Discharge: HOME | End: 2024-09-06
Payer: MEDICARE

## 2024-09-06 DIAGNOSIS — N18.30 BENIGN HYPERTENSION WITH CKD (CHRONIC KIDNEY DISEASE) STAGE III (HCC): ICD-10-CM

## 2024-09-06 DIAGNOSIS — E55.9 VITAMIN D DEFICIENCY: ICD-10-CM

## 2024-09-06 DIAGNOSIS — E11.22 TYPE 2 DIABETES MELLITUS WITH STAGE 3A CHRONIC KIDNEY DISEASE, WITHOUT LONG-TERM CURRENT USE OF INSULIN (HCC): ICD-10-CM

## 2024-09-06 DIAGNOSIS — I12.9 BENIGN HYPERTENSION WITH CKD (CHRONIC KIDNEY DISEASE) STAGE III (HCC): ICD-10-CM

## 2024-09-06 DIAGNOSIS — N18.31 TYPE 2 DIABETES MELLITUS WITH STAGE 3A CHRONIC KIDNEY DISEASE, WITHOUT LONG-TERM CURRENT USE OF INSULIN (HCC): ICD-10-CM

## 2024-09-06 LAB
25(OH)D3 SERPL-MCNC: 28.6 NG/ML (ref 30–100)
ALBUMIN SERPL-MCNC: 4.1 G/DL (ref 3.5–5.2)
ALP SERPL-CCNC: 44 U/L (ref 35–104)
ALT SERPL-CCNC: 20 U/L (ref 5–33)
ANION GAP SERPL CALCULATED.3IONS-SCNC: 10 MMOL/L (ref 9–17)
AST SERPL-CCNC: 18 U/L
BACTERIA URNS QL MICRO: ABNORMAL
BASOPHILS # BLD: 0 K/UL (ref 0–0.2)
BASOPHILS NFR BLD: 1 % (ref 0–2)
BILIRUB SERPL-MCNC: 1.5 MG/DL (ref 0.3–1.2)
BILIRUB UR QL STRIP: NEGATIVE
BUN SERPL-MCNC: 19 MG/DL (ref 8–23)
CALCIUM SERPL-MCNC: 9.2 MG/DL (ref 8.6–10.4)
CASTS #/AREA URNS LPF: ABNORMAL /LPF
CHLORIDE SERPL-SCNC: 104 MMOL/L (ref 98–107)
CLARITY UR: CLEAR
CO2 SERPL-SCNC: 27 MMOL/L (ref 20–31)
COLOR UR: YELLOW
CREAT SERPL-MCNC: 0.7 MG/DL (ref 0.5–0.9)
EOSINOPHIL # BLD: 0.2 K/UL (ref 0–0.4)
EOSINOPHILS RELATIVE PERCENT: 3 % (ref 0–4)
EPI CELLS #/AREA URNS HPF: ABNORMAL /HPF
ERYTHROCYTE [DISTWIDTH] IN BLOOD BY AUTOMATED COUNT: 14.4 % (ref 11.5–14.9)
FOLATE SERPL-MCNC: 8.7 NG/ML (ref 4.8–24.2)
GFR, ESTIMATED: 82 ML/MIN/1.73M2
GLUCOSE SERPL-MCNC: 176 MG/DL (ref 70–99)
GLUCOSE UR STRIP-MCNC: NEGATIVE MG/DL
HCT VFR BLD AUTO: 39.9 % (ref 36–46)
HGB BLD-MCNC: 13.3 G/DL (ref 12–16)
HGB UR QL STRIP.AUTO: NEGATIVE
KETONES UR STRIP-MCNC: NEGATIVE MG/DL
LEUKOCYTE ESTERASE UR QL STRIP: ABNORMAL
LYMPHOCYTES NFR BLD: 2.1 K/UL (ref 1–4.8)
LYMPHOCYTES RELATIVE PERCENT: 27 % (ref 24–44)
MAGNESIUM SERPL-MCNC: 2.1 MG/DL (ref 1.6–2.6)
MCH RBC QN AUTO: 31.5 PG (ref 26–34)
MCHC RBC AUTO-ENTMCNC: 33.3 G/DL (ref 31–37)
MCV RBC AUTO: 94.4 FL (ref 80–100)
MONOCYTES NFR BLD: 0.8 K/UL (ref 0.1–1.3)
MONOCYTES NFR BLD: 10 % (ref 1–7)
NEUTROPHILS NFR BLD: 59 % (ref 36–66)
NEUTS SEG NFR BLD: 4.6 K/UL (ref 1.3–9.1)
NITRITE UR QL STRIP: NEGATIVE
PH UR STRIP: 5 [PH] (ref 5–8)
PHOSPHATE SERPL-MCNC: 3.1 MG/DL (ref 2.6–4.5)
PLATELET # BLD AUTO: 168 K/UL (ref 150–450)
PMV BLD AUTO: 8.2 FL (ref 6–12)
POTASSIUM SERPL-SCNC: 5.1 MMOL/L (ref 3.7–5.3)
PROT SERPL-MCNC: 6.9 G/DL (ref 6.4–8.3)
PROT UR STRIP-MCNC: ABNORMAL MG/DL
RBC # BLD AUTO: 4.23 M/UL (ref 4–5.2)
RBC #/AREA URNS HPF: ABNORMAL /HPF
SODIUM SERPL-SCNC: 141 MMOL/L (ref 135–144)
SP GR UR STRIP: 1.02 (ref 1–1.03)
TSH SERPL DL<=0.05 MIU/L-ACNC: 3.25 UIU/ML (ref 0.3–5)
URATE SERPL-MCNC: 4.8 MG/DL (ref 2.4–5.7)
UROBILINOGEN UR STRIP-ACNC: NORMAL EU/DL (ref 0–1)
VIT B12 SERPL-MCNC: 1178 PG/ML (ref 232–1245)
WBC #/AREA URNS HPF: ABNORMAL /HPF
WBC OTHER # BLD: 7.8 K/UL (ref 3.5–11)

## 2024-09-06 PROCEDURE — 82746 ASSAY OF FOLIC ACID SERUM: CPT

## 2024-09-06 PROCEDURE — 84443 ASSAY THYROID STIM HORMONE: CPT

## 2024-09-06 PROCEDURE — 84550 ASSAY OF BLOOD/URIC ACID: CPT

## 2024-09-06 PROCEDURE — 81001 URINALYSIS AUTO W/SCOPE: CPT

## 2024-09-06 PROCEDURE — 83735 ASSAY OF MAGNESIUM: CPT

## 2024-09-06 PROCEDURE — 80053 COMPREHEN METABOLIC PANEL: CPT

## 2024-09-06 PROCEDURE — 36415 COLL VENOUS BLD VENIPUNCTURE: CPT

## 2024-09-06 PROCEDURE — 82607 VITAMIN B-12: CPT

## 2024-09-06 PROCEDURE — 87086 URINE CULTURE/COLONY COUNT: CPT

## 2024-09-06 PROCEDURE — 84100 ASSAY OF PHOSPHORUS: CPT

## 2024-09-06 PROCEDURE — 82306 VITAMIN D 25 HYDROXY: CPT

## 2024-09-06 PROCEDURE — 85025 COMPLETE CBC W/AUTO DIFF WBC: CPT

## 2024-09-07 DIAGNOSIS — E55.9 VITAMIN D DEFICIENCY: ICD-10-CM

## 2024-09-07 LAB
MICROORGANISM SPEC CULT: NORMAL
SPECIMEN DESCRIPTION: NORMAL

## 2024-09-07 RX ORDER — ERGOCALCIFEROL 1.25 MG/1
50000 CAPSULE, LIQUID FILLED ORAL WEEKLY
Qty: 12 CAPSULE | Refills: 0 | Status: SHIPPED | OUTPATIENT
Start: 2024-09-07

## 2024-09-07 NOTE — RESULT ENCOUNTER NOTE
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

## 2024-09-10 ENCOUNTER — HOSPITAL ENCOUNTER (OUTPATIENT)
Age: 89
Discharge: HOME | End: 2024-09-10
Payer: MEDICARE

## 2024-09-10 ENCOUNTER — OFFICE VISIT (OUTPATIENT)
Dept: FAMILY MEDICINE CLINIC | Age: 89
End: 2024-09-10

## 2024-09-10 DIAGNOSIS — E11.22 TYPE 2 DIABETES MELLITUS WITH STAGE 3A CHRONIC KIDNEY DISEASE, WITHOUT LONG-TERM CURRENT USE OF INSULIN (HCC): ICD-10-CM

## 2024-09-10 DIAGNOSIS — Z23 NEED FOR INFLUENZA VACCINATION: ICD-10-CM

## 2024-09-10 DIAGNOSIS — N18.2 BENIGN HYPERTENSION WITH CKD (CHRONIC KIDNEY DISEASE), STAGE II: ICD-10-CM

## 2024-09-10 DIAGNOSIS — N18.31 TYPE 2 DIABETES MELLITUS WITH STAGE 3A CHRONIC KIDNEY DISEASE, WITHOUT LONG-TERM CURRENT USE OF INSULIN (HCC): ICD-10-CM

## 2024-09-10 DIAGNOSIS — I50.32 CHRONIC DIASTOLIC HEART FAILURE (HCC): ICD-10-CM

## 2024-09-10 DIAGNOSIS — I12.9 BENIGN HYPERTENSION WITH CKD (CHRONIC KIDNEY DISEASE) STAGE III (HCC): ICD-10-CM

## 2024-09-10 DIAGNOSIS — N18.30 BENIGN HYPERTENSION WITH CKD (CHRONIC KIDNEY DISEASE) STAGE III (HCC): ICD-10-CM

## 2024-09-10 DIAGNOSIS — E78.5 HYPERLIPIDEMIA WITH TARGET LDL LESS THAN 100: ICD-10-CM

## 2024-09-10 DIAGNOSIS — I83.893 VARICOSE VEINS OF BOTH LEGS WITH EDEMA: Primary | ICD-10-CM

## 2024-09-10 DIAGNOSIS — I12.9 BENIGN HYPERTENSION WITH CKD (CHRONIC KIDNEY DISEASE), STAGE II: ICD-10-CM

## 2024-09-10 LAB
BNP SERPL-MCNC: 200 PG/ML
CHOLEST SERPL-MCNC: 121 MG/DL
CHOLESTEROL/HDL RATIO: 3.4
CREAT UR-MCNC: 29.4 MG/DL (ref 28–217)
EST. AVERAGE GLUCOSE BLD GHB EST-MCNC: 169 MG/DL
HBA1C MFR BLD: 7.5 % (ref 4–6)
HDLC SERPL-MCNC: 36 MG/DL
LDLC SERPL CALC-MCNC: 47 MG/DL (ref 0–130)
MAGNESIUM SERPL-MCNC: 2 MG/DL (ref 1.6–2.6)
MICROALBUMIN UR-MCNC: 13 MG/L (ref 0–20)
MICROALBUMIN/CREAT UR-RTO: 44 MCG/MG CREAT (ref 0–25)
TRIGL SERPL-MCNC: 191 MG/DL

## 2024-09-10 PROCEDURE — 83735 ASSAY OF MAGNESIUM: CPT

## 2024-09-10 PROCEDURE — 36415 COLL VENOUS BLD VENIPUNCTURE: CPT

## 2024-09-10 PROCEDURE — 82570 ASSAY OF URINE CREATININE: CPT

## 2024-09-10 PROCEDURE — 83036 HEMOGLOBIN GLYCOSYLATED A1C: CPT

## 2024-09-10 PROCEDURE — 83880 ASSAY OF NATRIURETIC PEPTIDE: CPT

## 2024-09-10 PROCEDURE — 82043 UR ALBUMIN QUANTITATIVE: CPT

## 2024-09-10 PROCEDURE — 80061 LIPID PANEL: CPT

## 2024-09-10 RX ORDER — POTASSIUM CHLORIDE 1500 MG/1
20 TABLET, EXTENDED RELEASE ORAL 2 TIMES DAILY
Qty: 180 TABLET | Refills: 3 | Status: SHIPPED | OUTPATIENT
Start: 2024-09-10

## 2024-09-10 RX ORDER — FUROSEMIDE 40 MG
40 TABLET ORAL 2 TIMES DAILY
Qty: 180 TABLET | Refills: 3 | Status: SHIPPED | OUTPATIENT
Start: 2024-09-10

## 2024-09-10 SDOH — ECONOMIC STABILITY: FOOD INSECURITY: WITHIN THE PAST 12 MONTHS, YOU WORRIED THAT YOUR FOOD WOULD RUN OUT BEFORE YOU GOT MONEY TO BUY MORE.: NEVER TRUE

## 2024-09-10 SDOH — ECONOMIC STABILITY: FOOD INSECURITY: WITHIN THE PAST 12 MONTHS, THE FOOD YOU BOUGHT JUST DIDN'T LAST AND YOU DIDN'T HAVE MONEY TO GET MORE.: NEVER TRUE

## 2024-09-10 SDOH — ECONOMIC STABILITY: INCOME INSECURITY: HOW HARD IS IT FOR YOU TO PAY FOR THE VERY BASICS LIKE FOOD, HOUSING, MEDICAL CARE, AND HEATING?: NOT HARD AT ALL

## 2024-09-10 ASSESSMENT — PATIENT HEALTH QUESTIONNAIRE - PHQ9
1. LITTLE INTEREST OR PLEASURE IN DOING THINGS: NOT AT ALL
2. FEELING DOWN, DEPRESSED OR HOPELESS: NOT AT ALL
SUM OF ALL RESPONSES TO PHQ QUESTIONS 1-9: 0
SUM OF ALL RESPONSES TO PHQ9 QUESTIONS 1 & 2: 0
SUM OF ALL RESPONSES TO PHQ QUESTIONS 1-9: 0

## 2024-09-10 ASSESSMENT — ENCOUNTER SYMPTOMS
DIARRHEA: 1
COUGH: 0
ABDOMINAL DISTENTION: 0
ABDOMINAL PAIN: 0
WHEEZING: 0
SHORTNESS OF BREATH: 1
NAUSEA: 0
CHEST TIGHTNESS: 0
CONSTIPATION: 0
VOMITING: 0

## 2024-09-11 VITALS
HEIGHT: 69 IN | HEART RATE: 83 BPM | DIASTOLIC BLOOD PRESSURE: 72 MMHG | WEIGHT: 197.8 LBS | OXYGEN SATURATION: 95 % | SYSTOLIC BLOOD PRESSURE: 138 MMHG | BODY MASS INDEX: 29.3 KG/M2 | TEMPERATURE: 98.6 F

## 2024-09-11 PROBLEM — K62.5 RECTAL BLEEDING: Status: RESOLVED | Noted: 2023-09-22 | Resolved: 2024-09-11

## 2024-09-11 PROBLEM — I25.83 CORONARY ARTERY DISEASE DUE TO LIPID RICH PLAQUE: Status: RESOLVED | Noted: 2019-07-10 | Resolved: 2024-09-11

## 2024-09-11 PROBLEM — K92.2 LOWER GI BLEED: Status: RESOLVED | Noted: 2023-09-21 | Resolved: 2024-09-11

## 2024-09-11 PROBLEM — I25.10 CORONARY ARTERY DISEASE DUE TO LIPID RICH PLAQUE: Status: RESOLVED | Noted: 2019-07-10 | Resolved: 2024-09-11

## 2024-09-11 PROBLEM — R71.0 DROP IN HEMOGLOBIN: Status: RESOLVED | Noted: 2023-09-22 | Resolved: 2024-09-11

## 2024-09-19 ENCOUNTER — TELEPHONE (OUTPATIENT)
Dept: ONCOLOGY | Age: 89
End: 2024-09-19

## 2024-09-26 ENCOUNTER — HOSPITAL ENCOUNTER (OUTPATIENT)
Age: 89
Discharge: HOME | End: 2024-09-26
Payer: MEDICARE

## 2024-09-26 DIAGNOSIS — D64.9 ANEMIA, UNSPECIFIED TYPE: ICD-10-CM

## 2024-09-26 DIAGNOSIS — I50.32 CHRONIC DIASTOLIC HEART FAILURE (HCC): ICD-10-CM

## 2024-09-26 DIAGNOSIS — D47.2 MGUS (MONOCLONAL GAMMOPATHY OF UNKNOWN SIGNIFICANCE): ICD-10-CM

## 2024-09-26 DIAGNOSIS — I12.9 BENIGN HYPERTENSION WITH CKD (CHRONIC KIDNEY DISEASE), STAGE II: ICD-10-CM

## 2024-09-26 DIAGNOSIS — N18.2 BENIGN HYPERTENSION WITH CKD (CHRONIC KIDNEY DISEASE), STAGE II: ICD-10-CM

## 2024-09-26 LAB
ANION GAP SERPL CALCULATED.3IONS-SCNC: 9 MMOL/L (ref 9–17)
BASOPHILS # BLD: 0 K/UL (ref 0–0.2)
BASOPHILS NFR BLD: 1 % (ref 0–2)
BUN SERPL-MCNC: 22 MG/DL (ref 8–23)
CALCIUM SERPL-MCNC: 9.6 MG/DL (ref 8.6–10.4)
CHLORIDE SERPL-SCNC: 101 MMOL/L (ref 98–107)
CO2 SERPL-SCNC: 31 MMOL/L (ref 20–31)
CREAT SERPL-MCNC: 0.9 MG/DL (ref 0.5–0.9)
EOSINOPHIL # BLD: 0.2 K/UL (ref 0–0.4)
EOSINOPHILS RELATIVE PERCENT: 2 % (ref 0–4)
ERYTHROCYTE [DISTWIDTH] IN BLOOD BY AUTOMATED COUNT: 14.3 % (ref 11.5–14.9)
FERRITIN SERPL-MCNC: 191 NG/ML (ref 13–150)
GFR, ESTIMATED: 61 ML/MIN/1.73M2
GLUCOSE SERPL-MCNC: 219 MG/DL (ref 70–99)
HCT VFR BLD AUTO: 43 % (ref 36–46)
HGB BLD-MCNC: 14.4 G/DL (ref 12–16)
IRON SATN MFR SERPL: 30 % (ref 20–55)
IRON SERPL-MCNC: 91 UG/DL (ref 37–145)
LYMPHOCYTES NFR BLD: 2.3 K/UL (ref 1–4.8)
LYMPHOCYTES RELATIVE PERCENT: 27 % (ref 24–44)
MCH RBC QN AUTO: 31.4 PG (ref 26–34)
MCHC RBC AUTO-ENTMCNC: 33.6 G/DL (ref 31–37)
MCV RBC AUTO: 93.6 FL (ref 80–100)
MONOCYTES NFR BLD: 0.7 K/UL (ref 0.1–1.3)
MONOCYTES NFR BLD: 9 % (ref 1–7)
NEUTROPHILS NFR BLD: 61 % (ref 36–66)
NEUTS SEG NFR BLD: 5.2 K/UL (ref 1.3–9.1)
PLATELET # BLD AUTO: 203 K/UL (ref 150–450)
PMV BLD AUTO: 8.4 FL (ref 6–12)
POTASSIUM SERPL-SCNC: 4.5 MMOL/L (ref 3.7–5.3)
RBC # BLD AUTO: 4.59 M/UL (ref 4–5.2)
SODIUM SERPL-SCNC: 141 MMOL/L (ref 135–144)
TIBC SERPL-MCNC: 302 UG/DL (ref 250–450)
UNSATURATED IRON BINDING CAPACITY: 211 UG/DL (ref 112–347)
WBC OTHER # BLD: 8.5 K/UL (ref 3.5–11)

## 2024-09-26 PROCEDURE — 84155 ASSAY OF PROTEIN SERUM: CPT

## 2024-09-26 PROCEDURE — 83550 IRON BINDING TEST: CPT

## 2024-09-26 PROCEDURE — 82728 ASSAY OF FERRITIN: CPT

## 2024-09-26 PROCEDURE — 36415 COLL VENOUS BLD VENIPUNCTURE: CPT

## 2024-09-26 PROCEDURE — 83521 IG LIGHT CHAINS FREE EACH: CPT

## 2024-09-26 PROCEDURE — 85025 COMPLETE CBC W/AUTO DIFF WBC: CPT

## 2024-09-26 PROCEDURE — 80048 BASIC METABOLIC PNL TOTAL CA: CPT

## 2024-09-26 PROCEDURE — 83540 ASSAY OF IRON: CPT

## 2024-09-26 PROCEDURE — 84165 PROTEIN E-PHORESIS SERUM: CPT

## 2024-09-26 PROCEDURE — 86334 IMMUNOFIX E-PHORESIS SERUM: CPT

## 2024-09-27 LAB
ALBUMIN PERCENT: 59 % (ref 45–65)
ALBUMIN SERPL-MCNC: 4.3 G/DL (ref 3.2–5.2)
ALPHA 2 PERCENT: 12 % (ref 6–13)
ALPHA1 GLOB SERPL ELPH-MCNC: 0.3 G/DL (ref 0.1–0.4)
ALPHA1 GLOB SERPL ELPH-MCNC: 4 % (ref 3–6)
ALPHA2 GLOB SERPL ELPH-MCNC: 0.9 G/DL (ref 0.5–0.9)
B-GLOBULIN SERPL ELPH-MCNC: 0.8 G/DL (ref 0.5–1.1)
B-GLOBULIN SERPL ELPH-MCNC: 11 % (ref 11–19)
FREE KAPPA/LAMBDA RATIO: 1.47 (ref 0.22–1.74)
GAMMA GLOB SERPL ELPH-MCNC: 1 G/DL (ref 0.5–1.5)
GAMMA GLOBULIN %: 14 % (ref 9–20)
ITYP INTERPRETATION: ABNORMAL
KAPPA LC FREE SER-MCNC: 29.9 MG/L
LAMBDA LC FREE SERPL-MCNC: 20.4 MG/L (ref 4.2–27.7)
PATH REV: ABNORMAL
PATHOLOGIST: ABNORMAL
PROT PATTERN SERPL ELPH-IMP: ABNORMAL
PROT SERPL-MCNC: 7.2 G/DL (ref 6.6–8.7)
TOTAL PROT. SUM,%: 100 % (ref 98–102)
TOTAL PROT. SUM: 7.3 G/DL (ref 6.3–8.2)

## 2024-10-02 ENCOUNTER — OFFICE VISIT (OUTPATIENT)
Dept: ONCOLOGY | Age: 89
End: 2024-10-02
Payer: MEDICARE

## 2024-10-02 VITALS
HEART RATE: 65 BPM | DIASTOLIC BLOOD PRESSURE: 53 MMHG | OXYGEN SATURATION: 97 % | RESPIRATION RATE: 18 BRPM | SYSTOLIC BLOOD PRESSURE: 133 MMHG | BODY MASS INDEX: 29.11 KG/M2 | WEIGHT: 197.1 LBS | TEMPERATURE: 96.8 F

## 2024-10-02 DIAGNOSIS — Z86.711 HISTORY OF PULMONARY EMBOLISM: ICD-10-CM

## 2024-10-02 DIAGNOSIS — D47.2 MGUS (MONOCLONAL GAMMOPATHY OF UNKNOWN SIGNIFICANCE): Primary | ICD-10-CM

## 2024-10-02 PROCEDURE — 99211 OFF/OP EST MAY X REQ PHY/QHP: CPT | Performed by: INTERNAL MEDICINE

## 2024-10-02 PROCEDURE — G8427 DOCREV CUR MEDS BY ELIG CLIN: HCPCS | Performed by: INTERNAL MEDICINE

## 2024-10-02 PROCEDURE — G8417 CALC BMI ABV UP PARAM F/U: HCPCS | Performed by: INTERNAL MEDICINE

## 2024-10-02 PROCEDURE — 99214 OFFICE O/P EST MOD 30 MIN: CPT | Performed by: INTERNAL MEDICINE

## 2024-10-02 PROCEDURE — 1090F PRES/ABSN URINE INCON ASSESS: CPT | Performed by: INTERNAL MEDICINE

## 2024-10-02 PROCEDURE — 1123F ACP DISCUSS/DSCN MKR DOCD: CPT | Performed by: INTERNAL MEDICINE

## 2024-10-02 PROCEDURE — G8482 FLU IMMUNIZE ORDER/ADMIN: HCPCS | Performed by: INTERNAL MEDICINE

## 2024-10-02 PROCEDURE — 1036F TOBACCO NON-USER: CPT | Performed by: INTERNAL MEDICINE

## 2024-10-02 NOTE — PROGRESS NOTES
Laura House                                                                                                                  10/2/2024  MRN:   [id number]  YOB: 1933  PCP:                           Joann Soriano MD  Referring Physician: No ref. provider found  Treating Physician Name: YARY BALTAZAR MD      Reason for visit:  Chief Complaint   Patient presents with    Follow-up     Review status of disease     Discuss Labs       Current problems:  Anemia  Iron deficiency  B12 deficiency  GI bleed  History of pulmonary embolism   Coronary artery disease  History of colon cancer 2006 s/p surgery with no adjuvant chemotherapy    Active and recent treatments:  Oral B12 supplementation  Active surveillance for MGUS    Summary of Case/History:    Laura House a 89 y.o.female is a patient with anemia. Recent lab work showed iron deficiency anemia with fluctuations in HGB dating back to 2013 and has been consistently low since 08/2020 with worsening. She was in house 03/2021 following last lab work and dark stool, she underwent EGD and colonoscopy, gastritis and benign polyps found, negative otherwise. She received some IV iron while in house. Her lab work also showed paraproteinemia. She has history of PE and takes Xarelto and was taken off Brilinta while in house. She is taking B12 and has been taken off of Fosamax. She has history of cardiac stenting and follows with cardiology.   She has history of colon cancer and underwent resection in 2006, no recurrence.   She reports history of kidney stones and feels she passed one yesterday, 04/06/2021.   She has resumed regular Ayush-Chi.    Interim History:   Patient presents to the clinic for a follow-up visit and to discuss results of her lab workup.  Patient continues to be active and participates in ayush chi.  Complains of getting sick when she takes iron by mouth.  Hemoglobin within range.  M protein relatively stable.    During this

## 2024-10-24 ENCOUNTER — OFFICE VISIT (OUTPATIENT)
Dept: FAMILY MEDICINE CLINIC | Age: 89
End: 2024-10-24

## 2024-10-24 VITALS
BODY MASS INDEX: 28.73 KG/M2 | OXYGEN SATURATION: 98 % | SYSTOLIC BLOOD PRESSURE: 118 MMHG | HEART RATE: 61 BPM | DIASTOLIC BLOOD PRESSURE: 70 MMHG | WEIGHT: 194 LBS | HEIGHT: 69 IN

## 2024-10-24 VITALS — HEIGHT: 69 IN | WEIGHT: 192 LBS | BODY MASS INDEX: 28.44 KG/M2

## 2024-10-24 DIAGNOSIS — N18.30 BENIGN HYPERTENSION WITH CKD (CHRONIC KIDNEY DISEASE) STAGE III (HCC): ICD-10-CM

## 2024-10-24 DIAGNOSIS — H61.21 IMPACTED CERUMEN OF RIGHT EAR: ICD-10-CM

## 2024-10-24 DIAGNOSIS — I83.893 SYMPTOMATIC VARICOSE VEINS, BILATERAL: ICD-10-CM

## 2024-10-24 DIAGNOSIS — G89.29 CHRONIC PAIN OF RIGHT KNEE: Primary | ICD-10-CM

## 2024-10-24 DIAGNOSIS — E78.5 HYPERLIPIDEMIA WITH TARGET LDL LESS THAN 100: ICD-10-CM

## 2024-10-24 DIAGNOSIS — M25.561 CHRONIC PAIN OF RIGHT KNEE: Primary | ICD-10-CM

## 2024-10-24 DIAGNOSIS — I12.9 BENIGN HYPERTENSION WITH CKD (CHRONIC KIDNEY DISEASE) STAGE III (HCC): ICD-10-CM

## 2024-10-24 DIAGNOSIS — E78.5 HYPERLIPIDEMIA WITH TARGET LDL LESS THAN 70: ICD-10-CM

## 2024-10-24 RX ORDER — UBIDECARENONE 75 MG
50 CAPSULE ORAL DAILY
COMMUNITY

## 2024-10-24 RX ORDER — AMLODIPINE BESYLATE 2.5 MG/1
2.5 TABLET ORAL DAILY
Qty: 90 TABLET | Refills: 3 | Status: SHIPPED | OUTPATIENT
Start: 2024-10-24

## 2024-10-24 RX ORDER — EZETIMIBE 10 MG/1
10 TABLET ORAL NIGHTLY
Qty: 90 TABLET | Refills: 3 | Status: SHIPPED | OUTPATIENT
Start: 2024-10-24

## 2024-10-24 RX ORDER — TIZANIDINE 2 MG/1
2 TABLET ORAL NIGHTLY
Qty: 30 TABLET | Refills: 0 | Status: SHIPPED | OUTPATIENT
Start: 2024-10-24

## 2024-10-24 RX ORDER — ATORVASTATIN CALCIUM 80 MG/1
80 TABLET, FILM COATED ORAL NIGHTLY
Qty: 90 TABLET | Refills: 3 | Status: SHIPPED | OUTPATIENT
Start: 2024-10-24

## 2024-10-24 ASSESSMENT — ENCOUNTER SYMPTOMS
CONSTIPATION: 0
SHORTNESS OF BREATH: 0
BACK PAIN: 0
ABDOMINAL DISTENTION: 0
VOMITING: 0
CHEST TIGHTNESS: 0
DIARRHEA: 0
ABDOMINAL PAIN: 0
BLOOD IN STOOL: 0
COUGH: 0
NAUSEA: 0
WHEEZING: 0

## 2024-10-24 NOTE — PROGRESS NOTES
Pt in office for leg pain    Visit Information    Have you changed or started any medications since your last visit including any over-the-counter medicines, vitamins, or herbal medicines? no   Have you stopped taking any of your medications? Is so, why? -  no  Are you having any side effects from any of your medications? - no    Have you seen any other physician or provider since your last visit?  no   Have you had any other diagnostic tests since your last visit?  no   Have you been seen in the emergency room and/or had an admission in a hospital since we last saw you?  no   Have you had your routine dental cleaning in the past 6 months?  no     Do you have an active MyChart account? If no, what is the barrier?  No: pending    Patient Care Team:  Joann Soriano MD as PCP - General (Family Medicine)  Joann Soriano MD as PCP - Empaneled Provider  Salo Pak MD as Consulting Physician (Ophthalmology)  Donovan Phillips MD as Consulting Physician (Cardiology)  Ortiz Hoffmann MD as Consulting Physician (Urology)  Johan Browning MD as Consulting Physician (Cardiovascular Disease)  Harvey Martin MD as Consulting Physician (Gastroenterology)  Abdirahman Garza MD as Consulting Physician (Hematology and Oncology)    Medical History Review  Past Medical, Family, and Social History reviewed and does contribute to the patient presenting condition    Health Maintenance   Topic Date Due    COVID-19 Vaccine (8 - 2023-24 season) 09/01/2024    Colorectal Cancer Screen  11/01/2024    Annual Wellness Visit (Medicare)  01/09/2025    Lipids  09/10/2025    Depression Screen  09/10/2025    DTaP/Tdap/Td vaccine (2 - Td or Tdap) 05/03/2033    Flu vaccine  Completed    Shingles vaccine  Completed    Pneumococcal 65+ years Vaccine  Completed    Respiratory Syncytial Virus (RSV) Pregnant or age 60 yrs+  Completed    Hepatitis A vaccine  Aged Out    Hepatitis B vaccine  Aged Out    Hib vaccine  Aged Out    Polio vaccine  
Visit Information    Have you changed or started any medications since your last visit including any over-the-counter medicines, vitamins, or herbal medicines? no   Are you having any side effects from any of your medications? -  no  Have you stopped taking any of your medications? Is so, why? -  no    Have you seen any other physician or provider since your last visit? No  Have you had any other diagnostic tests since your last visit? No  Have you been seen in the emergency room and/or had an admission to a hospital since we last saw you? No  Have you had your routine dental cleaning in the past 6 months? no    Have you activated your [employer] account? If not, what are your barriers? Yes     Patient Care Team:  Joann Soriano MD as PCP - General (Family Medicine)  Joann Soriano MD as PCP - Empaneled Provider  Salo Pak MD as Consulting Physician (Ophthalmology)  Donovan Phillips MD as Consulting Physician (Cardiology)  Ortiz Hoffmann MD as Consulting Physician (Urology)  Johan Browning MD as Consulting Physician (Cardiovascular Disease)  Harvey Martin MD as Consulting Physician (Gastroenterology)  Abdirahman Garza MD as Consulting Physician (Hematology and Oncology)    Medical History Review  Past Medical, Family, and Social History reviewed and does contribute to the patient presenting condition    Health Maintenance   Topic Date Due    Colorectal Cancer Screen  11/01/2024    COVID-19 Vaccine (8 - 2023-24 season) 10/24/2025 (Originally 9/1/2024)    Annual Wellness Visit (Medicare)  01/09/2025    Lipids  09/10/2025    Depression Screen  09/10/2025    DTaP/Tdap/Td vaccine (2 - Td or Tdap) 05/03/2033    Flu vaccine  Completed    Shingles vaccine  Completed    Pneumococcal 65+ years Vaccine  Completed    Respiratory Syncytial Virus (RSV) Pregnant or age 60 yrs+  Completed    Hepatitis A vaccine  Aged Out    Hepatitis B vaccine  Aged Out    Hib vaccine  Aged Out    Polio vaccine  Aged Out    
09/26/2024 12:10 PM    CO2 31 09/26/2024 12:10 PM    BUN 22 09/26/2024 12:10 PM    CREATININE 0.9 09/26/2024 12:10 PM    GLUCOSE 219 09/26/2024 12:10 PM    GLUCOSE 170 02/14/2020 01:15 PM    CALCIUM 9.6 09/26/2024 12:10 PM        Lab Results   Component Value Date    ALT 20 09/06/2024    AST 18 09/06/2024    ALKPHOS 44 09/06/2024    BILITOT 1.5 (H) 09/06/2024       Lab Results   Component Value Date    TSH 3.25 09/06/2024       Lab Results   Component Value Date    CHOL 121 09/10/2024    CHOL 123 08/25/2023    CHOL 114 04/18/2022     Lab Results   Component Value Date    TRIG 191 (H) 09/10/2024    TRIG 180 (H) 08/25/2023    TRIG 171 (H) 04/18/2022     Lab Results   Component Value Date    HDL 36 (L) 09/10/2024    HDL 38 (L) 08/25/2023    HDL 35 (L) 04/18/2022     No components found for: \"LDLCALC\", \"LDLCHOLESTEROL\"  Lab Results   Component Value Date    CHOLHDLRATIO 3.4 09/10/2024    CHOLHDLRATIO 3.2 08/25/2023    CHOLHDLRATIO 3.3 04/18/2022         Lab Results   Component Value Date    AVFGANLR98 1178 09/06/2024     Lab Results   Component Value Date    FOLATE 8.7 09/06/2024     Lab Results   Component Value Date    VITD25 28.6 (L) 09/06/2024         No follow-ups on file.      This note was completed by using the assistance of a speech-recognition program. However, inadvertent computerized transcription errors may be present. Although every effort was made to ensure accuracy, no guarantees can be provided that every mistake has been identified and corrected by editing.      An electronic signature was used to authenticate this note.  Electronically signed by LISSY Sarmiento CNP on 10/24/2024 at 1:12 PM

## 2024-10-24 NOTE — PROGRESS NOTES
Pre-op Instructions For Out-Patient Surgery    Medication Instructions:  Please stop herbs and any supplements now (includes vitamins and minerals).    Please contact your surgeon and prescribing physician for pre-op instructions for any blood thinners. Stop Aspirin as directed.     If you have inhalers/aerosol treatments at home, please use them the morning of your surgery and bring the inhalers with you to the hospital. As well as any eye drops.     Please take the following medications the morning of your surgery with a sip of water:    Metoprolol Tartrate     Surgery Instructions:  After midnight before surgery:  Do not eat or drink anything, including water, mints, gum, and hard candy.  You may brush your teeth without swallowing.  No smoking, chewing tobacco, or street drugs.    Please shower or bathe before surgery.       Please do not wear any cologne, lotion, powder, jewelry, piercings, perfume, makeup, nail polish, hair accessories, or hair spray on the day of surgery.  Wear loose comfortable clothing.    Leave your valuables at home but bring a payment source for any after-surgery prescriptions you plan to fill at [facility].  Bring a storage case for any glasses/contacts.    An adult who is responsible for you MUST drive you home and should be with you for the first 24 hours after surgery.     The Day of Surgery:  Arrive at [facility] Surgery Entrance at the time directed by your surgeon and check in at the desk.     If you have a living will or healthcare power of , please bring a copy.    You will be taken to the pre-op holding area where you will be prepared for surgery.  A physical assessment will be performed by a nurse practitioner or house officer.  Your IV will be started and you will meet your anesthesiologist.    When you go to surgery, your family will be directed to the surgical waiting room, where the doctor should speak with them after your

## 2024-10-24 NOTE — PROGRESS NOTES
Patient informed writer of appointment with [facility] Physician Cardiology on 10/25/24. At this appointment, patient will be having device interrogation.

## 2024-10-25 ENCOUNTER — HOSPITAL ENCOUNTER (OUTPATIENT)
Dept: PREADMISSION TESTING | Age: 89
Discharge: HOME | End: 2024-10-29

## 2024-11-05 ENCOUNTER — ANESTHESIA EVENT (OUTPATIENT)
Dept: OPERATING ROOM | Age: 89
End: 2024-11-05
Payer: MEDICARE

## 2024-11-06 ENCOUNTER — HOSPITAL ENCOUNTER (OUTPATIENT)
Age: 89
Setting detail: OUTPATIENT SURGERY
Discharge: HOME | End: 2024-11-06
Attending: OPHTHALMOLOGY | Admitting: OPHTHALMOLOGY
Payer: MEDICARE

## 2024-11-06 ENCOUNTER — ANESTHESIA (OUTPATIENT)
Dept: OPERATING ROOM | Age: 89
End: 2024-11-06
Payer: MEDICARE

## 2024-11-06 VITALS
TEMPERATURE: 97.2 F | HEART RATE: 71 BPM | HEIGHT: 69 IN | RESPIRATION RATE: 16 BRPM | OXYGEN SATURATION: 96 % | BODY MASS INDEX: 28.73 KG/M2 | WEIGHT: 194 LBS | SYSTOLIC BLOOD PRESSURE: 185 MMHG | DIASTOLIC BLOOD PRESSURE: 62 MMHG

## 2024-11-06 LAB — GLUCOSE BLD-MCNC: 160 MG/DL (ref 65–105)

## 2024-11-06 PROCEDURE — V2632 POST CHMBR INTRAOCULAR LENS: HCPCS | Performed by: OPHTHALMOLOGY

## 2024-11-06 PROCEDURE — 6370000000 HC RX 637 (ALT 250 FOR IP): Performed by: OPHTHALMOLOGY

## 2024-11-06 PROCEDURE — 2500000003 HC RX 250 WO HCPCS: Performed by: ANESTHESIOLOGY

## 2024-11-06 PROCEDURE — 3600000012 HC SURGERY LEVEL 2 ADDTL 15MIN: Performed by: OPHTHALMOLOGY

## 2024-11-06 PROCEDURE — 6360000002 HC RX W HCPCS: Performed by: OPHTHALMOLOGY

## 2024-11-06 PROCEDURE — 2580000003 HC RX 258: Performed by: OPHTHALMOLOGY

## 2024-11-06 PROCEDURE — 3700000000 HC ANESTHESIA ATTENDED CARE: Performed by: OPHTHALMOLOGY

## 2024-11-06 PROCEDURE — 2500000003 HC RX 250 WO HCPCS: Performed by: OPHTHALMOLOGY

## 2024-11-06 PROCEDURE — 7100000011 HC PHASE II RECOVERY - ADDTL 15 MIN: Performed by: OPHTHALMOLOGY

## 2024-11-06 PROCEDURE — 2709999900 HC NON-CHARGEABLE SUPPLY: Performed by: OPHTHALMOLOGY

## 2024-11-06 PROCEDURE — 3700000001 HC ADD 15 MINUTES (ANESTHESIA): Performed by: OPHTHALMOLOGY

## 2024-11-06 PROCEDURE — 3600000002 HC SURGERY LEVEL 2 BASE: Performed by: OPHTHALMOLOGY

## 2024-11-06 PROCEDURE — 82947 ASSAY GLUCOSE BLOOD QUANT: CPT

## 2024-11-06 PROCEDURE — 7100000010 HC PHASE II RECOVERY - FIRST 15 MIN: Performed by: OPHTHALMOLOGY

## 2024-11-06 DEVICE — STERILE UV AND BLUE LIGHT FILTERING ACRYLIC FOLDABLE ASPHERIC POSTERIOR CHAMBER INTRAOCULAR LENS
Type: IMPLANTABLE DEVICE | Site: EYE | Status: FUNCTIONAL
Brand: CLAREON

## 2024-11-06 RX ORDER — EPINEPHRINE 1 MG/ML
INJECTION, SOLUTION, CONCENTRATE INTRAVENOUS PRN
Status: DISCONTINUED | OUTPATIENT
Start: 2024-11-06 | End: 2024-11-06 | Stop reason: ALTCHOICE

## 2024-11-06 RX ORDER — PROPARACAINE HYDROCHLORIDE 5 MG/ML
1 SOLUTION/ DROPS OPHTHALMIC EVERY 5 MIN PRN
Status: COMPLETED | OUTPATIENT
Start: 2024-11-06 | End: 2024-11-06

## 2024-11-06 RX ORDER — LIDOCAINE HYDROCHLORIDE 10 MG/ML
1 INJECTION, SOLUTION EPIDURAL; INFILTRATION; INTRACAUDAL; PERINEURAL
Status: COMPLETED | OUTPATIENT
Start: 2024-11-06 | End: 2024-11-06

## 2024-11-06 RX ORDER — LIDOCAINE HYDROCHLORIDE 10 MG/ML
INJECTION, SOLUTION EPIDURAL; INFILTRATION; INTRACAUDAL; PERINEURAL PRN
Status: DISCONTINUED | OUTPATIENT
Start: 2024-11-06 | End: 2024-11-06 | Stop reason: ALTCHOICE

## 2024-11-06 RX ORDER — KETOROLAC TROMETHAMINE 5 MG/ML
1 SOLUTION OPHTHALMIC
Status: COMPLETED | OUTPATIENT
Start: 2024-11-06 | End: 2024-11-06

## 2024-11-06 RX ORDER — SODIUM CHLORIDE 0.9 % (FLUSH) 0.9 %
5-40 SYRINGE (ML) INJECTION PRN
Status: DISCONTINUED | OUTPATIENT
Start: 2024-11-06 | End: 2024-11-06 | Stop reason: HOSPADM

## 2024-11-06 RX ORDER — SODIUM CHLORIDE 9 MG/ML
INJECTION, SOLUTION INTRAVENOUS PRN
Status: DISCONTINUED | OUTPATIENT
Start: 2024-11-06 | End: 2024-11-06 | Stop reason: HOSPADM

## 2024-11-06 RX ORDER — SODIUM CHLORIDE 0.9 % (FLUSH) 0.9 %
5-40 SYRINGE (ML) INJECTION EVERY 12 HOURS SCHEDULED
Status: DISCONTINUED | OUTPATIENT
Start: 2024-11-06 | End: 2024-11-06 | Stop reason: HOSPADM

## 2024-11-06 RX ORDER — TETRACAINE HYDROCHLORIDE 5 MG/ML
SOLUTION OPHTHALMIC PRN
Status: DISCONTINUED | OUTPATIENT
Start: 2024-11-06 | End: 2024-11-06 | Stop reason: ALTCHOICE

## 2024-11-06 RX ORDER — CIPROFLOXACIN HYDROCHLORIDE 3.5 MG/ML
SOLUTION/ DROPS TOPICAL PRN
Status: DISCONTINUED | OUTPATIENT
Start: 2024-11-06 | End: 2024-11-06 | Stop reason: ALTCHOICE

## 2024-11-06 RX ORDER — CYCLOPENTOLATE HYDROCHLORIDE 10 MG/ML
1 SOLUTION/ DROPS OPHTHALMIC PRN
Status: COMPLETED | OUTPATIENT
Start: 2024-11-06 | End: 2024-11-06

## 2024-11-06 RX ORDER — SODIUM CHLORIDE 9 MG/ML
INJECTION, SOLUTION INTRAVENOUS CONTINUOUS
Status: DISCONTINUED | OUTPATIENT
Start: 2024-11-06 | End: 2024-11-06 | Stop reason: HOSPADM

## 2024-11-06 RX ORDER — PREDNISOLONE ACETATE 10 MG/ML
SUSPENSION/ DROPS OPHTHALMIC PRN
Status: DISCONTINUED | OUTPATIENT
Start: 2024-11-06 | End: 2024-11-06 | Stop reason: ALTCHOICE

## 2024-11-06 RX ADMIN — KETOROLAC TROMETHAMINE 1 DROP: 0.5 SOLUTION OPHTHALMIC at 09:01

## 2024-11-06 RX ADMIN — CYCLOPENTOLATE HYDROCHLORIDE 1 DROP: 10 SOLUTION OPHTHALMIC at 09:07

## 2024-11-06 RX ADMIN — Medication 1 DROP: at 09:22

## 2024-11-06 RX ADMIN — Medication 1 DROP: at 09:07

## 2024-11-06 RX ADMIN — Medication 1 DROP: at 09:15

## 2024-11-06 RX ADMIN — CYCLOPENTOLATE HYDROCHLORIDE 1 DROP: 10 SOLUTION OPHTHALMIC at 09:15

## 2024-11-06 RX ADMIN — LIDOCAINE HYDROCHLORIDE 1 ML: 10 INJECTION, SOLUTION EPIDURAL; INFILTRATION; INTRACAUDAL; PERINEURAL at 09:38

## 2024-11-06 RX ADMIN — CYCLOPENTOLATE HYDROCHLORIDE 1 DROP: 10 SOLUTION OPHTHALMIC at 09:21

## 2024-11-06 RX ADMIN — SODIUM CHLORIDE, PRESERVATIVE FREE 10 ML: 5 INJECTION INTRAVENOUS at 09:38

## 2024-11-06 ASSESSMENT — PAIN - FUNCTIONAL ASSESSMENT
PAIN_FUNCTIONAL_ASSESSMENT: NONE - DENIES PAIN
PAIN_FUNCTIONAL_ASSESSMENT: 0-10

## 2024-11-06 ASSESSMENT — ENCOUNTER SYMPTOMS
NAUSEA: 0
SINUS PRESSURE: 0
SHORTNESS OF BREATH: 0
ABDOMINAL PAIN: 0

## 2024-11-06 ASSESSMENT — PAIN DESCRIPTION - DESCRIPTORS: DESCRIPTORS: ACHING

## 2024-11-06 NOTE — OP NOTE
Operative Note      Patient: Laura House  YOB: 1933  MRN: [id number]    Date of Procedure: 11/6/2024    Pre-Op Diagnosis Codes:      * Nuclear sclerotic cataract of left eye [H25.12]    Post-Op Diagnosis: Same       Procedure(s):  EYE CATARACT EMULSIFICATION INTRAOCULAR LENS IMPLANT    Surgeon(s):  Salo Pak MD    Assistant:   * No surgical staff found *    Anesthesia: Monitor Anesthesia Care    Estimated Blood Loss (mL): Minimal    Complications: None    Specimens:   * No specimens in log *    Implants:  Implant Name Type Inv. Item Serial No.  Lot No. LRB No. Used Action   LENS CLAREON IOL 19.5D - [id number]  LENS CLAREON IOL 19.5D [id number]  Left 1 Implanted         Drains: * No LDAs found *    Findings:  Infection Present At Time Of Surgery (PATOS) (choose all levels that have infection present):  No infection present  Other Findings: Cataract    Detailed Description of Procedure:   This is a 89-year-old woman having increasing difficulty with driving she is still driving and her best corrected vision is 20/70 in her left eye though she felt she was not having too much trouble seeing she has had significant vision loss from the cataract she has 3+ NS 3+ cortical cataract she appears understand the risk benefits alternatives of cataract surgery in her left eye and wishes to proceed.  The left eye was marked in the preoperative area.The patient was brought back into the operating suite, placed in supine position, prepped and draped in usual standard fashion. A lid speculum was placed into the left eye. Approximately 0.3 cc of plain nonpreserved lidocaine was placed onto the eye topically. A paracentesis tract was made at the 2 o'clock position with an eye knife. Then, 0.3 cc of 1% non-preserved lidocaine was placed into the anterior chamber. This was then replaced with Viscoat. Attention was directed

## 2024-11-06 NOTE — ANESTHESIA PRE PROCEDURE
Mallampati: II  TM distance: >3 FB   Neck ROM: full  Mouth opening: > = 3 FB   Dental:    (+) caps and bridge      Pulmonary:Negative Pulmonary ROS breath sounds clear to auscultation                             Cardiovascular:    (+) hypertension: no interval change, pacemaker ([employer]): pacemaker, past MI: no interval change and > 6 months, CAD: no interval change, CABG/stent (CABG and Stents X4): no interval change, dysrhythmias (Heart Block):, CHF:      ECG reviewed  Rhythm: regular  Rate: normal  Echocardiogram reviewed         Beta Blocker:  Dose within 24 Hrs      ROS comment: Left ventricle is normal in size. Mild left ventricular hypertrophy.  Global left ventricular systolic function is normal. Estimated LV EF 65--70%.  No obvious wall motion abnormality seen.  Left atrium is normal in size.  Mild mitral regurgitation.     Neuro/Psych:   (+) neuromuscular disease:, TIA, headaches:, psychiatric history:depression/anxiety             GI/Hepatic/Renal:   (+) liver disease:         ROS comment: Fatty liver.   Endo/Other:    (+) DiabetesType II DM, blood dyscrasia: anemia, arthritis:..                 Abdominal:             Vascular: negative vascular ROS.         Other Findings:         Anesthesia Plan      MAC     ASA 3     (MAC  Patient will go with no sedation)  Induction: intravenous.      Anesthetic plan and risks discussed with patient.      Plan discussed with CRNA.                  Loan Sierra MD   11/6/2024

## 2024-11-06 NOTE — H&P
side  Pulmonary:      Breath sounds: Normal breath sounds.   Abdominal:      General: Bowel sounds are normal.      Tenderness: There is no abdominal tenderness. There is no guarding.   Musculoskeletal:      Comments: Mild antalgic gait   Neurological:      Mental Status: She is alert and oriented to person, place, and time.   Psychiatric:         Mood and Affect: Mood normal.        PROVISIONAL DIAGNOSES / SURGERY:        EYE CATARACT EMULSIFICATION INTRAOCULAR LENS IMPLANT    Pre-Op Diagnosis Codes:      * Nuclear sclerotic cataract of left eye [H25.12]     Patient Active Problem List    Diagnosis Date Noted    History of coronary artery bypass graft x 3 07/02/2020    Fatty liver 08/23/2022    Pancreatic cyst 08/15/2022    Ventral hernia without obstruction or gangrene 08/13/2022    Varicose veins of both legs with edema 08/15/2024    Chronic pain of right knee 08/15/2024    History of bilateral knee replacement 08/15/2024    Chronic midline low back pain without sciatica 05/13/2024    Vitamin D deficiency 05/13/2024    Chronic midline thoracic back pain 05/13/2024    Chronic pain of both shoulders 05/13/2024    DDD (degenerative disc disease), cervical 05/10/2024    DDD (degenerative disc disease), lumbar 05/10/2024    DDD (degenerative disc disease), thoracic 05/10/2024    Pacemaker 12/04/2023    AV block, 2nd degree 11/30/2023    Abdominal discomfort 09/22/2023    Hx of long term use of blood thinners 09/22/2023    Incontinence of feces with fecal urgency 04/10/2022    Status post colonoscopy with polypectomy 06/09/2021    History of TIA (transient ischemic attack) 06/09/2021    Mild aortic stenosis 06/09/2021    Personal history of colon cancer 04/12/2021    Chronic diastolic heart failure (HCC) 03/25/2021    MGUS (monoclonal gammopathy of unknown significance) 03/18/2021    Anemia 12/14/2020    Seborrheic dermatitis 12/14/2020    Bilateral hearing loss 08/28/2020    History of coronary artery stent placement

## 2024-11-07 NOTE — ANESTHESIA POSTPROCEDURE EVALUATION
Department of Anesthesiology  Postprocedure Note    Patient: Laura House  MRN: [id number]  YOB: 1933  Date of evaluation: 11/6/2024    Procedure Summary       Date: 11/06/24 Room / Location: [address]    Anesthesia Start: 1025 Anesthesia Stop: 1054    Procedure: EYE CATARACT EMULSIFICATION INTRAOCULAR LENS IMPLANT (Left: Eye) Diagnosis:       Nuclear sclerotic cataract of left eye      (Nuclear sclerotic cataract of left eye [H25.12])    Surgeons: Salo Pak MD Responsible Provider: Loan Sierra MD    Anesthesia Type: MAC ASA Status: 3            Anesthesia Type: MAC    Kyle Phase I: Kyle Score: 10    Ykle Phase II: Kyle Score: 10    Anesthesia Post Evaluation    Comments: POST- ANESTHESIA EVALUATION       Pt Name: Laura House  MRN: [id number]  YOB: 1933  Date of evaluation: 11/6/2024  Time:  7:56 PM      BP (!) 185/62   Pulse 71   Temp 97.2 °F (36.2 °C) (Infrared)   Resp 16   Ht 1.753 m (5' 9\")   Wt 88 kg (194 lb)   SpO2 96%   BMI 28.65 kg/m²      Consciousness Level  Awake  Cardiopulmonary Status  Stable  Pain Adequately Treated YES  Nausea / Vomiting  NO  Adequate Hydration  YES  Anesthesia Related Complications NONE      Electronically signed by Loan Sierra MD on 11/6/2024 at 7:56 PM           No notable events documented.

## 2024-11-12 ENCOUNTER — HOSPITAL ENCOUNTER (OUTPATIENT)
Dept: MRI IMAGING | Age: 89
Discharge: HOME | End: 2024-11-14
Payer: MEDICARE

## 2024-11-12 DIAGNOSIS — M25.561 CHRONIC PAIN OF RIGHT KNEE: ICD-10-CM

## 2024-11-12 DIAGNOSIS — G89.29 CHRONIC PAIN OF RIGHT KNEE: ICD-10-CM

## 2024-11-12 PROCEDURE — 73721 MRI JNT OF LWR EXTRE W/O DYE: CPT

## 2024-11-19 LAB
LEFT VENTRICULAR EJECTION FRACTION MODE: NORMAL
LV EF: 60 % (ref 60–65)

## 2024-11-20 DIAGNOSIS — I83.893 SYMPTOMATIC VARICOSE VEINS, BILATERAL: ICD-10-CM

## 2024-11-20 DIAGNOSIS — M25.561 CHRONIC PAIN OF RIGHT KNEE: ICD-10-CM

## 2024-11-20 DIAGNOSIS — G89.29 CHRONIC PAIN OF RIGHT KNEE: ICD-10-CM

## 2024-11-20 RX ORDER — TIZANIDINE 2 MG/1
2 TABLET ORAL NIGHTLY
Qty: 30 TABLET | Refills: 0 | Status: SHIPPED | OUTPATIENT
Start: 2024-11-20

## 2024-11-20 NOTE — TELEPHONE ENCOUNTER
Please Approve or Refuse.  Send to Pharmacy per Pt's Request: [facility]     Next Visit Date:  1/10/2025   Last Visit Date: 10/24/2024    Hemoglobin A1C (%)   Date Value   09/10/2024 7.5 (H)   05/09/2024 6.9   11/30/2023 6.3             ( goal A1C is < 7)   BP Readings from Last 3 Encounters:   11/06/24 (!) 185/62   10/24/24 118/70   10/02/24 (!) 133/53          (goal 120/80)  BUN   Date Value Ref Range Status   09/26/2024 22 8 - 23 mg/dL Final     Creatinine   Date Value Ref Range Status   09/26/2024 0.9 0.5 - 0.9 mg/dL Final     Potassium   Date Value Ref Range Status   09/26/2024 4.5 3.7 - 5.3 mmol/L Final

## 2024-12-17 ENCOUNTER — HOSPITAL ENCOUNTER (OUTPATIENT)
Age: 88
Discharge: HOME | End: 2024-12-17
Payer: MEDICARE

## 2024-12-17 LAB
ANION GAP SERPL CALCULATED.3IONS-SCNC: 13 MMOL/L (ref 9–16)
BUN SERPL-MCNC: 16 MG/DL (ref 8–23)
CALCIUM SERPL-MCNC: 9.7 MG/DL (ref 8.6–10.4)
CHLORIDE SERPL-SCNC: 100 MMOL/L (ref 98–107)
CO2 SERPL-SCNC: 29 MMOL/L (ref 20–31)
CREAT SERPL-MCNC: 0.8 MG/DL (ref 0.7–1.2)
D DIMER PPP FEU-MCNC: 0.37 UG/ML FEU (ref 0–0.59)
GFR, ESTIMATED: 70 ML/MIN/1.73M2
GLUCOSE SERPL-MCNC: 162 MG/DL (ref 74–99)
MAGNESIUM SERPL-MCNC: 1.9 MG/DL (ref 1.7–2.3)
POTASSIUM SERPL-SCNC: 3.8 MMOL/L (ref 3.7–5.3)
SODIUM SERPL-SCNC: 142 MMOL/L (ref 136–145)

## 2024-12-17 PROCEDURE — 85379 FIBRIN DEGRADATION QUANT: CPT

## 2024-12-17 PROCEDURE — 80048 BASIC METABOLIC PNL TOTAL CA: CPT

## 2024-12-17 PROCEDURE — 83735 ASSAY OF MAGNESIUM: CPT

## 2024-12-17 PROCEDURE — 36415 COLL VENOUS BLD VENIPUNCTURE: CPT

## 2024-12-27 DIAGNOSIS — G89.29 CHRONIC PAIN OF RIGHT KNEE: ICD-10-CM

## 2024-12-27 DIAGNOSIS — M25.561 CHRONIC PAIN OF RIGHT KNEE: ICD-10-CM

## 2024-12-27 DIAGNOSIS — I83.893 SYMPTOMATIC VARICOSE VEINS, BILATERAL: ICD-10-CM

## 2024-12-27 RX ORDER — TIZANIDINE 2 MG/1
2 TABLET ORAL NIGHTLY
Qty: 30 TABLET | Refills: 0 | Status: SHIPPED | OUTPATIENT
Start: 2024-12-27

## 2024-12-27 NOTE — TELEPHONE ENCOUNTER
Please Approve or Refuse.  Send to Pharmacy per Pt's Request:      Next Visit Date:  1/10/2025   Last Visit Date: 10/24/2024    Hemoglobin A1C (%)   Date Value   09/10/2024 7.5 (H)   05/09/2024 6.9   11/30/2023 6.3             ( goal A1C is < 7)   BP Readings from Last 3 Encounters:   11/06/24 (!) 185/62   10/24/24 118/70   10/02/24 (!) 133/53          (goal 120/80)  BUN   Date Value Ref Range Status   12/17/2024 16 8 - 23 mg/dL Final     Creatinine   Date Value Ref Range Status   12/17/2024 0.8 0.7 - 1.2 mg/dL Final     Potassium   Date Value Ref Range Status   12/17/2024 3.8 3.7 - 5.3 mmol/L Final

## 2025-01-06 ENCOUNTER — TELEPHONE (OUTPATIENT)
Dept: FAMILY MEDICINE CLINIC | Age: 89
End: 2025-01-06

## 2025-01-06 NOTE — TELEPHONE ENCOUNTER
----- Message from Henrik NICHOLAS sent at 1/6/2025  2:17 PM EST -----  Regarding: [facility] Appointment Request  [facility] Appointment Request    Patient needs appointment for [facility] Appointment Type: Annual Visit.    Patient Requested Dates(s): 1/10/2025  Patient Requested Time: 9 am to 10 am  Provider Name: Joann Soriano MD        Reason for Appointment Request: Other pt has an appointment dated on 01/10/2025 but the patients has another appointment .. Pt would like to get in earlier but there was limited time   --------------------------------------------------------------------------------------------------------------------------    Relationship to Patient: Self     Call Back Information: OK to leave message on voicemail  Preferred Call Back Number: Phone [phone] (home)

## 2025-01-09 ENCOUNTER — HOSPITAL ENCOUNTER (OUTPATIENT)
Age: 89
Discharge: HOME | End: 2025-01-09
Payer: MEDICARE

## 2025-01-09 LAB
BNP SERPL-MCNC: 233 PG/ML (ref 0–300)
CHOLEST SERPL-MCNC: 132 MG/DL (ref 0–199)
CHOLESTEROL/HDL RATIO: 3.7
CREAT UR-MCNC: 33.2 MG/DL (ref 28–217)
EST. AVERAGE GLUCOSE BLD GHB EST-MCNC: 171 MG/DL
HBA1C MFR BLD: 7.6 % (ref 4–6)
HDLC SERPL-MCNC: 36 MG/DL
LDLC SERPL CALC-MCNC: 55 MG/DL (ref 0–100)
MAGNESIUM SERPL-MCNC: 1.9 MG/DL (ref 1.7–2.3)
MICROALBUMIN UR-MCNC: 22 MG/L (ref 0–20)
MICROALBUMIN/CREAT UR-RTO: 66 MCG/MG CREAT (ref 0–25)
TRIGL SERPL-MCNC: 204 MG/DL (ref 0–149)

## 2025-01-09 PROCEDURE — 36415 COLL VENOUS BLD VENIPUNCTURE: CPT

## 2025-01-09 PROCEDURE — 83735 ASSAY OF MAGNESIUM: CPT

## 2025-01-09 PROCEDURE — 82570 ASSAY OF URINE CREATININE: CPT

## 2025-01-09 PROCEDURE — 80061 LIPID PANEL: CPT

## 2025-01-09 PROCEDURE — 82043 UR ALBUMIN QUANTITATIVE: CPT

## 2025-01-09 PROCEDURE — 83880 ASSAY OF NATRIURETIC PEPTIDE: CPT

## 2025-01-09 PROCEDURE — 83036 HEMOGLOBIN GLYCOSYLATED A1C: CPT

## 2025-01-09 NOTE — RESULT ENCOUNTER NOTE
Will discuss at appointment Hemoglobin A1c 7.6, relatively stable from prior 7.5  Small amount of proteins in the urine    continue current medications  Future Appointments  1/10/2025  1:30 PM    Joann Soriano MD    [facility]  4/2/2025   1:00 PM    Abdirahman Garza MD         [facility]        [facility]

## 2025-01-10 ENCOUNTER — OFFICE VISIT (OUTPATIENT)
Dept: FAMILY MEDICINE CLINIC | Age: 89
End: 2025-01-10

## 2025-01-10 VITALS
WEIGHT: 186.6 LBS | SYSTOLIC BLOOD PRESSURE: 128 MMHG | DIASTOLIC BLOOD PRESSURE: 70 MMHG | TEMPERATURE: 98.6 F | OXYGEN SATURATION: 98 % | BODY MASS INDEX: 27.64 KG/M2 | HEART RATE: 68 BPM | HEIGHT: 69 IN

## 2025-01-10 DIAGNOSIS — I12.9 BENIGN HYPERTENSION WITH CKD (CHRONIC KIDNEY DISEASE), STAGE II: ICD-10-CM

## 2025-01-10 DIAGNOSIS — N18.2 TYPE 2 DIABETES MELLITUS WITH STAGE 2 CHRONIC KIDNEY DISEASE, WITHOUT LONG-TERM CURRENT USE OF INSULIN (HCC): ICD-10-CM

## 2025-01-10 DIAGNOSIS — M1A.4790 OTHER SECONDARY CHRONIC GOUT OF FOOT WITHOUT TOPHUS, UNSPECIFIED LATERALITY: ICD-10-CM

## 2025-01-10 DIAGNOSIS — Z00.00 MEDICARE ANNUAL WELLNESS VISIT, SUBSEQUENT: Primary | ICD-10-CM

## 2025-01-10 DIAGNOSIS — E55.9 VITAMIN D DEFICIENCY: ICD-10-CM

## 2025-01-10 DIAGNOSIS — Z79.01 CHRONIC ANTICOAGULATION: ICD-10-CM

## 2025-01-10 DIAGNOSIS — N18.2 BENIGN HYPERTENSION WITH CKD (CHRONIC KIDNEY DISEASE), STAGE II: ICD-10-CM

## 2025-01-10 DIAGNOSIS — E11.22 TYPE 2 DIABETES MELLITUS WITH STAGE 2 CHRONIC KIDNEY DISEASE, WITHOUT LONG-TERM CURRENT USE OF INSULIN (HCC): ICD-10-CM

## 2025-01-10 DIAGNOSIS — I48.0 PAROXYSMAL ATRIAL FIBRILLATION (HCC): ICD-10-CM

## 2025-01-10 DIAGNOSIS — I50.32 CHRONIC DIASTOLIC HEART FAILURE (HCC): ICD-10-CM

## 2025-01-10 DIAGNOSIS — M85.80 OSTEOPENIA DETERMINED BY X-RAY: ICD-10-CM

## 2025-01-10 PROBLEM — I38 HEART VALVE DISEASE: Status: ACTIVE | Noted: 2024-10-22

## 2025-01-10 PROBLEM — I73.9 PERIPHERAL ARTERIAL DISEASE (HCC): Status: ACTIVE | Noted: 2024-10-22

## 2025-01-10 RX ORDER — FUROSEMIDE 40 MG/1
60 TABLET ORAL EVERY MORNING
Qty: 135 TABLET | Refills: 3
Start: 2025-01-10

## 2025-01-10 RX ORDER — ERGOCALCIFEROL 1.25 MG/1
50000 CAPSULE, LIQUID FILLED ORAL WEEKLY
Qty: 4 CAPSULE | Refills: 2 | Status: SHIPPED | OUTPATIENT
Start: 2025-01-10 | End: 2025-03-29

## 2025-01-10 RX ORDER — GLIMEPIRIDE 1 MG/1
1 TABLET ORAL 2 TIMES DAILY
Qty: 180 TABLET | Refills: 3 | Status: SHIPPED | OUTPATIENT
Start: 2025-01-10

## 2025-01-10 RX ORDER — AMLODIPINE BESYLATE 2.5 MG/1
2.5 TABLET ORAL DAILY
Qty: 90 TABLET | Refills: 3 | Status: SHIPPED | OUTPATIENT
Start: 2025-01-10

## 2025-01-10 RX ORDER — AMOXICILLIN 500 MG/1
CAPSULE ORAL
COMMUNITY
Start: 2025-01-08

## 2025-01-10 RX ORDER — ALENDRONATE SODIUM 35 MG/1
35 TABLET ORAL
Qty: 12 TABLET | Refills: 3 | Status: SHIPPED | OUTPATIENT
Start: 2025-01-10

## 2025-01-10 RX ORDER — RIVAROXABAN 20 MG/1
20 TABLET, FILM COATED ORAL
Qty: 90 TABLET | Refills: 3 | Status: SHIPPED | OUTPATIENT
Start: 2025-01-10

## 2025-01-10 RX ORDER — POTASSIUM CHLORIDE 1500 MG/1
20 TABLET, EXTENDED RELEASE ORAL 2 TIMES DAILY
Qty: 180 TABLET | Refills: 3
Start: 2025-01-10

## 2025-01-10 RX ORDER — ALLOPURINOL 100 MG/1
100 TABLET ORAL 2 TIMES DAILY
Qty: 180 TABLET | Refills: 3 | Status: SHIPPED | OUTPATIENT
Start: 2025-01-10

## 2025-01-10 RX ORDER — RIVAROXABAN 20 MG/1
TABLET, FILM COATED ORAL
COMMUNITY
Start: 2025-01-06 | End: 2025-01-10 | Stop reason: SDUPTHER

## 2025-01-10 SDOH — ECONOMIC STABILITY: FOOD INSECURITY: WITHIN THE PAST 12 MONTHS, YOU WORRIED THAT YOUR FOOD WOULD RUN OUT BEFORE YOU GOT MONEY TO BUY MORE.: NEVER TRUE

## 2025-01-10 SDOH — ECONOMIC STABILITY: FOOD INSECURITY: WITHIN THE PAST 12 MONTHS, THE FOOD YOU BOUGHT JUST DIDN'T LAST AND YOU DIDN'T HAVE MONEY TO GET MORE.: NEVER TRUE

## 2025-01-10 ASSESSMENT — PATIENT HEALTH QUESTIONNAIRE - PHQ9
SUM OF ALL RESPONSES TO PHQ QUESTIONS 1-9: 0
SUM OF ALL RESPONSES TO PHQ QUESTIONS 1-9: 0
SUM OF ALL RESPONSES TO PHQ9 QUESTIONS 1 & 2: 0
SUM OF ALL RESPONSES TO PHQ QUESTIONS 1-9: 0
1. LITTLE INTEREST OR PLEASURE IN DOING THINGS: NOT AT ALL
2. FEELING DOWN, DEPRESSED OR HOPELESS: NOT AT ALL
SUM OF ALL RESPONSES TO PHQ QUESTIONS 1-9: 0

## 2025-01-10 NOTE — ASSESSMENT & PLAN NOTE
Chronic, type 2 diabetes mellitus, stable, fairly controlled type 2 diabetes mellitus  Chronic kidney disease stage II stable    Lab Results   Component Value Date    LABA1C 7.6 (H) 01/09/2025    LABA1C 7.5 (H) 09/10/2024    LABA1C 6.9 05/09/2024       Orders:    glimepiride (AMARYL) 1 MG tablet; Take 1 tablet by mouth in the morning and at bedtime Monitor blood glucose daily, if blood glucose below 80, stop taking it , take some orange juice immediately, and inform your doctor    [place] OFFICE OUTPATIENT VISIT 25 MINUTES [[id number]]    Comprehensive Metabolic Panel; Future    Uric Acid; Future    TSH; Future    Magnesium; Future    Phosphorus; Future    Vitamin B12 & Folate; Future    Patient denies hypoglycemia   High Risk Medication: GLIMEPIRIDE Use in Seniors Your older patient is receiving GLIMEPIRIDE based on claims records. The Beers criteria advise that sulfonylureas should generally be avoided in seniors due to its potential to cause severe prolonged hypoglycemia. Older patients and those who are malnourished, debilitated or have severe renal or hepatic impairment may be at the greatest risk. Sulfonylureas have a higher risk of cardiovascular events and all-cause mortality than alternative agents.  Patient Demographics    -advised home blood glucose testing  daily  -daily feet exam, Foot care: advised to wash feet daily, pat dry and apply lotion at night, avoiding between toes. Need to look at feet daily and report to a physician any signs of inflammation or skin damage  -annual dilated eye exam  -Low carb, low fat diet, increase fruits and vegetables, and exercise 4-5 times a day 30-40 minutes a day discussed, patient admits she is not always keeping a low-carb diet, will be less restrictive due to her age  -continue current treatment  -continue Aspirin 81 Mg  -continue  statin atorvastatin

## 2025-01-10 NOTE — ASSESSMENT & PLAN NOTE
Chronic, stable, on chronic anticoagulation, recently started by cardiologist  Continue metoprolol 25 Mg twice a day, and Xarelto 20 Mg daily, per cardiology  Due to high risk for falls will send letter to cardiologist, and inquire if okay to stop aspirin    Orders:    IN OFFICE OUTPATIENT VISIT 25 MINUTES [[id number]]

## 2025-01-10 NOTE — ASSESSMENT & PLAN NOTE
Chronic hypertension, well-controlled, at goal  Kidney disease stage II, stable  Will check labs  Continue current medications: Furosemide 60 Mg with potassium, metoprolol 25 Mg twice a day  Discussed low salt diet and BP and pulse monitoring.    Orders:    [place] OFFICE OUTPATIENT VISIT 25 MINUTES [[id number]]    furosemide (LASIX) 40 MG tablet; Take 1.5 tablets by mouth every morning Take with potassium. Dose increased 9/10/2024    potassium chloride (KLOR-CON M) 20 MEQ extended release tablet; Take 1 tablet by mouth 2 times daily TAKE WITH FUROSEMIDE. Dose increased 1/10/2025    Comprehensive Metabolic Panel; Future    Uric Acid; Future    TSH; Future    Magnesium; Future    Phosphorus; Future

## 2025-01-10 NOTE — PROGRESS NOTES
Visit Information    Have you changed or started any medications since your last visit including any over-the-counter medicines, vitamins, or herbal medicines? yes -    Have you stopped taking any of your medications? Is so, why? -  yes -   Are you having any side effects from any of your medications? - no    Have you seen any other physician or provider since your last visit?  yes -    Have you had any other diagnostic tests since your last visit?  yes -    Have you been seen in the emergency room and/or had an admission in a hospital since we last saw you?  yes -    Have you had your routine dental cleaning in the past 6 months?  no     Do you have an active MyChart account? If no, what is the barrier?  Yes    Patient Care Team:  Joann Soriano MD as PCP - General (Family Medicine)  Joann Soriano MD as PCP - Empaneled Provider  Salo Pak MD as Consulting Physician (Ophthalmology)  Donovan Phillips MD as Consulting Physician (Cardiology)  Ortiz Hoffmann MD as Consulting Physician (Urology)  Johan Browning MD as Consulting Physician (Cardiovascular Disease)  Harvey Martin MD as Consulting Physician (Gastroenterology)  Abdirahman Garza MD as Consulting Physician (Hematology and Oncology)    Medical History Review  Past Medical, Family, and Social History reviewed and does contribute to the patient presenting condition    Health Maintenance   Topic Date Due    Colorectal Cancer Screen  11/01/2024    Annual Wellness Visit (Medicare)  01/09/2025    COVID-19 Vaccine (8 - 2023-24 season) 10/24/2025 (Originally 9/1/2024)    Depression Screen  09/10/2025    Lipids  01/09/2026    DTaP/Tdap/Td vaccine (2 - Td or Tdap) 05/03/2033    Flu vaccine  Completed    Shingles vaccine  Completed    Pneumococcal 65+ years Vaccine  Completed    Respiratory Syncytial Virus (RSV) Pregnant or age 60 yrs+  Completed    Hepatitis A vaccine  Aged Out    Hepatitis B vaccine  Aged Out    Hib vaccine  Aged Out    Polio

## 2025-01-10 NOTE — ASSESSMENT & PLAN NOTE
Ongoing  Continue vitamin D supplementation  Lab Results   Component Value Date    VITD25 28.6 (L) 09/06/2024         Orders:    [place] OFFICE OUTPATIENT VISIT 25 MINUTES [[id number]]    vitamin D (ERGOCALCIFEROL) 1.25 MG (12040 UT) CAPS capsule; Take 1 capsule by mouth once a week for 12 doses

## 2025-01-10 NOTE — ASSESSMENT & PLAN NOTE
Chronic, stable  Continue furosemide 60 Mg daily, with potassium, and metoprolol 25 Mg twice a day, check labs  Follow-up with cardiologist as scheduled  Orders:    [place] OFFICE OUTPATIENT VISIT 25 MINUTES [[id number]]    furosemide (LASIX) 40 MG tablet; Take 1.5 tablets by mouth every morning Take with potassium. Dose increased 9/10/2024    potassium chloride (KLOR-CON M) 20 MEQ extended release tablet; Take 1 tablet by mouth 2 times daily TAKE WITH FUROSEMIDE. Dose increased 1/10/2025    Brain Natriuretic Peptide; Future    Ejection Fraction Percentage      EF 11/19/2024 showed 60 to 65%    Lab Results   Component Value Date    LVEF 60 11/19/2024    LVEFMODE Echo 11/19/2024

## 2025-01-10 NOTE — PROGRESS NOTES
Medicare Annual Wellness Visit    Laura House is here for Medicare AWV, Medication Refill (DISCUSS XARELTO/SENT TO MAIL ORDER PHARMACY), Diabetes, Hand Pain (LEFT HAND/VEIN WILL BECOME PAINFUL AT TIMES), and Hypertension    Assessment & Plan   Medicare annual wellness visit, subsequent  Type 2 diabetes mellitus with stage 2 chronic kidney disease, without long-term current use of insulin ([facility]ly [facility])  Assessment & Plan:         Lab Results   Component Value Date    LABA1C 7.6 (H) 01/09/2025    LABA1C 7.5 (H) 09/10/2024    LABA1C 6.9 05/09/2024       Orders:    glimepiride (AMARYL) 1 MG tablet; Take 1 tablet by mouth in the morning and at bedtime Monitor blood glucose daily, if blood glucose below 80, stop taking it , take some orange juice immediately, and inform your doctor    [place] OFFICE OUTPATIENT VISIT 25 MINUTES [[id number]]    Comprehensive Metabolic Panel; Future    Uric Acid; Future    TSH; Future    Magnesium; Future    Phosphorus; Future    Vitamin B12 & Folate; Future    Patient denies hypoglycemia   High Risk Medication: GLIMEPIRIDE Use in Seniors Your older patient is receiving GLIMEPIRIDE based on claims records. The Beers criteria advise that sulfonylureas should generally be avoided in seniors due to its potential to cause severe prolonged hypoglycemia. Older patients and those who are malnourished, debilitated or have severe renal or hepatic impairment may be at the greatest risk. Sulfonylureas have a higher risk of cardiovascular events and all-cause mortality than alternative agents.  Patient Demographics    Orders:  -     glimepiride (AMARYL) 1 MG tablet; Take 1 tablet by mouth in the morning and at bedtime Monitor blood glucose daily, if blood glucose below 80, stop taking it , take some orange juice immediately, and inform your doctor, Disp-180 tablet, R-3Normal  -     [place] OFFICE OUTPATIENT VISIT 25 MINUTES [[id number]]  -     Comprehensive Metabolic Panel; Future  -     Uric Acid; Future  -     TSH; Future  -

## 2025-01-10 NOTE — ASSESSMENT & PLAN NOTE
Chronic, improved, continue allopurinol, check uric acid  Lab Results   Component Value Date    URICACID 4.8 09/06/2024         Orders:    allopurinol (ZYLOPRIM) 100 MG tablet; Take 1 tablet by mouth 2 times daily    [place] OFFICE OUTPATIENT VISIT 25 MINUTES [[id number]]    Uric Acid; Future

## 2025-01-10 NOTE — ASSESSMENT & PLAN NOTE
Chronic, continue alendronate, she denies any side effects    Orders:    [place] OFFICE OUTPATIENT VISIT 25 MINUTES [[id number]]    alendronate (FOSAMAX) 35 MG tablet; Take 1 tablet by mouth every 7 days For bones SUNDAY.  Take with a full glass of water, on an empty stomach, and do not lay down for 30 minutes

## 2025-01-10 NOTE — ASSESSMENT & PLAN NOTE
recently started on Xarelto by cardiologist    Orders:    [place] OFFICE OUTPATIENT VISIT 25 MINUTES [[id number]]    XARELTO 20 MG TABS tablet; Take 1 tablet by mouth daily (with breakfast)

## 2025-01-10 NOTE — PATIENT INSTRUCTIONS
fat--butter, margarine, and shortening--you eat. Use olive, peanut, or canola oil when you cook. Bake, broil, and steam foods instead of frying them.     Eat a variety of fruit and vegetables every day. Dark green, deep orange, red, or yellow fruits and vegetables are especially good for you. Examples include spinach, carrots, peaches, and berries.     Foods high in fiber can reduce your cholesterol and provide important vitamins and minerals. High-fiber foods include whole-grain cereals and breads, oatmeal, beans, brown rice, citrus fruits, and apples.     Eat lean proteins. Heart-healthy proteins include seafood, lean meats and poultry, eggs, beans, peas, nuts, seeds, and soy products.     Limit drinks and foods with added sugar. These include candy, desserts, and soda pop.   Heart-healthy lifestyle    If your doctor recommends it, get more exercise. For many people, walking is a good choice. Or you may want to swim, bike, or do other activities. Bit by bit, increase the time you're active every day. Try for at least 30 minutes on most days of the week.     Try to quit or cut back on using tobacco and other nicotine products. This includes smoking and vaping. If you need help quitting, talk to your doctor about stop-smoking programs and medicines. These can increase your chances of quitting for good. Quitting is one of the most important things you can do to protect your heart. It is never too late to quit. Try to avoid secondhand smoke too.     Stay at a weight that's healthy for you. Talk to your doctor if you need help losing weight.     Try to get 7 to 9 hours of sleep each night.     Limit alcohol to 2 drinks a day for men and 1 drink a day for women. Too much alcohol can cause health problems.     Manage other health problems such as diabetes, high blood pressure, and high cholesterol. If you think you may have a problem with alcohol or drug use, talk to your doctor.   Medicines    Take your medicines

## 2025-01-19 PROBLEM — Z98.890 STATUS POST COLONOSCOPY WITH POLYPECTOMY: Status: RESOLVED | Noted: 2021-06-09 | Resolved: 2025-01-19

## 2025-02-12 ENCOUNTER — TELEPHONE (OUTPATIENT)
Dept: FAMILY MEDICINE CLINIC | Age: 89
End: 2025-02-12

## 2025-02-12 DIAGNOSIS — M50.30 DDD (DEGENERATIVE DISC DISEASE), CERVICAL: ICD-10-CM

## 2025-02-12 DIAGNOSIS — G89.29 CHRONIC MIDLINE LOW BACK PAIN WITHOUT SCIATICA: ICD-10-CM

## 2025-02-12 DIAGNOSIS — I83.893 SYMPTOMATIC VARICOSE VEINS, BILATERAL: ICD-10-CM

## 2025-02-12 DIAGNOSIS — M25.511 CHRONIC PAIN OF BOTH SHOULDERS: ICD-10-CM

## 2025-02-12 DIAGNOSIS — Z96.653 HISTORY OF BILATERAL KNEE REPLACEMENT: Primary | ICD-10-CM

## 2025-02-12 DIAGNOSIS — M25.512 CHRONIC PAIN OF BOTH SHOULDERS: ICD-10-CM

## 2025-02-12 DIAGNOSIS — G89.29 CHRONIC PAIN OF RIGHT KNEE: ICD-10-CM

## 2025-02-12 DIAGNOSIS — G89.29 CHRONIC PAIN OF BOTH SHOULDERS: ICD-10-CM

## 2025-02-12 DIAGNOSIS — M54.50 CHRONIC MIDLINE LOW BACK PAIN WITHOUT SCIATICA: ICD-10-CM

## 2025-02-12 DIAGNOSIS — M51.360 DEGENERATION OF INTERVERTEBRAL DISC OF LUMBAR REGION WITH DISCOGENIC BACK PAIN: ICD-10-CM

## 2025-02-12 DIAGNOSIS — M51.34 DDD (DEGENERATIVE DISC DISEASE), THORACIC: ICD-10-CM

## 2025-02-12 DIAGNOSIS — M25.561 CHRONIC PAIN OF RIGHT KNEE: ICD-10-CM

## 2025-02-12 RX ORDER — POLYETHYLENE GLYCOL 3350, SODIUM SULFATE ANHYDROUS, SODIUM BICARBONATE, SODIUM CHLORIDE, POTASSIUM CHLORIDE 236; 22.74; 6.74; 5.86; 2.97 G/4L; G/4L; G/4L; G/4L; G/4L
POWDER, FOR SOLUTION ORAL
Qty: 4000 ML | Refills: 0 | Status: SHIPPED | OUTPATIENT
Start: 2025-02-12

## 2025-02-12 RX ORDER — LIDOCAINE 40 MG/G
CREAM TOPICAL
Qty: 360 G | Refills: 3 | Status: SHIPPED | OUTPATIENT
Start: 2025-02-12

## 2025-02-12 NOTE — TELEPHONE ENCOUNTER
Office received a fax from [employer].    Diclofenac Sodium Gel 100GM 1% is discontinued. The OTC version is not covered by patients plan.     Please indicate alternative of your choice and sign and return.    Thank you.

## 2025-02-12 NOTE — TELEPHONE ENCOUNTER
Please Approve or Refuse.  Send to Pharmacy per Pt's Request: [facility]      Next Visit Date:  5/1/2025   Last Visit Date: 1/10/2025    Hemoglobin A1C (%)   Date Value   01/09/2025 7.6 (H)   09/10/2024 7.5 (H)   05/09/2024 6.9             ( goal A1C is < 7)   BP Readings from Last 3 Encounters:   01/10/25 128/70   11/06/24 (!) 185/62   10/24/24 118/70          (goal 120/80)  BUN   Date Value Ref Range Status   12/17/2024 16 8 - 23 mg/dL Final     Creatinine   Date Value Ref Range Status   12/17/2024 0.8 0.7 - 1.2 mg/dL Final     Potassium   Date Value Ref Range Status   12/17/2024 3.8 3.7 - 5.3 mmol/L Final

## 2025-02-12 NOTE — TELEPHONE ENCOUNTER
Please let the patient know to  prescription from the pharmacy.  Orders Placed This Encounter   Medications    lidocaine (LMX) 4 % cream     Si g topical 2-3 times a day as needed for pain, maximum 20 g/day     Dispense:  360 g     Refill:  3         [facility] - [place], [place] - [address] [place] [address] - P [phone] - F [phone]  [address] [facility] [address]  Phone: [phone] Fax: [phone]      No orders of the defined types were placed in this encounter.      Future Appointments   Date Time Provider Department Center   2025  1:00 PM Abdirahman Garza MD [facility]   2025  3:30 PM Joann Soriano MD [facility] DEP   2026  3:00 PM Joann Soriano MD fp [facility] DEP       Thank you!

## 2025-02-18 RX ORDER — PREDNISOLONE ACETATE 10 MG/ML
SUSPENSION/ DROPS OPHTHALMIC
Refills: 0 | OUTPATIENT
Start: 2025-02-18

## 2025-02-18 NOTE — TELEPHONE ENCOUNTER
I did not prescribe prednisone to put in the eyes, long-term can cause side effects, this is a medication prescribed by her ophthalmologist needs to contact her ophthalmologist

## 2025-02-19 RX ORDER — PREDNISOLONE ACETATE 10 MG/ML
SUSPENSION/ DROPS OPHTHALMIC
Refills: 0 | OUTPATIENT
Start: 2025-02-19

## 2025-02-19 NOTE — TELEPHONE ENCOUNTER
This prescription is coming from her ophthalmologist, to requested from ophthalmology, we do not give steroids eyedrops long-term, they can lead to cataracts or other side effect in the eyes and blindness, she needs to contact her ophthalmologist to send a refill for her

## 2025-03-28 ENCOUNTER — HOSPITAL ENCOUNTER (OUTPATIENT)
Age: 89
Discharge: HOME | End: 2025-03-28
Payer: MEDICARE

## 2025-03-28 DIAGNOSIS — Z86.711 HISTORY OF PULMONARY EMBOLISM: ICD-10-CM

## 2025-03-28 DIAGNOSIS — D47.2 MGUS (MONOCLONAL GAMMOPATHY OF UNKNOWN SIGNIFICANCE): ICD-10-CM

## 2025-03-28 LAB
ALBUMIN PERCENT: ABNORMAL %
ALBUMIN SERPL-MCNC: ABNORMAL G/DL
ALPHA 2 PERCENT: ABNORMAL %
ALPHA1 GLOB SERPL ELPH-MCNC: ABNORMAL %
ALPHA1 GLOB SERPL ELPH-MCNC: ABNORMAL G/DL
ALPHA2 GLOB SERPL ELPH-MCNC: ABNORMAL G/DL
B-GLOBULIN SERPL ELPH-MCNC: ABNORMAL %
B-GLOBULIN SERPL ELPH-MCNC: ABNORMAL G/DL
BASOPHILS # BLD: 0.1 K/UL (ref 0–0.2)
BASOPHILS NFR BLD: 1 % (ref 0–2)
EOSINOPHIL # BLD: 0.2 K/UL (ref 0–0.4)
EOSINOPHILS RELATIVE PERCENT: 2 % (ref 0–4)
ERYTHROCYTE [DISTWIDTH] IN BLOOD BY AUTOMATED COUNT: 15.1 % (ref 11.5–14.9)
FREE KAPPA/LAMBDA RATIO: 1.47 (ref 0.22–1.74)
GAMMA GLOB SERPL ELPH-MCNC: ABNORMAL G/DL
GAMMA GLOBULIN %: ABNORMAL %
HCT VFR BLD AUTO: 41.2 % (ref 36–46)
HGB BLD-MCNC: 13.9 G/DL (ref 12–16)
ITYP INTERPRETATION: ABNORMAL
KAPPA LC FREE SER-MCNC: 28.7 MG/L
LAMBDA LC FREE SERPL-MCNC: 19.5 MG/L (ref 4.2–27.7)
LYMPHOCYTES NFR BLD: 2.5 K/UL (ref 1–4.8)
LYMPHOCYTES RELATIVE PERCENT: 28 % (ref 24–44)
M PROTEIN 2 SERPL ELPH-MCNC: ABNORMAL G/DL
M PROTEIN SERPL ELPH-MCNC: ABNORMAL G/DL
MCH RBC QN AUTO: 30.3 PG (ref 26–34)
MCHC RBC AUTO-ENTMCNC: 33.7 G/DL (ref 31–37)
MCV RBC AUTO: 89.9 FL (ref 80–100)
MONOCYTES NFR BLD: 0.8 K/UL (ref 0.1–1.3)
MONOCYTES NFR BLD: 9 % (ref 1–7)
NEUTROPHILS NFR BLD: 60 % (ref 36–66)
NEUTS SEG NFR BLD: 5.3 K/UL (ref 1.3–9.1)
PATH REV: ABNORMAL
PATHOLOGIST: ABNORMAL
PLATELET # BLD AUTO: 191 K/UL (ref 150–450)
PMV BLD AUTO: 7.9 FL (ref 6–12)
PROT PATTERN SERPL ELPH-IMP: ABNORMAL
PROT SERPL-MCNC: 6.7 G/DL (ref 6.6–8.7)
RBC # BLD AUTO: 4.58 M/UL (ref 4–5.2)
TOTAL PROT. SUM,%: ABNORMAL %
TOTAL PROT. SUM: ABNORMAL G/DL
WBC OTHER # BLD: 8.9 K/UL (ref 3.5–11)

## 2025-03-28 PROCEDURE — 36415 COLL VENOUS BLD VENIPUNCTURE: CPT

## 2025-03-28 PROCEDURE — 83521 IG LIGHT CHAINS FREE EACH: CPT

## 2025-03-28 PROCEDURE — 85025 COMPLETE CBC W/AUTO DIFF WBC: CPT

## 2025-03-28 PROCEDURE — 84155 ASSAY OF PROTEIN SERUM: CPT

## 2025-03-28 PROCEDURE — 84165 PROTEIN E-PHORESIS SERUM: CPT

## 2025-03-28 PROCEDURE — 86334 IMMUNOFIX E-PHORESIS SERUM: CPT

## 2025-03-31 LAB
ALBUMIN PERCENT: 59 % (ref 56–66)
ALBUMIN SERPL-MCNC: 4 G/DL (ref 3.2–5.2)
ALPHA 2 PERCENT: 13 % (ref 7–12)
ALPHA1 GLOB SERPL ELPH-MCNC: 0.3 G/DL (ref 0.1–0.4)
ALPHA1 GLOB SERPL ELPH-MCNC: 5 % (ref 3–5)
ALPHA2 GLOB SERPL ELPH-MCNC: 0.8 G/DL (ref 0.5–0.9)
B-GLOBULIN SERPL ELPH-MCNC: 0.7 G/DL (ref 0.7–1.4)
B-GLOBULIN SERPL ELPH-MCNC: 11 % (ref 8–13)
FREE KAPPA/LAMBDA RATIO: 1.47 (ref 0.22–1.74)
GAMMA GLOB SERPL ELPH-MCNC: 0.9 G/DL (ref 0.5–1.5)
GAMMA GLOBULIN %: 13 % (ref 11–19)
ITYP INTERPRETATION: ABNORMAL
KAPPA LC FREE SER-MCNC: 28.7 MG/L
LAMBDA LC FREE SERPL-MCNC: 19.5 MG/L (ref 4.2–27.7)
PATH REV: ABNORMAL
PATHOLOGIST: ABNORMAL
PROT PATTERN SERPL ELPH-IMP: ABNORMAL
PROT SERPL-MCNC: 6.7 G/DL (ref 6.6–8.7)
TOTAL PROT. SUM,%: 101 % (ref 98–102)
TOTAL PROT. SUM: 6.7 G/DL (ref 6.3–8.2)

## 2025-04-02 ENCOUNTER — OFFICE VISIT (OUTPATIENT)
Dept: ONCOLOGY | Age: 89
End: 2025-04-02
Payer: MEDICARE

## 2025-04-02 VITALS
TEMPERATURE: 97.2 F | SYSTOLIC BLOOD PRESSURE: 133 MMHG | WEIGHT: 190 LBS | RESPIRATION RATE: 18 BRPM | HEART RATE: 62 BPM | OXYGEN SATURATION: 97 % | DIASTOLIC BLOOD PRESSURE: 78 MMHG | BODY MASS INDEX: 28.06 KG/M2

## 2025-04-02 DIAGNOSIS — D47.2 MGUS (MONOCLONAL GAMMOPATHY OF UNKNOWN SIGNIFICANCE): Primary | ICD-10-CM

## 2025-04-02 DIAGNOSIS — Z86.711 HISTORY OF PULMONARY EMBOLISM: ICD-10-CM

## 2025-04-02 DIAGNOSIS — Z79.01 ANTICOAGULATED: ICD-10-CM

## 2025-04-02 PROCEDURE — 99211 OFF/OP EST MAY X REQ PHY/QHP: CPT | Performed by: INTERNAL MEDICINE

## 2025-04-02 PROCEDURE — G8428 CUR MEDS NOT DOCUMENT: HCPCS | Performed by: INTERNAL MEDICINE

## 2025-04-02 PROCEDURE — 1090F PRES/ABSN URINE INCON ASSESS: CPT | Performed by: INTERNAL MEDICINE

## 2025-04-02 PROCEDURE — 1123F ACP DISCUSS/DSCN MKR DOCD: CPT | Performed by: INTERNAL MEDICINE

## 2025-04-02 PROCEDURE — G8417 CALC BMI ABV UP PARAM F/U: HCPCS | Performed by: INTERNAL MEDICINE

## 2025-04-02 PROCEDURE — 1036F TOBACCO NON-USER: CPT | Performed by: INTERNAL MEDICINE

## 2025-04-02 PROCEDURE — 99214 OFFICE O/P EST MOD 30 MIN: CPT | Performed by: INTERNAL MEDICINE

## 2025-04-02 PROCEDURE — 1126F AMNT PAIN NOTED NONE PRSNT: CPT | Performed by: INTERNAL MEDICINE

## 2025-04-02 NOTE — PROGRESS NOTES
not lay down for 30 minutes 12 tablet 3    furosemide (LASIX) 40 MG tablet Take 1.5 tablets by mouth every morning Take with potassium. Dose increased 9/10/2024 135 tablet 3    potassium chloride (KLOR-CON M) 20 MEQ extended release tablet Take 1 tablet by mouth 2 times daily TAKE WITH FUROSEMIDE. Dose increased 1/10/2025 180 tablet 3    XARELTO 20 MG TABS tablet Take 1 tablet by mouth daily (with breakfast) 90 tablet 3    vitamin B-12 (CYANOCOBALAMIN) 100 MCG tablet Take 0.5 tablets by mouth daily      ezetimibe (ZETIA) 10 MG tablet Take 1 tablet by mouth nightly 90 tablet 3    atorvastatin (LIPITOR) 80 MG tablet Take 1 tablet by mouth nightly 90 tablet 3    metoprolol tartrate (LOPRESSOR) 25 MG tablet TAKE 1 TABLET TWICE A  tablet 3    loperamide (RA ANTI-DIARRHEAL) 2 MG capsule Take 1 capsule by mouth daily (Patient taking differently: Take 1 capsule by mouth as needed for Diarrhea) 360 capsule 1    glucose monitoring kit Please supply Patient with a glucose monitoring kit that insurance will cover. Testing once a day 1 kit 0    Lancets 30G MISC Testing once a day.  Please dispense according to patients insurance. 100 each 3    blood glucose monitor strips Testing once a day.  Please dispense according to patients insurance. 100 strip 3    aspirin 81 MG EC tablet Take 1 tablet by mouth daily      Handicap Placard MISC by Does not apply route Can't walk greater than 200 feet. Expires in 5 years. 1 each 0    FreeStyle Lancets MISC USE TO TEST BLOOD SUGAR TWICE A  each 3    Lancet Device MISC For use with lancets for blood glucose checks 1 each 0    Blood Glucose Monitoring Suppl (FREESTYLE LITE) SARY Broken  0    nitroGLYCERIN (NITROSTAT) 0.4 MG SL tablet Place 1 tablet under the tongue every 5 minutes as needed for Chest pain 25 tablet 1    Biotin 300 MCG TABS Take 600 mcg by mouth every other day       vitamin D (ERGOCALCIFEROL) 1.25 MG (69454 UT) CAPS capsule Take 1 capsule by mouth once a week for

## 2025-05-01 ENCOUNTER — HOSPITAL ENCOUNTER (OUTPATIENT)
Age: 89
Discharge: HOME | End: 2025-05-01
Payer: MEDICARE

## 2025-05-01 ENCOUNTER — RESULTS FOLLOW-UP (OUTPATIENT)
Dept: FAMILY MEDICINE CLINIC | Age: 89
End: 2025-05-01

## 2025-05-01 ENCOUNTER — OFFICE VISIT (OUTPATIENT)
Dept: FAMILY MEDICINE CLINIC | Age: 89
End: 2025-05-01
Payer: MEDICARE

## 2025-05-01 VITALS
HEART RATE: 65 BPM | SYSTOLIC BLOOD PRESSURE: 134 MMHG | TEMPERATURE: 97.7 F | BODY MASS INDEX: 28.41 KG/M2 | DIASTOLIC BLOOD PRESSURE: 68 MMHG | RESPIRATION RATE: 16 BRPM | HEIGHT: 69 IN | WEIGHT: 191.8 LBS | OXYGEN SATURATION: 95 %

## 2025-05-01 DIAGNOSIS — N18.2 TYPE 2 DIABETES MELLITUS WITH STAGE 2 CHRONIC KIDNEY DISEASE, WITHOUT LONG-TERM CURRENT USE OF INSULIN (HCC): Primary | ICD-10-CM

## 2025-05-01 DIAGNOSIS — N18.2 BENIGN HYPERTENSION WITH CKD (CHRONIC KIDNEY DISEASE), STAGE II: ICD-10-CM

## 2025-05-01 DIAGNOSIS — Z91.81 AT HIGH RISK FOR FALLS: ICD-10-CM

## 2025-05-01 DIAGNOSIS — E11.22 TYPE 2 DIABETES MELLITUS WITH STAGE 2 CHRONIC KIDNEY DISEASE, WITHOUT LONG-TERM CURRENT USE OF INSULIN (HCC): Primary | ICD-10-CM

## 2025-05-01 DIAGNOSIS — Z85.038 PERSONAL HISTORY OF COLON CANCER: ICD-10-CM

## 2025-05-01 DIAGNOSIS — I12.9 BENIGN HYPERTENSION WITH CKD (CHRONIC KIDNEY DISEASE), STAGE II: ICD-10-CM

## 2025-05-01 DIAGNOSIS — M85.80 OSTEOPENIA DETERMINED BY X-RAY: ICD-10-CM

## 2025-05-01 DIAGNOSIS — I48.0 PAROXYSMAL ATRIAL FIBRILLATION (HCC): ICD-10-CM

## 2025-05-01 DIAGNOSIS — E55.9 VITAMIN D DEFICIENCY: ICD-10-CM

## 2025-05-01 DIAGNOSIS — M25.561 CHRONIC PAIN OF RIGHT KNEE: ICD-10-CM

## 2025-05-01 DIAGNOSIS — N18.2 TYPE 2 DIABETES MELLITUS WITH STAGE 2 CHRONIC KIDNEY DISEASE, WITHOUT LONG-TERM CURRENT USE OF INSULIN (HCC): ICD-10-CM

## 2025-05-01 DIAGNOSIS — I50.32 CHRONIC DIASTOLIC HEART FAILURE (HCC): ICD-10-CM

## 2025-05-01 DIAGNOSIS — E11.22 TYPE 2 DIABETES MELLITUS WITH STAGE 2 CHRONIC KIDNEY DISEASE, WITHOUT LONG-TERM CURRENT USE OF INSULIN (HCC): ICD-10-CM

## 2025-05-01 DIAGNOSIS — G89.29 CHRONIC PAIN OF RIGHT KNEE: ICD-10-CM

## 2025-05-01 LAB
25(OH)D3 SERPL-MCNC: 35.1 NG/ML (ref 30–100)
ALBUMIN SERPL-MCNC: 4.2 G/DL (ref 3.5–5.2)
ALP SERPL-CCNC: 50 U/L (ref 35–104)
ALT SERPL-CCNC: 18 U/L (ref 10–35)
ANION GAP SERPL CALCULATED.3IONS-SCNC: 12 MMOL/L (ref 9–16)
AST SERPL-CCNC: 22 U/L (ref 10–35)
BASOPHILS # BLD: 0.1 K/UL (ref 0–0.2)
BASOPHILS NFR BLD: 1 % (ref 0–2)
BILIRUB SERPL-MCNC: 1.7 MG/DL (ref 0–1.2)
BNP SERPL-MCNC: 254 PG/ML (ref 0–300)
BUN SERPL-MCNC: 14 MG/DL (ref 8–23)
CALCIUM SERPL-MCNC: 9.8 MG/DL (ref 8.6–10.4)
CHLORIDE SERPL-SCNC: 101 MMOL/L (ref 98–107)
CO2 SERPL-SCNC: 27 MMOL/L (ref 20–31)
CREAT SERPL-MCNC: 0.8 MG/DL (ref 0.7–1.2)
EOSINOPHIL # BLD: 0.2 K/UL (ref 0–0.4)
EOSINOPHILS RELATIVE PERCENT: 2 % (ref 0–4)
ERYTHROCYTE [DISTWIDTH] IN BLOOD BY AUTOMATED COUNT: 15.3 % (ref 11.5–14.9)
FOLATE SERPL-MCNC: 8.9 NG/ML (ref 4.8–24.2)
GFR, ESTIMATED: 70 ML/MIN/1.73M2
GLUCOSE SERPL-MCNC: 127 MG/DL (ref 74–99)
HBA1C MFR BLD: 8 %
HCT VFR BLD AUTO: 43.1 % (ref 36–46)
HGB BLD-MCNC: 14.4 G/DL (ref 12–16)
LYMPHOCYTES NFR BLD: 2.5 K/UL (ref 1–4.8)
LYMPHOCYTES RELATIVE PERCENT: 30 % (ref 24–44)
MAGNESIUM SERPL-MCNC: 2 MG/DL (ref 1.7–2.3)
MCH RBC QN AUTO: 30.5 PG (ref 26–34)
MCHC RBC AUTO-ENTMCNC: 33.4 G/DL (ref 31–37)
MCV RBC AUTO: 91.5 FL (ref 80–100)
MONOCYTES NFR BLD: 0.7 K/UL (ref 0.1–1.3)
MONOCYTES NFR BLD: 8 % (ref 1–7)
NEUTROPHILS NFR BLD: 59 % (ref 36–66)
NEUTS SEG NFR BLD: 4.9 K/UL (ref 1.3–9.1)
PHOSPHATE SERPL-MCNC: 3.3 MG/DL (ref 2.5–4.5)
PLATELET # BLD AUTO: 188 K/UL (ref 150–450)
PMV BLD AUTO: 8.6 FL (ref 6–12)
POTASSIUM SERPL-SCNC: 3.8 MMOL/L (ref 3.7–5.3)
PROT SERPL-MCNC: 7.2 G/DL (ref 6.6–8.7)
RBC # BLD AUTO: 4.71 M/UL (ref 4–5.2)
SODIUM SERPL-SCNC: 140 MMOL/L (ref 136–145)
TSH SERPL DL<=0.05 MIU/L-ACNC: 3.54 UIU/ML (ref 0.27–4.2)
URATE SERPL-MCNC: 5.2 MG/DL (ref 2.4–5.7)
VIT B12 SERPL-MCNC: 1859 PG/ML (ref 232–1245)
WBC OTHER # BLD: 8.3 K/UL (ref 3.5–11)

## 2025-05-01 PROCEDURE — 1090F PRES/ABSN URINE INCON ASSESS: CPT | Performed by: FAMILY MEDICINE

## 2025-05-01 PROCEDURE — 82746 ASSAY OF FOLIC ACID SERUM: CPT

## 2025-05-01 PROCEDURE — G8417 CALC BMI ABV UP PARAM F/U: HCPCS | Performed by: FAMILY MEDICINE

## 2025-05-01 PROCEDURE — 84550 ASSAY OF BLOOD/URIC ACID: CPT

## 2025-05-01 PROCEDURE — 1159F MED LIST DOCD IN RCRD: CPT | Performed by: FAMILY MEDICINE

## 2025-05-01 PROCEDURE — 82306 VITAMIN D 25 HYDROXY: CPT

## 2025-05-01 PROCEDURE — 83735 ASSAY OF MAGNESIUM: CPT

## 2025-05-01 PROCEDURE — 36415 COLL VENOUS BLD VENIPUNCTURE: CPT

## 2025-05-01 PROCEDURE — 3052F HG A1C>EQUAL 8.0%<EQUAL 9.0%: CPT | Performed by: FAMILY MEDICINE

## 2025-05-01 PROCEDURE — 83880 ASSAY OF NATRIURETIC PEPTIDE: CPT

## 2025-05-01 PROCEDURE — G8427 DOCREV CUR MEDS BY ELIG CLIN: HCPCS | Performed by: FAMILY MEDICINE

## 2025-05-01 PROCEDURE — 1036F TOBACCO NON-USER: CPT | Performed by: FAMILY MEDICINE

## 2025-05-01 PROCEDURE — 85025 COMPLETE CBC W/AUTO DIFF WBC: CPT

## 2025-05-01 PROCEDURE — 1160F RVW MEDS BY RX/DR IN RCRD: CPT | Performed by: FAMILY MEDICINE

## 2025-05-01 PROCEDURE — 84100 ASSAY OF PHOSPHORUS: CPT

## 2025-05-01 PROCEDURE — 82607 VITAMIN B-12: CPT

## 2025-05-01 PROCEDURE — 99214 OFFICE O/P EST MOD 30 MIN: CPT | Performed by: FAMILY MEDICINE

## 2025-05-01 PROCEDURE — 84443 ASSAY THYROID STIM HORMONE: CPT

## 2025-05-01 PROCEDURE — 83036 HEMOGLOBIN GLYCOSYLATED A1C: CPT | Performed by: FAMILY MEDICINE

## 2025-05-01 PROCEDURE — 1123F ACP DISCUSS/DSCN MKR DOCD: CPT | Performed by: FAMILY MEDICINE

## 2025-05-01 PROCEDURE — 80053 COMPREHEN METABOLIC PANEL: CPT

## 2025-05-01 RX ORDER — BNT162B2 0.23 MG/2.25ML
0.3 INJECTION, SUSPENSION INTRAMUSCULAR ONCE
Qty: 0.3 ML | Refills: 0 | Status: SHIPPED | OUTPATIENT
Start: 2025-05-01 | End: 2025-05-01

## 2025-05-01 NOTE — ASSESSMENT & PLAN NOTE
Chronic and stable, continue metoprolol 25 Mg twice a day for rate control, and chronic anticoagulation with Xarelto 20 Mg daily, follow-up with cardiology as scheduled

## 2025-05-01 NOTE — ASSESSMENT & PLAN NOTE
Chronic, stable, check labs, continue current medications  Lab Results   Component Value Date    LVEF 60 11/19/2024    LVEFMODE Echo 11/19/2024   Continue metoprolol 25 Mg twice a day, furosemide 60 Mg daily        Orders:    Brain Natriuretic Peptide; Future

## 2025-05-01 NOTE — ASSESSMENT & PLAN NOTE
Hypertension and well-controlled, continue current medications metoprolol 25 Mg twice a day, furosemide 60 Mg with potassium, amlodipine 2.5 Mg daily  Chronic kidney disease stage II improved from prior, will continue to monitor, new labs ordered  Orders:    CBC with Auto Differential; Future    Comprehensive Metabolic Panel; Future    Uric Acid; Future    TSH; Future    Magnesium; Future    Phosphorus; Future

## 2025-05-01 NOTE — ASSESSMENT & PLAN NOTE
Unsure if improving or not.  Will recheck level  Continue supplementation.      Orders:    Vitamin D 25 Hydroxy; Future

## 2025-05-01 NOTE — ASSESSMENT & PLAN NOTE
Chronic and failing to improve     Persistent right knee pain despite previous evaluations and knee replacements.  - Advised to use Bengay or Vicks three times daily and consider using a knee brace or wrap.  - Referral for physical therapy to improve stability and walking.  - Use of cane recommended to reduce pressure on the knee.  Orders:    [facility]

## 2025-05-01 NOTE — ASSESSMENT & PLAN NOTE
History of colon cancer and recent precancerous polyps.  - Last colonoscopy on 11/01/2023; recommendation for repeat colonoscopy this year.  - Referral to Dr. Martin for further evaluation and management.  - High risk for developing colon cancer again; advised to keep all appointments.  She says she will go for colonoscopy  Orders:    Harvey Hollingsworth MD, Gastroenterology, [place]

## 2025-05-01 NOTE — RESULT ENCOUNTER NOTE
Addressed during office visit today, POC A1c 8, worsening diabetes, fairly controlled diabetes for her age, continue treatment recommended during the office visit.  To stop drinking pop and cut down on sweets

## 2025-05-01 NOTE — PROGRESS NOTES
Pt is here for 3 month follow up on diabetes and HTN      Visit Information    Have you changed or started any medications since your last visit including any over-the-counter medicines, vitamins, or herbal medicines? no   Have you stopped taking any of your medications? Is so, why? -  no  Are you having any side effects from any of your medications? - no    Have you seen any other physician or provider since your last visit?  no   Have you had any other diagnostic tests since your last visit?  no   Have you been seen in the emergency room and/or had an admission in a hospital since we last saw you?  no   Have you had your routine dental cleaning in the past 6 months?  no     Do you have an active MyChart account? If no, what is the barrier?  Yes    Patient Care Team:  Joann Soriano MD as PCP - General (Family Medicine)  Joann Soriano MD as PCP - Empaneled Provider  Salo Pak MD as Consulting Physician (Ophthalmology)  Ortiz Hoffmann MD as Consulting Physician (Urology)  Johan Browning MD as Consulting Physician (Cardiovascular Disease)  Harvey Martin MD as Consulting Physician (Gastroenterology)  Abdirahman Garza MD as Consulting Physician (Hematology and Oncology)  Rae Christine MD as Consulting Physician (Cardiovascular Disease)    Medical History Review  Past Medical, Family, and Social History reviewed and does contribute to the patient presenting condition    Health Maintenance   Topic Date Due    Colorectal Cancer Screen  11/01/2024    COVID-19 Vaccine (8 - 2024-25 season) 10/24/2025 (Originally 9/1/2024)    Lipids  01/09/2026    Depression Screen  01/10/2026    Annual Wellness Visit (Medicare)  01/11/2026    DTaP/Tdap/Td vaccine (2 - Td or Tdap) 05/03/2033    Flu vaccine  Completed    Shingles vaccine  Completed    Pneumococcal 50+ years Vaccine  Completed    Respiratory Syncytial Virus (RSV) Pregnant or age 60 yrs+  Completed    Hepatitis A vaccine  Aged Out    Hepatitis B 
tongue every 5 minutes as needed for Chest pain 25 tablet 1    Biotin 300 MCG TABS Take 600 mcg by mouth every other day       ZINC PO Take by mouth (Patient not taking: Reported on 5/1/2025)      PEG 3350-KCl-NaBcb-NaCl-NaSulf (PEG-3350/ELECTROLYTES) 236 g SOLR MIX AND DRINK 4,000 ML ONCE FOR ONE DOSE (Patient not taking: Reported on 5/1/2025) 4000 mL 0    vitamin D (ERGOCALCIFEROL) 1.25 MG (24226 UT) CAPS capsule Take 1 capsule by mouth once a week for 12 doses 4 capsule 2    aspirin 81 MG EC tablet Take 1 tablet by mouth daily (Patient not taking: Reported on 5/1/2025)       No current facility-administered medications for this visit.        Objective     Vital signs within normal limits except overweight per BMI  Blood pressure 134/68, pulse 65, temperature 97.7 °F (36.5 °C), resp. rate 16, height 1.753 m (5' 9.02\"), weight 87 kg (191 lb 12.8 oz), SpO2 95%, not currently breastfeeding.  Body mass index is 28.31 kg/m².    Physical Exam  Vitals and nursing note reviewed.   Constitutional:       General: She is not in acute distress.     Appearance: Normal appearance. She is well-developed. She is not diaphoretic.   HENT:      Head: Normocephalic and atraumatic.      Right Ear: External ear normal.      Left Ear: External ear normal.      Nose: Nose normal.      Mouth/Throat:      Mouth: Mucous membranes are moist.   Eyes:      General: Lids are normal. No scleral icterus.        Right eye: No discharge.         Left eye: No discharge.      Extraocular Movements: Extraocular movements intact.      Conjunctiva/sclera: Conjunctivae normal.   Neck:      Thyroid: No thyromegaly.   Cardiovascular:      Rate and Rhythm: Normal rate and regular rhythm.      Heart sounds: Murmur heard.      Crescendo systolic murmur is present with a grade of 3/6.      Comments: Pacemaker left upper chest.  Advised to wear compression stockings  Pulmonary:      Effort: Pulmonary effort is normal. No respiratory distress.      Breath

## 2025-05-02 ENCOUNTER — TELEPHONE (OUTPATIENT)
Dept: GASTROENTEROLOGY | Age: 89
End: 2025-05-02

## 2025-05-02 NOTE — RESULT ENCOUNTER NOTE
Please notify patient: Blood glucose improved 127  Kidney disease stage II stable  1 liver test bilirubin levels mildly increased  Vitamin D improved but still borderline low continue vitamin D weekly for 3 months, take with food    Otherwise labs within normal limits  continue current treatment    Future Appointments  9/3/2025   3:15 PM    Joann Soriano MD    [facility] DEP  10/8/2025  1:00 PM    Abdirahman Garza MD         [facility]        [facility]  1/13/2026  3:00 PM    Joann Soriano MD    [facility] DEP

## 2025-05-04 PROBLEM — Z91.81 AT HIGH RISK FOR FALLS: Status: ACTIVE | Noted: 2025-05-04

## 2025-05-05 NOTE — ASSESSMENT & PLAN NOTE
Chronic type 2 diabetes mellitus, worsening, fairly controlled for her age  Chronic kidney disease stage II improved from prior, will continue to monitor.  Avoid dehydration and NSAIDs    Lab Results   Component Value Date    LABA1C 8.0 05/01/2025    LABA1C 7.6 (H) 01/09/2025    LABA1C 7.5 (H) 09/10/2024         - A1c increased from 7.6 to 8, indicating suboptimal control.  - Currently taking glimepiride 1 mg twice a day.  - Advised to reduce sugar intake and stop drinking soda to help lower blood sugar levels.  - Blood work ordered to monitor condition.    POC A1c 8, slightly worse than before.  If hypoglycemia develops, will cut down glimepiride, she understands    Orders:    POCT glycosylated hemoglobin (Hb A1C)    CBC with Auto Differential; Future    Comprehensive Metabolic Panel; Future    Uric Acid; Future    Vitamin B12 & Folate; Future    TSH; Future    Albumin/Creatinine Ratio, Urine; Future      Lab Results   Component Value Date    LABA1C 8.0 05/01/2025    LABA1C 7.6 (H) 01/09/2025    LABA1C 7.5 (H) 09/10/2024

## 2025-05-05 NOTE — ASSESSMENT & PLAN NOTE
Increasing fall risk, to start physical therapy, force precautions discussed    Orders:    [facility] - [facility]

## 2025-05-05 NOTE — ASSESSMENT & PLAN NOTE
Chronic, likely improving   Currently taking alendronate once a week for osteopenia  - Missed last dose; plans to resume the following week.  - No reported issues with alendronate.

## 2025-06-02 ENCOUNTER — OFFICE VISIT (OUTPATIENT)
Dept: GASTROENTEROLOGY | Age: 89
End: 2025-06-02
Payer: MEDICARE

## 2025-06-02 VITALS
DIASTOLIC BLOOD PRESSURE: 64 MMHG | WEIGHT: 187 LBS | BODY MASS INDEX: 27.6 KG/M2 | SYSTOLIC BLOOD PRESSURE: 127 MMHG | HEART RATE: 63 BPM

## 2025-06-02 DIAGNOSIS — R19.7 DIARRHEA, UNSPECIFIED TYPE: ICD-10-CM

## 2025-06-02 DIAGNOSIS — C18.9 ADENOCARCINOMA, COLON (HCC): ICD-10-CM

## 2025-06-02 DIAGNOSIS — K86.2 PANCREATIC CYST: ICD-10-CM

## 2025-06-02 DIAGNOSIS — E80.6 HYPERBILIRUBINEMIA: ICD-10-CM

## 2025-06-02 DIAGNOSIS — Z86.0100 HISTORY OF COLON POLYPS: Primary | ICD-10-CM

## 2025-06-02 DIAGNOSIS — K76.0 FATTY LIVER: ICD-10-CM

## 2025-06-02 PROCEDURE — 1160F RVW MEDS BY RX/DR IN RCRD: CPT | Performed by: PHYSICIAN ASSISTANT

## 2025-06-02 PROCEDURE — 1125F AMNT PAIN NOTED PAIN PRSNT: CPT | Performed by: PHYSICIAN ASSISTANT

## 2025-06-02 PROCEDURE — 99214 OFFICE O/P EST MOD 30 MIN: CPT | Performed by: PHYSICIAN ASSISTANT

## 2025-06-02 PROCEDURE — 1036F TOBACCO NON-USER: CPT | Performed by: PHYSICIAN ASSISTANT

## 2025-06-02 PROCEDURE — G8427 DOCREV CUR MEDS BY ELIG CLIN: HCPCS | Performed by: PHYSICIAN ASSISTANT

## 2025-06-02 PROCEDURE — 1090F PRES/ABSN URINE INCON ASSESS: CPT | Performed by: PHYSICIAN ASSISTANT

## 2025-06-02 PROCEDURE — G8417 CALC BMI ABV UP PARAM F/U: HCPCS | Performed by: PHYSICIAN ASSISTANT

## 2025-06-02 PROCEDURE — 1123F ACP DISCUSS/DSCN MKR DOCD: CPT | Performed by: PHYSICIAN ASSISTANT

## 2025-06-02 PROCEDURE — 1159F MED LIST DOCD IN RCRD: CPT | Performed by: PHYSICIAN ASSISTANT

## 2025-06-02 RX ORDER — POLYETHYLENE GLYCOL 3350, SODIUM SULFATE ANHYDROUS, SODIUM BICARBONATE, SODIUM CHLORIDE, POTASSIUM CHLORIDE 236; 22.74; 6.74; 5.86; 2.97 G/4L; G/4L; G/4L; G/4L; G/4L
4 POWDER, FOR SOLUTION ORAL ONCE
Qty: 1 ML | Refills: 0 | Status: SHIPPED | OUTPATIENT
Start: 2025-06-02 | End: 2025-06-02

## 2025-06-02 RX ORDER — BISACODYL 5 MG
TABLET, DELAYED RELEASE (ENTERIC COATED) ORAL
Qty: 4 TABLET | Refills: 0 | Status: SHIPPED | OUTPATIENT
Start: 2025-06-02

## 2025-06-02 RX ORDER — COLESTIPOL HYDROCHLORIDE 1 G/1
1 TABLET ORAL 2 TIMES DAILY
Qty: 60 TABLET | Refills: 3 | Status: SHIPPED | OUTPATIENT
Start: 2025-06-02

## 2025-06-02 NOTE — PROGRESS NOTES
GI CLINIC FOLLOW UP    INTERVAL HISTORY:   No referring provider defined for this encounter.    Chief Complaint   Patient presents with    Follow-up     1 year follow up 4/2024 Colonoscopy. Loss stool today, accidents.       HISTORY OF PRESENT ILLNESS: Ms.Betty EDEL House is a 89 y.o. female with CHF, CKD, CABG 2020, MGUS, referred for evaluation of hx colon ca, colon polyps/flat lesions, elevated AST, diarrhea, anemia, s/p anay.    Here for f/u  Extensive history of lower GI bleed from ulcer near the appendiceal orifice in 2024. Patient does have history of large flat lesions which removed endoscopically because she is not a surgical candidate. Pathology showing tubular adenomas. Recommended 1 year repeat due to the risk of the polyps (due 11/2024). She does take Xarelto    Reports she has frequent BMs with loose stool and sense of urgency  1 large BM most days, sometimes 2-3 total  Usually in the morning upon waking  States her bowels are the color of butterscotch  No abdominal pain  No anemia, normal platelets  Continues to follow with Dr Garza for monocolonal gammopathy of unknown significance (MGUS), b12 deficiency, and iron deficiency    Denies fever/chills, change in appetite, unintentional weight loss, dysphagia/odynophagia, n/v, heartburn, abd pain, constipation, black or bloody stools.       Last EGD: no prior    Last colonoscopy:   11/1/23 - The prep was fair.    Findings:     The lesion removed from the cecum last time is completely gone with a clip on the polypectomy site still there     Large flat lesion measured 3-1/2 cm long by 1 cm wide hiding behind a fold is in the ascending colon above the ileocecal valve in the medial aspect of the colon was very difficult to see, nevertheless we were able to work on it retroflex  I was able to injected and lifted up with epinephrine and then EMR remove it with stiff snare  Then I placed a clip on  I have a feeling that we removed with piecemeal fashion

## 2025-06-02 NOTE — TELEPHONE ENCOUNTER
Procedure scheduled/Dr Martin  Procedure:colonoscopy  Dx:hx of colon polyps-diarrhea  Date:11/5/25  Time: 9:30 am arriving at 7:30am  Hospital:[facility] phone call:tbd  Bowel Prep instructions given: Golyte-dulc  In office/via phone: office  Clearance needed:Xarelto       GLP-1: none

## 2025-06-05 ENCOUNTER — OFFICE VISIT (OUTPATIENT)
Dept: FAMILY MEDICINE CLINIC | Age: 89
End: 2025-06-05

## 2025-06-05 VITALS
TEMPERATURE: 98.7 F | SYSTOLIC BLOOD PRESSURE: 132 MMHG | DIASTOLIC BLOOD PRESSURE: 86 MMHG | OXYGEN SATURATION: 100 % | HEIGHT: 69 IN | BODY MASS INDEX: 28.29 KG/M2 | HEART RATE: 65 BPM | WEIGHT: 191 LBS

## 2025-06-05 DIAGNOSIS — E55.9 VITAMIN D DEFICIENCY: ICD-10-CM

## 2025-06-05 DIAGNOSIS — K52.9 CHRONIC DIARRHEA: ICD-10-CM

## 2025-06-05 DIAGNOSIS — I25.810 CORONARY ARTERY DISEASE INVOLVING CORONARY BYPASS GRAFT OF NATIVE HEART WITHOUT ANGINA PECTORIS: ICD-10-CM

## 2025-06-05 DIAGNOSIS — I48.0 PAROXYSMAL ATRIAL FIBRILLATION (HCC): ICD-10-CM

## 2025-06-05 DIAGNOSIS — N18.2 TYPE 2 DIABETES MELLITUS WITH STAGE 2 CHRONIC KIDNEY DISEASE, WITHOUT LONG-TERM CURRENT USE OF INSULIN (HCC): ICD-10-CM

## 2025-06-05 DIAGNOSIS — M54.31 SCIATICA, RIGHT SIDE: Primary | ICD-10-CM

## 2025-06-05 DIAGNOSIS — I50.32 CHRONIC DIASTOLIC HEART FAILURE (HCC): ICD-10-CM

## 2025-06-05 DIAGNOSIS — Z23 ENCOUNTER FOR IMMUNIZATION: ICD-10-CM

## 2025-06-05 DIAGNOSIS — I12.9 BENIGN HYPERTENSION WITH CKD (CHRONIC KIDNEY DISEASE), STAGE II: ICD-10-CM

## 2025-06-05 DIAGNOSIS — E11.22 TYPE 2 DIABETES MELLITUS WITH STAGE 2 CHRONIC KIDNEY DISEASE, WITHOUT LONG-TERM CURRENT USE OF INSULIN (HCC): ICD-10-CM

## 2025-06-05 DIAGNOSIS — N18.2 BENIGN HYPERTENSION WITH CKD (CHRONIC KIDNEY DISEASE), STAGE II: ICD-10-CM

## 2025-06-05 DIAGNOSIS — Z85.038 PERSONAL HISTORY OF COLON CANCER: ICD-10-CM

## 2025-06-05 RX ORDER — LOPERAMIDE HYDROCHLORIDE 2 MG/1
2 CAPSULE ORAL DAILY PRN
Qty: 90 CAPSULE | Refills: 1 | Status: SHIPPED | OUTPATIENT
Start: 2025-06-05

## 2025-06-05 RX ORDER — KETOROLAC TROMETHAMINE 30 MG/ML
30 INJECTION, SOLUTION INTRAMUSCULAR; INTRAVENOUS ONCE
Status: COMPLETED | OUTPATIENT
Start: 2025-06-05 | End: 2025-06-05

## 2025-06-05 RX ORDER — NITROGLYCERIN 0.4 MG/1
0.4 TABLET SUBLINGUAL EVERY 5 MIN PRN
Qty: 25 TABLET | Refills: 1 | Status: SHIPPED | OUTPATIENT
Start: 2025-06-05

## 2025-06-05 RX ORDER — ERGOCALCIFEROL 1.25 MG/1
50000 CAPSULE, LIQUID FILLED ORAL WEEKLY
Qty: 12 CAPSULE | Refills: 0 | Status: SHIPPED | OUTPATIENT
Start: 2025-06-05 | End: 2025-08-22

## 2025-06-05 RX ORDER — BNT162B2 0.23 MG/2.25ML
0.3 INJECTION, SUSPENSION INTRAMUSCULAR ONCE
Qty: 0.3 ML | Refills: 0 | Status: SHIPPED | OUTPATIENT
Start: 2025-06-05 | End: 2025-06-05

## 2025-06-05 RX ORDER — METHYLPREDNISOLONE 4 MG/1
TABLET ORAL
Qty: 1 KIT | Refills: 0 | Status: SHIPPED | OUTPATIENT
Start: 2025-06-05

## 2025-06-05 RX ADMIN — KETOROLAC TROMETHAMINE 30 MG: 30 INJECTION, SOLUTION INTRAMUSCULAR; INTRAVENOUS at 10:34

## 2025-06-05 SDOH — ECONOMIC STABILITY: FOOD INSECURITY: WITHIN THE PAST 12 MONTHS, THE FOOD YOU BOUGHT JUST DIDN'T LAST AND YOU DIDN'T HAVE MONEY TO GET MORE.: NEVER TRUE

## 2025-06-05 SDOH — ECONOMIC STABILITY: FOOD INSECURITY: WITHIN THE PAST 12 MONTHS, YOU WORRIED THAT YOUR FOOD WOULD RUN OUT BEFORE YOU GOT MONEY TO BUY MORE.: NEVER TRUE

## 2025-06-05 ASSESSMENT — ENCOUNTER SYMPTOMS
WHEEZING: 0
BACK PAIN: 1
CONSTIPATION: 0
NAUSEA: 0
CHEST TIGHTNESS: 0
COUGH: 0
ABDOMINAL DISTENTION: 0
SHORTNESS OF BREATH: 1
VOMITING: 0
DIARRHEA: 1
ABDOMINAL PAIN: 0

## 2025-06-05 ASSESSMENT — PATIENT HEALTH QUESTIONNAIRE - PHQ9
SUM OF ALL RESPONSES TO PHQ QUESTIONS 1-9: 0
SUM OF ALL RESPONSES TO PHQ QUESTIONS 1-9: 0
2. FEELING DOWN, DEPRESSED OR HOPELESS: NOT AT ALL
SUM OF ALL RESPONSES TO PHQ QUESTIONS 1-9: 0
1. LITTLE INTEREST OR PLEASURE IN DOING THINGS: NOT AT ALL
SUM OF ALL RESPONSES TO PHQ QUESTIONS 1-9: 0

## 2025-06-05 NOTE — ASSESSMENT & PLAN NOTE
Patient is overdue for colonoscopy  I encouraged her to do the colonoscopy, she will need clearance from cardiology  She is scheduled for colonoscopy 11/5/2025

## 2025-06-05 NOTE — ASSESSMENT & PLAN NOTE
Chronic, stable, on chronic anticoagulation, continue Xarelto 20 Mg daily, and metoprolol 25 Mg twice a day for rate control

## 2025-06-05 NOTE — ASSESSMENT & PLAN NOTE
Acute, not getting better     - The pain is likely due to a muscle strain in the piriformis muscle, which may be compressing the sciatic nerve due to spasms in the buttock. The pain is rated as 6 out of 10 and has been persistent for two weeks, with increased burning sensation over the past two to three days.  - She is currently on Xarelto 20 mg daily and has not missed any doses. Given her current medication regimen, including Xarelto 20 mg daily, it is not advisable to prescribe NSAIDs medications that could potentially cause bleeding  - A Toradol injection will be administered today for immediate pain relief. A Medrol pack will be prescribed, with instructions to avoid consuming sweets, soda, and juice for a period of 7 to 10 days.  - She is advised to elevate her legs while at home to manage the swelling. A referral for physical therapy will be made to help with muscle spasms.  Will avoid muscle relaxants due to very high risk of falls and sedation.  Opiate would be high risk medication for her   She has tried topical creams already and does not help.  Will start physical therapy. - A referral for physical therapy will be made to help with muscle spasms.  If pain does not get better then we will do imaging studies    Orders:    ketorolac (TORADOL) injection 30 mg    methylPREDNISolone (MEDROL, CARLOS,) 4 MG tablet; Take by mouth, with food. Keep low carb, low salt diet while taking it    [facility] - [facility]      Toradol was given during visit. Patient was observed in the office for 15 minutes after the administration of the medication. No reaction noted, she reports improvement of pain.

## 2025-06-05 NOTE — ASSESSMENT & PLAN NOTE
Chronic hypertension, at goal (stable), continue current treatment plan, continue with amlodipine 2.5 Mg daily, furosemide 60 Mg p.o. daily, metoprolol 25 Mg twice a day  Chronic kidney disease stage II, stable, okay to get Toradol  Will continue to monitor

## 2025-06-05 NOTE — ASSESSMENT & PLAN NOTE
Chronic, not improving  - She has been experiencing significant bowel issues and was advised to undergo another colonoscopy by her gastroenterologist. She had four colonoscopies last year.  - She is currently NOT taking Colestid as needed for diarrhea and zinc daily.  She did not  the new prescription of Colestid from the pharmacy, it was sent by GI a few days ago.    She is unsure if she has Imodium at home.  - She is advised to take Colestid as needed for diarrhea.  - A prescription for loperamide will be sent to her pharmacy.  Orders:    loperamide (RA ANTI-DIARRHEAL) 2 MG capsule; Take 1 capsule by mouth daily as needed for Diarrhea

## 2025-06-05 NOTE — ASSESSMENT & PLAN NOTE
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

## 2025-06-05 NOTE — PROGRESS NOTES
Visit Information    Have you changed or started any medications since your last visit including any over-the-counter medicines, vitamins, or herbal medicines? no   Have you stopped taking any of your medications? Is so, why? -  no  Are you having any side effects from any of your medications? - no    Have you seen any other physician or provider since your last visit?  yes -    Have you had any other diagnostic tests since your last visit?  no   Have you been seen in the emergency room and/or had an admission in a hospital since we last saw you?  no   Have you had your routine dental cleaning in the past 6 months?  yes -      Do you have an active MyChart account? If no, what is the barrier?  No: PENDING    Patient Care Team:  Joann Soriano MD as PCP - General (Family Medicine)  Joann Soriano MD as PCP - Empaneled Provider  Salo Pak MD as Consulting Physician (Ophthalmology)  Ortiz Hoffmann MD as Consulting Physician (Urology)  Johan Browning MD as Consulting Physician (Cardiovascular Disease)  Harvey Martin MD as Consulting Physician (Gastroenterology)  Abdirahman Garza MD as Consulting Physician (Hematology and Oncology)  Rae Christine MD as Consulting Physician (Cardiovascular Disease)    Medical History Review  Past Medical, Family, and Social History reviewed and does contribute to the patient presenting condition    Health Maintenance   Topic Date Due    COVID-19 Vaccine (8 - 2024-25 season) 09/01/2024    Colorectal Cancer Screen  11/01/2024    Lipids  01/09/2026    Depression Screen  01/10/2026    Annual Wellness Visit (Medicare)  01/11/2026    DTaP/Tdap/Td vaccine (2 - Td or Tdap) 05/03/2033    Flu vaccine  Completed    Shingles vaccine  Completed    Pneumococcal 50+ years Vaccine  Completed    Respiratory Syncytial Virus (RSV) Pregnant or age 60 yrs+  Completed    Hepatitis A vaccine  Aged Out    Hepatitis B vaccine  Aged Out    Hib vaccine  Aged Out    Polio vaccine  Aged 
fever.   HENT:  Positive for hearing loss (pending hearing aids).    Eyes:  Positive for visual disturbance.   Respiratory:  Positive for shortness of breath (BEE). Negative for cough, chest tightness and wheezing.    Cardiovascular:  Positive for leg swelling. Negative for chest pain and palpitations.   Gastrointestinal:  Positive for diarrhea. Negative for abdominal distention, abdominal pain, constipation, nausea and vomiting.   Genitourinary:  Positive for frequency and urgency.   Musculoskeletal:  Positive for arthralgias (right knee, right thigh), back pain (right lower back) and gait problem (on and off).   Allergic/Immunologic: Positive for immunocompromised state (MGUS).   Neurological:  Positive for numbness (feet). Negative for weakness.   Hematological:  Bruises/bleeds easily.   Psychiatric/Behavioral:  Positive for dysphoric mood (grief).             Current Outpatient Medications   Medication Sig Dispense Refill    colestipol (COLESTID) 1 g tablet  NOT TAKING IT Take 1 tablet by mouth 2 times daily 60 tablet 3    bisacodyl 5 MG EC tablet Take as directed by physician office for bowel prep 4 tablet 0    ZINC PO Take by mouth      PEG 3350-KCl-NaBcb-NaCl-NaSulf (PEG-3350/ELECTROLYTES) 236 g SOLR MIX AND DRINK 4,000 ML ONCE FOR ONE DOSE 4000 mL 0    diclofenac sodium (VOLTAREN) 1 % GEL Apply 2 g topically 4 times daily as needed for Pain (joint pains) Correction 200 g 0    lidocaine (LMX) 4 % cream 5 g topical 2-3 times a day as needed for pain, maximum 20 g/day 360 g 3    amoxicillin (AMOXIL) 500 MG capsule For dental procedures      glimepiride (AMARYL) 1 MG tablet Take 1 tablet by mouth in the morning and at bedtime Monitor blood glucose daily, if blood glucose below 80, stop taking it , take some orange juice immediately, and inform your doctor 180 tablet 3    allopurinol (ZYLOPRIM) 100 MG tablet Take 1 tablet by mouth 2 times daily 180 tablet 3    amLODIPine (NORVASC) 2.5 MG tablet Take 1 tablet by

## 2025-06-05 NOTE — ASSESSMENT & PLAN NOTE
Chronic type 2 diabetes mellitus and worsening, but fairly controlled for her age    - Her blood sugar was 153 this morning. Her hemoglobin A1c was 8 in 01/2025, indicating that her diabetes has worsened.  - She is advised to cut down on sweets, soda, and juice for 7 to 10 days.  - Medrol pack will be prescribed with instructions to avoid consuming sweets, soda, and juice for a period of 7 to 10 days due to taking Medrol pack.  She does not keep any diet at this time  Continue glimepiride 1 Mg twice a day  Lab Results   Component Value Date    LABA1C 8.0 05/01/2025    LABA1C 7.6 (H) 01/09/2025    LABA1C 7.5 (H) 09/10/2024     Will continue monitoring

## 2025-06-05 NOTE — ASSESSMENT & PLAN NOTE
Chronic, stable   She has nitroglycerin on hand but has not needed it since last year.  - A prescription for nitroglycerin will be sent to her pharmacy to ensure she has it available if needed.  - She is advised to elevate her legs while at home to manage the swelling.  - A referral for physical therapy will be made to help with muscle spasms.  Continue with amlodipine 2.5 Mg daily, metoprolol 25 Mg twice a day, Lipitor 80 Mg daily and Zetia 10 Mg daily  Orders:    nitroGLYCERIN (NITROSTAT) 0.4 MG SL tablet; Place 1 tablet under the tongue every 5 minutes as needed for Chest pain

## 2025-06-24 ENCOUNTER — TELEPHONE (OUTPATIENT)
Dept: FAMILY MEDICINE CLINIC | Age: 89
End: 2025-06-24

## 2025-06-24 NOTE — TELEPHONE ENCOUNTER
Patient is interested in going to assisted living, her son wants to come to the appointment with her but wants to know if he needs to get any kind of form that he needs to bring with him or what the protocol is to get her to be able to get in assisted living.

## 2025-06-24 NOTE — TELEPHONE ENCOUNTER
From what I know there are some papers that she will need to be filled, they should get from the assisted living she goes to.  From what I know she needs to visit around and decide to one of them.  Also to bring the forms at the appointment so we can complete them.  Please let the patient or grandson know    Future Appointments   Date Time Provider Department Center   6/26/2025  1:00 PM Zachary Steel, PT [facility]   7/8/2025  9:00 AM Joann Soriano MD fp [facility] DE[facility]   8/14/2025 10:50 AM Ortiz Hoffmann [facility]   9/2/2025 11:00 AM Lor Samson PA-C [place] [facility]   9/3/2025  3:15 PM Joann Soriano MD fp [facility] DEP   10/8/2025  1:00 PM Abdirahman Garza MD [facility]   10/29/2025 10:15 AM [facility] PAT RM 4 [facility] PAT [facility]   1/13/2026  3:00 PM Joann Soriano MD fp [facility] DEP

## 2025-06-26 ENCOUNTER — HOSPITAL ENCOUNTER (OUTPATIENT)
Dept: PHYSICAL THERAPY | Age: 89
Setting detail: THERAPIES SERIES
Discharge: HOME | End: 2025-06-26
Attending: FAMILY MEDICINE
Payer: MEDICARE

## 2025-06-26 PROCEDURE — 97162 PT EVAL MOD COMPLEX 30 MIN: CPT

## 2025-06-26 NOTE — FLOWSHEET NOTE
Karyn Fall Risk Assessment    Risk Factor Scale  Score   History of Falls [] Yes  [x] No 25  0 0   Secondary Diagnosis [] Yes  [x] No 15  0 0   Ambulatory Aid [] Furniture  [] Crutches/cane/walker  [x] None/bedrest/wheelchair/nurse 30  15  0 0   IV/Heparin Lock [] Yes  [x] No 20  0 0   Gait/Transferring [x] Impaired  [] Weak  [] Normal/bedrest/immobile 20  10  0 20   Mental Status [] Forgets limitations  [x] Oriented to own ability 15  0 0      Total: 20     Based on the Assessment score: check the appropriate box.    [x]  No intervention needed   Low =   Score of 0-24    []  Use standard prevention interventions Moderate =  Score of 24-44   [] Give patient handout and discuss fall prevention strategies   [] Establish goal of education for patient/family RE: fall prevention strategies    []  Use high risk prevention interventions High = Score of 45 and higher   [] Give patient handout and discuss fall prevention strategies   [] Establish goal of education for patient/family Re: fall prevention strategies   [] Discuss lifeline / other resources    Electronically signed by:   Zachary Steel PT DPT  Date: 6/26/2025

## 2025-06-26 NOTE — THERAPY EVALUATION
duty   [] Light duty   [] Off due to condition    [x]  Retired   [] Not employed   [] Disability  [] Other:  []  Return to work:   Work activities/duties      ADL/IADL Previous level of function Current level of function Who currently assists the patient with task   Bathing  [x] Independent  [] Assist [x] Independent  [] Assist    Dress/grooming [x] Independent  [] Assist [x] Independent  [] Assist    Transfer/mobility [x] Independent  [] Assist [x] Independent  [] Assist    Feeding [x] Independent  [] Assist [x] Independent  [] Assist    Toileting [x] Independent  [] Assist [x] Independent  [] Assist    Driving [x] Independent  [] Assist [x] Independent  [] Assist    Housekeeping [x] Independent  [] Assist [x] Independent  [] Assist    Grocery shop/meal prep [x] Independent  [] Assist [x] Independent  [] Assist      Gait Prior level of function Current level of function    [x] Independent  [] Assist [x] Independent  [] Assist   Device: [x] Independent [x] Independent    [] Straight Cane [] Quad cane [] Straight Cane [] Quad cane    [] Standard walker [] Rolling walker   [] 4 wheeled walker [] Standard walker [] Rolling walker   [] 4 wheeled walker    [] Wheelchair [] Wheelchair       Functional Test: Modified Oswestry Low Back Pain Disability Index  Score: Raw score of 26/50  which is 52 % functionally impaired      Pain  Pain:  [x] Yes   [] No      Location:  (R) buttocks with referred pain into the posterior thigh   Pain Rating: (0-10 scale) 5/10   Worst: 5/10   Best: 4/10   Symptoms:  [] Improving [x] Worsening       [] Same  Descriptors: Constant, burning   Aggravating factors: ADLs that trunk extension (standing, walking)   Relieving factors: ADLs that involve trunk flexion (sitting)   Sleep: Disturbed   Other:     Objective   OBSERVATION No Deficit Deficit Not Tested Comments   Posture       Forward Head [] [x] []    Rounded Shoulders [] [x] []    Kyphosis [] [x] []    Lordosis [] [x] [] Decreased    Lateral

## 2025-07-08 ENCOUNTER — OFFICE VISIT (OUTPATIENT)
Dept: FAMILY MEDICINE CLINIC | Age: 89
End: 2025-07-08

## 2025-07-08 VITALS
DIASTOLIC BLOOD PRESSURE: 70 MMHG | HEIGHT: 69 IN | BODY MASS INDEX: 28.14 KG/M2 | HEART RATE: 67 BPM | OXYGEN SATURATION: 97 % | WEIGHT: 190 LBS | TEMPERATURE: 97.2 F | SYSTOLIC BLOOD PRESSURE: 120 MMHG

## 2025-07-08 DIAGNOSIS — N18.2 BENIGN HYPERTENSION WITH CKD (CHRONIC KIDNEY DISEASE), STAGE II: ICD-10-CM

## 2025-07-08 DIAGNOSIS — I12.9 BENIGN HYPERTENSION WITH CKD (CHRONIC KIDNEY DISEASE), STAGE II: ICD-10-CM

## 2025-07-08 DIAGNOSIS — N18.2 TYPE 2 DIABETES MELLITUS WITH STAGE 2 CHRONIC KIDNEY DISEASE, WITHOUT LONG-TERM CURRENT USE OF INSULIN (HCC): Primary | ICD-10-CM

## 2025-07-08 DIAGNOSIS — R19.7 DIARRHEA, UNSPECIFIED TYPE: ICD-10-CM

## 2025-07-08 DIAGNOSIS — I50.32 CHRONIC DIASTOLIC HEART FAILURE (HCC): ICD-10-CM

## 2025-07-08 DIAGNOSIS — M17.0 PRIMARY OSTEOARTHRITIS OF BOTH KNEES: ICD-10-CM

## 2025-07-08 DIAGNOSIS — H91.93 BILATERAL HEARING LOSS, UNSPECIFIED HEARING LOSS TYPE: ICD-10-CM

## 2025-07-08 DIAGNOSIS — E11.22 TYPE 2 DIABETES MELLITUS WITH STAGE 2 CHRONIC KIDNEY DISEASE, WITHOUT LONG-TERM CURRENT USE OF INSULIN (HCC): Primary | ICD-10-CM

## 2025-07-08 DIAGNOSIS — H61.21 IMPACTED CERUMEN OF RIGHT EAR: ICD-10-CM

## 2025-07-08 RX ORDER — COLESTIPOL HYDROCHLORIDE 1 G/1
1 TABLET ORAL 2 TIMES DAILY
Qty: 180 TABLET | Refills: 3 | Status: SHIPPED | OUTPATIENT
Start: 2025-07-08

## 2025-07-08 ASSESSMENT — ENCOUNTER SYMPTOMS
COUGH: 0
NAUSEA: 0
BACK PAIN: 0
VOMITING: 0
ABDOMINAL DISTENTION: 0
CHEST TIGHTNESS: 0
CONSTIPATION: 0
SHORTNESS OF BREATH: 0
ABDOMINAL PAIN: 0
WHEEZING: 0
DIARRHEA: 1

## 2025-07-08 ASSESSMENT — PATIENT HEALTH QUESTIONNAIRE - PHQ9
2. FEELING DOWN, DEPRESSED OR HOPELESS: NOT AT ALL
SUM OF ALL RESPONSES TO PHQ QUESTIONS 1-9: 0
1. LITTLE INTEREST OR PLEASURE IN DOING THINGS: NOT AT ALL
SUM OF ALL RESPONSES TO PHQ QUESTIONS 1-9: 0

## 2025-07-08 NOTE — ASSESSMENT & PLAN NOTE
Chronic, stable, will continue to monitor  Continue with metoprolol 25 Mg twice a day     Lab Results   Component Value Date    LVEF 60 11/19/2024    LVEFMODE Echo 11/19/2024   Continue metoprolol 25 Mg twice a day, furosemide 60 Mg with potassium daily  Check labs      Orders:    Brain Natriuretic Peptide; Future

## 2025-07-08 NOTE — ASSESSMENT & PLAN NOTE
Orders:    colestipol (COLESTID) 1 g tablet; Take 1 tablet by mouth 2 times daily For diarrhea

## 2025-07-08 NOTE — ASSESSMENT & PLAN NOTE
Chronic and worsening   Hearing loss.  - Underwent hearing test a year ago, identified issue in one ear; nurse flushed it out, has not returned since then.  - Has not yet obtained hearing aids; referral to Dr. Moeller, ENT specialist, made for further evaluation of hearing loss and potential wax removal.    Orders:    Salena Castro MD, Otolaryngology, [place]    CRISTIAN - Guera Johnson AuD, Audiology, [place]

## 2025-07-08 NOTE — ASSESSMENT & PLAN NOTE
Chronic, patient does have history of prior partial colectomy, which likely contributes to her diarrhea   Continues to experience diarrhea, managed with medication; reports at least one bowel movement per day, which is better than before.  - Refill for colestipol sent to [facility].  Patient has history of colon cancer, pending colonoscopy  Orders:    colestipol (COLESTID) 1 g tablet; Take 1 tablet by mouth 2 times daily For diarrhea

## 2025-07-08 NOTE — ASSESSMENT & PLAN NOTE
Chronic hypertension, well-controlled, continue current treatment amlodipine 2.5 Mg daily, furosemide 60 Mg with potassium daily, metoprolol 25 Mg twice a day  Chronic kidney disease stage II stable, will continue to monitor, avoid NSAIDs and dehydration, check labs    Orders:    CBC; Future    Comprehensive Metabolic Panel; Future    TSH; Future    Uric Acid; Future    Magnesium; Future    Phosphorus; Future    Urinalysis with Microscopic; Future

## 2025-07-08 NOTE — ASSESSMENT & PLAN NOTE
Chronic type 2 diabetes mellitus, worsening, fairly controlled for her age group, continue current treatment, continue glimepiride 1 mg twice a day, she denies hypoglycemic events  Hemoglobin A1c was 8 at last visit, indicating poor control; blood sugar level was 157 this morning.  - Advised to maintain a low-salt and low-carb diet and limit soda intake to once a week.    Lab Results   Component Value Date    LABA1C 8.0 05/01/2025    LABA1C 7.6 (H) 01/09/2025    LABA1C 7.5 (H) 09/10/2024     Chronic kidney disease stage II, stable, will continue to monitor, avoid NSAIDs and dehydration, and check labs    Orders:    CBC; Future    Comprehensive Metabolic Panel; Future    TSH; Future    Uric Acid; Future    Vitamin B12 & Folate; Future

## 2025-07-08 NOTE — PROGRESS NOTES
vaccine  Aged Out    Meningococcal (ACWY) vaccine  Aged Out    Meningococcal B vaccine  Aged Out               
dentist on 08/17/2025.    Hemoglobin A1c worsening  Lab Results   Component Value Date    LABA1C 8.0 05/01/2025    LABA1C 7.6 (H) 01/09/2025    LABA1C 7.5 (H) 09/10/2024         Planning to go to [facility]    PHQ-2 Over the past 2 weeks, how often have you been bothered by any of the following problems?  Little interest or pleasure in doing things: Not at all  Feeling down, depressed, or hopeless: Not at all  PHQ-2 Score: 0  PHQ-9 Over the past 2 weeks, how often have you been bothered by any of the following problems?  PHQ-9 Total Score: 0  PHQ-9 Total Score: 0        7/8/2025     9:02 AM 6/5/2025     9:56 AM 1/10/2025     1:38 PM 9/10/2024    10:52 AM 5/9/2024    11:28 AM 1/9/2024     9:47 AM 8/29/2023     2:46 PM   PHQ Scores   PHQ2 Score 0 0 0 0 0 0 0   PHQ9 Score 0 0 0 0 0 0 0     Interpretation of Total Score Depression Severity: 1-4 = Minimal depression, 5-9 = Mild depression, 10-14 = Moderate depression, 15-19 = Moderately severe depression, 20-27 = Severe depression      Review of Systems   Constitutional:  Positive for fatigue. Negative for activity change, appetite change, chills, diaphoresis, fever and unexpected weight change.   HENT:  Positive for hearing loss (pending hearing aids).    Eyes:  Positive for visual disturbance.   Respiratory:  Negative for cough, chest tightness, shortness of breath and wheezing.    Cardiovascular:  Positive for leg swelling. Negative for chest pain and palpitations.   Gastrointestinal:  Positive for diarrhea. Negative for abdominal distention, abdominal pain, constipation, nausea and vomiting.   Genitourinary:  Positive for frequency and urgency. Negative for difficulty urinating.   Musculoskeletal:  Positive for arthralgias (knees) and gait problem. Negative for back pain.   Allergic/Immunologic: Positive for immunocompromised state (MGUS, advanced age, diabetes).   Neurological:  Positive for numbness (feet). Negative for weakness.   Hematological:

## 2025-07-12 ENCOUNTER — TELEPHONE (OUTPATIENT)
Dept: FAMILY MEDICINE CLINIC | Age: 89
End: 2025-07-12

## 2025-07-12 PROBLEM — M17.0 PRIMARY OSTEOARTHRITIS OF BOTH KNEES: Status: ACTIVE | Noted: 2025-07-12

## 2025-07-12 NOTE — TELEPHONE ENCOUNTER
Please cancel 9/3/2025, patient agreed  Return in about 3 months (around 10/8/2025) for Visit type extended 30 minutes chronic problems.    Cancel 9/3

## 2025-07-12 NOTE — ASSESSMENT & PLAN NOTE
Chronic and worsening due to wear-and-tear nature of the disease  Reports leg pain has improved, but experienced severe knee pain during the night and redness in one foot.  - Does not experience shortness of breath when walking from the parking lot or climbing stairs; owns a walker but does not use it regularly.  - Attended physical therapy once last week and is awaiting a call to schedule further sessions; uses Voltaren gel for knees and wears support hose during winter months.  - Prescription for diclofenac cream sent to [facility]; advised to wear compression stockings with a pressure of 20-30 mmHg upon waking up in the morning, to help with bilateral leg edema, and encouraged to use walker to prevent falls.    Orders:    diclofenac sodium (VOLTAREN) 1 % GEL; Apply 2 g topically 4 times daily as needed for Pain (joint pains) Correction

## 2025-07-14 NOTE — TELEPHONE ENCOUNTER
Noted  Future Appointments   Date Time Provider Department Center   7/16/2025  2:45 PM Susan Ley, PTA [facility]   7/23/2025  1:15 PM Susan Ley, PTA [facility]   8/14/2025 10:50 AM Ortiz Hoffmann MD [facility]   9/2/2025 11:00 AM Lor Samson, [facility]   10/8/2025  1:00 PM Abdirahman Garza MD [facility]   10/29/2025 10:15 AM [facility] PAT RM 4 [facility] PAT [facility]   11/25/2025  1:30 PM Joann Soriano MD fp [facility] DEP   1/13/2026  3:00 PM Joann Soriano MD fp [facility] DEP

## 2025-07-16 ENCOUNTER — HOSPITAL ENCOUNTER (OUTPATIENT)
Dept: PHYSICAL THERAPY | Age: 89
Setting detail: THERAPIES SERIES
Discharge: HOME | End: 2025-07-16
Attending: FAMILY MEDICINE
Payer: MEDICARE

## 2025-07-16 PROCEDURE — 97110 THERAPEUTIC EXERCISES: CPT

## 2025-07-16 NOTE — FLOWSHEET NOTE
[facility]   Outpatient Rehabilitation & Therapy  [address]  P: [phone]   F: [phone]    Physical Therapy Daily Treatment Note      Date:  2025  Patient Name:  Laura House    :  12/10/1933  MRN: [id number]  Physician: Joann Soriano MD                    Medical Diagnosis: Sciatica, right side (M54.31)                   Clinical Diagnosis: (R) buttock pain (M54.3) with decreased range of motion (Z74.0) and walking difficulty (R26.2)   Onset date: 2025           Next Dr's appt.: TBD   PT Visit Information  PT Insurance Information: [facility] Medicare - $50.00 co-pay, based on medical necessity, no hard max (authorization: 12 visits approved, valid  - )   Total # of Visits Approved: 12  Total # of Visits to Date: 1  No Show: 0    Subjective:    Pt reports achiness in posterior thigh has improved since initial injury.    Pain:  [x] Yes   [] No       Location:  (R) buttocks with referred pain into the posterior thigh   Pain Rating: (0-10 scale) 5/10   Worst: 5/10   Best: 4/10   Pain altered Tx:  [x] No  [] Yes  Action:  Comments:    Objective:  Modalities:   Precautions: Cancer, CAD, CHF, DM   Exercises:  Exercise Reps/ Time Weight/ Level Completed  Today Comments   Mat Level        TA activation  10x 3\" hold      Hook-lying alternating marches  10x 2      Clamshells  ADD            Passive stretching   (B) IT Band Stretch 30 secs x 3 reps    (B) Hamstring 30 secs x 3 reps    (R) Quad 30 secs x 3 reps    (B) Gastroc 30 secs x 3 reps    (B) Piriformis Tightness 30 secs x 2 reps     Specific Instructions for next treatment: Continue with passive stretching. Open chain core/LE strengthening using gravity neutral and gravity resisted positions.     Assessment:  First treatment after eval. Treatment began with passive stretching to reduce muscular tension that is currently present and to promote ROM. Initiated core strengthening exercises to improve ease of ADLs and

## (undated) DEVICE — ENDO KIT W/SYRINGE: Brand: MEDLINE INDUSTRIES, INC.

## (undated) DEVICE — GLOVE ORANGE PI 7 1/2   MSG9075

## (undated) DEVICE — PENCIL ELECSURG BPLR 18 GA DISP

## (undated) DEVICE — TOWEL,OR,DSP,ST,WHITE,DLX,2/PK,40PK/CS: Brand: MEDLINE

## (undated) DEVICE — GLOVE SURG SZ 75 STD WHT LTX SYN POLYMER BEAD REINF ANTI RL

## (undated) DEVICE — MICROSURGICAL INSTRUMENT ANTERIOR CHAMBER CANNULA 27GA: Brand: ALCON

## (undated) DEVICE — POLYP TRAP: Brand: TRAPEASE®

## (undated) DEVICE — ERBE NESSY® OMEGA PLATE USA (85+23)CM² , WITH CABLE 3 M: Brand: ERBE

## (undated) DEVICE — THE MONARCH® "B" CARTRIDGE IS A SINGLE-USE POLYPROPYLENE CARTRIDGE FOR POSTERIOR CHAMBER IOL DELIVERY: Brand: MONARCH® II

## (undated) DEVICE — GOWN,SIRUS,NONRNF,XLN/XL,20/CS: Brand: MEDLINE

## (undated) DEVICE — DEFENDO AIR WATER SUCTION AND BIOPSY VALVE KIT FOR  OLYMPUS: Brand: DEFENDO AIR/WATER/SUCTION AND BIOPSY VALVE

## (undated) DEVICE — SNARE ENDOSCP L240CM LOOP W13MM DIA2.4MM SHT THROW SM OVL

## (undated) DEVICE — FORCEPS BX L240CM WRK CHN 2.8MM STD CAP W/ NDL MIC MESH

## (undated) DEVICE — GOWN,AURORA,NONREINFORCED,LARGE: Brand: MEDLINE

## (undated) DEVICE — CLEARCUT® SLIT KNIFE DUAL BEVEL 2.75MM ANGLED: Brand: CLEARCUT®

## (undated) DEVICE — FORCEPS BX L L240CM DIA2.4MM RAD JAW 4 HOT FOR POLYP DISP

## (undated) DEVICE — MARKER,SKIN,WI/RULER AND LABELS: Brand: MEDLINE

## (undated) DEVICE — GLOVE ORTHO 8   MSG9480

## (undated) DEVICE — SNARE ENDOSCP AD L240CM LOOP W10MM SHTH DIA2.4MM RND INSUL

## (undated) DEVICE — SNARE ENDOSCP L240CM W15MM SHTH DIA2.4MM CHN 2.8MM STIFF

## (undated) DEVICE — NEEDLE SCLERO 25GA L240CM OD0.51MM ID0.24MM EXTN L4MM SHTH

## (undated) DEVICE — SOLUTION IRRIGATION BAL SALT SOLUTION 500 ML STRL BSS

## (undated) DEVICE — GLOVE SURG SZ 75 CRM LTX FREE POLYISOPRENE POLYMER BEAD ANTI

## (undated) DEVICE — ST CHARLES CYSTO PACK: Brand: MEDLINE INDUSTRIES, INC.

## (undated) DEVICE — SNARE ENDOSCP L240CM OD2.4MM LOOP W25MM RND INSUL STIFF

## (undated) DEVICE — CATHETER URET 5FR L70CM TIP 8FR OPN END CONE TIP INJ HUB

## (undated) DEVICE — PACK CATARACT SURGI+CARE CUSTOM

## (undated) DEVICE — BITEBLOCK 54FR W/ DENT RIM BLOX

## (undated) DEVICE — CORD,CAUTERY,BIPOLAR,STERILE: Brand: MEDLINE

## (undated) DEVICE — STRAP,POSITIONING,KNEE/BODY,FOAM,4X60": Brand: MEDLINE

## (undated) DEVICE — STRAP POS MP 30X3 IN HK LOOP CLOSURE FOAM DISP

## (undated) DEVICE — SOLUTION IV IRRIG WATER 1000ML POUR BRL 2F7114

## (undated) DEVICE — SATINCRESCENT® KNIFE ANGLED BEVEL UP: Brand: SATINCRESCENT®

## (undated) DEVICE — SWABSTICK MEDICATED 10% POVIDONE IOD PVP TRIPE ANTISEP PREP 3 PER PK